# Patient Record
Sex: MALE | Race: WHITE | NOT HISPANIC OR LATINO | Employment: FULL TIME | URBAN - METROPOLITAN AREA
[De-identification: names, ages, dates, MRNs, and addresses within clinical notes are randomized per-mention and may not be internally consistent; named-entity substitution may affect disease eponyms.]

---

## 2019-10-09 ENCOUNTER — APPOINTMENT (OUTPATIENT)
Dept: RADIOLOGY | Facility: MEDICAL CENTER | Age: 59
End: 2019-10-09
Attending: STUDENT IN AN ORGANIZED HEALTH CARE EDUCATION/TRAINING PROGRAM
Payer: COMMERCIAL

## 2019-10-09 ENCOUNTER — APPOINTMENT (OUTPATIENT)
Dept: RADIOLOGY | Facility: MEDICAL CENTER | Age: 59
End: 2019-10-09
Attending: EMERGENCY MEDICINE
Payer: COMMERCIAL

## 2019-10-09 ENCOUNTER — HOSPITAL ENCOUNTER (OUTPATIENT)
Facility: MEDICAL CENTER | Age: 59
End: 2019-10-11
Attending: EMERGENCY MEDICINE | Admitting: INTERNAL MEDICINE
Payer: COMMERCIAL

## 2019-10-09 DIAGNOSIS — L03.115 CELLULITIS OF RIGHT LOWER EXTREMITY: ICD-10-CM

## 2019-10-09 DIAGNOSIS — L03.114 CELLULITIS OF LEFT UPPER EXTREMITY: ICD-10-CM

## 2019-10-09 PROBLEM — E87.1 HYPONATREMIA: Status: ACTIVE | Noted: 2019-10-09

## 2019-10-09 PROBLEM — F12.10 CANNABIS USE DISORDER, MILD, ABUSE: Chronic | Status: ACTIVE | Noted: 2019-10-09

## 2019-10-09 PROBLEM — F15.10 METHAMPHETAMINE USE (HCC): Status: ACTIVE | Noted: 2019-10-09

## 2019-10-09 PROBLEM — Z59.00 HOMELESS: Status: ACTIVE | Noted: 2019-10-09

## 2019-10-09 LAB
ALBUMIN SERPL BCP-MCNC: 4.1 G/DL (ref 3.2–4.9)
ALBUMIN/GLOB SERPL: 1.6 G/DL
ALP SERPL-CCNC: 72 U/L (ref 30–99)
ALT SERPL-CCNC: 10 U/L (ref 2–50)
AMPHET UR QL SCN: POSITIVE
ANION GAP SERPL CALC-SCNC: 8 MMOL/L (ref 0–11.9)
APPEARANCE UR: CLEAR
AST SERPL-CCNC: 16 U/L (ref 12–45)
BACTERIA #/AREA URNS HPF: NEGATIVE /HPF
BARBITURATES UR QL SCN: NEGATIVE
BASOPHILS # BLD AUTO: 0.5 % (ref 0–1.8)
BASOPHILS # BLD: 0.04 K/UL (ref 0–0.12)
BENZODIAZ UR QL SCN: NEGATIVE
BILIRUB SERPL-MCNC: 0.8 MG/DL (ref 0.1–1.5)
BILIRUB UR QL STRIP.AUTO: NEGATIVE
BUN SERPL-MCNC: 14 MG/DL (ref 8–22)
BZE UR QL SCN: NEGATIVE
CALCIUM SERPL-MCNC: 9.1 MG/DL (ref 8.5–10.5)
CANNABINOIDS UR QL SCN: POSITIVE
CHLORIDE SERPL-SCNC: 101 MMOL/L (ref 96–112)
CK SERPL-CCNC: 37 U/L (ref 0–154)
CO2 SERPL-SCNC: 24 MMOL/L (ref 20–33)
COLOR UR: YELLOW
CREAT SERPL-MCNC: 0.64 MG/DL (ref 0.5–1.4)
CRP SERPL HS-MCNC: 7.69 MG/DL (ref 0–0.75)
EOSINOPHIL # BLD AUTO: 0.17 K/UL (ref 0–0.51)
EOSINOPHIL NFR BLD: 1.9 % (ref 0–6.9)
EPI CELLS #/AREA URNS HPF: NEGATIVE /HPF
ERYTHROCYTE [DISTWIDTH] IN BLOOD BY AUTOMATED COUNT: 51.7 FL (ref 35.9–50)
ERYTHROCYTE [SEDIMENTATION RATE] IN BLOOD BY WESTERGREN METHOD: 27 MM/HOUR (ref 0–20)
EST. AVERAGE GLUCOSE BLD GHB EST-MCNC: 105 MG/DL
GLOBULIN SER CALC-MCNC: 2.6 G/DL (ref 1.9–3.5)
GLUCOSE SERPL-MCNC: 134 MG/DL (ref 65–99)
GLUCOSE UR STRIP.AUTO-MCNC: NEGATIVE MG/DL
GRAM STN SPEC: NORMAL
HBA1C MFR BLD: 5.3 % (ref 0–5.6)
HCT VFR BLD AUTO: 43 % (ref 42–52)
HGB BLD-MCNC: 14 G/DL (ref 14–18)
HYALINE CASTS #/AREA URNS LPF: ABNORMAL /LPF
IMM GRANULOCYTES # BLD AUTO: 0.05 K/UL (ref 0–0.11)
IMM GRANULOCYTES NFR BLD AUTO: 0.6 % (ref 0–0.9)
KETONES UR STRIP.AUTO-MCNC: NEGATIVE MG/DL
LACTATE BLD-SCNC: 1.7 MMOL/L (ref 0.5–2)
LEUKOCYTE ESTERASE UR QL STRIP.AUTO: ABNORMAL
LYMPHOCYTES # BLD AUTO: 1.12 K/UL (ref 1–4.8)
LYMPHOCYTES NFR BLD: 12.7 % (ref 22–41)
MCH RBC QN AUTO: 29 PG (ref 27–33)
MCHC RBC AUTO-ENTMCNC: 32.6 G/DL (ref 33.7–35.3)
MCV RBC AUTO: 89 FL (ref 81.4–97.8)
METHADONE UR QL SCN: NEGATIVE
MICRO URNS: ABNORMAL
MONOCYTES # BLD AUTO: 0.8 K/UL (ref 0–0.85)
MONOCYTES NFR BLD AUTO: 9 % (ref 0–13.4)
NEUTROPHILS # BLD AUTO: 6.67 K/UL (ref 1.82–7.42)
NEUTROPHILS NFR BLD: 75.3 % (ref 44–72)
NITRITE UR QL STRIP.AUTO: NEGATIVE
NRBC # BLD AUTO: 0 K/UL
NRBC BLD-RTO: 0 /100 WBC
OPIATES UR QL SCN: POSITIVE
OXYCODONE UR QL SCN: NEGATIVE
PCP UR QL SCN: NEGATIVE
PH UR STRIP.AUTO: 6.5 [PH] (ref 5–8)
PLATELET # BLD AUTO: 234 K/UL (ref 164–446)
PMV BLD AUTO: 9.2 FL (ref 9–12.9)
POTASSIUM SERPL-SCNC: 3.8 MMOL/L (ref 3.6–5.5)
PROPOXYPH UR QL SCN: NEGATIVE
PROT SERPL-MCNC: 6.7 G/DL (ref 6–8.2)
PROT UR QL STRIP: NEGATIVE MG/DL
RBC # BLD AUTO: 4.83 M/UL (ref 4.7–6.1)
RBC # URNS HPF: ABNORMAL /HPF
RBC UR QL AUTO: NEGATIVE
SIGNIFICANT IND 70042: NORMAL
SITE SITE: NORMAL
SODIUM SERPL-SCNC: 133 MMOL/L (ref 135–145)
SOURCE SOURCE: NORMAL
SP GR UR STRIP.AUTO: 1.01
UROBILINOGEN UR STRIP.AUTO-MCNC: 1 MG/DL
WBC # BLD AUTO: 8.9 K/UL (ref 4.8–10.8)
WBC #/AREA URNS HPF: ABNORMAL /HPF

## 2019-10-09 PROCEDURE — G0378 HOSPITAL OBSERVATION PER HR: HCPCS

## 2019-10-09 PROCEDURE — 85025 COMPLETE CBC W/AUTO DIFF WBC: CPT

## 2019-10-09 PROCEDURE — 71045 X-RAY EXAM CHEST 1 VIEW: CPT

## 2019-10-09 PROCEDURE — 700117 HCHG RX CONTRAST REV CODE 255: Performed by: STUDENT IN AN ORGANIZED HEALTH CARE EDUCATION/TRAINING PROGRAM

## 2019-10-09 PROCEDURE — 700101 HCHG RX REV CODE 250: Performed by: EMERGENCY MEDICINE

## 2019-10-09 PROCEDURE — 87070 CULTURE OTHR SPECIMN AEROBIC: CPT | Mod: XU

## 2019-10-09 PROCEDURE — 87186 SC STD MICRODIL/AGAR DIL: CPT

## 2019-10-09 PROCEDURE — 86140 C-REACTIVE PROTEIN: CPT

## 2019-10-09 PROCEDURE — 82550 ASSAY OF CK (CPK): CPT

## 2019-10-09 PROCEDURE — 96368 THER/DIAG CONCURRENT INF: CPT

## 2019-10-09 PROCEDURE — 96366 THER/PROPH/DIAG IV INF ADDON: CPT

## 2019-10-09 PROCEDURE — 700105 HCHG RX REV CODE 258: Performed by: EMERGENCY MEDICINE

## 2019-10-09 PROCEDURE — 80053 COMPREHEN METABOLIC PANEL: CPT

## 2019-10-09 PROCEDURE — 96365 THER/PROPH/DIAG IV INF INIT: CPT | Mod: XU

## 2019-10-09 PROCEDURE — 700105 HCHG RX REV CODE 258: Performed by: STUDENT IN AN ORGANIZED HEALTH CARE EDUCATION/TRAINING PROGRAM

## 2019-10-09 PROCEDURE — 73130 X-RAY EXAM OF HAND: CPT | Mod: LT

## 2019-10-09 PROCEDURE — 700111 HCHG RX REV CODE 636 W/ 250 OVERRIDE (IP): Performed by: EMERGENCY MEDICINE

## 2019-10-09 PROCEDURE — 36415 COLL VENOUS BLD VENIPUNCTURE: CPT

## 2019-10-09 PROCEDURE — 96375 TX/PRO/DX INJ NEW DRUG ADDON: CPT

## 2019-10-09 PROCEDURE — 700111 HCHG RX REV CODE 636 W/ 250 OVERRIDE (IP): Performed by: STUDENT IN AN ORGANIZED HEALTH CARE EDUCATION/TRAINING PROGRAM

## 2019-10-09 PROCEDURE — 83605 ASSAY OF LACTIC ACID: CPT

## 2019-10-09 PROCEDURE — 83036 HEMOGLOBIN GLYCOSYLATED A1C: CPT

## 2019-10-09 PROCEDURE — 87077 CULTURE AEROBIC IDENTIFY: CPT

## 2019-10-09 PROCEDURE — A9270 NON-COVERED ITEM OR SERVICE: HCPCS | Performed by: STUDENT IN AN ORGANIZED HEALTH CARE EDUCATION/TRAINING PROGRAM

## 2019-10-09 PROCEDURE — 87040 BLOOD CULTURE FOR BACTERIA: CPT

## 2019-10-09 PROCEDURE — 96372 THER/PROPH/DIAG INJ SC/IM: CPT

## 2019-10-09 PROCEDURE — 99407 BEHAV CHNG SMOKING > 10 MIN: CPT | Performed by: INTERNAL MEDICINE

## 2019-10-09 PROCEDURE — 81001 URINALYSIS AUTO W/SCOPE: CPT | Mod: XU

## 2019-10-09 PROCEDURE — 85652 RBC SED RATE AUTOMATED: CPT

## 2019-10-09 PROCEDURE — 96367 TX/PROPH/DG ADDL SEQ IV INF: CPT

## 2019-10-09 PROCEDURE — 73201 CT UPPER EXTREMITY W/DYE: CPT | Mod: LT

## 2019-10-09 PROCEDURE — 87086 URINE CULTURE/COLONY COUNT: CPT

## 2019-10-09 PROCEDURE — 700102 HCHG RX REV CODE 250 W/ 637 OVERRIDE(OP): Performed by: STUDENT IN AN ORGANIZED HEALTH CARE EDUCATION/TRAINING PROGRAM

## 2019-10-09 PROCEDURE — 80307 DRUG TEST PRSMV CHEM ANLYZR: CPT

## 2019-10-09 PROCEDURE — 99220 PR INITIAL OBSERVATION CARE,LEVL III: CPT | Mod: GC | Performed by: INTERNAL MEDICINE

## 2019-10-09 PROCEDURE — 99285 EMERGENCY DEPT VISIT HI MDM: CPT

## 2019-10-09 PROCEDURE — 87205 SMEAR GRAM STAIN: CPT

## 2019-10-09 RX ORDER — NICOTINE 21 MG/24HR
21 PATCH, TRANSDERMAL 24 HOURS TRANSDERMAL
Status: DISCONTINUED | OUTPATIENT
Start: 2019-10-09 | End: 2019-10-11 | Stop reason: HOSPADM

## 2019-10-09 RX ORDER — SULFAMETHOXAZOLE AND TRIMETHOPRIM 800; 160 MG/1; MG/1
2 TABLET ORAL EVERY 12 HOURS
Qty: 40 TAB | Refills: 0 | Status: ON HOLD | OUTPATIENT
Start: 2019-10-09 | End: 2019-10-11

## 2019-10-09 RX ORDER — CEPHALEXIN 500 MG/1
500 CAPSULE ORAL 4 TIMES DAILY
Qty: 40 CAP | Refills: 0 | Status: ON HOLD | OUTPATIENT
Start: 2019-10-09 | End: 2019-10-11 | Stop reason: SDUPTHER

## 2019-10-09 RX ORDER — SODIUM CHLORIDE, SODIUM LACTATE, POTASSIUM CHLORIDE, AND CALCIUM CHLORIDE .6; .31; .03; .02 G/100ML; G/100ML; G/100ML; G/100ML
30 INJECTION, SOLUTION INTRAVENOUS ONCE
Status: COMPLETED | OUTPATIENT
Start: 2019-10-09 | End: 2019-10-09

## 2019-10-09 RX ORDER — SODIUM CHLORIDE 9 MG/ML
INJECTION, SOLUTION INTRAVENOUS CONTINUOUS
Status: DISCONTINUED | OUTPATIENT
Start: 2019-10-09 | End: 2019-10-10

## 2019-10-09 RX ORDER — POLYETHYLENE GLYCOL 3350 17 G/17G
1 POWDER, FOR SOLUTION ORAL
Status: DISCONTINUED | OUTPATIENT
Start: 2019-10-09 | End: 2019-10-11 | Stop reason: HOSPADM

## 2019-10-09 RX ORDER — BISACODYL 10 MG
10 SUPPOSITORY, RECTAL RECTAL
Status: DISCONTINUED | OUTPATIENT
Start: 2019-10-09 | End: 2019-10-11 | Stop reason: HOSPADM

## 2019-10-09 RX ORDER — AMOXICILLIN 250 MG
2 CAPSULE ORAL 2 TIMES DAILY
Status: DISCONTINUED | OUTPATIENT
Start: 2019-10-09 | End: 2019-10-11 | Stop reason: HOSPADM

## 2019-10-09 RX ORDER — KETOROLAC TROMETHAMINE 30 MG/ML
10 INJECTION, SOLUTION INTRAMUSCULAR; INTRAVENOUS EVERY 6 HOURS PRN
Status: DISCONTINUED | OUTPATIENT
Start: 2019-10-09 | End: 2019-10-11 | Stop reason: HOSPADM

## 2019-10-09 RX ORDER — ACETAMINOPHEN 325 MG/1
650 TABLET ORAL EVERY 6 HOURS PRN
Status: DISCONTINUED | OUTPATIENT
Start: 2019-10-09 | End: 2019-10-11 | Stop reason: HOSPADM

## 2019-10-09 RX ORDER — POTASSIUM CHLORIDE 20 MEQ/1
40 TABLET, EXTENDED RELEASE ORAL ONCE
Status: COMPLETED | OUTPATIENT
Start: 2019-10-09 | End: 2019-10-09

## 2019-10-09 RX ADMIN — IOHEXOL 100 ML: 350 INJECTION, SOLUTION INTRAVENOUS at 17:43

## 2019-10-09 RX ADMIN — KETOROLAC TROMETHAMINE 9.99 MG: 30 INJECTION, SOLUTION INTRAMUSCULAR at 20:25

## 2019-10-09 RX ADMIN — SODIUM CHLORIDE: 9 INJECTION, SOLUTION INTRAVENOUS at 20:16

## 2019-10-09 RX ADMIN — METRONIDAZOLE 500 MG: 500 INJECTION, SOLUTION INTRAVENOUS at 05:11

## 2019-10-09 RX ADMIN — VANCOMYCIN HYDROCHLORIDE 1500 MG: 500 INJECTION, POWDER, LYOPHILIZED, FOR SOLUTION INTRAVENOUS at 18:33

## 2019-10-09 RX ADMIN — ENOXAPARIN SODIUM 40 MG: 100 INJECTION SUBCUTANEOUS at 11:14

## 2019-10-09 RX ADMIN — CEFTRIAXONE SODIUM 2 G: 2 INJECTION, POWDER, FOR SOLUTION INTRAMUSCULAR; INTRAVENOUS at 04:49

## 2019-10-09 RX ADMIN — POTASSIUM CHLORIDE 40 MEQ: 1500 TABLET, EXTENDED RELEASE ORAL at 13:40

## 2019-10-09 RX ADMIN — VANCOMYCIN HYDROCHLORIDE 2600 MG: 500 INJECTION, POWDER, LYOPHILIZED, FOR SOLUTION INTRAVENOUS at 06:17

## 2019-10-09 RX ADMIN — NICOTINE TRANSDERMAL SYSTEM 21 MG: 21 PATCH, EXTENDED RELEASE TRANSDERMAL at 11:14

## 2019-10-09 RX ADMIN — ACETAMINOPHEN 650 MG: 325 TABLET, FILM COATED ORAL at 17:54

## 2019-10-09 RX ADMIN — SODIUM CHLORIDE, POTASSIUM CHLORIDE, SODIUM LACTATE AND CALCIUM CHLORIDE 2604 ML: 600; 310; 30; 20 INJECTION, SOLUTION INTRAVENOUS at 04:43

## 2019-10-09 RX ADMIN — SENNOSIDES, DOCUSATE SODIUM 2 TABLET: 50; 8.6 TABLET, FILM COATED ORAL at 18:31

## 2019-10-09 ASSESSMENT — ENCOUNTER SYMPTOMS
DIAPHORESIS: 0
BLOOD IN STOOL: 0
HALLUCINATIONS: 0
DEPRESSION: 0
NERVOUS/ANXIOUS: 0
WEAKNESS: 0
SHORTNESS OF BREATH: 0
FEVER: 1
FEVER: 0
MYALGIAS: 0
WEIGHT LOSS: 0
CONSTIPATION: 0
CHILLS: 0
DIARRHEA: 0
ABDOMINAL PAIN: 0
HEADACHES: 0
VOMITING: 0
INSOMNIA: 0
SENSORY CHANGE: 0
SORE THROAT: 0
DIZZINESS: 0
COUGH: 0
PALPITATIONS: 0

## 2019-10-09 ASSESSMENT — LIFESTYLE VARIABLES
TOTAL SCORE: 0
TOTAL SCORE: 0
HOW MANY TIMES IN THE PAST YEAR HAVE YOU HAD 5 OR MORE DRINKS IN A DAY: 0
HAVE YOU EVER FELT YOU SHOULD CUT DOWN ON YOUR DRINKING: NO
TOTAL SCORE: 0
CONSUMPTION TOTAL: NEGATIVE
EVER HAD A DRINK FIRST THING IN THE MORNING TO STEADY YOUR NERVES TO GET RID OF A HANGOVER: NO
ALCOHOL_USE: YES
DOES PATIENT WANT TO STOP DRINKING: NO
EVER FELT BAD OR GUILTY ABOUT YOUR DRINKING: NO
HAVE PEOPLE ANNOYED YOU BY CRITICIZING YOUR DRINKING: NO
ON A TYPICAL DAY WHEN YOU DRINK ALCOHOL HOW MANY DRINKS DO YOU HAVE: 1
EVER_SMOKED: YES
AVERAGE NUMBER OF DAYS PER WEEK YOU HAVE A DRINK CONTAINING ALCOHOL: 0
SUBSTANCE_ABUSE: 1

## 2019-10-09 ASSESSMENT — COGNITIVE AND FUNCTIONAL STATUS - GENERAL
SUGGESTED CMS G CODE MODIFIER DAILY ACTIVITY: CH
MOBILITY SCORE: 24
DAILY ACTIVITIY SCORE: 24
SUGGESTED CMS G CODE MODIFIER MOBILITY: CH

## 2019-10-09 NOTE — DISCHARGE INSTRUCTIONS
Discharge Instructions    Discharged to home by car with relative. Discharged via walking, hospital escort: Refused.  Special equipment needed: Not Applicable    Be sure to schedule a follow-up appointment with your primary care doctor or any specialists as instructed.     Discharge Plan:   Diet Plan: Discussed  Activity Level: Discussed  Confirmed Follow up Appointment: Patient to Call and Schedule Appointment  Confirmed Symptoms Management: Discussed  Medication Reconciliation Updated: No (Comments)  Influenza Vaccine Indication: Patient Refuses    I understand that a diet low in cholesterol, fat, and sodium is recommended for good health. Unless I have been given specific instructions below for another diet, I accept this instruction as my diet prescription.   Other diet: Regular     Special Instructions:       Cellulitis, Adult  Introduction  Cellulitis is a skin infection. The infected area is usually red and sore. This condition occurs most often in the arms and lower legs. It is very important to get treated for this condition.  Follow these instructions at home:  · Take over-the-counter and prescription medicines only as told by your doctor.  · If you were prescribed an antibiotic medicine, take it as told by your doctor. Do not stop taking the antibiotic even if you start to feel better.  · Drink enough fluid to keep your pee (urine) clear or pale yellow.  · Do not touch or rub the infected area.  · Raise (elevate) the infected area above the level of your heart while you are sitting or lying down.  · Place warm or cold wet cloths (warm or cold compresses) on the infected area. Do this as told by your doctor.  · Keep all follow-up visits as told by your doctor. This is important. These visits let your doctor make sure your infection is not getting worse.  Contact a doctor if:  · You have a fever.  · Your symptoms do not get better after 1-2 days of treatment.  · Your bone or joint under the infected area  starts to hurt after the skin has healed.  · Your infection comes back. This can happen in the same area or another area.  · You have a swollen bump in the infected area.  · You have new symptoms.  · You feel ill and also have muscle aches and pains.  Get help right away if:  · Your symptoms get worse.  · You feel very sleepy.  · You throw up (vomit) or have watery poop (diarrhea) for a long time.  · There are red streaks coming from the infected area.  · Your red area gets larger.  · Your red area turns darker.  This information is not intended to replace advice given to you by your health care provider. Make sure you discuss any questions you have with your health care provider.  Document Released: 06/05/2009 Document Revised: 05/25/2017 Document Reviewed: 10/26/2016  © 2017 [employer]      · Is patient discharged on Warfarin / Coumadin?   No     Depression / Suicide Risk    As [facility] are [facility], it is important to learn how to keep safe from harming yourself.    Recognize the warning signs:  · Abrupt changes in personality, positive or negative- including increase in energy   · Giving away possessions  · Change in eating patterns- significant weight changes-  positive or negative  · Change in sleeping patterns- unable to sleep or sleeping all the time   · Unwillingness or inability to communicate  · Depression  · Unusual sadness, discouragement and loneliness  · Talk of wanting to die  · Neglect of personal appearance   · Rebelliousness- reckless behavior  · Withdrawal from people/activities they love  · Confusion- inability to concentrate     If you or a loved one observes any of these behaviors or has concerns about self-harm, here's what you can do:  · Talk about it- your feelings and reasons for harming yourself  · Remove any means that you might use to hurt yourself (examples: pills, rope, extension cords, firearm)  · Get professional help from the community (Mental Health,  Substance Abuse, psychological counseling)  · Do not be alone:Call your Safe Contact- someone whom you trust who will be there for you.  · Call your local CRISIS HOTLINE 217-5357 or 564-475-4695  · Call your local Children's Mobile Crisis Response Team [place] (743) 141-9637 or www.[employer]  · Call the toll free National Suicide Prevention Hotlines   · National Suicide Prevention Lifeline 234-228-RRXX (0331)  · National Hope Line Network 800-SUICIDE (387-1619)

## 2019-10-09 NOTE — ASSESSMENT & PLAN NOTE
History of methamphetamine use recent for pain per patient.  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  -Patient counseled on methamphetamine use cessation.

## 2019-10-09 NOTE — ASSESSMENT & PLAN NOTE
-Markedly improved  -MRI could not be obtained due to spinal cord stimulator.  -CT scan of the left hand 10/9 shows soft tissue swelling without involvement of bone.  No signs of abscess.  -History of MRSA left hip abscess in 2010.  -Cultures with light growth of MSSA, heavy growth of beta-hemolytic Streptococcus group A.   -Changing to PO cephalexin from IV cefazolin on discharge

## 2019-10-09 NOTE — PROGRESS NOTES
Pt unable to have MRI left hand due to implanted stimulator in lower back area. Pt has no device information. Nurse notified

## 2019-10-09 NOTE — ED PROVIDER NOTES
ED Provider Note    ED Provider Note      Primary care provider: Pcp Pt States None    CHIEF COMPLAINT  Chief Complaint   Patient presents with   • Bug Bite     was bitten by something possibly on Sunday. left hand has been swelling since, almost double in size   • Hand Swelling     left hand, says it did start to get better but now has swollen up again       HPI  Gonsalo Hunt is a 59 y.o. male who presents to the Emergency Department with chief complaint of bug bite and hand swelling.  Patient is currently homeless and reports that he thinks he was bitten by something on the palm of his left foot days prior.  He reports increasing redness pain and swelling throughout the left hand.  He states that he has had slight chills no measured fever minor nausea without emesis.  He has moderate pain that extends from the hand into the forearm.  Patient states that he actually has better mobility of the hand now than he did yesterday states that he was unable to move his fingers due to the pain and swelling yesterday.  Patient also reports some redness and swelling of the skin on the anterior aspect of his right lower extremity.  No headache altered mental status cough congestion chest pain shortness of breath no abdominal pain no problems with urination or bowel movements no other acute symptoms or concerns at this time.    REVIEW OF SYSTEMS  10 systems reviewed and otherwise negative, pertinent positives and negatives listed in the history of present illness.    PAST MEDICAL HISTORY    Patient denies  SURGICAL HISTORY   has a past surgical history that includes other orthopedic surgery and other abdominal surgery.    SOCIAL HISTORY  Social History     Tobacco Use   • Smoking status: Current Every Day Smoker     Packs/day: 1.00     Types: Cigarettes   Substance Use Topics   • Alcohol use: Yes     Comment: rarely   • Drug use: No      Social History     Substance and Sexual Activity   Drug Use No       FAMILY  "HISTORY  Non-Contributory    CURRENT MEDICATIONS  Home Medications     Reviewed by Saroj Leslie R.N. (Registered Nurse) on 10/09/19 at 0401  Med List Status: <None>   Medication Last Dose Status        Patient Aquiles Taking any Medications                       ALLERGIES  Allergies   Allergen Reactions   • Pcn [Penicillins]    • Tylenol W/Codeine [Acetaminophen-Codeine]        PHYSICAL EXAM  VITAL SIGNS: /76   Pulse 91   Temp 36.8 °C (98.2 °F) (Temporal)   Resp 16   Ht 1.93 m (6' 4\")   Wt 102 kg (224 lb 13.9 oz)   SpO2 97%   BMI 27.37 kg/m²   Pulse ox interpretation: I interpret this pulse ox as normal.  Constitutional: Alert and oriented x 3, moderate distress  HEENT: Atraumatic normocephalic, pupils are equal round reactive to light extraocular movements are intact. The nares is clear, external ears are normal, mouth shows dry mucous membranes  Neck: Supple, no JVD no tracheal deviation  Cardiovascular: Tachycardic no murmur rub or gallop 2+ pulses peripherally x4  Thorax & Lungs: No respiratory distress, no wheezes rales or rhonchi, No chest tenderness.   GI: Soft nontender nondistended positive bowel sounds, no peritoneal signs  Skin: Patient has several areas of chronic appearing skin breakdown over the upper and lower extremities.  In the left upper extremity patient has a small skin defect of a proximally 1 cm at the central palm the hand with minimal purulence at the site.  Patient has erythema and induration throughout the hand both of the palmar and the dorsal aspect there is erythema and induration does extend at the posterior aspect of the left forearm to just distal to the elbow.  There is no axillary or antecubital fossa lymphadenopathy no other streaking.  Patient has a secondary area of concern in the right lower extremity approximately 15 cm in the anterior right lower extremity with erythema and induration no fluctuance no drainage.  Musculoskeletal: Moving all extremities " with full range and 5 of 5 strength, no acute  deformity swelling of the left hand as described above and slight decrease in  strength in left hand due to swelling.  Normal cap refill and sensation both radially and ulnarly in all fingers.  Neurologic: Cranial nerves III through XII are grossly intact, no sensory deficit, no cerebellar dysfunction   Psychiatric: Appropriate affect for situation at this time      DIAGNOSTIC STUDIES / PROCEDURES  LABS    Results for orders placed or performed during the hospital encounter of 10/25/11   CBC WITH DIFFERENTIAL   Result Value Ref Range    WBC 7.3 4.8 - 10.8 K/uL    RBC 4.56 (L) 4.70 - 6.10 M/uL    Hemoglobin 14.9 14.0 - 18.0 g/dL    Hematocrit 42.1 42.0 - 52.0 %    MCV 92.2 79.0 - 98.0 fL    MCH 32.7 27.0 - 33.0 pg    MCHC 35.5 (H) 30.0 - 35.0 g/dL    RDW 14.0 12.0 - 16.2 %    Platelet Count 246 164 - 446 K/uL    MPV 7.6 6.7 - 10.4 fL    Neutrophils-Polys 69.9 44.0 - 72.0 %    Lymphocytes 20.9 (L) 22.0 - 41.0 %    Monocytes 6.5 1.0 - 9.0 %    Eosinophils 2.1 0.0 - 6.0 %    Basophils 0.6 0.0 - 2.0 %   COMP METABOLIC PANEL   Result Value Ref Range    Sodium 136 135 - 145 mmol/L    Potassium 3.7 3.6 - 5.5 mmol/L    Chloride 105 96 - 112 mmol/L    Co2 21 20 - 33 mmol/L    Anion Gap 10.0 0.0 - 11.9    Glucose 99 65 - 99 mg/dL    Bun 15 8 - 22 mg/dL    Creatinine 0.60 0.50 - 1.40 mg/dL    Calcium 9.3 8.4 - 10.2 mg/dL    AST(SGOT) 21 12 - 45 U/L    ALT(SGPT) 20 2 - 50 U/L    Alkaline Phosphatase 98 30 - 99 U/L    Total Bilirubin 0.5 0.1 - 1.5 mg/dL    Albumin 4.2 3.2 - 4.9 g/dL    Total Protein 7.3 6.0 - 8.2 g/dL    Globulin 3.1 1.9 - 3.5 g/dL    A-G Ratio 1.4 g/dL   LIPASE   Result Value Ref Range    Lipase 41 7 - 58 U/L   RBC MORPHOLOGY   Result Value Ref Range    RBC Morphology Normal    ESTIMATED GFR   Result Value Ref Range    GFR If African American >60 mL/min/1.73 m 2    GFR If Non African American >60 mL/min/1.73 m 2       All labs reviewed by  "me.      RADIOLOGY  DX-CHEST-PORTABLE (1 VIEW)    (Results Pending)     The radiologist's interpretation of all radiological studies have been reviewed by me.    COURSE & MEDICAL DECISION MAKING  Pertinent Labs & Imaging studies reviewed. (See chart for details)    4:48 AM - Patient seen and examined at bedside.       Patient noted to have slightly elevated blood pressure likely circumstantial secondary to presenting complaint. Referred to primary care physician for further evaluation.     Patient was given IV fluids based on tachycardia dry mucous membranes concern for sepsis, oral hydration was not attempted due to insufficiency for hydration, after fluids had improvement of symptoms, improvement of vital signs    Medical Decision Making: Patient has significant cellulitis of the left hand in the left upper extremity as well as secondary lesion in the right lower extremity.  He does have some minimal purulent drainage from the palmar aspect of the hand skin breakdown.  Patient was given broad-spectrum antibiotic's in the emergency department vancomycin Rocephin Flagyl patient be admitted to the [facility] resident housestaff for further evaluation and treatment.  There is no obvious fluctuance in the hand but the patient may benefit from MRI of the hand should the cellulitis failed to improve with broad-spectrum antibiotics.  Admitted in guarded condition.    /76   Pulse 91   Temp 36.8 °C (98.2 °F) (Temporal)   Resp 16   Ht 1.93 m (6' 4\")   Wt 102 kg (224 lb 13.9 oz)   SpO2 97%   BMI 27.37 kg/m²     [address] [address] [phone]  Schedule an appointment as soon as possible for a visit   for establishment of primary care, for blood pressure management    [facility], Emergency Dept  [address] [facility] [address] [phone]    immediately if symptoms worsen      New Prescriptions    CEPHALEXIN (KEFLEX) 500 MG CAP    Take 1 Cap by " mouth 4 times a day for 10 days.    SULFAMETHOXAZOLE-TRIMETHOPRIM (BACTRIM DS) 800-160 MG TABLET    Take 2 Tabs by mouth every 12 hours for 10 days.       FINAL IMPRESSION  1. Cellulitis of left upper extremity Active   2. Cellulitis of right lower extremity Active       This dictation has been created using voice recognition software and/or scribes. The accuracy of the dictation is limited by the abilities of the software and the expertise of the scribes. I expect there may be some errors of grammar and possibly content. I made every attempt to manually correct the errors within my dictation. However, errors related to voice recognition software and/or scribes may still exist and should be interpreted within the appropriate context.

## 2019-10-09 NOTE — ED TRIAGE NOTES
"Gonsalo Hunt   59 y.o. male   Chief Complaint   Patient presents with   • Bug Bite     was bitten by something possibly on Sunday. left hand has been swelling since, almost double in size   • Hand Swelling     left hand, says it did start to get better but now has swollen up again      Pt amb to triage with steady gait for above complaint. + CMS though pt says it is difficult to move the fingers on his left hand.      Pt is alert and oriented, speaking in full sentences, follows commands and responds appropriately to questions. NAD. Resp are even and unlabored.   Pt placed in lobby. Pt educated on triage process. Pt encouraged to alert staff for any changes.    /91   Pulse (!) 106   Temp 36.8 °C (98.2 °F) (Temporal)   Resp 16   Ht 1.93 m (6' 4\")   Wt 102 kg (224 lb 13.9 oz)   SpO2 97%   BMI 27.37 kg/m²     "

## 2019-10-09 NOTE — ASSESSMENT & PLAN NOTE
Improved.   3 inch lesion seen, with granulation tissue. Markedly improved regression of erythema around wound at the tibial area.   1cm inch lesion at the lateral calf with very mild erythema     -Change antibiotics as per above.
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
## 2019-10-09 NOTE — NON-PROVIDER
Internal Medicine Admitting History and Physical    Note Author: Lior Traore, Student       Name Gonsalo Hunt       1960   Age/Sex 59 y.o. male   MRN [id number]   Code Status FULL     After 5PM or if no immediate response to page, please call for cross-coverage  Attending/Team: White See Patient List for primary contact information  Call [phone] to page    1st Call - Day Intern (R1):   Dr. Will 2nd Call - Day Sr. Resident (R2/R3):   Dr. Rai       Chief Complaint:   Left hand and arm pain for 4 days     HPI:  KRISTAN Hunt is a homeless man with a history of heroin IV drug use (last use 4-5 years ago) presenting with worsening left arm and hand pain with swelling for 4 days. He reports that the he thinks he was bitten by a bug. He endorses as well some redness and swelling on the anterior aspect of his right lower extremity. Denies fevers or chills. Currently smokes one pack of cigarettes a day and marijuana but denies using any other recreational drug. He reports smoking amphetamines about 3 days ago to control the pain.     Review of Systems   Constitutional: Negative for diaphoresis, fever and malaise/fatigue.   HENT: Negative for sore throat.    Respiratory: Negative for shortness of breath.    Cardiovascular: Negative for chest pain.   Gastrointestinal: Negative for abdominal pain, blood in stool and constipation.   Neurological: Negative for weakness.             Past Medical History (Chronic medical problem, known complications and current treatment)    Denies any.    Past Surgical History:  Past Surgical History:   Procedure Laterality Date   • OTHER ABDOMINAL SURGERY      gall bladder   • OTHER ORTHOPEDIC SURGERY      laminectomy        Current Outpatient Medications:  Home Medications     Reviewed by Christain Romero (Pharmacy Tech) on 10/09/19 at 0722  Med List Status: Complete   Medication Last Dose Status        Patient Aquiles Taking any Medications                        Medication Allergy/Sensitivities:  Allergies   Allergen Reactions   • Codeine Rash     Rash     • Pcn [Penicillins] Unspecified     Unknown reaction. Tolerated ceftriaxone on 10/9/19           Family History (mandatory)   No family history on file.    Social History (mandatory)   Social History     Socioeconomic History   • Marital status: Single     Spouse name: Not on file   • Number of children: Not on file   • Years of education: Not on file   • Highest education level: Not on file   Occupational History   • Not on file   Social Needs   • Financial resource strain: Not on file   • Food insecurity:     Worry: Not on file     Inability: Not on file   • Transportation needs:     Medical: Not on file     Non-medical: Not on file   Tobacco Use   • Smoking status: Current Every Day Smoker     Packs/day: 1.00     Types: Cigarettes   Substance and Sexual Activity   • Alcohol use: Yes     Comment: rarely   • Drug use: No   • Sexual activity: Not on file   Lifestyle   • Physical activity:     Days per week: Not on file     Minutes per session: Not on file   • Stress: Not on file   Relationships   • Social connections:     Talks on phone: Not on file     Gets together: Not on file     Attends Jewish service: Not on file     Active member of club or organization: Not on file     Attends meetings of clubs or organizations: Not on file     Relationship status: Not on file   • Intimate partner violence:     Fear of current or ex partner: Not on file     Emotionally abused: Not on file     Physically abused: Not on file     Forced sexual activity: Not on file   Other Topics Concern   • Not on file   Social History Narrative   • Not on file     Living situation: Homeless  PCP : Pcp Pt States None    Physical Exam     Vitals:    10/09/19 0700 10/09/19 0730 10/09/19 0800 10/09/19 0930   BP: 119/74 121/76 110/76 122/78   Pulse: 72 71 71 69   Resp: 18      Temp:       TempSrc:       SpO2: 96% 97% 94% 94%   Weight:        Height:         Body mass index is 27.37 kg/m².  O2 therapy: Pulse Oximetry: 94 %    Physical Exam   Constitutional: He is oriented to person, place, and time and well-developed, well-nourished, and in no distress. Vital signs are normal.   HENT:   Head: Normocephalic and atraumatic.   Right Ear: External ear normal.   Left Ear: External ear normal.   Eyes: Pupils are equal, round, and reactive to light. Conjunctivae, EOM and lids are normal.   Neck: Neck supple.   Cardiovascular: Normal rate, regular rhythm, normal heart sounds and normal pulses. Exam reveals no gallop.   No murmur heard.  Pulmonary/Chest: Effort normal and breath sounds normal.   Abdominal: Normal appearance and bowel sounds are normal. There is no tenderness. There is no rebound.   Musculoskeletal:        Right shoulder: Tenderness: Left arm and hand. Swelling: Left arm and hand. Pain: Left arm and hand.        Left forearm: He exhibits tenderness and swelling.        Left hand: He exhibits decreased range of motion, tenderness and swelling. Decreased sensation noted.   Lymphadenopathy:     He has no cervical adenopathy.   Neurological: He is alert and oriented to person, place, and time. A sensory deficit (Upper extremity bilaterally) is present.   Skin: Skin is warm. Ecchymosis and lesion (Left hand purulent) noted. Laceration: Left hand. There is erythema (Left hand and right leg).   Psychiatric: Mood, memory, affect and judgment normal.         Data Review       Old Records Request:   Deferred  Current Records review/summary: Completed    Lab Data Review:  Recent Results (from the past 24 hour(s))   LACTIC ACID    Collection Time: 10/09/19  4:04 AM   Result Value Ref Range    Lactic Acid 1.7 0.5 - 2.0 mmol/L   CBC WITH DIFFERENTIAL    Collection Time: 10/09/19  4:04 AM   Result Value Ref Range    WBC 8.9 4.8 - 10.8 K/uL    RBC 4.83 4.70 - 6.10 M/uL    Hemoglobin 14.0 14.0 - 18.0 g/dL    Hematocrit 43.0 42.0 - 52.0 %    MCV 89.0 81.4 - 97.8  fL    MCH 29.0 27.0 - 33.0 pg    MCHC 32.6 (L) 33.7 - 35.3 g/dL    RDW 51.7 (H) 35.9 - 50.0 fL    Platelet Count 234 164 - 446 K/uL    MPV 9.2 9.0 - 12.9 fL    Neutrophils-Polys 75.30 (H) 44.00 - 72.00 %    Lymphocytes 12.70 (L) 22.00 - 41.00 %    Monocytes 9.00 0.00 - 13.40 %    Eosinophils 1.90 0.00 - 6.90 %    Basophils 0.50 0.00 - 1.80 %    Immature Granulocytes 0.60 0.00 - 0.90 %    Nucleated RBC 0.00 /100 WBC    Neutrophils (Absolute) 6.67 1.82 - 7.42 K/uL    Lymphs (Absolute) 1.12 1.00 - 4.80 K/uL    Monos (Absolute) 0.80 0.00 - 0.85 K/uL    Eos (Absolute) 0.17 0.00 - 0.51 K/uL    Baso (Absolute) 0.04 0.00 - 0.12 K/uL    Immature Granulocytes (abs) 0.05 0.00 - 0.11 K/uL    NRBC (Absolute) 0.00 K/uL   COMP METABOLIC PANEL    Collection Time: 10/09/19  4:04 AM   Result Value Ref Range    Sodium 133 (L) 135 - 145 mmol/L    Potassium 3.8 3.6 - 5.5 mmol/L    Chloride 101 96 - 112 mmol/L    Co2 24 20 - 33 mmol/L    Anion Gap 8.0 0.0 - 11.9    Glucose 134 (H) 65 - 99 mg/dL    Bun 14 8 - 22 mg/dL    Creatinine 0.64 0.50 - 1.40 mg/dL    Calcium 9.1 8.5 - 10.5 mg/dL    AST(SGOT) 16 12 - 45 U/L    ALT(SGPT) 10 2 - 50 U/L    Alkaline Phosphatase 72 30 - 99 U/L    Total Bilirubin 0.8 0.1 - 1.5 mg/dL    Albumin 4.1 3.2 - 4.9 g/dL    Total Protein 6.7 6.0 - 8.2 g/dL    Globulin 2.6 1.9 - 3.5 g/dL    A-G Ratio 1.6 g/dL   ESTIMATED GFR    Collection Time: 10/09/19  4:04 AM   Result Value Ref Range    GFR If African American >60 >60 mL/min/1.73 m 2    GFR If Non African American >60 >60 mL/min/1.73 m 2   URINALYSIS    Collection Time: 10/09/19  6:50 AM   Result Value Ref Range    Color Yellow     Character Clear     Specific Gravity 1.015 <1.035    Ph 6.5 5.0 - 8.0    Glucose Negative Negative mg/dL    Ketones Negative Negative mg/dL    Protein Negative Negative mg/dL    Bilirubin Negative Negative    Urobilinogen, Urine 1.0 Negative    Nitrite Negative Negative    Leukocyte Esterase Trace (A) Negative    Occult Blood Negative  Negative    Micro Urine Req Microscopic    URINE MICROSCOPIC (W/UA)    Collection Time: 10/09/19  6:50 AM   Result Value Ref Range    WBC 5-10 (A) /hpf    RBC 0-2 (A) /hpf    Bacteria Negative None /hpf    Epithelial Cells Negative /hpf    Hyaline Cast 0-2 /lpf   URINE DRUG SCREEN    Collection Time: 10/09/19  6:50 AM   Result Value Ref Range    Amphetamines Urine Positive (A) Negative    Barbiturates Negative Negative    Benzodiazepines Negative Negative    Cocaine Metabolite Negative Negative    Methadone Negative Negative    Opiates Positive (A) Negative    Oxycodone Negative Negative    Phencyclidine -Pcp Negative Negative    Propoxyphene Negative Negative    Cannabinoid Metab Positive (A) Negative       Imaging/Procedures Review:    Independant Imaging Review: Completed  DX-CHEST-PORTABLE (1 VIEW)   Final Result         1.  Left basilar atelectasis, no focal infiltrate   2.  Cardiomegaly      MR-HAND - WITH & W/O LEFT    (Results Pending)          EKG:   EKG Independent Review: Deferred  QTc:, HR: , Normal Sinus Rhythm, no ST/T changes     Records reviewed and summarized in current documentation :  Yes  [employer] teaching service handout given to patient:  No         Assessment/Plan     Assessment & plan notes cannot be loaded without a specified hospital service.  # Left hand purulent cellulitis  Assessment:  -59 y.o. homeless man in no acute distress with purulent cellulitis of L-hand and no signs of systemic infection. SIRS score is 0.     Plan:  -Blood cultures (ED)  -Left hand x-ray  -Left hand MRI w/ and w/o contrast  -Wound culture  -Continue IV Vancomycin  -Toxicology screen  -CMP, UA,   -Limb elevation  -Possibly consult with orthopedic surgery depending on MRI results or signs of pain worsening concerning with compartment syndrome  -Tylenol as needed for pain. Toradol mg IV q 6 hrs as needed for breakthrough pain.    # Right lower extremity cellulitis  -Continue IV Vancomycin  -Continue close monitoring      Anticipated Hospital stay:  >2 midnights        Quality Measures  Quality-Core Measures  PCP: Pcp Pt States None

## 2019-10-09 NOTE — ED NOTES
MEDICATION RECONCILIATION UPDATED AND COMPLETE PER PT AT BEDSIDE        PT REPORTS NO PRESCRIPTION OR OTC MEDICATIONS

## 2019-10-09 NOTE — PROGRESS NOTES
Pt arrived from ED with all belongings. Pt oriented to room. Pt requests keeping belongings in room. No c/o pain at this moment. Pt ambulating without difficulty.

## 2019-10-09 NOTE — SENIOR ADMIT NOTE
SENIOR ADMIT NOTE     CC:  Left hand and arm pain x 4 days.     HPI:   Mr. Hunt is a pleasant 59-year-old homeless gentleman with a history of distant IV drug use (heroin, last use 5 years ago) and MRSA hip abscess in 2010, who presents with worsening left arm swelling and pain following what he believed was a bug bite approximately 4 days ago.  He also has mild erythema and tenderness in the right lower extremity as well.  Denies any fevers or chills.  Currently uses marijuana occasionally but no other illicit drug use.  No significant alcohol use.  He reports a penicillin allergy as a child but does not remember the reaction.    ER course:  X-ray of the chest showed left basilar atelectasis without focal infiltrate.  Cardiomegaly.  White count was normal.  Afebrile.  Mild tachycardia on arrival, which is improved.  No other SIRS criteria.  Started on vancomycin, ceftriaxone, and metronidazole IV per ER physician.  Blood cultures obtained per ER physician.      Physical exam:  Afebrile.  Vital signs stable.  Patient has sleeping comfortably.  He is alert and oriented.  There is purulent drainage from a less than 15 millimeter opening from the volar aspect of the left hand.  There is exquisite tenderness to palpation of the left hand as well as the forearm up to the elbow.  Significant induration and swelling with erythema.  Right lower extremity shin has 3 x 5 cm excoriation with surrounding erythema and tenderness.     Laboratory studies:  White count 8.9, with a left shift.  Hemoglobin 14.0, platelets 234.  Sodium mildly low at 133, glucose 134.  Lactic acid 1.7.       Imaging studies:  As per above.     Assessment and plan:  #Purulent cellulitis of left hand  #Nonpurulent cellulitis of right lower extremity  #Distant history of IV drug use with heroin dependence  #Cannabis use disorder  #Homelessness    -Admit to medicine for observation.  -Wound culture obtained after cleaning surrounding area with alcohol  swab and and expressing purulence.  -MRI left hand with and without contrast to rule out osteomyelitis.  -Continue IV vancomycin due to concern for MRSA given history of IV drug use albeit distant.  -Discontinue metronidazole since low suspicion for anaerobic involvement.  -Tylenol as needed for pain.  Toradol 10 mg IV every 6 hours as needed for breakthrough pain.        Please see Dr. Will's history and physical for complete details.

## 2019-10-09 NOTE — PROGRESS NOTES
"Pharmacy Kinetics 59 y.o. male on vancomycin day # 1 10/9/2019    Currently on Vancomycin new start  10/9 at 0617 vancomycin 2600 mg (25 mg/kg) X 1 dose     Provider specified end date: not specified     Indication for Treatment: Skin and soft tissue infection    Pertinent history per medical record: Admitted on 10/9/2019 for cellulitis of left hand and left upper extremity. Patient also has secondary lesion of right lower extremity. Per chart review, noted to have purulent drainage from palmar aspect of hand. Patient has history of homelessness, remote IVDU (5 years ago) and complaint of recent bug bite to hand/foot. Patient received vancomycin, ceftriaxone, metronidazole in the ER given concern for deeper tissue involvement. MRI ordered. On admission, patient continued vancomycin.     Other antibiotics: ceftriaxone, metronidazole in the ER on 10/9/19    Allergies: Codeine and Pcn [penicillins]     List concerns for renal function: nephrotoxic drugs    Pertinent cultures to date:   None this admission   2010 history of MRSA from left hip abscess wound      Imaging  10/9 MRI ordered      Recent Labs     10/09/19  0404   WBC 8.9   NEUTSPOLYS 75.30*     Recent Labs     10/09/19  0404   BUN 14   CREATININE 0.64   ALBUMIN 4.1     No results for input(s): VANCOTROUGH, VANCOPEAK, VANCORANDOM in the last 72 hours.    Intake/Output Summary (Last 24 hours) at 10/9/2019 1007  Last data filed at 10/9/2019 0616  Gross per 24 hour   Intake 2804 ml   Output --   Net 2804 ml      /78   Pulse 69   Temp 36.8 °C (98.2 °F) (Temporal)   Resp 18   Ht 1.93 m (6' 4\")   Wt 102 kg (224 lb 13.9 oz)   SpO2 94%  Temp (24hrs), Av.8 °C (98.2 °F), Min:36.8 °C (98.2 °F), Max:36.8 °C (98.2 °F)      A/P   1. Vancomycin dose change: vancomycin 1500 mg (15 mg/kg) q12h   2. Next vancomycin level: consider ordering on 10/11 prior to morning dose (not ordered) or sooner if urine output decreases   3. Goal trough: 12-16 " mcg/mL  Comments: initiated per protocol vancomycin dosing given limited concern for renal dysfunction. Scr at baseline. Follow up on vancomycin level order, antimicrobial duration and MRI result. Pharmacy will continue to monitor and adjust dosing or recommend de-escalation as appropriate.         Carol Valerio Pharm.D., BCPS

## 2019-10-10 ENCOUNTER — APPOINTMENT (OUTPATIENT)
Dept: RADIOLOGY | Facility: MEDICAL CENTER | Age: 59
End: 2019-10-10
Attending: STUDENT IN AN ORGANIZED HEALTH CARE EDUCATION/TRAINING PROGRAM
Payer: COMMERCIAL

## 2019-10-10 PROBLEM — F19.90 IV DRUG USER: Status: ACTIVE | Noted: 2019-10-10

## 2019-10-10 LAB
ALBUMIN SERPL BCP-MCNC: 3.4 G/DL (ref 3.2–4.9)
ALBUMIN/GLOB SERPL: 1.3 G/DL
ALP SERPL-CCNC: 63 U/L (ref 30–99)
ALT SERPL-CCNC: 9 U/L (ref 2–50)
ANION GAP SERPL CALC-SCNC: 7 MMOL/L (ref 0–11.9)
AST SERPL-CCNC: 13 U/L (ref 12–45)
BASOPHILS # BLD AUTO: 0.9 % (ref 0–1.8)
BASOPHILS # BLD: 0.04 K/UL (ref 0–0.12)
BILIRUB SERPL-MCNC: 0.5 MG/DL (ref 0.1–1.5)
BUN SERPL-MCNC: 8 MG/DL (ref 8–22)
CALCIUM SERPL-MCNC: 8.5 MG/DL (ref 8.5–10.5)
CHLORIDE SERPL-SCNC: 104 MMOL/L (ref 96–112)
CO2 SERPL-SCNC: 25 MMOL/L (ref 20–33)
CREAT SERPL-MCNC: 0.57 MG/DL (ref 0.5–1.4)
EOSINOPHIL # BLD AUTO: 0.18 K/UL (ref 0–0.51)
EOSINOPHIL NFR BLD: 3.9 % (ref 0–6.9)
ERYTHROCYTE [DISTWIDTH] IN BLOOD BY AUTOMATED COUNT: 50.9 FL (ref 35.9–50)
GLOBULIN SER CALC-MCNC: 2.7 G/DL (ref 1.9–3.5)
GLUCOSE SERPL-MCNC: 83 MG/DL (ref 65–99)
HAV IGM SERPL QL IA: NEGATIVE
HBV CORE IGM SER QL: NEGATIVE
HBV SURFACE AG SER QL: NEGATIVE
HCT VFR BLD AUTO: 43.3 % (ref 42–52)
HCV AB SER QL: REACTIVE
HGB BLD-MCNC: 13.9 G/DL (ref 14–18)
HIV 1+2 AB+HIV1 P24 AG SERPL QL IA: NON REACTIVE
IMM GRANULOCYTES # BLD AUTO: 0.02 K/UL (ref 0–0.11)
IMM GRANULOCYTES NFR BLD AUTO: 0.4 % (ref 0–0.9)
LYMPHOCYTES # BLD AUTO: 0.98 K/UL (ref 1–4.8)
LYMPHOCYTES NFR BLD: 21.4 % (ref 22–41)
MAGNESIUM SERPL-MCNC: 1.8 MG/DL (ref 1.5–2.5)
MCH RBC QN AUTO: 28.9 PG (ref 27–33)
MCHC RBC AUTO-ENTMCNC: 32.1 G/DL (ref 33.7–35.3)
MCV RBC AUTO: 90 FL (ref 81.4–97.8)
MONOCYTES # BLD AUTO: 0.43 K/UL (ref 0–0.85)
MONOCYTES NFR BLD AUTO: 9.4 % (ref 0–13.4)
NEUTROPHILS # BLD AUTO: 2.92 K/UL (ref 1.82–7.42)
NEUTROPHILS NFR BLD: 64 % (ref 44–72)
NRBC # BLD AUTO: 0 K/UL
NRBC BLD-RTO: 0 /100 WBC
PHOSPHATE SERPL-MCNC: 3.6 MG/DL (ref 2.5–4.5)
PLATELET # BLD AUTO: 204 K/UL (ref 164–446)
PMV BLD AUTO: 9.2 FL (ref 9–12.9)
POTASSIUM SERPL-SCNC: 3.9 MMOL/L (ref 3.6–5.5)
PROT SERPL-MCNC: 6.1 G/DL (ref 6–8.2)
RBC # BLD AUTO: 4.81 M/UL (ref 4.7–6.1)
SODIUM SERPL-SCNC: 136 MMOL/L (ref 135–145)
WBC # BLD AUTO: 4.6 K/UL (ref 4.8–10.8)

## 2019-10-10 PROCEDURE — 99225 PR SUBSEQUENT OBSERVATION CARE,LEVEL II: CPT | Mod: GC | Performed by: INTERNAL MEDICINE

## 2019-10-10 PROCEDURE — 96376 TX/PRO/DX INJ SAME DRUG ADON: CPT

## 2019-10-10 PROCEDURE — 84100 ASSAY OF PHOSPHORUS: CPT

## 2019-10-10 PROCEDURE — G0378 HOSPITAL OBSERVATION PER HR: HCPCS

## 2019-10-10 PROCEDURE — 80053 COMPREHEN METABOLIC PANEL: CPT

## 2019-10-10 PROCEDURE — 87522 HEPATITIS C REVRS TRNSCRPJ: CPT

## 2019-10-10 PROCEDURE — 36415 COLL VENOUS BLD VENIPUNCTURE: CPT

## 2019-10-10 PROCEDURE — 96366 THER/PROPH/DIAG IV INF ADDON: CPT

## 2019-10-10 PROCEDURE — 700111 HCHG RX REV CODE 636 W/ 250 OVERRIDE (IP): Performed by: STUDENT IN AN ORGANIZED HEALTH CARE EDUCATION/TRAINING PROGRAM

## 2019-10-10 PROCEDURE — 96367 TX/PROPH/DG ADDL SEQ IV INF: CPT

## 2019-10-10 PROCEDURE — 96372 THER/PROPH/DIAG INJ SC/IM: CPT

## 2019-10-10 PROCEDURE — 700102 HCHG RX REV CODE 250 W/ 637 OVERRIDE(OP): Performed by: STUDENT IN AN ORGANIZED HEALTH CARE EDUCATION/TRAINING PROGRAM

## 2019-10-10 PROCEDURE — 87902 NFCT AGT GNTYP ALYS HEP C: CPT

## 2019-10-10 PROCEDURE — A9270 NON-COVERED ITEM OR SERVICE: HCPCS | Performed by: STUDENT IN AN ORGANIZED HEALTH CARE EDUCATION/TRAINING PROGRAM

## 2019-10-10 PROCEDURE — 85025 COMPLETE CBC W/AUTO DIFF WBC: CPT

## 2019-10-10 PROCEDURE — 700105 HCHG RX REV CODE 258: Performed by: STUDENT IN AN ORGANIZED HEALTH CARE EDUCATION/TRAINING PROGRAM

## 2019-10-10 PROCEDURE — 80074 ACUTE HEPATITIS PANEL: CPT

## 2019-10-10 PROCEDURE — 83735 ASSAY OF MAGNESIUM: CPT

## 2019-10-10 PROCEDURE — 87389 HIV-1 AG W/HIV-1&-2 AB AG IA: CPT

## 2019-10-10 RX ORDER — CEFAZOLIN SODIUM 2 G/100ML
2 INJECTION, SOLUTION INTRAVENOUS EVERY 8 HOURS
Status: DISCONTINUED | OUTPATIENT
Start: 2019-10-10 | End: 2019-10-11

## 2019-10-10 RX ORDER — POTASSIUM CHLORIDE 20 MEQ/1
20 TABLET, EXTENDED RELEASE ORAL ONCE
Status: COMPLETED | OUTPATIENT
Start: 2019-10-10 | End: 2019-10-10

## 2019-10-10 RX ORDER — CEFAZOLIN SODIUM 2 G/100ML
2 INJECTION, SOLUTION INTRAVENOUS EVERY 8 HOURS
Status: CANCELLED | OUTPATIENT
Start: 2019-10-10

## 2019-10-10 RX ADMIN — SENNOSIDES, DOCUSATE SODIUM 2 TABLET: 50; 8.6 TABLET, FILM COATED ORAL at 05:02

## 2019-10-10 RX ADMIN — POTASSIUM CHLORIDE 20 MEQ: 1500 TABLET, EXTENDED RELEASE ORAL at 08:58

## 2019-10-10 RX ADMIN — CEFAZOLIN SODIUM 2 G: 2 INJECTION, SOLUTION INTRAVENOUS at 21:34

## 2019-10-10 RX ADMIN — NICOTINE TRANSDERMAL SYSTEM 21 MG: 21 PATCH, EXTENDED RELEASE TRANSDERMAL at 05:02

## 2019-10-10 RX ADMIN — KETOROLAC TROMETHAMINE 9.99 MG: 30 INJECTION, SOLUTION INTRAMUSCULAR at 16:44

## 2019-10-10 RX ADMIN — Medication 200 MG: at 18:16

## 2019-10-10 RX ADMIN — KETOROLAC TROMETHAMINE 9.99 MG: 30 INJECTION, SOLUTION INTRAMUSCULAR at 05:02

## 2019-10-10 RX ADMIN — VANCOMYCIN HYDROCHLORIDE 1500 MG: 500 INJECTION, POWDER, LYOPHILIZED, FOR SOLUTION INTRAVENOUS at 05:02

## 2019-10-10 RX ADMIN — Medication 200 MG: at 08:59

## 2019-10-10 RX ADMIN — ENOXAPARIN SODIUM 40 MG: 100 INJECTION SUBCUTANEOUS at 16:43

## 2019-10-10 RX ADMIN — SENNOSIDES, DOCUSATE SODIUM 2 TABLET: 50; 8.6 TABLET, FILM COATED ORAL at 18:16

## 2019-10-10 RX ADMIN — CEFAZOLIN SODIUM 2 G: 2 INJECTION, SOLUTION INTRAVENOUS at 13:49

## 2019-10-10 ASSESSMENT — ENCOUNTER SYMPTOMS
VOMITING: 0
ABDOMINAL PAIN: 0
CONSTIPATION: 0
DIARRHEA: 0
MYALGIAS: 0
FOCAL WEAKNESS: 0
BLOOD IN STOOL: 0
DIZZINESS: 0
PALPITATIONS: 0
WEAKNESS: 0
SHORTNESS OF BREATH: 0
BLURRED VISION: 0
DOUBLE VISION: 0
INSOMNIA: 0
HEADACHES: 0
NAUSEA: 0
FALLS: 0
FEVER: 0
COUGH: 0
SORE THROAT: 0
CHILLS: 0
NERVOUS/ANXIOUS: 0

## 2019-10-10 ASSESSMENT — PATIENT HEALTH QUESTIONNAIRE - PHQ9
SUM OF ALL RESPONSES TO PHQ9 QUESTIONS 1 AND 2: 0
2. FEELING DOWN, DEPRESSED, IRRITABLE, OR HOPELESS: NOT AT ALL
1. LITTLE INTEREST OR PLEASURE IN DOING THINGS: NOT AT ALL

## 2019-10-10 NOTE — PROGRESS NOTES
A&Ox4, reporting pain level of 8 to hand. PRN toradol provided. Left hand is swollen and scant amount of drainage.  RLE has two open wounds that are outlined in marker that are with redness that is only within the margins.   NS running at 83.  Pt upset and slightly irritable due to issues outside of the hospital where a friend of his went to his motel room to grab some of this things and management called the police on him.  RN tried to provide comfort and perspective.  Pt now resting without issue.

## 2019-10-10 NOTE — NON-PROVIDER
Internal Medicine Medical Student Note  Note Author: Lior Traore, Student    Name Gonsalo Hunt 1960   Age/Sex 59 y.o. male   MRN [id number]   Code Status FULL             Reason for interval visit  (Principal Problem)   Left hand purulent cellulitis    Interval Problem Daily Status Update  (problem status, last 24 hours, new history, new data )   59 y.o. Homeless male with a remote history of IV drug use admitted for left hand pain and swelling for 4 days.     -No acute events overnight.  -Alert and oriented.  -Pain responding to Tylenol PRN and Toradol 10 mg IV q 6 hrs.       Physical Exam       Vitals:    10/09/19 1039 10/09/19 1910 10/10/19 0310 10/10/19 0732   BP: 125/88 131/77 107/59 126/74   Pulse: 68 87 66 65   Resp: 18 18 16 20   Temp: 36.4 °C (97.5 °F) 36.3 °C (97.3 °F) 36.4 °C (97.6 °F) 36.8 °C (98.2 °F)   TempSrc: Temporal Temporal Temporal Temporal   SpO2: 97% 98% 95% 93%   Weight: 98.9 kg (218 lb 0.6 oz)      Height:         Body mass index is 26.54 kg/m².    Oxygen Therapy:  Pulse Oximetry: 93 %, O2 (LPM): 0, O2 Delivery: None (Room Air)    Physical Exam   Constitutional: He is oriented to person, place, and time and well-developed, well-nourished, and in no distress. Vital signs are normal.   HENT:   Head: Normocephalic and atraumatic.   Right Ear: External ear normal.   Left Ear: External ear normal.   Eyes: Pupils are equal, round, and reactive to light. Conjunctivae, EOM and lids are normal.   Neck: Neck supple.   Cardiovascular: Normal rate, regular rhythm, normal heart sounds and normal pulses. Exam reveals no gallop.   No murmur heard.  Pulmonary/Chest: Effort normal and breath sounds normal.   Abdominal: Normal appearance and bowel sounds are normal. There is no tenderness. There is no rebound.   Musculoskeletal:        Right shoulder: Tenderness: Left arm and hand. Swelling: Left arm and hand. Pain: Left arm and hand.        Left forearm: He exhibits tenderness  and swelling.        Left hand: He exhibits decreased range of motion, tenderness and swelling. Decreased sensation noted.   Lymphadenopathy:     He has no cervical adenopathy.   Neurological: He is alert and oriented to person, place, and time. A sensory deficit (Upper extremity bilaterally) is present.   Skin: Skin is warm. Ecchymosis and lesion (Left hand purulent) noted. Laceration: Left hand. There is erythema (Left hand and right leg).   Psychiatric: Mood, memory, affect and judgment normal.       CT-HAND WITH LEFT   Final Result      1.  Dorsal soft tissue swelling over the metacarpals.   2.  No focal abscess demonstrated.   3.  No focal bony destruction or soft tissue gas.      DX-HAND 3+ LEFT   Final Result      Diffuse soft tissue swelling. No x-ray evidence of osteomyelitis  although not excluded.      DX-CHEST-PORTABLE (1 VIEW)   Final Result         1.  Left basilar atelectasis, no focal infiltrate   2.  Cardiomegaly            Assessment/Plan   # Left hand purulent cellulitis  Asessment:  58 yo homeless man in no acute distress with L-hand purulent cellulitis in no signs of systemic infection. SIRS score is 0. Significant improvement of symptoms after 1 day course of IV Vancomycin.     Plan:  -Blood cultures (pending)  -Left hand x-ray is consistent with diffuse swelling, no evidence of osteomyelitis. No fracture.  -Left hand MRI could not be completed. Patient has an implanted stimulator in lower back from previous surgery.   -Wound culture shows Gram positive cocci. Consistent with Staph A.   -Left hand CT w/ contrast is consistent with no focal abscess, no bony destruction, no soft tissue gas.  -Continue IV Vancomycin since patient is at high risk for noncompliance.

## 2019-10-10 NOTE — PROGRESS NOTES
Pt in good spirits now that he is able to eat. Pt. Reports that swelling and pain in left hand has decreased since admission. Will continue to monitor.

## 2019-10-10 NOTE — PROGRESS NOTES
Internal Medicine Interval Note  Note Author: Brock Rai M.D.     Name Gonsalo Hunt 1960   Age/Sex 59 y.o. male   MRN [id number]   Code Status FULL     After 5PM or if no immediate response to page, please call for cross-coverage  Attending/Team: Zoie See Patient List for primary contact information  Call [phone] to page    1st Call - Day Intern (R1):   Eric Will M.D. 2nd Call - Day Sr. Resident (R2/R3):   Brock Rai M.D.         Reason for interval visit  (Principal Problem)   Cellulitis of left hand and right lower extremity.      Interval Problem Daily Status Update  (24 hours, problem oriented, brief subjective history, new lab/imaging data pertinent to that problem)   Purulent cellulitis of left hand: Decreased erythema and purulent discharge from volar aspect.  Decreased swelling as well as pain.  Increased mobility.  Pain controlled on Tylenol and Toradol IV.  CT scan left hand showing no abscess and no involvement of the bones.    Cultures with light growth of MSSA, heavy growth of beta-hemolytic Streptococcus group A.  Vancomycin changed to cefazolin.    Nonpurulent cellulitis of right lower extremity: Improved erythema and tenderness of right shin excoriation, dorsal right lower extremity smaller excoriation also improving.    Review of Systems   Constitutional: Negative for chills and fever.   HENT: Negative for ear pain and sore throat.    Eyes: Negative for blurred vision and double vision.   Respiratory: Negative for cough and shortness of breath.    Cardiovascular: Negative for chest pain and palpitations.   Gastrointestinal: Negative for abdominal pain, blood in stool, constipation, diarrhea, nausea and vomiting.   Genitourinary: Negative for dysuria, frequency and urgency.   Musculoskeletal: Positive for joint pain (Left hand pain improving). Negative for falls and myalgias.   Skin: Negative for itching and rash.   Neurological: Negative for dizziness,  focal weakness, weakness and headaches.   Psychiatric/Behavioral: The patient is not nervous/anxious and does not have insomnia.        Disposition/Barriers to discharge:   IV antibiotics for cellulitis.    Consultants/Specialty  None  PCP: Pcp Pt States None      Quality Measures  Quality-Core Measures   Reviewed items::  Labs reviewed, Medications reviewed and Radiology images reviewed  Lundberg catheter::  No Lundberg  DVT prophylaxis pharmacological::  Enoxaparin (Lovenox)  Antibiotics:  Treating active infection/contamination beyond 24 hours perioperative coverage          Physical Exam       Vitals:    10/09/19 1910 10/10/19 0310 10/10/19 0732 10/10/19 1505   BP: 131/77 107/59 126/74 127/86   Pulse: 87 66 65 75   Resp: 18 16 20 19   Temp: 36.3 °C (97.3 °F) 36.4 °C (97.6 °F) 36.8 °C (98.2 °F) 36.9 °C (98.4 °F)   TempSrc: Temporal Temporal Temporal Temporal   SpO2: 98% 95% 93% 96%   Weight:       Height:         Body mass index is 26.54 kg/m².    Oxygen Therapy:  Pulse Oximetry: 96 %, O2 (LPM): 0, O2 Delivery: None (Room Air)    Physical Exam   Constitutional: He is oriented to person, place, and time and well-developed, well-nourished, and in no distress. No distress.   HENT:   Head: Normocephalic and atraumatic.   Right Ear: External ear normal.   Left Ear: External ear normal.   Nose: Nose normal.   Mouth/Throat: Oropharynx is clear and moist. No oropharyngeal exudate.   Mallampati 2   Eyes: Pupils are equal, round, and reactive to light. Conjunctivae are normal. Right eye exhibits no discharge. Left eye exhibits no discharge. No scleral icterus.   Neck: Neck supple. No JVD present. No tracheal deviation present. No thyromegaly present.   Cardiovascular: Normal rate, regular rhythm, normal heart sounds and intact distal pulses. Exam reveals no gallop and no friction rub.   No murmur heard.  Pulmonary/Chest: Effort normal and breath sounds normal. No stridor. No respiratory distress. He has no wheezes. He has no  rales.   Abdominal: Soft. Bowel sounds are normal. He exhibits no distension and no mass. There is no tenderness. There is no rebound and no guarding.   Musculoskeletal: He exhibits edema (Left hand, improving) and tenderness (Left hand improving). He exhibits no deformity.   Neurological: He is alert and oriented to person, place, and time. No cranial nerve deficit. He exhibits normal muscle tone. GCS score is 15.   Skin: Skin is warm and dry. No rash noted. He is not diaphoretic. There is erythema (Improving on left hand and right lower extremity). No pallor.   Psychiatric: Mood, memory, affect and judgment normal.   Nursing note and vitals reviewed.            Assessment/Plan     * Cellulitis of hand, left- (present on admission)  Assessment & Plan  -Improving.  -MRI could not be obtained due to spinal cord stimulator.  -CT scan of the left hand 10/9 shows soft tissue swelling without involvement of bone.  No signs of abscess.  -History of MRSA left hip abscess in 2010.  -Cultures with light growth of MSSA, heavy growth of beta-hemolytic Streptococcus group A.   -Change vancomycin to cefazolin IV.    Cellulitis of right lower leg- (present on admission)  Assessment & Plan  3 inch lesion seen, with granulation tissue and mild erythema around wound at the tibial area likely cellulitis  1cm inch lesion at the lateral calf with very mild erythema   -Improving.  -Change antibiotics as per above.    Hyponatremia- (present on admission)  Assessment & Plan  -Resolved.  -Continue to monitor.  -Discontinue IV fluids.    IV drug user- (present on admission)  Assessment & Plan  -History of IV heroin drug use reportedly last 5 years ago.  -Hepatitis A, B, and C panel as well as HIV testing ordered.  -No signs of endocarditis.    Cannabis use disorder, mild, abuse- (present on admission)  Assessment & Plan  -History of regular marijuana use.    Homeless- (present on admission)  Assessment & Plan  -Coordinate with SW for  disposition.     Methamphetamine use (HCC)- (present on admission)  Assessment & Plan  History of methamphetamine use recent for pain per patient.  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  -Patient counseled on methamphetamine use cessation.

## 2019-10-10 NOTE — ASSESSMENT & PLAN NOTE
-History of IV heroin drug use reportedly last 5 years ago.  -Hepatitis A, B, and C panel as well as HIV testing ordered.  -No signs of endocarditis.   How Severe Are Your Bumps?: mild Have Your Bumps Been Treated?: not been treated Is This A New Presentation, Or A Follow-Up?: Bumps

## 2019-10-10 NOTE — CARE PLAN
Problem: Safety  Goal: Will remain free from falls  Outcome: PROGRESSING AS EXPECTED    Bed alarm is on, bed is locked and in lowest position, call light and bedside table are within reach, treaded slipper socks are on, and hourly rounding is in place.       Problem: Infection  Goal: Will remain free from infection  Outcome: PROGRESSING AS EXPECTED    Pt being treated with IV vanco. Educating pt on good hand hygiene. Ensuring proper safety precautions are being used while providing pt care.     Problem: Pain Management  Goal: Pain level will decrease to patient's comfort goal  Outcome: PROGRESSING AS EXPECTED    Acetaminophen and Toradol provided PRN.

## 2019-10-10 NOTE — DISCHARGE PLANNING
Patient is eligible for Medicaid Meds to Beds at discharge if they have [place] Medicaid, Medicaid FFS, Medicaid HMO (N), or [place]. This service is provided through the [facility] if orders are received by the pharmacy prior to 4pm Monday through Friday. Preferred pharmacy has been changed to [facility]. Please call x [phone] prior to discharge.

## 2019-10-10 NOTE — PROGRESS NOTES
Pt having mild epistaxis this AM, due to blowing nose too hard and on xarelto. Pt instructed to hold pressure with tissue and upon reevaluating pt, bleeding almost completely stopped. Pt did not want to take morning dose of xarelto. MD to be notified.

## 2019-10-11 VITALS
WEIGHT: 218.03 LBS | SYSTOLIC BLOOD PRESSURE: 109 MMHG | HEIGHT: 76 IN | HEART RATE: 70 BPM | RESPIRATION RATE: 16 BRPM | BODY MASS INDEX: 26.55 KG/M2 | TEMPERATURE: 97 F | OXYGEN SATURATION: 97 % | DIASTOLIC BLOOD PRESSURE: 60 MMHG

## 2019-10-11 PROBLEM — F19.90 IV DRUG USER: Status: RESOLVED | Noted: 2019-10-10 | Resolved: 2019-10-11

## 2019-10-11 PROBLEM — E87.1 HYPONATREMIA: Status: RESOLVED | Noted: 2019-10-09 | Resolved: 2019-10-11

## 2019-10-11 PROBLEM — F17.200 SMOKING: Status: ACTIVE | Noted: 2019-10-11

## 2019-10-11 PROBLEM — R76.8 HEPATITIS C ANTIBODY POSITIVE IN BLOOD: Status: ACTIVE | Noted: 2019-10-11

## 2019-10-11 LAB
ALBUMIN SERPL BCP-MCNC: 3.2 G/DL (ref 3.2–4.9)
ALBUMIN/GLOB SERPL: 1.2 G/DL
ALP SERPL-CCNC: 64 U/L (ref 30–99)
ALT SERPL-CCNC: 11 U/L (ref 2–50)
ANION GAP SERPL CALC-SCNC: 7 MMOL/L (ref 0–11.9)
AST SERPL-CCNC: 16 U/L (ref 12–45)
BACTERIA UR CULT: NORMAL
BACTERIA WND AEROBE CULT: ABNORMAL
BASOPHILS # BLD AUTO: 0.6 % (ref 0–1.8)
BASOPHILS # BLD: 0.03 K/UL (ref 0–0.12)
BILIRUB SERPL-MCNC: 0.3 MG/DL (ref 0.1–1.5)
BUN SERPL-MCNC: 15 MG/DL (ref 8–22)
CALCIUM SERPL-MCNC: 8.7 MG/DL (ref 8.5–10.5)
CHLORIDE SERPL-SCNC: 105 MMOL/L (ref 96–112)
CO2 SERPL-SCNC: 24 MMOL/L (ref 20–33)
CREAT SERPL-MCNC: 0.76 MG/DL (ref 0.5–1.4)
EOSINOPHIL # BLD AUTO: 0.18 K/UL (ref 0–0.51)
EOSINOPHIL NFR BLD: 3.7 % (ref 0–6.9)
ERYTHROCYTE [DISTWIDTH] IN BLOOD BY AUTOMATED COUNT: 49.9 FL (ref 35.9–50)
GLOBULIN SER CALC-MCNC: 2.6 G/DL (ref 1.9–3.5)
GLUCOSE SERPL-MCNC: 184 MG/DL (ref 65–99)
GRAM STN SPEC: ABNORMAL
HCT VFR BLD AUTO: 43.3 % (ref 42–52)
HGB BLD-MCNC: 14.4 G/DL (ref 14–18)
IMM GRANULOCYTES # BLD AUTO: 0.02 K/UL (ref 0–0.11)
IMM GRANULOCYTES NFR BLD AUTO: 0.4 % (ref 0–0.9)
LYMPHOCYTES # BLD AUTO: 1.04 K/UL (ref 1–4.8)
LYMPHOCYTES NFR BLD: 21.4 % (ref 22–41)
MAGNESIUM SERPL-MCNC: 1.8 MG/DL (ref 1.5–2.5)
MCH RBC QN AUTO: 29.6 PG (ref 27–33)
MCHC RBC AUTO-ENTMCNC: 33.3 G/DL (ref 33.7–35.3)
MCV RBC AUTO: 88.9 FL (ref 81.4–97.8)
MONOCYTES # BLD AUTO: 0.44 K/UL (ref 0–0.85)
MONOCYTES NFR BLD AUTO: 9 % (ref 0–13.4)
NEUTROPHILS # BLD AUTO: 3.16 K/UL (ref 1.82–7.42)
NEUTROPHILS NFR BLD: 64.9 % (ref 44–72)
NRBC # BLD AUTO: 0 K/UL
NRBC BLD-RTO: 0 /100 WBC
PHOSPHATE SERPL-MCNC: 4.8 MG/DL (ref 2.5–4.5)
PLATELET # BLD AUTO: 234 K/UL (ref 164–446)
PMV BLD AUTO: 9.2 FL (ref 9–12.9)
POTASSIUM SERPL-SCNC: 3.9 MMOL/L (ref 3.6–5.5)
PROT SERPL-MCNC: 5.8 G/DL (ref 6–8.2)
RBC # BLD AUTO: 4.87 M/UL (ref 4.7–6.1)
SIGNIFICANT IND 70042: ABNORMAL
SIGNIFICANT IND 70042: NORMAL
SITE SITE: ABNORMAL
SITE SITE: NORMAL
SODIUM SERPL-SCNC: 136 MMOL/L (ref 135–145)
SOURCE SOURCE: ABNORMAL
SOURCE SOURCE: NORMAL
WBC # BLD AUTO: 4.9 K/UL (ref 4.8–10.8)

## 2019-10-11 PROCEDURE — 99217 PR OBSERVATION CARE DISCHARGE: CPT | Mod: GC | Performed by: INTERNAL MEDICINE

## 2019-10-11 PROCEDURE — 96367 TX/PROPH/DG ADDL SEQ IV INF: CPT

## 2019-10-11 PROCEDURE — 96366 THER/PROPH/DIAG IV INF ADDON: CPT

## 2019-10-11 PROCEDURE — A9270 NON-COVERED ITEM OR SERVICE: HCPCS | Performed by: STUDENT IN AN ORGANIZED HEALTH CARE EDUCATION/TRAINING PROGRAM

## 2019-10-11 PROCEDURE — 700111 HCHG RX REV CODE 636 W/ 250 OVERRIDE (IP): Performed by: STUDENT IN AN ORGANIZED HEALTH CARE EDUCATION/TRAINING PROGRAM

## 2019-10-11 PROCEDURE — 36415 COLL VENOUS BLD VENIPUNCTURE: CPT

## 2019-10-11 PROCEDURE — 83735 ASSAY OF MAGNESIUM: CPT

## 2019-10-11 PROCEDURE — 85025 COMPLETE CBC W/AUTO DIFF WBC: CPT

## 2019-10-11 PROCEDURE — 84100 ASSAY OF PHOSPHORUS: CPT

## 2019-10-11 PROCEDURE — 700102 HCHG RX REV CODE 250 W/ 637 OVERRIDE(OP): Performed by: STUDENT IN AN ORGANIZED HEALTH CARE EDUCATION/TRAINING PROGRAM

## 2019-10-11 PROCEDURE — G0378 HOSPITAL OBSERVATION PER HR: HCPCS

## 2019-10-11 PROCEDURE — 80053 COMPREHEN METABOLIC PANEL: CPT

## 2019-10-11 RX ORDER — CEPHALEXIN 500 MG/1
500 CAPSULE ORAL 4 TIMES DAILY
Qty: 20 CAP | Refills: 0 | Status: SHIPPED | OUTPATIENT
Start: 2019-10-11 | End: 2019-10-16

## 2019-10-11 RX ORDER — CEPHALEXIN 500 MG/1
500 CAPSULE ORAL EVERY 6 HOURS
Status: DISCONTINUED | OUTPATIENT
Start: 2019-10-12 | End: 2019-10-11 | Stop reason: HOSPADM

## 2019-10-11 RX ORDER — MAGNESIUM SULFATE HEPTAHYDRATE 40 MG/ML
2 INJECTION, SOLUTION INTRAVENOUS ONCE
Status: COMPLETED | OUTPATIENT
Start: 2019-10-11 | End: 2019-10-11

## 2019-10-11 RX ORDER — POTASSIUM CHLORIDE 20 MEQ/1
40 TABLET, EXTENDED RELEASE ORAL ONCE
Status: COMPLETED | OUTPATIENT
Start: 2019-10-11 | End: 2019-10-11

## 2019-10-11 RX ADMIN — POTASSIUM CHLORIDE 40 MEQ: 1500 TABLET, EXTENDED RELEASE ORAL at 10:16

## 2019-10-11 RX ADMIN — CEFAZOLIN SODIUM 2 G: 2 INJECTION, SOLUTION INTRAVENOUS at 13:50

## 2019-10-11 RX ADMIN — Medication 200 MG: at 05:20

## 2019-10-11 RX ADMIN — SENNOSIDES, DOCUSATE SODIUM 2 TABLET: 50; 8.6 TABLET, FILM COATED ORAL at 05:21

## 2019-10-11 RX ADMIN — NICOTINE TRANSDERMAL SYSTEM 21 MG: 21 PATCH, EXTENDED RELEASE TRANSDERMAL at 05:21

## 2019-10-11 RX ADMIN — MAGNESIUM SULFATE 2 G: 2 INJECTION INTRAVENOUS at 10:21

## 2019-10-11 RX ADMIN — CEFAZOLIN SODIUM 2 G: 2 INJECTION, SOLUTION INTRAVENOUS at 05:20

## 2019-10-11 ASSESSMENT — ENCOUNTER SYMPTOMS
BACK PAIN: 0
PHOTOPHOBIA: 0
FALLS: 0
WEIGHT LOSS: 0
DIZZINESS: 0
WHEEZING: 0
EYE PAIN: 0
HEARTBURN: 0
NECK PAIN: 0
CONSTIPATION: 0
LOSS OF CONSCIOUSNESS: 0
WEAKNESS: 0
FEVER: 0
MYALGIAS: 0
ABDOMINAL PAIN: 0
CHILLS: 0
PALPITATIONS: 0
SHORTNESS OF BREATH: 0
SEIZURES: 0
COUGH: 0
HEMOPTYSIS: 0
DIARRHEA: 0
DOUBLE VISION: 0
BLURRED VISION: 0
ORTHOPNEA: 0

## 2019-10-11 NOTE — NON-PROVIDER
Internal Medicine Medical Student Note  Note Author: Lior Traore, Student    Name Gonsalo Hunt 1960   Age/Sex 59 y.o. male   MRN [id number]   Code Status FULL             Reason for interval visit  (Principal Problem)   Cellulitis of hand, left    Interval Problem Daily Status Update  (problem status, last 24 hours, new history, new data )   58 yo homeless male with a remote hx of IVDU admitted for left hand purulent cellulitis in no signs of acute systemic infection (No progressive SIRS or qSOFA).    N/P: Homeless, remote IVDU, chronic THC, Utox: + for meth and opiates.  ENT: Mallampati score 1.  MSK/Pain: Hx spinal stimulator, chronic back pain. At risk for appliance infection. Currently neg BC.   CVS: Normal. Per chest x-ray cardiomyopathy, probably 2/2 meth use.   Pulm: Smoker, 1 ppd, 30 py. Per chest x-ray LLL atelectasis.   GI: Hep C Ab reactive.  : UA revealed 5-10 WBCs and 0-2 RBCs. Possible asymptomatic pyuria.   Renal: Hypo Na 133, Cr 0.64 (136) after IV fluids. Most likely 2/2 dehydration.  Heme: Admit WBC 7.3, today 4.9.   Derm: Left hand swelling, wound mid palm for 5 days. RLE excoriation. ALP 73. Per hand x-ray no OM, remote hx of IVDU. Motor function intact.   ID: Light growth MSSA, Group A strep. HIV non reactive.         Physical Exam       Vitals:    10/10/19 1505 10/10/19 1905 10/11/19 0305 10/11/19 0748   BP: 127/86 119/75 113/63 109/60   Pulse: 75 77 77 70   Resp: 19 17 16 16   Temp: 36.9 °C (98.4 °F) 36.4 °C (97.5 °F) 36.8 °C (98.3 °F) 36.1 °C (97 °F)   TempSrc: Temporal Temporal Temporal Temporal   SpO2: 96% 95% 97%    Weight:       Height:         Body mass index is 26.54 kg/m².    Oxygen Therapy:  Pulse Oximetry: 97 %, O2 (LPM): 0, O2 Delivery: None (Room Air)    Physical Exam   Constitutional: He is oriented to person, place, and time and well-developed, well-nourished, and in no distress. No distress.   HENT:   Head: Normocephalic and atraumatic.   Right  Ear: External ear normal.   Left Ear: External ear normal.   Nose: Nose normal.   Mouth/Throat: Oropharynx is clear and moist.   Eyes: Pupils are equal, round, and reactive to light. Conjunctivae and EOM are normal. No scleral icterus.   Neck: Normal range of motion. Neck supple.   Cardiovascular: Normal rate, regular rhythm and intact distal pulses. Exam reveals no gallop and no friction rub.   No murmur heard.  Pulmonary/Chest: Effort normal and breath sounds normal. No respiratory distress. He has no wheezes. He has no rales. He exhibits no tenderness.   Abdominal: Soft. Bowel sounds are normal. There is no tenderness. There is no rebound and no guarding.   Musculoskeletal: Normal range of motion.   Lymphadenopathy:     He has no cervical adenopathy.   Neurological: He is alert and oriented to person, place, and time.   Skin: Skin is warm and dry. There is erythema (Mild to RLE).   Psychiatric: Mood, memory, affect and judgment normal.       CT-HAND WITH LEFT   Final Result      1.  Dorsal soft tissue swelling over the metacarpals.   2.  No focal abscess demonstrated.   3.  No focal bony destruction or soft tissue gas.      DX-HAND 3+ LEFT   Final Result      Diffuse soft tissue swelling. No x-ray evidence of osteomyelitis  although not excluded.      DX-CHEST-PORTABLE (1 VIEW)   Final Result         1.  Left basilar atelectasis, no focal infiltrate   2.  Cardiomegaly            Assessment/Plan   #Purulent Left hand cellulitis.  Assesment:   Purulent left hand cellulitis and non purulent RLE cellulits. Wound cultures light growth MSSA, group A strep. No OM or vascular compromise.     Plan:  D/C home  -PO cephalexin x 5 days total  -Clinic f/u  -Tdap shot

## 2019-10-11 NOTE — ASSESSMENT & PLAN NOTE
Likely chronic hepatitis C infection.  Per pt, had been diagnosed 20 years ago, states got Hep C using razor to shave in [place]. Born between [birth year]; Assymptomatic, no transaminitis, normal plt ct, Positive Hep C antibody, negative with coinfection with Hep B, negative HIV test. Normal Creatinine, no cyroglobinemia sxs.  No abdominal US or Abd CT on file. Would recommend PCP to further workup if patient has fibrosis and HCC screening.     Plan  Ordered HIV C NAAT w/ reflex NS5A  Avoidance of Alcohol  Follow up with PCP for further treatment, HCC screening.

## 2019-10-11 NOTE — PROGRESS NOTES
Internal Medicine Interval Note  Note Author: Eric Will M.D.     Name Gonsalo Hunt 1960   Age/Sex 59 y.o. male   MRN [id number]   Code Status Full      After 5PM or if no immediate response to page, please call for cross-coverage  Attending/Team: Dr Mays/Talon See Patient List for primary contact information  Call [phone] to page    1st Call - Day Intern (R1):   Dr Will 2nd Call - Day Sr. Resident (R2/R3):   Dr Rai         Reason for interval visit  (Principal Problem)   No acute overnight events. Pt's VS stable, remained afebrile with stable blood pressure. Pt's hand swelling markedly improved. Tolerating IV cefazolin. Otherwise, pt denies fever, chills and malaise    -WBC ct 4.9; Na 136. CT Left hand did not show osteomyelitis or gas in tissue.   -Ordered HCV PCR viral load with reflex NS5A resistance.   -Since pt tolerated cefazolin would be discharged with cephalexin PO 500mg QID.       Review of Systems   Constitutional: Negative for chills, fever and weight loss.   HENT: Negative for hearing loss and tinnitus.    Eyes: Negative for blurred vision, double vision, photophobia and pain.   Respiratory: Negative for cough, hemoptysis, shortness of breath and wheezing.    Cardiovascular: Negative for chest pain, palpitations and orthopnea.   Gastrointestinal: Negative for abdominal pain, constipation, diarrhea and heartburn.   Genitourinary: Negative for dysuria, frequency and urgency.   Musculoskeletal: Negative for back pain, falls, joint pain, myalgias and neck pain.   Skin: Positive for rash. Negative for itching.   Neurological: Negative for dizziness, seizures, loss of consciousness and weakness.       Disposition/Barriers to discharge:   None    Consultants/Specialty    PCP: Pcp Pt States None      Quality Measures  Quality-Core Measures   Reviewed items::  Labs reviewed  Lundberg catheter::  No Lundberg  DVT prophylaxis pharmacological::  Enoxaparin (Lovenox)  Antibiotics:   Treating active infection/contamination beyond 24 hours perioperative coverage          Physical Exam       Vitals:    10/10/19 1505 10/10/19 1905 10/11/19 0305 10/11/19 0748   BP: 127/86 119/75 113/63 109/60   Pulse: 75 77 77 70   Resp: 19 17 16 16   Temp: 36.9 °C (98.4 °F) 36.4 °C (97.5 °F) 36.8 °C (98.3 °F) 36.1 °C (97 °F)   TempSrc: Temporal Temporal Temporal Temporal   SpO2: 96% 95% 97%    Weight:       Height:         Body mass index is 26.54 kg/m².    Oxygen Therapy:  Pulse Oximetry: 97 %, O2 (LPM): 0, O2 Delivery: None (Room Air)    Physical Exam   Constitutional: No distress.   HENT:   Head: Normocephalic.   Eyes: Pupils are equal, round, and reactive to light.   Neck: Normal range of motion. No JVD present. No thyromegaly present.   Cardiovascular: Normal rate, regular rhythm and normal heart sounds. Exam reveals no friction rub.   No murmur heard.  Pulmonary/Chest: Breath sounds normal. No respiratory distress. He has no wheezes. He has no rales. He exhibits no tenderness.   Abdominal: Bowel sounds are normal. He exhibits no distension and no mass. There is no tenderness. There is no rebound and no guarding.   Musculoskeletal: He exhibits no edema or tenderness.   Lymphadenopathy:     He has no cervical adenopathy.   Neurological: He is alert.   Left hand sensation intact, still feels pain when gerhard left fingers to make a fist.    Skin: Skin is warm. Rash noted. He is not diaphoretic. There is erythema.   Markedly improved erythema regression at the Right tibial wound area.  Left hand swelling markedly improved.     Left hand:  Wound at central palm non-discharging, non tender when palpated, no erythema appreciated.    Psychiatric: Mood, memory, affect and judgment normal.             Assessment/Plan   Gilbert is a 60 y/o M w/ hx of IVDU, with no signficant hx of vascular disease, hx of b/l wrist fracture without hardware done admitted here for 3 day swelling of left hand likely from cellulitis;  MRI of left hand negative for deep soft infection, now with markedly improved left hand swelling on IV cefazolin. OK to be discharged today with PO cephalexin 500mg QID. Needs further workup for hep C outpatient.          * Cellulitis of hand, left- (present on admission)  Assessment & Plan  -Markedly improved  -MRI could not be obtained due to spinal cord stimulator.  -CT scan of the left hand 10/9 shows soft tissue swelling without involvement of bone.  No signs of abscess.  -History of MRSA left hip abscess in 2010.  -Cultures with light growth of MSSA, heavy growth of beta-hemolytic Streptococcus group A.   -Changing to PO cephalexin from IV cefazolin on discharge    Hepatitis C antibody positive in blood  Assessment & Plan  Likely chronic hepatitis C infection.  Per pt, had been diagnosed 20 years ago, states got Hep C using razor to shave in [facility]. Born between [birth year]; Assymptomatic, no transaminitis, Positive Hep C antibody, negative with coinfection with Hep B, negative HIV test. Normal Creatinine, no cyroglobinemia sxs.  No abdominal US or Abd CT on file. Would recommend PCP to further workup if patient has fibrosis and HCC screening.     Plan  Ordered HIV C NAAT w/ reflex NS5A  Avoidance of Alcohol  Follow up with PCP for further treatment, HCC screening.      Hyponatremia- (present on admission)  Assessment & Plan  -Resolved.  -Continue to monitor.  -Discontinue IV fluids.    Cellulitis of right lower leg- (present on admission)  Assessment & Plan  Improved.   3 inch lesion seen, with granulation tissue. Markedly improved regression of erythema around wound at the tibial area.   1cm inch lesion at the lateral calf with very mild erythema     -Change antibiotics as per above.    IV drug user- (present on admission)  Assessment & Plan  -History of IV heroin drug use reportedly last 5 years ago.  -Hepatitis A, B, and C panel as well as HIV testing ordered.  -No signs of endocarditis.    Cannabis use  disorder, mild, abuse- (present on admission)  Assessment & Plan  -History of regular marijuana use.    Homeless- (present on admission)  Assessment & Plan  -Coordinate with  for disposition.     Methamphetamine use (HCC)- (present on admission)  Assessment & Plan  History of methamphetamine use recent for pain per patient.  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  -Patient counseled on methamphetamine use cessation.

## 2019-10-11 NOTE — PROGRESS NOTES
Pt alert and oriented, on RA and VSS. Pt ambulates to the bathroom independently. He reports that his left hand is looking and feeling much better than the day before. Denies pain at this time. Fall precautions and hourly rounding in place. Pt calls appropriately. Will continue to monitor.

## 2019-10-12 NOTE — PROGRESS NOTES
7:26 PM   Patient discharged home via bus, bus pass given. IV discontinued. Discharge paperwork and instructions gone over with patient and signed. All belongings sent with patient

## 2019-10-12 NOTE — DISCHARGE SUMMARY
Internal Medicine Discharge Summary  Note Author: Brock Rai M.D.       Name Gonsalo Hunt 1960   Age/Sex 59 y.o. male   MRN [id number]         Admit Date:  10/9/2019       Discharge Date:   10/11/2019    Service:   [facility] Internal Medicine White Team  Attending Physician(s):   Ron Mays M.D.       Senior Resident(s):   Brock Rai M.D.  Darian Resident(s):   Eric Will M.D.  PCP: Pcp Pt States None      Primary Diagnosis:   Purulent cellulitis of left upper extremity and nonpurulent cellulitis of right lower extremity    Secondary Diagnoses:                Principal Problem:    Cellulitis of hand, left POA: Yes  Active Problems:    Hepatitis C antibody positive in blood POA: Yes    Cellulitis of right lower leg POA: Yes    Methamphetamine use (HCC) POA: Yes    Homeless POA: Yes    Cannabis use disorder, mild, abuse (Chronic) POA: Yes    Smoking POA: Yes  Resolved Problems:    Hyponatremia POA: Yes    IV drug user POA: Yes      Hospital Summary (Brief Narrative):       Mr. Hunt is a pleasant 59-year-old homeless gentleman with a history of distant IV drug use (heroin, last use 5 years ago) and MRSA hip abscess in , who presented with 4-day history of worsening left arm pain and swelling following what was believed to be a bug bite.  He was also noted to have erythema and induration in the right lower extremity consistent with a nonpurulent cellulitis. On admission, he had purulent discharge from the volar aspect of his left hand, which was positive for heavy growth of beta-hemolytic Streptococcus group A and light growth of Staphylococcus aureus, MSSA.  Patient later reported that he cut the volar aspect of his left palm to relieve the pressure from the swelling.  Due to childhood history of penicillin allergy, patient was continued on vancomycin alone and then transitioned to cefazolin following culture results.  His significant swelling as well as erythema continued to  improve with increased mobility of his fingers.  Pain was well controlled with acetaminophen and low-dose IV Toradol.  He did not have any signs or symptoms of necrotizing fasciitis or compartment syndrome.  He continued to remain afebrile without leukocytosis.  He continued to recover well and was discharged on 5-day course of cephalexin.      Patient /Hospital Summary (Details -- Problem Oriented) :          Hepatitis C antibody positive in blood  Assessment & Plan  Hepatitis B panel testing was positive for hepatitis C antibody.  Hepatitis RNA testing is pending.  Patient reports he likely got hepatitis C while using a razor in nursing home 20 years ago.  HIV testing was negative.  Recommend a follow-up with PCP.    Cellulitis of right lower leg  Assessment & Plan  Improved as per above.    * Cellulitis of hand, left  Assessment & Plan  As per above.  Purulent discharge on arrival which improved.  CT scan with contrast was performed since patient had a spinal cord stimulator, which was unclear whether he was compatible with MRI.  CT scan did not show any signs of osteomyelitis or abscess.    Hyponatremia-resolved as of 10/11/2019  Assessment & Plan  Mild hyponatremia resolved.    IV drug user-resolved as of 10/11/2019  Assessment & Plan  History of IV heroin drug use reportedly last 5 years ago.  Hepatitis panel testing was ordered because of his history was positive for hepatitis C antibody.  HIV testing was negative.  No signs or symptoms of endocarditis.    Cannabis use disorder, mild, abuse  Assessment & Plan  Continues to use marijuana regularly.    Homeless  Assessment & Plan  Stays at local shelters.    Methamphetamine use (HCC)  Assessment & Plan  History of methamphetamine use recent for pain per patient.  Cardiomegaly on chest x-ray, suspect ischemic cardiomyopathy from methamphetamine use.  Patient counseled on methamphetamine use cessation.    Smoking- (present on admission)  Assessment & Plan  Patient was  maintained on nicotine replacement therapy.  History of smoking 1 pack/day.        Consultants:     None.    Procedures:        None.    Imaging/ Testing:      CT-HAND WITH LEFT   Final Result      1.  Dorsal soft tissue swelling over the metacarpals.   2.  No focal abscess demonstrated.   3.  No focal bony destruction or soft tissue gas.      DX-HAND 3+ LEFT   Final Result      Diffuse soft tissue swelling. No x-ray evidence of osteomyelitis  although not excluded.      DX-CHEST-PORTABLE (1 VIEW)   Final Result         1.  Left basilar atelectasis, no focal infiltrate   2.  Cardiomegaly          Discharge Medications:         Medication Reconciliation: Completed       Medication List      Start taking these medications      Instructions   cephALEXin 500 MG Caps  Commonly known as:  KEFLEX   Take 1 Cap by mouth 4 times a day for 5 days.  Dose:  500 mg                     Disposition:   To home.    Diet:   Regular.    Activity:   As tolerated.    Instructions:       The patient was instructed to return to the ER in the event of worsening symptoms. I have counseled the patient on the importance of compliance and the patient has agreed to proceed with all medical recommendations and follow up plan indicated above.   The patient understands that all medications come with benefits and risks. Risks may include permanent injury or death and these risks can be minimized with close reassessment and monitoring.        Primary Care Provider:    Pcp Pt States None  Discharge summary faxed to primary care provider:  Deferred  Copy of discharge summary given to the patient: Deferred      Follow up appointment details :      No future appointments.  [facility]  [address] [place] [id number]  [phone]  In 1 week  For follow up of cellulitis.      Pending Studies:        HCV by [employer] NAAT  HCV NS5A drug resistance    Time spent on discharge day patient visit, preparing discharge paperwork and  arranging for patient follow up.    Summary of follow up issues:   -Work up of hepatitis C infection  -Resolution of cellulitis    Discharge Time (Minutes) :    53  Hospital Course Type:  Inpatient Stay >2 midnights      Condition on Discharge    ______________________________________________________________________    Interval history/exam for day of discharge:    Erythema, edema, and discharge from the left hand was improving significantly.  Patient was able to flex his left digits with significant improvement.  Erythema and edema in the right lower extremity excoriations was also improving.  Patient continued to remain afebrile with a low normal white count.    Vitals:    10/10/19 1505 10/10/19 1905 10/11/19 0305 10/11/19 0748   BP: 127/86 119/75 113/63 109/60   Pulse: 75 77 77 70   Resp: 19 17 16 16   Temp: 36.9 °C (98.4 °F) 36.4 °C (97.5 °F) 36.8 °C (98.3 °F) 36.1 °C (97 °F)   TempSrc: Temporal Temporal Temporal Temporal   SpO2: 96% 95% 97%    Weight:       Height:           Physical Exam   Constitutional: He is oriented to person, place, and time. He appears well-developed and well-nourished. No distress.   HENT:   Head: Normocephalic and atraumatic.   Right Ear: External ear normal.   Left Ear: External ear normal.   Nose: Nose normal.   Mouth/Throat: Oropharynx is clear and moist. No oropharyngeal exudate.   Eyes: Pupils are equal, round, and reactive to light. Conjunctivae and EOM are normal. Right eye exhibits no discharge. Left eye exhibits no discharge. No scleral icterus.   Neck: No JVD present. No tracheal deviation present. No thyromegaly present.   Cardiovascular: Normal rate, regular rhythm, normal heart sounds and intact distal pulses. Exam reveals no gallop and no friction rub.   No murmur heard.  Pulmonary/Chest: Effort normal and breath sounds normal. No stridor. No respiratory distress. He has no wheezes. He has no rales.   Abdominal: Soft. Bowel sounds are normal. He exhibits no distension and  no mass. There is no tenderness. There is no rebound and no guarding.   Musculoskeletal: He exhibits edema (Left upper extremity edema improving). He exhibits no tenderness or deformity.   Lymphadenopathy:     He has no cervical adenopathy.   Neurological: He is alert and oriented to person, place, and time. No cranial nerve deficit. He exhibits normal muscle tone.   Skin: Skin is warm and dry. No rash noted. He is not diaphoretic. There is erythema (Mild left hand and right lower extremity erythema improving). No pallor.   Excoriations in the anterior shin of right lower extremity as well as posterior calf   Psychiatric: He has a normal mood and affect. His behavior is normal. Judgment and thought content normal.   Nursing note and vitals reviewed.        Most Recent Labs:    Lab Results   Component Value Date/Time    WBC 4.9 10/11/2019 02:44 AM    RBC 4.87 10/11/2019 02:44 AM    HEMOGLOBIN 14.4 10/11/2019 02:44 AM    HEMATOCRIT 43.3 10/11/2019 02:44 AM    MCV 88.9 10/11/2019 02:44 AM    MCH 29.6 10/11/2019 02:44 AM    MCHC 33.3 (L) 10/11/2019 02:44 AM    MPV 9.2 10/11/2019 02:44 AM    NEUTSPOLYS 64.90 10/11/2019 02:44 AM    LYMPHOCYTES 21.40 (L) 10/11/2019 02:44 AM    MONOCYTES 9.00 10/11/2019 02:44 AM    EOSINOPHILS 3.70 10/11/2019 02:44 AM    BASOPHILS 0.60 10/11/2019 02:44 AM      Lab Results   Component Value Date/Time    SODIUM 136 10/11/2019 02:44 AM    POTASSIUM 3.9 10/11/2019 02:44 AM    CHLORIDE 105 10/11/2019 02:44 AM    CO2 24 10/11/2019 02:44 AM    GLUCOSE 184 (H) 10/11/2019 02:44 AM    BUN 15 10/11/2019 02:44 AM    CREATININE 0.76 10/11/2019 02:44 AM    CREATININE 1.0 07/08/2007 02:50 PM      Lab Results   Component Value Date/Time    ALTSGPT 11 10/11/2019 02:44 AM    ASTSGOT 16 10/11/2019 02:44 AM    ALKPHOSPHAT 64 10/11/2019 02:44 AM    TBILIRUBIN 0.3 10/11/2019 02:44 AM    LIPASE 41 10/25/2011 08:00 PM    ALBUMIN 3.2 10/11/2019 02:44 AM    GLOBULIN 2.6 10/11/2019 02:44 AM    INR 1.05 07/08/2007  02:50 PM     Lab Results   Component Value Date/Time    PROTHROMBTM 13.2 07/08/2007 02:50 PM    INR 1.05 07/08/2007 02:50 PM

## 2019-10-14 LAB
BACTERIA BLD CULT: NORMAL
BACTERIA BLD CULT: NORMAL
SIGNIFICANT IND 70042: NORMAL
SIGNIFICANT IND 70042: NORMAL
SITE SITE: NORMAL
SITE SITE: NORMAL
SOURCE SOURCE: NORMAL
SOURCE SOURCE: NORMAL

## 2019-10-15 LAB
HCV RNA SERPL NAA+PROBE-ACNC: ABNORMAL K[IU]/ML
HCV RNA SERPL NAA+PROBE-LOG IU: 6.81 LOG IU/ML
HCV RNA SERPL QL NAA+PROBE: DETECTED

## 2019-10-22 LAB
HCV GENTYP 1 NS5A MUT DET ISLT: NORMAL
HCV GENTYP 1 SERPL NAA+PROBE: NORMAL

## 2020-01-16 ENCOUNTER — ANESTHESIA (OUTPATIENT)
Dept: SURGERY | Facility: MEDICAL CENTER | Age: 60
DRG: 853 | End: 2020-01-16
Payer: COMMERCIAL

## 2020-01-16 ENCOUNTER — APPOINTMENT (OUTPATIENT)
Dept: CARDIOLOGY | Facility: MEDICAL CENTER | Age: 60
DRG: 853 | End: 2020-01-16
Attending: STUDENT IN AN ORGANIZED HEALTH CARE EDUCATION/TRAINING PROGRAM
Payer: COMMERCIAL

## 2020-01-16 ENCOUNTER — HOSPITAL ENCOUNTER (INPATIENT)
Facility: MEDICAL CENTER | Age: 60
LOS: 15 days | DRG: 853 | End: 2020-01-31
Attending: EMERGENCY MEDICINE | Admitting: INTERNAL MEDICINE
Payer: COMMERCIAL

## 2020-01-16 ENCOUNTER — ANESTHESIA EVENT (OUTPATIENT)
Dept: SURGERY | Facility: MEDICAL CENTER | Age: 60
DRG: 853 | End: 2020-01-16
Payer: COMMERCIAL

## 2020-01-16 ENCOUNTER — APPOINTMENT (OUTPATIENT)
Dept: RADIOLOGY | Facility: MEDICAL CENTER | Age: 60
DRG: 853 | End: 2020-01-16
Attending: EMERGENCY MEDICINE
Payer: COMMERCIAL

## 2020-01-16 DIAGNOSIS — L02.91 ABSCESS: ICD-10-CM

## 2020-01-16 DIAGNOSIS — L03.115 CELLULITIS OF RIGHT LOWER EXTREMITY: ICD-10-CM

## 2020-01-16 DIAGNOSIS — Z59.00 HOMELESS: ICD-10-CM

## 2020-01-16 DIAGNOSIS — I48.91 ATRIAL FIBRILLATION WITH RVR (HCC): ICD-10-CM

## 2020-01-16 DIAGNOSIS — M72.6 NECROTIZING FASCIITIS OF PELVIC REGION AND THIGH (HCC): Primary | ICD-10-CM

## 2020-01-16 DIAGNOSIS — A41.9 SEPSIS WITH ACUTE ORGAN DYSFUNCTION, DUE TO UNSPECIFIED ORGANISM, UNSPECIFIED TYPE, UNSPECIFIED WHETHER SEPTIC SHOCK PRESENT: ICD-10-CM

## 2020-01-16 DIAGNOSIS — R65.20 SEPSIS WITH ACUTE ORGAN DYSFUNCTION, DUE TO UNSPECIFIED ORGANISM, UNSPECIFIED TYPE, UNSPECIFIED WHETHER SEPTIC SHOCK PRESENT: ICD-10-CM

## 2020-01-16 PROBLEM — B18.2 CHRONIC HEPATITIS C VIRUS INFECTION (HCC): Status: ACTIVE | Noted: 2019-10-11

## 2020-01-16 PROBLEM — L02.415 CELLULITIS AND ABSCESS OF RIGHT LOWER EXTREMITY: Status: ACTIVE | Noted: 2019-10-09

## 2020-01-16 LAB
ALBUMIN SERPL BCP-MCNC: 2.6 G/DL (ref 3.2–4.9)
ALBUMIN/GLOB SERPL: 0.7 G/DL
ALP SERPL-CCNC: 102 U/L (ref 30–99)
ALT SERPL-CCNC: 42 U/L (ref 2–50)
AMPHET UR QL SCN: POSITIVE
ANION GAP SERPL CALC-SCNC: 9 MMOL/L (ref 0–11.9)
APPEARANCE UR: CLEAR
APTT PPP: 26.3 SEC (ref 24.7–36)
AST SERPL-CCNC: 31 U/L (ref 12–45)
BARBITURATES UR QL SCN: NEGATIVE
BASOPHILS # BLD AUTO: 0 % (ref 0–1.8)
BASOPHILS # BLD: 0 K/UL (ref 0–0.12)
BENZODIAZ UR QL SCN: NEGATIVE
BILIRUB SERPL-MCNC: 0.7 MG/DL (ref 0.1–1.5)
BILIRUB UR QL STRIP.AUTO: NEGATIVE
BLOOD CULTURE HOLD CXBCH: NORMAL
BUN SERPL-MCNC: 21 MG/DL (ref 8–22)
BURR CELLS BLD QL SMEAR: NORMAL
BZE UR QL SCN: NEGATIVE
CALCIUM SERPL-MCNC: 8.8 MG/DL (ref 8.5–10.5)
CANNABINOIDS UR QL SCN: NEGATIVE
CHLORIDE SERPL-SCNC: 94 MMOL/L (ref 96–112)
CK SERPL-CCNC: 40 U/L (ref 0–154)
CO2 SERPL-SCNC: 24 MMOL/L (ref 20–33)
COLOR UR: NORMAL
CREAT SERPL-MCNC: 0.75 MG/DL (ref 0.5–1.4)
CRP SERPL HS-MCNC: 12.38 MG/DL (ref 0–0.75)
EKG IMPRESSION: NORMAL
EOSINOPHIL # BLD AUTO: 0.26 K/UL (ref 0–0.51)
EOSINOPHIL NFR BLD: 1.8 % (ref 0–6.9)
ERYTHROCYTE [DISTWIDTH] IN BLOOD BY AUTOMATED COUNT: 45.4 FL (ref 35.9–50)
ERYTHROCYTE [SEDIMENTATION RATE] IN BLOOD BY WESTERGREN METHOD: 34 MM/HOUR (ref 0–20)
GLOBULIN SER CALC-MCNC: 3.7 G/DL (ref 1.9–3.5)
GLUCOSE SERPL-MCNC: 114 MG/DL (ref 65–99)
GLUCOSE UR STRIP.AUTO-MCNC: NEGATIVE MG/DL
GRAM STN SPEC: NORMAL
GRAM STN SPEC: NORMAL
HCT VFR BLD AUTO: 45.4 % (ref 42–52)
HGB BLD-MCNC: 15.1 G/DL (ref 14–18)
INR PPP: 1.16 (ref 0.87–1.13)
INR PPP: 1.21 (ref 0.87–1.13)
KETONES UR STRIP.AUTO-MCNC: NEGATIVE MG/DL
LACTATE BLD-SCNC: 1.3 MMOL/L (ref 0.5–2)
LACTATE BLD-SCNC: 2.5 MMOL/L (ref 0.5–2)
LACTATE BLD-SCNC: 2.5 MMOL/L (ref 0.5–2)
LEUKOCYTE ESTERASE UR QL STRIP.AUTO: NEGATIVE
LV EJECT FRACT  99904: 60
LV EJECT FRACT MOD 2C 99903: 82.31
LV EJECT FRACT MOD 4C 99902: 54.47
LV EJECT FRACT MOD BP 99901: 71.56
LYMPHOCYTES # BLD AUTO: 1.02 K/UL (ref 1–4.8)
LYMPHOCYTES NFR BLD: 7.1 % (ref 22–41)
MAGNESIUM SERPL-MCNC: 1.9 MG/DL (ref 1.5–2.5)
MANUAL DIFF BLD: NORMAL
MCH RBC QN AUTO: 28.3 PG (ref 27–33)
MCHC RBC AUTO-ENTMCNC: 33.3 G/DL (ref 33.7–35.3)
MCV RBC AUTO: 85.2 FL (ref 81.4–97.8)
METHADONE UR QL SCN: NEGATIVE
MICRO URNS: NORMAL
MONOCYTES # BLD AUTO: 0.37 K/UL (ref 0–0.85)
MONOCYTES NFR BLD AUTO: 2.6 % (ref 0–13.4)
MORPHOLOGY BLD-IMP: NORMAL
NEUTROPHILS # BLD AUTO: 12.66 K/UL (ref 1.82–7.42)
NEUTROPHILS NFR BLD: 80.5 % (ref 44–72)
NEUTS BAND NFR BLD MANUAL: 8 % (ref 0–10)
NITRITE UR QL STRIP.AUTO: NEGATIVE
NRBC # BLD AUTO: 0 K/UL
NRBC BLD-RTO: 0 /100 WBC
OPIATES UR QL SCN: POSITIVE
OXYCODONE UR QL SCN: NEGATIVE
PCP UR QL SCN: NEGATIVE
PH UR STRIP.AUTO: 6 [PH] (ref 5–8)
PLATELET # BLD AUTO: 598 K/UL (ref 164–446)
PLATELET BLD QL SMEAR: NORMAL
PMV BLD AUTO: 9.1 FL (ref 9–12.9)
POIKILOCYTOSIS BLD QL SMEAR: NORMAL
POTASSIUM SERPL-SCNC: 4.9 MMOL/L (ref 3.6–5.5)
PROPOXYPH UR QL SCN: NEGATIVE
PROT SERPL-MCNC: 6.3 G/DL (ref 6–8.2)
PROT UR QL STRIP: NEGATIVE MG/DL
PROTHROMBIN TIME: 15.1 SEC (ref 12–14.6)
PROTHROMBIN TIME: 15.6 SEC (ref 12–14.6)
RBC # BLD AUTO: 5.33 M/UL (ref 4.7–6.1)
RBC BLD AUTO: PRESENT
RBC UR QL AUTO: NEGATIVE
SIGNIFICANT IND 70042: NORMAL
SIGNIFICANT IND 70042: NORMAL
SITE SITE: NORMAL
SITE SITE: NORMAL
SODIUM SERPL-SCNC: 127 MMOL/L (ref 135–145)
SOURCE SOURCE: NORMAL
SOURCE SOURCE: NORMAL
SP GR UR STRIP.AUTO: 1.04
UROBILINOGEN UR STRIP.AUTO-MCNC: 2 MG/DL
WBC # BLD AUTO: 14.3 K/UL (ref 4.8–10.8)

## 2020-01-16 PROCEDURE — 87205 SMEAR GRAM STAIN: CPT | Mod: 91

## 2020-01-16 PROCEDURE — 87116 MYCOBACTERIA CULTURE: CPT

## 2020-01-16 PROCEDURE — 81003 URINALYSIS AUTO W/O SCOPE: CPT

## 2020-01-16 PROCEDURE — 700111 HCHG RX REV CODE 636 W/ 250 OVERRIDE (IP): Performed by: EMERGENCY MEDICINE

## 2020-01-16 PROCEDURE — 99291 CRITICAL CARE FIRST HOUR: CPT | Mod: GC | Performed by: INTERNAL MEDICINE

## 2020-01-16 PROCEDURE — A9270 NON-COVERED ITEM OR SERVICE: HCPCS | Performed by: STUDENT IN AN ORGANIZED HEALTH CARE EDUCATION/TRAINING PROGRAM

## 2020-01-16 PROCEDURE — 82550 ASSAY OF CK (CPK): CPT

## 2020-01-16 PROCEDURE — 87186 SC STD MICRODIL/AGAR DIL: CPT | Mod: 91

## 2020-01-16 PROCEDURE — 85652 RBC SED RATE AUTOMATED: CPT

## 2020-01-16 PROCEDURE — 96375 TX/PRO/DX INJ NEW DRUG ADDON: CPT

## 2020-01-16 PROCEDURE — 160027 HCHG SURGERY MINUTES - 1ST 30 MINS LEVEL 2: Performed by: ORTHOPAEDIC SURGERY

## 2020-01-16 PROCEDURE — 93005 ELECTROCARDIOGRAM TRACING: CPT | Performed by: EMERGENCY MEDICINE

## 2020-01-16 PROCEDURE — 700111 HCHG RX REV CODE 636 W/ 250 OVERRIDE (IP): Performed by: STUDENT IN AN ORGANIZED HEALTH CARE EDUCATION/TRAINING PROGRAM

## 2020-01-16 PROCEDURE — 700105 HCHG RX REV CODE 258

## 2020-01-16 PROCEDURE — 85027 COMPLETE CBC AUTOMATED: CPT

## 2020-01-16 PROCEDURE — 87077 CULTURE AEROBIC IDENTIFY: CPT | Mod: 91

## 2020-01-16 PROCEDURE — 700105 HCHG RX REV CODE 258: Performed by: STUDENT IN AN ORGANIZED HEALTH CARE EDUCATION/TRAINING PROGRAM

## 2020-01-16 PROCEDURE — 700101 HCHG RX REV CODE 250: Performed by: ANESTHESIOLOGY

## 2020-01-16 PROCEDURE — 160048 HCHG OR STATISTICAL LEVEL 1-5: Performed by: ORTHOPAEDIC SURGERY

## 2020-01-16 PROCEDURE — 700111 HCHG RX REV CODE 636 W/ 250 OVERRIDE (IP): Performed by: ANESTHESIOLOGY

## 2020-01-16 PROCEDURE — 160002 HCHG RECOVERY MINUTES (STAT): Performed by: ORTHOPAEDIC SURGERY

## 2020-01-16 PROCEDURE — 85730 THROMBOPLASTIN TIME PARTIAL: CPT

## 2020-01-16 PROCEDURE — 73701 CT LOWER EXTREMITY W/DYE: CPT | Mod: RT

## 2020-01-16 PROCEDURE — 71045 X-RAY EXAM CHEST 1 VIEW: CPT

## 2020-01-16 PROCEDURE — 85007 BL SMEAR W/DIFF WBC COUNT: CPT

## 2020-01-16 PROCEDURE — 700105 HCHG RX REV CODE 258: Performed by: ANESTHESIOLOGY

## 2020-01-16 PROCEDURE — 87075 CULTR BACTERIA EXCEPT BLOOD: CPT

## 2020-01-16 PROCEDURE — 96365 THER/PROPH/DIAG IV INF INIT: CPT

## 2020-01-16 PROCEDURE — 160035 HCHG PACU - 1ST 60 MINS PHASE I: Performed by: ORTHOPAEDIC SURGERY

## 2020-01-16 PROCEDURE — 700117 HCHG RX CONTRAST REV CODE 255: Performed by: EMERGENCY MEDICINE

## 2020-01-16 PROCEDURE — 0KBQ0ZZ EXCISION OF RIGHT UPPER LEG MUSCLE, OPEN APPROACH: ICD-10-PCS | Performed by: ORTHOPAEDIC SURGERY

## 2020-01-16 PROCEDURE — 87015 SPECIMEN INFECT AGNT CONCNTJ: CPT

## 2020-01-16 PROCEDURE — A6550 NEG PRES WOUND THER DRSG SET: HCPCS | Performed by: ORTHOPAEDIC SURGERY

## 2020-01-16 PROCEDURE — 87040 BLOOD CULTURE FOR BACTERIA: CPT

## 2020-01-16 PROCEDURE — 501445 HCHG STAPLER, SKIN DISP: Performed by: ORTHOPAEDIC SURGERY

## 2020-01-16 PROCEDURE — 700105 HCHG RX REV CODE 258: Performed by: EMERGENCY MEDICINE

## 2020-01-16 PROCEDURE — 80053 COMPREHEN METABOLIC PANEL: CPT

## 2020-01-16 PROCEDURE — 160038 HCHG SURGERY MINUTES - EA ADDL 1 MIN LEVEL 2: Performed by: ORTHOPAEDIC SURGERY

## 2020-01-16 PROCEDURE — 87102 FUNGUS ISOLATION CULTURE: CPT

## 2020-01-16 PROCEDURE — 87206 SMEAR FLUORESCENT/ACID STAI: CPT

## 2020-01-16 PROCEDURE — 93306 TTE W/DOPPLER COMPLETE: CPT | Mod: 26 | Performed by: INTERNAL MEDICINE

## 2020-01-16 PROCEDURE — 96367 TX/PROPH/DG ADDL SEQ IV INF: CPT

## 2020-01-16 PROCEDURE — 86140 C-REACTIVE PROTEIN: CPT

## 2020-01-16 PROCEDURE — 99291 CRITICAL CARE FIRST HOUR: CPT

## 2020-01-16 PROCEDURE — 87070 CULTURE OTHR SPECIMN AEROBIC: CPT | Mod: 91

## 2020-01-16 PROCEDURE — 700102 HCHG RX REV CODE 250 W/ 637 OVERRIDE(OP): Performed by: STUDENT IN AN ORGANIZED HEALTH CARE EDUCATION/TRAINING PROGRAM

## 2020-01-16 PROCEDURE — 83735 ASSAY OF MAGNESIUM: CPT

## 2020-01-16 PROCEDURE — 160036 HCHG PACU - EA ADDL 30 MINS PHASE I: Performed by: ORTHOPAEDIC SURGERY

## 2020-01-16 PROCEDURE — 87150 DNA/RNA AMPLIFIED PROBE: CPT

## 2020-01-16 PROCEDURE — 85610 PROTHROMBIN TIME: CPT | Mod: 91

## 2020-01-16 PROCEDURE — 160009 HCHG ANES TIME/MIN: Performed by: ORTHOPAEDIC SURGERY

## 2020-01-16 PROCEDURE — 83605 ASSAY OF LACTIC ACID: CPT

## 2020-01-16 PROCEDURE — 93306 TTE W/DOPPLER COMPLETE: CPT

## 2020-01-16 PROCEDURE — 500881 HCHG PACK, EXTREMITY: Performed by: ORTHOPAEDIC SURGERY

## 2020-01-16 PROCEDURE — 80307 DRUG TEST PRSMV CHEM ANLYZR: CPT

## 2020-01-16 PROCEDURE — 770022 HCHG ROOM/CARE - ICU (200)

## 2020-01-16 RX ORDER — SODIUM CHLORIDE, SODIUM GLUCONATE, SODIUM ACETATE, POTASSIUM CHLORIDE AND MAGNESIUM CHLORIDE 526; 502; 368; 37; 30 MG/100ML; MG/100ML; MG/100ML; MG/100ML; MG/100ML
INJECTION, SOLUTION INTRAVENOUS
Status: DISCONTINUED | OUTPATIENT
Start: 2020-01-16 | End: 2020-01-16 | Stop reason: SURG

## 2020-01-16 RX ORDER — SODIUM CHLORIDE, SODIUM LACTATE, POTASSIUM CHLORIDE, CALCIUM CHLORIDE 600; 310; 30; 20 MG/100ML; MG/100ML; MG/100ML; MG/100ML
1000 INJECTION, SOLUTION INTRAVENOUS ONCE
Status: COMPLETED | OUTPATIENT
Start: 2020-01-16 | End: 2020-01-16

## 2020-01-16 RX ORDER — SODIUM CHLORIDE, SODIUM LACTATE, POTASSIUM CHLORIDE, CALCIUM CHLORIDE 600; 310; 30; 20 MG/100ML; MG/100ML; MG/100ML; MG/100ML
INJECTION, SOLUTION INTRAVENOUS CONTINUOUS
Status: DISCONTINUED | OUTPATIENT
Start: 2020-01-16 | End: 2020-01-19

## 2020-01-16 RX ORDER — HYDROMORPHONE HYDROCHLORIDE 1 MG/ML
0.2 INJECTION, SOLUTION INTRAMUSCULAR; INTRAVENOUS; SUBCUTANEOUS
Status: DISCONTINUED | OUTPATIENT
Start: 2020-01-16 | End: 2020-01-16 | Stop reason: HOSPADM

## 2020-01-16 RX ORDER — HYDROMORPHONE HYDROCHLORIDE 2 MG/ML
INJECTION, SOLUTION INTRAMUSCULAR; INTRAVENOUS; SUBCUTANEOUS PRN
Status: DISCONTINUED | OUTPATIENT
Start: 2020-01-16 | End: 2020-01-16 | Stop reason: SURG

## 2020-01-16 RX ORDER — OXYCODONE HCL 5 MG/5 ML
5 SOLUTION, ORAL ORAL
Status: DISCONTINUED | OUTPATIENT
Start: 2020-01-16 | End: 2020-01-16 | Stop reason: HOSPADM

## 2020-01-16 RX ORDER — MAGNESIUM SULFATE HEPTAHYDRATE 40 MG/ML
2 INJECTION, SOLUTION INTRAVENOUS ONCE
Status: COMPLETED | OUTPATIENT
Start: 2020-01-16 | End: 2020-01-16

## 2020-01-16 RX ORDER — ONDANSETRON 2 MG/ML
4 INJECTION INTRAMUSCULAR; INTRAVENOUS
Status: COMPLETED | OUTPATIENT
Start: 2020-01-16 | End: 2020-01-16

## 2020-01-16 RX ORDER — MIDAZOLAM HYDROCHLORIDE 1 MG/ML
INJECTION INTRAMUSCULAR; INTRAVENOUS PRN
Status: DISCONTINUED | OUTPATIENT
Start: 2020-01-16 | End: 2020-01-16 | Stop reason: SURG

## 2020-01-16 RX ORDER — MIDAZOLAM HYDROCHLORIDE 1 MG/ML
1 INJECTION INTRAMUSCULAR; INTRAVENOUS
Status: DISCONTINUED | OUTPATIENT
Start: 2020-01-16 | End: 2020-01-16 | Stop reason: HOSPADM

## 2020-01-16 RX ORDER — PHENYLEPHRINE HCL IN 0.9% NACL 0.5 MG/5ML
SYRINGE (ML) INTRAVENOUS PRN
Status: DISCONTINUED | OUTPATIENT
Start: 2020-01-16 | End: 2020-01-16 | Stop reason: SURG

## 2020-01-16 RX ORDER — MORPHINE SULFATE 4 MG/ML
3 INJECTION, SOLUTION INTRAMUSCULAR; INTRAVENOUS EVERY 4 HOURS PRN
Status: DISCONTINUED | OUTPATIENT
Start: 2020-01-16 | End: 2020-01-16

## 2020-01-16 RX ORDER — HALOPERIDOL 5 MG/ML
1 INJECTION INTRAMUSCULAR
Status: DISCONTINUED | OUTPATIENT
Start: 2020-01-16 | End: 2020-01-16 | Stop reason: HOSPADM

## 2020-01-16 RX ORDER — LINEZOLID 2 MG/ML
600 INJECTION, SOLUTION INTRAVENOUS ONCE
Status: COMPLETED | OUTPATIENT
Start: 2020-01-16 | End: 2020-01-16

## 2020-01-16 RX ORDER — LABETALOL HYDROCHLORIDE 5 MG/ML
5 INJECTION, SOLUTION INTRAVENOUS
Status: DISCONTINUED | OUTPATIENT
Start: 2020-01-16 | End: 2020-01-16 | Stop reason: HOSPADM

## 2020-01-16 RX ORDER — SODIUM CHLORIDE, SODIUM LACTATE, POTASSIUM CHLORIDE, AND CALCIUM CHLORIDE .6; .31; .03; .02 G/100ML; G/100ML; G/100ML; G/100ML
1000 INJECTION, SOLUTION INTRAVENOUS ONCE
Status: COMPLETED | OUTPATIENT
Start: 2020-01-16 | End: 2020-01-16

## 2020-01-16 RX ORDER — MEPERIDINE HYDROCHLORIDE 25 MG/ML
6.25 INJECTION INTRAMUSCULAR; INTRAVENOUS; SUBCUTANEOUS
Status: DISCONTINUED | OUTPATIENT
Start: 2020-01-16 | End: 2020-01-16 | Stop reason: HOSPADM

## 2020-01-16 RX ORDER — POLYETHYLENE GLYCOL 3350 17 G/17G
1 POWDER, FOR SOLUTION ORAL
Status: DISCONTINUED | OUTPATIENT
Start: 2020-01-16 | End: 2020-01-31 | Stop reason: HOSPADM

## 2020-01-16 RX ORDER — SODIUM CHLORIDE 9 MG/ML
INJECTION, SOLUTION INTRAVENOUS
Status: COMPLETED
Start: 2020-01-16 | End: 2020-01-16

## 2020-01-16 RX ORDER — SODIUM CHLORIDE, SODIUM LACTATE, POTASSIUM CHLORIDE, CALCIUM CHLORIDE 600; 310; 30; 20 MG/100ML; MG/100ML; MG/100ML; MG/100ML
INJECTION, SOLUTION INTRAVENOUS CONTINUOUS
Status: DISCONTINUED | OUTPATIENT
Start: 2020-01-16 | End: 2020-01-16

## 2020-01-16 RX ORDER — HYDRALAZINE HYDROCHLORIDE 20 MG/ML
5 INJECTION INTRAMUSCULAR; INTRAVENOUS
Status: DISCONTINUED | OUTPATIENT
Start: 2020-01-16 | End: 2020-01-16 | Stop reason: HOSPADM

## 2020-01-16 RX ORDER — CLINDAMYCIN PHOSPHATE 900 MG/50ML
900 INJECTION, SOLUTION INTRAVENOUS ONCE
Status: DISCONTINUED | OUTPATIENT
Start: 2020-01-16 | End: 2020-01-16

## 2020-01-16 RX ORDER — DIPHENHYDRAMINE HYDROCHLORIDE 50 MG/ML
12.5 INJECTION INTRAMUSCULAR; INTRAVENOUS
Status: DISCONTINUED | OUTPATIENT
Start: 2020-01-16 | End: 2020-01-16 | Stop reason: HOSPADM

## 2020-01-16 RX ORDER — ESMOLOL HYDROCHLORIDE 10 MG/ML
INJECTION INTRAVENOUS PRN
Status: DISCONTINUED | OUTPATIENT
Start: 2020-01-16 | End: 2020-01-16 | Stop reason: SURG

## 2020-01-16 RX ORDER — HYDROMORPHONE HYDROCHLORIDE 1 MG/ML
0.5 INJECTION, SOLUTION INTRAMUSCULAR; INTRAVENOUS; SUBCUTANEOUS EVERY 4 HOURS PRN
Status: DISCONTINUED | OUTPATIENT
Start: 2020-01-16 | End: 2020-01-16

## 2020-01-16 RX ORDER — OXYCODONE HCL 5 MG/5 ML
10 SOLUTION, ORAL ORAL
Status: DISCONTINUED | OUTPATIENT
Start: 2020-01-16 | End: 2020-01-16 | Stop reason: HOSPADM

## 2020-01-16 RX ORDER — AMOXICILLIN 250 MG
2 CAPSULE ORAL 2 TIMES DAILY
Status: DISCONTINUED | OUTPATIENT
Start: 2020-01-16 | End: 2020-01-31 | Stop reason: HOSPADM

## 2020-01-16 RX ORDER — ACETAMINOPHEN 325 MG/1
650 TABLET ORAL EVERY 6 HOURS PRN
Status: DISCONTINUED | OUTPATIENT
Start: 2020-01-16 | End: 2020-01-20

## 2020-01-16 RX ORDER — HYDROMORPHONE HYDROCHLORIDE 1 MG/ML
0.1 INJECTION, SOLUTION INTRAMUSCULAR; INTRAVENOUS; SUBCUTANEOUS
Status: DISCONTINUED | OUTPATIENT
Start: 2020-01-16 | End: 2020-01-16 | Stop reason: HOSPADM

## 2020-01-16 RX ORDER — ACETAMINOPHEN 325 MG/1
1300 TABLET ORAL EVERY 6 HOURS PRN
Status: ON HOLD | COMMUNITY
End: 2020-01-31

## 2020-01-16 RX ORDER — HYDROMORPHONE HYDROCHLORIDE 1 MG/ML
1 INJECTION, SOLUTION INTRAMUSCULAR; INTRAVENOUS; SUBCUTANEOUS
Status: DISCONTINUED | OUTPATIENT
Start: 2020-01-16 | End: 2020-01-17

## 2020-01-16 RX ORDER — HYDROMORPHONE HYDROCHLORIDE 1 MG/ML
0.4 INJECTION, SOLUTION INTRAMUSCULAR; INTRAVENOUS; SUBCUTANEOUS
Status: DISCONTINUED | OUTPATIENT
Start: 2020-01-16 | End: 2020-01-16 | Stop reason: HOSPADM

## 2020-01-16 RX ORDER — LINEZOLID 600 MG/1
600 TABLET, FILM COATED ORAL EVERY 12 HOURS
Status: DISCONTINUED | OUTPATIENT
Start: 2020-01-16 | End: 2020-01-22

## 2020-01-16 RX ORDER — BISACODYL 10 MG
10 SUPPOSITORY, RECTAL RECTAL
Status: DISCONTINUED | OUTPATIENT
Start: 2020-01-16 | End: 2020-01-31 | Stop reason: HOSPADM

## 2020-01-16 RX ADMIN — MEROPENEM 500 MG: 500 INJECTION, POWDER, FOR SOLUTION INTRAVENOUS at 10:46

## 2020-01-16 RX ADMIN — HYDROMORPHONE HYDROCHLORIDE 1 MG: 1 INJECTION, SOLUTION INTRAMUSCULAR; INTRAVENOUS; SUBCUTANEOUS at 12:16

## 2020-01-16 RX ADMIN — HYDROMORPHONE HYDROCHLORIDE 1 MG: 1 INJECTION, SOLUTION INTRAMUSCULAR; INTRAVENOUS; SUBCUTANEOUS at 20:15

## 2020-01-16 RX ADMIN — HYDROMORPHONE HYDROCHLORIDE 1 MG: 1 INJECTION, SOLUTION INTRAMUSCULAR; INTRAVENOUS; SUBCUTANEOUS at 15:32

## 2020-01-16 RX ADMIN — PROPOFOL 50 MG: 10 INJECTION, EMULSION INTRAVENOUS at 17:33

## 2020-01-16 RX ADMIN — HYDROMORPHONE HYDROCHLORIDE 1 MG: 1 INJECTION, SOLUTION INTRAMUSCULAR; INTRAVENOUS; SUBCUTANEOUS at 22:15

## 2020-01-16 RX ADMIN — SODIUM CHLORIDE 500 ML: 9 INJECTION, SOLUTION INTRAVENOUS at 14:30

## 2020-01-16 RX ADMIN — SODIUM CHLORIDE, SODIUM GLUCONATE, SODIUM ACETATE, POTASSIUM CHLORIDE AND MAGNESIUM CHLORIDE: 526; 502; 368; 37; 30 INJECTION, SOLUTION INTRAVENOUS at 18:14

## 2020-01-16 RX ADMIN — LINEZOLID 600 MG: 600 INJECTION, SOLUTION INTRAVENOUS at 08:31

## 2020-01-16 RX ADMIN — ONDANSETRON 4 MG: 2 INJECTION INTRAMUSCULAR; INTRAVENOUS at 19:20

## 2020-01-16 RX ADMIN — FENTANYL CITRATE 50 MCG: 0.05 INJECTION, SOLUTION INTRAMUSCULAR; INTRAVENOUS at 19:17

## 2020-01-16 RX ADMIN — SODIUM CHLORIDE, POTASSIUM CHLORIDE, SODIUM LACTATE AND CALCIUM CHLORIDE 1000 ML: 600; 310; 30; 20 INJECTION, SOLUTION INTRAVENOUS at 10:02

## 2020-01-16 RX ADMIN — HYDROMORPHONE HYDROCHLORIDE 0.5 MG: 1 INJECTION, SOLUTION INTRAMUSCULAR; INTRAVENOUS; SUBCUTANEOUS at 11:19

## 2020-01-16 RX ADMIN — LINEZOLID 600 MG: 600 TABLET, FILM COATED ORAL at 20:15

## 2020-01-16 RX ADMIN — SODIUM CHLORIDE, POTASSIUM CHLORIDE, SODIUM LACTATE AND CALCIUM CHLORIDE: 600; 310; 30; 20 INJECTION, SOLUTION INTRAVENOUS at 16:57

## 2020-01-16 RX ADMIN — ESMOLOL HYDROCHLORIDE 20 MG: 100 INJECTION, SOLUTION INTRAVENOUS at 18:24

## 2020-01-16 RX ADMIN — SODIUM CHLORIDE, POTASSIUM CHLORIDE, SODIUM LACTATE AND CALCIUM CHLORIDE: 600; 310; 30; 20 INJECTION, SOLUTION INTRAVENOUS at 10:02

## 2020-01-16 RX ADMIN — SODIUM CHLORIDE, POTASSIUM CHLORIDE, SODIUM LACTATE AND CALCIUM CHLORIDE 1000 ML: 600; 310; 30; 20 INJECTION, SOLUTION INTRAVENOUS at 08:31

## 2020-01-16 RX ADMIN — ACETAMINOPHEN 650 MG: 325 TABLET, FILM COATED ORAL at 22:15

## 2020-01-16 RX ADMIN — SODIUM CHLORIDE, POTASSIUM CHLORIDE, SODIUM LACTATE AND CALCIUM CHLORIDE: 600; 310; 30; 20 INJECTION, SOLUTION INTRAVENOUS at 20:16

## 2020-01-16 RX ADMIN — HYDROMORPHONE HYDROCHLORIDE 2 MG: 2 INJECTION, SOLUTION INTRAMUSCULAR; INTRAVENOUS; SUBCUTANEOUS at 17:33

## 2020-01-16 RX ADMIN — IOHEXOL 65 ML: 350 INJECTION, SOLUTION INTRAVENOUS at 07:47

## 2020-01-16 RX ADMIN — Medication 100 MCG: at 18:16

## 2020-01-16 RX ADMIN — Medication 100 MCG: at 18:24

## 2020-01-16 RX ADMIN — FENTANYL CITRATE 50 MCG: 0.05 INJECTION, SOLUTION INTRAMUSCULAR; INTRAVENOUS at 07:21

## 2020-01-16 RX ADMIN — HALOPERIDOL LACTATE 1 MG: 5 INJECTION, SOLUTION INTRAMUSCULAR at 19:22

## 2020-01-16 RX ADMIN — CEFTRIAXONE SODIUM 2 G: 2 INJECTION, POWDER, FOR SOLUTION INTRAMUSCULAR; INTRAVENOUS at 08:11

## 2020-01-16 RX ADMIN — SODIUM CHLORIDE, POTASSIUM CHLORIDE, SODIUM LACTATE AND CALCIUM CHLORIDE 1000 ML: 600; 310; 30; 20 INJECTION, SOLUTION INTRAVENOUS at 06:57

## 2020-01-16 RX ADMIN — SODIUM CHLORIDE, POTASSIUM CHLORIDE, SODIUM LACTATE AND CALCIUM CHLORIDE 1000 ML: 600; 310; 30; 20 INJECTION, SOLUTION INTRAVENOUS at 17:15

## 2020-01-16 RX ADMIN — MIDAZOLAM HYDROCHLORIDE 2 MG: 1 INJECTION, SOLUTION INTRAMUSCULAR; INTRAVENOUS at 17:33

## 2020-01-16 RX ADMIN — MAGNESIUM SULFATE 2 G: 2 INJECTION INTRAVENOUS at 14:30

## 2020-01-16 SDOH — ECONOMIC STABILITY - HOUSING INSECURITY: HOMELESSNESS UNSPECIFIED: Z59.00

## 2020-01-16 ASSESSMENT — ENCOUNTER SYMPTOMS
FEVER: 1
MYALGIAS: 0
VOMITING: 0
ABDOMINAL PAIN: 0
BLURRED VISION: 0
COUGH: 0
CHILLS: 1
SHORTNESS OF BREATH: 0
NAUSEA: 1
DIZZINESS: 0
WHEEZING: 0
DEPRESSION: 0
DOUBLE VISION: 0
WEIGHT LOSS: 0
HEADACHES: 0

## 2020-01-16 ASSESSMENT — LIFESTYLE VARIABLES
SUBSTANCE_ABUSE: 1
EVER_SMOKED: YES

## 2020-01-16 ASSESSMENT — PAIN SCALES - GENERAL: PAIN_LEVEL: 2

## 2020-01-16 NOTE — ED PROVIDER NOTES
ED Provider Note    ER PROVIDER NOTE      CHIEF COMPLAINT  Chief Complaint   Patient presents with   • Wound Infection     Pt fell a couple of weeks ago, right thigh was erythemic afterwards. Pt reports today the area opened, discharge. Pt reports he has felt feverish, did not measure temperature.        HPI  Gonsalo Hunt is a 59 y.o. male who presents to the emergency department complaining of pain and swelling to his right thigh.  Patient reports he slipped on ice 2 weeks ago, had some pain and redness to the area ever since, however in the last 3 to 4 days the pain has been worsening, and the last 2 days he noticed it more swelling and began to drain some pus.  Subjective fever.  Chills.  He denies any chest pain, cough, shortness of breath.  No abdominal pain nausea vomiting.  No other rash or lesion.    He denies any injection drug use preceding the lesion although states he has been using to deal with the pain        REVIEW OF SYSTEMS  Pertinent positives include leg pain, fever. Pertinent negatives include no chest pain. See HPI for details. All other systems reviewed and are negative.    PAST MEDICAL HISTORY   has a past medical history of Back pain and Heroin use.    SURGICAL HISTORY   has a past surgical history that includes other orthopedic surgery and other abdominal surgery.    FAMILY HISTORY  Family History   Problem Relation Age of Onset   • No Known Problems Mother    • No Known Problems Father        SOCIAL HISTORY  Social History     Socioeconomic History   • Marital status: Single     Spouse name: Not on file   • Number of children: Not on file   • Years of education: Not on file   • Highest education level: Not on file   Occupational History   • Not on file   Social Needs   • Financial resource strain: Not on file   • Food insecurity:     Worry: Not on file     Inability: Not on file   • Transportation needs:     Medical: Not on file     Non-medical: Not on file   Tobacco Use   • Smoking  "status: Current Every Day Smoker     Packs/day: 1.00     Types: Cigarettes   • Smokeless tobacco: Never Used   Substance and Sexual Activity   • Alcohol use: Yes     Comment: rarely   • Drug use: No   • Sexual activity: Not on file   Lifestyle   • Physical activity:     Days per week: Not on file     Minutes per session: Not on file   • Stress: Not on file   Relationships   • Social connections:     Talks on phone: Not on file     Gets together: Not on file     Attends Evangelical service: Not on file     Active member of club or organization: Not on file     Attends meetings of clubs or organizations: Not on file     Relationship status: Not on file   • Intimate partner violence:     Fear of current or ex partner: Not on file     Emotionally abused: Not on file     Physically abused: Not on file     Forced sexual activity: Not on file   Other Topics Concern   • Not on file   Social History Narrative   • Not on file      Social History     Substance and Sexual Activity   Drug Use No       CURRENT MEDICATIONS  Home Medications     Reviewed by Christian Romero (Pharmacy Tech) on 01/16/20 at 0830  Med List Status: Complete   Medication Last Dose Status   acetaminophen (TYLENOL) 325 MG Tab <1week Active   Multiple Vitamins-Minerals (MULTIVITAMIN PO) unk Active                ALLERGIES  Allergies   Allergen Reactions   • Codeine Rash     Rash     • Pcn [Penicillins] Unspecified     Unknown reaction. Tolerated ceftriaxone on 10/9/19         PHYSICAL EXAM  VITAL SIGNS: BP (!) 96/68   Pulse (!) 127   Temp 36.9 °C (98.4 °F) (Temporal)   Ht 1.93 m (6' 4\")   Wt 99.8 kg (220 lb)   SpO2 95%   BMI 26.78 kg/m²   Pulse ox interpretation: I interpret this pulse ox as normal.    Constitutional: Alert ill-appearing  HENT: No signs of trauma, Bilateral external ears normal, Nose normal.   Eyes: Pupils are equal and reactive, Conjunctiva normal, Non-icteric.   Neck: Normal range of motion, No tenderness, Supple, No stridor. "   Lymphatic: No lymphadenopathy noted.   Cardiovascular: Tachycardic, regular, no murmurs.   Thorax & Lungs: Mild wheezing throughout, no respiratory distress no chest tenderness.   Abdomen: Bowel sounds normal, Soft, No tenderness, No masses, No pulsatile masses. No peritoneal signs.  Skin: Warm, Dry, No erythema, No rash.   Back: No bony tenderness, No CVA tenderness.   Extremities: Intact distal pulses, no cyanosis, no obvious deformity  Musculoskeletal: Extensive area over right thigh essentially from level of the greater trochanter to almost the knee, erythematous with some skin breakdown, several areas of purulent drainage, tender to palpation, right lower extremity edema noted, otherwise good range of motion in all major joints. No tenderness to palpation or major deformities noted.   Neurologic: Alert , Normal motor function, Normal sensory function, No focal deficits noted.   Psychiatric: Affect normal, Judgment normal, Mood normal.     DIAGNOSTIC STUDIES / PROCEDURES    EKG  Results for orders placed or performed during the hospital encounter of 01/16/20   CBC WITH DIFFERENTIAL   Result Value Ref Range    WBC 14.3 (H) 4.8 - 10.8 K/uL    RBC 5.33 4.70 - 6.10 M/uL    Hemoglobin 15.1 14.0 - 18.0 g/dL    Hematocrit 45.4 42.0 - 52.0 %    MCV 85.2 81.4 - 97.8 fL    MCH 28.3 27.0 - 33.0 pg    MCHC 33.3 (L) 33.7 - 35.3 g/dL    RDW 45.4 35.9 - 50.0 fL    Platelet Count 598 (H) 164 - 446 K/uL    MPV 9.1 9.0 - 12.9 fL    Neutrophils-Polys 80.50 (H) 44.00 - 72.00 %    Lymphocytes 7.10 (L) 22.00 - 41.00 %    Monocytes 2.60 0.00 - 13.40 %    Eosinophils 1.80 0.00 - 6.90 %    Basophils 0.00 0.00 - 1.80 %    Nucleated RBC 0.00 /100 WBC    Neutrophils (Absolute) 12.66 (H) 1.82 - 7.42 K/uL    Lymphs (Absolute) 1.02 1.00 - 4.80 K/uL    Monos (Absolute) 0.37 0.00 - 0.85 K/uL    Eos (Absolute) 0.26 0.00 - 0.51 K/uL    Baso (Absolute) 0.00 0.00 - 0.12 K/uL    NRBC (Absolute) 0.00 K/uL   COMP METABOLIC PANEL   Result Value Ref  Range    Sodium 127 (L) 135 - 145 mmol/L    Potassium 4.9 3.6 - 5.5 mmol/L    Chloride 94 (L) 96 - 112 mmol/L    Co2 24 20 - 33 mmol/L    Anion Gap 9.0 0.0 - 11.9    Glucose 114 (H) 65 - 99 mg/dL    Bun 21 8 - 22 mg/dL    Creatinine 0.75 0.50 - 1.40 mg/dL    Calcium 8.8 8.5 - 10.5 mg/dL    AST(SGOT) 31 12 - 45 U/L    ALT(SGPT) 42 2 - 50 U/L    Alkaline Phosphatase 102 (H) 30 - 99 U/L    Total Bilirubin 0.7 0.1 - 1.5 mg/dL    Albumin 2.6 (L) 3.2 - 4.9 g/dL    Total Protein 6.3 6.0 - 8.2 g/dL    Globulin 3.7 (H) 1.9 - 3.5 g/dL    A-G Ratio 0.7 g/dL   LACTIC ACID   Result Value Ref Range    Lactic Acid 2.5 (H) 0.5 - 2.0 mmol/L   LACTIC ACID   Result Value Ref Range    Lactic Acid 2.5 (H) 0.5 - 2.0 mmol/L   APTT   Result Value Ref Range    APTT 26.3 24.7 - 36.0 sec   PROTHROMBIN TIME   Result Value Ref Range    PT 15.6 (H) 12.0 - 14.6 sec    INR 1.21 (H) 0.87 - 1.13   WESTERGREN SED RATE   Result Value Ref Range    Sed Rate Westergren 34 (H) 0 - 20 mm/hour   CRP QUANTITIVE (NON-CARDIAC)   Result Value Ref Range    Stat C-Reactive Protein 12.38 (H) 0.00 - 0.75 mg/dL   DIFFERENTIAL MANUAL   Result Value Ref Range    Bands-Stabs 8.00 0.00 - 10.00 %    Manual Diff Status PERFORMED    PERIPHERAL SMEAR REVIEW   Result Value Ref Range    Peripheral Smear Review see below    PLATELET ESTIMATE   Result Value Ref Range    Plt Estimation Increased    MORPHOLOGY   Result Value Ref Range    RBC Morphology Present     Poikilocytosis 1+     Echinocytes 1+    Blood Culture,Hold   Result Value Ref Range    Blood Culture Hold Collected    ESTIMATED GFR   Result Value Ref Range    GFR If African American >60 >60 mL/min/1.73 m 2    GFR If Non African American >60 >60 mL/min/1.73 m 2   EKG   Result Value Ref Range    Report       [facility] Emergency Dept.    Test Date:  2020-01-16  Pt Name:    JANNA ROJAS                Department: ER  MRN:        [id number]                      Room:       BL 14  Gender:     Male                          Technician: [id number]  :        1960                   Requested By:SHERI BAINS  Order #:    [id number]                    Reading MD:    Measurements  Intervals                                Axis  Rate:       148                          P:  AK:                                      QRS:        -27  QRSD:       84                           T:          67  QT:         288  QTc:        453    Interpretive Statements  ATRIAL FIBRILLATION, V-RATE 118-181  BORDERLINE LEFT AXIS DEVIATION  No previous ECG available for comparison           RADIOLOGY  CT-EXTREMITY, LOWER WITH RIGHT   Final Result         1.  Large abscess in the right lateral thigh, may be within or adjacent to the muscular fascia. Adjacent fluid collection tracking adjacent to this abscess is seen which may be additional early forming abscess.   2.  Foci of air within these fluid collections is seen, appearance suggests gas producing organisms, consider component of necrotizing fasciitis as clinically appropriate.      These findings were discussed with the patient's clinician, SHERI BAINS, on 2020 8:00 AM.      DX-CHEST-PORTABLE (1 VIEW)   Final Result         1.  No acute cardiopulmonary disease.   2.  Atherosclerosis      EC-ECHOCARDIOGRAM COMPLETE W/O CONT    (Results Pending)     The radiologist's interpretation of all radiological studies have been reviewed by me.    COURSE & MEDICAL DECISION MAKING  Nursing notes, VS, PMSFHx reviewed in chart.    6:06 AM Patient seen and examined at bedside. Patient will be treated with IV fluid, Zyvox and Rocephin, patient has a penicillin allergy. Ordered for sepsis bundle, sed rate, CRP, CT to evaluate his symptoms.     6:30 AM patient reevaluated, has not received the pain medication yet, readdressed antibiotics with nursing as these were not going yet    7:08 AM  Patient reevaluated, more comfortable at this point,    7:53 AM  Paged ICU and orthopedics    8:21 AM  Discussed  case with [facility] for admission to ICU    8:26 AM   Patient reevaluated, complete this time, still tachycardic, blood pressure with systolics 100s, ordered for additional fluids    Discussed case with Dr. Blanc for orthopedic intervention    8:43 AM patient reevaluated, comfortable at this time    9:36 AM patient reevaluated again, heart rate 1 teens, systolic blood pressure 103, pending admission orders        HYDRATION: Based on the patient's presentation of Sepsis and Tachycardia the patient was given IV fluids. IV Hydration was used because oral hydration was not as rapid as required. Upon recheck following hydration, the patient was improved.     The total critical care time spent on this patient was 40 minutes, resuscitating patient, speaking with admitting physician, and interpreting test results. This 40 minutes is exclusive of separately billable procedures.      Decision Making:  This is a 59 y.o. male presenting with right leg wound, that has resulted in concomitant sepsis.  Patient was started on Zyvox and Rocephin given his allergies, as well as initial fluid resuscitation, 2 L.  He does appear to be fluid responsive with, with no hypotension.  Initial lactate was 2.5.  I do believe is the source is his abscess and orthopedics has been consulted given the large abscess and need for surgical intervention as well as with potential for early necrotizing fasciitis although his exam is not consistent with necrotizing fasciitis at this time.  Patient has no other focal symptoms to suggest other source.  He does have atrial fibrillation with rapid ventricular response, I think this is likely secondary to his infection, and would not attempt more aggressive rate control as his heart rate is improving with treatment of his infection  Patient is admitted in critical condition    FINAL IMPRESSION  1. Sepsis with acute organ dysfunction, due to unspecified organism, unspecified type, unspecified whether septic  shock present (HCC)    2. Cellulitis of right lower extremity    3. Abscess    4. Atrial fibrillation with RVR (HCC)         The note accurately reflects work and decisions made by me.  Christos Jennings M.D.  1/16/2020  9:48 AM

## 2020-01-16 NOTE — H&P
"      Internal Medicine Admitting History and Physical    Note Author: Earl Moraels M.D.       Name Gonsalo Hunt       1960   Age/Sex 59 y.o. male   MRN [id number]   Code Status Full     After 5PM or if no immediate response to page, please call for cross-coverage  Attending/Team: Dr. Paula Morales(resident) See Patient List for primary contact information  Call [phone] to page        Chief Complaint:   Right lower extremity pain    HPI:  Mr. Hunt is a 59 year old gentleman with a past medical history significant for Hepatitis C, homelessness, and previous IVDU who presents complaining of 2 weeks of right lower extremity pain localized to his lateral thigh. He noticed it after a mechanical ground level fall 2 weeks ago and states it has been progressively more painful with recent fevers and chills. He has also had some intermittent nausea and vomiting. Over the past day he has noticed copious purulent and bloody drainage and has been expressing this and cleaning it daily with alcohol swabs. He denies any recent history of IV drug use, saying \"I stay away from that stuff now.\" Additionally he was apparently unaware of his Hepatitis C status; I informed him he was Hepatitis C positive.     In the emergency department he was found to have borderline hypotension and atrial fibrillation with RVR, which he denies previous history of, and was treated with intravenous fluids, Ceftriaxone, and Linezolid. Orthopedic surgeon Dr. Blanc was consulted by the ERP. His rate gradually decreased to the 120s after receiving fluids and he denied any chest pain, shortness of breath, or dizziness.    Review of Systems   Constitutional: Positive for chills, fever and malaise/fatigue. Negative for weight loss.   HENT: Negative for hearing loss and tinnitus.    Eyes: Negative for blurred vision and double vision.   Respiratory: Negative for cough, shortness of breath and wheezing.    Cardiovascular: Negative for " chest pain and leg swelling.   Gastrointestinal: Positive for nausea. Negative for abdominal pain and vomiting.   Genitourinary: Negative for dysuria and urgency.   Musculoskeletal: Negative for myalgias.   Skin: Negative for itching and rash.   Neurological: Negative for dizziness and headaches.   Psychiatric/Behavioral: Positive for substance abuse (tobacco, marijuana). Negative for depression.             Past Medical History (Chronic medical problem, known complications and current treatment)    Hepatitis C - 6.2 million viral load 10/2019     Past Surgical History:  Past Surgical History:   Procedure Laterality Date   • OTHER ABDOMINAL SURGERY      gall bladder   • OTHER ORTHOPEDIC SURGERY      laminectomy 1990       Current Outpatient Medications:  Home Medications     Reviewed by Christian Romero (Pharmacy Tech) on 01/16/20 at 0830  Med List Status: Complete   Medication Last Dose Status   acetaminophen (TYLENOL) 325 MG Tab <1week Active   Multiple Vitamins-Minerals (MULTIVITAMIN PO) unk Active                Medication Allergy/Sensitivities:  Allergies   Allergen Reactions   • Codeine Rash     Rash     • Pcn [Penicillins] Unspecified     Unknown reaction. Tolerated ceftriaxone on 10/9/19           Family History (mandatory)   Family History   Problem Relation Age of Onset   • No Known Problems Mother    • No Known Problems Father        Social History (mandatory)   Social History     Socioeconomic History   • Marital status: Single     Spouse name: Not on file   • Number of children: Not on file   • Years of education: Not on file   • Highest education level: Not on file   Occupational History   • Not on file   Social Needs   • Financial resource strain: Not on file   • Food insecurity:     Worry: Not on file     Inability: Not on file   • Transportation needs:     Medical: Not on file     Non-medical: Not on file   Tobacco Use   • Smoking status: Current Every Day Smoker     Packs/day: 1.00     Types:  Cigarettes   • Smokeless tobacco: Never Used   Substance and Sexual Activity   • Alcohol use: Yes     Comment: rarely   • Drug use: No   • Sexual activity: Not on file   Lifestyle   • Physical activity:     Days per week: Not on file     Minutes per session: Not on file   • Stress: Not on file   Relationships   • Social connections:     Talks on phone: Not on file     Gets together: Not on file     Attends Mosque service: Not on file     Active member of club or organization: Not on file     Attends meetings of clubs or organizations: Not on file     Relationship status: Not on file   • Intimate partner violence:     Fear of current or ex partner: Not on file     Emotionally abused: Not on file     Physically abused: Not on file     Forced sexual activity: Not on file   Other Topics Concern   • Not on file   Social History Narrative   • Not on file     Living situation: Homeless   PCP : Pcp Pt States None    Physical Exam     Vitals:    01/16/20 0901 01/16/20 0915 01/16/20 0916 01/16/20 0946   BP: 100/67  (!) 96/68 101/63   Pulse: (!) 122 (!) 129 (!) 127 (!) 119   Temp:       TempSrc:       SpO2: 95% 98% 95% 94%   Weight:       Height:         Body mass index is 26.78 kg/m².  O2 therapy: Pulse Oximetry: 94 %    Physical Exam   Constitutional: He is oriented to person, place, and time.   Unkempt, appears older than stated age   HENT:   Head: Normocephalic and atraumatic.   Poor dentition, moist mucous membranes   Cardiovascular:   Irregularly irregular, tachycardic, no murmurs   Pulmonary/Chest: Effort normal. He has wheezes (scattered, end-expiratory). He has no rales.   Abdominal: Soft. Bowel sounds are normal. He exhibits no distension. There is no tenderness.   Musculoskeletal:         General: No edema.      Comments: Large abscess on the right lateral thigh with copious foul smelling muddy-brown and bloody drainage, fluctuant and extremely tender with areas of blackened necrotic tissue    Peripheral pulses,  neurologic function is intact in right lower extremity   Neurological: He is alert and oriented to person, place, and time.         Data Review       Old Records Request:   Deferred  Current Records review/summary: Completed    Lab Data Review:  Recent Results (from the past 24 hour(s))   CBC WITH DIFFERENTIAL    Collection Time: 01/16/20  6:45 AM   Result Value Ref Range    WBC 14.3 (H) 4.8 - 10.8 K/uL    RBC 5.33 4.70 - 6.10 M/uL    Hemoglobin 15.1 14.0 - 18.0 g/dL    Hematocrit 45.4 42.0 - 52.0 %    MCV 85.2 81.4 - 97.8 fL    MCH 28.3 27.0 - 33.0 pg    MCHC 33.3 (L) 33.7 - 35.3 g/dL    RDW 45.4 35.9 - 50.0 fL    Platelet Count 598 (H) 164 - 446 K/uL    MPV 9.1 9.0 - 12.9 fL    Neutrophils-Polys 80.50 (H) 44.00 - 72.00 %    Lymphocytes 7.10 (L) 22.00 - 41.00 %    Monocytes 2.60 0.00 - 13.40 %    Eosinophils 1.80 0.00 - 6.90 %    Basophils 0.00 0.00 - 1.80 %    Nucleated RBC 0.00 /100 WBC    Neutrophils (Absolute) 12.66 (H) 1.82 - 7.42 K/uL    Lymphs (Absolute) 1.02 1.00 - 4.80 K/uL    Monos (Absolute) 0.37 0.00 - 0.85 K/uL    Eos (Absolute) 0.26 0.00 - 0.51 K/uL    Baso (Absolute) 0.00 0.00 - 0.12 K/uL    NRBC (Absolute) 0.00 K/uL   COMP METABOLIC PANEL    Collection Time: 01/16/20  6:45 AM   Result Value Ref Range    Sodium 127 (L) 135 - 145 mmol/L    Potassium 4.9 3.6 - 5.5 mmol/L    Chloride 94 (L) 96 - 112 mmol/L    Co2 24 20 - 33 mmol/L    Anion Gap 9.0 0.0 - 11.9    Glucose 114 (H) 65 - 99 mg/dL    Bun 21 8 - 22 mg/dL    Creatinine 0.75 0.50 - 1.40 mg/dL    Calcium 8.8 8.5 - 10.5 mg/dL    AST(SGOT) 31 12 - 45 U/L    ALT(SGPT) 42 2 - 50 U/L    Alkaline Phosphatase 102 (H) 30 - 99 U/L    Total Bilirubin 0.7 0.1 - 1.5 mg/dL    Albumin 2.6 (L) 3.2 - 4.9 g/dL    Total Protein 6.3 6.0 - 8.2 g/dL    Globulin 3.7 (H) 1.9 - 3.5 g/dL    A-G Ratio 0.7 g/dL   LACTIC ACID    Collection Time: 01/16/20  6:45 AM   Result Value Ref Range    Lactic Acid 2.5 (H) 0.5 - 2.0 mmol/L   APTT    Collection Time: 01/16/20  6:45 AM      Result Value Ref Range    APTT 26.3 24.7 - 36.0 sec   PROTHROMBIN TIME    Collection Time: 20  6:45 AM   Result Value Ref Range    PT 15.6 (H) 12.0 - 14.6 sec    INR 1.21 (H) 0.87 - 1.13   WESTERGREN SED RATE    Collection Time: 20  6:45 AM   Result Value Ref Range    Sed Rate Westergren 34 (H) 0 - 20 mm/hour   CRP QUANTITIVE (NON-CARDIAC)    Collection Time: 20  6:45 AM   Result Value Ref Range    Stat C-Reactive Protein 12.38 (H) 0.00 - 0.75 mg/dL   DIFFERENTIAL MANUAL    Collection Time: 20  6:45 AM   Result Value Ref Range    Bands-Stabs 8.00 0.00 - 10.00 %    Manual Diff Status PERFORMED    PERIPHERAL SMEAR REVIEW    Collection Time: 20  6:45 AM   Result Value Ref Range    Peripheral Smear Review see below    PLATELET ESTIMATE    Collection Time: 20  6:45 AM   Result Value Ref Range    Plt Estimation Increased    MORPHOLOGY    Collection Time: 20  6:45 AM   Result Value Ref Range    RBC Morphology Present     Poikilocytosis 1+     Echinocytes 1+    Blood Culture,Hold    Collection Time: 20  6:45 AM   Result Value Ref Range    Blood Culture Hold Collected    ESTIMATED GFR    Collection Time: 20  6:45 AM   Result Value Ref Range    GFR If African American >60 >60 mL/min/1.73 m 2    GFR If Non African American >60 >60 mL/min/1.73 m 2   EKG    Collection Time: 20  6:49 AM   Result Value Ref Range    Report       [facility] Emergency Dept.    Test Date:  2020  Pt Name:    JANNA ROJAS                Department: ER  MRN:        [id number]                      Room:       [facility]  Gender:     Male                         Technician: [id number]  :        1960                   Requested By:SHERI BAINS  Order #:    [id number]                    Rigoberto MD:    Measurements  Intervals                                Axis  Rate:       148                          P:  KS:                                      QRS:        -27  QRSD:        84                           T:          67  QT:         288  QTc:        453    Interpretive Statements  ATRIAL FIBRILLATION, V-RATE 118-181  BORDERLINE LEFT AXIS DEVIATION  No previous ECG available for comparison     LACTIC ACID    Collection Time: 01/16/20  8:10 AM   Result Value Ref Range    Lactic Acid 2.5 (H) 0.5 - 2.0 mmol/L       Imaging/Procedures Review:    Independant Imaging Review: Completed  CT-EXTREMITY, LOWER WITH RIGHT   Final Result         1.  Large abscess in the right lateral thigh, may be within or adjacent to the muscular fascia. Adjacent fluid collection tracking adjacent to this abscess is seen which may be additional early forming abscess.   2.  Foci of air within these fluid collections is seen, appearance suggests gas producing organisms, consider component of necrotizing fasciitis as clinically appropriate.      These findings were discussed with the patient's clinician, SHERI BAINS, on 1/16/2020 8:00 AM.      DX-CHEST-PORTABLE (1 VIEW)   Final Result         1.  No acute cardiopulmonary disease.   2.  Atherosclerosis      EC-ECHOCARDIOGRAM COMPLETE W/O CONT    (Results Pending)               Assessment/Plan     * Sepsis without acute organ dysfunction (HCC)  Assessment & Plan  This is Sepsis Present on admission  SIRS criteria identified on my evaluation include: Tachycardia, with heart rate greater than 90 BPM and Leukocyosis, with WBC greater than 12,000  Source is right lower extremity  Sepsis protocol initiated  Fluid resuscitation ordered per protocol  IV antibiotics ordered    Ordered additional 1L bolus of LR followed by 150mL/hr LR  Cultures pending  Antibiotics to cover necrotizing process, MRSA, toxin-producing strep, gram negatives  See cellulitis/abscess problem          Cellulitis and abscess of right lower extremity- (present on admission)  Assessment & Plan  Assessment: Patient presents with large abscess (40cm craniocaudially, 4x10cm in other dimensions) in his  right lateral thigh and a somewhat subacute history of increasing pain, fever, and chills after a ground level fall where he injured the area. He has elevated WBC and has been tachycardic with atrial fibrillation with RVR, although has been afebrile. He has a history of IV drug use, MSSA and GAS cellulitis most recently 10/2019. Given his subacute presentation necrotizing fasciitis is somewhat less likely despite the air seen, however he will need surgical intervention regardless. Given the appearance of his discharge and air seen on CT, anaerobic infection probably most likely followed by staph. Will cover broadly for toxin-producing strep although he does not appear to have toxin-mediated hemodynamic compromise currently.    Plan:  30cc/kg sepsis bolus followed by maintenance fluids  Blood cultures, wound culture/gram stain  Orthopedic surgical consultation with OR this afternoon, NPO, no anticoagulation  Broad coverage with Meropenem(penicillin allergy) and Linezolid(MRSA, mediation of toxin production)  Pain control with hydromorphone 0.5mg Q4H PRN(morphine intolerance, avoid antiplatelet effect of Toradol in the pre-operative setting), will titrate as appropriate  CPK    Atrial fibrillation (HCC)  Assessment & Plan  Assessment: New atrial fibrillation with RVR in the setting of sepsis, no previously known history of atrial fibrillation. Likely reactive to his acute process; technically has [id number] of 0(no diagnosed comorbidities although he has poor outpatient follow-up) and would not require anticoagulation.    Plan:  Treat underlying process as above  Will address rate control if he becomes hypotensive or if rate persistently >140  Echocardiogram    Hyponatremia- (present on admission)  Assessment & Plan  Moderate hyponatremia in the setting of volume depletion, will continue to monitor and if persistent obtain urine osmolality and electrolytes.     Chronic hepatitis C virus infection (HCC)- (present on  admission)  Assessment & Plan  Assessment: Patient was antibody positive with viral load 6.2 million. Transaminases normal, ALP slightly high.    Plan:  Will need outpatient follow-up, abstention from further drug use      Anticipated Hospital stay:  >2 midnights        Quality Measures  Quality-Core Measures   Reviewed items::  Labs reviewed, Medications reviewed, Radiology images reviewed and EKG reviewed  Lundberg catheter::  No Lundberg  DVT prophylaxis - mechanical:  SCDs (pending surgery)    PCP: Pcp Pt States None

## 2020-01-16 NOTE — ED TRIAGE NOTES
Chief Complaint   Patient presents with   • Wound Infection     Pt fell a couple of weeks ago, right thigh was erythemic afterwards. Pt reports today the area opened, discharge. Pt reports he has felt feverish, did not measure temperature.      [facility] Bp 98/62, pulse 70, 96% on room air, GCS 15, RR 18.

## 2020-01-16 NOTE — ASSESSMENT & PLAN NOTE
Resolved   This is Sepsis Present on admission  SIRS criteria identified on my evaluation include: Tachycardia, with heart rate greater than 90 BPM and Leukocyosis, with WBC greater than 12,000  Source is right lower extremity, bacteremia  Sepsis protocol initiated  Fluid resuscitation ordered per protocol  IV antibiotics were ordered    Maintenance fluid as above  Phenylephrine GTT - has not required for over 24 hours  Cultures pending - repeat blood cultures NGTD  Antibiotics to cover necrotizing process, MSSA  See necrotizing fasciitis problem.    * Sepsis Resolved  - CTM  - Vital signs stable  - BC No growth for 5 days  - Pt will finish Abx Feb 1st

## 2020-01-16 NOTE — ASSESSMENT & PLAN NOTE
- Resolved  - A Fib on 1/20 afternoon, was given Metoprolol, Amiodarone loading dose and Drip.  - Converted back to Sinus Rhythm on 1/21 at 3:52 am.  -  EKG SR (1/21)  - Continues sinus rhythm   - Transferred to medical floor  - Denies chest pain, palpitations or SOB  - Amiodarone discontinued on 1/29                         PLAN:  - On Medicacal floor  - Stop Amiodarone  - CTM  - SNF pending  - Patient able to get wound change w/o IV phorphine  -

## 2020-01-16 NOTE — CONSULTS
DATE OF SERVICE:  01/16/2020    ORTHOPEDIC CONSULTATION    REQUESTING PHYSICIAN:  Dr. Christos Jennings, emergency department.    REASON FOR CONSULTATION:  Right thigh infection.    CHIEF COMPLAINT:  Right thigh pain and swelling.    HISTORY OF PRESENT ILLNESS:  The patient is a 59-year-old male.  He has a past   medical history of hepatitis C and history of IV drug use.  He is currently   homeless.  He states that 2 weeks ago or so, he fell on the ice and injured   his right thigh.  He states that since then, he has been getting progressively   more pain and swelling in the thigh to the degree where he presented to the   emergency department this morning.  He does state that he has had some recent   fevers and chills, as well as some intermittent nausea and vomiting.  He   denies other issues.  He denies using a recent injectable drugs.  He is being   evaluated for admission to the hospital service in the ICU.    PAST MEDICAL HISTORY:  ALLERGIES:  CODEINE AND PENICILLIN.    MEDICATIONS:  Outpatient medications, Tylenol and multivitamins.  Additional   pertinent inpatient medications include linezolid and meropenem.    FAMILY HISTORY:  Negative for significant medical problems that he is aware   of.    SOCIAL HISTORY:  The patient is homeless.  He has a history of IV drug use,   but denies using it recently; however, his urine drug screen was positive for   amphetamines.    PAST SURGICAL HISTORY:  He has had spine surgery in 1990 consistent with   laminectomy, as well as a cholecystectomy.    REVIEW OF SYSTEMS:  He does have chills, fevers, malaise and fatigue, nausea.    Otherwise, normal per AMA criteria other than that already stated in the HPI.    PHYSICAL EXAMINATION:  VITAL SIGNS:  Temperature is 98, heart rate 121, respiratory rate 18, blood   pressure is 90/72, pulse oximetry 100% on room air.  GENERAL APPEARANCE:  The patient is alert, oriented, pleasant, cooperative, in   no acute distress.  HEAD, EYES, EARS,  NOSE AND THROAT:  Normocephalic and atraumatic.  Mucous   membranes are moist.  He has poor dentition.  PULMONARY:  Symmetric, unlabored breathing.  CARDIOVASCULAR:  Extremities are well perfused.  Mild JVP is noted.  He has a   sinus tachycardia seen on the cardiac monitor.  ABDOMEN:  Not obviously distended.  MUSCULOSKELETAL:  Right lower extremity, he has significant swelling of the   right thigh.  At the lateral aspect of the thigh, there is some eschar and   evidence of skin necrosis and drainage diffusely from the skin.  There is no   significant knee effusion noted.  He is able to dorsi and plantarflex his   foot, and flex and extend the toes.  He has no significant pain with log roll   of the right lower extremity.    DIAGNOSTIC IMAGING:  CT of the right lower extremity shows an extensive   abscess in the right lateral thigh within the muscle, tracking from the distal   thigh to the proximal thigh, with foci of air within the fluid collections.    ASSESSMENT:  A 59-year-old male with an extensive right thigh abscess.  He is   being evaluated for admission to the [facility] internal medicine service to the ICU   and started on empiric antibiotic therapy.  I do not feel that he has obvious   necrotizing fasciitis, but he does have an extensive abscess in the right   thigh.    RECOMMENDATIONS:  I discussed these findings with the patient.  I recommended   going to the operating room for drainage of his large abscess and debridement   of devitalized skin and potentially subcutaneous tissue and muscle depending   on intraoperative findings  I anticipate he will need a wound VAC   postoperatively and ongoing wound care and potentially further surgeries for   serial debridement until we can achieve a stable wound and help treat his   infection.  He expressed understanding and he wishes to go to the operating   room for surgery as soon as possible.       ____________________________________     MD SUKUMAR Cordero  / SUZI    DD:  01/16/2020 11:39:28  DT:  01/16/2020 11:50:58    D#:  [id number]  Job#:  [id number]

## 2020-01-16 NOTE — PROGRESS NOTES
2 RN skin check complete.   Devices in place cardiac monitoring, bp cuff, pulseox.  Skin assessed under devices yes.  Confirmed pressure ulcers found on sacrum.  Extensive necrotic wound noted on right thigh. Bilateral feet calloused and peeling. Wound consult placed yes.  The following interventions in place mepilex, waffle cushion overlay, pillows in use for support and positioning.

## 2020-01-16 NOTE — ASSESSMENT & PLAN NOTE
-  Patient presented with large abscess (40cm craniocaudially, 4x10cm in other dimensions) in his right lateral thigh and a somewhat subacute history of increasing pain, fever, and chills after a ground level fall where he injured the area. - He had elevated WBC.  - Initially started on Meropenem and Linezolid on admission, went to the OR evening of 1/16/2019 with extensive debridement confirming necrotizing infection.   - Cultures were obtained and a wound vac was placed.   - Subsequently gram stain showed gram positive cocci consistent with streptococcus,    -  Cefazolin and Linezolid after above finding  - Cultures positive for viridans strep in wound.  - 3rd I&D with change of wound vac on 1/20  - No more I&D planned by Surgery  - Continues Sinus Rhythm   - Midline placed   - BC negative for 5 days  - No signs of infection  - Afebrile, No Tachycardia  - Patient able to walk hallways  - Wound vac changed yesterday 1/29 without IV morphine  - Ortho signed out               Plan:    - Waiting for room to be DC, already accepted SNF  - Wound Vac changes 3 times a week  - Continue Cefazolin till Feb 1st  - Pain control, Oxy 10  - DC IV meds  - CTM  - SNF transfer till petient can tolerate Wound Vac changes w/o IV analgesics per Ortho

## 2020-01-17 ENCOUNTER — APPOINTMENT (OUTPATIENT)
Dept: RADIOLOGY | Facility: MEDICAL CENTER | Age: 60
DRG: 853 | End: 2020-01-17
Attending: INTERNAL MEDICINE
Payer: COMMERCIAL

## 2020-01-17 PROBLEM — M72.6 NECROTIZING FASCIITIS OF PELVIC REGION AND THIGH (HCC): Status: ACTIVE | Noted: 2019-10-09

## 2020-01-17 PROBLEM — R65.21 SEPTIC SHOCK DUE TO GRAM POSITIVE BACTERIA (HCC): Status: ACTIVE | Noted: 2020-01-16

## 2020-01-17 PROBLEM — D64.9 NORMOCHROMIC ANEMIA: Status: ACTIVE | Noted: 2020-01-17

## 2020-01-17 PROBLEM — B95.61 MSSA BACTEREMIA: Status: ACTIVE | Noted: 2020-01-17

## 2020-01-17 PROBLEM — R78.81 MSSA BACTEREMIA: Status: ACTIVE | Noted: 2020-01-17

## 2020-01-17 PROBLEM — D75.839 THROMBOCYTOSIS: Status: ACTIVE | Noted: 2020-01-17

## 2020-01-17 PROBLEM — A41.89 SEPTIC SHOCK DUE TO GRAM POSITIVE BACTERIA (HCC): Status: ACTIVE | Noted: 2020-01-16

## 2020-01-17 LAB
ALBUMIN SERPL BCP-MCNC: 1.7 G/DL (ref 3.2–4.9)
ALBUMIN/GLOB SERPL: 0.7 G/DL
ALP SERPL-CCNC: 58 U/L (ref 30–99)
ALT SERPL-CCNC: 20 U/L (ref 2–50)
ANION GAP SERPL CALC-SCNC: 4 MMOL/L (ref 0–11.9)
AST SERPL-CCNC: 14 U/L (ref 12–45)
BASOPHILS # BLD AUTO: 0.3 % (ref 0–1.8)
BASOPHILS # BLD: 0.04 K/UL (ref 0–0.12)
BILIRUB SERPL-MCNC: 0.3 MG/DL (ref 0.1–1.5)
BUN SERPL-MCNC: 15 MG/DL (ref 8–22)
CALCIUM SERPL-MCNC: 7.3 MG/DL (ref 8.5–10.5)
CHLORIDE SERPL-SCNC: 96 MMOL/L (ref 96–112)
CO2 SERPL-SCNC: 28 MMOL/L (ref 20–33)
CREAT SERPL-MCNC: 0.54 MG/DL (ref 0.5–1.4)
EOSINOPHIL # BLD AUTO: 0.13 K/UL (ref 0–0.51)
EOSINOPHIL NFR BLD: 1 % (ref 0–6.9)
ERYTHROCYTE [DISTWIDTH] IN BLOOD BY AUTOMATED COUNT: 48.5 FL (ref 35.9–50)
GLOBULIN SER CALC-MCNC: 2.4 G/DL (ref 1.9–3.5)
GLUCOSE SERPL-MCNC: 128 MG/DL (ref 65–99)
HCT VFR BLD AUTO: 31.2 % (ref 42–52)
HGB BLD-MCNC: 10.5 G/DL (ref 14–18)
IMM GRANULOCYTES # BLD AUTO: 0.22 K/UL (ref 0–0.11)
IMM GRANULOCYTES NFR BLD AUTO: 1.7 % (ref 0–0.9)
LYMPHOCYTES # BLD AUTO: 1.14 K/UL (ref 1–4.8)
LYMPHOCYTES NFR BLD: 8.7 % (ref 22–41)
MCH RBC QN AUTO: 29.5 PG (ref 27–33)
MCHC RBC AUTO-ENTMCNC: 33.7 G/DL (ref 33.7–35.3)
MCV RBC AUTO: 87.6 FL (ref 81.4–97.8)
MONOCYTES # BLD AUTO: 1.27 K/UL (ref 0–0.85)
MONOCYTES NFR BLD AUTO: 9.7 % (ref 0–13.4)
NEUTROPHILS # BLD AUTO: 10.3 K/UL (ref 1.82–7.42)
NEUTROPHILS NFR BLD: 78.6 % (ref 44–72)
NRBC # BLD AUTO: 0 K/UL
NRBC BLD-RTO: 0 /100 WBC
OSMOLALITY UR: 606 MOSM/KG H2O (ref 300–900)
PLATELET # BLD AUTO: 567 K/UL (ref 164–446)
PMV BLD AUTO: 9.1 FL (ref 9–12.9)
POTASSIUM SERPL-SCNC: 4.1 MMOL/L (ref 3.6–5.5)
PROT SERPL-MCNC: 4.1 G/DL (ref 6–8.2)
RBC # BLD AUTO: 3.56 M/UL (ref 4.7–6.1)
RHODAMINE-AURAMINE STN SPEC: NORMAL
SIGNIFICANT IND 70042: NORMAL
SITE SITE: NORMAL
SODIUM SERPL-SCNC: 128 MMOL/L (ref 135–145)
SODIUM UR-SCNC: 42 MMOL/L
SOURCE SOURCE: NORMAL
WBC # BLD AUTO: 13.1 K/UL (ref 4.8–10.8)

## 2020-01-17 PROCEDURE — C1751 CATH, INF, PER/CENT/MIDLINE: HCPCS

## 2020-01-17 PROCEDURE — 71045 X-RAY EXAM CHEST 1 VIEW: CPT

## 2020-01-17 PROCEDURE — 700111 HCHG RX REV CODE 636 W/ 250 OVERRIDE (IP)

## 2020-01-17 PROCEDURE — 700111 HCHG RX REV CODE 636 W/ 250 OVERRIDE (IP): Performed by: INTERNAL MEDICINE

## 2020-01-17 PROCEDURE — 02HV33Z INSERTION OF INFUSION DEVICE INTO SUPERIOR VENA CAVA, PERCUTANEOUS APPROACH: ICD-10-PCS | Performed by: INTERNAL MEDICINE

## 2020-01-17 PROCEDURE — 36556 INSERT NON-TUNNEL CV CATH: CPT

## 2020-01-17 PROCEDURE — 84300 ASSAY OF URINE SODIUM: CPT

## 2020-01-17 PROCEDURE — 700102 HCHG RX REV CODE 250 W/ 637 OVERRIDE(OP): Performed by: STUDENT IN AN ORGANIZED HEALTH CARE EDUCATION/TRAINING PROGRAM

## 2020-01-17 PROCEDURE — 770022 HCHG ROOM/CARE - ICU (200)

## 2020-01-17 PROCEDURE — 700105 HCHG RX REV CODE 258

## 2020-01-17 PROCEDURE — 99291 CRITICAL CARE FIRST HOUR: CPT | Mod: 25 | Performed by: INTERNAL MEDICINE

## 2020-01-17 PROCEDURE — A9270 NON-COVERED ITEM OR SERVICE: HCPCS | Performed by: INTERNAL MEDICINE

## 2020-01-17 PROCEDURE — 83935 ASSAY OF URINE OSMOLALITY: CPT

## 2020-01-17 PROCEDURE — 700105 HCHG RX REV CODE 258: Performed by: STUDENT IN AN ORGANIZED HEALTH CARE EDUCATION/TRAINING PROGRAM

## 2020-01-17 PROCEDURE — 80053 COMPREHEN METABOLIC PANEL: CPT

## 2020-01-17 PROCEDURE — 36556 INSERT NON-TUNNEL CV CATH: CPT | Mod: LT | Performed by: INTERNAL MEDICINE

## 2020-01-17 PROCEDURE — 85025 COMPLETE CBC W/AUTO DIFF WBC: CPT

## 2020-01-17 PROCEDURE — 700102 HCHG RX REV CODE 250 W/ 637 OVERRIDE(OP): Performed by: INTERNAL MEDICINE

## 2020-01-17 PROCEDURE — 700111 HCHG RX REV CODE 636 W/ 250 OVERRIDE (IP): Performed by: STUDENT IN AN ORGANIZED HEALTH CARE EDUCATION/TRAINING PROGRAM

## 2020-01-17 PROCEDURE — A9270 NON-COVERED ITEM OR SERVICE: HCPCS | Performed by: STUDENT IN AN ORGANIZED HEALTH CARE EDUCATION/TRAINING PROGRAM

## 2020-01-17 RX ORDER — HYDROMORPHONE HYDROCHLORIDE 2 MG/ML
INJECTION, SOLUTION INTRAMUSCULAR; INTRAVENOUS; SUBCUTANEOUS
Status: COMPLETED
Start: 2020-01-17 | End: 2020-01-17

## 2020-01-17 RX ORDER — SODIUM CHLORIDE 9 MG/ML
INJECTION, SOLUTION INTRAVENOUS
Status: ACTIVE
Start: 2020-01-17 | End: 2020-01-17

## 2020-01-17 RX ORDER — HYDROMORPHONE HYDROCHLORIDE 1 MG/ML
1 INJECTION, SOLUTION INTRAMUSCULAR; INTRAVENOUS; SUBCUTANEOUS EVERY 4 HOURS PRN
Status: DISCONTINUED | OUTPATIENT
Start: 2020-01-17 | End: 2020-01-19

## 2020-01-17 RX ORDER — SODIUM CHLORIDE, SODIUM LACTATE, POTASSIUM CHLORIDE, CALCIUM CHLORIDE 600; 310; 30; 20 MG/100ML; MG/100ML; MG/100ML; MG/100ML
INJECTION, SOLUTION INTRAVENOUS
Status: COMPLETED
Start: 2020-01-17 | End: 2020-01-17

## 2020-01-17 RX ORDER — HYDROMORPHONE HYDROCHLORIDE 2 MG/ML
2 INJECTION, SOLUTION INTRAMUSCULAR; INTRAVENOUS; SUBCUTANEOUS ONCE
Status: COMPLETED | OUTPATIENT
Start: 2020-01-17 | End: 2020-01-17

## 2020-01-17 RX ORDER — MORPHINE SULFATE 4 MG/ML
2 INJECTION, SOLUTION INTRAMUSCULAR; INTRAVENOUS
Status: COMPLETED | OUTPATIENT
Start: 2020-01-17 | End: 2020-01-18

## 2020-01-17 RX ORDER — SODIUM CHLORIDE, SODIUM LACTATE, POTASSIUM CHLORIDE, AND CALCIUM CHLORIDE .6; .31; .03; .02 G/100ML; G/100ML; G/100ML; G/100ML
250 INJECTION, SOLUTION INTRAVENOUS ONCE
Status: COMPLETED | OUTPATIENT
Start: 2020-01-17 | End: 2020-01-17

## 2020-01-17 RX ORDER — GABAPENTIN 300 MG/1
300 CAPSULE ORAL 3 TIMES DAILY
Status: DISCONTINUED | OUTPATIENT
Start: 2020-01-17 | End: 2020-01-17

## 2020-01-17 RX ORDER — PREGABALIN 25 MG/1
50 CAPSULE ORAL 2 TIMES DAILY
Status: DISCONTINUED | OUTPATIENT
Start: 2020-01-17 | End: 2020-01-23

## 2020-01-17 RX ORDER — PHENYLEPHRINE HYDROCHLORIDE 10 MG/ML
INJECTION, SOLUTION INTRAMUSCULAR; INTRAVENOUS; SUBCUTANEOUS
Status: ACTIVE
Start: 2020-01-17 | End: 2020-01-17

## 2020-01-17 RX ORDER — OXYCODONE HYDROCHLORIDE 10 MG/1
10 TABLET ORAL EVERY 4 HOURS PRN
Status: DISCONTINUED | OUTPATIENT
Start: 2020-01-17 | End: 2020-01-23

## 2020-01-17 RX ORDER — HYDROMORPHONE HYDROCHLORIDE 2 MG/ML
2 INJECTION, SOLUTION INTRAMUSCULAR; INTRAVENOUS; SUBCUTANEOUS
Status: DISCONTINUED | OUTPATIENT
Start: 2020-01-17 | End: 2020-01-17

## 2020-01-17 RX ORDER — NOREPINEPHRINE BITARTRATE 1 MG/ML
INJECTION, SOLUTION INTRAVENOUS
Status: COMPLETED
Start: 2020-01-17 | End: 2020-01-17

## 2020-01-17 RX ORDER — CEFAZOLIN SODIUM 2 G/100ML
2 INJECTION, SOLUTION INTRAVENOUS EVERY 8 HOURS
Status: DISCONTINUED | OUTPATIENT
Start: 2020-01-17 | End: 2020-01-31 | Stop reason: HOSPADM

## 2020-01-17 RX ADMIN — MEROPENEM 500 MG: 500 INJECTION, POWDER, FOR SOLUTION INTRAVENOUS at 05:00

## 2020-01-17 RX ADMIN — PHENYLEPHRINE HYDROCHLORIDE 100 MCG/MIN: 10 INJECTION INTRAVENOUS at 15:08

## 2020-01-17 RX ADMIN — PREGABALIN 50 MG: 25 CAPSULE ORAL at 16:57

## 2020-01-17 RX ADMIN — PHENYLEPHRINE HYDROCHLORIDE 75 MCG/MIN: 10 INJECTION INTRAVENOUS at 08:38

## 2020-01-17 RX ADMIN — HYDROMORPHONE HYDROCHLORIDE 1 MG: 1 INJECTION, SOLUTION INTRAMUSCULAR; INTRAVENOUS; SUBCUTANEOUS at 18:42

## 2020-01-17 RX ADMIN — SODIUM CHLORIDE, POTASSIUM CHLORIDE, SODIUM LACTATE AND CALCIUM CHLORIDE: 600; 310; 30; 20 INJECTION, SOLUTION INTRAVENOUS at 21:48

## 2020-01-17 RX ADMIN — CEFAZOLIN SODIUM 2 G: 2 INJECTION, SOLUTION INTRAVENOUS at 10:02

## 2020-01-17 RX ADMIN — HYDROMORPHONE HYDROCHLORIDE 2 MG: 2 INJECTION, SOLUTION INTRAMUSCULAR; INTRAVENOUS; SUBCUTANEOUS at 04:37

## 2020-01-17 RX ADMIN — HYDROMORPHONE HYDROCHLORIDE 1 MG: 1 INJECTION, SOLUTION INTRAMUSCULAR; INTRAVENOUS; SUBCUTANEOUS at 13:06

## 2020-01-17 RX ADMIN — LINEZOLID 600 MG: 600 TABLET, FILM COATED ORAL at 16:56

## 2020-01-17 RX ADMIN — SODIUM CHLORIDE, POTASSIUM CHLORIDE, SODIUM LACTATE AND CALCIUM CHLORIDE: 600; 310; 30; 20 INJECTION, SOLUTION INTRAVENOUS at 15:10

## 2020-01-17 RX ADMIN — OXYCODONE HYDROCHLORIDE 10 MG: 10 TABLET ORAL at 10:02

## 2020-01-17 RX ADMIN — LINEZOLID 600 MG: 600 TABLET, FILM COATED ORAL at 05:00

## 2020-01-17 RX ADMIN — SODIUM CHLORIDE, POTASSIUM CHLORIDE, SODIUM LACTATE AND CALCIUM CHLORIDE: 600; 310; 30; 20 INJECTION, SOLUTION INTRAVENOUS at 02:21

## 2020-01-17 RX ADMIN — HYDROMORPHONE HYDROCHLORIDE 1 MG: 1 INJECTION, SOLUTION INTRAMUSCULAR; INTRAVENOUS; SUBCUTANEOUS at 04:23

## 2020-01-17 RX ADMIN — PHENYLEPHRINE HYDROCHLORIDE 50 MCG/MIN: 10 INJECTION INTRAVENOUS at 03:30

## 2020-01-17 RX ADMIN — PHENYLEPHRINE HYDROCHLORIDE 50 MCG/MIN: 10 INJECTION INTRAVENOUS at 23:19

## 2020-01-17 RX ADMIN — CEFAZOLIN SODIUM 2 G: 2 INJECTION, SOLUTION INTRAVENOUS at 13:06

## 2020-01-17 RX ADMIN — SODIUM CHLORIDE, POTASSIUM CHLORIDE, SODIUM LACTATE AND CALCIUM CHLORIDE 250 ML: 600; 310; 30; 20 INJECTION, SOLUTION INTRAVENOUS at 03:54

## 2020-01-17 RX ADMIN — OXYCODONE HYDROCHLORIDE 10 MG: 10 TABLET ORAL at 21:48

## 2020-01-17 RX ADMIN — SODIUM CHLORIDE, SODIUM LACTATE, POTASSIUM CHLORIDE, AND CALCIUM CHLORIDE 250 ML: .6; .31; .03; .02 INJECTION, SOLUTION INTRAVENOUS at 03:54

## 2020-01-17 RX ADMIN — OXYCODONE HYDROCHLORIDE 10 MG: 10 TABLET ORAL at 16:57

## 2020-01-17 RX ADMIN — HYDROMORPHONE HYDROCHLORIDE 1 MG: 1 INJECTION, SOLUTION INTRAMUSCULAR; INTRAVENOUS; SUBCUTANEOUS at 23:14

## 2020-01-17 RX ADMIN — MEROPENEM 500 MG: 500 INJECTION, POWDER, FOR SOLUTION INTRAVENOUS at 00:17

## 2020-01-17 RX ADMIN — HYDROMORPHONE HYDROCHLORIDE 1 MG: 1 INJECTION, SOLUTION INTRAMUSCULAR; INTRAVENOUS; SUBCUTANEOUS at 00:17

## 2020-01-17 RX ADMIN — HYDROMORPHONE HYDROCHLORIDE 2 MG: 2 INJECTION, SOLUTION INTRAMUSCULAR; INTRAVENOUS; SUBCUTANEOUS at 08:29

## 2020-01-17 RX ADMIN — SENNOSIDES AND DOCUSATE SODIUM 2 TABLET: 8.6; 5 TABLET ORAL at 16:58

## 2020-01-17 RX ADMIN — CEFAZOLIN SODIUM 2 G: 2 INJECTION, SOLUTION INTRAVENOUS at 21:48

## 2020-01-17 RX ADMIN — SODIUM CHLORIDE, POTASSIUM CHLORIDE, SODIUM LACTATE AND CALCIUM CHLORIDE: 600; 310; 30; 20 INJECTION, SOLUTION INTRAVENOUS at 08:15

## 2020-01-17 ASSESSMENT — ENCOUNTER SYMPTOMS
DIAPHORESIS: 0
INSOMNIA: 1
SHORTNESS OF BREATH: 0
PALPITATIONS: 0
NAUSEA: 0
SORE THROAT: 0
HALLUCINATIONS: 0
FOCAL WEAKNESS: 0
DIZZINESS: 0
HEADACHES: 0
FLANK PAIN: 0
SENSORY CHANGE: 0
NERVOUS/ANXIOUS: 0
POLYDIPSIA: 0
HEMOPTYSIS: 0
CHILLS: 0
PND: 0
MYALGIAS: 1
DOUBLE VISION: 0
PHOTOPHOBIA: 0
VOMITING: 0
BRUISES/BLEEDS EASILY: 0
SPEECH CHANGE: 0
DIARRHEA: 0
SPUTUM PRODUCTION: 0
COUGH: 0
LOSS OF CONSCIOUSNESS: 0
BLURRED VISION: 0
ABDOMINAL PAIN: 0
ORTHOPNEA: 0
EYE REDNESS: 0
FEVER: 0

## 2020-01-17 NOTE — PROGRESS NOTES
SBPs low 80s with MAP >65. [employer] Resident and Dr Thomas notified. IV Rick and Levo ordered. Plan for central line placement

## 2020-01-17 NOTE — ASSESSMENT & PLAN NOTE
Rate controlled -> remains controlled  Echocardiogram noted  Rick-Synephrine for hypotension, avoiding catecholamine so we do not drive the heart rate up  Optimize electrolytes  No anticoagulation for now  Low CHADSvasc2

## 2020-01-17 NOTE — DISCHARGE PLANNING
Patient is eligible for Medicaid Meds to Beds at discharge if they have coverage with [place] Medicaid, Medicaid FFS, Medicaid HMO ([facility]), or [place]. This service is provided through the [facility] if orders are received by the pharmacy prior to 4pm Monday through Friday excluding holidays. Preferred pharmacy has been changed to [facility]. Please call [phone] prior to discharge.

## 2020-01-17 NOTE — ANESTHESIA POSTPROCEDURE EVALUATION
Patient: Gonsalo Hunt    Procedure Summary     Date:  01/16/20 Room / Location:  [place] [address] / SURGERY [facility]    Anesthesia Start:  1730 Anesthesia Stop:  1846    Procedure:  RIGHT THIGH ABSCESS INCISION AND DRAINAGE, WOUND VAC PLACEMENT (Right Leg Upper) Diagnosis:  (Right thigh abscess)    Surgeon:  Amari Blanc M.D. Responsible Provider:  Stephen Morales M.D.    Anesthesia Type:  general ASA Status:  3 - Emergent          Final Anesthesia Type: general  Last vitals  BP   Blood Pressure: (!) 79/55    Temp   36.3 °C (97.4 °F)    Pulse   Pulse: (!) 108   Resp   (!) 24    SpO2   100 %      Anesthesia Post Evaluation    Patient location during evaluation: PACU  Patient participation: complete - patient participated  Level of consciousness: sleepy but conscious  Pain score: 2    Airway patency: patent  Anesthetic complications: no  Cardiovascular status: hypotensive  Respiratory status: acceptable  Hydration status: hypovolemic  Comments: Giving fluid bolus.    PONV: none           Nurse Pain Score: 8 (NPRS)

## 2020-01-17 NOTE — PROGRESS NOTES
[facility] Progress Note      Admit Date: 1/16/2020    Resident(s): Earl Morales M.D.   Attending:  TATYANA STRAUSS/ Dr. Gould    Patient ID:    Name:  Gonsalo Hunt     YOB: 1960  Age:  59 y.o.  male   MRN:  [id number]    Hospital Course (carried forward and updated):  Gonsalo Hunt is a 59 y.o. homeless male with a past medical history significant for intravenous drug use and tobacco use who presented 1/16/2020 with right lower extremity pain and edema and was found to have a necrotizing streptococcal infection.  He was started on intravenous fluids and appropriate broad-spectrum antibiotics upon arrival and underwent debridement and drainage per orthopedic surgery on the evening of admission, however subsequently on 1/17/2020 required initiation of vasopressors to maintain his blood pressure.  Additionally he had blood cultures positive for MSSA.  His antibiotics were narrowed on 1/17/2020 to cefazolin and linezolid to cover his toxic streptococcal infection as well as his MSSA bacteremia.  Infectious disease was consulted for further recommendations.  During his hospitalization he was found to be in atrial fibrillation which has not been previously documented for him.    Consultants:  Critical Care  Orthopedic surgery  Infectious disease    Interval Events:  He became hypotensive overnight, requiring initiation of phenylephrine.  Blood cultures were also positive for MSSA as of this morning.  He is more comfortable overall as compared to yesterday, as well as alert and oriented.  He underwent debridement, incision and drainage yesterday with orthopedic surgery with confirmation of necrotizing fasciitis.  Discussed with infectious disease team and infectious disease pharmacist and reviewed Gram stain and cultures in the laboratory, narrowed his coverage to cefazolin and linezolid.  Surgery continues to follow, a woundvac is now in place and he may require further debridement in a few  "days.      Review of Systems   Constitutional: Positive for malaise/fatigue (improving). Negative for chills and fever.   Eyes: Negative for blurred vision and double vision.   Respiratory: Negative for cough and shortness of breath.    Cardiovascular: Negative for chest pain and leg swelling.   Gastrointestinal: Negative for nausea and vomiting.   Skin: Negative for itching and rash.   Neurological: Negative for dizziness and headaches.       PHYSICAL EXAM:  Vitals:    01/17/20 0400 01/17/20 0423 01/17/20 0500 01/17/20 0600   BP: (!) 95/57 (!) 93/62 (!) 91/60 (!) 96/65   Pulse: 94 99 (!) 110 99   Resp: 19 (!) 11 20 17   Temp: 36.1 °C (97 °F)      TempSrc:       SpO2: 98% 97% 90% 95%   Weight: 96.5 kg (212 lb 11.9 oz)      Height:        Body mass index is 25.9 kg/m².  Latest Vitals:  BP (!) 96/65   Pulse 99   Temp 36.1 °C (97 °F)   Resp 17   Ht 1.93 m (6' 4\")   Wt 96.5 kg (212 lb 11.9 oz)   SpO2 95%   BMI 25.90 kg/m²   O2 therapy: Pulse Oximetry: 95 %, O2 (LPM): 0, O2 Delivery: None (Room Air)  Vitals Range last 24h:  Temp:  [36.1 °C (96.9 °F)-36.9 °C (98.5 °F)] 36.1 °C (97 °F)  Pulse:  [] 99  Resp:  [9-26] 17  BP: ()/(47-71) 96/65  SpO2:  [90 %-100 %] 95 %       Intake/Output Summary (Last 24 hours) at 1/17/2020 1127  Last data filed at 1/17/2020 0600  Gross per 24 hour   Intake 4668.19 ml   Output 2225 ml   Net 2443.19 ml        Physical Exam   Constitutional: He is oriented to person, place, and time.   Unkempt, appears older than stated age   HENT:   Head: Normocephalic and atraumatic.   Poor dentition, moist mucous membranes   Cardiovascular:   Murmur (very faint systolic murmur heard best at LLSB) heard.  Irregularly irregular, normal rate, no new murmurs heard today    Pulmonary/Chest: Effort normal. He has no wheezes (scattered, end-expiratory). He has no rales.   Abdominal: Soft. Bowel sounds are normal. He exhibits no distension. There is no tenderness.   Musculoskeletal:         " General: No edema.      Comments: Wound vac in place on the right lateral thigh without significant surrounding erythema. 400mL of dark red drainage as of 8am.     Neurovascularly intact   Neurological: He is alert and oriented to person, place, and time.           Recent Labs     01/16/20 0645 01/17/20 0445   SODIUM 127* 128*   POTASSIUM 4.9 4.1   CHLORIDE 94* 96   CO2 24 28   BUN 21 15   CREATININE 0.75 0.54   MAGNESIUM 1.9  --    CALCIUM 8.8 7.3*     Recent Labs     01/16/20 0645 01/17/20 0445   ALTSGPT 42 20   ASTSGOT 31 14   ALKPHOSPHAT 102* 58   TBILIRUBIN 0.7 0.3   GLUCOSE 114* 128*     Recent Labs     01/16/20 0645 01/17/20 0445   RBC 5.33 3.56*   HEMOGLOBIN 15.1 10.5*   HEMATOCRIT 45.4 31.2*   PLATELETCT 598* 567*   PROTHROMBTM 15.1*  15.6*  --    APTT 26.3  --    INR 1.16*  1.21*  --      Recent Labs     01/16/20 0645 01/17/20 0445   WBC 14.3* 13.1*   NEUTSPOLYS 80.50* 78.60*   LYMPHOCYTES 7.10* 8.70*   MONOCYTES 2.60 9.70   EOSINOPHILS 1.80 1.00   BASOPHILS 0.00 0.30   ASTSGOT 31 14   ALTSGPT 42 20   ALKPHOSPHAT 102* 58   TBILIRUBIN 0.7 0.3       Meds:  • NORepinephrine (LEVOPHED) infusion  0-30 mcg/min Stopped (01/17/20 0345)   • NS       • Phenylephrine infusion  0-300 mcg/min 75 mcg/min (01/17/20 0838)   • phenylephrine       • ceFAZolin  2 g 2 g (01/17/20 1002)   • oxyCODONE immediate-release  10 mg     • HYDROmorphone  1 mg     • [START ON 1/18/2020] enoxaparin (LOVENOX) injection  40 mg     • linezolid  600 mg     • senna-docusate  2 Tab      And   • polyethylene glycol/lytes  1 Packet      And   • magnesium hydroxide  30 mL      And   • bisacodyl  10 mg     • LR   150 mL/hr at 01/17/20 0815   • acetaminophen  650 mg            Imaging:  DX-CHEST-LIMITED (1 VIEW)   Final Result         1.  No acute cardiopulmonary disease.      EC-ECHOCARDIOGRAM COMPLETE W/O CONT   Final Result      CT-EXTREMITY, LOWER WITH RIGHT   Final Result         1.  Large abscess in the right lateral thigh, may be  within or adjacent to the muscular fascia. Adjacent fluid collection tracking adjacent to this abscess is seen which may be additional early forming abscess.   2.  Foci of air within these fluid collections is seen, appearance suggests gas producing organisms, consider component of necrotizing fasciitis as clinically appropriate.      These findings were discussed with the patient's clinician, SHERI BAINS, on 1/16/2020 8:00 AM.      DX-CHEST-PORTABLE (1 VIEW)   Final Result         1.  No acute cardiopulmonary disease.   2.  Atherosclerosis          Problem and Plan:    * Septic shock due to Gram positive bacteria (HCC)  Assessment & Plan  This is Sepsis Present on admission  SIRS criteria identified on my evaluation include: Tachycardia, with heart rate greater than 90 BPM and Leukocyosis, with WBC greater than 12,000  Source is right lower extremity, bacteremia  Sepsis protocol initiated  Fluid resuscitation ordered per protocol  IV antibiotics ordered    Maintenance fluid as above  Phenylephrine GTT  Cultures pending  Antibiotics to cover necrotizing process, MSSA  See necrotizing fasciitis problem          MSSA bacteremia  Assessment & Plan  Assessment: Initial blood cultures positive for MSSA bacteremia, this is not a common cause of necrotizing fasciitis and I suspect although seemingly unlikely, there is an entirely different source considering his wound gram stains have shown only streptococcus. Given his history of intravenous drug use it is conceivable that his bacteremia is due to an endocarditis.  Notably his transthoracic echocardiogram was negative for any vegetations although he did have mild mitral regurgitation of unknown chronicity, which is faintly audible as a systolic murmur on exam.  Unfortunately this is somewhat concerning for endocarditis, although transthoracic echocardiogram is not very sensitive and it is probably too early to evaluate with transesophageal echocardiogram to get the  best yield.  Either way, he is currently covered well with cefazolin.    Notably currently only meets 2 minor duke criteria.    Plan:  Cefazolin as above  Repeat blood cultures at 48H  Consider BERNABE at 5-7 days from first positive blood culture    Necrotizing fasciitis of pelvic region and thigh (HCC)- (present on admission)  Assessment & Plan  Assessment: Patient presents with large abscess (40cm craniocaudially, 4x10cm in other dimensions) in his right lateral thigh and a somewhat subacute history of increasing pain, fever, and chills after a ground level fall where he injured the area. He had elevated WBC and had been tachycardic with atrial fibrillation with RVR. Initially started on Meropenem and Linezolid on admission, went to the OR evening of 1/16/2019 with extensive debridement confirming necrotizing infection. Cultures were obtained and a wound vac was placed.     Subsequently gram stain showed gram positive cocci consistent with streptococcus, which is consistent as this is a common cause of necrotizing fasciitis. His coverage was then narrowed to Cefazolin and Linezolid(for toxin production.) Technically he does not meet criteria for toxic shock(although he is requiring pressors) as he does not have end-organ manifestations, however this is likely a toxin-producing organism with hemodynamic consequences. It is questionable if his staphylococcal bacteremia is related to this infection.    Plan:  Maintenance fluid while NPO  Start diet, when taking good PO can stop fluids  Phenylephrine gtt  Follow wound culture results  Appreciate orthopedic intervention, may need further source control  Narrow spectrum to Cefazolin and Linezolid(to be continued until off pressors and clinically improving)  Pain control with Oxycodone 10mg Q4H, breakthrough with hydromorphone IV    Atrial fibrillation (HCC)  Assessment & Plan  Assessment: New atrial fibrillation with RVR in the setting of sepsis, no previously known  history of atrial fibrillation. Likely reactive to his acute process; technically has [id number] of 0(no diagnosed comorbidities although he has poor outpatient follow-up) and would not require anticoagulation.     Rate significantly improved, actually converted to sinus spontaneously 1/17    Plan:  Treat underlying process as above  Will address rate control if he becomes hypotensive or if rate persistently >140    Hyponatremia- (present on admission)  Assessment & Plan  Moderate hyponatremia in the setting of volume depletion, not improving significantly with fluids    Urine osmolality  Urine sodium    Chronic hepatitis C virus infection (HCC)- (present on admission)  Assessment & Plan  Assessment: Patient was antibody positive with viral load 6.2 million. Transaminases normal, ALP slightly high.    Plan:  Will need outpatient follow-up, abstention from further drug use    Methamphetamine use (HCC)- (present on admission)  Assessment & Plan  Counselled on cessation      DISPO: ICU    CODE STATUS: Full    Quality Measures:  Feeding: Regular diet  Analgesia: Oxycodone with Hydromorphone  Sedation: None  Thromboprophylaxis: None, order at 24 hours post-op  Head of bed: >30 degrees  Ulcer prophylaxis: None  Glycemic control: None  Bowel care: bowel regimen  Indwelling lines: Central line, PIV  Deescalation of antibiotics: Not appropriate currently      Earl Morales M.D.

## 2020-01-17 NOTE — ASSESSMENT & PLAN NOTE
- MSSA Bacteremia One bottle  - Second Culture taken 18t h no Growth for 5 days  - Midline was placed 1/21 afternoon.  - Patient will continue antibiotics till Feb 1st  - DC pending after can tolerate wound change w/o IV drugs  - Outpatient PCP follow up

## 2020-01-17 NOTE — PROGRESS NOTES
Critical Care Progress Note    Date of admission  1/16/2020    Chief Complaint  59 y.o. male admitted 1/16/2020 with RLE pain secondary to thigh abcess -> sepsis    Hospital Course          Interval Problem Update  Reviewed last 24 hour events:    S/p I&D abcess late yesterday  Strep on gram stain  Started on pressors and CVC place early this AM  BC + for MSSA by PCR - only reported out so far  Strep in wound    A&O x4  AF chronic? - 90s  SBp 60-90s  KERLINE 100  UO ok    VAC R thigh  Afebrile  WBC 13.1  Room air  LA 1.3  Merrem/Zyvox  ECHO noted    SQ heparin  ID eval  Repeat BC x2  Oral pain meds  Antibiotics adjusted to Ancef and Zyvox    Serial follow-up  Blood pressure improved since initiating Kerline-Synephrine infusion  Actively titrating KERLINE drip between 50 and 100 mics  Urine output acceptable  No new fevers  Wound draining nicely with new VAC  Initial thigh culture growing very dense streptococcus    Review of Systems  Review of Systems   Constitutional: Positive for malaise/fatigue. Negative for chills, diaphoresis and fever.   HENT: Negative for congestion, nosebleeds and sore throat.    Eyes: Negative for blurred vision and double vision.   Respiratory: Negative for cough, sputum production and shortness of breath.    Cardiovascular: Positive for leg swelling. Negative for chest pain, palpitations, orthopnea and PND.   Gastrointestinal: Negative for abdominal pain, diarrhea, nausea and vomiting.   Genitourinary: Negative for dysuria, flank pain, hematuria and urgency.   Musculoskeletal: Positive for myalgias (RLE). Negative for joint pain.   Neurological: Negative for sensory change, speech change, focal weakness and headaches.   Endo/Heme/Allergies: Does not bruise/bleed easily.   Psychiatric/Behavioral: The patient has insomnia. The patient is not nervous/anxious.         Vital Signs for last 24 hours   Temp:  [36.1 °C (96.9 °F)-36.9 °C (98.4 °F)] 36.9 °C (98.4 °F)  Pulse:  [] 78  Resp:  [9-29]  22  BP: ()/(47-82) 118/69  SpO2:  [70 %-100 %] 96 %    Hemodynamic parameters for last 24 hours       Respiratory Information for the last 24 hours       Physical Exam   Physical Exam  Vitals signs reviewed.   Constitutional:       Appearance: He is ill-appearing. He is not toxic-appearing or diaphoretic.   HENT:      Head: Normocephalic and atraumatic.      Mouth/Throat:      Mouth: Mucous membranes are moist.   Eyes:      General: No scleral icterus.     Extraocular Movements: Extraocular movements intact.   Neck:      Musculoskeletal: Neck supple. No neck rigidity.   Cardiovascular:      Rate and Rhythm: Normal rate. Rhythm irregularly irregular.      Pulses: Normal pulses.      Heart sounds: No murmur. No gallop.       Comments: AF  Pulmonary:      Effort: Pulmonary effort is normal. No respiratory distress.      Breath sounds: No wheezing, rhonchi or rales.   Abdominal:      General: Abdomen is flat. Bowel sounds are normal. There is no distension.      Palpations: Abdomen is soft. There is no fluid wave, hepatomegaly or splenomegaly.      Tenderness: There is no tenderness. There is no right CVA tenderness, left CVA tenderness or guarding. Negative signs include Huddleston's sign and McBurney's sign.   Musculoskeletal:      Right lower leg: No edema.      Left lower leg: No edema.      Comments: VAC in place covering most of the anterior lateral right thigh with fairly large output in duration around wound improved versus yesterday   Lymphadenopathy:      Cervical: No cervical adenopathy.   Skin:     General: Skin is warm and dry.      Capillary Refill: Capillary refill takes less than 2 seconds.      Coloration: Skin is not cyanotic.      Nails: There is no clubbing.     Neurological:      General: No focal deficit present.      Mental Status: He is alert and oriented to person, place, and time. Mental status is at baseline.      Comments: Follows well   Psychiatric:         Attention and Perception:  Attention normal.         Mood and Affect: Mood normal.         Speech: Speech normal.         Behavior: Behavior normal. Behavior is cooperative.         Medications  Current Facility-Administered Medications   Medication Dose Route Frequency Provider Last Rate Last Dose   • norepinephrine (LEVOPHED) 8 mg in  mL Infusion  0-30 mcg/min Intravenous Continuous Donovan Thomas M.D.   Stopped at 01/17/20 0345   • phenylephrine (KERLINE-SYNEPHRINE) 40 mg in  mL Infusion  0-300 mcg/min Intravenous Continuous Dorina Dc M.D. 37.5 mL/hr at 01/17/20 1508 100 mcg/min at 01/17/20 1508   • ceFAZolin in dextrose (ANCEF) IVPB premix 2 g  2 g Intravenous Q8HRS Earl Morales M.D.   Stopped at 01/17/20 1336   • oxyCODONE immediate release (ROXICODONE) tablet 10 mg  10 mg Oral Q4HRS PRSUNITA Morales M.D.   10 mg at 01/17/20 1002   • HYDROmorphone pf (DILAUDID) injection 1 mg  1 mg Intravenous Q4HRS PRN Earl Morales M.D.   1 mg at 01/17/20 1306   • [START ON 1/18/2020] enoxaparin (LOVENOX) inj 40 mg  40 mg Subcutaneous DAILY Piero Gould M.D.       • linezolid (ZYVOX) tablet 600 mg  600 mg Oral Q12HRS Earl Morales M.D.   600 mg at 01/17/20 0500   • senna-docusate (PERICOLACE or SENOKOT S) 8.6-50 MG per tablet 2 Tab  2 Tab Oral BID Earl Morales M.D.        And   • polyethylene glycol/lytes (MIRALAX) PACKET 1 Packet  1 Packet Oral QDAY PRN Earl Morales M.D.        And   • magnesium hydroxide (MILK OF MAGNESIA) suspension 30 mL  30 mL Oral QDAY PRN Earl Morales M.D.        And   • bisacodyl (DULCOLAX) suppository 10 mg  10 mg Rectal QDAY PRSUNITA Morales M.D.       • lactated ringers infusion   Intravenous Continuous Earl Morales M.D. 150 mL/hr at 01/17/20 1510     • acetaminophen (TYLENOL) tablet 650 mg  650 mg Oral Q6HRS PRN Earl Morales M.D.   650 mg at 01/16/20 2215       Fluids    Intake/Output Summary (Last 24 hours) at 1/17/2020 1616  Last data filed at 1/17/2020 1510  Gross per  24 hour   Intake 4510.69 ml   Output 1550 ml   Net 2960.69 ml       Laboratory      Recent Labs     01/16/20  0645   CPKTOTAL 40     Recent Labs     01/16/20 0645 01/17/20  0445   SODIUM 127* 128*   POTASSIUM 4.9 4.1   CHLORIDE 94* 96   CO2 24 28   BUN 21 15   CREATININE 0.75 0.54   MAGNESIUM 1.9  --    CALCIUM 8.8 7.3*     Recent Labs     01/16/20 0645 01/17/20  0445   ALTSGPT 42 20   ASTSGOT 31 14   ALKPHOSPHAT 102* 58   TBILIRUBIN 0.7 0.3   GLUCOSE 114* 128*     Recent Labs     01/16/20 0645 01/17/20  0445   WBC 14.3* 13.1*   NEUTSPOLYS 80.50* 78.60*   LYMPHOCYTES 7.10* 8.70*   MONOCYTES 2.60 9.70   EOSINOPHILS 1.80 1.00   BASOPHILS 0.00 0.30   ASTSGOT 31 14   ALTSGPT 42 20   ALKPHOSPHAT 102* 58   TBILIRUBIN 0.7 0.3     Recent Labs     01/16/20 0645 01/17/20  0445   RBC 5.33 3.56*   HEMOGLOBIN 15.1 10.5*   HEMATOCRIT 45.4 31.2*   PLATELETCT 598* 567*   PROTHROMBTM 15.1*  15.6*  --    APTT 26.3  --    INR 1.16*  1.21*  --        Imaging  X-Ray:  I have personally reviewed the images and compared with prior images.  EKG:  I have personally reviewed the images and compared with prior images.  CT:    Reviewed  Echo:   Reviewed    Assessment/Plan  * Septic shock due to Gram positive bacteria (HCC)  Assessment & Plan  This is Sepsis Present on admission  SIRS criteria identified on my evaluation include: Tachycardia, with heart rate greater than 90 BPM, Tachypnea, with respirations greater than 20 per minute and Leukocyosis, with WBC greater than 12,000  Source is thigh  Sepsis protocol initiated  Fluid resuscitation ordered per protocol  IV antibiotics as appropriate for source of sepsis  While organ dysfunction may be noted elsewhere in this problem list or in the chart, degree of organ dysfunction does not meet CMS criteria for severe sepsis  IV Rick-Synephrine since she is in A. fib  Second pressor to add would be vasopressin  Repeat fluid boluses needed  CVP monitoring her bedside ultrasound for volume  assessment as needed        MSSA bacteremia  Assessment & Plan  One of two + BC 1/16  Repeat BC  TTE neg  If repeat BC + then BERNABE  ID eval    Necrotizing fasciitis of pelvic region and thigh (HCC)- (present on admission)  Assessment & Plan  S/p I&D 1/16  40 cm long thigh abcess drained  NF seen I wound  VAC  Back to OR as per Surgery    Atrial fibrillation (HCC)  Assessment & Plan  Rate controlled  Echocardiogram noted  Rick-Synephrine for hypotension, avoiding catecholamine so we do not drive the heart rate up  Optimize electrolytes  No anticoagulation for now    Hyponatremia- (present on admission)  Assessment & Plan  Mild  Secondary to above  Serial BMP  Avoid hypotonic fluids    Thrombocytosis (HCC)  Assessment & Plan  Secondary probably indolent right thigh abscess  Functionally an acute phase reactant  Monitor    Normochromic anemia  Assessment & Plan  Multifactorial including nutrition, infection etc.  Serial CBC  Transfuse per protocols    Smoking- (present on admission)  Assessment & Plan  Cessation encouraged  COPD?  Need vaccines prior to discharge    Chronic hepatitis C virus infection (HCC)- (present on admission)  Assessment & Plan  Monitor    Cannabis use disorder, mild, abuse- (present on admission)  Assessment & Plan  Cessation encouraged    Homeless- (present on admission)  Assessment & Plan  SW consult, may need SNU for weeks    Methamphetamine use (HCC)- (present on admission)  Assessment & Plan  + UDS  Cessation encouraged    Cellulitis of hand, left- (present on admission)  Assessment & Plan  Monitor    VTE:  Contraindicated  Ulcer: Not Indicated  Lines: Central Line  Ongoing indication addressed    I have performed a physical exam and reviewed and updated ROS and Plan today (1/17/2020). In review of yesterday's note (1/16/2020), there are no changes except as documented above.     Discussed patient condition and risk of morbidity and/or mortality with RN, RT, Pharmacy, [facility] resident, Charge  nurse / hot rounds and Patient     The patient remains critically ill.  Critical care time = 45 minutes in directly providing and coordinating critical care and extensive data review.  No time overlap and excludes procedures.

## 2020-01-17 NOTE — CONSULTS
Infectious Disease Consultation    Date of consult: 1/17/2020    Referring Physician  Earl Morales M.D.    Reason for Consultation  Necrotizing fasciitis complicated by septic shock    History of Presenting Illness  59 y.o. male with PMH of IV drug use including amphetamines and heroin, tobacco use, chronic hepatitis C infection, homelessness, presented 1/16/2020 with right thigh swelling after ground-level fall 2 weeks prior to presentation (patient did not seek immediate care).  CT with contrast of the right lower extremity showed a large fluid collection in the right lateral thigh with extensive foci of air.  Leukocytes, platelets, lactic acid, CRP were elevated.  Orthopedic Surgery Dr. Blanc was consulted, with incision and drainage of right thigh abscess performed on 1/16/2020 with a wound VAC placed.  Patient was initially normotensive however became hypotensive today, central line was placed and phenylephrine was started (norepinephrine not used due to new onset atrial fibrillation with rapid ventricular response).  Culture history notable for MRSA from wound in 2010, and clindamycin resistant Staphylococcus aureus as well as group A streptococcus in October of last year.  Blood cultures this course drawn 1/16/2020 showing initial speciation of possible Staphylococcus aureus that is methicillin sensitive by PCR.  Gram stain of wound discharge from admission, reviewed directly by myself and Dr. Kwong, appeared to be gram-positive cocci in chains; per Dr. Morales the preliminary culture from this fluid may be mixed staph and strep (no official results in Epic yet).  Gram stain and initial deep tissue cultures from surgery appear to be strep per Dr. Morales (no official results in Epic yet).  Infectious Disease was consulted for antibiotic recommendations.    Current antibiotics  Cefazolin 2 g IV: 1/17/2020 - present  Linezolid 600 mg p.o.: 1/16/2020 - present    Previous antibiotics  Ceftriaxone 2 g IV:  1/16/2020 (1 dose)  Linezolid 600 mg IV: 1/16/2020 (1 dose)  Meropenem 500 mg IV: 1/16/2020 - 1/17/2020 (3 doses)    Code Status  Full Code    Review of Systems  Review of Systems   Constitutional: Positive for malaise/fatigue. Negative for chills and fever.   HENT: Negative for congestion and sore throat.    Eyes: Negative for photophobia and redness.   Respiratory: Negative for hemoptysis and shortness of breath.    Cardiovascular: Negative for chest pain and palpitations.   Gastrointestinal: Negative for diarrhea and vomiting.   Genitourinary: Negative for dysuria and hematuria.   Musculoskeletal: Positive for myalgias. Negative for joint pain.   Skin: Positive for rash.   Neurological: Negative for dizziness and loss of consciousness.   Endo/Heme/Allergies: Negative for environmental allergies and polydipsia.   Psychiatric/Behavioral: Negative for hallucinations and suicidal ideas.       Past Medical History   has a past medical history of Back pain and Heroin use.    Surgical History   has a past surgical history that includes other orthopedic surgery; other abdominal surgery; and irrigation & debridement ortho (Right, 1/16/2020).    Family History  family history includes No Known Problems in his father and mother.    Social History   reports that he has been smoking cigarettes. He has been smoking about 1.00 pack per day. He has never used smokeless tobacco. He reports previous alcohol use. He reports current drug use. Drug: Marijuana.    Medications  Home Medications     Reviewed by Gay Mayers R.N. (Registered Nurse) on 01/16/20 at 1709  Med List Status: Complete   Medication Last Dose Status   acetaminophen (TYLENOL) 325 MG Tab <1week Active   acetaminophen (TYLENOL) tablet 650 mg  Active   bisacodyl (DULCOLAX) suppository 10 mg  Active   HYDROmorphone pf (DILAUDID) injection 1 mg  Active   lactated ringer BOLUS infusion  Active   lactated ringers infusion  Active   linezolid (ZYVOX) tablet 600 mg   Active   magnesium hydroxide (MILK OF MAGNESIA) suspension 30 mL  Active   meropenem (MERREM) 500 mg in  mL IVPB  Active   Multiple Vitamins-Minerals (MULTIVITAMIN PO) unk Active   polyethylene glycol/lytes (MIRALAX) PACKET 1 Packet  Active   senna-docusate (PERICOLACE or SENOKOT S) 8.6-50 MG per tablet 2 Tab  Active              Current Facility-Administered Medications   Medication Dose Route Frequency Provider Last Rate Last Dose   • norepinephrine (LEVOPHED) 8 mg in  mL Infusion  0-30 mcg/min Intravenous Continuous Donovan Thomas M.D.   Stopped at 01/17/20 0345   • SODIUM CHLORIDE 0.9 % IV SOLN            • phenylephrine (KERLINE-SYNEPHRINE) 40 mg in  mL Infusion  0-300 mcg/min Intravenous Continuous Dorina Dc M.D. 37.5 mL/hr at 01/17/20 0945 100 mcg/min at 01/17/20 0945   • PHENYLEPHRINE HCL 10 MG/ML INJ SOLN (WRAPPED)            • ceFAZolin in dextrose (ANCEF) IVPB premix 2 g  2 g Intravenous Q8HRS Earl Morales M.D.   Stopped at 01/17/20 1032   • oxyCODONE immediate release (ROXICODONE) tablet 10 mg  10 mg Oral Q4HRS PRN Earl Morales M.D.   10 mg at 01/17/20 1002   • HYDROmorphone pf (DILAUDID) injection 1 mg  1 mg Intravenous Q4HRS PRN Earl Morales M.D.       • [START ON 1/18/2020] enoxaparin (LOVENOX) inj 40 mg  40 mg Subcutaneous DAILY Piero Gould M.D.       • linezolid (ZYVOX) tablet 600 mg  600 mg Oral Q12HRS Earl Morales M.D.   600 mg at 01/17/20 0500   • senna-docusate (PERICOLACE or SENOKOT S) 8.6-50 MG per tablet 2 Tab  2 Tab Oral BID Earl Morales M.D.        And   • polyethylene glycol/lytes (MIRALAX) PACKET 1 Packet  1 Packet Oral QDAY PRN Earl Morales M.D.        And   • magnesium hydroxide (MILK OF MAGNESIA) suspension 30 mL  30 mL Oral QDAY PRN Earl Morales M.D.        And   • bisacodyl (DULCOLAX) suppository 10 mg  10 mg Rectal QDAY PRN Earl Morales M.D.       • lactated ringers infusion   Intravenous Continuous Earl Morales M.D. 150  mL/hr at 01/17/20 0815     • acetaminophen (TYLENOL) tablet 650 mg  650 mg Oral Q6HRS PRN Earl Morales M.D.   650 mg at 01/16/20 0724       Allergies  Allergies   Allergen Reactions   • Codeine Rash     Rash  Historical tolerance to hydromorphone   • Pcn [Penicillins] Unspecified     Unknown reaction. Tolerated ceftriaxone on 10/9/19         Vital Signs last 24 hours  Temp:  [36.1 °C (96.9 °F)-36.9 °C (98.5 °F)] 36.9 °C (98.4 °F)  Pulse:  [] 65  Resp:  [9-26] 26  BP: ()/(47-82) 101/65  SpO2:  [90 %-100 %] 92 %    Physical Exam  Physical Exam   Constitutional: No distress.   HENT:   Head: Normocephalic and atraumatic.   Eyes: Conjunctivae and EOM are normal.   Neck: Normal range of motion. Neck supple.   Cardiovascular: Normal rate and regular rhythm.   Pulmonary/Chest: Breath sounds normal. No respiratory distress.   Abdominal: Soft. There is no tenderness.   Musculoskeletal:         General: Tenderness (right lateral thigh) and edema (right lower extremity) present.      Comments: Wound vac in place   Lymphadenopathy:     He has no cervical adenopathy.   Neurological: He is alert.   Skin: Skin is warm and dry. Rash (right lateral thigh) noted.   Psychiatric: His affect is not inappropriate. He is not agitated.       Fluids    Intake/Output Summary (Last 24 hours) at 1/17/2020 1217  Last data filed at 1/17/2020 0600  Gross per 24 hour   Intake 4668.19 ml   Output 2225 ml   Net 2443.19 ml       Laboratory  Recent Results (from the past 48 hour(s))   GRAM STAIN    Collection Time: 01/16/20  6:25 AM   Result Value Ref Range    Significant Indicator .     Source WND     Site RIGHT LEG     Gram Stain Result Few WBCs.  Moderate Gram positive cocci.      CBC WITH DIFFERENTIAL    Collection Time: 01/16/20  6:45 AM   Result Value Ref Range    WBC 14.3 (H) 4.8 - 10.8 K/uL    RBC 5.33 4.70 - 6.10 M/uL    Hemoglobin 15.1 14.0 - 18.0 g/dL    Hematocrit 45.4 42.0 - 52.0 %    MCV 85.2 81.4 - 97.8 fL    MCH 28.3 27.0  - 33.0 pg    MCHC 33.3 (L) 33.7 - 35.3 g/dL    RDW 45.4 35.9 - 50.0 fL    Platelet Count 598 (H) 164 - 446 K/uL    MPV 9.1 9.0 - 12.9 fL    Neutrophils-Polys 80.50 (H) 44.00 - 72.00 %    Lymphocytes 7.10 (L) 22.00 - 41.00 %    Monocytes 2.60 0.00 - 13.40 %    Eosinophils 1.80 0.00 - 6.90 %    Basophils 0.00 0.00 - 1.80 %    Nucleated RBC 0.00 /100 WBC    Neutrophils (Absolute) 12.66 (H) 1.82 - 7.42 K/uL    Lymphs (Absolute) 1.02 1.00 - 4.80 K/uL    Monos (Absolute) 0.37 0.00 - 0.85 K/uL    Eos (Absolute) 0.26 0.00 - 0.51 K/uL    Baso (Absolute) 0.00 0.00 - 0.12 K/uL    NRBC (Absolute) 0.00 K/uL   COMP METABOLIC PANEL    Collection Time: 01/16/20  6:45 AM   Result Value Ref Range    Sodium 127 (L) 135 - 145 mmol/L    Potassium 4.9 3.6 - 5.5 mmol/L    Chloride 94 (L) 96 - 112 mmol/L    Co2 24 20 - 33 mmol/L    Anion Gap 9.0 0.0 - 11.9    Glucose 114 (H) 65 - 99 mg/dL    Bun 21 8 - 22 mg/dL    Creatinine 0.75 0.50 - 1.40 mg/dL    Calcium 8.8 8.5 - 10.5 mg/dL    AST(SGOT) 31 12 - 45 U/L    ALT(SGPT) 42 2 - 50 U/L    Alkaline Phosphatase 102 (H) 30 - 99 U/L    Total Bilirubin 0.7 0.1 - 1.5 mg/dL    Albumin 2.6 (L) 3.2 - 4.9 g/dL    Total Protein 6.3 6.0 - 8.2 g/dL    Globulin 3.7 (H) 1.9 - 3.5 g/dL    A-G Ratio 0.7 g/dL   LACTIC ACID    Collection Time: 01/16/20  6:45 AM   Result Value Ref Range    Lactic Acid 2.5 (H) 0.5 - 2.0 mmol/L   APTT    Collection Time: 01/16/20  6:45 AM   Result Value Ref Range    APTT 26.3 24.7 - 36.0 sec   PROTHROMBIN TIME    Collection Time: 01/16/20  6:45 AM   Result Value Ref Range    PT 15.6 (H) 12.0 - 14.6 sec    INR 1.21 (H) 0.87 - 1.13   WESTERGREN SED RATE    Collection Time: 01/16/20  6:45 AM   Result Value Ref Range    Sed Rate Westergren 34 (H) 0 - 20 mm/hour   CRP QUANTITIVE (NON-CARDIAC)    Collection Time: 01/16/20  6:45 AM   Result Value Ref Range    Stat C-Reactive Protein 12.38 (H) 0.00 - 0.75 mg/dL   DIFFERENTIAL MANUAL    Collection Time: 01/16/20  6:45 AM   Result Value Ref  Range    Bands-Stabs 8.00 0.00 - 10.00 %    Manual Diff Status PERFORMED    PERIPHERAL SMEAR REVIEW    Collection Time: 20  6:45 AM   Result Value Ref Range    Peripheral Smear Review see below    PLATELET ESTIMATE    Collection Time: 20  6:45 AM   Result Value Ref Range    Plt Estimation Increased    MORPHOLOGY    Collection Time: 20  6:45 AM   Result Value Ref Range    RBC Morphology Present     Poikilocytosis 1+     Echinocytes 1+    Blood Culture,Hold    Collection Time: 20  6:45 AM   Result Value Ref Range    Blood Culture Hold Collected    ESTIMATED GFR    Collection Time: 20  6:45 AM   Result Value Ref Range    GFR If African American >60 >60 mL/min/1.73 m 2    GFR If Non African American >60 >60 mL/min/1.73 m 2   Prothrombin time (INR)    Collection Time: 20  6:45 AM   Result Value Ref Range    PT 15.1 (H) 12.0 - 14.6 sec    INR 1.16 (H) 0.87 - 1.13   CREATINE KINASE    Collection Time: 20  6:45 AM   Result Value Ref Range    CPK Total 40 0 - 154 U/L   MAGNESIUM    Collection Time: 20  6:45 AM   Result Value Ref Range    Magnesium 1.9 1.5 - 2.5 mg/dL   EKG    Collection Time: 20  6:49 AM   Result Value Ref Range    Report       [facility] Emergency Dept[facility]    Test Date:  2020  Pt Name:    JANNA ROJAS                Department: ER  MRN:        [id number]                      Room:       [facility]  Gender:     Male                         Technician: [id number]  :        1960                   Requested By:SHERI BAINS  Order #:    [id number]                    Rigoberto MD:    Measurements  Intervals                                Axis  Rate:       148                          P:  HI:                                      QRS:        -27  QRSD:       84                           T:          67  QT:         288  QTc:        453    Interpretive Statements  ATRIAL FIBRILLATION, V-RATE 118-181  BORDERLINE LEFT AXIS DEVIATION  No previous  ECG available for comparison     Blood Culture    Collection Time: 01/16/20  7:10 AM   Result Value Ref Range    Significant Indicator POS (POS)     Source BLD     Site PERIPHERAL     Culture Result (A)      Growth detected by Bactec instrument. 01/17/2020  03:51  Gram Stain: Gram positive cocci: Possible Staphylococcus sp.  Staphylococcus aureus (methicillin sensitive)  detected by PCR.  Susceptibility to follow.     LACTIC ACID    Collection Time: 01/16/20  8:10 AM   Result Value Ref Range    Lactic Acid 2.5 (H) 0.5 - 2.0 mmol/L   Urinalysis    Collection Time: 01/16/20 10:04 AM   Result Value Ref Range    Color DK Yellow     Character Clear     Specific Gravity 1.038 <1.035    Ph 6.0 5.0 - 8.0    Glucose Negative Negative mg/dL    Ketones Negative Negative mg/dL    Protein Negative Negative mg/dL    Bilirubin Negative Negative    Urobilinogen, Urine 2.0 Negative    Nitrite Negative Negative    Leukocyte Esterase Negative Negative    Occult Blood Negative Negative    Micro Urine Req see below    URINE DRUG SCREEN    Collection Time: 01/16/20 10:04 AM   Result Value Ref Range    Amphetamines Urine Positive (A) Negative    Barbiturates Negative Negative    Benzodiazepines Negative Negative    Cocaine Metabolite Negative Negative    Methadone Negative Negative    Opiates Positive (A) Negative    Oxycodone Negative Negative    Phencyclidine -Pcp Negative Negative    Propoxyphene Negative Negative    Cannabinoid Metab Negative Negative   EC-ECHOCARDIOGRAM COMPLETE W/O CONT    Collection Time: 01/16/20 12:16 PM   Result Value Ref Range    Eject.Frac. MOD BP 71.56     Eject.Frac. MOD 4C 54.47     Eject.Frac. MOD 2C 82.31     Left Ventrical Ejection Fraction 60    Lactic Acid Every four hours after STAT order    Collection Time: 01/16/20  3:30 PM   Result Value Ref Range    Lactic Acid 1.3 0.5 - 2.0 mmol/L   GRAM STAIN    Collection Time: 01/16/20  6:09 PM   Result Value Ref Range    Significant Indicator .     Source WND      Site Right Thigh Abscess     Gram Stain Result Moderate WBCs.  Many Gram positive cocci.      CBC with Differential    Collection Time: 01/17/20  4:45 AM   Result Value Ref Range    WBC 13.1 (H) 4.8 - 10.8 K/uL    RBC 3.56 (L) 4.70 - 6.10 M/uL    Hemoglobin 10.5 (L) 14.0 - 18.0 g/dL    Hematocrit 31.2 (L) 42.0 - 52.0 %    MCV 87.6 81.4 - 97.8 fL    MCH 29.5 27.0 - 33.0 pg    MCHC 33.7 33.7 - 35.3 g/dL    RDW 48.5 35.9 - 50.0 fL    Platelet Count 567 (H) 164 - 446 K/uL    MPV 9.1 9.0 - 12.9 fL    Neutrophils-Polys 78.60 (H) 44.00 - 72.00 %    Lymphocytes 8.70 (L) 22.00 - 41.00 %    Monocytes 9.70 0.00 - 13.40 %    Eosinophils 1.00 0.00 - 6.90 %    Basophils 0.30 0.00 - 1.80 %    Immature Granulocytes 1.70 (H) 0.00 - 0.90 %    Nucleated RBC 0.00 /100 WBC    Neutrophils (Absolute) 10.30 (H) 1.82 - 7.42 K/uL    Lymphs (Absolute) 1.14 1.00 - 4.80 K/uL    Monos (Absolute) 1.27 (H) 0.00 - 0.85 K/uL    Eos (Absolute) 0.13 0.00 - 0.51 K/uL    Baso (Absolute) 0.04 0.00 - 0.12 K/uL    Immature Granulocytes (abs) 0.22 (H) 0.00 - 0.11 K/uL    NRBC (Absolute) 0.00 K/uL   Comp Metabolic Panel (CMP)    Collection Time: 01/17/20  4:45 AM   Result Value Ref Range    Sodium 128 (L) 135 - 145 mmol/L    Potassium 4.1 3.6 - 5.5 mmol/L    Chloride 96 96 - 112 mmol/L    Co2 28 20 - 33 mmol/L    Anion Gap 4.0 0.0 - 11.9    Glucose 128 (H) 65 - 99 mg/dL    Bun 15 8 - 22 mg/dL    Creatinine 0.54 0.50 - 1.40 mg/dL    Calcium 7.3 (L) 8.5 - 10.5 mg/dL    AST(SGOT) 14 12 - 45 U/L    ALT(SGPT) 20 2 - 50 U/L    Alkaline Phosphatase 58 30 - 99 U/L    Total Bilirubin 0.3 0.1 - 1.5 mg/dL    Albumin 1.7 (L) 3.2 - 4.9 g/dL    Total Protein 4.1 (L) 6.0 - 8.2 g/dL    Globulin 2.4 1.9 - 3.5 g/dL    A-G Ratio 0.7 g/dL   ESTIMATED GFR    Collection Time: 01/17/20  4:45 AM   Result Value Ref Range    GFR If African American >60 >60 mL/min/1.73 m 2    GFR If Non African American >60 >60 mL/min/1.73 m 2       Pertinent Micro:  Results     Procedure  "Component Value Units Date/Time    BLOOD CULTURE [[id number]] Collected:  01/16/20 0645    Order Status:  Completed Specimen:  Blood from Peripheral Updated:  01/17/20 0947    Narrative:       Collected By:[id number] PAT FLORES  For blood culture collected 1/16/20 at 0645  Per Hospital Policy: Only change Specimen Src: to \"Line\" if  specified by physician order.    Blood Culture [[id number]]  (Abnormal) Collected:  01/16/20 0710    Order Status:  Completed Specimen:  Blood from Peripheral Updated:  01/17/20 0507     Significant Indicator POS     Source BLD     Site PERIPHERAL     Culture Result Growth detected by Bactec instrument. 01/17/2020  03:51  Gram Stain: Gram positive cocci: Possible Staphylococcus sp.  Staphylococcus aureus (methicillin sensitive)  detected by PCR.  Susceptibility to follow.      Narrative:       CALL  [facility]ers  161 tel. [phone],  CALLED  161 tel. [phone] 01/17/2020, 05:07, RESULTS CALLED TO:Riri  Per Hospital Policy: Only change Specimen Src: to \"Line\" if  specified by physician order.  Left Hand    GRAM STAIN [[id number]] Collected:  01/16/20 1809    Order Status:  Completed Specimen:  Wound Updated:  01/16/20 2101     Significant Indicator .     Source WND     Site Right Thigh Abscess     Gram Stain Result Moderate WBCs.  Many Gram positive cocci.      AFB Culture [[id number]] Collected:  01/16/20 1809    Order Status:  Completed Specimen:  Other Updated:  01/16/20 1858    CULTURE WOUND W/ GRAM STAIN [[id number]] Collected:  01/16/20 1809    Order Status:  Completed Specimen:  Other Updated:  01/16/20 1858    Anaerobic Culture [[id number]] Collected:  01/16/20 1809    Order Status:  Completed Specimen:  Other Updated:  01/16/20 1858    Fungal Culture [[id number]] Collected:  01/16/20 1809    Order Status:  Completed Specimen:  Other Updated:  01/16/20 1858    GRAM STAIN [[id number]] Collected:  01/16/20 0625    Order Status:  Completed Specimen:  Wound Updated:  01/16/20 1451     Significant " Indicator .     Source WND     Site RIGHT LEG     Gram Stain Result Few WBCs.  Moderate Gram positive cocci.      Urinalysis [[id number]] Collected:  01/16/20 1004    Order Status:  Completed Specimen:  Urine Updated:  01/16/20 1018     Color DK Yellow     Character Clear     Specific Gravity 1.038     Ph 6.0     Glucose Negative mg/dL      Ketones Negative mg/dL      Protein Negative mg/dL      Bilirubin Negative     Urobilinogen, Urine 2.0     Nitrite Negative     Leukocyte Esterase Negative     Occult Blood Negative     Micro Urine Req see below     Comment: Microscopic examination not performed when specimen is clear  and chemically negative for protein, blood, leukocyte esterase  and nitrite.         Narrative:       If not done within the last 24 hours    Blood Culture,Hold [[id number]] Collected:  01/16/20 0645    Order Status:  Completed Updated:  01/16/20 0741     Blood Culture Hold Collected    Culture Wound W/ Gram Stain [[id number]] Collected:  01/16/20 0625    Order Status:  Completed Specimen:  Blood from Right Leg Updated:  01/16/20 0640        Blood Culture   Date Value Ref Range Status   06/16/2010 No growth after 5 days of incubation.  Final     Blood Culture Hold   Date Value Ref Range Status   01/16/2020 Collected  Final        Imaging  DX-CHEST-LIMITED (1 VIEW)   Final Result         1.  No acute cardiopulmonary disease.      EC-ECHOCARDIOGRAM COMPLETE W/O CONT   Final Result      CT-EXTREMITY, LOWER WITH RIGHT   Final Result         1.  Large abscess in the right lateral thigh, may be within or adjacent to the muscular fascia. Adjacent fluid collection tracking adjacent to this abscess is seen which may be additional early forming abscess.   2.  Foci of air within these fluid collections is seen, appearance suggests gas producing organisms, consider component of necrotizing fasciitis as clinically appropriate.      These findings were discussed with the patient's clinician, SHERI BAINS, on  1/16/2020 8:00 AM.      DX-CHEST-PORTABLE (1 VIEW)   Final Result         1.  No acute cardiopulmonary disease.   2.  Atherosclerosis          Assessment/Plan  1.  Sepsis secondary to necrotizing fasciitis of right lateral thigh tertiary to traumatic wound sustained from ground-level fall  2.  Intravenous drug use  3.  Chronic hepatitis C infection  4.  New onset atrial fibrillation with rapid ventricular response    Necrotizing fasciitis likely due to toxin producing Streptococcus (based on review of Gram stain, final speciation and sensitivities pending) with concomitant hypotensive shock.  Patient is now postoperative day 1 of incision and drainage.  Need to follow-up final results of blood culture which is being preliminarily read as MSSA in patient with a history of IV drug use, no vegetation seen on transthoracic echocardiogram. Transesophageal echocardiogram per primary team.  Agree with changing from meropenem to cefazolin and continuing linezolid for streptococcus and MSSA coverage as well as toxin suppression.  Current regimen would also cover MRSA however laboratory results as of today not showing MRSA (only remote history of MRSA from 10 years ago).  Thank you for this consultation.  Infectious Disease will continue to follow with you.    Chevy Carroll M.D. PGY-3 Internal Medicine  Discussed with Dr. Kwong.  Discussed patient condition and risk of morbidity and/or mortality with TATYANA Callahan resident.

## 2020-01-17 NOTE — CARE PLAN
Problem: Fluid Volume:  Goal: Will maintain balanced intake and output  Outcome: PROGRESSING AS EXPECTED  Note:   Pt fluid status monitored with strict I/O documentation, assessment of skin integrity, edema and lung sounds.        Problem: Safety  Goal: Will remain free from injury  Outcome: PROGRESSING AS EXPECTED  Intervention: Provide assistance with mobility  Note:   Pt provided assistance to restroom. Pt provided education on safety, fall prevention, and use of call light. Treaded socks in place, call light within reach, hourly rounding in effect.

## 2020-01-17 NOTE — OP REPORT
DATE OF SERVICE:  01/16/2020    PREOPERATIVE DIAGNOSIS:  Right thigh necrotizing soft tissue infection with   associated abscess.    POSTOPERATIVE DIAGNOSIS:  Right thigh necrotizing soft tissue infection with   associated abscess.    PROCEDURES PERFORMED:  1.  Incision and drainage of right intramuscular thigh abscess.  2.  Excisional debridement of right thigh wound including skin, subcutaneous   tissue and muscle through wound measuring 30x10 cm.  3.  Application of wound VAC greater than 50 sq cm.    SURGEON:  Amari Blanc MD    ANESTHESIOLOGIST:  Stephen Morales MD    ANESTHESIA:  General.    ESTIMATED BLOOD LOSS:  200 mL.    SPECIMENS:  Purulent drainage was swabbed and sent to lab for aerobic and   anaerobic cultures.    INDICATION FOR PROCEDURE:  Patient is a 59-year-old male.  He presented this   morning to the emergency department with sepsis and extensive abscess in the   right thigh extending into the lateral and anterior quadriceps musculature.    He was admitted to the [facility] internal medicine service and in the ICU for sepsis   and started on empiric antibiotic therapy.  I was consulted and he did have   evidence of necrotizing soft tissue infection with skin necrosis centrally at   the thigh, but it did not seem to be extending proximally or distally along   the extremity.  I discussed treatment recommendations with the patient.  I   recommended excisional debridement of his devitalized soft tissue and incision   and drainage of his abscess.  He signed informed consent preoperatively and   wished to proceed with surgery as outlined above.    DESCRIPTION OF PROCEDURE:  Patient was met in the preop holding area and his   surgical site was signed.  His consent was confirmed to be accurate.  He was   taken back to the operating room and general anesthesia was induced.  The   right lower extremity was prepped and draped in the usual sterile fashion up   to the hip.  A formal timeout was performed to  confirm patient's correct name,   correct surgical site, correct procedure and correct laterality.  An   elliptical incision was made around the area of devitalized skin down through   the subcutaneous tissue.  There was copious amounts of purulent drainage   expressed through the subcutaneous plane.  I incised the iliotibial band and   there was copious amounts of purulent drainage underneath the iliotibial band   as well.  There was devitalized subcutaneous tissue, iliotibial band and a   portion of vastus lateralis muscle, which I debrided with a rongeur.  The   anterior quadriceps muscle fascia was also excised as it was very purulent in   nature.  The purulence extended to the anterior distal thigh as well as the   lateral proximal thigh.  I excised devitalized fascia, muscle and subcutaneous   tissue as well as a little bit more skin to healthy bleeding skin edges.    Once sufficient debridement was performed with scalpel, rongeur and curette, I   thoroughly irrigated the wound with Pulsavac using normal saline at 6 liters.    I then applied a wound VAC at 125 mmHg low continuous pressure to the wound,   which measured about 30x10 cm in maximal dimensions, elliptical in shape.  I   was able to achieve a wound VAC at 125 mmHg low continuous pressure and obtain   a good seal with Ioban.  He was then awoken from anesthesia, transferred on   the [facility] and taken to the postanesthesia care unit in stable condition.    PLAN:  1.  Patient will be readmitted to the ICU postop.  2.  I recommend continuing antibiotic therapy and following up intraoperative   cultures.  3.  Continue wound VAC therapy for now and I anticipate the need for him to   return to the operating room in 2-3 days for repeat wound debridement and   wound VAC change.       ____________________________________     MD SUKUMAR Cordero / SUZI    DD:  01/16/2020 18:35:07  DT:  01/16/2020 18:46:47    D#:  [id number]  Job#:  [id number]

## 2020-01-17 NOTE — ANESTHESIA PROCEDURE NOTES
Airway  Date/Time: 1/16/2020 5:34 PM  Performed by: Stephen Morales M.D.  Authorized by: Stephen Morales M.D.     Location:  OR  Urgency:  Elective  Difficult Airway: No    Indications for Airway Management:  Anesthesia  Spontaneous Ventilation: absent    Sedation Level:  Deep  Preoxygenated: Yes    MILS Maintained Throughout: No    Mask Difficulty Assessment:  1 - vent by mask  Final Airway Type:  Supraglottic airway  Final Supraglottic Airway:  Standard LMA  SGA Size:  4  Number of Attempts at Approach:  1

## 2020-01-17 NOTE — PROGRESS NOTES
Planning right thigh repeat debridement and wound vac change in OR scheduled for tomorrow am.  REED HARE.

## 2020-01-17 NOTE — ANESTHESIA TIME REPORT
Anesthesia Start and Stop Event Times     Date Time Event    1/16/2020 1651 Ready for Procedure     1730 Anesthesia Start     1846 Anesthesia Stop        Responsible Staff  01/16/20    Name Role Begin End    Stephen Morales M.D. Anesth 1730 1846        Preop Diagnosis (Free Text):  Pre-op Diagnosis     Right thigh abscess        Preop Diagnosis (Codes):    Post op Diagnosis  Abscess of right thigh      Premium Reason  Non-Premium    Comments:

## 2020-01-17 NOTE — ASSESSMENT & PLAN NOTE
+ UDS  Cessation encouraged  Patient takes amphetamines for pain management, he states its the only thing that works

## 2020-01-17 NOTE — PROCEDURES
Central Line Insertion  Date/Time: 1/17/2020 5:00 AM  Performed by: Donovan Thomas M.D.  Authorized by: Donovan Thomas M.D.     Consent:     Consent obtained:  Verbal    Consent given by:  Patient    Risks discussed:  Arterial puncture, incorrect placement, nerve damage, pneumothorax, infection and bleeding    Alternatives discussed:  Delayed treatment  Pre-procedure details:     Hand hygiene: Hand hygiene performed prior to insertion      Sterile barrier technique: All elements of maximal sterile technique followed      Skin preparation:  ChloraPrep  Sedation:     Sedation type:  Moderate (conscious) sedation  Anesthesia:     Anesthesia method:  Local infiltration    Local anesthetic:  Lidocaine 1% w/o epi  Procedure details:     Location:  L subclavian    Patient position:  Flat    Procedural supplies:  Triple lumen    Catheter size:  7.5 Fr    Landmarks identified: yes      Ultrasound guidance: no      Number of attempts:  1    Successful placement: yes    Post-procedure details:     Post-procedure:  Dressing applied and line sutured    Assessment:  Blood return through all ports and free fluid flow    Patient tolerance of procedure:  Tolerated well, no immediate complications  Comments:      Now on pressors, septic shock

## 2020-01-17 NOTE — ANESTHESIA PREPROCEDURE EVALUATION
59 yio ashlyn ID of right thigh abscess  Septic shock    Meth and opiates in the urine screen  Sodium 127  Very rapid A fib    Relevant Problems   CARDIAC   (+) Atrial fibrillation (HCC)         (+) Chronic hepatitis C virus infection (HCC)      Other   (+) Cannabis use disorder, mild, abuse   (+) Cellulitis and abscess of right lower extremity   (+) Cellulitis of hand, left   (+) Homeless   (+) Hyponatremia   (+) Methamphetamine use (HCC)   (+) Sepsis without acute organ dysfunction (HCC)   (+) Smoking       Physical Exam    Airway   Mallampati: II  TM distance: >3 FB  Neck ROM: full       Cardiovascular - normal exam  Rhythm: regular  Rate: normal  (-) murmur     Dental       Very poor dentition   Pulmonary - normal exam  Breath sounds clear to auscultation     Abdominal    Neurological      Other findings: Only a few decayed lower incisors remain          Anesthesia Plan    ASA 4- EMERGENT   ASA physical status 4 criteria: sepsisASA physical status emergent criteria: acutely contaminated wound or identified infection source    Plan - general       Airway plan will be LMA        Induction: intravenous    Postoperative Plan: Postoperative administration of opioids is intended.    Pertinent diagnostic labs and testing reviewed    Informed Consent:    Anesthetic plan and risks discussed with patient.    Use of blood products discussed with: patient whom consented to blood products.

## 2020-01-17 NOTE — PROGRESS NOTES
ICU brief progress note    Patient with systolics down into the 60s was received IV fluid bolus with minimal response now on Rick-Synephrine.  Rick-Synephrine chosen because of atrial failure with concern for rapid ventricular response.  Rick-Synephrine started with a with a good blood pressure response with a mean arterial pressure goals of 65 clinically patient appears largely unchanged except complaining of fairly intense leg pain which responds to 2 mg boluses of Dilaudid.

## 2020-01-17 NOTE — CARE PLAN
Problem: Safety  Goal: Will remain free from injury  Outcome: PROGRESSING AS EXPECTED  Note:   Fall precautions in place. Bed in lowest position. Non-skid socks in place. Personal possessions within reach. Mobility sign on door. Bed-alarm on. Call light within reach. Pt educated regarding fall prevention and states understanding.      Problem: Skin Integrity  Goal: Risk for impaired skin integrity will decrease  Outcome: PROGRESSING SLOWER THAN EXPECTED  Note:   S/p ID with wound vac placement. Possible DTI to sacrum. Wound consult and pictures placed

## 2020-01-17 NOTE — ANESTHESIA QCDR
2019 [facility] Clinical Data Registry (for Quality Improvement)     Postoperative nausea/vomiting risk protocol (Adult = 18 yrs and Pediatric 3-17 yrs)- (430 and 463)  General inhalation anesthetic (NOT TIVA) with PONV risk factors: No  Provision of anti-emetic therapy with at least 2 different classes of agents: N/A  Patient DID NOT receive anti-emetic therapy and reason is documented in Medical Record: N/A    Multimodal Pain Management- (477)  Non-emergent surgery AND patient age >= 18: No  Use of Multimodal Pain Management, two or more drugs and/or interventions, NOT including systemic opioids:   Exception: Documented allergy to multiple classes of analgesics:     Smoking Abstinence (404)  Patient is current smoker (cigarette, pipe, e-cig, marijuanna): Yes  Elective Surgery: No  Abstinence instructions provided prior to day of surgery:   Patient abstained from smoking on day of surgery:     Pre-Op Beta-Blocker in Isolated CABG (44)  Isolated CABG AND patient age >= 18: No  Beta-blocker admin within 24 hours of surgical incision:   Exception:of medical reason(s) for not administering beta blocker within 24 hours prior to surgical incision (e.g., not  indicated,other medical reason):     PACU assessment of acute postoperative pain prior to Anesthesia Care End- Applies to Patients Age = 18- (ABG7)  Initial PACU pain score is which of the following: < 7/10  Patient unable to report pain score: N/A    Post-anesthetic transfer of care checklist/protocol to PACU/ICU- (426 and 427)  Upon conclusion of case, patient transferred to which of the following locations: PACU/Non-ICU  Use of transfer checklist/protocol: Yes  Exclusion: Service Performed in Patient Hospital Room (and thus did not require transfer): N/A  Unplanned admission to ICU related to anesthesia service up through end of PACU care- (MD51)  Unplanned admission to ICU (not initially anticipated at anesthesia start time): No

## 2020-01-17 NOTE — OR SURGEON
Immediate Post OP Note    PreOp Diagnosis: Right thigh necrotizing soft tissue infection and associated abscess    PostOp Diagnosis: same    Procedure(s):  RIGHT THIGH ABSCESS INCISION AND DRAINAGE, WOUND VAC PLACEMENT - Wound Class: Dirty or Infected    Surgeon(s):  Amari Blanc M.D.    Anesthesiologist/Type of Anesthesia:  Anesthesiologist: Stephen Morales M.D./General    Surgical Staff:  Circulator: Gay Mayers R.N.  Scrub Person: Johny Moura    Specimens removed if any:  ID Type Source Tests Collected by Time Destination   1 : Right thigh abscess Other Other AFB CULTURE, FUNGAL CULTURE, AEROBIC/ANAEROBIC CULTURE (SURGERY) Amari Blanc M.D. 1/16/2020  6:09 PM        Estimated Blood Loss: 200cc    Findings: see dictation    Complications: none known    PLAN:  --readmit ICU postop  --continue abx therapy  --continue wound vac therapy  --will need to return to OR in 2-3 days for repeat debridement and wound vac change        1/16/2020 6:30 PM Amari Blanc M.D.

## 2020-01-18 ENCOUNTER — ANESTHESIA (OUTPATIENT)
Dept: SURGERY | Facility: MEDICAL CENTER | Age: 60
DRG: 853 | End: 2020-01-18
Payer: COMMERCIAL

## 2020-01-18 ENCOUNTER — ANESTHESIA EVENT (OUTPATIENT)
Dept: SURGERY | Facility: MEDICAL CENTER | Age: 60
DRG: 853 | End: 2020-01-18
Payer: COMMERCIAL

## 2020-01-18 LAB
ALBUMIN SERPL BCP-MCNC: 1.7 G/DL (ref 3.2–4.9)
ALBUMIN/GLOB SERPL: 0.7 G/DL
ALP SERPL-CCNC: 60 U/L (ref 30–99)
ALT SERPL-CCNC: 12 U/L (ref 2–50)
ANION GAP SERPL CALC-SCNC: 2 MMOL/L (ref 0–11.9)
AST SERPL-CCNC: 11 U/L (ref 12–45)
BACTERIA WND AEROBE CULT: ABNORMAL
BASOPHILS # BLD AUTO: 0.4 % (ref 0–1.8)
BASOPHILS # BLD: 0.03 K/UL (ref 0–0.12)
BILIRUB SERPL-MCNC: 0.2 MG/DL (ref 0.1–1.5)
BUN SERPL-MCNC: 9 MG/DL (ref 8–22)
CALCIUM SERPL-MCNC: 7.4 MG/DL (ref 8.5–10.5)
CHLORIDE SERPL-SCNC: 97 MMOL/L (ref 96–112)
CO2 SERPL-SCNC: 31 MMOL/L (ref 20–33)
CREAT SERPL-MCNC: 0.65 MG/DL (ref 0.5–1.4)
EOSINOPHIL # BLD AUTO: 0.09 K/UL (ref 0–0.51)
EOSINOPHIL NFR BLD: 1.2 % (ref 0–6.9)
ERYTHROCYTE [DISTWIDTH] IN BLOOD BY AUTOMATED COUNT: 47.8 FL (ref 35.9–50)
GLOBULIN SER CALC-MCNC: 2.5 G/DL (ref 1.9–3.5)
GLUCOSE SERPL-MCNC: 107 MG/DL (ref 65–99)
GRAM STN SPEC: ABNORMAL
GRAM STN SPEC: ABNORMAL
HCT VFR BLD AUTO: 28.5 % (ref 42–52)
HGB BLD-MCNC: 9.5 G/DL (ref 14–18)
IMM GRANULOCYTES # BLD AUTO: 0.19 K/UL (ref 0–0.11)
IMM GRANULOCYTES NFR BLD AUTO: 2.5 % (ref 0–0.9)
LYMPHOCYTES # BLD AUTO: 0.93 K/UL (ref 1–4.8)
LYMPHOCYTES NFR BLD: 12.3 % (ref 22–41)
MCH RBC QN AUTO: 29.1 PG (ref 27–33)
MCHC RBC AUTO-ENTMCNC: 33.3 G/DL (ref 33.7–35.3)
MCV RBC AUTO: 87.2 FL (ref 81.4–97.8)
MONOCYTES # BLD AUTO: 0.63 K/UL (ref 0–0.85)
MONOCYTES NFR BLD AUTO: 8.3 % (ref 0–13.4)
NEUTROPHILS # BLD AUTO: 5.69 K/UL (ref 1.82–7.42)
NEUTROPHILS NFR BLD: 75.3 % (ref 44–72)
NRBC # BLD AUTO: 0 K/UL
NRBC BLD-RTO: 0 /100 WBC
PLATELET # BLD AUTO: 363 K/UL (ref 164–446)
PMV BLD AUTO: 8.8 FL (ref 9–12.9)
POTASSIUM SERPL-SCNC: 4.2 MMOL/L (ref 3.6–5.5)
PROT SERPL-MCNC: 4.2 G/DL (ref 6–8.2)
RBC # BLD AUTO: 3.27 M/UL (ref 4.7–6.1)
SIGNIFICANT IND 70042: ABNORMAL
SIGNIFICANT IND 70042: ABNORMAL
SITE SITE: ABNORMAL
SITE SITE: ABNORMAL
SODIUM SERPL-SCNC: 130 MMOL/L (ref 135–145)
SOURCE SOURCE: ABNORMAL
SOURCE SOURCE: ABNORMAL
WBC # BLD AUTO: 7.6 K/UL (ref 4.8–10.8)

## 2020-01-18 PROCEDURE — 160048 HCHG OR STATISTICAL LEVEL 1-5: Performed by: ORTHOPAEDIC SURGERY

## 2020-01-18 PROCEDURE — 770001 HCHG ROOM/CARE - MED/SURG/GYN PRIV*

## 2020-01-18 PROCEDURE — 160009 HCHG ANES TIME/MIN: Performed by: ORTHOPAEDIC SURGERY

## 2020-01-18 PROCEDURE — 160038 HCHG SURGERY MINUTES - EA ADDL 1 MIN LEVEL 2: Performed by: ORTHOPAEDIC SURGERY

## 2020-01-18 PROCEDURE — 87040 BLOOD CULTURE FOR BACTERIA: CPT

## 2020-01-18 PROCEDURE — 501838 HCHG SUTURE GENERAL: Performed by: ORTHOPAEDIC SURGERY

## 2020-01-18 PROCEDURE — 700111 HCHG RX REV CODE 636 W/ 250 OVERRIDE (IP): Performed by: STUDENT IN AN ORGANIZED HEALTH CARE EDUCATION/TRAINING PROGRAM

## 2020-01-18 PROCEDURE — 700111 HCHG RX REV CODE 636 W/ 250 OVERRIDE (IP): Performed by: ANESTHESIOLOGY

## 2020-01-18 PROCEDURE — 700101 HCHG RX REV CODE 250: Performed by: ANESTHESIOLOGY

## 2020-01-18 PROCEDURE — 700102 HCHG RX REV CODE 250 W/ 637 OVERRIDE(OP): Performed by: INTERNAL MEDICINE

## 2020-01-18 PROCEDURE — 501445 HCHG STAPLER, SKIN DISP: Performed by: ORTHOPAEDIC SURGERY

## 2020-01-18 PROCEDURE — 500881 HCHG PACK, EXTREMITY: Performed by: ORTHOPAEDIC SURGERY

## 2020-01-18 PROCEDURE — 700105 HCHG RX REV CODE 258: Performed by: STUDENT IN AN ORGANIZED HEALTH CARE EDUCATION/TRAINING PROGRAM

## 2020-01-18 PROCEDURE — A6454 SELF-ADHER BAND W>=3" <5"/YD: HCPCS | Performed by: ORTHOPAEDIC SURGERY

## 2020-01-18 PROCEDURE — A9270 NON-COVERED ITEM OR SERVICE: HCPCS | Performed by: INTERNAL MEDICINE

## 2020-01-18 PROCEDURE — 160035 HCHG PACU - 1ST 60 MINS PHASE I: Performed by: ORTHOPAEDIC SURGERY

## 2020-01-18 PROCEDURE — 160002 HCHG RECOVERY MINUTES (STAT): Performed by: ORTHOPAEDIC SURGERY

## 2020-01-18 PROCEDURE — 99291 CRITICAL CARE FIRST HOUR: CPT | Performed by: INTERNAL MEDICINE

## 2020-01-18 PROCEDURE — 0KBQ0ZZ EXCISION OF RIGHT UPPER LEG MUSCLE, OPEN APPROACH: ICD-10-PCS | Performed by: ORTHOPAEDIC SURGERY

## 2020-01-18 PROCEDURE — 160027 HCHG SURGERY MINUTES - 1ST 30 MINS LEVEL 2: Performed by: ORTHOPAEDIC SURGERY

## 2020-01-18 PROCEDURE — A9270 NON-COVERED ITEM OR SERVICE: HCPCS | Performed by: STUDENT IN AN ORGANIZED HEALTH CARE EDUCATION/TRAINING PROGRAM

## 2020-01-18 PROCEDURE — 700102 HCHG RX REV CODE 250 W/ 637 OVERRIDE(OP): Performed by: STUDENT IN AN ORGANIZED HEALTH CARE EDUCATION/TRAINING PROGRAM

## 2020-01-18 PROCEDURE — 501330 HCHG SET, CYSTO IRRIG TUBING: Performed by: ORTHOPAEDIC SURGERY

## 2020-01-18 PROCEDURE — 85025 COMPLETE CBC W/AUTO DIFF WBC: CPT

## 2020-01-18 PROCEDURE — 770022 HCHG ROOM/CARE - ICU (200)

## 2020-01-18 PROCEDURE — 80053 COMPREHEN METABOLIC PANEL: CPT

## 2020-01-18 PROCEDURE — 700101 HCHG RX REV CODE 250: Performed by: INTERNAL MEDICINE

## 2020-01-18 PROCEDURE — A7000 DISPOSABLE CANISTER FOR PUMP: HCPCS | Performed by: ORTHOPAEDIC SURGERY

## 2020-01-18 RX ORDER — HYDROMORPHONE HYDROCHLORIDE 1 MG/ML
0.2 INJECTION, SOLUTION INTRAMUSCULAR; INTRAVENOUS; SUBCUTANEOUS
Status: DISCONTINUED | OUTPATIENT
Start: 2020-01-18 | End: 2020-01-18 | Stop reason: HOSPADM

## 2020-01-18 RX ORDER — MAGNESIUM SULFATE HEPTAHYDRATE 500 MG/ML
INJECTION, SOLUTION INTRAMUSCULAR; INTRAVENOUS PRN
Status: DISCONTINUED | OUTPATIENT
Start: 2020-01-18 | End: 2020-01-18 | Stop reason: SURG

## 2020-01-18 RX ORDER — HYDROMORPHONE HYDROCHLORIDE 1 MG/ML
0.1 INJECTION, SOLUTION INTRAMUSCULAR; INTRAVENOUS; SUBCUTANEOUS
Status: DISCONTINUED | OUTPATIENT
Start: 2020-01-18 | End: 2020-01-18 | Stop reason: HOSPADM

## 2020-01-18 RX ORDER — FOLIC ACID 1 MG/1
1 TABLET ORAL DAILY
Status: COMPLETED | OUTPATIENT
Start: 2020-01-19 | End: 2020-01-22

## 2020-01-18 RX ORDER — DIPHENHYDRAMINE HYDROCHLORIDE 50 MG/ML
12.5 INJECTION INTRAMUSCULAR; INTRAVENOUS
Status: DISCONTINUED | OUTPATIENT
Start: 2020-01-18 | End: 2020-01-18 | Stop reason: HOSPADM

## 2020-01-18 RX ORDER — METOCLOPRAMIDE HYDROCHLORIDE 5 MG/ML
INJECTION INTRAMUSCULAR; INTRAVENOUS PRN
Status: DISCONTINUED | OUTPATIENT
Start: 2020-01-18 | End: 2020-01-18 | Stop reason: SURG

## 2020-01-18 RX ORDER — HALOPERIDOL 5 MG/ML
1 INJECTION INTRAMUSCULAR
Status: DISCONTINUED | OUTPATIENT
Start: 2020-01-18 | End: 2020-01-18 | Stop reason: HOSPADM

## 2020-01-18 RX ORDER — THIAMINE MONONITRATE (VIT B1) 100 MG
100 TABLET ORAL DAILY
Status: COMPLETED | OUTPATIENT
Start: 2020-01-19 | End: 2020-01-22

## 2020-01-18 RX ORDER — ONDANSETRON 2 MG/ML
INJECTION INTRAMUSCULAR; INTRAVENOUS PRN
Status: DISCONTINUED | OUTPATIENT
Start: 2020-01-18 | End: 2020-01-18 | Stop reason: SURG

## 2020-01-18 RX ORDER — KETOROLAC TROMETHAMINE 30 MG/ML
INJECTION, SOLUTION INTRAMUSCULAR; INTRAVENOUS PRN
Status: DISCONTINUED | OUTPATIENT
Start: 2020-01-18 | End: 2020-01-18 | Stop reason: SURG

## 2020-01-18 RX ORDER — ONDANSETRON 2 MG/ML
4 INJECTION INTRAMUSCULAR; INTRAVENOUS
Status: DISCONTINUED | OUTPATIENT
Start: 2020-01-18 | End: 2020-01-18 | Stop reason: HOSPADM

## 2020-01-18 RX ORDER — OXYCODONE HCL 5 MG/5 ML
5 SOLUTION, ORAL ORAL
Status: DISCONTINUED | OUTPATIENT
Start: 2020-01-18 | End: 2020-01-18 | Stop reason: HOSPADM

## 2020-01-18 RX ORDER — OXYCODONE HCL 5 MG/5 ML
10 SOLUTION, ORAL ORAL
Status: DISCONTINUED | OUTPATIENT
Start: 2020-01-18 | End: 2020-01-18 | Stop reason: HOSPADM

## 2020-01-18 RX ORDER — LIDOCAINE HYDROCHLORIDE 20 MG/ML
INJECTION, SOLUTION EPIDURAL; INFILTRATION; INTRACAUDAL; PERINEURAL PRN
Status: DISCONTINUED | OUTPATIENT
Start: 2020-01-18 | End: 2020-01-18 | Stop reason: SURG

## 2020-01-18 RX ORDER — CEFAZOLIN SODIUM 1 G/3ML
INJECTION, POWDER, FOR SOLUTION INTRAMUSCULAR; INTRAVENOUS PRN
Status: DISCONTINUED | OUTPATIENT
Start: 2020-01-18 | End: 2020-01-18 | Stop reason: SURG

## 2020-01-18 RX ORDER — HYDROMORPHONE HYDROCHLORIDE 1 MG/ML
0.4 INJECTION, SOLUTION INTRAMUSCULAR; INTRAVENOUS; SUBCUTANEOUS
Status: DISCONTINUED | OUTPATIENT
Start: 2020-01-18 | End: 2020-01-18 | Stop reason: HOSPADM

## 2020-01-18 RX ORDER — MEPERIDINE HYDROCHLORIDE 25 MG/ML
6.25 INJECTION INTRAMUSCULAR; INTRAVENOUS; SUBCUTANEOUS
Status: DISCONTINUED | OUTPATIENT
Start: 2020-01-18 | End: 2020-01-18 | Stop reason: HOSPADM

## 2020-01-18 RX ADMIN — HYDROMORPHONE HYDROCHLORIDE 1 MG: 1 INJECTION, SOLUTION INTRAMUSCULAR; INTRAVENOUS; SUBCUTANEOUS at 20:51

## 2020-01-18 RX ADMIN — OXYCODONE HYDROCHLORIDE 10 MG: 10 TABLET ORAL at 23:33

## 2020-01-18 RX ADMIN — SODIUM CHLORIDE, POTASSIUM CHLORIDE, SODIUM LACTATE AND CALCIUM CHLORIDE: 600; 310; 30; 20 INJECTION, SOLUTION INTRAVENOUS at 20:52

## 2020-01-18 RX ADMIN — SENNOSIDES AND DOCUSATE SODIUM 2 TABLET: 8.6; 5 TABLET ORAL at 04:46

## 2020-01-18 RX ADMIN — THIAMINE HYDROCHLORIDE: 100 INJECTION, SOLUTION INTRAMUSCULAR; INTRAVENOUS at 11:00

## 2020-01-18 RX ADMIN — LIDOCAINE HYDROCHLORIDE 100 MG: 20 INJECTION, SOLUTION EPIDURAL; INFILTRATION; INTRACAUDAL at 14:46

## 2020-01-18 RX ADMIN — FENTANYL CITRATE 100 MCG: 50 INJECTION, SOLUTION INTRAMUSCULAR; INTRAVENOUS at 14:47

## 2020-01-18 RX ADMIN — HYDROMORPHONE HYDROCHLORIDE 1 MG: 1 INJECTION, SOLUTION INTRAMUSCULAR; INTRAVENOUS; SUBCUTANEOUS at 04:56

## 2020-01-18 RX ADMIN — SODIUM CHLORIDE, POTASSIUM CHLORIDE, SODIUM LACTATE AND CALCIUM CHLORIDE: 600; 310; 30; 20 INJECTION, SOLUTION INTRAVENOUS at 04:56

## 2020-01-18 RX ADMIN — LINEZOLID 600 MG: 600 TABLET, FILM COATED ORAL at 04:46

## 2020-01-18 RX ADMIN — PREGABALIN 50 MG: 25 CAPSULE ORAL at 17:17

## 2020-01-18 RX ADMIN — CEFAZOLIN SODIUM 2 G: 2 INJECTION, SOLUTION INTRAVENOUS at 20:52

## 2020-01-18 RX ADMIN — CEFAZOLIN SODIUM 2 G: 2 INJECTION, SOLUTION INTRAVENOUS at 04:46

## 2020-01-18 RX ADMIN — HYDROMORPHONE HYDROCHLORIDE 1 MG: 1 INJECTION, SOLUTION INTRAMUSCULAR; INTRAVENOUS; SUBCUTANEOUS at 16:28

## 2020-01-18 RX ADMIN — MAGNESIUM SULFATE HEPTAHYDRATE 3 G: 500 INJECTION, SOLUTION INTRAMUSCULAR; INTRAVENOUS at 14:46

## 2020-01-18 RX ADMIN — CEFAZOLIN SODIUM 2 G: 2 INJECTION, SOLUTION INTRAVENOUS at 12:57

## 2020-01-18 RX ADMIN — HYDROMORPHONE HYDROCHLORIDE 0.4 MG: 1 INJECTION, SOLUTION INTRAMUSCULAR; INTRAVENOUS; SUBCUTANEOUS at 15:51

## 2020-01-18 RX ADMIN — HYDROMORPHONE HYDROCHLORIDE 1 MG: 1 INJECTION, SOLUTION INTRAMUSCULAR; INTRAVENOUS; SUBCUTANEOUS at 11:03

## 2020-01-18 RX ADMIN — KETOROLAC TROMETHAMINE 30 MG: 30 INJECTION, SOLUTION INTRAMUSCULAR at 14:46

## 2020-01-18 RX ADMIN — SODIUM CHLORIDE, POTASSIUM CHLORIDE, SODIUM LACTATE AND CALCIUM CHLORIDE: 600; 310; 30; 20 INJECTION, SOLUTION INTRAVENOUS at 14:37

## 2020-01-18 RX ADMIN — SENNOSIDES AND DOCUSATE SODIUM 2 TABLET: 8.6; 5 TABLET ORAL at 17:17

## 2020-01-18 RX ADMIN — FENTANYL CITRATE 50 MCG: 0.05 INJECTION, SOLUTION INTRAMUSCULAR; INTRAVENOUS at 15:48

## 2020-01-18 RX ADMIN — OXYCODONE HYDROCHLORIDE 10 MG: 10 TABLET ORAL at 12:56

## 2020-01-18 RX ADMIN — PREGABALIN 50 MG: 25 CAPSULE ORAL at 04:46

## 2020-01-18 RX ADMIN — METOCLOPRAMIDE 10 MG: 5 INJECTION, SOLUTION INTRAMUSCULAR; INTRAVENOUS at 14:46

## 2020-01-18 RX ADMIN — CEFAZOLIN 2 G: 330 INJECTION, POWDER, FOR SOLUTION INTRAMUSCULAR; INTRAVENOUS at 14:36

## 2020-01-18 RX ADMIN — LINEZOLID 600 MG: 600 TABLET, FILM COATED ORAL at 17:17

## 2020-01-18 RX ADMIN — ONDANSETRON 4 MG: 2 INJECTION INTRAMUSCULAR; INTRAVENOUS at 14:46

## 2020-01-18 RX ADMIN — MORPHINE SULFATE 2 MG: 4 INJECTION INTRAVENOUS at 01:35

## 2020-01-18 RX ADMIN — SODIUM CHLORIDE, POTASSIUM CHLORIDE, SODIUM LACTATE AND CALCIUM CHLORIDE: 600; 310; 30; 20 INJECTION, SOLUTION INTRAVENOUS at 11:03

## 2020-01-18 RX ADMIN — PROPOFOL 150 MG: 10 INJECTION, EMULSION INTRAVENOUS at 14:46

## 2020-01-18 RX ADMIN — ACETAMINOPHEN 650 MG: 325 TABLET, FILM COATED ORAL at 07:20

## 2020-01-18 ASSESSMENT — ENCOUNTER SYMPTOMS
VOMITING: 0
COUGH: 0
SHORTNESS OF BREATH: 0
SPEECH CHANGE: 0
DIAPHORESIS: 0
SPUTUM PRODUCTION: 0
FOCAL WEAKNESS: 0
PALPITATIONS: 0
INSOMNIA: 1
SORE THROAT: 0
NAUSEA: 0
ORTHOPNEA: 0
SENSORY CHANGE: 0
CHILLS: 0
FEVER: 0
FLANK PAIN: 0
DOUBLE VISION: 0
BLURRED VISION: 0
HEADACHES: 0
BRUISES/BLEEDS EASILY: 0
PND: 0
MYALGIAS: 1
ABDOMINAL PAIN: 0
DIARRHEA: 0
NERVOUS/ANXIOUS: 0

## 2020-01-18 NOTE — PROGRESS NOTES
59yoM with right thigh extensive necrotizing soft tissue infection and abscess s/p I&D on 1/16.    S: NPO awaiting surgery, says thigh feels a lot better now.    O:  Vitals:    01/18/20 1407   BP: 102/61   Pulse: 75   Resp: 16   Temp: 36.1 °C (97 °F)   SpO2: 98%     Exam:  General-NAD, alert and following commands  RLE-thigh VAC c/d/i, NVI distally, moderate amount of serosanguinous drainage in vac cannister.    A: 59yoM with right thigh extensive necrotizing soft tissue infection and abscess s/p I&D on 1/16.    Recs:  --planning repeat I&D and wound vac change in OR today, he is in agreement to proceed.

## 2020-01-18 NOTE — CARE PLAN
Problem: Pain Management  Goal: Pain level will decrease to patient's comfort goal  Outcome: PROGRESSING AS EXPECTED  Note:   PRN dilaudid and oxycodone. PO Lyrica added today. Q2hr pain assessments     Problem: Infection  Goal: Will remain free from infection  Outcome: PROGRESSING SLOWER THAN EXPECTED  Note:   + blood cultures from 1/16. IV Ancef and PO Zyvox. ID consulted. Wound vac in place to right leg

## 2020-01-18 NOTE — ANESTHESIA PROCEDURE NOTES
Airway  Date/Time: 1/18/2020 2:47 PM  Performed by: David Rome M.D.  Authorized by: David Rome M.D.     Location:  OR  Urgency:  Elective  Indications for Airway Management:  Anesthesia  Spontaneous Ventilation: absent    Sedation Level:  Deep  Preoxygenated: Yes    Final Airway Type:  Supraglottic airway  Final Supraglottic Airway:  Standard LMA  Number of Attempts at Approach:  1

## 2020-01-18 NOTE — ANESTHESIA TIME REPORT
Anesthesia Start and Stop Event Times     Date Time Event    1/18/2020 1430 Ready for Procedure     1436 Anesthesia Start     1522 Anesthesia Stop        Responsible Staff  01/18/20    Name Role Begin End    David Rome M.D. Anesth 1436 1522        Preop Diagnosis (Free Text):  Pre-op Diagnosis     RIGHT THIGH OPEN WOUND        Preop Diagnosis (Codes):    Post op Diagnosis  Left leg cellulitis      Premium Reason  E. Weekend    Comments: premium emergency

## 2020-01-18 NOTE — ASSESSMENT & PLAN NOTE
One of two + BC 1/16  Repeat BC negative so far  TTE neg  If repeat BC + then BERNABE  ID eval ongoing

## 2020-01-18 NOTE — ASSESSMENT & PLAN NOTE
S/p I&D 1/16  Streptococcus viridans growing from the wounds as well as MSSA  40 cm long thigh abcess drained  NF seen I wound  VAC  Back to OR as per Surgery 1/18 for follow-up I&D

## 2020-01-18 NOTE — ANESTHESIA POSTPROCEDURE EVALUATION
Patient: Gonsalo Hunt    Procedure Summary     Date:  01/18/20 Room / Location:  [place] [address] / SURGERY [facility]    Anesthesia Start:  1436 Anesthesia Stop:      Procedure:  IRRIGATION AND DEBRIDEMENT, WOUND- THIGH AND WOUND VAC CHANGE (Right Leg Upper) Diagnosis:  (RIGHT THIGH OPEN WOUND)    Surgeon:  Amari Blanc M.D. Responsible Provider:  David Rome M.D.    Anesthesia Type:  general ASA Status:  4 - Emergent          Final Anesthesia Type: general  Last vitals  BP   Blood Pressure: 102/61    Temp   36.1 °C (97 °F)    Pulse   Pulse: 75   Resp   16    SpO2   98 %      Anesthesia Post Evaluation    Patient location during evaluation: PACU  Patient participation: complete - patient participated  Level of consciousness: awake and alert    Airway patency: patent  Anesthetic complications: no  Cardiovascular status: hemodynamically stable  Respiratory status: acceptable  Hydration status: euvolemic    PONV: none           Nurse Pain Score: 6 (NPRS)

## 2020-01-18 NOTE — OR SURGEON
Immediate Post OP Note    PreOp Diagnosis: Right thigh infected wound    PostOp Diagnosis: same    Procedure(s):  IRRIGATION AND DEBRIDEMENT, WOUND- THIGH AND WOUND VAC CHANGE - Wound Class: Dirty or Infected    Surgeon(s):  Amari Blanc M.D.    Anesthesiologist/Type of Anesthesia:  Anesthesiologist: David Rome M.D./General    Surgical Staff:  Circulator: Lukas D. Gansert, R.N.  Scrub Person: Luis Hodges    Specimens removed if any:  * No specimens in log *    Estimated Blood Loss: minimal    Findings: see dictation    Complications: none known    PLAN:  --readmit medicine postop  --continue wound vac therapy  --continue abx per ID recs  --will need to return to OR in 2-3 days for repeat I&D and VAC change        1/18/2020 3:17 PM Amari Blanc M.D.

## 2020-01-18 NOTE — ANESTHESIA PREPROCEDURE EVALUATION
Relevant Problems   CARDIAC   (+) Atrial fibrillation (HCC)         (+) Chronic hepatitis C virus infection (HCC)       Physical Exam    Airway   Mallampati: II  TM distance: >3 FB  Neck ROM: full       Cardiovascular - normal exam  Rhythm: regular  Rate: normal  (-) murmur     Dental - normal exam         Pulmonary - normal exam  Breath sounds clear to auscultation     Abdominal    Neurological - normal exam                 Anesthesia Plan    ASA 4- EMERGENT       Plan - general             Induction: intravenous    Postoperative Plan: Postoperative administration of opioids is intended.    Pertinent diagnostic labs and testing reviewed    Informed Consent:    Anesthetic plan and risks discussed with patient.    Use of blood products discussed with: patient whom consented to blood products.

## 2020-01-18 NOTE — ASSESSMENT & PLAN NOTE
This is Sepsis Present on admission  SIRS criteria identified on my evaluation include: Tachycardia, with heart rate greater than 90 BPM, Tachypnea, with respirations greater than 20 per minute and Leukocyosis, with WBC greater than 12,000  Source is thigh  Sepsis protocol initiated  Fluid resuscitation ordered per protocol  IV antibiotics as appropriate for source of sepsis  While organ dysfunction may be noted elsewhere in this problem list or in the chart, degree of organ dysfunction does not meet CMS criteria for severe sepsis  IV Rick-Synephrine since she is in A. fib  Second pressor to add would be vasopressin  Repeat fluid boluses needed  CVP monitoring her bedside ultrasound for volume assessment as needed

## 2020-01-18 NOTE — PROGRESS NOTES
[facility] Progress Note      Admit Date: 1/16/2020    Resident(s): Earl Morales M.D.   Attending:  TATYANA STRAUSS/ Dr. Gould    Patient ID:    Name:  Gonsalo Hunt     YOB: 1960  Age:  59 y.o.  male   MRN:  [id number]    Hospital Course (carried forward and updated):  Gonsalo Hunt is a 59 y.o. homeless male with a past medical history significant for intravenous drug use and tobacco use who presented 1/16/2020 with right lower extremity pain and edema and was found to have a necrotizing streptococcal infection.  He was started on intravenous fluids and appropriate broad-spectrum antibiotics upon arrival and underwent debridement and drainage per orthopedic surgery on the evening of admission, however subsequently on 1/17/2020 required initiation of vasopressors to maintain his blood pressure.  Additionally he had blood cultures positive for MSSA.  His antibiotics were narrowed on 1/17/2020 to cefazolin and linezolid to cover his toxic streptococcal infection as well as his MSSA bacteremia.  Infectious disease was consulted for further recommendations.  During his hospitalization he was found to be in atrial fibrillation which has not been previously documented for him.     Consultants:  Critical Care  Infectious disease  Orthopedic surgery     Interval Events:  No acute events overnight, now off pressors. He is pending surgery today for further debridement. Pain control improving, afebrile overnight, drainage of about 800mL. Cultures positive for viridans strep and MSSA; infectious disease now onboard. Repeat cultures obtained today and pending.    Review of Systems   Constitutional: Negative for chills and fever.   Respiratory: Negative for cough and shortness of breath.    Cardiovascular: Negative for chest pain and leg swelling.   Gastrointestinal: Negative for nausea and vomiting.       PHYSICAL EXAM:  Vitals:    01/18/20 0400 01/18/20 0456 01/18/20 0500 01/18/20 0600   BP: 107/58  108/55 106/55  "  Pulse: 87 89 88 81   Resp: 17 18 16 19   Temp: 36.2 °C (97.2 °F)      TempSrc: Temporal      SpO2: 99% 99% 97% 99%   Weight: 102.5 kg (225 lb 15.5 oz)      Height:        Body mass index is 27.51 kg/m².  Latest Vitals:  /55   Pulse 81   Temp 36.2 °C (97.2 °F) (Temporal)   Resp 19   Ht 1.93 m (6' 4\")   Wt 102.5 kg (225 lb 15.5 oz)   SpO2 99%   BMI 27.51 kg/m²   O2 therapy: Pulse Oximetry: 99 %, O2 (LPM): 0, O2 Delivery: None (Room Air)  Vitals Range last 24h:  Temp:  [36.2 °C (97.2 °F)-36.9 °C (98.5 °F)] 36.2 °C (97.2 °F)  Pulse:  [65-98] 81  Resp:  [13-30] 19  BP: ()/(55-82) 106/55  SpO2:  [90 %-100 %] 99 %       Intake/Output Summary (Last 24 hours) at 1/18/2020 0753  Last data filed at 1/18/2020 0600  Gross per 24 hour   Intake 4437.17 ml   Output 1950 ml   Net 2487.17 ml        Physical Exam   Constitutional: He is oriented to person, place, and time.   Unkempt, appears older than stated age   HENT:   Head: Normocephalic and atraumatic.   Poor dentition, moist mucous membranes   Cardiovascular:   Murmur (very faint systolic murmur heard best at LLSB) heard.  Regular rate and rhythm, normal rate   Pulmonary/Chest: Effort normal. He has no wheezes (scattered, end-expiratory). He has no rales.   Abdominal: Soft. Bowel sounds are normal. He exhibits no distension. There is no tenderness.   Musculoskeletal:         General: No edema.      Comments: Wound vac in place on the right lateral thigh without significant surrounding erythema. 800mL of dark red drainage as of 8am.     Neurovascularly intact   Neurological: He is alert and oriented to person, place, and time.           Recent Labs     01/16/20  0645 01/17/20  0445 01/18/20  0450   SODIUM 127* 128* 130*   POTASSIUM 4.9 4.1 4.2   CHLORIDE 94* 96 97   CO2 24 28 31   BUN 21 15 9   CREATININE 0.75 0.54 0.65   MAGNESIUM 1.9  --   --    CALCIUM 8.8 7.3* 7.4*     Recent Labs     01/16/20  0645 01/17/20  0445 01/18/20  0450   ALTSGPT 42 20 12 "   ASTSGOT 31 14 11*   ALKPHOSPHAT 102* 58 60   TBILIRUBIN 0.7 0.3 0.2   GLUCOSE 114* 128* 107*     Recent Labs     01/16/20  0645 01/17/20 0445 01/18/20  0450   RBC 5.33 3.56* 3.27*   HEMOGLOBIN 15.1 10.5* 9.5*   HEMATOCRIT 45.4 31.2* 28.5*   PLATELETCT 598* 567* 363   PROTHROMBTM 15.1*  15.6*  --   --    APTT 26.3  --   --    INR 1.16*  1.21*  --   --      Recent Labs     01/16/20  0645 01/17/20 0445 01/18/20  0450   WBC 14.3* 13.1* 7.6   NEUTSPOLYS 80.50* 78.60* 75.30*   LYMPHOCYTES 7.10* 8.70* 12.30*   MONOCYTES 2.60 9.70 8.30   EOSINOPHILS 1.80 1.00 1.20   BASOPHILS 0.00 0.30 0.40   ASTSGOT 31 14 11*   ALTSGPT 42 20 12   ALKPHOSPHAT 102* 58 60   TBILIRUBIN 0.7 0.3 0.2       Meds:  • NORepinephrine (LEVOPHED) infusion  0-30 mcg/min Stopped (01/17/20 0345)   • Phenylephrine infusion  0-300 mcg/min Stopped (01/18/20 0318)   • ceFAZolin  2 g Stopped (01/18/20 0516)   • oxyCODONE immediate-release  10 mg     • HYDROmorphone  1 mg     • pregabalin  50 mg     • linezolid  600 mg     • senna-docusate  2 Tab      And   • polyethylene glycol/lytes  1 Packet      And   • magnesium hydroxide  30 mL      And   • bisacodyl  10 mg     • LR   150 mL/hr at 01/18/20 0456   • acetaminophen  650 mg         Imaging:  DX-CHEST-LIMITED (1 VIEW)   Final Result         1.  No acute cardiopulmonary disease.      EC-ECHOCARDIOGRAM COMPLETE W/O CONT   Final Result      CT-EXTREMITY, LOWER WITH RIGHT   Final Result         1.  Large abscess in the right lateral thigh, may be within or adjacent to the muscular fascia. Adjacent fluid collection tracking adjacent to this abscess is seen which may be additional early forming abscess.   2.  Foci of air within these fluid collections is seen, appearance suggests gas producing organisms, consider component of necrotizing fasciitis as clinically appropriate.      These findings were discussed with the patient's clinician, SHERI BAINS, on 1/16/2020 8:00 AM.      DX-CHEST-PORTABLE (1 VIEW)    Final Result         1.  No acute cardiopulmonary disease.   2.  Atherosclerosis          Problem and Plan:    * Septic shock due to Gram positive bacteria (HCC)  Assessment & Plan  This is Sepsis Present on admission  SIRS criteria identified on my evaluation include: Tachycardia, with heart rate greater than 90 BPM and Leukocyosis, with WBC greater than 12,000  Source is right lower extremity, bacteremia  Sepsis protocol initiated  Fluid resuscitation ordered per protocol  IV antibiotics ordered    Maintenance fluid as above  Phenylephrine GTT  Cultures pending  Antibiotics to cover necrotizing process, MSSA  See necrotizing fasciitis problem          MSSA bacteremia  Assessment & Plan  Assessment: Initial blood cultures positive for MSSA bacteremia, this is not a common cause of necrotizing fasciitis and I suspect although seemingly unlikely, there is an entirely different source considering his wound gram stains have shown only streptococcus. Given his history of intravenous drug use it is conceivable that his bacteremia is due to an endocarditis.  Notably his transthoracic echocardiogram was negative for any vegetations although he did have mild mitral regurgitation of unknown chronicity, which is faintly audible as a systolic murmur on exam.  Unfortunately this is somewhat concerning for endocarditis, although transthoracic echocardiogram is not very sensitive and it is probably too early to evaluate with transesophageal echocardiogram to get the best yield.  Either way, he is currently covered well with cefazolin.    Notably currently only meets 2 minor [facility] criteria.    Plan:  Cefazolin as above  Follow repeat blood cultures, once negative and not requiring pressors can remove central line and place PICC  Consider BERNABE at 5-7 days from first positive blood culture    Necrotizing fasciitis of pelvic region and thigh (HCC)- (present on admission)  Assessment & Plan  Assessment: Patient presents with large  abscess (40cm craniocaudially, 4x10cm in other dimensions) in his right lateral thigh and a somewhat subacute history of increasing pain, fever, and chills after a ground level fall where he injured the area. He had elevated WBC and had been tachycardic with atrial fibrillation with RVR. Initially started on Meropenem and Linezolid on admission, went to the OR evening of 1/16/2019 with extensive debridement confirming necrotizing infection. Cultures were obtained and a wound vac was placed.     Subsequently gram stain showed gram positive cocci consistent with streptococcus, which is consistent as this is a common cause of necrotizing fasciitis. His coverage was then narrowed to Cefazolin and Linezolid(for toxin production.) Technically he does not meet criteria for toxic shock(although he is requiring pressors) as he does not have end-organ manifestations, however this is likely a toxin-producing organism with hemodynamic consequences. It is questionable if his staphylococcal bacteremia is related to this infection.    Cultures positive for viridans strep in wound as well as MSSA.    Plan:  Maintenance fluid while NPO, surgery today - hold DVT prophylaxis with Lovenox for 24 hours  Phenylephrine gtt, currently not needed  Follow wound culture sensitivities  Appreciate orthopedic intervention  Continue Cefazolin and Linezolid(to be continued until off pressors and clinically improving)  Pain control with Oxycodone 10mg Q4H, breakthrough with hydromorphone IV    Atrial fibrillation (HCC)  Assessment & Plan  Assessment: New atrial fibrillation with RVR in the setting of sepsis, no previously known history of atrial fibrillation. Likely reactive to his acute process; technically has [id number] of 0(no diagnosed comorbidities although he has poor outpatient follow-up) and would not require anticoagulation.     Rate significantly improved, actually converted to sinus spontaneously 1/17    Plan:  Treat underlying process  as above  Will address rate control if he becomes hypotensive or if rate persistently >140    Hyponatremia- (present on admission)  Assessment & Plan  Moderate hyponatremia in the setting of volume depletion, gradually improving with fluids. Urine electrolytes consistent with SIADH, once taking PO will not give further IV fluids. Clinically correcting hyponatremia relatively less vital than maintaining BPs currently.    Chronic hepatitis C virus infection (HCC)- (present on admission)  Assessment & Plan  Assessment: Patient was antibody positive with viral load 6.2 million. Transaminases normal, ALP slightly high.    Plan:  Will need outpatient follow-up, abstention from further drug use    Methamphetamine use (HCC)- (present on admission)  Assessment & Plan  Counselled on cessation      DISPO: ICU    CODE STATUS: Full    Quality Measures:  Feeding: NPO for surgery  Analgesia: Oxy/dilaudid  Sedation: None  Thromboprophylaxis: SCD  Head of bed: >30 degrees  Ulcer prophylaxis: None  Glycemic control: PRN  Bowel care: bowel regimen  Indwelling lines: Central, PIV, wound vac  Deescalation of antibiotics: None warranted      Earl Morales M.D.

## 2020-01-18 NOTE — ASSESSMENT & PLAN NOTE
Secondary probably indolent right thigh abscess, improved  Functionally an acute phase reactant  Monitor

## 2020-01-18 NOTE — CARE PLAN
Problem: Pain Management  Goal: Pain level will decrease to patient's comfort goal  Outcome: PROGRESSING SLOWER THAN EXPECTED  Note:   Pt on oral and IV pain medication. Multimodal medication started to help pain control     Problem: Fluid Volume:  Goal: Will maintain balanced intake and output  Note:   Pt on continuous IV fluids as ordered. Urine output adequate

## 2020-01-18 NOTE — ANESTHESIA QCDR
2019 [facility] Clinical Data Registry (for Quality Improvement)     Postoperative nausea/vomiting risk protocol (Adult = 18 yrs and Pediatric 3-17 yrs)- (430 and 463)  General inhalation anesthetic (NOT TIVA) with PONV risk factors: Yes  Provision of anti-emetic therapy with at least 2 different classes of agents: Yes   Patient DID NOT receive anti-emetic therapy and reason is documented in Medical Record:  N/A    Multimodal Pain Management- (477)  Non-emergent surgery AND patient age >= 18: Yes  Use of Multimodal Pain Management, two or more drugs and/or interventions, NOT including systemic opioids: Yes  Exception: Documented allergy to multiple classes of analgesics: N/A    Smoking Abstinence (404)  Patient is current smoker (cigarette, pipe, e-cig, marijuanna): Yes  Elective Surgery: No  Abstinence instructions provided prior to day of surgery:   Patient abstained from smoking on day of surgery:     Pre-Op Beta-Blocker in Isolated CABG (44)  Isolated CABG AND patient age >= 18: Yes  Beta-blocker admin within 24 hours of surgical incision: Yes  Exception:of medical reason(s) for not administering beta blocker within 24 hours prior to surgical incision (e.g., not  indicated,other medical reason):     PACU assessment of acute postoperative pain prior to Anesthesia Care End- Applies to Patients Age = 18- (ABG7)  Initial PACU pain score is which of the following: < 7/10  Patient unable to report pain score: N/A    Post-anesthetic transfer of care checklist/protocol to PACU/ICU- (426 and 427)  Upon conclusion of case, patient transferred to which of the following locations: PACU/Non-ICU  Use of transfer checklist/protocol: Yes  Exclusion: Service Performed in Patient Hospital Room (and thus did not require transfer): N/A  Unplanned admission to ICU related to anesthesia service up through end of PACU care- (MD51)  Unplanned admission to ICU (not initially anticipated at anesthesia start time): No

## 2020-01-18 NOTE — CARE PLAN
Problem: Knowledge Deficit  Goal: Knowledge of disease process/condition, treatment plan, diagnostic tests, and medications will improve  Outcome: PROGRESSING AS EXPECTED  Note:   POC discussed with patient, all questions answered, medication education completed, pt educated on disease process.      Problem: Pain Management  Goal: Pain level will decrease to patient's comfort goal  Note:   Pt educated on 0/10 pain scale and to notify RN for pain

## 2020-01-18 NOTE — OP REPORT
DATE OF SERVICE:  01/18/2020    PREOPERATIVE DIAGNOSIS:  Right thigh infected open wound, status post wound   VAC application.    POSTOPERATIVE DIAGNOSIS:  Right thigh infected open wound, status post wound   VAC application.    PROCEDURES PERFORMED:  1.  Excisional debridement of right thigh wound including skin, subcutaneous   tissue, and muscle through wound measuring 30x10 cm in maximal dimensions.  2.  Application of wound VAC greater than 50 square cm.    SURGEON:  Amari Blanc MD    ANESTHESIOLOGIST:  David Rome MD    ANESTHESIA:  General.    ESTIMATED BLOOD LOSS:  Minimal.    DRAINS:  Wound VAC at 125 mmHg low continuous pressure was applied to the   right thigh wound.    INDICATION FOR PROCEDURE:  The patient is a 59-year-old male.  On 01/16/2020,   I took him to the operating room for incision and drainage of right   intramuscular thigh abscess as well as extensive excisional debridement of   right thigh wound including skin, subcutaneous tissue, and muscle resulting in   a wound measuring 30x10 cm.  I applied wound VAC to this area.  He has had   polymicrobial cultures and this was consistent with necrotizing soft tissue   infection.  The plan was to return to the operating room today for another   debridement and wound VAC change.  He signed informed consent preoperatively   and wished to proceed with surgery as outlined above.    DESCRIPTION OF PROCEDURE:  The patient was met in the preop holding area and   his surgical site was signed.  His consent was confirmed for accuracy.  He was   taken back to the operating room.  General anesthesia was induced.  His wound   VAC was removed from the right thigh.  Right lower extremity was then prepped   and draped in the usual sterile fashion.  Formal timeout was performed to   confirm patient's correct name, correct surgical site, correct procedure and   correct laterality.  There was a small amount of purulent fluid tracking from   the proximal  aspect of the wound and posteriorly as well.  At the proximal   aspect, evaluated this area and bluntly dissected into a plane adjacent to the   greater trochanter of the femur underneath the iliotibial band.  There was a   small amount of purulence more anteriorly as well under the subcutaneous   tissue proximally and between the residual iliotibial band and the vastus   lateralis muscle.  I thoroughly irrigated out the entire wound with Pulsavac   using normal saline and excised some devitalized skin, fascial tissue and   subcutaneous tissue with a rongeur and scalpel.  The remaining muscle of the   quadriceps was contractile and viable.  After thorough irrigation of the   wound, I then placed a wound VAC and had it tracked up into the areas where   the purulence had originated to prevent adherence of this wound and hopefully   they continue to allow to drain.  I then placed a wound VAC over the residual   portion of the wound and adhered to the skin edges with staples and applied a   good seal at 125 mmHg low continuous pressure with Ioban over the VAC sponge.    He was then awoken from anesthesia and transferred on the [place] and taken to   postanesthesia care unit in stable condition.    PLAN:  1.  The patient will be readmitted to the medical service postop.  2.  He should be continued on IV antibiotic therapy as directed by the   infectious disease service.  3.  I will continue wound VAC therapy for now.  4.  He will need to return to the operating room in 2-3 days for a repeat   wound debridement and wound VAC change.       ____________________________________     MD SUKUMAR Cordero / SUZI    DD:  01/18/2020 15:22:58  DT:  01/18/2020 15:34:28    D#:  [id number]  Job#:  [id number]

## 2020-01-18 NOTE — ASSESSMENT & PLAN NOTE
SW consult, may need SNU for weeks for complete ABX course - transfer to  when ready?  Vitamin supplements  Nutritional supplements  Update vaccines prior to discharge  Does he need a tetanus vaccine?

## 2020-01-18 NOTE — PROGRESS NOTES
Critical Care Progress Note    Date of admission  1/16/2020    Chief Complaint  59 y.o. male admitted 1/16/2020 with RLE pain secondary to thigh abcess -> sepsis    Hospital Course          Interval Problem Update  Reviewed last 24 hour events:    Leg pain better  Remains on pressors for sepsis  Titrating KERLINE drip down  AF 70-80s  SBp 90-110s, off kerline-this a.m. when I examined him but blood pressure soft and down to 90/59  Threshold continue to titrate since patient going to the OR today   IV, will continue since he is come back to the OR today  UO adequate  N.p.o. for the OR later today  I/Os reviewed  Follows well  A&O x4  Tm 98.5  WBC 7.6  Ancef/Zyvox  ID eval  Cultures reviewed - Viridans Strep/MSSA in wound MSSA blood   since placed  REpeat BC neg from admit  Hgb 28.5  Na 130  Room air  IS pending  Lyrica may be helping him with the pain  Patient tells me he takes amphetamines because that helps him with pain?    To OR for wound washout/debriedment -> VAC again  Supplements PO  Vitamins  Rally bag okay today and start p.o. tomorrow  Hold Lovenox for OR  Continue current antibiotics  Keep Kerline-Synephrine on the MAR and monitor postop for recurrent hypotension, that is what happened yesterday    Serial follow-up  Hemodynamically unchanged still with soft blood pressure, 89/54 but asymptomatic  If has another low blood pressure we will restart his needle, low threshold to rebolus him going into the OR when they will vasodilate him to put him to sleep and reoperate on him  Not spiking a fever at this time  No new micro     YESTERDAY  S/p I&D abcess late yesterday  Strep on gram stain  Started on pressors and CVC place early this AM  BC + for MSSA by PCR - only reported out so far  Strep in wound    A&O x4  AF chronic? - 90s  SBp 60-90s  KERLINE 100  UO ok    VAC R thigh  Afebrile  WBC 13.1  Room air  LA 1.3  Merrem/Zyvox  ECHO noted    SQ heparin  ID eval  Repeat BC x2  Oral pain meds  Antibiotics  adjusted to Ancef and Zyvox    Serial follow-up  Blood pressure improved since initiating Kerline-Synephrine infusion  Actively titrating KERLINE drip between 50 and 100 mics  Urine output acceptable  No new fevers  Wound draining nicely with new VAC  Initial thigh culture growing very dense streptococcus    Review of Systems  Review of Systems   Constitutional: Positive for malaise/fatigue (Improved). Negative for chills, diaphoresis and fever.   HENT: Negative for congestion, nosebleeds and sore throat.    Eyes: Negative for blurred vision and double vision.   Respiratory: Negative for cough, sputum production and shortness of breath.    Cardiovascular: Positive for leg swelling (Improved). Negative for chest pain, palpitations, orthopnea and PND.   Gastrointestinal: Negative for abdominal pain, diarrhea, nausea and vomiting.   Genitourinary: Negative for dysuria, flank pain, hematuria and urgency.   Musculoskeletal: Positive for myalgias (RLE, improved). Negative for joint pain.   Neurological: Negative for sensory change, speech change, focal weakness and headaches.   Endo/Heme/Allergies: Does not bruise/bleed easily.   Psychiatric/Behavioral: The patient has insomnia (Chronic). The patient is not nervous/anxious.         Vital Signs for last 24 hours   Temp:  [36.1 °C (97 °F)-36.9 °C (98.5 °F)] 36.1 °C (97 °F)  Pulse:  [72-93] 75  Resp:  [14-30] 16  BP: ()/(53-74) 102/61  SpO2:  [91 %-100 %] 98 %    Hemodynamic parameters for last 24 hours       Respiratory Information for the last 24 hours       Physical Exam   Physical Exam  Vitals signs reviewed.   Constitutional:       Appearance: He is ill-appearing (No change). He is not toxic-appearing or diaphoretic.   HENT:      Head: Normocephalic and atraumatic.      Mouth/Throat:      Mouth: Mucous membranes are moist.   Eyes:      General: No scleral icterus.     Extraocular Movements: Extraocular movements intact.   Neck:      Musculoskeletal: Neck supple. No neck  rigidity.   Cardiovascular:      Rate and Rhythm: Normal rate. Rhythm irregularly irregular.      Pulses: Normal pulses.      Heart sounds: No murmur. No gallop.       Comments: AF  Pulmonary:      Effort: Pulmonary effort is normal. No respiratory distress.      Breath sounds: No wheezing, rhonchi or rales.   Abdominal:      General: Abdomen is flat. Bowel sounds are normal. There is no distension.      Palpations: Abdomen is soft. There is no fluid wave, hepatomegaly or splenomegaly.      Tenderness: There is no tenderness. There is no right CVA tenderness, left CVA tenderness or guarding. Negative signs include Huddleston's sign and McBurney's sign.   Musculoskeletal:      Right lower leg: No edema.      Left lower leg: No edema.      Comments: VAC in place covering most of the anterior lateral right thigh with fairly large output, redness/edema/induration around the margins of the wound are improved significantly   Lymphadenopathy:      Cervical: No cervical adenopathy.   Skin:     General: Skin is warm and dry.      Capillary Refill: Capillary refill takes less than 2 seconds.      Coloration: Skin is not cyanotic.      Nails: There is no clubbing.     Neurological:      General: No focal deficit present.      Mental Status: He is alert and oriented to person, place, and time. Mental status is at baseline.      Comments: Follows well   Psychiatric:         Attention and Perception: Attention normal.         Mood and Affect: Mood normal.         Speech: Speech normal.         Behavior: Behavior normal. Behavior is cooperative.         Medications  Current Facility-Administered Medications   Medication Dose Route Frequency Provider Last Rate Last Dose   • [MAR Hold] thiamine tablet 100 mg  100 mg Oral DAILY Piero Gould M.D.        And   • [MAR Hold] folic acid (FOLVITE) tablet 1 mg  1 mg Oral DAILY Piero Gould M.D.        And   • [MAR Hold] multivitamin (THERAGRAN) tablet 1 Tab  1 Tab Oral DAILY Piero  SHEMAR Gould M.D.       • norepinephrine (LEVOPHED) 8 mg in  mL Infusion  0-30 mcg/min Intravenous Continuous Donovan Thomas M.D.   Stopped at 01/17/20 0345   • phenylephrine (KERLINE-SYNEPHRINE) 40 mg in  mL Infusion  0-300 mcg/min Intravenous Continuous Dorina Dc M.D.   Stopped at 01/18/20 0318   • [MAR Hold] ceFAZolin in dextrose (ANCEF) IVPB premix 2 g  2 g Intravenous Q8HRS Earl Morales M.D.   Stopped at 01/18/20 1327   • [MAR Hold] oxyCODONE immediate release (ROXICODONE) tablet 10 mg  10 mg Oral Q4HRS PRN Earl Morales M.D.   10 mg at 01/18/20 1256   • [MAR Hold] HYDROmorphone pf (DILAUDID) injection 1 mg  1 mg Intravenous Q4HRS PRN Earl Morales M.D.   1 mg at 01/18/20 1103   • [MAR Hold] pregabalin (LYRICA) capsule 50 mg  50 mg Oral BID Piero Gould M.D.   50 mg at 01/18/20 0446   • [MAR Hold] linezolid (ZYVOX) tablet 600 mg  600 mg Oral Q12HRS Earl Morlaes M.D.   600 mg at 01/18/20 0446   • [MAR Hold] senna-docusate (PERICOLACE or SENOKOT S) 8.6-50 MG per tablet 2 Tab  2 Tab Oral BID Earl Morales M.D.   2 Tab at 01/18/20 0446    And   • [MAR Hold] polyethylene glycol/lytes (MIRALAX) PACKET 1 Packet  1 Packet Oral QDAY PRN aErl Morales M.D.        And   • [MAR Hold] magnesium hydroxide (MILK OF MAGNESIA) suspension 30 mL  30 mL Oral QDAY PRN Earl Morales M.D.        And   • [MAR Hold] bisacodyl (DULCOLAX) suppository 10 mg  10 mg Rectal QDAY PRN Earl Morales M.D.       • lactated ringers infusion   Intravenous Continuous Earl Morales M.D. 0 mL/hr at 01/18/20 1402     • [MAR Hold] acetaminophen (TYLENOL) tablet 650 mg  650 mg Oral Q6HRS PRN Earl Morales M.D.   650 mg at 01/18/20 0720     Facility-Administered Medications Ordered in Other Encounters   Medication Dose Route Frequency Provider Last Rate Last Dose   • propofol (DIPRIVAN) injection    PRN David Rome M.D.   150 mg at 01/18/20 1446   • lidocaine PF (XYLOCAINE-MPF) 2 % injection PF    PRN  David Rome M.D.   100 mg at 01/18/20 1446   • ketorolac (TORADOL) injection    PATRICIA Rome M.D.   30 mg at 01/18/20 1446   • magnesium sulfate 50 % injection    PATRICIA Rome M.D.   3 g at 01/18/20 1446   • metoclopramide (REGLAN) injection    PATRICIA Rome M.D.   10 mg at 01/18/20 1446   • ondansetron (ZOFRAN) syringe/vial injection    PATRICIA Rome M.D.   4 mg at 01/18/20 1446   • ceFAZolin (ANCEF) injection    PATRICIA Rome M.D.   2 g at 01/18/20 1436   • fentaNYL (SUBLIMAZE) injection    PATRICIA Rome M.D.   100 mcg at 01/18/20 1447       Fluids    Intake/Output Summary (Last 24 hours) at 1/18/2020 1504  Last data filed at 1/18/2020 1300  Gross per 24 hour   Intake 5867.17 ml   Output 3790 ml   Net 2077.17 ml       Laboratory      Recent Labs     01/16/20  0645   CPKTOTAL 40     Recent Labs     01/16/20  0645 01/17/20  0445 01/18/20  0450   SODIUM 127* 128* 130*   POTASSIUM 4.9 4.1 4.2   CHLORIDE 94* 96 97   CO2 24 28 31   BUN 21 15 9   CREATININE 0.75 0.54 0.65   MAGNESIUM 1.9  --   --    CALCIUM 8.8 7.3* 7.4*     Recent Labs     01/16/20  0645 01/17/20  0445 01/18/20  0450   ALTSGPT 42 20 12   ASTSGOT 31 14 11*   ALKPHOSPHAT 102* 58 60   TBILIRUBIN 0.7 0.3 0.2   GLUCOSE 114* 128* 107*     Recent Labs     01/16/20  0645 01/17/20  0445 01/18/20  0450   WBC 14.3* 13.1* 7.6   NEUTSPOLYS 80.50* 78.60* 75.30*   LYMPHOCYTES 7.10* 8.70* 12.30*   MONOCYTES 2.60 9.70 8.30   EOSINOPHILS 1.80 1.00 1.20   BASOPHILS 0.00 0.30 0.40   ASTSGOT 31 14 11*   ALTSGPT 42 20 12   ALKPHOSPHAT 102* 58 60   TBILIRUBIN 0.7 0.3 0.2     Recent Labs     01/16/20  0645 01/17/20  0445 01/18/20  0450   RBC 5.33 3.56* 3.27*   HEMOGLOBIN 15.1 10.5* 9.5*   HEMATOCRIT 45.4 31.2* 28.5*   PLATELETCT 598* 567* 363   PROTHROMBTM 15.1*  15.6*  --   --    APTT 26.3  --   --    INR 1.16*  1.21*  --   --        Imaging  X-Ray:  I have personally reviewed the images and compared with prior images.  EKG:   I have personally reviewed the images and compared with prior images.  CT:    Reviewed  Echo:   Reviewed    Assessment/Plan  * Septic shock due to Gram positive bacteria (HCC)  Assessment & Plan  This is Sepsis Present on admission  SIRS criteria identified on my evaluation include: Tachycardia, with heart rate greater than 90 BPM, Tachypnea, with respirations greater than 20 per minute and Leukocyosis, with WBC greater than 12,000  Source is thigh  Sepsis protocol initiated  Fluid resuscitation ordered per protocol  IV antibiotics as appropriate for source of sepsis  While organ dysfunction may be noted elsewhere in this problem list or in the chart, degree of organ dysfunction does not meet CMS criteria for severe sepsis  IV Rick-Synephrine since she is in A. fib  Second pressor to add would be vasopressin  Repeat fluid boluses needed  CVP monitoring her bedside ultrasound for volume assessment as needed        MSSA bacteremia  Assessment & Plan  One of two + BC 1/16  Repeat BC negative so far  TTE neg  If repeat BC + then BERNABE  ID eval ongoing    Necrotizing fasciitis of pelvic region and thigh (HCC)- (present on admission)  Assessment & Plan  S/p I&D 1/16  Streptococcus viridans growing from the wounds as well as MSSA  40 cm long thigh abcess drained  NF seen I wound  VAC  Back to OR as per Surgery 1/18 for follow-up I&D    Atrial fibrillation (HCC)  Assessment & Plan  Rate controlled -> remains controlled  Echocardiogram noted  Rick-Synephrine for hypotension, avoiding catecholamine so we do not drive the heart rate up  Optimize electrolytes  No anticoagulation for now  Low CHADSvasc2    Hyponatremia- (present on admission)  Assessment & Plan  Mild, improved  Secondary to above  Serial BMP  Avoid hypotonic fluids    Thrombocytosis (HCC)  Assessment & Plan  Secondary probably indolent right thigh abscess, improved  Functionally an acute phase reactant  Monitor    Normochromic anemia  Assessment &  Plan  Multifactorial including nutrition, infection etc.  Serial CBC  Transfuse per protocols    Smoking- (present on admission)  Assessment & Plan  Cessation encouraged  COPD?  Need vaccines prior to discharge    Chronic hepatitis C virus infection (HCC)- (present on admission)  Assessment & Plan  Monitor    Cannabis use disorder, mild, abuse- (present on admission)  Assessment & Plan  Cessation encouraged    Homeless- (present on admission)  Assessment & Plan  SW consult, may need SNU for weeks for complete ABX course - transfer to  when ready?  Vitamin supplements  Nutritional supplements  Update vaccines prior to discharge  Does he need a tetanus vaccine?    Methamphetamine use (HCC)- (present on admission)  Assessment & Plan  + UDS  Cessation encouraged  Patient takes amphetamines for pain management, he states its the only thing that works    Cellulitis of hand, left- (present on admission)  Assessment & Plan  Monitor    VTE:  Contraindicated  Ulcer: Not Indicated  Lines: Central Line  Ongoing indication addressed    I have performed a physical exam and reviewed and updated ROS and Plan today (1/18/2020). In review of yesterday's note (1/17/2020), there are no changes except as documented above.     Patient remains critically ill.  Patient has improving sepsis and necrotizing fasciitis right thigh was Streptococcus viridans and MSSA.  Rick-Synephrine dose weaned down/off today off so far but he still is a very soft blood pressure is going back to the OR today and may need further fluid resuscitation and pressors postop.  Add vasopressin and Rick-Synephrine if we get to 100 mics.  IV antibiotics ongoing cultures reviewed.  Echocardiogram does not reveal obvious infectious process but if repeat positive cultures are identified especially for staph in the blood will consult cardiology for BERNABE.  Reassess postop when he comes back to the ICU.  Prognosis guarded.  Patient at high risk for increased morbidity  mortality without above critical care interventions.    Discussed patient condition and risk of morbidity and/or mortality with RN, RT, Pharmacy, [facility] resident, Charge nurse / hot rounds and Patient     The patient remains critically ill.  Critical care time = 50 minutes in directly providing and coordinating critical care and extensive data review.  No time overlap and excludes procedures.

## 2020-01-19 ENCOUNTER — APPOINTMENT (OUTPATIENT)
Dept: RADIOLOGY | Facility: MEDICAL CENTER | Age: 60
DRG: 853 | End: 2020-01-19
Attending: STUDENT IN AN ORGANIZED HEALTH CARE EDUCATION/TRAINING PROGRAM
Payer: COMMERCIAL

## 2020-01-19 PROBLEM — K40.90 UNILATERAL INGUINAL HERNIA WITHOUT OBSTRUCTION OR GANGRENE: Status: ACTIVE | Noted: 2020-01-19

## 2020-01-19 PROBLEM — K40.91 UNILATERAL RECURRENT INGUINAL HERNIA WITHOUT OBSTRUCTION OR GANGRENE: Status: ACTIVE | Noted: 2020-01-19

## 2020-01-19 LAB
ALBUMIN SERPL BCP-MCNC: 1.7 G/DL (ref 3.2–4.9)
ALBUMIN/GLOB SERPL: 0.7 G/DL
ALP SERPL-CCNC: 67 U/L (ref 30–99)
ALT SERPL-CCNC: 11 U/L (ref 2–50)
ANION GAP SERPL CALC-SCNC: 2 MMOL/L (ref 0–11.9)
AST SERPL-CCNC: 13 U/L (ref 12–45)
BACTERIA BLD CULT: ABNORMAL
BACTERIA BLD CULT: ABNORMAL
BACTERIA SPEC ANAEROBE CULT: NORMAL
BASOPHILS # BLD AUTO: 0.4 % (ref 0–1.8)
BASOPHILS # BLD: 0.03 K/UL (ref 0–0.12)
BILIRUB SERPL-MCNC: 0.2 MG/DL (ref 0.1–1.5)
BUN SERPL-MCNC: 11 MG/DL (ref 8–22)
CALCIUM SERPL-MCNC: 7.3 MG/DL (ref 8.5–10.5)
CHLORIDE SERPL-SCNC: 98 MMOL/L (ref 96–112)
CO2 SERPL-SCNC: 30 MMOL/L (ref 20–33)
CREAT SERPL-MCNC: 0.62 MG/DL (ref 0.5–1.4)
EOSINOPHIL # BLD AUTO: 0.05 K/UL (ref 0–0.51)
EOSINOPHIL NFR BLD: 0.7 % (ref 0–6.9)
ERYTHROCYTE [DISTWIDTH] IN BLOOD BY AUTOMATED COUNT: 47.8 FL (ref 35.9–50)
GLOBULIN SER CALC-MCNC: 2.4 G/DL (ref 1.9–3.5)
GLUCOSE SERPL-MCNC: 138 MG/DL (ref 65–99)
HCT VFR BLD AUTO: 27.7 % (ref 42–52)
HGB BLD-MCNC: 9.1 G/DL (ref 14–18)
IMM GRANULOCYTES # BLD AUTO: 0.17 K/UL (ref 0–0.11)
IMM GRANULOCYTES NFR BLD AUTO: 2.3 % (ref 0–0.9)
LYMPHOCYTES # BLD AUTO: 0.71 K/UL (ref 1–4.8)
LYMPHOCYTES NFR BLD: 9.4 % (ref 22–41)
MAGNESIUM SERPL-MCNC: 1.9 MG/DL (ref 1.5–2.5)
MCH RBC QN AUTO: 29.3 PG (ref 27–33)
MCHC RBC AUTO-ENTMCNC: 32.9 G/DL (ref 33.7–35.3)
MCV RBC AUTO: 89.1 FL (ref 81.4–97.8)
MONOCYTES # BLD AUTO: 0.51 K/UL (ref 0–0.85)
MONOCYTES NFR BLD AUTO: 6.8 % (ref 0–13.4)
NEUTROPHILS # BLD AUTO: 6.06 K/UL (ref 1.82–7.42)
NEUTROPHILS NFR BLD: 80.4 % (ref 44–72)
NRBC # BLD AUTO: 0 K/UL
NRBC BLD-RTO: 0 /100 WBC
PHOSPHATE SERPL-MCNC: 3.6 MG/DL (ref 2.5–4.5)
PLATELET # BLD AUTO: 351 K/UL (ref 164–446)
PMV BLD AUTO: 8.9 FL (ref 9–12.9)
POTASSIUM SERPL-SCNC: 4.7 MMOL/L (ref 3.6–5.5)
PROT SERPL-MCNC: 4.1 G/DL (ref 6–8.2)
RBC # BLD AUTO: 3.11 M/UL (ref 4.7–6.1)
SIGNIFICANT IND 70042: ABNORMAL
SIGNIFICANT IND 70042: NORMAL
SITE SITE: ABNORMAL
SITE SITE: NORMAL
SODIUM SERPL-SCNC: 130 MMOL/L (ref 135–145)
SOURCE SOURCE: ABNORMAL
SOURCE SOURCE: NORMAL
WBC # BLD AUTO: 7.5 K/UL (ref 4.8–10.8)

## 2020-01-19 PROCEDURE — 700102 HCHG RX REV CODE 250 W/ 637 OVERRIDE(OP): Performed by: STUDENT IN AN ORGANIZED HEALTH CARE EDUCATION/TRAINING PROGRAM

## 2020-01-19 PROCEDURE — 307059 PAD,EAR PROTECTOR: Performed by: INTERNAL MEDICINE

## 2020-01-19 PROCEDURE — 83735 ASSAY OF MAGNESIUM: CPT

## 2020-01-19 PROCEDURE — 700111 HCHG RX REV CODE 636 W/ 250 OVERRIDE (IP): Performed by: STUDENT IN AN ORGANIZED HEALTH CARE EDUCATION/TRAINING PROGRAM

## 2020-01-19 PROCEDURE — 770020 HCHG ROOM/CARE - TELE (206)

## 2020-01-19 PROCEDURE — 700105 HCHG RX REV CODE 258: Performed by: STUDENT IN AN ORGANIZED HEALTH CARE EDUCATION/TRAINING PROGRAM

## 2020-01-19 PROCEDURE — A9270 NON-COVERED ITEM OR SERVICE: HCPCS | Performed by: INTERNAL MEDICINE

## 2020-01-19 PROCEDURE — A9270 NON-COVERED ITEM OR SERVICE: HCPCS | Performed by: STUDENT IN AN ORGANIZED HEALTH CARE EDUCATION/TRAINING PROGRAM

## 2020-01-19 PROCEDURE — A6213 FOAM DRG >16<=48 SQ IN W/BDR: HCPCS | Performed by: INTERNAL MEDICINE

## 2020-01-19 PROCEDURE — 85025 COMPLETE CBC W/AUTO DIFF WBC: CPT

## 2020-01-19 PROCEDURE — 700102 HCHG RX REV CODE 250 W/ 637 OVERRIDE(OP): Performed by: INTERNAL MEDICINE

## 2020-01-19 PROCEDURE — 76857 US EXAM PELVIC LIMITED: CPT

## 2020-01-19 PROCEDURE — 84100 ASSAY OF PHOSPHORUS: CPT

## 2020-01-19 PROCEDURE — 700111 HCHG RX REV CODE 636 W/ 250 OVERRIDE (IP): Performed by: INTERNAL MEDICINE

## 2020-01-19 PROCEDURE — 80053 COMPREHEN METABOLIC PANEL: CPT

## 2020-01-19 PROCEDURE — 99233 SBSQ HOSP IP/OBS HIGH 50: CPT | Performed by: INTERNAL MEDICINE

## 2020-01-19 RX ORDER — MAGNESIUM SULFATE HEPTAHYDRATE 40 MG/ML
2 INJECTION, SOLUTION INTRAVENOUS ONCE
Status: COMPLETED | OUTPATIENT
Start: 2020-01-19 | End: 2020-01-19

## 2020-01-19 RX ORDER — SODIUM CHLORIDE, SODIUM LACTATE, POTASSIUM CHLORIDE, CALCIUM CHLORIDE 600; 310; 30; 20 MG/100ML; MG/100ML; MG/100ML; MG/100ML
INJECTION, SOLUTION INTRAVENOUS CONTINUOUS
Status: DISCONTINUED | OUTPATIENT
Start: 2020-01-20 | End: 2020-01-22

## 2020-01-19 RX ORDER — MORPHINE SULFATE 4 MG/ML
2 INJECTION, SOLUTION INTRAMUSCULAR; INTRAVENOUS EVERY 4 HOURS PRN
Status: DISCONTINUED | OUTPATIENT
Start: 2020-01-19 | End: 2020-01-23

## 2020-01-19 RX ORDER — SODIUM CHLORIDE 9 MG/ML
INJECTION, SOLUTION INTRAVENOUS
Status: ACTIVE
Start: 2020-01-19 | End: 2020-01-20

## 2020-01-19 RX ORDER — ONDANSETRON 2 MG/ML
4 INJECTION INTRAMUSCULAR; INTRAVENOUS EVERY 4 HOURS PRN
Status: DISCONTINUED | OUTPATIENT
Start: 2020-01-19 | End: 2020-01-31 | Stop reason: HOSPADM

## 2020-01-19 RX ADMIN — OXYCODONE HYDROCHLORIDE 10 MG: 10 TABLET ORAL at 11:48

## 2020-01-19 RX ADMIN — POLYETHYLENE GLYCOL 3350 1 PACKET: 17 POWDER, FOR SOLUTION ORAL at 07:26

## 2020-01-19 RX ADMIN — CEFAZOLIN SODIUM 2 G: 2 INJECTION, SOLUTION INTRAVENOUS at 21:53

## 2020-01-19 RX ADMIN — MAGNESIUM SULFATE HEPTAHYDRATE 2 G: 40 INJECTION, SOLUTION INTRAVENOUS at 10:54

## 2020-01-19 RX ADMIN — THERA TABS 1 TABLET: TAB at 10:54

## 2020-01-19 RX ADMIN — CEFAZOLIN SODIUM 2 G: 2 INJECTION, SOLUTION INTRAVENOUS at 15:05

## 2020-01-19 RX ADMIN — OXYCODONE HYDROCHLORIDE 10 MG: 10 TABLET ORAL at 21:53

## 2020-01-19 RX ADMIN — HYDROMORPHONE HYDROCHLORIDE 1 MG: 1 INJECTION, SOLUTION INTRAMUSCULAR; INTRAVENOUS; SUBCUTANEOUS at 08:55

## 2020-01-19 RX ADMIN — PREGABALIN 50 MG: 25 CAPSULE ORAL at 04:55

## 2020-01-19 RX ADMIN — OXYCODONE HYDROCHLORIDE 10 MG: 10 TABLET ORAL at 07:30

## 2020-01-19 RX ADMIN — MORPHINE SULFATE 2 MG: 4 INJECTION INTRAVENOUS at 19:57

## 2020-01-19 RX ADMIN — CEFAZOLIN SODIUM 2 G: 2 INJECTION, SOLUTION INTRAVENOUS at 04:55

## 2020-01-19 RX ADMIN — SENNOSIDES AND DOCUSATE SODIUM 2 TABLET: 8.6; 5 TABLET ORAL at 04:55

## 2020-01-19 RX ADMIN — HYDROMORPHONE HYDROCHLORIDE 1 MG: 1 INJECTION, SOLUTION INTRAMUSCULAR; INTRAVENOUS; SUBCUTANEOUS at 04:56

## 2020-01-19 RX ADMIN — LINEZOLID 600 MG: 600 TABLET, FILM COATED ORAL at 17:06

## 2020-01-19 RX ADMIN — OXYCODONE HYDROCHLORIDE 10 MG: 10 TABLET ORAL at 16:51

## 2020-01-19 RX ADMIN — MORPHINE SULFATE 2 MG: 4 INJECTION INTRAVENOUS at 14:29

## 2020-01-19 RX ADMIN — ACETAMINOPHEN 650 MG: 325 TABLET, FILM COATED ORAL at 02:12

## 2020-01-19 RX ADMIN — HYDROMORPHONE HYDROCHLORIDE 1 MG: 1 INJECTION, SOLUTION INTRAMUSCULAR; INTRAVENOUS; SUBCUTANEOUS at 00:51

## 2020-01-19 RX ADMIN — FOLIC ACID 1 MG: 1 TABLET ORAL at 10:54

## 2020-01-19 RX ADMIN — Medication 100 MG: at 10:54

## 2020-01-19 RX ADMIN — PREGABALIN 50 MG: 25 CAPSULE ORAL at 17:06

## 2020-01-19 RX ADMIN — SODIUM CHLORIDE, POTASSIUM CHLORIDE, SODIUM LACTATE AND CALCIUM CHLORIDE: 600; 310; 30; 20 INJECTION, SOLUTION INTRAVENOUS at 04:56

## 2020-01-19 RX ADMIN — SODIUM CHLORIDE, POTASSIUM CHLORIDE, SODIUM LACTATE AND CALCIUM CHLORIDE: 600; 310; 30; 20 INJECTION, SOLUTION INTRAVENOUS at 12:20

## 2020-01-19 RX ADMIN — LINEZOLID 600 MG: 600 TABLET, FILM COATED ORAL at 04:56

## 2020-01-19 ASSESSMENT — PATIENT HEALTH QUESTIONNAIRE - PHQ9
5. POOR APPETITE OR OVEREATING: SEVERAL DAYS
6. FEELING BAD ABOUT YOURSELF - OR THAT YOU ARE A FAILURE OR HAVE LET YOURSELF OR YOUR FAMILY DOWN: SEVERAL DAYS
2. FEELING DOWN, DEPRESSED, IRRITABLE, OR HOPELESS: SEVERAL DAYS
7. TROUBLE CONCENTRATING ON THINGS, SUCH AS READING THE NEWSPAPER OR WATCHING TELEVISION: SEVERAL DAYS
1. LITTLE INTEREST OR PLEASURE IN DOING THINGS: SEVERAL DAYS
9. THOUGHTS THAT YOU WOULD BE BETTER OFF DEAD, OR OF HURTING YOURSELF: NOT AT ALL
SUM OF ALL RESPONSES TO PHQ QUESTIONS 1-9: 9
3. TROUBLE FALLING OR STAYING ASLEEP OR SLEEPING TOO MUCH: MORE THAN HALF THE DAYS
8. MOVING OR SPEAKING SO SLOWLY THAT OTHER PEOPLE COULD HAVE NOTICED. OR THE OPPOSITE, BEING SO FIGETY OR RESTLESS THAT YOU HAVE BEEN MOVING AROUND A LOT MORE THAN USUAL: NOT AT ALL
4. FEELING TIRED OR HAVING LITTLE ENERGY: MORE THAN HALF THE DAYS
SUM OF ALL RESPONSES TO PHQ9 QUESTIONS 1 AND 2: 2

## 2020-01-19 ASSESSMENT — COGNITIVE AND FUNCTIONAL STATUS - GENERAL
CLIMB 3 TO 5 STEPS WITH RAILING: A LOT
SUGGESTED CMS G CODE MODIFIER DAILY ACTIVITY: CK
DAILY ACTIVITIY SCORE: 18
DRESSING REGULAR LOWER BODY CLOTHING: A LITTLE
HELP NEEDED FOR BATHING: A LOT
MOVING FROM LYING ON BACK TO SITTING ON SIDE OF FLAT BED: A LOT
TURNING FROM BACK TO SIDE WHILE IN FLAT BAD: A LOT
DRESSING REGULAR UPPER BODY CLOTHING: A LITTLE
STANDING UP FROM CHAIR USING ARMS: A LOT
TOILETING: A LOT
WALKING IN HOSPITAL ROOM: A LOT
MOBILITY SCORE: 12
MOVING TO AND FROM BED TO CHAIR: A LOT
SUGGESTED CMS G CODE MODIFIER MOBILITY: CL

## 2020-01-19 ASSESSMENT — LIFESTYLE VARIABLES
EVER_SMOKED: YES
EVER HAD A DRINK FIRST THING IN THE MORNING TO STEADY YOUR NERVES TO GET RID OF A HANGOVER: NO
DOES PATIENT WANT TO STOP DRINKING: NO
TOTAL SCORE: 0
ALCOHOL_USE: YES
TOTAL SCORE: 0
EVER FELT BAD OR GUILTY ABOUT YOUR DRINKING: NO
ON A TYPICAL DAY WHEN YOU DRINK ALCOHOL HOW MANY DRINKS DO YOU HAVE: 0
HOW MANY TIMES IN THE PAST YEAR HAVE YOU HAD 5 OR MORE DRINKS IN A DAY: 0
CONSUMPTION TOTAL: NEGATIVE
HAVE PEOPLE ANNOYED YOU BY CRITICIZING YOUR DRINKING: NO
TOTAL SCORE: 0
HAVE YOU EVER FELT YOU SHOULD CUT DOWN ON YOUR DRINKING: NO
AVERAGE NUMBER OF DAYS PER WEEK YOU HAVE A DRINK CONTAINING ALCOHOL: 0

## 2020-01-19 ASSESSMENT — ENCOUNTER SYMPTOMS
SENSORY CHANGE: 0
BRUISES/BLEEDS EASILY: 0
NERVOUS/ANXIOUS: 0
SPUTUM PRODUCTION: 0
PND: 0
VOMITING: 0
NAUSEA: 0
SORE THROAT: 0
DIARRHEA: 0
SPEECH CHANGE: 0
BLURRED VISION: 0
FOCAL WEAKNESS: 0
MYALGIAS: 1
ABDOMINAL PAIN: 0
FEVER: 0
COUGH: 0
FLANK PAIN: 0
SHORTNESS OF BREATH: 0
ORTHOPNEA: 0
CHILLS: 0
DIAPHORESIS: 0
DOUBLE VISION: 0
HEADACHES: 0
INSOMNIA: 0
PALPITATIONS: 0

## 2020-01-19 NOTE — CARE PLAN
Problem: Nutritional:  Goal: Achieve adequate nutritional intake  Description  Patient will consume 50% of meals   Outcome: PROGRESSING AS EXPECTED     See RD note.

## 2020-01-19 NOTE — PROGRESS NOTES
Critical Care Progress Note    Date of admission  1/16/2020    Chief Complaint  59 y.o. male admitted 1/16/2020 with RLE pain secondary to thigh abcess -> sepsis    Hospital Course          Interval Problem Update  Reviewed last 24 hour events:    S/p 1st f/u wound washout and debridement  Bp soft at times but no KERLINE drip overnight    A&O x4  SR 90s  SBp 90-120s  UO adequate    Tm 99.3  WBC 7.5  Micro: No new positive cultures  Bowel care protocols  L ing hernia - reduced by pateint  Sodium 130    Transfer to [facility]  Replace Mg  OR tomorrow for third time  Change to IV MS - seems to like Dilaudid IV  Surgery eval in AM - sooner if needed     YESTERDAY  Leg pain better  Remains on pressors for sepsis  Titrating KERLINE drip down  AF 70-80s  SBp 90-110s, off kerline-this a.m. when I examined him but blood pressure soft and down to 90/59  Threshold continue to titrate since patient going to the OR today   IV, will continue since he is come back to the OR today  UO adequate  N.p.o. for the OR later today  I/Os reviewed  Follows well  A&O x4  Tm 98.5  WBC 7.6  Ancef/Zyvox  ID eval  Cultures reviewed - Viridans Strep/MSSA in wound MSSA blood   since placed  REpeat BC neg from admit  Hgb 28.5  Na 130  Room air  IS pending  Lyrica may be helping him with the pain  Patient tells me he takes amphetamines because that helps him with pain?    To OR for wound washout/debriedment -> VAC again  Supplements PO  Vitamins  Rally bag okay today and start p.o. tomorrow  Hold Lovenox for OR  Continue current antibiotics  Keep Kerline-Synephrine on the MAR and monitor postop for recurrent hypotension, that is what happened yesterday    Serial follow-up  Hemodynamically unchanged still with soft blood pressure, 89/54 but asymptomatic  If has another low blood pressure we will restart his needle, low threshold to rebolus him going into the OR when they will vasodilate him to put him to sleep and reoperate on him  Not spiking a fever at  this time  No new micro   Ancef and Zyvox    Review of Systems  Review of Systems   Constitutional: Positive for malaise/fatigue (Improved). Negative for chills, diaphoresis and fever.   HENT: Negative for congestion, nosebleeds and sore throat.    Eyes: Negative for blurred vision and double vision.   Respiratory: Negative for cough, sputum production and shortness of breath.    Cardiovascular: Positive for leg swelling (Improved). Negative for chest pain, palpitations, orthopnea and PND.   Gastrointestinal: Negative for abdominal pain, diarrhea, nausea and vomiting.   Genitourinary: Negative for dysuria, flank pain, hematuria and urgency.        Left groin and testicular pain   Musculoskeletal: Positive for myalgias (RLE, improved). Negative for joint pain.   Neurological: Negative for sensory change, speech change, focal weakness and headaches.   Endo/Heme/Allergies: Does not bruise/bleed easily.   Psychiatric/Behavioral: The patient is not nervous/anxious and does not have insomnia.         Vital Signs for last 24 hours   Temp:  [36.1 °C (97 °F)-37.5 °C (99.5 °F)] 37.1 °C (98.7 °F)  Pulse:  [82-97] 97  Resp:  [16-26] 16  BP: ()/(46-68) 108/66  SpO2:  [95 %-99 %] 99 %    Hemodynamic parameters for last 24 hours       Respiratory Information for the last 24 hours       Physical Exam   Physical Exam  Vitals signs reviewed.   Constitutional:       Appearance: He is ill-appearing (No change). He is not toxic-appearing or diaphoretic.   HENT:      Head: Normocephalic and atraumatic.      Mouth/Throat:      Mouth: Mucous membranes are moist.   Eyes:      General: No scleral icterus.     Extraocular Movements: Extraocular movements intact.   Neck:      Musculoskeletal: Neck supple. No neck rigidity.   Cardiovascular:      Rate and Rhythm: Normal rate. Rhythm irregularly irregular.      Pulses: Normal pulses.      Heart sounds: No murmur. No gallop.       Comments: AF  Pulmonary:      Effort: Pulmonary effort is  normal. No respiratory distress.      Breath sounds: No wheezing, rhonchi or rales.   Abdominal:      General: Abdomen is flat. Bowel sounds are normal. There is no distension.      Palpations: Abdomen is soft. There is no fluid wave, hepatomegaly or splenomegaly.      Tenderness: There is no tenderness. There is no right CVA tenderness, left CVA tenderness or guarding. Negative signs include Huddleston's sign and McBurney's sign.      Hernia: A hernia is present.   Genitourinary:     Comments: Large hernia left groin/left scrotal sac, reduced manually  Musculoskeletal:      Right lower leg: No edema.      Left lower leg: No edema.      Comments: VAC in place covering most of the anterior lateral right thigh with fairly large output, redness/edema/induration around the margins of the wound are improved significantly   Lymphadenopathy:      Cervical: No cervical adenopathy.   Skin:     General: Skin is warm and dry.      Capillary Refill: Capillary refill takes less than 2 seconds.      Coloration: Skin is not cyanotic.      Nails: There is no clubbing.     Neurological:      General: No focal deficit present.      Mental Status: He is alert and oriented to person, place, and time. Mental status is at baseline.      Comments: Follows well   Psychiatric:         Attention and Perception: Attention normal.         Mood and Affect: Mood normal.         Speech: Speech normal.         Behavior: Behavior normal. Behavior is cooperative.         Medications  Current Facility-Administered Medications   Medication Dose Route Frequency Provider Last Rate Last Dose   • SODIUM CHLORIDE 0.9 % IV SOLN            • morphine (pf) 4 MG/ML injection 2 mg  2 mg Intravenous Q4HRS PRN Earl Morales M.D.   2 mg at 01/19/20 1957   • ondansetron (ZOFRAN) syringe/vial injection 4 mg  4 mg Intravenous Q4HRS PRN Piero Gould M.D.       • [START ON 1/20/2020] lactated ringers infusion   Intravenous Continuous Earl Morales M.D.       •  thiamine tablet 100 mg  100 mg Oral DAILY Piero Gould M.D.   100 mg at 01/19/20 1054    And   • folic acid (FOLVITE) tablet 1 mg  1 mg Oral DAILY Piero Gould M.D.   1 mg at 01/19/20 1054    And   • multivitamin (THERAGRAN) tablet 1 Tab  1 Tab Oral DAILY Piero Gould M.D.   1 Tab at 01/19/20 1054   • ceFAZolin in dextrose (ANCEF) IVPB premix 2 g  2 g Intravenous Q8HRS Earl Morales M.D. 200 mL/hr at 01/19/20 2153 2 g at 01/19/20 2153   • oxyCODONE immediate release (ROXICODONE) tablet 10 mg  10 mg Oral Q4HRS PRSUNITA Morales M.D.   10 mg at 01/19/20 2153   • pregabalin (LYRICA) capsule 50 mg  50 mg Oral BID Piero Gould M.D.   50 mg at 01/19/20 1706   • linezolid (ZYVOX) tablet 600 mg  600 mg Oral Q12HRS Earl Morales M.D.   600 mg at 01/19/20 1706   • senna-docusate (PERICOLACE or SENOKOT S) 8.6-50 MG per tablet 2 Tab  2 Tab Oral BID Earl Morales M.D.   2 Tab at 01/19/20 0455    And   • polyethylene glycol/lytes (MIRALAX) PACKET 1 Packet  1 Packet Oral QDAY PATRICIA Morales M.D.   1 Packet at 01/19/20 0726    And   • magnesium hydroxide (MILK OF MAGNESIA) suspension 30 mL  30 mL Oral QDAY PATRICIA Morales M.D.        And   • bisacodyl (DULCOLAX) suppository 10 mg  10 mg Rectal QDAY PRSUNITA Morales M.D.       • acetaminophen (TYLENOL) tablet 650 mg  650 mg Oral Q6HRS PRSUNITA Morales M.D.   650 mg at 01/19/20 0212       Fluids    Intake/Output Summary (Last 24 hours) at 1/19/2020 2232  Last data filed at 1/19/2020 2000  Gross per 24 hour   Intake 4360 ml   Output 3000 ml   Net 1360 ml       Laboratory          Recent Labs     01/17/20  0445 01/18/20  0450 01/19/20  0500   SODIUM 128* 130* 130*   POTASSIUM 4.1 4.2 4.7   CHLORIDE 96 97 98   CO2 28 31 30   BUN 15 9 11   CREATININE 0.54 0.65 0.62   MAGNESIUM  --   --  1.9   PHOSPHORUS  --   --  3.6   CALCIUM 7.3* 7.4* 7.3*     Recent Labs     01/17/20  0445 01/18/20  0450 01/19/20  0500   ALTSGPT 20 12 11   ASTSGOT 14 11*  13   ALKPHOSPHAT 58 60 67   TBILIRUBIN 0.3 0.2 0.2   GLUCOSE 128* 107* 138*     Recent Labs     01/17/20  0445 01/18/20  0450 01/19/20  0500   WBC 13.1* 7.6 7.5   NEUTSPOLYS 78.60* 75.30* 80.40*   LYMPHOCYTES 8.70* 12.30* 9.40*   MONOCYTES 9.70 8.30 6.80   EOSINOPHILS 1.00 1.20 0.70   BASOPHILS 0.30 0.40 0.40   ASTSGOT 14 11* 13   ALTSGPT 20 12 11   ALKPHOSPHAT 58 60 67   TBILIRUBIN 0.3 0.2 0.2     Recent Labs     01/17/20  0445 01/18/20  0450 01/19/20  0500   RBC 3.56* 3.27* 3.11*   HEMOGLOBIN 10.5* 9.5* 9.1*   HEMATOCRIT 31.2* 28.5* 27.7*   PLATELETCT 567* 363 351       Imaging  X-Ray:  I have personally reviewed the images and compared with prior images.  EKG:  I have personally reviewed the images and compared with prior images.  CT:    Reviewed  Echo:   Reviewed    Assessment/Plan  * Septic shock due to Gram positive bacteria (HCC)  Assessment & Plan  This is Sepsis Present on admission  SIRS criteria identified on my evaluation include: Tachycardia, with heart rate greater than 90 BPM, Tachypnea, with respirations greater than 20 per minute and Leukocyosis, with WBC greater than 12,000  Source is thigh  Sepsis protocol initiated  Fluid resuscitation ordered per protocol  IV antibiotics as appropriate for source of sepsis  While organ dysfunction may be noted elsewhere in this problem list or in the chart, degree of organ dysfunction does not meet CMS criteria for severe sepsis  IV Rick-Synephrine since she is in A. fib  Second pressor to add would be vasopressin  Repeat fluid boluses needed  CVP monitoring her bedside ultrasound for volume assessment as needed        MSSA bacteremia  Assessment & Plan  One of two + BC 1/16  Repeat BC negative so far  TTE neg  If repeat BC + then BERNABE  ID eval ongoing    Necrotizing fasciitis of pelvic region and thigh (HCC)- (present on admission)  Assessment & Plan  S/p I&D 1/16  Streptococcus viridans growing from the wounds as well as MSSA  40 cm long thigh abcess drained  NF  seen I wound  VAC  Back to OR as per Surgery 1/18 for follow-up I&D    Unilateral recurrent inguinal hernia without obstruction or gangrene  Assessment & Plan  Reducible in left groin  Ultrasound abdomen/groin pending  Surgical consultation  Monitor for obstruction/strangulation    Atrial fibrillation (HCC)  Assessment & Plan  Rate controlled -> remains controlled  Echocardiogram noted  Rick-Synephrine for hypotension, avoiding catecholamine so we do not drive the heart rate up  Optimize electrolytes  No anticoagulation for now  Low CHADSvasc2    Hyponatremia- (present on admission)  Assessment & Plan  Mild, improved  Secondary to above  Serial BMP  Avoid hypotonic fluids    Thrombocytosis (HCC)  Assessment & Plan  Secondary probably indolent right thigh abscess, improved  Functionally an acute phase reactant  Monitor    Normochromic anemia  Assessment & Plan  Multifactorial including nutrition, infection etc.  Serial CBC  Transfuse per protocols    Smoking- (present on admission)  Assessment & Plan  Cessation encouraged  COPD?  Need vaccines prior to discharge    Chronic hepatitis C virus infection (HCC)- (present on admission)  Assessment & Plan  Monitor    Cannabis use disorder, mild, abuse- (present on admission)  Assessment & Plan  Cessation encouraged    Homeless- (present on admission)  Assessment & Plan  SW consult, may need SNU for weeks for complete ABX course - transfer to  when ready?  Vitamin supplements  Nutritional supplements  Update vaccines prior to discharge  Does he need a tetanus vaccine?    Methamphetamine use (HCC)- (present on admission)  Assessment & Plan  + UDS  Cessation encouraged  Patient takes amphetamines for pain management, he states its the only thing that works    Cellulitis of hand, left- (present on admission)  Assessment & Plan  Monitor    VTE:  Contraindicated  Ulcer: Not Indicated  Lines: Central Line  Ongoing indication addressed    I have performed a physical exam and  reviewed and updated ROS and Plan today (1/19/2020). In review of yesterday's note (1/18/2020), there are no changes except as documented above.     Discussed patient condition and risk of morbidity and/or mortality with RN, RT, Pharmacy, [facility] resident, Charge nurse / hot rounds and Patient

## 2020-01-19 NOTE — PROGRESS NOTES
59yoM with right thigh extensive necrotizing soft tissue infection and abscess s/p I&D on 1/16 AND 1/18.    S: doing okay, no complaints    O:  Vitals:    01/19/20 0800   BP: (!) 94/61   Pulse: 86   Resp: 18   Temp:    SpO2: 97%     Exam:  General-NAD, alert and following commands  RLE-thigh VAC c/d/i, NVI distally, mild amount of serosanguinous drainage in vac cannister.    A: 59yoM with right thigh extensive necrotizing soft tissue infection and abscess s/p I&D on 1/16 AND 1/18.    Recs:  --readmit medicine postop  --continue wound vac therapy  --continue abx per ID recs  --will need to return to OR in 1-2 days for repeat I&D and VAC change

## 2020-01-19 NOTE — ASSESSMENT & PLAN NOTE
- Large left Inguinal Hernia  - Bowel contents in scrotum  - General surgery recommended outpatient follow up for elective surgery once recovered from infection.              PLAN:  - Scrotal sling  - No heavy lifting  - Outpatient follow up with surgery after recovery from this hospitalization  - Elective surgery  - Establish care with PCP  - KAILA

## 2020-01-19 NOTE — PROGRESS NOTES
"   Orthopaedic Progress Note    Interval changes:  Pain control issues - Neurontin 100mg po TID added and oxi increased to 10mg     ROS - Patient denies any new issues except per above.    BP (!) 94/61   Pulse 86   Temp 36.1 °C (97 °F) (Temporal)   Resp 18   Ht 1.93 m (6' 4\")   Wt 110.5 kg (243 lb 9.7 oz)   SpO2 97%       Patient seen and examined  No acute distress  Breathing non labored  RRR  R thigh vac dressing CDI with no leak, DNVI, moves all toes, cap refill <2 sec.     Recent Labs     01/17/20  0445 01/18/20  0450 01/19/20  0500   WBC 13.1* 7.6 7.5   RBC 3.56* 3.27* 3.11*   HEMOGLOBIN 10.5* 9.5* 9.1*   HEMATOCRIT 31.2* 28.5* 27.7*   MCV 87.6 87.2 89.1   MCH 29.5 29.1 29.3   MCHC 33.7 33.3* 32.9*   RDW 48.5 47.8 47.8   PLATELETCT 567* 363 351   MPV 9.1 8.8* 8.9*       Active Hospital Problems    Diagnosis   • MSSA bacteremia [R78.81]     Priority: High   • Septic shock due to Gram positive bacteria (HCC) [A41.89, R65.21]     Priority: High   • Necrotizing fasciitis of pelvic region and thigh (HCC) [M72.6]     Priority: High   • Atrial fibrillation (HCC) [I48.91]     Priority: Medium   • Hyponatremia [E87.1]     Priority: Medium   • Normochromic anemia [D64.9]     Priority: Low   • Thrombocytosis (HCC) [D47.3]     Priority: Low   • Chronic hepatitis C virus infection (HCC) [B18.2]     Priority: Low   • Smoking [F17.200]     Priority: Low   • Cannabis use disorder, mild, abuse [F12.10]     Priority: Low   • Homeless [Z59.0]     Priority: Low   • Methamphetamine use (HCC) [F15.10]     Priority: Low   • Cellulitis of hand, left [L03.114]     Priority: Low       Assessment/Plan:  Return to OR this weekend for repeat I&D  POD#1 S/P:  1.  Incision and drainage of right intramuscular thigh abscess.  2.  Excisional debridement of right thigh wound including skin, subcutaneous tissue and muscle through wound measuring 30x10 cm.  3.  Application of wound VAC greater than 50 sq cm.  Wt bearing status - WBAT  Wound " care/Drains - vac in place  Future Procedures - Repeat I&D possible closure this weekend   Sutures/Staples out- pending closure type  PT/OT-initiated  Antibiotics: ancef 2g IV q8  DVT Prophylaxis- TEDS/SCDs/Foot pumps  [place]-none  Case Coordination for Discharge Planning - Disposition pending abx needs

## 2020-01-19 NOTE — WOUND TEAM
[facility]  Inpatient Services  Initial Wound and Skin Care Evaluation    Admission Date: 1/16/2020     Consult Date: 01/16/2020 @ 1255   HPI, PMH, SH: Reviewed    Unit where seen by Wound Team: [id number]     WOUND CONSULT RELATED TO:   Sacrum and Right lateral thigh     Self Report / Pain Level:  Pain with movement        OBJECTIVE:  Pt lying in bed with wound vac functioning properly to R thigh, sacral mepilex to Sacrum    WOUND TYPE, LOCATION, CHARACTERISTICS (Pressure Injuries: location, stage, POA or date identified)    Pressure Injury 01/16/20 Coccyx (POA)   Wound Image       Pressure Injury Stage 2    State of Healing Non-healing    Site Assessment Clean;Dry;Intact;Red    Toshia-wound Assessment Clean;Dry;Intact    Margins Attached edges;Defined edges    Wound Length (cm) 4 cm    Wound Width (cm) 4 cm    Wound Depth (cm) 0.1 cm    Wound Surface Area (cm^2) 16 cm^2    Closure Adhesive bandage    Drainage Amount None    Non-staged Wound Description Not applicable    Treatments Cleansed;Site care    Cleansing Normal Saline Irrigation    Periwound Protectant Skin Protectant wipes to Periwound    Dressing Options Mepilex    Dressing Cleansing/Solutions Not Applicable    Dressing Changed New    Dressing Status Clean;Dry;Intact    Dressing Change Frequency Every 72 hrs    NEXT Dressing Change  01/22/20    NEXT Weekly Photo (Inpatient Only) 01/26/20    WOUND NURSE ONLY - Odor None    WOUND NURSE ONLY - Exposed Structures None    WOUND NURSE ONLY - Tissue Type and Percentage 100% red    WOUND NURSE ONLY - Time Spent with Patient (mins) 60      Vascular:    Dorsal Pedal pulses:  NA  Posterior tib pulses:   NA    SILVIA:      NA    Lab Values:    Lab Results   Component Value Date/Time    WBC 7.5 01/19/2020 05:00 AM    RBC 3.11 (L) 01/19/2020 05:00 AM    HEMOGLOBIN 9.1 (L) 01/19/2020 05:00 AM    HEMATOCRIT 27.7 (L) 01/19/2020 05:00 AM      Results from last 7 days   Lab Units 01/16/20  0645   C REACTIVE PROTEIN  4596 mg/dL 12.38*     Results from last 7 days   Lab Units 01/16/20  0645   SED RATE JANAE 1526 mm/hour 34*     Lab Results   Component Value Date/Time    HBA1C 5.3 10/09/2019 11:16 AM      Culture:   NA    INTERVENTIONS BY WOUND TEAM:  Chart and images reviewed, discussed with bedside RN. Per bedside RN pt has undergone surgical intervention for the right lateral thigh on both 1/16 and 1/18 with wound vac placement and pt will be going back for an I&D on 1/20. This RN in with bedside RN to assess sacrum. Pt agreeable but yelled during entire assessment. Pt was slow to turn but able to partially turn self. This RN removed sacral mepilex to assess the area. Pt had what appears to have been a DTI to the area that is now a stage II. Measurements and pictures obtained. Sacral mepilex reapplied and pt returned to a supine position. Pt then boosted up and pillows applied.     Interdisciplinary consultation: Patient, Bedside RN (Gail),     EVALUATION: Pt is an older homeless gentleman who is Hep C+. Pt was admitted with R thigh pain and found to have a large wound with slough/eschar. Per report the wound was caused by Heroin use. Pt immediately under went surgical intervention and then returned to the OR again on the 18th for further I&D with wound vac. Pt scheduled to return to the OR on 1/20 for further debridement. Wound team will continue to follow awaiting orders for wound vac changes by wound team. Pts sacrum appears to have had a DTI per chart review that has evolved into a stage II here. Given pts level of pain and noncompliance there is concern the wound will deteriorate during this hospital stay. Mepilex in place to offload pressure.    Goals: Steady decrease in wound area and depth weekly.    NURSING PLAN OF CARE ORDERS (X):  Dressing changes: See Dressing Care orders: X  Skin care: See Skin Care orders: X  Rectal tube care: See Rectal Tube Care orders:        Other orders:                           RSKIN:     CURRENTLY IN PLACE (X), APPLIED THIS VISIT (A), ORDERED (O):   Q shift Yash:  X  Q shift pressure point assessments:  X  Pressure redistribution mattress                             Low Airloss   X, ICU Bed                     Bariatric SAMMI                     Bariatric foam                        Heel float boots  O                   Heel Silicone dressing O                        Float Heels off Bed with Pillows               Barrier wipes         Barrier Cream         Barrier paste          Sacral silicone dressing X        Silicone O2 tubing X        Anchorfast         Cannula fixation Device (Tender )          Gray Foam Ear protectors           Trach with Optifoam split foam                 Waffle cushion        Waffle Overlay  X       Rectal tube or BMS    Purwick/Condom Cath          Antifungal tx      Interdry          Reposition q 2 hours X, self mobile with assistance and reminders       Up to chair        Ambulate      PT/OT        Dietician        Diabetes Education      PO  X -Regular (Double portions)   TF     TPN     NPO X-NPO at midnight for Sx   # days   Other        WOUND TEAM PLAN OF CARE (X):   Dressing changes by wound team:          Follow up 1-2 times weekly:               Follow up 3 times weekly:                NPWT change 3 times weekly:     Follow up as needed:  X, Wound team will continue to monitor for wound vac changes 3x weekly     Other (explain):     Anticipated discharge plans (X):   LTACH:        SNF/Rehab: X, Pt will require additional outpatient wound care to the right lateral thigh wound                 Home Care:           Outpatient Wound Center:            Self Care:            Other:

## 2020-01-19 NOTE — DIETARY
"Nutrition services: Day 3 of admit.  Gonsalo Hunt is a 59 y.o. male with admitting DX of sepsis, abscess of right leg.   Consult received for Failure to Thrive (homelessness, low albumin, active infection).     Met w/ pt at bedside. Pt reports adequate PO prior to admit despite homelessness. States that his UBW is ~235-240# (107-109 kg) which is consistent with current wt. Pt with poor dentition, declined need for softer foods. RD to update kitchen. Provided pt with food pantry prescription to obtain food on d/c.     Assessment:  Height: 193 cm (6' 4\")  Weight: 110.5 kg (243 lb 9.7 oz)  Body mass index is 29.65 kg/m²., BMI classification: Overweight.   Diet/Intake: Regular (double portions), Boost Plus TID; % x 1 meal and 1 snack.     Evaluation:   1. Admitted with cellulitis and abscess of right lower extremity (thigh), sepsis, and hyponatremia.   2. Pt underwent I&D and wound vac change of thigh on 1/18.   3. Skin: Pressure injury on coccyx, abscess on right thigh; Pending wound therapy consult.   4. Pertinent meds: thiamine, folic acid, multivitamin.   5. Labs: K, phos, Mg all WNL.   6. Wt was 98.9 kg via standing scale on 10/9/19, stable with wt of 96.5 kg on 1/17.   7. Nutrition-focused physical exam unremarkable.     Malnutrition Risk: Unable to meet criteria at this time.     Recommendations/Plan:  1. Continue current diet and supplements as ordered.   2. Encourage intake of meals and supplements.   3. Document intake of all meals and supplements as % taken in ADL's to provide interdisciplinary communication across all shifts.   4. Monitor weight.  5. Nutrition rep will continue to see patient for ongoing meal and snack preferences.     RD Following.     "

## 2020-01-19 NOTE — PROGRESS NOTES
[facility] Progress Note      Admit Date: 1/16/2020    Resident(s): Earl Morales M.D.   Attending:  TATYANA STRAUSS/ Dr. Gould    Patient ID:    Name:  Gonsalo Hunt     YOB: 1960  Age:  59 y.o.  male   MRN:  [id number]    Hospital Course (carried forward and updated):  Gonsalo Hunt is a 59 y.o. homeless male with a past medical history significant for intravenous drug use and tobacco use who presented 1/16/2020 with right lower extremity pain and edema and was found to have a necrotizing streptococcal infection.  He was started on intravenous fluids and appropriate broad-spectrum antibiotics upon arrival and underwent debridement and drainage per orthopedic surgery on the evening of admission, however subsequently on 1/17/2020 required initiation of vasopressors to maintain his blood pressure.  Additionally he had blood cultures positive for MSSA.  His antibiotics were narrowed on 1/17/2020 to cefazolin and linezolid to cover his toxic streptococcal infection as well as his MSSA bacteremia.  Infectious disease was consulted for further recommendations. He subsequently underwent a repeat incision and drainage procedure on 1/18/2020 without significant complications. During his hospitalization he was found to be in atrial fibrillation which has not been previously documented for him.     Consultants:  Critical Care  Infectious disease  Orthopedic surgery    Interval Events:  Patient complaining of some testicular pain overnight, found to have a reducible(with difficulty) left inguinal hernia. Mildly tender, per patient has been chronic for at least 8 months with progressively worsening difficulty in reduction. No symptoms of obstruction or gangrene. Otherwise no acute events, underwent further I&D yesterday, draining about 200mL in the wound vac. Pain managed with oxycodone and breakthrough IV dilaudid.      Review of Systems   Constitutional: Negative for chills and fever.   Eyes: Negative for blurred  "vision and double vision.   Respiratory: Negative for cough and shortness of breath.    Cardiovascular: Negative for chest pain and leg swelling.   Gastrointestinal: Negative for nausea and vomiting.   Skin: Negative for itching and rash.       PHYSICAL EXAM:  Vitals:    01/19/20 0600 01/19/20 0700 01/19/20 0730 01/19/20 0800   BP: 104/52 (!) 97/53 (!) 92/56 (!) 94/61   Pulse: 85 90 87 86   Resp: 16 19 16 18   Temp:       TempSrc:       SpO2: 96% 95% 99% 97%   Weight:       Height:        Body mass index is 29.65 kg/m².  Latest Vitals:  BP (!) 94/61   Pulse 86   Temp 36.1 °C (97 °F) (Temporal)   Resp 18   Ht 1.93 m (6' 4\")   Wt 110.5 kg (243 lb 9.7 oz)   SpO2 97%   BMI 29.65 kg/m²   O2 therapy: Pulse Oximetry: 97 %, O2 (LPM): 0, O2 Delivery: None (Room Air)  Vitals Range last 24h:  Temp:  [36.1 °C (97 °F)-36.3 °C (97.4 °F)] 36.1 °C (97 °F)  Pulse:  [67-94] 86  Resp:  [12-26] 18  BP: ()/(44-68) 94/61  SpO2:  [85 %-100 %] 97 %  Date 01/19/20 0700 - 01/20/20 0659   Shift 0412-6591 6832-7835 2460-0146 24 Hour Total   INTAKE   P.O. 350   350     P.O. 350   350   I.V. 300   300     Volume (mL) (lactated ringers infusion) 300   300   Shift Total 650   650   OUTPUT   Urine 325   325     Number of Times Voided 1 x   1 x     Urine Void (mL) 325   325   Shift Total 325   325      325        Intake/Output Summary (Last 24 hours) at 1/19/2020 0911  Last data filed at 1/19/2020 0800  Gross per 24 hour   Intake 4705 ml   Output 2675 ml   Net 2030 ml        Physical Exam   Constitutional: He is oriented to person, place, and time.   Appears older than stated age   HENT:   Head: Normocephalic and atraumatic.   Poor dentition, moist mucous membranes   Cardiovascular:   Murmur (very faint systolic murmur heard best at LLSB) heard.  Regular rate and rhythm, normal rate   Pulmonary/Chest: Effort normal. He has no wheezes. He has no rales.   Abdominal: Soft. Bowel sounds are normal. He exhibits no distension. There is " no tenderness.   Genitourinary:    Genitourinary Comments: Reducible(with some difficulty and mild tenderness) left inguinal hernia, no skin changes     Musculoskeletal:         General: No edema.      Comments: Wound vac in place on the right lateral thigh without significant surrounding erythema. 200mL of dark red drainage as of 8am.     Neurovascularly intact   Neurological: He is alert and oriented to person, place, and time.           Recent Labs     01/17/20 0445 01/18/20  0450 01/19/20  0500   SODIUM 128* 130* 130*   POTASSIUM 4.1 4.2 4.7   CHLORIDE 96 97 98   CO2 28 31 30   BUN 15 9 11   CREATININE 0.54 0.65 0.62   MAGNESIUM  --   --  1.9   PHOSPHORUS  --   --  3.6   CALCIUM 7.3* 7.4* 7.3*     Recent Labs     01/17/20 0445 01/18/20  0450 01/19/20  0500   ALTSGPT 20 12 11   ASTSGOT 14 11* 13   ALKPHOSPHAT 58 60 67   TBILIRUBIN 0.3 0.2 0.2   GLUCOSE 128* 107* 138*     Recent Labs     01/17/20 0445 01/18/20  0450 01/19/20  0500   RBC 3.56* 3.27* 3.11*   HEMOGLOBIN 10.5* 9.5* 9.1*   HEMATOCRIT 31.2* 28.5* 27.7*   PLATELETCT 567* 363 351     Recent Labs     01/17/20 0445 01/18/20  0450 01/19/20  0500   WBC 13.1* 7.6 7.5   NEUTSPOLYS 78.60* 75.30* 80.40*   LYMPHOCYTES 8.70* 12.30* 9.40*   MONOCYTES 9.70 8.30 6.80   EOSINOPHILS 1.00 1.20 0.70   BASOPHILS 0.30 0.40 0.40   ASTSGOT 14 11* 13   ALTSGPT 20 12 11   ALKPHOSPHAT 58 60 67   TBILIRUBIN 0.3 0.2 0.2       Meds:  • magnesium sulfate  2 g     • thiamine  100 mg      And   • folic acid  1 mg      And   • multivitamin  1 Tab     • NORepinephrine (LEVOPHED) infusion  0-30 mcg/min Stopped (01/17/20 0345)   • Phenylephrine infusion  0-300 mcg/min Stopped (01/18/20 0318)   • ceFAZolin  2 g Stopped (01/19/20 0525)   • oxyCODONE immediate-release  10 mg     • HYDROmorphone  1 mg     • pregabalin  50 mg     • linezolid  600 mg     • senna-docusate  2 Tab      And   • polyethylene glycol/lytes  1 Packet      And   • magnesium hydroxide  30 mL      And   • bisacodyl   10 mg     • LR   150 mL/hr at 01/19/20 0456   • acetaminophen  650 mg          Imaging:  DX-CHEST-LIMITED (1 VIEW)   Final Result         1.  No acute cardiopulmonary disease.      EC-ECHOCARDIOGRAM COMPLETE W/O CONT   Final Result      CT-EXTREMITY, LOWER WITH RIGHT   Final Result         1.  Large abscess in the right lateral thigh, may be within or adjacent to the muscular fascia. Adjacent fluid collection tracking adjacent to this abscess is seen which may be additional early forming abscess.   2.  Foci of air within these fluid collections is seen, appearance suggests gas producing organisms, consider component of necrotizing fasciitis as clinically appropriate.      These findings were discussed with the patient's clinician, SHERI BAINS, on 1/16/2020 8:00 AM.      DX-CHEST-PORTABLE (1 VIEW)   Final Result         1.  No acute cardiopulmonary disease.   2.  Atherosclerosis      US-INGUINAL HERNIA    (Results Pending)       Problem and Plan:    * Septic shock due to Gram positive bacteria (HCC)  Assessment & Plan  This is Sepsis Present on admission  SIRS criteria identified on my evaluation include: Tachycardia, with heart rate greater than 90 BPM and Leukocyosis, with WBC greater than 12,000  Source is right lower extremity, bacteremia  Sepsis protocol initiated  Fluid resuscitation ordered per protocol  IV antibiotics ordered    Maintenance fluid as above  Phenylephrine GTT - has not required for over 24 hours  Cultures pending - repeat blood cultures NGTD  Antibiotics to cover necrotizing process, MSSA  See necrotizing fasciitis problem          MSSA bacteremia  Assessment & Plan  Assessment: Initial blood cultures positive for MSSA bacteremia, this is not a common cause of necrotizing fasciitis and I suspect although seemingly unlikely, there is an entirely different source considering his wound gram stains have shown only streptococcus. Given his history of intravenous drug use it is conceivable that  his bacteremia is due to an endocarditis.  Notably his transthoracic echocardiogram was negative for any vegetations although he did have mild mitral regurgitation of unknown chronicity, which is faintly audible as a systolic murmur on exam.  Unfortunately this is somewhat concerning for endocarditis, although transthoracic echocardiogram is not very sensitive and it is probably too early to evaluate with transesophageal echocardiogram to get the best yield.  Either way, he is currently covered well with cefazolin.    Notably currently only meets 2 minor [facility] criteria.    Plan:  Cefazolin as above  Follow repeat blood cultures, once negative and not requiring pressors can remove central line and place PICC  Consider BERNABE at 5-7 days from first positive blood culture    Necrotizing fasciitis of pelvic region and thigh (HCC)- (present on admission)  Assessment & Plan  Assessment: Patient presents with large abscess (40cm craniocaudially, 4x10cm in other dimensions) in his right lateral thigh and a somewhat subacute history of increasing pain, fever, and chills after a ground level fall where he injured the area. He had elevated WBC and had been tachycardic with atrial fibrillation with RVR. Initially started on Meropenem and Linezolid on admission, went to the OR evening of 1/16/2019 with extensive debridement confirming necrotizing infection. Cultures were obtained and a wound vac was placed.     Subsequently gram stain showed gram positive cocci consistent with streptococcus, which is consistent as this is a common cause of necrotizing fasciitis. His coverage was then narrowed to Cefazolin and Linezolid(for toxin production.) Technically he does not meet criteria for toxic shock(although he is requiring pressors) as he does not have end-organ manifestations, however this is likely a toxin-producing organism with hemodynamic consequences. It is questionable if his staphylococcal bacteremia is related to this  infection.    Cultures positive for viridans strep in wound as well as MSSA.    Plan:  Follow wound culture sensitivities  Appreciate orthopedic intervention, will need repeat debridement in 2-3 days  Continue Cefazolin and Linezolid(to be continued until off pressors and clinically improving)  Pain control with Oxycodone 10mg Q4H, breakthrough with IV morphine.  Pregabalin to minimize opioid use for multimodality pain control  Start aggressive bowel protocol due to heavy narcotic use  Ok to transfer to [facility]    Unilateral inguinal hernia without obstruction or gangrene  Assessment & Plan  Patient has a left inguinal hernia protruding into the scrotum. It can be reduced with some difficulty however is mildly tender, however the patient is unlikely to have good follow-up and it is becoming progressively more difficult to reduce.    U/S inguinal hernia  Consult general surgery while patient is admitted, urgency to depend on further symptoms and ultrasound results    Atrial fibrillation (HCC)  Assessment & Plan  Assessment: New atrial fibrillation with RVR in the setting of sepsis, no previously known history of atrial fibrillation. Likely reactive to his acute process; technically has [id number] of 0(no diagnosed comorbidities although he has poor outpatient follow-up) and would not require anticoagulation.     Rate significantly improved, actually converted to sinus spontaneously 1/17    Plan:  Treat underlying process as above  Will address rate control if he becomes hypotensive or if rate persistently >140    Hyponatremia- (present on admission)  Assessment & Plan  Moderate hyponatremia in the setting of volume depletion, gradually improving with fluids. Urine electrolytes consistent with SIADH, once taking PO will not give further IV fluids. Clinically correcting hyponatremia relatively less vital than maintaining BPs currently.    Chronic hepatitis C virus infection (HCC)- (present on admission)  Assessment &  Plan  Assessment: Patient was antibody positive with viral load 6.2 million. Transaminases normal, ALP slightly high.    Plan:  Will need outpatient follow-up, abstention from further drug use    Methamphetamine use (HCC)- (present on admission)  Assessment & Plan  Counselled on cessation      DISPO: Transfer to [facility]    CODE STATUS: Full    Quality Measures:  Feeding: Regular  Analgesia: Oxy/dilaudid  Sedation: None  Thromboprophylaxis: SCD  Head of bed: >30 degrees  Ulcer prophylaxis: None  Glycemic control: PRN  Bowel care: bowel regimen advancing  Indwelling lines: PIV/Central line  Deescalation of antibiotics: On treatment for necrotizing fasciitis      Earl Morales M.D.

## 2020-01-19 NOTE — PROGRESS NOTES
Patient with complaints of groin pain. Tenderness and swelling noted. History of inguinal hernia. [facility] Resident Saund notified. No new orders. PRN pain medication available.  Will continue to monitor and pass along to day team.

## 2020-01-19 NOTE — CARE PLAN
Problem: Safety  Goal: Will remain free from injury  Outcome: PROGRESSING AS EXPECTED  Note:   Fall precautions in place. Bed in lowest position. Non-skid socks in place. Personal possessions within reach. Mobility sign on door. Bed-alarm on. Call light within reach. Pt educated regarding fall prevention and states understanding.      Problem: Infection  Goal: Will remain free from infection  Outcome: PROGRESSING AS EXPECTED  Note:   + blood cultures 1/16, ID consulted. IV Ancef and PO Zyvox. Repeat cultures pending

## 2020-01-20 ENCOUNTER — APPOINTMENT (OUTPATIENT)
Dept: RADIOLOGY | Facility: MEDICAL CENTER | Age: 60
DRG: 853 | End: 2020-01-20
Attending: STUDENT IN AN ORGANIZED HEALTH CARE EDUCATION/TRAINING PROGRAM
Payer: COMMERCIAL

## 2020-01-20 ENCOUNTER — ANESTHESIA (OUTPATIENT)
Dept: SURGERY | Facility: MEDICAL CENTER | Age: 60
DRG: 853 | End: 2020-01-20
Payer: COMMERCIAL

## 2020-01-20 ENCOUNTER — ANESTHESIA EVENT (OUTPATIENT)
Dept: SURGERY | Facility: MEDICAL CENTER | Age: 60
DRG: 853 | End: 2020-01-20
Payer: COMMERCIAL

## 2020-01-20 LAB
ALBUMIN SERPL BCP-MCNC: 1.8 G/DL (ref 3.2–4.9)
ALBUMIN SERPL BCP-MCNC: 2.3 G/DL (ref 3.2–4.9)
ALBUMIN/GLOB SERPL: 0.7 G/DL
ALBUMIN/GLOB SERPL: 0.8 G/DL
ALP SERPL-CCNC: 66 U/L (ref 30–99)
ALP SERPL-CCNC: 76 U/L (ref 30–99)
ALT SERPL-CCNC: 9 U/L (ref 2–50)
ALT SERPL-CCNC: 9 U/L (ref 2–50)
ANION GAP SERPL CALC-SCNC: 5 MMOL/L (ref 0–11.9)
ANION GAP SERPL CALC-SCNC: 7 MMOL/L (ref 0–11.9)
AST SERPL-CCNC: 13 U/L (ref 12–45)
AST SERPL-CCNC: 17 U/L (ref 12–45)
BASOPHILS # BLD AUTO: 0.5 % (ref 0–1.8)
BASOPHILS # BLD AUTO: 0.5 % (ref 0–1.8)
BASOPHILS # BLD: 0.04 K/UL (ref 0–0.12)
BASOPHILS # BLD: 0.06 K/UL (ref 0–0.12)
BILIRUB SERPL-MCNC: 0.2 MG/DL (ref 0.1–1.5)
BILIRUB SERPL-MCNC: 0.2 MG/DL (ref 0.1–1.5)
BUN SERPL-MCNC: 10 MG/DL (ref 8–22)
BUN SERPL-MCNC: 11 MG/DL (ref 8–22)
CALCIUM SERPL-MCNC: 7.6 MG/DL (ref 8.5–10.5)
CALCIUM SERPL-MCNC: 8.4 MG/DL (ref 8.5–10.5)
CHLORIDE SERPL-SCNC: 98 MMOL/L (ref 96–112)
CHLORIDE SERPL-SCNC: 98 MMOL/L (ref 96–112)
CO2 SERPL-SCNC: 27 MMOL/L (ref 20–33)
CO2 SERPL-SCNC: 29 MMOL/L (ref 20–33)
COMMENT 1642: NORMAL
CREAT SERPL-MCNC: 0.59 MG/DL (ref 0.5–1.4)
CREAT SERPL-MCNC: 0.66 MG/DL (ref 0.5–1.4)
EKG IMPRESSION: NORMAL
EOSINOPHIL # BLD AUTO: 0 K/UL (ref 0–0.51)
EOSINOPHIL # BLD AUTO: 0.14 K/UL (ref 0–0.51)
EOSINOPHIL NFR BLD: 0 % (ref 0–6.9)
EOSINOPHIL NFR BLD: 1.9 % (ref 0–6.9)
ERYTHROCYTE [DISTWIDTH] IN BLOOD BY AUTOMATED COUNT: 48.2 FL (ref 35.9–50)
ERYTHROCYTE [DISTWIDTH] IN BLOOD BY AUTOMATED COUNT: 50.4 FL (ref 35.9–50)
GLOBULIN SER CALC-MCNC: 2.5 G/DL (ref 1.9–3.5)
GLOBULIN SER CALC-MCNC: 3 G/DL (ref 1.9–3.5)
GLUCOSE SERPL-MCNC: 114 MG/DL (ref 65–99)
GLUCOSE SERPL-MCNC: 124 MG/DL (ref 65–99)
HCT VFR BLD AUTO: 28.6 % (ref 42–52)
HCT VFR BLD AUTO: 35.7 % (ref 42–52)
HGB BLD-MCNC: 11.4 G/DL (ref 14–18)
HGB BLD-MCNC: 9.4 G/DL (ref 14–18)
IMM GRANULOCYTES # BLD AUTO: 0.22 K/UL (ref 0–0.11)
IMM GRANULOCYTES # BLD AUTO: 0.26 K/UL (ref 0–0.11)
IMM GRANULOCYTES NFR BLD AUTO: 2.2 % (ref 0–0.9)
IMM GRANULOCYTES NFR BLD AUTO: 2.9 % (ref 0–0.9)
LYMPHOCYTES # BLD AUTO: 0.49 K/UL (ref 1–4.8)
LYMPHOCYTES # BLD AUTO: 0.95 K/UL (ref 1–4.8)
LYMPHOCYTES NFR BLD: 12.7 % (ref 22–41)
LYMPHOCYTES NFR BLD: 4.2 % (ref 22–41)
MAGNESIUM SERPL-MCNC: 1.8 MG/DL (ref 1.5–2.5)
MAGNESIUM SERPL-MCNC: 1.9 MG/DL (ref 1.5–2.5)
MCH RBC QN AUTO: 29 PG (ref 27–33)
MCH RBC QN AUTO: 29.2 PG (ref 27–33)
MCHC RBC AUTO-ENTMCNC: 31.9 G/DL (ref 33.7–35.3)
MCHC RBC AUTO-ENTMCNC: 32.9 G/DL (ref 33.7–35.3)
MCV RBC AUTO: 88.3 FL (ref 81.4–97.8)
MCV RBC AUTO: 91.3 FL (ref 81.4–97.8)
MONOCYTES # BLD AUTO: 0.17 K/UL (ref 0–0.85)
MONOCYTES # BLD AUTO: 0.56 K/UL (ref 0–0.85)
MONOCYTES NFR BLD AUTO: 1.5 % (ref 0–13.4)
MONOCYTES NFR BLD AUTO: 7.5 % (ref 0–13.4)
MORPHOLOGY BLD-IMP: NORMAL
NEUTROPHILS # BLD AUTO: 10.74 K/UL (ref 1.82–7.42)
NEUTROPHILS # BLD AUTO: 5.59 K/UL (ref 1.82–7.42)
NEUTROPHILS NFR BLD: 74.5 % (ref 44–72)
NEUTROPHILS NFR BLD: 91.6 % (ref 44–72)
NRBC # BLD AUTO: 0 K/UL
NRBC # BLD AUTO: 0 K/UL
NRBC BLD-RTO: 0 /100 WBC
NRBC BLD-RTO: 0 /100 WBC
PLATELET # BLD AUTO: 173 K/UL (ref 164–446)
PLATELET # BLD AUTO: 406 K/UL (ref 164–446)
PMV BLD AUTO: 8.9 FL (ref 9–12.9)
PMV BLD AUTO: 9.6 FL (ref 9–12.9)
POTASSIUM SERPL-SCNC: 4.3 MMOL/L (ref 3.6–5.5)
POTASSIUM SERPL-SCNC: 4.3 MMOL/L (ref 3.6–5.5)
PROT SERPL-MCNC: 4.3 G/DL (ref 6–8.2)
PROT SERPL-MCNC: 5.3 G/DL (ref 6–8.2)
RBC # BLD AUTO: 3.24 M/UL (ref 4.7–6.1)
RBC # BLD AUTO: 3.91 M/UL (ref 4.7–6.1)
SODIUM SERPL-SCNC: 132 MMOL/L (ref 135–145)
SODIUM SERPL-SCNC: 132 MMOL/L (ref 135–145)
TROPONIN T SERPL-MCNC: 9 NG/L (ref 6–19)
WBC # BLD AUTO: 11.7 K/UL (ref 4.8–10.8)
WBC # BLD AUTO: 7.5 K/UL (ref 4.8–10.8)

## 2020-01-20 PROCEDURE — A6550 NEG PRES WOUND THER DRSG SET: HCPCS | Performed by: ORTHOPAEDIC SURGERY

## 2020-01-20 PROCEDURE — 700105 HCHG RX REV CODE 258: Performed by: STUDENT IN AN ORGANIZED HEALTH CARE EDUCATION/TRAINING PROGRAM

## 2020-01-20 PROCEDURE — 93005 ELECTROCARDIOGRAM TRACING: CPT | Performed by: STUDENT IN AN ORGANIZED HEALTH CARE EDUCATION/TRAINING PROGRAM

## 2020-01-20 PROCEDURE — 700111 HCHG RX REV CODE 636 W/ 250 OVERRIDE (IP): Performed by: STUDENT IN AN ORGANIZED HEALTH CARE EDUCATION/TRAINING PROGRAM

## 2020-01-20 PROCEDURE — 94760 N-INVAS EAR/PLS OXIMETRY 1: CPT

## 2020-01-20 PROCEDURE — 84484 ASSAY OF TROPONIN QUANT: CPT

## 2020-01-20 PROCEDURE — 160009 HCHG ANES TIME/MIN: Performed by: ORTHOPAEDIC SURGERY

## 2020-01-20 PROCEDURE — 700111 HCHG RX REV CODE 636 W/ 250 OVERRIDE (IP): Performed by: ANESTHESIOLOGY

## 2020-01-20 PROCEDURE — 700105 HCHG RX REV CODE 258

## 2020-01-20 PROCEDURE — 700102 HCHG RX REV CODE 250 W/ 637 OVERRIDE(OP): Performed by: STUDENT IN AN ORGANIZED HEALTH CARE EDUCATION/TRAINING PROGRAM

## 2020-01-20 PROCEDURE — 501445 HCHG STAPLER, SKIN DISP: Performed by: ORTHOPAEDIC SURGERY

## 2020-01-20 PROCEDURE — 160048 HCHG OR STATISTICAL LEVEL 1-5: Performed by: ORTHOPAEDIC SURGERY

## 2020-01-20 PROCEDURE — 700102 HCHG RX REV CODE 250 W/ 637 OVERRIDE(OP): Performed by: INTERNAL MEDICINE

## 2020-01-20 PROCEDURE — 160028 HCHG SURGERY MINUTES - 1ST 30 MINS LEVEL 3: Performed by: ORTHOPAEDIC SURGERY

## 2020-01-20 PROCEDURE — 99233 SBSQ HOSP IP/OBS HIGH 50: CPT | Mod: GC | Performed by: INTERNAL MEDICINE

## 2020-01-20 PROCEDURE — A9270 NON-COVERED ITEM OR SERVICE: HCPCS | Performed by: STUDENT IN AN ORGANIZED HEALTH CARE EDUCATION/TRAINING PROGRAM

## 2020-01-20 PROCEDURE — 85025 COMPLETE CBC W/AUTO DIFF WBC: CPT

## 2020-01-20 PROCEDURE — 93010 ELECTROCARDIOGRAM REPORT: CPT | Performed by: INTERNAL MEDICINE

## 2020-01-20 PROCEDURE — 700101 HCHG RX REV CODE 250: Performed by: ANESTHESIOLOGY

## 2020-01-20 PROCEDURE — 160002 HCHG RECOVERY MINUTES (STAT): Performed by: ORTHOPAEDIC SURGERY

## 2020-01-20 PROCEDURE — A9270 NON-COVERED ITEM OR SERVICE: HCPCS | Performed by: ANESTHESIOLOGY

## 2020-01-20 PROCEDURE — 700101 HCHG RX REV CODE 250: Performed by: STUDENT IN AN ORGANIZED HEALTH CARE EDUCATION/TRAINING PROGRAM

## 2020-01-20 PROCEDURE — 83735 ASSAY OF MAGNESIUM: CPT

## 2020-01-20 PROCEDURE — 160036 HCHG PACU - EA ADDL 30 MINS PHASE I: Performed by: ORTHOPAEDIC SURGERY

## 2020-01-20 PROCEDURE — 700102 HCHG RX REV CODE 250 W/ 637 OVERRIDE(OP): Performed by: ANESTHESIOLOGY

## 2020-01-20 PROCEDURE — 160035 HCHG PACU - 1ST 60 MINS PHASE I: Performed by: ORTHOPAEDIC SURGERY

## 2020-01-20 PROCEDURE — 80053 COMPREHEN METABOLIC PANEL: CPT

## 2020-01-20 PROCEDURE — A9270 NON-COVERED ITEM OR SERVICE: HCPCS | Performed by: INTERNAL MEDICINE

## 2020-01-20 PROCEDURE — 501488 HCHG SUCTION CANN, WOUNDVAC TRAC: Performed by: ORTHOPAEDIC SURGERY

## 2020-01-20 PROCEDURE — 770020 HCHG ROOM/CARE - TELE (206)

## 2020-01-20 PROCEDURE — 160039 HCHG SURGERY MINUTES - EA ADDL 1 MIN LEVEL 3: Performed by: ORTHOPAEDIC SURGERY

## 2020-01-20 PROCEDURE — 0JBL0ZZ EXCISION OF RIGHT UPPER LEG SUBCUTANEOUS TISSUE AND FASCIA, OPEN APPROACH: ICD-10-PCS | Performed by: ORTHOPAEDIC SURGERY

## 2020-01-20 PROCEDURE — 36415 COLL VENOUS BLD VENIPUNCTURE: CPT

## 2020-01-20 RX ORDER — MEPERIDINE HYDROCHLORIDE 25 MG/ML
12.5 INJECTION INTRAMUSCULAR; INTRAVENOUS; SUBCUTANEOUS
Status: DISCONTINUED | OUTPATIENT
Start: 2020-01-20 | End: 2020-01-20 | Stop reason: HOSPADM

## 2020-01-20 RX ORDER — OXYCODONE HCL 5 MG/5 ML
10 SOLUTION, ORAL ORAL
Status: COMPLETED | OUTPATIENT
Start: 2020-01-20 | End: 2020-01-20

## 2020-01-20 RX ORDER — HALOPERIDOL 5 MG/ML
1 INJECTION INTRAMUSCULAR
Status: DISCONTINUED | OUTPATIENT
Start: 2020-01-20 | End: 2020-01-20 | Stop reason: HOSPADM

## 2020-01-20 RX ORDER — SODIUM CHLORIDE 9 MG/ML
INJECTION, SOLUTION INTRAVENOUS
Status: COMPLETED
Start: 2020-01-20 | End: 2020-01-20

## 2020-01-20 RX ORDER — DIPHENHYDRAMINE HYDROCHLORIDE 50 MG/ML
12.5 INJECTION INTRAMUSCULAR; INTRAVENOUS
Status: DISCONTINUED | OUTPATIENT
Start: 2020-01-20 | End: 2020-01-20 | Stop reason: HOSPADM

## 2020-01-20 RX ORDER — DEXTROSE MONOHYDRATE 50 MG/ML
INJECTION, SOLUTION INTRAVENOUS CONTINUOUS
Status: DISCONTINUED | OUTPATIENT
Start: 2020-01-20 | End: 2020-01-21

## 2020-01-20 RX ORDER — METOPROLOL TARTRATE 1 MG/ML
5 INJECTION, SOLUTION INTRAVENOUS ONCE
Status: COMPLETED | OUTPATIENT
Start: 2020-01-20 | End: 2020-01-20

## 2020-01-20 RX ORDER — ONDANSETRON 2 MG/ML
4 INJECTION INTRAMUSCULAR; INTRAVENOUS
Status: COMPLETED | OUTPATIENT
Start: 2020-01-20 | End: 2020-01-20

## 2020-01-20 RX ORDER — IPRATROPIUM BROMIDE AND ALBUTEROL SULFATE 2.5; .5 MG/3ML; MG/3ML
3 SOLUTION RESPIRATORY (INHALATION)
Status: DISCONTINUED | OUTPATIENT
Start: 2020-01-20 | End: 2020-01-20 | Stop reason: HOSPADM

## 2020-01-20 RX ORDER — SODIUM CHLORIDE 9 MG/ML
500 INJECTION, SOLUTION INTRAVENOUS ONCE
Status: COMPLETED | OUTPATIENT
Start: 2020-01-20 | End: 2020-01-20

## 2020-01-20 RX ORDER — OXYCODONE HCL 5 MG/5 ML
5 SOLUTION, ORAL ORAL
Status: COMPLETED | OUTPATIENT
Start: 2020-01-20 | End: 2020-01-20

## 2020-01-20 RX ORDER — ONDANSETRON 2 MG/ML
INJECTION INTRAMUSCULAR; INTRAVENOUS PRN
Status: DISCONTINUED | OUTPATIENT
Start: 2020-01-20 | End: 2020-01-20 | Stop reason: SURG

## 2020-01-20 RX ORDER — HYDROMORPHONE HYDROCHLORIDE 1 MG/ML
0.1 INJECTION, SOLUTION INTRAMUSCULAR; INTRAVENOUS; SUBCUTANEOUS
Status: DISCONTINUED | OUTPATIENT
Start: 2020-01-20 | End: 2020-01-20 | Stop reason: HOSPADM

## 2020-01-20 RX ORDER — LIDOCAINE HYDROCHLORIDE 20 MG/ML
INJECTION, SOLUTION EPIDURAL; INFILTRATION; INTRACAUDAL; PERINEURAL PRN
Status: DISCONTINUED | OUTPATIENT
Start: 2020-01-20 | End: 2020-01-20 | Stop reason: SURG

## 2020-01-20 RX ORDER — ACETAMINOPHEN 500 MG
1000 TABLET ORAL 3 TIMES DAILY
Status: DISCONTINUED | OUTPATIENT
Start: 2020-01-20 | End: 2020-01-31 | Stop reason: HOSPADM

## 2020-01-20 RX ORDER — HYDROMORPHONE HYDROCHLORIDE 1 MG/ML
0.4 INJECTION, SOLUTION INTRAMUSCULAR; INTRAVENOUS; SUBCUTANEOUS
Status: DISCONTINUED | OUTPATIENT
Start: 2020-01-20 | End: 2020-01-20 | Stop reason: HOSPADM

## 2020-01-20 RX ORDER — ACETAMINOPHEN 325 MG/1
650 TABLET ORAL 3 TIMES DAILY
Status: DISCONTINUED | OUTPATIENT
Start: 2020-01-20 | End: 2020-01-20

## 2020-01-20 RX ORDER — HYDROMORPHONE HYDROCHLORIDE 1 MG/ML
0.2 INJECTION, SOLUTION INTRAMUSCULAR; INTRAVENOUS; SUBCUTANEOUS
Status: DISCONTINUED | OUTPATIENT
Start: 2020-01-20 | End: 2020-01-20 | Stop reason: HOSPADM

## 2020-01-20 RX ORDER — SODIUM CHLORIDE, SODIUM LACTATE, POTASSIUM CHLORIDE, CALCIUM CHLORIDE 600; 310; 30; 20 MG/100ML; MG/100ML; MG/100ML; MG/100ML
INJECTION, SOLUTION INTRAVENOUS CONTINUOUS
Status: DISCONTINUED | OUTPATIENT
Start: 2020-01-20 | End: 2020-01-20 | Stop reason: HOSPADM

## 2020-01-20 RX ORDER — DEXAMETHASONE SODIUM PHOSPHATE 4 MG/ML
INJECTION, SOLUTION INTRA-ARTICULAR; INTRALESIONAL; INTRAMUSCULAR; INTRAVENOUS; SOFT TISSUE PRN
Status: DISCONTINUED | OUTPATIENT
Start: 2020-01-20 | End: 2020-01-20 | Stop reason: SURG

## 2020-01-20 RX ADMIN — FENTANYL CITRATE 100 MCG: 50 INJECTION, SOLUTION INTRAMUSCULAR; INTRAVENOUS at 12:52

## 2020-01-20 RX ADMIN — SODIUM CHLORIDE, POTASSIUM CHLORIDE, SODIUM LACTATE AND CALCIUM CHLORIDE: 600; 310; 30; 20 INJECTION, SOLUTION INTRAVENOUS at 06:12

## 2020-01-20 RX ADMIN — CEFAZOLIN SODIUM 2 G: 2 INJECTION, SOLUTION INTRAVENOUS at 21:55

## 2020-01-20 RX ADMIN — THERA TABS 1 TABLET: TAB at 06:08

## 2020-01-20 RX ADMIN — MORPHINE SULFATE 2 MG: 4 INJECTION INTRAVENOUS at 01:36

## 2020-01-20 RX ADMIN — PROPOFOL 200 MG: 10 INJECTION, EMULSION INTRAVENOUS at 12:42

## 2020-01-20 RX ADMIN — SODIUM CHLORIDE 500 ML: 9 INJECTION, SOLUTION INTRAVENOUS at 16:30

## 2020-01-20 RX ADMIN — SODIUM CHLORIDE 1000 ML: 9 INJECTION, SOLUTION INTRAVENOUS at 16:02

## 2020-01-20 RX ADMIN — FENTANYL CITRATE 50 MCG: 0.05 INJECTION, SOLUTION INTRAMUSCULAR; INTRAVENOUS at 13:37

## 2020-01-20 RX ADMIN — ONDANSETRON 4 MG: 2 INJECTION INTRAMUSCULAR; INTRAVENOUS at 12:42

## 2020-01-20 RX ADMIN — HYDROMORPHONE HYDROCHLORIDE 0.4 MG: 1 INJECTION, SOLUTION INTRAMUSCULAR; INTRAVENOUS; SUBCUTANEOUS at 14:02

## 2020-01-20 RX ADMIN — OXYCODONE HYDROCHLORIDE 10 MG: 10 TABLET ORAL at 06:13

## 2020-01-20 RX ADMIN — DEXTROSE MONOHYDRATE: 50 INJECTION, SOLUTION INTRAVENOUS at 17:27

## 2020-01-20 RX ADMIN — METOPROLOL TARTRATE 2.5 MG: 5 INJECTION, SOLUTION INTRAVENOUS at 15:58

## 2020-01-20 RX ADMIN — LINEZOLID 600 MG: 600 TABLET, FILM COATED ORAL at 06:07

## 2020-01-20 RX ADMIN — OXYCODONE HYDROCHLORIDE 10 MG: 5 SOLUTION ORAL at 13:35

## 2020-01-20 RX ADMIN — Medication 100 MG: at 06:07

## 2020-01-20 RX ADMIN — FENTANYL CITRATE 100 MCG: 50 INJECTION, SOLUTION INTRAMUSCULAR; INTRAVENOUS at 12:38

## 2020-01-20 RX ADMIN — LINEZOLID 600 MG: 600 TABLET, FILM COATED ORAL at 17:31

## 2020-01-20 RX ADMIN — AMIODARONE HYDROCHLORIDE 1 MG/MIN: 50 INJECTION, SOLUTION INTRAVENOUS at 17:39

## 2020-01-20 RX ADMIN — HYDROMORPHONE HYDROCHLORIDE 0.2 MG: 1 INJECTION, SOLUTION INTRAMUSCULAR; INTRAVENOUS; SUBCUTANEOUS at 14:08

## 2020-01-20 RX ADMIN — LIDOCAINE HYDROCHLORIDE 5 ML: 20 INJECTION, SOLUTION EPIDURAL; INFILTRATION; INTRACAUDAL at 12:38

## 2020-01-20 RX ADMIN — PREGABALIN 50 MG: 25 CAPSULE ORAL at 06:08

## 2020-01-20 RX ADMIN — FENTANYL CITRATE 50 MCG: 0.05 INJECTION, SOLUTION INTRAMUSCULAR; INTRAVENOUS at 13:33

## 2020-01-20 RX ADMIN — ACETAMINOPHEN 1000 MG: 500 TABLET, FILM COATED ORAL at 17:31

## 2020-01-20 RX ADMIN — HYDROMORPHONE HYDROCHLORIDE 0.4 MG: 1 INJECTION, SOLUTION INTRAMUSCULAR; INTRAVENOUS; SUBCUTANEOUS at 13:56

## 2020-01-20 RX ADMIN — SODIUM CHLORIDE, POTASSIUM CHLORIDE, SODIUM LACTATE AND CALCIUM CHLORIDE: 600; 310; 30; 20 INJECTION, SOLUTION INTRAVENOUS at 00:06

## 2020-01-20 RX ADMIN — AMIODARONE HYDROCHLORIDE 150 MG: 1.5 INJECTION, SOLUTION INTRAVENOUS at 17:18

## 2020-01-20 RX ADMIN — DEXAMETHASONE SODIUM PHOSPHATE 4 MG: 4 INJECTION, SOLUTION INTRA-ARTICULAR; INTRALESIONAL; INTRAMUSCULAR; INTRAVENOUS; SOFT TISSUE at 12:42

## 2020-01-20 RX ADMIN — MORPHINE SULFATE 2 MG: 4 INJECTION INTRAVENOUS at 20:31

## 2020-01-20 RX ADMIN — PREGABALIN 50 MG: 25 CAPSULE ORAL at 17:31

## 2020-01-20 RX ADMIN — CEFAZOLIN SODIUM 2 G: 2 INJECTION, SOLUTION INTRAVENOUS at 06:07

## 2020-01-20 RX ADMIN — FOLIC ACID 1 MG: 1 TABLET ORAL at 06:08

## 2020-01-20 RX ADMIN — SODIUM CHLORIDE 500 ML: 9 INJECTION, SOLUTION INTRAVENOUS at 15:30

## 2020-01-20 RX ADMIN — MORPHINE SULFATE 2 MG: 4 INJECTION INTRAVENOUS at 10:09

## 2020-01-20 RX ADMIN — OXYCODONE HYDROCHLORIDE 10 MG: 10 TABLET ORAL at 17:32

## 2020-01-20 RX ADMIN — ONDANSETRON 4 MG: 2 INJECTION INTRAMUSCULAR; INTRAVENOUS at 13:30

## 2020-01-20 ASSESSMENT — PAIN SCALES - GENERAL: PAIN_LEVEL: 3

## 2020-01-20 ASSESSMENT — ENCOUNTER SYMPTOMS
PALPITATIONS: 0
TINGLING: 0
HEMOPTYSIS: 0
ORTHOPNEA: 0
FOCAL WEAKNESS: 0
DOUBLE VISION: 0
CHILLS: 0
BLURRED VISION: 0
SHORTNESS OF BREATH: 0
VOMITING: 0
ABDOMINAL PAIN: 0
SINUS PAIN: 0
SPEECH CHANGE: 0
NAUSEA: 0
FEVER: 0
SPUTUM PRODUCTION: 0
HEADACHES: 0
BACK PAIN: 0
NECK PAIN: 0
HEARTBURN: 0
BRUISES/BLEEDS EASILY: 0
HALLUCINATIONS: 0
COUGH: 0
DIZZINESS: 0
PHOTOPHOBIA: 0
MYALGIAS: 0
DEPRESSION: 0
SORE THROAT: 0
DIAPHORESIS: 0

## 2020-01-20 ASSESSMENT — LIFESTYLE VARIABLES: EVER_SMOKED: YES

## 2020-01-20 ASSESSMENT — COPD QUESTIONNAIRES
DO YOU EVER COUGH UP ANY MUCUS OR PHLEGM?: NO/ONLY WITH OCCASIONAL COLDS OR INFECTIONS
DURING THE PAST 4 WEEKS HOW MUCH DID YOU FEEL SHORT OF BREATH: NONE/LITTLE OF THE TIME
HAVE YOU SMOKED AT LEAST 100 CIGARETTES IN YOUR ENTIRE LIFE: YES
COPD SCREENING SCORE: 3

## 2020-01-20 NOTE — ANESTHESIA QCDR
2019 [facility] Clinical Data Registry (for Quality Improvement)     Postoperative nausea/vomiting risk protocol (Adult = 18 yrs and Pediatric 3-17 yrs)- (430 and 463)  General inhalation anesthetic (NOT TIVA) with PONV risk factors: Yes  Provision of anti-emetic therapy with at least 2 different classes of agents: Yes   Patient DID NOT receive anti-emetic therapy and reason is documented in Medical Record:  N/A    Multimodal Pain Management- (477)  Non-emergent surgery AND patient age >= 18: No  Use of Multimodal Pain Management, two or more drugs and/or interventions, NOT including systemic opioids:   Exception: Documented allergy to multiple classes of analgesics:     Smoking Abstinence (404)  Patient is current smoker (cigarette, pipe, e-cig, marijuanna): No  Elective Surgery:   Abstinence instructions provided prior to day of surgery:   Patient abstained from smoking on day of surgery:     Pre-Op Beta-Blocker in Isolated CABG (44)  Isolated CABG AND patient age >= 18: No  Beta-blocker admin within 24 hours of surgical incision:   Exception:of medical reason(s) for not administering beta blocker within 24 hours prior to surgical incision (e.g., not  indicated,other medical reason):     PACU assessment of acute postoperative pain prior to Anesthesia Care End- Applies to Patients Age = 18- (ABG7)  Initial PACU pain score is which of the following: < 7/10  Patient unable to report pain score: N/A    Post-anesthetic transfer of care checklist/protocol to PACU/ICU- (426 and 427)  Upon conclusion of case, patient transferred to which of the following locations: PACU/Non-ICU  Use of transfer checklist/protocol: Yes  Exclusion: Service Performed in Patient Hospital Room (and thus did not require transfer): N/A  Unplanned admission to ICU related to anesthesia service up through end of PACU care- (MD51)  Unplanned admission to ICU (not initially anticipated at anesthesia start time): No

## 2020-01-20 NOTE — ANESTHESIA POSTPROCEDURE EVALUATION
Patient: Gonsalo Hunt    Procedure Summary     Date:  01/20/20 Room / Location:  [place] [id number] / SURGERY [facility]    Anesthesia Start:  1238 Anesthesia Stop:  1322    Procedure:  INCISION AND DRAINAGE, WOUND VAC CHANGE (Right Thigh) Diagnosis:  (right thigh wound)    Surgeon:  Amari Blanc M.D. Responsible Provider:  Tevin Zavaleta M.D.    Anesthesia Type:  general ASA Status:  3 - Emergent          Final Anesthesia Type: general  Last vitals  BP   Blood Pressure: 105/62    Temp   36.7 °C (98 °F)    Pulse   Pulse: 81   Resp   18    SpO2   96 %      Anesthesia Post Evaluation    Patient location during evaluation: PACU  Patient participation: complete - patient participated  Level of consciousness: awake and alert  Pain score: 3    Airway patency: patent  Anesthetic complications: no  Cardiovascular status: hemodynamically stable  Respiratory status: acceptable  Hydration status: euvolemic    PONV: none           Nurse Pain Score: 8 (NPRS)

## 2020-01-20 NOTE — PROGRESS NOTES
2 RN skin check cmplete  Sacral skin breakdown. Wound to patient's sacrum, mepilex replaced due to saturation.   Right lateral thigh wound vac in place  Bilateral feet calloused, pt refusing heel float boots at this time.

## 2020-01-20 NOTE — OR NURSING
Pt on room air.  Nausea treated with Zofran, tolerating PO fluids, medication, and lunch box.  Right thigh wound vac compressed, patent and draining scant serosanguineous fluid.  Pain down from 8/10 to 5/10. VSS, afebrile, CRAFT, A/O x4.    No belongings in PACU.

## 2020-01-20 NOTE — PROGRESS NOTES
Two Rn skin check.    Sacrum breakdown, already seen by wound care and picture taken today.-mepilex in place  Right thigh wound with wound vac in place.  Right heel has several calluses and the heel itself is boggy-soft heel lift boots placed. Left foot has several calluses.

## 2020-01-20 NOTE — ANESTHESIA TIME REPORT
Anesthesia Start and Stop Event Times     Date Time Event    1/20/2020 1238 Anesthesia Start     1322 Anesthesia Stop        Responsible Staff  01/20/20    Name Role Begin End    Tevin Zavaleta M.D. Anesth 1238 1322        Preop Diagnosis (Free Text):  Pre-op Diagnosis     right thigh wound        Preop Diagnosis (Codes):    Post op Diagnosis  Necrotizing fasciitis      Premium Reason  Non-Premium    Comments:

## 2020-01-20 NOTE — OR SURGEON
Immediate Post OP Note    PreOp Diagnosis: Right thigh infected open wound    PostOp Diagnosis: same    Procedure(s):  INCISION AND DRAINAGE, WOUND VAC CHANGE - Wound Class: Clean    Surgeon(s):  Amari Blanc M.D.    Anesthesiologist/Type of Anesthesia:  Anesthesiologist: Tevin Zavaleta M.D./General    Surgical Staff:  Circulator: Angela Malave R.N.  Scrub Person: Keyon Acosta    Specimens removed if any:  * No specimens in log *    Estimated Blood Loss: minimal    Findings: see dictation    Complications: none known    PLAN:  --readmit medicine postop  --WBAT RLE  --continue wound vac therapy per wound care team  --continue abx per ID recs        1/20/2020 1:17 PM Amari Blanc M.D.

## 2020-01-20 NOTE — PROGRESS NOTES
Daily Progress Note:     Date of Service: 1/20/2020  Primary Team: [facility] Team   Attending: Jose Lange M.D.   Senior Resident: Dr. Andersen  Intern: Dr. Urbano  Contact:  [phone]    Chief Complaint:   Right thigh wound Infection  MSSA Bacteremia  Hep C Infection    Subjective:  - Mr Hunt was admitted to the Hospital for wound infection.  - Underwent two I&D with wound vac placement.   - Had one BC bottle positive for  MSSA.  - Wound positive for Strep Viridans  - ID following, currently on Cefazolin, Linezolid.  - Wound cultures past 48 hrs negative  - PICC line pending placement  - Patient underwent I&D this afternoon and change of Wound Vac.  - Pain Control adequate.  - Patient has a large Indirect Ingunal Hernia, general surgery recommended out patient follow up for elective surgery.  - Patient went into A Fib but has resolved per Telemetry, likely stress induced from sepsis.  - Telemetry Sinus Rhythm.  - Patient will need SNF to complete treatment when cleared by Medical Team  - Homelessness.    Consultants/Specialty:  Othopaedic  General Surgery  Intensive Care  Infectious Disease     Review of Systems:    Review of Systems   Constitutional: Positive for malaise/fatigue. Negative for chills, diaphoresis and fever.   HENT: Negative for congestion, hearing loss, sinus pain, sore throat and tinnitus.    Eyes: Negative for blurred vision, double vision and photophobia.   Respiratory: Negative for cough, hemoptysis, sputum production and shortness of breath.    Cardiovascular: Negative for chest pain, palpitations and orthopnea.   Gastrointestinal: Negative for abdominal pain, heartburn, nausea and vomiting.   Genitourinary: Negative for dysuria, frequency, hematuria and urgency.   Musculoskeletal: Negative for back pain, joint pain, myalgias and neck pain.        Right thigh pain, wound site.   Skin: Negative for itching and rash.   Neurological: Negative for dizziness, tingling, speech  change, focal weakness and headaches.   Endo/Heme/Allergies: Does not bruise/bleed easily.   Psychiatric/Behavioral: Negative for depression, hallucinations and suicidal ideas.       Objective Data:   Physical Exam:   Vitals:   Temp:  [36.4 °C (97.5 °F)-37.5 °C (99.5 °F)] 36.7 °C (98.1 °F)  Pulse:  [] 134  Resp:  [12-30] 20  BP: ()/(55-67) 110/67  SpO2:  [94 %-99 %] 94 %    Physical Exam  Constitutional:       General: He is not in acute distress.     Appearance: Normal appearance.   HENT:      Head: Normocephalic and atraumatic.      Nose: Nose normal. No congestion or rhinorrhea.      Mouth/Throat:      Mouth: Mucous membranes are moist.   Eyes:      General: No scleral icterus.     Extraocular Movements: Extraocular movements intact.      Conjunctiva/sclera: Conjunctivae normal.      Pupils: Pupils are equal, round, and reactive to light.   Neck:      Musculoskeletal: Normal range of motion and neck supple. No neck rigidity or muscular tenderness.   Cardiovascular:      Rate and Rhythm: Normal rate and regular rhythm.      Pulses: Normal pulses.      Heart sounds: No murmur.   Pulmonary:      Effort: Pulmonary effort is normal. No respiratory distress.      Breath sounds: Normal breath sounds. No stridor. No wheezing, rhonchi or rales.   Abdominal:      General: Bowel sounds are normal. There is no distension.      Palpations: Abdomen is soft. There is no mass.      Tenderness: There is no tenderness. There is no guarding or rebound.      Hernia: No hernia is present.   Genitourinary:     Comments: Large left inguinal hernia.  Musculoskeletal: Normal range of motion.         General: Swelling present.      Right lower leg: No edema.      Left lower leg: No edema.      Comments: Right thigh mild swelling, no erythema notes in the periphery of wound vac.   Skin:     General: Skin is warm.      Coloration: Skin is not jaundiced or pale.      Findings: No erythema.   Neurological:      General: No focal  deficit present.      Mental Status: He is alert and oriented to person, place, and time. Mental status is at baseline.      Cranial Nerves: No cranial nerve deficit.   Psychiatric:         Mood and Affect: Mood normal.         Behavior: Behavior normal.         Thought Content: Thought content normal.         Judgment: Judgment normal.           Labs:   Review    Imaging:    Review      * Septic shock due to Gram positive bacteria (HCC)  Assessment & Plan  This is Sepsis Present on admission  SIRS criteria identified on my evaluation include: Tachycardia, with heart rate greater than 90 BPM and Leukocyosis, with WBC greater than 12,000  Source is right lower extremity, bacteremia  Sepsis protocol initiated  Fluid resuscitation ordered per protocol  IV antibiotics were ordered    Maintenance fluid as above  Phenylephrine GTT - has not required for over 24 hours  Cultures pending - repeat blood cultures NGTD  Antibiotics to cover necrotizing process, MSSA  See necrotizing fasciitis problem.    * Sepsis Resolved          MSSA bacteremia  Assessment & Plan  - MSSA Bacteremia One bottle  - Second Culture 48 hr BC no Growth   - CTM  - PICC line placement safe now    Necrotizing fasciitis of pelvic region and thigh (HCC)- (present on admission)  Assessment & Plan  -  Patient presented with large abscess (40cm craniocaudially, 4x10cm in other dimensions) in his right lateral thigh and a somewhat subacute history of increasing pain, fever, and chills after a ground level fall where he injured the area. - He had elevated WBC.  - Initially started on Meropenem and Linezolid on admission, went to the OR evening of 1/16/2019 with extensive debridement confirming necrotizing infection.   - Cultures were obtained and a wound vac was placed.   - Subsequently gram stain showed gram positive cocci consistent with streptococcus,    -  Cefazolin and Linezolid after above finding  - Cultures positive for viridans strep in wound.  - 3rd  I&D with change of wound vac on 1/20               Plan:    - Follow wound culture sensitivities  - Ortho 3rd I&D on 1/20  - Continue Cefazolin and Linezolid.  - ID following   - Pain control  - CTM    Unilateral recurrent inguinal hernia without obstruction or gangrene  Assessment & Plan  - Large left Inguinal Hernia  - Bowel contents in scrotum  - General surgery recommended outpatient follow up for elective surgery              PLAN:  - Scrotal sling      Atrial fibrillation (HCC)  Assessment & Plan  - Resolved  - CTM  - Telemetry    Hyponatremia- (present on admission)  Assessment & Plan  - Mild Hyponatremia 1/20  Sodium 132  - No CNS symptoms  - CTM    Chronic hepatitis C virus infection (HCC)- (present on admission)  Assessment & Plan  - Patient was antibody positive with viral load 6.2 million.   - Transaminases normal, ALP slightly high.    Plan:  - Outpatient follow-up,   - Abstention from further drug use  - Establish care PCP    Methamphetamine use (HCC)- (present on admission)  Assessment & Plan  - Counselled on cessation.  - PCP establish care

## 2020-01-20 NOTE — CONSULTS
Surgical Consultation    Date: 1/19/2020    Requesting Physician: Dr. Gould  PCP: Pcp Pt States None    CC: Right thigh pain    HPI: This is a 59 y.o. male who is presenting with a right thigh necrotizing soft tissue infection in the setting of IV drug use, hepatitis C, and homelessness.  Reportedly he had an injury to his right thigh from a fall.  He underwent debridement with VAC placement by orthopedic surgery, and another debridement is planned for tomorrow.  Surgery was consulted due to the presence of a chronic left inguinal hernia which reportedly is getting bigger.  It is currently reducible and without obstruction, but visible on an ultrasound which was performed earlier today.  The patient reports that he used to have a hernia strap/belt, but lost it.  Currently he denies any fever, chills, nausea, vomiting, chest pain, shortness of breath, diarrhea, constipation.    Past Medical History:   Diagnosis Date   • Back pain    • Heroin use        Past Surgical History:   Procedure Laterality Date   • IRRIGATION & DEBRIDEMENT ORTHO Right 1/16/2020    Procedure: RIGHT THIGH ABSCESS INCISION AND DRAINAGE, WOUND VAC PLACEMENT;  Surgeon: Amari Blanc M.D.;  Location: SURGERY [facility];  Service: Orthopedics   • OTHER ABDOMINAL SURGERY      gall bladder   • OTHER ORTHOPEDIC SURGERY      laminectomy 1990       Current Facility-Administered Medications   Medication Dose Route Frequency Provider Last Rate Last Dose   • morphine (pf) 4 MG/ML injection 2 mg  2 mg Intravenous Q4HRS PRN Earl Morales M.D.   2 mg at 01/19/20 1429   • ondansetron (ZOFRAN) syringe/vial injection 4 mg  4 mg Intravenous Q4HRS PRN Piero Gould M.D.       • thiamine tablet 100 mg  100 mg Oral DAILY Piero Gould M.D.   100 mg at 01/19/20 1054    And   • folic acid (FOLVITE) tablet 1 mg  1 mg Oral DAILY Piero Gould M.D.   1 mg at 01/19/20 1054    And   • multivitamin (THERAGRAN) tablet 1 Tab  1 Tab Oral DAILY Piero  SHEMAR Gould M.D.   1 Tab at 01/19/20 1054   • ceFAZolin in dextrose (ANCEF) IVPB premix 2 g  2 g Intravenous Q8HRS Earl Morales M.D. 200 mL/hr at 01/19/20 1505 2 g at 01/19/20 1505   • oxyCODONE immediate release (ROXICODONE) tablet 10 mg  10 mg Oral Q4HRS PRN Earl Morales M.D.   10 mg at 01/19/20 1148   • pregabalin (LYRICA) capsule 50 mg  50 mg Oral BID Piero Gould M.D.   50 mg at 01/19/20 0455   • linezolid (ZYVOX) tablet 600 mg  600 mg Oral Q12HRS Earl Morales M.D.   600 mg at 01/19/20 0456   • senna-docusate (PERICOLACE or SENOKOT S) 8.6-50 MG per tablet 2 Tab  2 Tab Oral BID Earl Morales M.D.   2 Tab at 01/19/20 0455    And   • polyethylene glycol/lytes (MIRALAX) PACKET 1 Packet  1 Packet Oral QDAY PRN Earl Morales M.D.   1 Packet at 01/19/20 0726    And   • magnesium hydroxide (MILK OF MAGNESIA) suspension 30 mL  30 mL Oral QDAY PRN Earl Morales M.D.        And   • bisacodyl (DULCOLAX) suppository 10 mg  10 mg Rectal QDAY PRN Earl Morales M.D.       • lactated ringers infusion   Intravenous Continuous Earl Morales M.D. 150 mL/hr at 01/19/20 1220     • acetaminophen (TYLENOL) tablet 650 mg  650 mg Oral Q6HRS PRN Earl Morales M.D.   650 mg at 01/19/20 0212       Social History     Socioeconomic History   • Marital status: Single     Spouse name: Not on file   • Number of children: Not on file   • Years of education: Not on file   • Highest education level: Not on file   Occupational History   • Not on file   Social Needs   • Financial resource strain: Not on file   • Food insecurity:     Worry: Not on file     Inability: Not on file   • Transportation needs:     Medical: Not on file     Non-medical: Not on file   Tobacco Use   • Smoking status: Current Every Day Smoker     Packs/day: 1.00     Types: Cigarettes   • Smokeless tobacco: Never Used   Substance and Sexual Activity   • Alcohol use: Not Currently     Comment: rarely   • Drug use: Yes     Types: Marijuana     Comment:  "previous IV meth   • Sexual activity: Not on file   Lifestyle   • Physical activity:     Days per week: Not on file     Minutes per session: Not on file   • Stress: Not on file   Relationships   • Social connections:     Talks on phone: Not on file     Gets together: Not on file     Attends Voodoo service: Not on file     Active member of club or organization: Not on file     Attends meetings of clubs or organizations: Not on file     Relationship status: Not on file   • Intimate partner violence:     Fear of current or ex partner: Not on file     Emotionally abused: Not on file     Physically abused: Not on file     Forced sexual activity: Not on file   Other Topics Concern   • Not on file   Social History Narrative   • Not on file       Family History   Problem Relation Age of Onset   • No Known Problems Mother    • No Known Problems Father        Allergies:  Codeine and Pcn [penicillins]    Review of Systems:  Negative except as per HPI on 10 point review    Physical Exam:  /64   Pulse 90   Temp 37.3 °C (99.1 °F) (Temporal)   Resp 20   Ht 1.93 m (6' 4\")   Wt 110.5 kg (243 lb 9.7 oz)   SpO2 99%     Constitutional: he is oriented to person, place, and time.  he appears well-developed and well-nourished. No acute distress.   Head: Normocephalic and atraumatic.   Neck: Normal range of motion. Neck supple. No JVD present. No tracheal deviation present.   Cardiovascular: Normal rate, regular rhythm  Pulmonary/Chest: Breathing is nonlabored on room air.  No stridor, no respiratory distress  Abdominal: Soft, nontender, nondistended.  Moderate sized indirect left inguinal hernia which is reducible.  No overlying skin changes.  Some scrotal edema.  Mild tenderness  Musculoskeletal: Right thigh incision with VAC in place, functioning.  Neurological: he is alert and oriented to person, place, and time.  No gross motor or sensory deficit  Skin: Skin is warm and dry. No rash noted. he is not diaphoretic. No " erythema. No pallor.   Psychiatric: he has a depressed mood and affect at this time    Labs:  Recent Labs     01/17/20  0445 01/18/20  0450 01/19/20  0500   WBC 13.1* 7.6 7.5   RBC 3.56* 3.27* 3.11*   HEMOGLOBIN 10.5* 9.5* 9.1*   HEMATOCRIT 31.2* 28.5* 27.7*   MCV 87.6 87.2 89.1   MCH 29.5 29.1 29.3   MCHC 33.7 33.3* 32.9*   RDW 48.5 47.8 47.8   PLATELETCT 567* 363 351   MPV 9.1 8.8* 8.9*     Recent Labs     01/17/20  0445 01/18/20  0450 01/19/20  0500   SODIUM 128* 130* 130*   POTASSIUM 4.1 4.2 4.7   CHLORIDE 96 97 98   CO2 28 31 30   GLUCOSE 128* 107* 138*   BUN 15 9 11   CREATININE 0.54 0.65 0.62   CALCIUM 7.3* 7.4* 7.3*         Recent Labs     01/17/20  0445 01/18/20  0450 01/19/20  0500   ASTSGOT 14 11* 13   ALTSGPT 20 12 11   TBILIRUBIN 0.3 0.2 0.2   ALKPHOSPHAT 58 60 67   GLOBULIN 2.4 2.5 2.4       Radiology:  US-INGUINAL HERNIA   Final Result      Indirect left inguinal hernia containing bowel extending into scrotal sac         DX-CHEST-LIMITED (1 VIEW)   Final Result         1.  No acute cardiopulmonary disease.      EC-ECHOCARDIOGRAM COMPLETE W/O CONT   Final Result      CT-EXTREMITY, LOWER WITH RIGHT   Final Result         1.  Large abscess in the right lateral thigh, may be within or adjacent to the muscular fascia. Adjacent fluid collection tracking adjacent to this abscess is seen which may be additional early forming abscess.   2.  Foci of air within these fluid collections is seen, appearance suggests gas producing organisms, consider component of necrotizing fasciitis as clinically appropriate.      These findings were discussed with the patient's clinician, SHERI BAINS, on 1/16/2020 8:00 AM.      DX-CHEST-PORTABLE (1 VIEW)   Final Result         1.  No acute cardiopulmonary disease.   2.  Atherosclerosis          Assessment: This is a 59 y.o. male admitted for right thigh necrotizing soft tissue infection status post debridement by orthopedic surgery with VAC placement.  I am consulted now for  reducible but fairly large left inguinal hernia which is been chronic.  At this point there is no evidence of bowel obstruction.  There is scrotal edema likely secondary to some resuscitation and immobility.      Recommendations:   -At this time, I would not recommend proceeding with left inguinal hernia repair given recent management of necrotizing soft tissue infection and concerns about potential mesh infection due to transient bacteremia.  The hernia is reducible and nonobstructing.  I would recommend proceeding with elective robotic inguinal hernia repair with mesh on an outpatient basis.  -I provided the patient with a scrotal support device which was obtained in the operating room, but the patient needs a hernia belt.  This was discussed with Dr. Morales and he will check with physical therapy to obtain this.  -Recommend social work consult for help with temporary housing  -Overall, the patient needs to be encouraged to accept some responsibility for his medical condition and show initiative to help his own health.    -Call with any further questions, concerns, updates      Weston Krause M.D.  [facility]  [phone]

## 2020-01-20 NOTE — PROGRESS NOTES
Received report from day shift RN. Assumed patient care  AOx4  Pain rated at 7/10, medicated per MAR  Room air  Right thigh wound vac in place, 125 mmHg, no leaks noted, patent  No complaints of nausea at this time, regular diet. Will be NPO at midnight  Pt refusing to ambulate, q2 turns  +void +flatus +BM  Call light within reach  Bed locked and in low position  POC discussed with patient   All needs met at this time

## 2020-01-20 NOTE — OP REPORT
DATE OF SERVICE:  01/20/2020    PREOPERATIVE DIAGNOSIS:  Right thigh infected open wound, status post   excisional debridement and wound VAC application.    POSTOPERATIVE DIAGNOSIS:  Right thigh infected open wound, status post   excisional debridement and wound VAC application.    PROCEDURES PERFORMED:  1.  Excisional debridement of right thigh wound including skin, subcutaneous   tissue, and muscle through wound measuring 30x10 cm in elliptical shape.  2.  Application of wound VAC greater than 50 square cm, right thigh wound.    INDICATION FOR PROCEDURE:  Patient is a 59-year-old male.  He developed a   necrotizing soft tissue infection.  I took him to the operating room on   01/16/2020 for excision of devitalized and infected skin, subcutaneous tissue   in muscle fashion a small amount of muscle of the quadriceps and applied wound   VAC therapy, was readmitted to the ICU.  I have taken him back on 01/18/2020.    There was some mild residual purulent drainage, but no extensive further   devitalization of soft tissue.  I debrided this wound and then placed another   wound VAC and today the plan is to return to the operating room for another   debridement and evaluation to determine whether there is residual infection.    He signed informed consent preoperatively and he wished to proceed with   surgery as outlined above.      DESCRIPTION OF PROCEDURE:  Patient was met in the preop holding area and his   surgical site was signed.  His consent was confirmed to be accurate.  He was   taken back to the operating room and general anesthesia was induced.  The   right thigh was prepped and draped in the usual sterile fashion.  A formal   time-out was performed to confirm patient's correct name, correct surgical   site, correct procedure and correct laterality.  The wound appeared healthy   and there was viable muscle.  There was no evidence of residual purulent   drainage.  There was just some small amount of devitalized  subcutaneous tissue   and fascia of the iliotibial band that I debrided with a rongeur.  I   thoroughly irrigated out the wound with Pulsavac using 3 liters of normal   saline.  I then applied a wound VAC covering the open wound, which measured   30x10 cm in elliptical shape and used small amount of staples and adhered the   wound VAC sponge to the skin and then achieved a good seal at 125 mmHg low   continuous pressure.  He was then awoken from anesthesia and transferred on   the rGarfield and taken to postanesthesia care unit in stable condition.      PLAN:  1.  Patient will be readmitted to the medical service postop.    2.  He can be weightbearing as tolerated to the right lower extremity.    3.  Recommend continuing wound VAC therapy and I have consulted the inpatient   wound team to start transitioning to wound VAC changes on the floor.    4.  He will likely ultimately require soft tissue coverage with split   thickness skin grafting once we confirm that his infection is sufficiently   treated, he may require plastic surgery consult to assist with this given the   size of the wound.    5.  He should be continued on IV antibiotic therapy as directed by the   infectious disease service.       ____________________________________     MD SUKUMAR Cordero / SUZI    DD:  01/20/2020 13:25:28  DT:  01/20/2020 13:48:20    D#:  [id number]  Job#:  [id number]

## 2020-01-20 NOTE — ASSESSMENT & PLAN NOTE
Reducible in left groin  Ultrasound abdomen/groin pending  Surgical consultation  Monitor for obstruction/strangulation

## 2020-01-20 NOTE — PROGRESS NOTES
Patient received from pacu, hooked back up to  and monitor, Patient Hr noted to be up to 134, verified with tele rate is elevated.  Unable to determine if afib.  Call into [facility].

## 2020-01-20 NOTE — ANESTHESIA PROCEDURE NOTES
Airway  Date/Time: 1/20/2020 12:45 PM  Performed by: Tevin Zavaleta M.D.  Authorized by: Tevin Zavaleta M.D.     Location:  OR  Urgency:  Elective  Indications for Airway Management:  Anesthesia  Spontaneous Ventilation: absent    Sedation Level:  Deep  Preoxygenated: Yes    Mask Difficulty Assessment:  0 - not attempted  Final Airway Type:  Supraglottic airway  Final Supraglottic Airway:  Standard LMA  SGA Size:  4  Number of Attempts at Approach:  1

## 2020-01-20 NOTE — ANESTHESIA PREPROCEDURE EVALUATION
Right thigh infection. Denies problems with anesthesia. Denies angina, dyspnea, GERD.     Relevant Problems   CARDIAC   (+) Atrial fibrillation (HCC)         (+) Chronic hepatitis C virus infection (HCC)      Other   (+) Cannabis use disorder, mild, abuse   (+) Homeless   (+) Methamphetamine use (HCC)   (+) Septic shock due to Gram positive bacteria (HCC)   (+) Smoking       Physical Exam    Airway   Mallampati: II  TM distance: >3 FB  Neck ROM: full       Cardiovascular - normal exam  Rhythm: regular  Rate: normal  (-) murmur     Dental - normal exam      Very poor dentition   Pulmonary - normal exam  Breath sounds clear to auscultation     Abdominal    Neurological - normal exam                 Anesthesia Plan    ASA 3- EMERGENT   ASA physical status 3 criteria: alcohol and/or substance dependence or abuseASA physical status emergent criteria: acutely contaminated wound or identified infection source    Plan - general       Airway plan will be LMA        Induction: intravenous    Postoperative Plan: Postoperative administration of opioids is intended.    Pertinent diagnostic labs and testing reviewed    Informed Consent:    Anesthetic plan and risks discussed with patient.    Use of blood products discussed with: patient whom consented to blood products.

## 2020-01-20 NOTE — PROGRESS NOTES
[facility] ICU Transfer Note                                                                                         ICU Admit Date: 1/16/2020       ICU Discharge Date: 1/19/2020        Primary Diagnosis: Necrotizing fasciitis, MSSA bacteremia      ICU Course Summary (Brief Narrative):  Gonsalo Hunt is a 59 y.o. homeless male with a past medical history significant for intravenous drug use and tobacco use who presented 1/16/2020 with right lower extremity pain and edema and was found to have a necrotizing streptococcal infection.  He was started on intravenous fluids and appropriate broad-spectrum antibiotics upon arrival and underwent debridement and drainage per orthopedic surgery on the evening of admission, however subsequently on 1/17/2020 required initiation of vasopressors to maintain his blood pressure.  Additionally he had blood cultures positive for MSSA.  His antibiotics were narrowed on 1/17/2020 to cefazolin and linezolid to cover his toxic streptococcal infection as well as his MSSA bacteremia.  Infectious disease was consulted for further recommendations. He subsequently underwent a repeat incision and drainage procedure on 1/18/2020 without significant complications. During his hospitalization he was found to be in atrial fibrillation which has not been previously documented for him. He was also found to have a left inguinal hernia which is reducible but associated with some discomfort and difficulty in reduction; general surgery was consulted and recommended outpatient elective robotic surgery with temporization with scrotal sling.       Important Events in the ICU with relevant dates:  - Central Line: 1/17/2020 - 1/19/2020   - Intubation: None   - Pressors: Phenylephrine, now discontinued   - Lundberg catheter: None  - Tube feeding: None  - Antibiotics: Cefazolin/Linezolid   - Other procedures: I&D x2, see above     Things to follow:  -Blood/wound cultures, specifically anaerobic culture  -Can likely  discontinue Linezolid once full source control is achieved  -Recommend down-titration of analgesics over time and maintenance of aggressive bowel protocol  -Consider BERNABE 1/22-1/25 to evaluate for vegetations due to drug use history and MSSA bacteremia without clear source. It is not thought that his necrotizing fasciitis is due to staphylococcus despite growth on drainage culture.  -Discuss atrial fibrillation/anticoagulation, he technically does not merit AC given his his [id number] of 0 but if he has underlying hypertension(does not follow with PCP so has no real history) then anticoagulation would be indicated.  -Midline placed, patient unlikely to discharge with IV antibiotics so decided against PICC for now  -Ensure good follow-up outpatient for hepatitis C, drug use, his inguinal hernia(may strangulate if he does not have it eventually treated)      Earl Morales M.D.

## 2020-01-20 NOTE — DISCHARGE SUMMARY
Internal Medicine Discharge Summary  Note Author: Siria Andersen M.D.       Name Gonsalo Hunt       1960   Age/Sex 59 y.o. male   MRN [id number]         Admit Date:  2020       Discharge Date:   2020    Service:   [facility] Internal Medicine Purple Team  Attending Physician(s):   Alex Obrien M.D., Jose Lange M.D.  Senior Resident(s):   Brock Rai M.D.  Darian Resident(s):   Ander Urbano M.D.  PCP: Pcp Pt States None      Primary Diagnosis:   Septic shock secondary to necrotizing fasciitis of right lower extremity    Secondary Diagnoses:                Principal Problem (Resolved):    Septic shock due to Gram positive bacteria (HCC) POA: Yes  Active Problems:    Unilateral recurrent inguinal hernia without obstruction or gangrene POA: Yes    Methamphetamine use (HCC) POA: Yes    Homeless POA: Yes    Cannabis use disorder, mild, abuse (Chronic) POA: Yes    Chronic hepatitis C virus infection (HCC) POA: Yes    Smoking POA: Yes    Normochromic anemia POA: Yes  Resolved Problems:    Necrotizing fasciitis of pelvic region and thigh (HCC) POA: Yes    MSSA bacteremia POA: Yes    Hyponatremia POA: Yes    Atrial fibrillation (HCC) POA: Yes    Cellulitis of hand, left POA: Yes    Thrombocytosis (HCC) POA: Yes      Hospital Summary (Brief Narrative):       Gonsalo Hunt is a 59 y.o. homeless male with a past medical history significant for intravenous drug use and tobacco use who presented 2020 with right lower extremity pain and edema and was found to have a necrotizing streptococcal infection.  He was started on intravenous fluids and appropriate broad-spectrum antibiotics upon arrival and underwent debridement and drainage per orthopedic surgery on the evening of admission. However, the patient subsequently developed septic shock on 2020 requiring initiation of vasopressors.  Additionally, he had blood cultures positive for MSSA.  His antibiotics were narrowed on 2020  to cefazolin and linezolid to cover his toxic streptococcal infection as well as his MSSA bacteremia.  Infectious disease was consulted for further recommendations. Patient underwent a repeat incision and drainage procedure on 1/18/2020 without significant complications, followed by a third incision and drainage with wound vac change on 1/20/2020. During his ICU stay he was found to be in atrial fibrillation, which had not been previously documented for him, and then after transfer out of the ICU, he had another episode of atrial fibrillation with rapid ventricular response but remained hemodynamically stable.  He was chemically converted with IV amiodarone, which was transitioned to oral form for maintenance and then discontinued on 1/28/2020 following medical stabilization and after a period of being in normal sinus rhythm.  He was also found to have a left inguinal hernia, which was reducible but associated with some discomfort and difficulty in reduction.  General surgery was consulted and recommended outpatient elective robotic surgery with temporization with scrotal sling.  A supportive belt was provided for his hernia.  Once he was able to tolerate wound vac changes with oral pain medication control, he was discharged in stable medical condition to [facility] skilled [facility].      Patient /Hospital Summary (Details -- Problem Oriented) :          * Septic shock due to Gram positive bacteria (HCC)-resolved as of 1/31/2020  Assessment & Plan  As per above.    MSSA bacteremia-resolved as of 1/31/2020  Assessment & Plan  MSSA Bacteremia One bottle 1/16/2020  Blood cultures from 1/18/2020-x2.  Midline was placed 1/21 afternoon.  Continue cefazolin for 2 weeks last day 2/1/2020    Necrotizing fasciitis of pelvic region and thigh (HCC)-resolved as of 1/31/2020  Assessment & Plan  As per above.  Continue wound VAC dressing changes.  Oxycodone as needed for pain associated with changes.    Unilateral  recurrent inguinal hernia without obstruction or gangrene  Assessment & Plan  Large left Inguinal Hernia.  Bowel contents in scrotum.  General surgery recommended outpatient follow up for elective surgery once recovered from infection.    Plan:  Scrotal sling  No heavy lifting  Outpatient follow up with surgery after recovery from this hospitalization  Elective surgery with PCP referral.    Atrial fibrillation (HCC)-resolved as of 1/31/2020  Assessment & Plan  Resolved.   [id number] Score was 0.  Went into atrial fibrillation on 1/20 was cardioverted with amiodarone.  Converted back to Sinus Rhythm on 1/21 at 3:52 am.  Amiodarone discontinued on 1/29 since atrial fibrillation was related to acute illness.    Hyponatremia-resolved as of 1/31/2020  Assessment & Plan  Resolved.    Normochromic anemia  Assessment & Plan  Resolved.    Smoking  Assessment & Plan  Counseled on cessation.    Chronic hepatitis C virus infection (HCC)  Assessment & Plan  Patient was antibody positive with viral load 6.2 million.   Transaminases normal, ALP slightly high.    Plan:  Outpatient follow-up. Consider referral to [facility] following discharge from skilled nursing facility.    Cannabis use disorder, mild, abuse  Assessment & Plan  Counseled patient on discontinuation.    Homeless  Assessment & Plan  Disposition pending following discharge from [facility].    Methamphetamine use (HCC)  Assessment & Plan  Counselled on cessation.  Follow-up with PCP for further monitoring and counseling.    Thrombocytosis (HCC)-resolved as of 1/31/2020  Assessment & Plan  Resolved.  Secondary to active infectious process.      Consultants:     Orthopedic surgery-Amari Blanc M.D.  Infectious diseases-Ritchie Kwong M.D.  General surgery-Weston Krause M.D.      Procedures:        1/16/2020-Incision and drainage of right intramuscular thigh abscess with wound vac placement.  1/17/2020-Central line  placement  1/18/2020-Excisional debridement of right thigh wound including skin, subcutaneous   tissue, and muscle through wound.  Application of wound VAC.  1/20/2020-Excisional debridement of right thigh wound including skin, subcutaneous   tissue, and muscle through wound.  Application of wound VAC.    Imaging/ Testing:      US-EXTREMITY VENOUS UPPER UNILAT LEFT   Final Result      IR-MIDLINE CATHETER INSERTION WO GUIDANCE > AGE 3   Final Result                  Ultrasound-guided midline placement performed by qualified nursing staff    as above.          US-INGUINAL HERNIA   Final Result      Indirect left inguinal hernia containing bowel extending into scrotal sac         DX-CHEST-LIMITED (1 VIEW)   Final Result         1.  No acute cardiopulmonary disease.      EC-ECHOCARDIOGRAM COMPLETE W/O CONT   Final Result      CT-EXTREMITY, LOWER WITH RIGHT   Final Result         1.  Large abscess in the right lateral thigh, may be within or adjacent to the muscular fascia. Adjacent fluid collection tracking adjacent to this abscess is seen which may be additional early forming abscess.   2.  Foci of air within these fluid collections is seen, appearance suggests gas producing organisms, consider component of necrotizing fasciitis as clinically appropriate.      These findings were discussed with the patient's clinician, SHERI BAINS, on 1/16/2020 8:00 AM.      DX-CHEST-PORTABLE (1 VIEW)   Final Result         1.  No acute cardiopulmonary disease.   2.  Atherosclerosis          Discharge Medications:         Medication Reconciliation: Completed       Hunt Gonsalo   Home Medication Instructions ELIUD:[id number]    Printed on:01/31/20 1228   Medication Information                      acetaminophen (TYLENOL) 500 MG Tab  Take 2 Tabs by mouth 3 times a day.             ceFAZolin in dextrose (ANCEF) 2 g/100mL IVPB  100 mL by Intravenous route every 8 hours for 1 day.             gabapentin (NEURONTIN) 100 MG Cap  Take 1 Cap  by mouth 2 Times a Day.             heparin 5000 UNIT/ML Solution  Inject 1 mL as instructed every 8 hours.             lidocaine (LIDODERM) 5 % Patch  Apply 1 Patch to skin as directed every 24 hours.             lidocaine (XYLOCAINE) 4 % Solution  Apply 1 Application to affected area(s) as needed (Wound Care).             miconazole 2%-zinc oxide (INZO) 2 % Cream topical cream  Apply 1 g to affected area(s) every 6 hours. Apply to buttocks.             Multiple Vitamins-Minerals (MULTIVITAMIN PO)  Take 1 Tab by mouth every day.             oxyCODONE immediate release (ROXICODONE) 10 MG immediate release tablet  Take 1 Tab by mouth every 6 hours as needed (For dressing changes) for up to 5 days.             polyethylene glycol/lytes (MIRALAX) Pack  Take 1 Packet by mouth every day.             senna-docusate (PERICOLACE OR SENOKOT S) 8.6-50 MG Tab  Take 2 Tabs by mouth 2 Times a Day.             traZODone (DESYREL) 50 MG Tab  Take 1 Tab by mouth every bedtime.                 Disposition:   [facility]    Diet:   Regular    Activity:   As tolerated    Instructions:      The patient was instructed to return to the ER in the event of worsening symptoms. I have counseled the patient on the importance of compliance and the patient has agreed to proceed with all medical recommendations and follow up plan indicated above.   The patient understands that all medications come with benefits and risks. Risks may include permanent injury or death and these risks can be minimized with close reassessment and monitoring.        Primary Care Provider:    Pcp Pt States None  Discharge summary faxed to primary care provider:  Deferred  Copy of discharge summary given to the patient: Deferred      Follow up appointment details :      [facility]  [address] [facility] [address]  [phone]        [facility] [address] [place]  [address]  [phone]      Please call to schedule your hospital follow up and to establish with a new Primary Care Physician. Thank you.         Pending Studies:        None.    Time spent on discharge day patient visit, preparing discharge paperwork and arranging for patient follow up.    Summary of follow up issues:   -Continue wound vac changes and monitoring of right thigh wound.  -Completion of cefazolin until 1/2/2020.  -Consider restarting amiodarone if recurrent atrial fibrillation.    Discharge Time (Minutes) :    62  Hospital Course Type:  Inpatient Stay >2 midnights      Condition on Discharge  Stable, improve.  Wound healing well with wound vac in place.  ______________________________________________________________________    Interval history/exam for day of discharge:    Patient was tolerating wound VAC dressing changes with oral oxycodone.  He was tolerating a diet.  He was afebrile.  Right thigh wound was clean dry and intact.        Vitals:    01/29/20 1540 01/29/20 2006 01/30/20 0414 01/30/20 0808   BP: (Abnormal) 90/53 (Abnormal) 93/60 101/59 (Abnormal) 98/57   Pulse: 75 73 71 68   Resp: 18 16 16 16   Temp: 36.8 °C (98.3 °F) 36.2 °C (97.2 °F) 36.5 °C (97.7 °F) 36.3 °C (97.4 °F)   TempSrc: Temporal Temporal Temporal Temporal   SpO2: 98% 98% 98% 98%   Weight:       Height:           Physical Exam  Vitals signs and nursing note reviewed.   Constitutional:       General: He is not in acute distress.     Appearance: He is well-developed. He is not diaphoretic.   HENT:      Head: Normocephalic and atraumatic.      Right Ear: External ear normal.      Left Ear: External ear normal.      Nose: Nose normal.      Mouth/Throat:      Pharynx: No oropharyngeal exudate.   Eyes:      General: No scleral icterus.        Right eye: No discharge.         Left eye: No discharge.      Conjunctiva/sclera: Conjunctivae normal.      Pupils: Pupils are equal, round, and reactive to light.   Neck:      Thyroid: No  thyromegaly.      Vascular: No JVD.      Trachea: No tracheal deviation.   Cardiovascular:      Rate and Rhythm: Normal rate and regular rhythm.      Heart sounds: Normal heart sounds. No murmur. No friction rub. No gallop.    Pulmonary:      Effort: Pulmonary effort is normal. No respiratory distress.      Breath sounds: Normal breath sounds. No stridor. No wheezing or rales.   Abdominal:      General: Bowel sounds are normal. There is no distension.      Palpations: Abdomen is soft. There is no mass.      Tenderness: There is no tenderness. There is no guarding or rebound.   Genitourinary:     Comments: Large left inguinal hernia and scrotum.  Musculoskeletal:         General: No tenderness or deformity.   Lymphadenopathy:      Cervical: No cervical adenopathy.   Skin:     General: Skin is warm and dry.      Coloration: Skin is not pale.      Findings: No erythema or rash.      Comments: Right thigh wound with wound VAC in place.  Clean dry and intact.   Neurological:      Mental Status: He is alert and oriented to person, place, and time.      Cranial Nerves: No cranial nerve deficit.      Motor: No abnormal muscle tone.   Psychiatric:         Behavior: Behavior normal.         Thought Content: Thought content normal.         Judgment: Judgment normal.           Most Recent Labs:    Lab Results   Component Value Date/Time    WBC 7.5 01/20/2020 04:47 AM    RBC 3.24 (L) 01/20/2020 04:47 AM    HEMOGLOBIN 9.4 (L) 01/20/2020 04:47 AM    HEMATOCRIT 28.6 (L) 01/20/2020 04:47 AM    MCV 88.3 01/20/2020 04:47 AM    MCH 29.0 01/20/2020 04:47 AM    MCHC 32.9 (L) 01/20/2020 04:47 AM    MPV 8.9 (L) 01/20/2020 04:47 AM    NEUTSPOLYS 74.50 (H) 01/20/2020 04:47 AM    LYMPHOCYTES 12.70 (L) 01/20/2020 04:47 AM    MONOCYTES 7.50 01/20/2020 04:47 AM    EOSINOPHILS 1.90 01/20/2020 04:47 AM    BASOPHILS 0.50 01/20/2020 04:47 AM      Lab Results   Component Value Date/Time    SODIUM 132 (L) 01/20/2020 04:47 AM    POTASSIUM 4.3  01/20/2020 04:47 AM    CHLORIDE 98 01/20/2020 04:47 AM    CO2 29 01/20/2020 04:47 AM    GLUCOSE 124 (H) 01/20/2020 04:47 AM    BUN 10 01/20/2020 04:47 AM    CREATININE 0.59 01/20/2020 04:47 AM    CREATININE 1.0 07/08/2007 02:50 PM      Lab Results   Component Value Date/Time    ALTSGPT 9 01/20/2020 04:47 AM    ASTSGOT 13 01/20/2020 04:47 AM    ALKPHOSPHAT 66 01/20/2020 04:47 AM    TBILIRUBIN 0.2 01/20/2020 04:47 AM    LIPASE 41 10/25/2011 08:00 PM    ALBUMIN 1.8 (L) 01/20/2020 04:47 AM    GLOBULIN 2.5 01/20/2020 04:47 AM    INR 1.21 (H) 01/16/2020 06:45 AM    INR 1.16 (H) 01/16/2020 06:45 AM     Lab Results   Component Value Date/Time    PROTHROMBTM 15.6 (H) 01/16/2020 06:45 AM    PROTHROMBTM 15.1 (H) 01/16/2020 06:45 AM    INR 1.21 (H) 01/16/2020 06:45 AM    INR 1.16 (H) 01/16/2020 06:45 AM

## 2020-01-20 NOTE — PROGRESS NOTES
59yoM with right thigh extensive necrotizing soft tissue infection and abscess s/p I&D on 1/16 AND 1/18.    S: NPO awaiting surgery    O:  Vitals:    01/20/20 0831   BP: 105/62   Pulse: 81   Resp: 18   Temp: 36.7 °C (98 °F)   SpO2: 96%     Exam:  General-NAD, alert and following commands  RLE-thigh VAC c/d/i, NVI distally, mild amount of serosanguinous drainage in vac cannister.    A: 59yoM with right thigh extensive necrotizing soft tissue infection and abscess s/p I&D on 1/16 AND 1/18.    Recs:  --planning repeat I&D and vac change in OR today

## 2020-01-20 NOTE — PROGRESS NOTES
Patient seen this morning in f/u for MSSA bacteremia and necrotizing fasciitis right thigh    HPI: patient presented with fever, low blood pressure and painful right thigh. He states this developed after an injury to it, denied iv drug use but did have amphetamines in his tox screen. On admit, the right thigh was quite diffusely swollen with some induration and there was a large necrotic lesion along the lateral aspect. Dark fluid drainage was noted, no odor. CT scan showed gas in the thigh. Taken to OR for now 2 debridements of necrotic and purulent tissue, and wound vac applied. He feels much better, pain and swelling decreased. Off pressors, only complaint today was of a recurrent hernia left inguinal area    PMH- untreated HCV            Last Fall, had a MSSA hand infection    Allergies ? PCN- told may have had a reaction as a child    Meds- iv cefazolin /oral linezolid    Social- homeless     Review of Systems  Review of Systems   Constitutional:. Negative for chills and fever.     Respiratory: Negative for hemoptysis and shortness of breath.    Cardiovascular: Negative for chest pain and palpitations.   Gastrointestinal: Negative for diarrhea and vomiting. Does have hernia   Genitourinary: Negative for dysuria and hematuria.   Musculoskeletal: leg feels more comfortable, less pain   Skin: no rash    Physical Exam  37.5  108/63   P76  Physical Exam   Constitutional: No distress, comfortable appearing .   HENT:   Head: Normocephalic and atraumatic.   Eyes: Conjunctivae and EOM are normal.   Neck: Normal range of motion. Neck supple.   Cardiovascular: Normal rate and regular rhythm.  I do not hear a murmur   Pulmonary/Chest: Breath sounds normal. No respiratory distress.   Abdominal: Soft. There is no tenderness. , has large hernia in right inguinal area   Musculoskeletal:         General: right thigh much less swollen, no redness or induration    Wound vac in place    Psychiatric: His affect is not  inappropriate. He is not agitated.     LABS:    BLOOD;  Susceptibility      Staphylococcus aureus     TORITO     Ampicillin/sulbactam <=8/4 mcg/mL Sensitive     Azithromycin >4 mcg/mL Resistant     Cefazolin <=8 mcg/mL Sensitive     Ceftaroline <=0.5 mcg/mL Sensitive     Clindamycin <=0.5 mcg/mL Resistant     Daptomycin <=1 mcg/mL Sensitive     Erythromycin >4 mcg/mL Resistant     Oxacillin 0.5 mcg/mL Sensitive     Penicillin >8 mcg/mL Resistant     Pip/Tazobactam <=4 mcg/mL Sensitive     Tetracycline <=4 mcg/mL Sensitive     Trimeth/Sulfa <=0.5/9.5 m... Sensitive     Vancomycin 1 mcg/mL Sensitive      Site Right Thigh Abscess    Culture Result -Abnormal     Gram Stain Result Moderate WBCs.   Many Gram positive cocci.    Culture Result Abnormal    Viridans Streptococcus   Heavy growth     Culture Result Abnormal    Staphylococcus aureus   Rare growth   See previous culture for sensitivity report.     Resulting Agency M         Specimen Collected: 01/16/20 18:09 Last Resulted: 01/18/20 09:51 Lab Flowsheet Order Details View Encounter Lab and Collection Details               Assess:  1. Necrotizing fasciitis of right thigh s/p 2 debridements with excellent clinical improvement, sepsis resolved and leg much improved .Not clear what precipitated this infection. Wound cultures showed predominately strep viridans which does not cause nec fasciitis  2. MSSA bacteremia - source appears to be the leg, with no murmur on exam and neg TTE, seems unlikely to be cardiac.Will need to treat with 2 weeks of iv antibiotics from negative blood cultures  3. HCV - not a candidate for HCV therapy until drug use addressed         REC:             1.PICC line once negative f/u cultures are neg for 48 hours- covers both MSSA and strep viridana           2.  Likely will need SNF for the iv antibiotics and wound care            3. 2 weeks of iv cefazolin once BCs negative'               4. Stop linezolid after last debridement            5. I  would not repeat any cardiac echoes unless blood cultures remain positive

## 2020-01-20 NOTE — CARE PLAN
Problem: Infection  Goal: Will remain free from infection  Outcome: PROGRESSING AS EXPECTED     Pt receiving IV abx per MAR    Problem: Knowledge Deficit  Goal: Knowledge of disease process/condition, treatment plan, diagnostic tests, and medications will improve  Outcome: PROGRESSING AS EXPECTED     Educated patient regarding treatment plan, verbalizes understanding

## 2020-01-21 ENCOUNTER — PATIENT OUTREACH (OUTPATIENT)
Dept: HEALTH INFORMATION MANAGEMENT | Facility: OTHER | Age: 60
End: 2020-01-21

## 2020-01-21 ENCOUNTER — APPOINTMENT (OUTPATIENT)
Dept: RADIOLOGY | Facility: MEDICAL CENTER | Age: 60
DRG: 853 | End: 2020-01-21
Attending: STUDENT IN AN ORGANIZED HEALTH CARE EDUCATION/TRAINING PROGRAM
Payer: COMMERCIAL

## 2020-01-21 LAB
ALBUMIN SERPL BCP-MCNC: 1.8 G/DL (ref 3.2–4.9)
ALBUMIN/GLOB SERPL: 0.7 G/DL
ALP SERPL-CCNC: 58 U/L (ref 30–99)
ALT SERPL-CCNC: 9 U/L (ref 2–50)
ANION GAP SERPL CALC-SCNC: 4 MMOL/L (ref 0–11.9)
AST SERPL-CCNC: 13 U/L (ref 12–45)
BASOPHILS # BLD AUTO: 0.3 % (ref 0–1.8)
BASOPHILS # BLD: 0.02 K/UL (ref 0–0.12)
BILIRUB SERPL-MCNC: 0.1 MG/DL (ref 0.1–1.5)
BUN SERPL-MCNC: 13 MG/DL (ref 8–22)
CALCIUM SERPL-MCNC: 7.7 MG/DL (ref 8.5–10.5)
CHLORIDE SERPL-SCNC: 99 MMOL/L (ref 96–112)
CO2 SERPL-SCNC: 31 MMOL/L (ref 20–33)
CREAT SERPL-MCNC: 0.7 MG/DL (ref 0.5–1.4)
EKG IMPRESSION: NORMAL
EOSINOPHIL # BLD AUTO: 0 K/UL (ref 0–0.51)
EOSINOPHIL NFR BLD: 0 % (ref 0–6.9)
ERYTHROCYTE [DISTWIDTH] IN BLOOD BY AUTOMATED COUNT: 48.7 FL (ref 35.9–50)
GLOBULIN SER CALC-MCNC: 2.5 G/DL (ref 1.9–3.5)
GLUCOSE SERPL-MCNC: 121 MG/DL (ref 65–99)
HCT VFR BLD AUTO: 28 % (ref 42–52)
HGB BLD-MCNC: 9.4 G/DL (ref 14–18)
IMM GRANULOCYTES # BLD AUTO: 0.14 K/UL (ref 0–0.11)
IMM GRANULOCYTES NFR BLD AUTO: 1.9 % (ref 0–0.9)
LYMPHOCYTES # BLD AUTO: 0.84 K/UL (ref 1–4.8)
LYMPHOCYTES NFR BLD: 11.6 % (ref 22–41)
MAGNESIUM SERPL-MCNC: 1.9 MG/DL (ref 1.5–2.5)
MCH RBC QN AUTO: 29.6 PG (ref 27–33)
MCHC RBC AUTO-ENTMCNC: 33.6 G/DL (ref 33.7–35.3)
MCV RBC AUTO: 88.1 FL (ref 81.4–97.8)
MONOCYTES # BLD AUTO: 0.43 K/UL (ref 0–0.85)
MONOCYTES NFR BLD AUTO: 5.9 % (ref 0–13.4)
NEUTROPHILS # BLD AUTO: 5.81 K/UL (ref 1.82–7.42)
NEUTROPHILS NFR BLD: 80.3 % (ref 44–72)
NRBC # BLD AUTO: 0 K/UL
NRBC BLD-RTO: 0 /100 WBC
PLATELET # BLD AUTO: 349 K/UL (ref 164–446)
PMV BLD AUTO: 8.9 FL (ref 9–12.9)
POTASSIUM SERPL-SCNC: 4.6 MMOL/L (ref 3.6–5.5)
PROT SERPL-MCNC: 4.3 G/DL (ref 6–8.2)
RBC # BLD AUTO: 3.18 M/UL (ref 4.7–6.1)
SODIUM SERPL-SCNC: 134 MMOL/L (ref 135–145)
WBC # BLD AUTO: 7.2 K/UL (ref 4.8–10.8)

## 2020-01-21 PROCEDURE — A9270 NON-COVERED ITEM OR SERVICE: HCPCS | Performed by: STUDENT IN AN ORGANIZED HEALTH CARE EDUCATION/TRAINING PROGRAM

## 2020-01-21 PROCEDURE — 93010 ELECTROCARDIOGRAM REPORT: CPT | Performed by: INTERNAL MEDICINE

## 2020-01-21 PROCEDURE — 93005 ELECTROCARDIOGRAM TRACING: CPT | Performed by: INTERNAL MEDICINE

## 2020-01-21 PROCEDURE — 700102 HCHG RX REV CODE 250 W/ 637 OVERRIDE(OP): Performed by: STUDENT IN AN ORGANIZED HEALTH CARE EDUCATION/TRAINING PROGRAM

## 2020-01-21 PROCEDURE — 700111 HCHG RX REV CODE 636 W/ 250 OVERRIDE (IP): Performed by: STUDENT IN AN ORGANIZED HEALTH CARE EDUCATION/TRAINING PROGRAM

## 2020-01-21 PROCEDURE — 700105 HCHG RX REV CODE 258: Performed by: STUDENT IN AN ORGANIZED HEALTH CARE EDUCATION/TRAINING PROGRAM

## 2020-01-21 PROCEDURE — 97161 PT EVAL LOW COMPLEX 20 MIN: CPT

## 2020-01-21 PROCEDURE — 700102 HCHG RX REV CODE 250 W/ 637 OVERRIDE(OP): Performed by: INTERNAL MEDICINE

## 2020-01-21 PROCEDURE — 770020 HCHG ROOM/CARE - TELE (206)

## 2020-01-21 PROCEDURE — 307736 CANISTER GO RENASYS 300ML: Performed by: INTERNAL MEDICINE

## 2020-01-21 PROCEDURE — A9270 NON-COVERED ITEM OR SERVICE: HCPCS | Performed by: INTERNAL MEDICINE

## 2020-01-21 PROCEDURE — 05HY33Z INSERTION OF INFUSION DEVICE INTO UPPER VEIN, PERCUTANEOUS APPROACH: ICD-10-PCS | Performed by: INTERNAL MEDICINE

## 2020-01-21 PROCEDURE — 36415 COLL VENOUS BLD VENIPUNCTURE: CPT

## 2020-01-21 PROCEDURE — 97165 OT EVAL LOW COMPLEX 30 MIN: CPT

## 2020-01-21 PROCEDURE — 76937 US GUIDE VASCULAR ACCESS: CPT

## 2020-01-21 PROCEDURE — 85025 COMPLETE CBC W/AUTO DIFF WBC: CPT

## 2020-01-21 PROCEDURE — 83735 ASSAY OF MAGNESIUM: CPT

## 2020-01-21 PROCEDURE — 99233 SBSQ HOSP IP/OBS HIGH 50: CPT | Mod: GC | Performed by: INTERNAL MEDICINE

## 2020-01-21 PROCEDURE — 80053 COMPREHEN METABOLIC PANEL: CPT

## 2020-01-21 RX ORDER — AMIODARONE HYDROCHLORIDE 200 MG/1
200 TABLET ORAL TWICE DAILY
Status: DISCONTINUED | OUTPATIENT
Start: 2020-01-21 | End: 2020-01-28

## 2020-01-21 RX ORDER — HEPARIN SODIUM 5000 [USP'U]/ML
5000 INJECTION, SOLUTION INTRAVENOUS; SUBCUTANEOUS EVERY 8 HOURS
Status: DISCONTINUED | OUTPATIENT
Start: 2020-01-21 | End: 2020-01-31 | Stop reason: HOSPADM

## 2020-01-21 RX ORDER — AMIODARONE HYDROCHLORIDE 200 MG/1
200 TABLET ORAL DAILY
Status: DISCONTINUED | OUTPATIENT
Start: 2020-02-18 | End: 2020-01-28

## 2020-01-21 RX ORDER — MORPHINE SULFATE 4 MG/ML
4 INJECTION, SOLUTION INTRAMUSCULAR; INTRAVENOUS
Status: DISCONTINUED | OUTPATIENT
Start: 2020-01-21 | End: 2020-01-24

## 2020-01-21 RX ADMIN — PREGABALIN 50 MG: 25 CAPSULE ORAL at 17:40

## 2020-01-21 RX ADMIN — CEFAZOLIN SODIUM 2 G: 2 INJECTION, SOLUTION INTRAVENOUS at 22:30

## 2020-01-21 RX ADMIN — OXYCODONE HYDROCHLORIDE 10 MG: 10 TABLET ORAL at 08:55

## 2020-01-21 RX ADMIN — SENNOSIDES AND DOCUSATE SODIUM 2 TABLET: 8.6; 5 TABLET ORAL at 05:11

## 2020-01-21 RX ADMIN — THERA TABS 1 TABLET: TAB at 05:11

## 2020-01-21 RX ADMIN — MORPHINE SULFATE 2 MG: 4 INJECTION INTRAVENOUS at 22:29

## 2020-01-21 RX ADMIN — ACETAMINOPHEN 1000 MG: 500 TABLET, FILM COATED ORAL at 12:30

## 2020-01-21 RX ADMIN — CEFAZOLIN SODIUM 2 G: 2 INJECTION, SOLUTION INTRAVENOUS at 05:11

## 2020-01-21 RX ADMIN — AMIODARONE HYDROCHLORIDE 0.5 MG/MIN: 50 INJECTION, SOLUTION INTRAVENOUS at 02:30

## 2020-01-21 RX ADMIN — ACETAMINOPHEN 1000 MG: 500 TABLET, FILM COATED ORAL at 17:40

## 2020-01-21 RX ADMIN — HEPARIN SODIUM 5000 UNITS: 5000 INJECTION, SOLUTION INTRAVENOUS; SUBCUTANEOUS at 16:03

## 2020-01-21 RX ADMIN — OXYCODONE HYDROCHLORIDE 10 MG: 10 TABLET ORAL at 13:45

## 2020-01-21 RX ADMIN — FOLIC ACID 1 MG: 1 TABLET ORAL at 05:11

## 2020-01-21 RX ADMIN — PREGABALIN 50 MG: 25 CAPSULE ORAL at 05:11

## 2020-01-21 RX ADMIN — OXYCODONE HYDROCHLORIDE 10 MG: 10 TABLET ORAL at 02:28

## 2020-01-21 RX ADMIN — CEFAZOLIN SODIUM 2 G: 2 INJECTION, SOLUTION INTRAVENOUS at 16:03

## 2020-01-21 RX ADMIN — HEPARIN SODIUM 5000 UNITS: 5000 INJECTION, SOLUTION INTRAVENOUS; SUBCUTANEOUS at 05:49

## 2020-01-21 RX ADMIN — AMIODARONE HYDROCHLORIDE 200 MG: 200 TABLET ORAL at 17:40

## 2020-01-21 RX ADMIN — ACETAMINOPHEN 1000 MG: 500 TABLET, FILM COATED ORAL at 05:11

## 2020-01-21 RX ADMIN — SENNOSIDES AND DOCUSATE SODIUM 2 TABLET: 8.6; 5 TABLET ORAL at 17:41

## 2020-01-21 RX ADMIN — HEPARIN SODIUM 5000 UNITS: 5000 INJECTION, SOLUTION INTRAVENOUS; SUBCUTANEOUS at 21:52

## 2020-01-21 RX ADMIN — AMIODARONE HYDROCHLORIDE 200 MG: 200 TABLET ORAL at 10:50

## 2020-01-21 RX ADMIN — LINEZOLID 600 MG: 600 TABLET, FILM COATED ORAL at 17:41

## 2020-01-21 RX ADMIN — OXYCODONE HYDROCHLORIDE 10 MG: 10 TABLET ORAL at 21:52

## 2020-01-21 RX ADMIN — OXYCODONE HYDROCHLORIDE 10 MG: 10 TABLET ORAL at 17:44

## 2020-01-21 RX ADMIN — Medication 100 MG: at 05:12

## 2020-01-21 RX ADMIN — LINEZOLID 600 MG: 600 TABLET, FILM COATED ORAL at 05:11

## 2020-01-21 SDOH — ECONOMIC STABILITY: TRANSPORTATION INSECURITY
IN THE PAST 12 MONTHS, HAS THE LACK OF TRANSPORTATION KEPT YOU FROM MEDICAL APPOINTMENTS OR FROM GETTING MEDICATIONS?: YES

## 2020-01-21 SDOH — ECONOMIC STABILITY: INCOME INSECURITY: HOW HARD IS IT FOR YOU TO PAY FOR THE VERY BASICS LIKE FOOD, HOUSING, MEDICAL CARE, AND HEATING?: HARD

## 2020-01-21 SDOH — ECONOMIC STABILITY: TRANSPORTATION INSECURITY
IN THE PAST 12 MONTHS, HAS LACK OF TRANSPORTATION KEPT YOU FROM MEETINGS, WORK, OR FROM GETTING THINGS NEEDED FOR DAILY LIVING?: NO

## 2020-01-21 SDOH — ECONOMIC STABILITY: FOOD INSECURITY: WITHIN THE PAST 12 MONTHS, THE FOOD YOU BOUGHT JUST DIDN'T LAST AND YOU DIDN'T HAVE MONEY TO GET MORE.: NEVER TRUE

## 2020-01-21 SDOH — ECONOMIC STABILITY: FOOD INSECURITY: WITHIN THE PAST 12 MONTHS, YOU WORRIED THAT YOUR FOOD WOULD RUN OUT BEFORE YOU GOT MONEY TO BUY MORE.: NEVER TRUE

## 2020-01-21 ASSESSMENT — ENCOUNTER SYMPTOMS
HEARTBURN: 0
HEADACHES: 0
NAUSEA: 0
PHOTOPHOBIA: 0
ORTHOPNEA: 0
FEVER: 0
SORE THROAT: 0
BLURRED VISION: 0
DIAPHORESIS: 0
HEMOPTYSIS: 0
TINGLING: 0
MYALGIAS: 0
SPUTUM PRODUCTION: 0
STRIDOR: 0
DEPRESSION: 0
CHILLS: 0
COUGH: 0
ABDOMINAL PAIN: 0
SHORTNESS OF BREATH: 0
BRUISES/BLEEDS EASILY: 0
DIZZINESS: 0
DOUBLE VISION: 0
VOMITING: 0
FOCAL WEAKNESS: 0
BACK PAIN: 0
PALPITATIONS: 0
NECK PAIN: 0

## 2020-01-21 ASSESSMENT — LIFESTYLE VARIABLES: SUBSTANCE_ABUSE: 0

## 2020-01-21 ASSESSMENT — GAIT ASSESSMENTS
ASSISTIVE DEVICE: FRONT WHEEL WALKER
GAIT LEVEL OF ASSIST: MINIMAL ASSIST
DISTANCE (FEET): 3

## 2020-01-21 ASSESSMENT — COGNITIVE AND FUNCTIONAL STATUS - GENERAL
SUGGESTED CMS G CODE MODIFIER MOBILITY: CL
STANDING UP FROM CHAIR USING ARMS: A LITTLE
DRESSING REGULAR UPPER BODY CLOTHING: A LITTLE
MOVING FROM LYING ON BACK TO SITTING ON SIDE OF FLAT BED: A LITTLE
MOBILITY SCORE: 14
PERSONAL GROOMING: A LITTLE
TOILETING: A LOT
DRESSING REGULAR LOWER BODY CLOTHING: A LOT
SUGGESTED CMS G CODE MODIFIER DAILY ACTIVITY: CK
WALKING IN HOSPITAL ROOM: A LOT
TURNING FROM BACK TO SIDE WHILE IN FLAT BAD: A LOT
DAILY ACTIVITIY SCORE: 15
CLIMB 3 TO 5 STEPS WITH RAILING: A LOT
MOVING TO AND FROM BED TO CHAIR: A LOT
EATING MEALS: A LITTLE
HELP NEEDED FOR BATHING: A LOT

## 2020-01-21 ASSESSMENT — ACTIVITIES OF DAILY LIVING (ADL): TOILETING: INDEPENDENT

## 2020-01-21 NOTE — PROGRESS NOTES
Paged by RN for A Fib with RVR, patient was asymptomatic, BP was dropping less than his baseline down to 70s/50s, had the A fib after the procedure, he was NPO overnight, received 500ml IV fluid bolus, EKG a Fib, mag and K pending, rate was 150s, gave IV metoprolol 2.5mg IV, slowed to 90s-110s, loaded with Amio 150 mg / 10 min then drip 1mg /min IV x6 hours then 0.5 mg / min IV x 18 hours.   CHADVASC score 0.

## 2020-01-21 NOTE — DOCUMENTATION QUERY
"                                                                         [facility]                                                                       Query Response Note      PATIENT:               JANNA ROJAS  ACCT #:                  [id number]  MRN:                     [id number]  :                      1960  ADMIT DATE:       2020 5:57 AM  DISCH DATE:          RESPONDING  PROVIDER #:        [id number]           QUERY TEXT:    Pressure ulcer is documented as follows by the Wound Care RN in the Medical Record.     \"Pressure Injury Coccyx (POA) Stage 2\"    Please specify if you:    NOTE:  If an appropriate response is not listed below, please respond with a new note.    The patient's Clinical Indicators include:   Wound Team Note: Pressure Injury Coccyx (POA) Stage 2, non-healing, clean, dry intact, red, length - 4 cm, width - 4 cm, depth - 0.1 cm, surface area - 16 cm^2  Treatment: Wound team consult, dietary consult; mepilex dressing, low air loss mattress; reposition q 2  Risk Factors: Homeless; failure to thrive;  hx heroin use; pain; active infection  Options provided:   -- Agree with Wound Care RN assessment   -- Disagree with Wound Care RN assessment   -- Unable to determine      Query created by: Rona Wells on 2020 1:15 PM    RESPONSE TEXT:    Agree with Wound Care RN assessment          Electronically signed by:  CHARLES GHOTRA MD 2020 1:24 PM              "

## 2020-01-21 NOTE — PROGRESS NOTES
Community Health Worker Intake  • Social determinates of health intake complete.   • Identified barriers to trouble paying for resources such as care, and housing.  • Contact information provided to Gonsalo Hunt.      CHW Richard met with pt to introduce CCM services. Pt reports no pcp. Informed pt about [facility], [facility], and [facility]. Pt reports possible discharge to rehab. Pt reports no transportation issues getting to medical appmnt. Pt reports he will either ride his bike or take the bus. Informed pt about MTM. Pt reports trouble paying for resources such as care, and housing. Pt reports he is on food stamps. Pt reports currently living in an apartment. Pt reports no support system outside the hospital. Pt reports he feels confident in managing his health after d/c.

## 2020-01-21 NOTE — PROGRESS NOTES
Assumed care of pt, bedside report received from FERNANDO Coello. Call light within reach. Addressed POC with pt, no additional questions at this time.

## 2020-01-21 NOTE — PROGRESS NOTES
Transfer Note:    Patient received from Pacu from Shari.  Per report patient prior to transfer SR 90's HR.  Patient received on the floor. Attached  and tele, vitals taken. At that time it was noted via  that patient HR was rapidly going up and down into 150's and 50's.I called tele who also confirmed patient hr was erratic but couldn't determine at that time what the rate was.  I called MD made aware of situation.  I placed patient on Zoll monitor. I called tele again to find out rhythm and they stated they were about to call me.  Made them aware patient was on Zoll and MD to bedside.  Md arrived. Ordered 500 cc bolus which was given.  Per Zoll patient HR up to 170 and rapidly changing.  Patient BP 80's/70's.  Patient lost consciousness at one point and had to be sternal rubbed for response.  After that we placed patient supine. I called vasquez rapid nurse after I asked MD to call a rapid and he declined.  Rapid nurse to bedside and situation report given.  At this point we were able to administer 2.5 IV metoprolol via rapid nurse.  A second 500 cc NSS bolus was ordered and started.  I asked for patient to be transferred to higher level of care.  Patient to be placed on amio drip and transferred to tele.  Tele nurses to bedside.  Called report to nathalia and patient transferred at that time.   Patient HR at time of transfer down to 100-130's via Zoll and BP still systolicly 80's  Charge nurse at bedside during this time.

## 2020-01-21 NOTE — PROGRESS NOTES
Daily Progress Note:     Date of Service: 1/21/2020  Primary Team: [facility] IM Purple Team   Attending: Jose Lange M.D.   Senior Resident: Dr. Andersen   Intern: Dr. Urbano  Contact:  [phone]    Chief Complaint:   Right thigh wound Infection.  MSSA Bacteremia.  Hep C Infection.    Subjective:   - Yesterday afternoon we received a paged by RN for A Fib w/ RVR, patient's HR was 100's-140's, his blood pressure was borderline low, but he was not symptomatic at all. EKG confirmed A Fib w/ rate 142, Labs were ordered stat, no electrolytes abnormalities or raising trop.  - Patient was given 2 boluses of 500ml NS, 2.5 IV metoprolol, and load dose of Amiodarone. He was sent to Telemetry floor on Amio drip. His vital signs stabilized within an hour, with /71, his rate when down to low 100's. Overnight patient remained asymptomatic with BP WNL, low 100's SBP. He converted to Sinus Rhythm at 03:52 am  - This  Morning he states feeling well, denies any lightheadedness, chest pain, palpitations, SOB or significant pain.  - Morning labs Unremarkable from baseline.  - Morning EKG: Sinus Rhythm, rate 65.  - We will transition to Oral Amiodarone.  - Surgeon does not think any need for future I&D, unless condition worsens or other problems arise, he thinks the wound looks clean, he would required at some point graft.  - Surgeon will address his Hernia repair outpatient once better from his current infection.    Consultants/Specialty:  Orthopaedic   General Surgery  Intensive Care    Review of Systems:    Review of Systems   Constitutional: Negative for chills, diaphoresis and fever.   HENT: Negative for ear pain, hearing loss, nosebleeds, sore throat and tinnitus.    Eyes: Negative for blurred vision, double vision and photophobia.   Respiratory: Negative for cough, hemoptysis, sputum production, shortness of breath and stridor.    Cardiovascular: Negative for chest pain, palpitations and orthopnea.    Gastrointestinal: Negative for abdominal pain, heartburn, nausea and vomiting.   Genitourinary: Negative for dysuria, frequency, hematuria and urgency.   Musculoskeletal: Negative for back pain, myalgias and neck pain.   Skin: Negative for itching and rash.   Neurological: Negative for dizziness, tingling, focal weakness and headaches.   Endo/Heme/Allergies: Does not bruise/bleed easily.   Psychiatric/Behavioral: Negative for depression, substance abuse and suicidal ideas.       Objective Data:   Physical Exam:   Vitals:   Temp:  [36.2 °C (97.1 °F)-36.7 °C (98.1 °F)] 36.4 °C (97.5 °F)  Pulse:  [] 74  Resp:  [12-30] 16  BP: ()/(51-85) 98/57  SpO2:  [94 %-98 %] 98 %     Physical Exam  Constitutional:       General: He is not in acute distress.     Appearance: Normal appearance.   HENT:      Head: Normocephalic and atraumatic.      Nose: Nose normal. No congestion or rhinorrhea.      Mouth/Throat:      Mouth: Mucous membranes are moist.      Pharynx: No oropharyngeal exudate or posterior oropharyngeal erythema.   Eyes:      General: No scleral icterus.     Extraocular Movements: Extraocular movements intact.      Conjunctiva/sclera: Conjunctivae normal.      Pupils: Pupils are equal, round, and reactive to light.   Neck:      Musculoskeletal: Normal range of motion and neck supple. No neck rigidity or muscular tenderness.      Vascular: No carotid bruit.   Cardiovascular:      Rate and Rhythm: Normal rate and regular rhythm.      Heart sounds: No murmur.   Pulmonary:      Effort: Pulmonary effort is normal. No respiratory distress.      Breath sounds: Normal breath sounds. No stridor. No wheezing, rhonchi or rales.   Chest:      Chest wall: No tenderness.   Abdominal:      General: Bowel sounds are normal. There is no distension.      Palpations: Abdomen is soft. There is no mass.      Tenderness: There is no tenderness. There is no guarding.      Hernia: No hernia is present.   Musculoskeletal: Normal  range of motion.         General: No swelling, tenderness or deformity.      Comments: Wound site area clean, no erythema noted.   Skin:     General: Skin is warm.      Coloration: Skin is not jaundiced or pale.      Findings: No erythema or rash.   Neurological:      Mental Status: He is alert.           Labs:   Review    Imaging:   Review    * Septic shock due to Gram positive bacteria (HCC)  Assessment & Plan  This is Sepsis Present on admission  SIRS criteria identified on my evaluation include: Tachycardia, with heart rate greater than 90 BPM and Leukocyosis, with WBC greater than 12,000  Source is right lower extremity, bacteremia  Sepsis protocol initiated  Fluid resuscitation ordered per protocol  IV antibiotics were ordered    Maintenance fluid as above  Phenylephrine GTT - has not required for over 24 hours  Cultures pending - repeat blood cultures NGTD  Antibiotics to cover necrotizing process, MSSA  See necrotizing fasciitis problem.    * Sepsis Resolved  - CTM          MSSA bacteremia  Assessment & Plan  - MSSA Bacteremia One bottle  - Second Culture taken 18t h no Growth do date  - CTM  - PICC line/Midline placement safe now  - PICC line/Midline  today 1/21    Necrotizing fasciitis of pelvic region and thigh (HCC)- (present on admission)  Assessment & Plan  -  Patient presented with large abscess (40cm craniocaudially, 4x10cm in other dimensions) in his right lateral thigh and a somewhat subacute history of increasing pain, fever, and chills after a ground level fall where he injured the area. - He had elevated WBC.  - Initially started on Meropenem and Linezolid on admission, went to the OR evening of 1/16/2019 with extensive debridement confirming necrotizing infection.   - Cultures were obtained and a wound vac was placed.   - Subsequently gram stain showed gram positive cocci consistent with streptococcus,    -  Cefazolin and Linezolid after above finding  - Cultures positive for viridans strep in  wound.  - 3rd I&D with change of wound vac on 1/20  - No more I&D planned by Surgery               Plan:    - Follow wound culture sensitivities  - DC Linezolid  - Continue Cefazolin  - ID following   - Pain control  - CTM  - Placement in the next 24-48 hours if place available    Unilateral recurrent inguinal hernia without obstruction or gangrene  Assessment & Plan  - Large left Inguinal Hernia  - Bowel contents in scrotum  - Seen by Surgery today 1/21  - General surgery recommended outpatient follow up for elective surgery once better from infection.              PLAN:  - Scrotal sling  - No heavy lifting      Atrial fibrillation (HCC)  Assessment & Plan  - Resolved  - A Fib on 1/20 afternoon, was given Metoprolol, Amiodarone loading dose and Drip.  - Converted back to Sinus Rhythm today 1/21 at 3:52 am.  - Morning EKG SR                       PLAN:  - CTM  - Telemetry floor  - On Tele  - Oral Amiodarone  -    Hyponatremia- (present on admission)  Assessment & Plan  - Mild Hyponatremia 1/21  Sodium 134  - No CNS symptoms  - Improving   - CTM    Chronic hepatitis C virus infection (HCC)- (present on admission)  Assessment & Plan  - Patient was antibody positive with viral load 6.2 million.   - Transaminases normal, ALP slightly high.    Plan:  - Outpatient follow-up,   - Abstention from further drug use  - Establish care PCP    Methamphetamine use (HCC)- (present on admission)  Assessment & Plan  - Counselled on cessation.  - PCP establish care

## 2020-01-21 NOTE — THERAPY
"Pt w/ hx of hep c, meth use, heroin use, homeless.  He is s/p fall, sustaining a right thigh wound, which is now s/p I&D w/ a wound vac.  He was indep w/ mobility PTA.  Today, he needs quite a bit of verbal cues in order to participate w/ therapy.  He needs mod assist for bed mob for his RLE.  Min assist to stand.  Refused to attempt ambulation, but did agree to take some sidesteps up to the head of the bed.  PT will follow and address goals to improve mobility.  He will probably need post acute in pt stay somewhere while his wound is healing.      Physical Therapy Evaluation completed.   Bed Mobility:  Supine to Sit: Moderate Assist  Transfers: Sit to Stand: Minimal Assist  Gait: Level Of Assist: Minimal Assist with Front-Wheel Walker     3  Plan of Care: Will benefit from Physical Therapy 3 times per week  Discharge Recommendations: Equipment: Will Continue to Assess for Equipment Needs. Post-acute therapy   Recommend post-acute placement for continued physical therapy services prior to discharge home. Patient can tolerate post-acute therapies at a 5x/week frequency.       See \"Rehab Therapy-Acute\" Patient Summary Report for complete documentation.        "

## 2020-01-21 NOTE — PROGRESS NOTES
Surgery    I was consulted on 1/19/2024 a progressively enlarging but nonobstructing and reducible left inguinal hernia.  I recommended placement of scrotal support/hernia belt system.  Surgery is not indicated at this time, and is contraindicated due to recent bacteremia and management of multiple wounds/necrotizing soft tissue infection.  I will see the patient as an outpatient and schedule him for elective robotic inguinal hernia repair with mesh once the above issues are resolved.    NAD, awake, alert  Breathing nonlabored  Right lower extremity wound VAC in place  Palpable reducible left inguinal hernia without significant tenderness or evidence of obstruction/skin changes      -Again, recommend continued use of a scrotal support/hernia belt system (recommend checking with physical therapy to obtain this device)  -Aggressive bowel regimen as needed to prevent constipation and therefore transient obstruction which could worsen hernia symptom  -Recommend avoiding heavy lifting greater than 15 pounds  -The above was discussed with the patient, bedside nurse, and current hospital care team  -The patient may call to schedule follow-up appointment once he is discharged.  The  will not schedule him if he remains inpatient.    Weston Krause M.D.  [facility]  [phone]

## 2020-01-21 NOTE — PROGRESS NOTES
2 RN skin check complete W/Praful RN.   Devices in place wound vac to right thigh.  Skin assessed under devices n/a.  Confirmed pressure ulcers found on sacrum.  The following interventions in place mepilex, wound vac, Q2 turns, low air loss mattress    Bilateral feet calloused with old popped blisters, pt refusing heel float boots.

## 2020-01-21 NOTE — PROGRESS NOTES
Pt arrived to unit. Pt A&Ox4. He c/o 5/10 pain in leg. Wound vac in place and on. POC discussed, he verbalized understanding. Pt requesting to eat; [facility] paged for orders. Pt currently strict NPO. Pt AFIB on monitors, HR up to 135. Call light within reach

## 2020-01-21 NOTE — PROGRESS NOTES
Called son as per Patient request. Informed of situation with heart per request(Rapid heart rate).  Son asked for more specific information, which patient had not given me any permissions. I gave the son the room number and floor number and directed him to the nurse on his current floor so they could ask patient for permission to discuss his medical issues.  I asked that family member give it some time before calling as there is a lot of things going on right at the moment and allow it to settle.

## 2020-01-21 NOTE — THERAPY
"Occupational Therapy Evaluation completed.   Functional Status:  Pt is a 58 y/o male admitted with R thigh abscess, s/p multiple I&Ds w/ wound vac placement. He was cooperative, however self limiting due to pain needing encouragement throughout. ModA bed mobility. Minesh sit<>stand w/ FWW. MaxA LB dressing. Setup grooming. He was able to take a couple side steps EOB in prep for ADL txfs. He is limited by weakness, fatigue, impaired balance, pain which impacts independence in self care and functional mobility.  Plan of Care: Will benefit from Occupational Therapy 3 times per week  Discharge Recommendations:  Equipment: Will Continue to Assess for Equipment Needs. Recommend post-acute placement for additional occupational therapy services prior to discharge home. Patient can tolerate post-acute therapies at a 5x/week frequency.      See \"Rehab Therapy-Acute\" Patient Summary Report for complete documentation.    "

## 2020-01-21 NOTE — PROGRESS NOTES
Dr. Prater updated about delay in pts amiodarone drip reduction. Pts VSS. No new orders at this time.

## 2020-01-21 NOTE — PROCEDURES
Vascular Access Team    Date of Insertion: 1/21/20  Arm Circumference: n/a  Line Length: 8cm  Line Size: 18g  Vein Occupancy %: 29  Reason for Midline: IV abx, lack of access  Labs: WBC 7.5, , BUN 10, Cr 0.59, GFR >60, INR n/a    Orders confirmed, vessel patency confirmed with ultrasound. Risks and benefits of procedure explained to patient and education regarding line associated bloodstream infections provided. Questions answered.     PowerGlide Midline placed in RUE per licensed provider order with ultrasound guidance. 18g, 8 cm line placed in basilic vein after 1 attempt(s).  Catheter inserted with brisk blood return. Secured with 0cm external from insertion site.  Line flushed without resistance with 10 mL 0.9% normal saline.  Midline secured with Biopatch and Tegaderm.     Midline placement is confirmed by nurse using ultrasound and ability to flush and draw blood. Midline is appropriate for use at this time.  No X-ray is needed for placement confirmation. Pt tolerated procedure well.  Patient condition relayed to unit RN or ordering physician via this post procedure note in the EMR.    Ultrasound images uploaded to PACS and viewable in the EMR - yes  Ultrasound imaged printed and placed in paper chart - no      BARD PowerGlide Midline ref # [id number], Lot # [id number]

## 2020-01-21 NOTE — CARE PLAN
Problem: Pain Management  Goal: Pain level will decrease to patient's comfort goal  Outcome: PROGRESSING AS EXPECTED  Intervention: Educate and implement non-pharmacologic comfort measures. Examples: relaxation, distration, play therapy, activity therapy, massage, etc.  Flowsheets (Taken 1/21/2020 0241)  Intervention: Medication (see MAR); Rest     Problem: Communication  Goal: The ability to communicate needs accurately and effectively will improve  Outcome: PROGRESSING AS EXPECTED  Intervention: [place] patient and significant other/support system to call light to alert staff of needs  Flowsheets (Taken 1/21/2020 0241)  Oriented to:: All of the Following : Location of Bathroom, Visiting Policy, Unit Routine, Call Light and Bedside Controls, Bedside Rail Policy, Smoking Policy, Rights and Responsibilities, Bedside Report, and Patient Education Notebook

## 2020-01-22 LAB
ALBUMIN SERPL BCP-MCNC: 2 G/DL (ref 3.2–4.9)
ALBUMIN/GLOB SERPL: 0.7 G/DL
ALP SERPL-CCNC: 58 U/L (ref 30–99)
ALT SERPL-CCNC: 8 U/L (ref 2–50)
ANION GAP SERPL CALC-SCNC: 9 MMOL/L (ref 0–11.9)
AST SERPL-CCNC: 18 U/L (ref 12–45)
BACTERIA BLD CULT: NORMAL
BASOPHILS # BLD AUTO: 0.6 % (ref 0–1.8)
BASOPHILS # BLD: 0.05 K/UL (ref 0–0.12)
BILIRUB SERPL-MCNC: 0.2 MG/DL (ref 0.1–1.5)
BUN SERPL-MCNC: 16 MG/DL (ref 8–22)
CALCIUM SERPL-MCNC: 7.8 MG/DL (ref 8.5–10.5)
CHLORIDE SERPL-SCNC: 100 MMOL/L (ref 96–112)
CO2 SERPL-SCNC: 25 MMOL/L (ref 20–33)
CREAT SERPL-MCNC: 0.74 MG/DL (ref 0.5–1.4)
EOSINOPHIL # BLD AUTO: 0.04 K/UL (ref 0–0.51)
EOSINOPHIL NFR BLD: 0.5 % (ref 0–6.9)
ERYTHROCYTE [DISTWIDTH] IN BLOOD BY AUTOMATED COUNT: 51 FL (ref 35.9–50)
GLOBULIN SER CALC-MCNC: 2.9 G/DL (ref 1.9–3.5)
GLUCOSE SERPL-MCNC: 120 MG/DL (ref 65–99)
HCT VFR BLD AUTO: 30.8 % (ref 42–52)
HGB BLD-MCNC: 9.8 G/DL (ref 14–18)
IMM GRANULOCYTES # BLD AUTO: 0.15 K/UL (ref 0–0.11)
IMM GRANULOCYTES NFR BLD AUTO: 1.9 % (ref 0–0.9)
LYMPHOCYTES # BLD AUTO: 1.12 K/UL (ref 1–4.8)
LYMPHOCYTES NFR BLD: 14.5 % (ref 22–41)
MCH RBC QN AUTO: 29 PG (ref 27–33)
MCHC RBC AUTO-ENTMCNC: 31.8 G/DL (ref 33.7–35.3)
MCV RBC AUTO: 91.1 FL (ref 81.4–97.8)
MONOCYTES # BLD AUTO: 0.49 K/UL (ref 0–0.85)
MONOCYTES NFR BLD AUTO: 6.3 % (ref 0–13.4)
NEUTROPHILS # BLD AUTO: 5.87 K/UL (ref 1.82–7.42)
NEUTROPHILS NFR BLD: 76.2 % (ref 44–72)
NRBC # BLD AUTO: 0 K/UL
NRBC BLD-RTO: 0 /100 WBC
PLATELET # BLD AUTO: 335 K/UL (ref 164–446)
PMV BLD AUTO: 8.4 FL (ref 9–12.9)
POTASSIUM SERPL-SCNC: 4.5 MMOL/L (ref 3.6–5.5)
PROT SERPL-MCNC: 4.9 G/DL (ref 6–8.2)
RBC # BLD AUTO: 3.38 M/UL (ref 4.7–6.1)
SIGNIFICANT IND 70042: NORMAL
SITE SITE: NORMAL
SODIUM SERPL-SCNC: 134 MMOL/L (ref 135–145)
SOURCE SOURCE: NORMAL
WBC # BLD AUTO: 7.7 K/UL (ref 4.8–10.8)

## 2020-01-22 PROCEDURE — 700102 HCHG RX REV CODE 250 W/ 637 OVERRIDE(OP): Performed by: STUDENT IN AN ORGANIZED HEALTH CARE EDUCATION/TRAINING PROGRAM

## 2020-01-22 PROCEDURE — 700111 HCHG RX REV CODE 636 W/ 250 OVERRIDE (IP): Performed by: STUDENT IN AN ORGANIZED HEALTH CARE EDUCATION/TRAINING PROGRAM

## 2020-01-22 PROCEDURE — 80053 COMPREHEN METABOLIC PANEL: CPT

## 2020-01-22 PROCEDURE — 700111 HCHG RX REV CODE 636 W/ 250 OVERRIDE (IP): Performed by: PHYSICIAN ASSISTANT

## 2020-01-22 PROCEDURE — A9270 NON-COVERED ITEM OR SERVICE: HCPCS | Performed by: STUDENT IN AN ORGANIZED HEALTH CARE EDUCATION/TRAINING PROGRAM

## 2020-01-22 PROCEDURE — 85025 COMPLETE CBC W/AUTO DIFF WBC: CPT

## 2020-01-22 PROCEDURE — A9270 NON-COVERED ITEM OR SERVICE: HCPCS | Performed by: INTERNAL MEDICINE

## 2020-01-22 PROCEDURE — 700101 HCHG RX REV CODE 250: Performed by: ORTHOPAEDIC SURGERY

## 2020-01-22 PROCEDURE — 700102 HCHG RX REV CODE 250 W/ 637 OVERRIDE(OP): Performed by: INTERNAL MEDICINE

## 2020-01-22 PROCEDURE — 97606 NEG PRS WND THER DME>50 SQCM: CPT

## 2020-01-22 PROCEDURE — 770006 HCHG ROOM/CARE - MED/SURG/GYN SEMI*

## 2020-01-22 PROCEDURE — 700105 HCHG RX REV CODE 258

## 2020-01-22 PROCEDURE — 36415 COLL VENOUS BLD VENIPUNCTURE: CPT

## 2020-01-22 PROCEDURE — 99232 SBSQ HOSP IP/OBS MODERATE 35: CPT | Mod: GC | Performed by: INTERNAL MEDICINE

## 2020-01-22 RX ORDER — LIDOCAINE HYDROCHLORIDE 20 MG/ML
20 INJECTION, SOLUTION INFILTRATION; PERINEURAL ONCE
Status: COMPLETED | OUTPATIENT
Start: 2020-01-22 | End: 2020-01-22

## 2020-01-22 RX ORDER — SODIUM CHLORIDE 9 MG/ML
INJECTION, SOLUTION INTRAVENOUS
Status: COMPLETED
Start: 2020-01-22 | End: 2020-01-22

## 2020-01-22 RX ADMIN — OXYCODONE HYDROCHLORIDE 10 MG: 10 TABLET ORAL at 19:11

## 2020-01-22 RX ADMIN — THERA TABS 1 TABLET: TAB at 04:57

## 2020-01-22 RX ADMIN — OXYCODONE HYDROCHLORIDE 10 MG: 10 TABLET ORAL at 14:56

## 2020-01-22 RX ADMIN — CEFAZOLIN SODIUM 2 G: 2 INJECTION, SOLUTION INTRAVENOUS at 20:02

## 2020-01-22 RX ADMIN — AMIODARONE HYDROCHLORIDE 200 MG: 200 TABLET ORAL at 04:57

## 2020-01-22 RX ADMIN — HEPARIN SODIUM 5000 UNITS: 5000 INJECTION, SOLUTION INTRAVENOUS; SUBCUTANEOUS at 04:58

## 2020-01-22 RX ADMIN — FOLIC ACID 1 MG: 1 TABLET ORAL at 04:57

## 2020-01-22 RX ADMIN — MORPHINE SULFATE 4 MG: 4 INJECTION INTRAVENOUS at 09:15

## 2020-01-22 RX ADMIN — LIDOCAINE HYDROCHLORIDE 20 ML: 20 INJECTION, SOLUTION INFILTRATION; PERINEURAL at 10:15

## 2020-01-22 RX ADMIN — PREGABALIN 50 MG: 25 CAPSULE ORAL at 17:23

## 2020-01-22 RX ADMIN — SODIUM CHLORIDE 500 ML: 9 INJECTION, SOLUTION INTRAVENOUS at 14:47

## 2020-01-22 RX ADMIN — OXYCODONE HYDROCHLORIDE 10 MG: 10 TABLET ORAL at 04:57

## 2020-01-22 RX ADMIN — ACETAMINOPHEN 1000 MG: 500 TABLET, FILM COATED ORAL at 17:23

## 2020-01-22 RX ADMIN — CEFAZOLIN SODIUM 2 G: 2 INJECTION, SOLUTION INTRAVENOUS at 14:46

## 2020-01-22 RX ADMIN — CEFAZOLIN SODIUM 2 G: 2 INJECTION, SOLUTION INTRAVENOUS at 04:58

## 2020-01-22 RX ADMIN — Medication 100 MG: at 04:57

## 2020-01-22 RX ADMIN — ACETAMINOPHEN 1000 MG: 500 TABLET, FILM COATED ORAL at 04:57

## 2020-01-22 RX ADMIN — PREGABALIN 50 MG: 25 CAPSULE ORAL at 04:56

## 2020-01-22 RX ADMIN — OXYCODONE HYDROCHLORIDE 10 MG: 10 TABLET ORAL at 09:43

## 2020-01-22 RX ADMIN — SENNOSIDES AND DOCUSATE SODIUM 2 TABLET: 8.6; 5 TABLET ORAL at 17:24

## 2020-01-22 RX ADMIN — LINEZOLID 600 MG: 600 TABLET, FILM COATED ORAL at 04:57

## 2020-01-22 RX ADMIN — HEPARIN SODIUM 5000 UNITS: 5000 INJECTION, SOLUTION INTRAVENOUS; SUBCUTANEOUS at 14:41

## 2020-01-22 RX ADMIN — AMIODARONE HYDROCHLORIDE 200 MG: 200 TABLET ORAL at 17:24

## 2020-01-22 RX ADMIN — SENNOSIDES AND DOCUSATE SODIUM 2 TABLET: 8.6; 5 TABLET ORAL at 04:57

## 2020-01-22 RX ADMIN — ACETAMINOPHEN 1000 MG: 500 TABLET, FILM COATED ORAL at 12:38

## 2020-01-22 RX ADMIN — HEPARIN SODIUM 5000 UNITS: 5000 INJECTION, SOLUTION INTRAVENOUS; SUBCUTANEOUS at 22:00

## 2020-01-22 ASSESSMENT — ENCOUNTER SYMPTOMS
NECK PAIN: 0
WEAKNESS: 0
SHORTNESS OF BREATH: 0
COUGH: 0
SORE THROAT: 0
HEMOPTYSIS: 0
BLURRED VISION: 0
MYALGIAS: 0
BRUISES/BLEEDS EASILY: 0
SINUS PAIN: 0
PHOTOPHOBIA: 0
HALLUCINATIONS: 0
ABDOMINAL PAIN: 0
ORTHOPNEA: 0
HEADACHES: 0
PALPITATIONS: 0
DIAPHORESIS: 0
FOCAL WEAKNESS: 0
NAUSEA: 0
DOUBLE VISION: 0
BACK PAIN: 0
SPEECH CHANGE: 0
SPUTUM PRODUCTION: 0
HEARTBURN: 0
CHILLS: 0
FEVER: 0
DIZZINESS: 0
VOMITING: 0
DEPRESSION: 0

## 2020-01-22 NOTE — WOUND TEAM
[facility]  Inpatient Services  Wound and Skin Care Evaluation    Admission Date: 1/16/2020     HPI, PMH, SH: Reviewed    Unit where seen by Wound Team: [id number]     WOUND Follow Up RELATED TO:   Right Lateral Thigh NPWT dressing change     Self Report / Pain Level:  Pain 10/10. Pre medicated w/ IV and PO pain medication prior to dressing change.      OBJECTIVE:  NPWT dressing in place. No leaks or drainage noted. Sacral mepilex on sacrum noted.     WOUND TYPE, LOCATION, CHARACTERISTICS (Pressure Injuries: location, stage, POA or date identified          Negative Pressure Wound Therapy Leg Upper;Other (Comment) (Active)   NPWT Pump Mode / Pressure Setting Continuous;125 mmHg    Dressing Type Black foam;Large    Number of Foam Pieces Used 4    Canister Changed No    Output (mL) 50 mL      Wound 01/16/20 Other (comment) Leg Right lateral Open complicated surgical wound (Active)   Wound Image      Site Assessment Red;Muscle & Tendon    Toshia-wound Assessment Pink;Bleeding    Margins Unattached edges;Defined edges    Wound Length (cm) 31.5 cm    Wound Width (cm) 14 cm    Wound Depth (cm) 1.5 cm    Wound Surface Area (cm^2) 441 cm^2    Undermining 2 cm    Closure Secondary intention    Drainage Amount Moderate    Drainage Description Sanguineous    Non-staged Wound Description Full thickness    Treatments Cleansed;Site care    Cleansing Normal Saline Irrigation    Periwound Protectant Benzoin;Drape    Dressing Options Wound Vac    Dressing Cleansing/Solutions Not Applicable    Dressing Changed Changed    Dressing Status Clean;Dry;Intact    Dressing Change Frequency Monday, Wednesday, Friday    NEXT Dressing Change  01/24/20    NEXT Weekly Photo (Inpatient Only) 01/29/20    WOUND NURSE ONLY - Odor None    WOUND NURSE ONLY - Exposed Structures Muscle;Tendon;Connective tissue    WOUND NURSE ONLY - Tissue Type and Percentage muscle and tendon exposed            Vascular:    Dorsal Pedal pulses:  NA  Posterior  tib pulses:   NA    SILVIA:      NA    Lab Values:    Lab Results   Component Value Date/Time    WBC 7.7 01/22/2020 02:17 AM    RBC 3.38 (L) 01/22/2020 02:17 AM    HEMOGLOBIN 9.8 (L) 01/22/2020 02:17 AM    HEMATOCRIT 30.8 (L) 01/22/2020 02:17 AM      Results from last 7 days   Lab Units 01/16/20  0645   C REACTIVE PROTEIN 4596 mg/dL 12.38*     Results from last 7 days   Lab Units 01/16/20  0645   SED RATE WESTERGREN 1526 mm/hour 34*     Lab Results   Component Value Date/Time    HBA1C 5.3 10/09/2019 11:16 AM      Culture:   NA    INTERVENTIONS BY WOUND TEAM:  Patient seen at bedside w/ Fadia Qureshi wound care tech. NS instill into foam. Drape carefully removed. 15 Staples removed from foam that is attached to pt's skin. Lidocaine was then dribbled into foam and left on for about 10 minutes before foam was removed. No retained foam noted. Cleansed wound w/ NS and gauze. Measurements and picture done. Pt noted to have undermining throughout, 2 cm noted at deepest. Benzoin and drape to periwound. 4 pieces of adaptic applied to wound bed. 4 pieces of foam used to fill wound bed, foam secured w/ drape. Small window was cut and trac pad was applied over foam. Pt reconnected back to 125 mmhg cont suction. No leaks or drainage noted. Sacral mepilex applied to right medial thigh and tubing was anchored to mepilex.     Interdisciplinary consultation: Patient, Bedside RN ([place]),     EVALUATION: Pt is an older homeless gentleman who is Hep C+. Pt was admitted with R thigh pain and found to have a large wound with slough/eschar. Per report the wound was caused by Heroin use. Pt immediately under went surgical intervention and then returned to the OR again on the 18th for further I&D with wound vac. Pt scheduled to return to the OR on 1/20 for further debridement. 1/22, wound team did the first NPWT dressing change. Pt has visible muscle and tendon. Adaptic applied to wound bed, pt had significant amount of pain w/ foam removal. NPWT  dressing to assist w/ tissue granulation and drainage control.     Goals: Steady decrease in wound area and depth weekly.    NURSING PLAN OF CARE ORDERS (X):  Dressing changes: See Dressing Care orders: X  Skin care: See Skin Care orders: X  Rectal tube care: See Rectal Tube Care orders:        Other orders:          WOUND TEAM PLAN OF CARE (X):   Dressing changes by wound team:          Follow up 1-2 times weekly:               Follow up 3 times weekly:                NPWT change 3 times weekly:X currently MWF.      Follow up as needed:    Other (explain):     Anticipated discharge plans (X):   LTACH:        SNF/Rehab: X, Pt will require additional outpatient wound care to the right lateral thigh wound. Per chart review SNF referal ordered.        Home Care:           Outpatient Wound Center:            Self Care:            Other:

## 2020-01-22 NOTE — CARE PLAN
Problem: Safety  Goal: Will remain free from injury  Outcome: PROGRESSING AS EXPECTED  Fall risk assessed every shift and fall precautions in place.  Pt educated to not get up without assistance.  Verbalized understanding.      Problem: Skin Integrity  Goal: Risk for impaired skin integrity will decrease  Outcome: PROGRESSING AS EXPECTED   Yash score completed every shift, pt turned every 2 hours, skin assessment complete, adequate nutrition encouraged.

## 2020-01-22 NOTE — PROGRESS NOTES
Bedside report received, assumed pt care @2307. Pt A&Ox4. He c/o pain in R leg; medicated per mar. POC discussed, he verbalized understanding. Pt able to move himself in bed. Pt reminded and educated on importance of frequent turns to help prevent skin breakdown. Pt refuses pillow turns. Call light within reach

## 2020-01-22 NOTE — PROGRESS NOTES
"   Orthopaedic Progress Note    Interval changes:  -Wound team to initiate 3x week vac dressing changes at bedside  -PRN morphine order place to facilitate dressing change tolerance  -Will need STSG once wound bed granulated enough  -Once tolerating bedside vac changes without morphine may make sense to DC to SNF until wound granulated enough for graft    ROS - Patient denies any new issues except per above.    BP (!) 98/57   Pulse 74   Temp 36.4 °C (97.5 °F) (Temporal)   Resp 16   Ht 1.93 m (6' 4\")   Wt 110.3 kg (243 lb 2.7 oz)   SpO2 98%       Patient seen and examined  No acute distress  Breathing non labored  RRR  R thigh vac dressing CDI with no leak, DNVI, moves all toes, cap refill <2 sec.     Recent Labs     01/20/20  0447 01/20/20  1610 01/21/20  0245   WBC 7.5 11.7* 7.2   RBC 3.24* 3.91* 3.18*   HEMOGLOBIN 9.4* 11.4* 9.4*   HEMATOCRIT 28.6* 35.7* 28.0*   MCV 88.3 91.3 88.1   MCH 29.0 29.2 29.6   MCHC 32.9* 31.9* 33.6*   RDW 48.2 50.4* 48.7   PLATELETCT 406 173 349   MPV 8.9* 9.6 8.9*       Active Hospital Problems    Diagnosis   • MSSA bacteremia [R78.81]     Priority: High   • Septic shock due to Gram positive bacteria (HCC) [A41.89, R65.21]     Priority: High   • Necrotizing fasciitis of pelvic region and thigh (HCC) [M72.6]     Priority: High   • Unilateral recurrent inguinal hernia without obstruction or gangrene [K40.91]     Priority: Medium   • Atrial fibrillation (HCC) [I48.91]     Priority: Medium   • Hyponatremia [E87.1]     Priority: Medium   • Normochromic anemia [D64.9]     Priority: Low   • Thrombocytosis (HCC) [D47.3]     Priority: Low   • Chronic hepatitis C virus infection (HCC) [B18.2]     Priority: Low   • Smoking [F17.200]     Priority: Low   • Cannabis use disorder, mild, abuse [F12.10]     Priority: Low   • Homeless [Z59.0]     Priority: Low   • Methamphetamine use (HCC) [F15.10]     Priority: Low   • Cellulitis of hand, left [L03.114]     Priority: Low "       Assessment/Plan:  POD#1 S/P  1.  Excisional debridement of right thigh wound including skin, subcutaneous tissue, and muscle through wound measuring 30x10 cm in elliptical shape.  2.  Application of wound VAC greater than 50 square cm, right thigh wound  POD#3 S/P:  1.  Incision and drainage of right intramuscular thigh abscess.  2.  Excisional debridement of right thigh wound including skin, subcutaneous tissue and muscle through wound measuring 30x10 cm.  3.  Application of wound VAC greater than 50 sq cm.  Wt bearing status - WBAT  Wound care/Drains - vac in place- wound team to change 3x week  Future Procedures - Repeat I&D possible closure this weekend   Sutures/Staples out- pending closure type  PT/OT-initiated  Antibiotics: ancef 2g IV q8  DVT Prophylaxis- TEDS/SCDs/Foot pumps  Lundberg-none  Case Coordination for Discharge Planning - Disposition pending abx needs

## 2020-01-22 NOTE — PROGRESS NOTES
Pt is refusing the Scrotal sling at this time stating that it is digging into his skin. Pt aware of the importance on wearing the sling. Pt's pain was medicated.

## 2020-01-22 NOTE — CARE PLAN
Problem: Nutritional:  Goal: Achieve adequate nutritional intake  Description  Patient will consume 50% of meals   Outcome: MET    PO intake is adequate at % of recorded meals since 1/19/20. Pt is receiving double portions and Boost Plus TID. Pt has an abscess with I&D on leg with wound vac and stage 2 on coccyx. Current PO intake should meet nutrition needs for wound healing. RD will follow weekly.

## 2020-01-22 NOTE — PROGRESS NOTES
Daily Progress Note:     Date of Service: 1/22/2020  Primary Team: [facility] Team   Attending: Jose Lange M.D.   Senior Resident: Dr. Andersen  Intern: Dr. Urbano  Contact:  [phone]    Chief Complaint:   Right thigh wound Infection.  MSSA Bacteremia.  Hep C Infection.    Subjective:  - No acute events overnight reported.  - Patient continues in Sinus Rhythm.  - Patient alert and oriented times 3, denies SOB, chest pain, palpitations, fever or chills, she stills complains of moderate pain in his right thigh, but it seems more tolerable.  - Surgery not planning on doing more I&D.  - Hernia repair will be addressed after he recovers from this hospitalization problem.  - Patient will be transferred to the Medical Floor.  - SNF order placed.  - Patient had already a Mid-line catheter to continue his antibiotic treatment outpatient when accepted by a facility.    Consultants/Specialty:  Orthopaedic  Surgery  Critical Care  Wound care    Review of Systems:    Review of Systems   Constitutional: Positive for malaise/fatigue. Negative for chills, diaphoresis and fever.   HENT: Negative for hearing loss, nosebleeds, sinus pain, sore throat and tinnitus.    Eyes: Negative for blurred vision, double vision and photophobia.   Respiratory: Negative for cough, hemoptysis, sputum production and shortness of breath.    Cardiovascular: Negative for chest pain, palpitations and orthopnea.   Gastrointestinal: Negative for abdominal pain, heartburn, nausea and vomiting.   Genitourinary: Negative for dysuria, frequency, hematuria and urgency.   Musculoskeletal: Negative for back pain, myalgias and neck pain.   Skin: Negative for itching and rash.   Neurological: Negative for dizziness, speech change, focal weakness, weakness and headaches.   Endo/Heme/Allergies: Does not bruise/bleed easily.   Psychiatric/Behavioral: Negative for depression, hallucinations and suicidal ideas.       Objective Data:   Physical Exam:    Vitals:   Temp:  [36 °C (96.8 °F)-36.5 °C (97.7 °F)] 36 °C (96.8 °F)  Pulse:  [76-82] 79  Resp:  [16-17] 16  BP: ()/(53-61) 98/61  SpO2:  [95 %-98 %] 96 %     Physical Exam  Constitutional:       General: He is not in acute distress.     Appearance: Normal appearance. He is not diaphoretic.   HENT:      Head: Normocephalic and atraumatic.      Nose: Nose normal. No congestion or rhinorrhea.      Mouth/Throat:      Mouth: Mucous membranes are moist.      Pharynx: Oropharynx is clear. No oropharyngeal exudate or posterior oropharyngeal erythema.   Eyes:      General: No scleral icterus.     Extraocular Movements: Extraocular movements intact.      Conjunctiva/sclera: Conjunctivae normal.      Pupils: Pupils are equal, round, and reactive to light.   Neck:      Musculoskeletal: Normal range of motion and neck supple. No neck rigidity or muscular tenderness.   Cardiovascular:      Rate and Rhythm: Normal rate and regular rhythm.      Heart sounds: No murmur.   Pulmonary:      Effort: Pulmonary effort is normal. No respiratory distress.      Breath sounds: Normal breath sounds. No stridor. No wheezing, rhonchi or rales.   Abdominal:      General: Bowel sounds are normal. There is no distension.      Palpations: There is no mass.      Tenderness: There is no tenderness. There is no guarding or rebound.   Genitourinary:     Comments: Large left Inguinal hernia.  Musculoskeletal: Normal range of motion.         General: No swelling, tenderness, deformity or signs of injury.      Comments: Wound vac right upper thigh, no signs of infection.   Skin:     General: Skin is warm.      Capillary Refill: Capillary refill takes less than 2 seconds.      Coloration: Skin is not jaundiced.      Findings: No rash.   Neurological:      General: No focal deficit present.      Mental Status: He is alert and oriented to person, place, and time. Mental status is at baseline.      Cranial Nerves: No cranial nerve deficit.    Psychiatric:         Mood and Affect: Mood normal.         Thought Content: Thought content normal.         Judgment: Judgment normal.           Labs:   Review    Imaging:   Review    * Septic shock due to Gram positive bacteria (HCC)  Assessment & Plan  This is Sepsis Present on admission  SIRS criteria identified on my evaluation include: Tachycardia, with heart rate greater than 90 BPM and Leukocyosis, with WBC greater than 12,000  Source is right lower extremity, bacteremia  Sepsis protocol initiated  Fluid resuscitation ordered per protocol  IV antibiotics were ordered    Maintenance fluid as above  Phenylephrine GTT - has not required for over 24 hours  Cultures pending - repeat blood cultures NGTD  Antibiotics to cover necrotizing process, MSSA  See necrotizing fasciitis problem.    * Sepsis Resolved  - CTM  - Vital signs stable        MSSA bacteremia  Assessment & Plan  - MSSA Bacteremia One bottle  - Second Culture taken 18t h no Growth do date  - CTM  - Midline was placed 1/21 afternoon.  - Patient will continue antibiotics in SNF in [place] in the next following days.    Necrotizing fasciitis of pelvic region and thigh (HCC)- (present on admission)  Assessment & Plan  -  Patient presented with large abscess (40cm craniocaudially, 4x10cm in other dimensions) in his right lateral thigh and a somewhat subacute history of increasing pain, fever, and chills after a ground level fall where he injured the area. - He had elevated WBC.  - Initially started on Meropenem and Linezolid on admission, went to the OR evening of 1/16/2019 with extensive debridement confirming necrotizing infection.   - Cultures were obtained and a wound vac was placed.   - Subsequently gram stain showed gram positive cocci consistent with streptococcus,    -  Cefazolin and Linezolid after above finding  - Cultures positive for viridans strep in wound.  - 3rd I&D with change of wound vac on 1/20  - No more I&D planned by Surgery  - Continues  Sinus Rhythm   - Midline placed                Plan:    - DC Linezolid  - Continue Cefazolin  - ID following   - Pain control  - CTM  - Continue IV Abx  - [facility] referral     Unilateral recurrent inguinal hernia without obstruction or gangrene  Assessment & Plan  - Large left Inguinal Hernia  - Bowel contents in scrotum  - Seen by Surgery today 1/21  - General surgery recommended outpatient follow up for elective surgery once better from infection.              PLAN:  - Scrotal sling  - No heavy lifting  - Outpatient follow up with surgery after recovery from this hospitalization      Atrial fibrillation (HCC)  Assessment & Plan  - Resolved  - A Fib on 1/20 afternoon, was given Metoprolol, Amiodarone loading dose and Drip.  - Converted back to Sinus Rhythm today 1/21 at 3:52 am.  - Morning EKG SR  - On Tele, patient continues Sinus Rhythm 1/22                       PLAN:  - CTM  - Transfer to Medical floor  - On Tele  - Oral Amiodarone      Hyponatremia- (present on admission)  Assessment & Plan  - Mild Hyponatremia 1/22, no change,  Sodium 134  - No CNS symptoms  - Improving   - CTM    Chronic hepatitis C virus infection (HCC)- (present on admission)  Assessment & Plan  - Patient was antibody positive with viral load 6.2 million.   - Transaminases normal, ALP slightly high.    Plan:  - Outpatient follow-up,   - Abstention from further drug use  - Establish care with PCP  - Regular follow up    Methamphetamine use (HCC)- (present on admission)  Assessment & Plan  - Counselled on cessation.  - PCP establish care

## 2020-01-23 ENCOUNTER — APPOINTMENT (OUTPATIENT)
Dept: RADIOLOGY | Facility: MEDICAL CENTER | Age: 60
DRG: 853 | End: 2020-01-23
Attending: STUDENT IN AN ORGANIZED HEALTH CARE EDUCATION/TRAINING PROGRAM
Payer: COMMERCIAL

## 2020-01-23 LAB
ALBUMIN SERPL BCP-MCNC: 2 G/DL (ref 3.2–4.9)
ALBUMIN/GLOB SERPL: 0.8 G/DL
ALP SERPL-CCNC: 60 U/L (ref 30–99)
ALT SERPL-CCNC: 10 U/L (ref 2–50)
ANION GAP SERPL CALC-SCNC: 4 MMOL/L (ref 0–11.9)
AST SERPL-CCNC: 17 U/L (ref 12–45)
BACTERIA BLD CULT: NORMAL
BACTERIA BLD CULT: NORMAL
BASOPHILS # BLD AUTO: 0.3 % (ref 0–1.8)
BASOPHILS # BLD: 0.02 K/UL (ref 0–0.12)
BILIRUB SERPL-MCNC: 0.2 MG/DL (ref 0.1–1.5)
BUN SERPL-MCNC: 15 MG/DL (ref 8–22)
CALCIUM SERPL-MCNC: 8.3 MG/DL (ref 8.5–10.5)
CHLORIDE SERPL-SCNC: 99 MMOL/L (ref 96–112)
CO2 SERPL-SCNC: 30 MMOL/L (ref 20–33)
CREAT SERPL-MCNC: 0.86 MG/DL (ref 0.5–1.4)
EOSINOPHIL # BLD AUTO: 0 K/UL (ref 0–0.51)
EOSINOPHIL NFR BLD: 0 % (ref 0–6.9)
ERYTHROCYTE [DISTWIDTH] IN BLOOD BY AUTOMATED COUNT: 50.3 FL (ref 35.9–50)
GLOBULIN SER CALC-MCNC: 2.6 G/DL (ref 1.9–3.5)
GLUCOSE SERPL-MCNC: 95 MG/DL (ref 65–99)
HCT VFR BLD AUTO: 27.3 % (ref 42–52)
HGB BLD-MCNC: 9 G/DL (ref 14–18)
IMM GRANULOCYTES # BLD AUTO: 0.12 K/UL (ref 0–0.11)
IMM GRANULOCYTES NFR BLD AUTO: 1.7 % (ref 0–0.9)
LYMPHOCYTES # BLD AUTO: 0.99 K/UL (ref 1–4.8)
LYMPHOCYTES NFR BLD: 14.4 % (ref 22–41)
MCH RBC QN AUTO: 29.6 PG (ref 27–33)
MCHC RBC AUTO-ENTMCNC: 33 G/DL (ref 33.7–35.3)
MCV RBC AUTO: 89.8 FL (ref 81.4–97.8)
MONOCYTES # BLD AUTO: 0.44 K/UL (ref 0–0.85)
MONOCYTES NFR BLD AUTO: 6.4 % (ref 0–13.4)
NEUTROPHILS # BLD AUTO: 5.32 K/UL (ref 1.82–7.42)
NEUTROPHILS NFR BLD: 77.2 % (ref 44–72)
NRBC # BLD AUTO: 0 K/UL
NRBC BLD-RTO: 0 /100 WBC
PLATELET # BLD AUTO: 325 K/UL (ref 164–446)
PMV BLD AUTO: 8.5 FL (ref 9–12.9)
POTASSIUM SERPL-SCNC: 4.2 MMOL/L (ref 3.6–5.5)
PROT SERPL-MCNC: 4.6 G/DL (ref 6–8.2)
RBC # BLD AUTO: 3.04 M/UL (ref 4.7–6.1)
SIGNIFICANT IND 70042: NORMAL
SIGNIFICANT IND 70042: NORMAL
SITE SITE: NORMAL
SITE SITE: NORMAL
SODIUM SERPL-SCNC: 133 MMOL/L (ref 135–145)
SOURCE SOURCE: NORMAL
SOURCE SOURCE: NORMAL
WBC # BLD AUTO: 6.9 K/UL (ref 4.8–10.8)

## 2020-01-23 PROCEDURE — 700102 HCHG RX REV CODE 250 W/ 637 OVERRIDE(OP): Performed by: INTERNAL MEDICINE

## 2020-01-23 PROCEDURE — 770006 HCHG ROOM/CARE - MED/SURG/GYN SEMI*

## 2020-01-23 PROCEDURE — A9270 NON-COVERED ITEM OR SERVICE: HCPCS | Performed by: STUDENT IN AN ORGANIZED HEALTH CARE EDUCATION/TRAINING PROGRAM

## 2020-01-23 PROCEDURE — 700102 HCHG RX REV CODE 250 W/ 637 OVERRIDE(OP): Performed by: STUDENT IN AN ORGANIZED HEALTH CARE EDUCATION/TRAINING PROGRAM

## 2020-01-23 PROCEDURE — 99232 SBSQ HOSP IP/OBS MODERATE 35: CPT | Mod: GC | Performed by: INTERNAL MEDICINE

## 2020-01-23 PROCEDURE — A9270 NON-COVERED ITEM OR SERVICE: HCPCS | Performed by: INTERNAL MEDICINE

## 2020-01-23 PROCEDURE — 36415 COLL VENOUS BLD VENIPUNCTURE: CPT

## 2020-01-23 PROCEDURE — 85025 COMPLETE CBC W/AUTO DIFF WBC: CPT

## 2020-01-23 PROCEDURE — 700111 HCHG RX REV CODE 636 W/ 250 OVERRIDE (IP): Performed by: STUDENT IN AN ORGANIZED HEALTH CARE EDUCATION/TRAINING PROGRAM

## 2020-01-23 PROCEDURE — 80053 COMPREHEN METABOLIC PANEL: CPT

## 2020-01-23 RX ORDER — MORPHINE SULFATE 4 MG/ML
2 INJECTION, SOLUTION INTRAMUSCULAR; INTRAVENOUS EVERY 8 HOURS PRN
Status: DISCONTINUED | OUTPATIENT
Start: 2020-01-23 | End: 2020-01-28

## 2020-01-23 RX ORDER — OXYCODONE HYDROCHLORIDE 10 MG/1
10 TABLET ORAL EVERY 6 HOURS PRN
Status: DISCONTINUED | OUTPATIENT
Start: 2020-01-23 | End: 2020-01-28

## 2020-01-23 RX ORDER — GABAPENTIN 100 MG/1
100 CAPSULE ORAL 2 TIMES DAILY
Status: DISCONTINUED | OUTPATIENT
Start: 2020-01-23 | End: 2020-01-31 | Stop reason: HOSPADM

## 2020-01-23 RX ADMIN — GABAPENTIN 100 MG: 100 CAPSULE ORAL at 16:12

## 2020-01-23 RX ADMIN — AMIODARONE HYDROCHLORIDE 200 MG: 200 TABLET ORAL at 16:12

## 2020-01-23 RX ADMIN — CEFAZOLIN SODIUM 2 G: 2 INJECTION, SOLUTION INTRAVENOUS at 12:09

## 2020-01-23 RX ADMIN — HEPARIN SODIUM 5000 UNITS: 5000 INJECTION, SOLUTION INTRAVENOUS; SUBCUTANEOUS at 04:44

## 2020-01-23 RX ADMIN — PREGABALIN 50 MG: 25 CAPSULE ORAL at 04:45

## 2020-01-23 RX ADMIN — AMIODARONE HYDROCHLORIDE 200 MG: 200 TABLET ORAL at 04:45

## 2020-01-23 RX ADMIN — ACETAMINOPHEN 1000 MG: 500 TABLET, FILM COATED ORAL at 12:09

## 2020-01-23 RX ADMIN — OXYCODONE HYDROCHLORIDE 10 MG: 10 TABLET ORAL at 12:11

## 2020-01-23 RX ADMIN — CEFAZOLIN SODIUM 2 G: 2 INJECTION, SOLUTION INTRAVENOUS at 20:20

## 2020-01-23 RX ADMIN — HEPARIN SODIUM 5000 UNITS: 5000 INJECTION, SOLUTION INTRAVENOUS; SUBCUTANEOUS at 12:10

## 2020-01-23 RX ADMIN — ACETAMINOPHEN 1000 MG: 500 TABLET, FILM COATED ORAL at 04:45

## 2020-01-23 RX ADMIN — ACETAMINOPHEN 1000 MG: 500 TABLET, FILM COATED ORAL at 16:12

## 2020-01-23 RX ADMIN — OXYCODONE HYDROCHLORIDE 10 MG: 10 TABLET ORAL at 18:51

## 2020-01-23 RX ADMIN — OXYCODONE HYDROCHLORIDE 10 MG: 10 TABLET ORAL at 00:43

## 2020-01-23 RX ADMIN — CEFAZOLIN SODIUM 2 G: 2 INJECTION, SOLUTION INTRAVENOUS at 04:45

## 2020-01-23 RX ADMIN — OXYCODONE HYDROCHLORIDE 10 MG: 10 TABLET ORAL at 04:45

## 2020-01-23 RX ADMIN — HEPARIN SODIUM 5000 UNITS: 5000 INJECTION, SOLUTION INTRAVENOUS; SUBCUTANEOUS at 20:20

## 2020-01-23 ASSESSMENT — ENCOUNTER SYMPTOMS
BRUISES/BLEEDS EASILY: 0
SPUTUM PRODUCTION: 0
HEMOPTYSIS: 0
SINUS PAIN: 0
DEPRESSION: 0
MYALGIAS: 0
SPEECH CHANGE: 0
DOUBLE VISION: 0
FEVER: 0
FOCAL WEAKNESS: 0
ABDOMINAL PAIN: 0
NECK PAIN: 0
HALLUCINATIONS: 0
COUGH: 0
SHORTNESS OF BREATH: 0
BACK PAIN: 0
DIZZINESS: 0
DIAPHORESIS: 0
VOMITING: 0
HEADACHES: 0
ORTHOPNEA: 0
CHILLS: 0
PALPITATIONS: 0
SORE THROAT: 0
BLURRED VISION: 0
NAUSEA: 0
WEAKNESS: 0
PHOTOPHOBIA: 0
HEARTBURN: 0

## 2020-01-23 NOTE — DISCHARGE PLANNING
Hospital Care management Discharge Planning     Anticipated Discharge Disposition:  · SNF     Action:  · This RN CM met with pt at bedside to obtain SNF choice.  · Per choice form, patient preference is as follows:  · (1) [facility]  · This RN CM faxed choice form to [facility].     Barriers to Discharge:  · SNF acceptance and bed availability.     Plan:  · Follow up with [facility] and treatment team.

## 2020-01-23 NOTE — PROGRESS NOTES
"   Orthopaedic PA Progress Note    Interval changes:Poorly tolerated vac change earlier. Requests conscious sedation vs OR for next change    ROS - Patient denies any new issues. No chest pain, dyspnea, or fever.  Pain well controlled.    /60   Pulse 80   Temp 36.1 °C (97 °F) (Temporal)   Resp 16   Ht 1.93 m (6' 4\")   Wt 110.1 kg (242 lb 11.6 oz)   SpO2 100%     Patient seen and examined  No acute distress  Breathing non labored  RRR  Surgical dressing is clean, dry, and intact. Patient clearly fires tibialis anterior, EHL, and gastrocnemius/soleus. Sensation is intact to light touch throughout superficial peroneal, deep peroneal, tibial, saphenous, and sural nerve distributions. Strong and palpable 2+ dorsalis pedis and posterior tibial pulses with capillary refill less than 2 seconds. No lower leg tenderness or discomfort.    Recent Labs     01/20/20  1610 01/21/20  0245 01/22/20  0217   WBC 11.7* 7.2 7.7   RBC 3.91* 3.18* 3.38*   HEMOGLOBIN 11.4* 9.4* 9.8*   HEMATOCRIT 35.7* 28.0* 30.8*   MCV 91.3 88.1 91.1   MCH 29.2 29.6 29.0   MCHC 31.9* 33.6* 31.8*   RDW 50.4* 48.7 51.0*   PLATELETCT 173 349 335   MPV 9.6 8.9* 8.4*       Active Hospital Problems    Diagnosis   • MSSA bacteremia [R78.81]     Priority: High   • Septic shock due to Gram positive bacteria (HCC) [A41.89, R65.21]     Priority: High   • Necrotizing fasciitis of pelvic region and thigh (HCC) [M72.6]     Priority: High   • Unilateral recurrent inguinal hernia without obstruction or gangrene [K40.91]     Priority: Medium   • Atrial fibrillation (HCC) [I48.91]     Priority: Medium   • Hyponatremia [E87.1]     Priority: Medium   • Normochromic anemia [D64.9]     Priority: Low   • Thrombocytosis (HCC) [D47.3]     Priority: Low   • Chronic hepatitis C virus infection (HCC) [B18.2]     Priority: Low   • Smoking [F17.200]     Priority: Low   • Cannabis use disorder, mild, abuse [F12.10]     Priority: Low   • Homeless [Z59.0]     Priority: Low   • " Methamphetamine use ([facility]) [F15.10]     Priority: Low   • Cellulitis of hand, left [L03.114]     Priority: Low     Assessment/Plan:  POD#2 S/P  1.  Excisional debridement of right thigh wound including skin, subcutaneous tissue, and muscle through wound measuring 30x10 cm in elliptical shape.  2.  Application of wound VAC greater than 50 square cm, right thigh wound  POD#4 S/P:  1.  Incision and drainage of right intramuscular thigh abscess.  2.  Excisional debridement of right thigh wound including skin, subcutaneous tissue and muscle through wound measuring 30x10 cm.  3.  Application of wound VAC greater than 50 sq cm.  Wt bearing status - WBAT  Wound care/Drains - vac in place- wound team to change 3x week  Future Procedures - Repeat I&D possible closure this weekend   Sutures/Staples out- pending closure type  PT/OT-initiated  Antibiotics: ancef 2g IV q8  DVT Prophylaxis- TEDS/SCDs/Foot pumps  Lundberg-none  Case Coordination for Discharge Planning - Disposition pending abx needs

## 2020-01-23 NOTE — PROGRESS NOTES
Assumed care of patient and report received from previous RN. Bed controls on, bed locked and in lowest position, call light with patient. Appropriate fall precautions in place. Belongings and bedside table within reach. Sleeping at this time

## 2020-01-24 LAB
ALBUMIN SERPL BCP-MCNC: 2.2 G/DL (ref 3.2–4.9)
ALBUMIN/GLOB SERPL: 0.8 G/DL
ALP SERPL-CCNC: 66 U/L (ref 30–99)
ALT SERPL-CCNC: 8 U/L (ref 2–50)
ANION GAP SERPL CALC-SCNC: 6 MMOL/L (ref 0–11.9)
AST SERPL-CCNC: 16 U/L (ref 12–45)
BASOPHILS # BLD AUTO: 0.4 % (ref 0–1.8)
BASOPHILS # BLD: 0.04 K/UL (ref 0–0.12)
BILIRUB SERPL-MCNC: 0.2 MG/DL (ref 0.1–1.5)
BUN SERPL-MCNC: 17 MG/DL (ref 8–22)
CALCIUM SERPL-MCNC: 8.2 MG/DL (ref 8.5–10.5)
CHLORIDE SERPL-SCNC: 101 MMOL/L (ref 96–112)
CO2 SERPL-SCNC: 29 MMOL/L (ref 20–33)
CREAT SERPL-MCNC: 0.76 MG/DL (ref 0.5–1.4)
EOSINOPHIL # BLD AUTO: 0.09 K/UL (ref 0–0.51)
EOSINOPHIL NFR BLD: 1 % (ref 0–6.9)
ERYTHROCYTE [DISTWIDTH] IN BLOOD BY AUTOMATED COUNT: 52.2 FL (ref 35.9–50)
GLOBULIN SER CALC-MCNC: 2.8 G/DL (ref 1.9–3.5)
GLUCOSE SERPL-MCNC: 110 MG/DL (ref 65–99)
HCT VFR BLD AUTO: 28.3 % (ref 42–52)
HGB BLD-MCNC: 9.1 G/DL (ref 14–18)
IMM GRANULOCYTES # BLD AUTO: 0.12 K/UL (ref 0–0.11)
IMM GRANULOCYTES NFR BLD AUTO: 1.3 % (ref 0–0.9)
LYMPHOCYTES # BLD AUTO: 0.93 K/UL (ref 1–4.8)
LYMPHOCYTES NFR BLD: 9.9 % (ref 22–41)
MCH RBC QN AUTO: 29.2 PG (ref 27–33)
MCHC RBC AUTO-ENTMCNC: 32.2 G/DL (ref 33.7–35.3)
MCV RBC AUTO: 90.7 FL (ref 81.4–97.8)
MONOCYTES # BLD AUTO: 0.5 K/UL (ref 0–0.85)
MONOCYTES NFR BLD AUTO: 5.3 % (ref 0–13.4)
NEUTROPHILS # BLD AUTO: 7.72 K/UL (ref 1.82–7.42)
NEUTROPHILS NFR BLD: 82.1 % (ref 44–72)
NRBC # BLD AUTO: 0 K/UL
NRBC BLD-RTO: 0 /100 WBC
PLATELET # BLD AUTO: 301 K/UL (ref 164–446)
PMV BLD AUTO: 8.3 FL (ref 9–12.9)
POTASSIUM SERPL-SCNC: 4.4 MMOL/L (ref 3.6–5.5)
PROT SERPL-MCNC: 5 G/DL (ref 6–8.2)
RBC # BLD AUTO: 3.12 M/UL (ref 4.7–6.1)
SODIUM SERPL-SCNC: 136 MMOL/L (ref 135–145)
WBC # BLD AUTO: 9.4 K/UL (ref 4.8–10.8)

## 2020-01-24 PROCEDURE — 700111 HCHG RX REV CODE 636 W/ 250 OVERRIDE (IP): Performed by: STUDENT IN AN ORGANIZED HEALTH CARE EDUCATION/TRAINING PROGRAM

## 2020-01-24 PROCEDURE — A9270 NON-COVERED ITEM OR SERVICE: HCPCS | Performed by: STUDENT IN AN ORGANIZED HEALTH CARE EDUCATION/TRAINING PROGRAM

## 2020-01-24 PROCEDURE — 700111 HCHG RX REV CODE 636 W/ 250 OVERRIDE (IP): Performed by: PHYSICIAN ASSISTANT

## 2020-01-24 PROCEDURE — 97606 NEG PRS WND THER DME>50 SQCM: CPT

## 2020-01-24 PROCEDURE — 770006 HCHG ROOM/CARE - MED/SURG/GYN SEMI*

## 2020-01-24 PROCEDURE — 99232 SBSQ HOSP IP/OBS MODERATE 35: CPT | Mod: GC | Performed by: INTERNAL MEDICINE

## 2020-01-24 PROCEDURE — 700105 HCHG RX REV CODE 258

## 2020-01-24 PROCEDURE — 700102 HCHG RX REV CODE 250 W/ 637 OVERRIDE(OP): Performed by: STUDENT IN AN ORGANIZED HEALTH CARE EDUCATION/TRAINING PROGRAM

## 2020-01-24 PROCEDURE — 93971 EXTREMITY STUDY: CPT | Mod: LT

## 2020-01-24 PROCEDURE — 85025 COMPLETE CBC W/AUTO DIFF WBC: CPT

## 2020-01-24 PROCEDURE — 306263 VAC CANNISTER W/GEL 500ML: Performed by: INTERNAL MEDICINE

## 2020-01-24 PROCEDURE — 80053 COMPREHEN METABOLIC PANEL: CPT

## 2020-01-24 RX ORDER — SODIUM CHLORIDE 9 MG/ML
INJECTION, SOLUTION INTRAVENOUS
Status: COMPLETED
Start: 2020-01-24 | End: 2020-01-25

## 2020-01-24 RX ORDER — MORPHINE SULFATE 10 MG/ML
5-10 INJECTION, SOLUTION INTRAMUSCULAR; INTRAVENOUS
Status: DISCONTINUED | OUTPATIENT
Start: 2020-01-24 | End: 2020-01-28

## 2020-01-24 RX ORDER — SODIUM CHLORIDE 9 MG/ML
INJECTION, SOLUTION INTRAVENOUS
Status: COMPLETED
Start: 2020-01-24 | End: 2020-01-24

## 2020-01-24 RX ORDER — LIDOCAINE HYDROCHLORIDE 20 MG/ML
20 INJECTION, SOLUTION INFILTRATION; PERINEURAL PRN
Status: DISCONTINUED | OUTPATIENT
Start: 2020-01-24 | End: 2020-01-29 | Stop reason: ALTCHOICE

## 2020-01-24 RX ADMIN — OXYCODONE HYDROCHLORIDE 10 MG: 10 TABLET ORAL at 13:58

## 2020-01-24 RX ADMIN — SODIUM CHLORIDE 500 ML: 9 INJECTION, SOLUTION INTRAVENOUS at 00:20

## 2020-01-24 RX ADMIN — GABAPENTIN 100 MG: 100 CAPSULE ORAL at 17:20

## 2020-01-24 RX ADMIN — SENNOSIDES AND DOCUSATE SODIUM 2 TABLET: 8.6; 5 TABLET ORAL at 17:21

## 2020-01-24 RX ADMIN — CEFAZOLIN SODIUM 2 G: 2 INJECTION, SOLUTION INTRAVENOUS at 05:09

## 2020-01-24 RX ADMIN — MORPHINE SULFATE 2 MG: 4 INJECTION INTRAVENOUS at 03:30

## 2020-01-24 RX ADMIN — GABAPENTIN 100 MG: 100 CAPSULE ORAL at 05:08

## 2020-01-24 RX ADMIN — ACETAMINOPHEN 1000 MG: 500 TABLET, FILM COATED ORAL at 05:08

## 2020-01-24 RX ADMIN — SODIUM CHLORIDE 500 ML: 9 INJECTION, SOLUTION INTRAVENOUS at 15:16

## 2020-01-24 RX ADMIN — OXYCODONE HYDROCHLORIDE 10 MG: 10 TABLET ORAL at 07:42

## 2020-01-24 RX ADMIN — MORPHINE SULFATE 10 MG: 10 INJECTION INTRAVENOUS at 14:45

## 2020-01-24 RX ADMIN — HEPARIN SODIUM 5000 UNITS: 5000 INJECTION, SOLUTION INTRAVENOUS; SUBCUTANEOUS at 05:08

## 2020-01-24 RX ADMIN — CEFAZOLIN SODIUM 2 G: 2 INJECTION, SOLUTION INTRAVENOUS at 13:42

## 2020-01-24 RX ADMIN — ACETAMINOPHEN 1000 MG: 500 TABLET, FILM COATED ORAL at 17:20

## 2020-01-24 RX ADMIN — AMIODARONE HYDROCHLORIDE 200 MG: 200 TABLET ORAL at 17:20

## 2020-01-24 RX ADMIN — ACETAMINOPHEN 1000 MG: 500 TABLET, FILM COATED ORAL at 13:34

## 2020-01-24 RX ADMIN — HEPARIN SODIUM 5000 UNITS: 5000 INJECTION, SOLUTION INTRAVENOUS; SUBCUTANEOUS at 13:40

## 2020-01-24 RX ADMIN — AMIODARONE HYDROCHLORIDE 200 MG: 200 TABLET ORAL at 05:08

## 2020-01-24 RX ADMIN — OXYCODONE HYDROCHLORIDE 10 MG: 10 TABLET ORAL at 01:05

## 2020-01-24 RX ADMIN — MORPHINE SULFATE 4 MG: 4 INJECTION INTRAVENOUS at 13:34

## 2020-01-24 RX ADMIN — HEPARIN SODIUM 5000 UNITS: 5000 INJECTION, SOLUTION INTRAVENOUS; SUBCUTANEOUS at 21:11

## 2020-01-24 RX ADMIN — CEFAZOLIN SODIUM 2 G: 2 INJECTION, SOLUTION INTRAVENOUS at 21:11

## 2020-01-24 ASSESSMENT — ENCOUNTER SYMPTOMS
VOMITING: 0
DEPRESSION: 0
COUGH: 0
NECK PAIN: 0
SENSORY CHANGE: 0
CHILLS: 0
FOCAL WEAKNESS: 0
BACK PAIN: 0
SHORTNESS OF BREATH: 0
DIAPHORESIS: 0
SORE THROAT: 0
BLURRED VISION: 0
HALLUCINATIONS: 0
TINGLING: 0
DOUBLE VISION: 0
BRUISES/BLEEDS EASILY: 0
SPUTUM PRODUCTION: 0
FEVER: 0
DIZZINESS: 0
STRIDOR: 0
SPEECH CHANGE: 0
ABDOMINAL PAIN: 0
ORTHOPNEA: 0
PHOTOPHOBIA: 0
HEARTBURN: 0
HEMOPTYSIS: 0
NAUSEA: 0
HEADACHES: 0
PALPITATIONS: 0
MYALGIAS: 0

## 2020-01-24 NOTE — DISCHARGE PLANNING
Agency/Facility Name: [facility]  Spoke To: Admissions  Outcome: Patient declined as they only take medicaid for children.

## 2020-01-24 NOTE — PROGRESS NOTES
Pt wound vac beeping continously displaying air leak message. Trac pad changed per would care orders and dressing reinforced. No further error messages, serosanguinous drainage noted to be draining, will continue to monitor.

## 2020-01-24 NOTE — DISCHARGE PLANNING
Hospital Care Management Discharge Planning       Action:   · PASRR completed: [id number]  · LOC in Manual Review. Will reassess in a few hours.

## 2020-01-24 NOTE — PROGRESS NOTES
Pt resting in bed, c/o pain in his RLE- wound vac in place, draining well. Pt in no apparent distress, VSS, call light within reach, bed in lowest position and locked, will continue to monitor.

## 2020-01-24 NOTE — DISCHARGE PLANNING
Hospital Care Management Discharge Planning       Anticipated Discharge Disposition:   · SNF     Action:   · This RN CM updated Radha with [facility] that patient's discharge disposition is SNF at this time.      Barriers to Discharge:   · Medical Clearance.  · SNF acceptance and bed availability.      Plan:   · F/U with [facility] and treatment team.    · Continue to provide support services and assistance with discharge planning as needed.

## 2020-01-24 NOTE — CARE PLAN
Problem: Bowel/Gastric:  Goal: Will not experience complications related to bowel motility  Outcome: PROGRESSING AS EXPECTED     Problem: Knowledge Deficit  Goal: Knowledge of the prescribed therapeutic regimen will improve  Outcome: PROGRESSING AS EXPECTED     Problem: Skin Integrity  Goal: Risk for impaired skin integrity will decrease  Outcome: PROGRESSING AS EXPECTED     Problem: Respiratory:  Goal: Respiratory status will improve  Outcome: PROGRESSING AS EXPECTED     Problem: Discharge Barriers/Planning  Goal: Patient's continuum of care needs will be met  Outcome: PROGRESSING SLOWER THAN EXPECTED     Problem: Psychosocial Needs:  Goal: Level of anxiety will decrease  Outcome: PROGRESSING SLOWER THAN EXPECTED     Problem: Mobility  Goal: Risk for activity intolerance will decrease  Outcome: PROGRESSING SLOWER THAN EXPECTED

## 2020-01-24 NOTE — PROGRESS NOTES
Left voicemail for wound team regarding pt complaints of burning rash around sacrum, legs, buttock, and scrotum. Site red and intact. Cleansing of area done. Requested they assess as soon as possible

## 2020-01-24 NOTE — DISCHARGE PLANNING
Hospital Care Management Discharge Planning       Anticipated Discharge Disposition:   · SNF     Action:   · This RN CM requested :[facility] to follow up on SNF acceptance and bed availability.      Barriers to Discharge:   · Medical Clearance.   · SNF acceptance and bed availability.      Plan:   · F/U with [facility] and SNFs.   · Continue to provide support services and assistance with discharge planning as needed.

## 2020-01-24 NOTE — DISCHARGE PLANNING
Received Transport Form @ 2639  Spoke to Preston @ [email or web address]    Transport is scheduled for 1/25/2020 @1100 going to [facility].

## 2020-01-24 NOTE — DISCHARGE PLANNING
Hospital Care Management Discharge Planning       Anticipated Discharge Disposition:   · [facility] tomorrow at 1100     Action:   · LOC still in manual review.   · If LOC does not clear before transport time tomorrow, will have to cancel transportation.      Barriers to Discharge:   · LOC clearance.     Plan:   · Check for clearance in AM.    · Continue to provide support services and assistance with discharge planning as needed.

## 2020-01-24 NOTE — DISCHARGE PLANNING
[facility]ility Name: [facility]  Spoke To: Jesenia  Outcome: Referral pending onsite liaison visit.     Agency/Facility Name: [facility]  Spoke To: Giancarlo  Outcome: Referral pending onsite liaison visit.    Agency/Facility Name: [facility]  Spoke To: Jasmin  Outcome: not contracted with patient's insurance.

## 2020-01-24 NOTE — PROGRESS NOTES
Daily Progress Note:     Date of Service: 1/24/2020  Primary Team: [facility] Team   Attending: Jose Lange M.D.   Senior Resident: Dr. Andersen  Intern: Dr. Urbano  Contact:  [phone]    Chief Complaint:   Right thigh wound Infection.  MSSA Bacteremia.  Hep C Infection.      Subjective:   - No acute events overnight reported.  - Patient complained about his PRN medications being extended Q6 hrs.  - Patient alert and oriented times 3, denies fever, chills, night sweats, SON, Palpitations or chest pain. Moderate pain in his leg the main complain, he does not look in distress when assessed this morning.   - Patient has not been able to do much PT/OT refusing at times, he is educated about the importance of it for faster recovery and cope better with pain.  - BC no growth for 5 days.  - Ortho PA mentioned maybe still need of further I&D, we need to clarify that, since I was told by Surgeon no need of any planned intervention previously.  - Patient still on Cefazolin only per ID recommendation, he will need to complete 2 weeks after negative results from his blood cultures.  - Patient has remained Sinus Rhythm, on Amiodarone for at least 3 months.  - Patient medically clear to be transferred to SNF when Ortho clears him from their service.  - Wound care on Board.    Consultants/Specialty:  Orthopaedic  Surgery  Critical Care  Wound care    Review of Systems:    Review of Systems   Constitutional: Positive for malaise/fatigue. Negative for chills, diaphoresis and fever.   HENT: Negative for ear discharge, hearing loss, nosebleeds, sore throat and tinnitus.    Eyes: Negative for blurred vision, double vision and photophobia.   Respiratory: Negative for cough, hemoptysis, sputum production, shortness of breath and stridor.    Cardiovascular: Negative for chest pain, palpitations and orthopnea.   Gastrointestinal: Negative for abdominal pain, heartburn, nausea and vomiting.   Genitourinary: Negative for  dysuria, frequency, hematuria and urgency.   Musculoskeletal: Negative for back pain, joint pain, myalgias and neck pain.   Skin: Negative for itching and rash.   Neurological: Negative for dizziness, tingling, sensory change, speech change, focal weakness and headaches.   Endo/Heme/Allergies: Does not bruise/bleed easily.   Psychiatric/Behavioral: Negative for depression, hallucinations and suicidal ideas.       Objective Data:   Physical Exam:   Vitals:   Temp:  [36.3 °C (97.4 °F)-36.9 °C (98.4 °F)] 36.6 °C (97.8 °F)  Pulse:  [69-83] 72  Resp:  [17-20] 20  BP: ()/(57-63) 110/63  SpO2:  [94 %-98 %] 97 %      Physical Exam  Constitutional:       General: He is not in acute distress.     Appearance: Normal appearance. He is not diaphoretic.   HENT:      Head: Normocephalic and atraumatic.      Nose: Nose normal. No congestion or rhinorrhea.      Mouth/Throat:      Mouth: Mucous membranes are moist.      Pharynx: Oropharynx is clear. No oropharyngeal exudate or posterior oropharyngeal erythema.   Eyes:      General: No scleral icterus.     Extraocular Movements: Extraocular movements intact.      Conjunctiva/sclera: Conjunctivae normal.      Pupils: Pupils are equal, round, and reactive to light.   Neck:      Musculoskeletal: Normal range of motion and neck supple. No neck rigidity or muscular tenderness.   Cardiovascular:      Rate and Rhythm: Normal rate and regular rhythm.      Pulses: Normal pulses.      Heart sounds: No murmur.   Pulmonary:      Effort: Pulmonary effort is normal. No respiratory distress.      Breath sounds: Normal breath sounds. No stridor. No wheezing, rhonchi or rales.   Abdominal:      General: Bowel sounds are normal. There is no distension.      Palpations: Abdomen is soft. There is no mass.      Tenderness: There is no tenderness. There is no guarding or rebound.   Genitourinary:     Comments: Large left inguinal hernia, will be addressed outpatient for elective  surgery.  Musculoskeletal: Normal range of motion.         General: No swelling or tenderness.      Right lower leg: No edema.      Left lower leg: No edema.      Comments: Right thigh wound vac in place, no erythema or edema in the periphery. Patient has mild.moderate pain.   Skin:     General: Skin is warm.      Capillary Refill: Capillary refill takes less than 2 seconds.      Coloration: Skin is not jaundiced.      Findings: No bruising or erythema.   Neurological:      General: No focal deficit present.      Mental Status: He is alert and oriented to person, place, and time. Mental status is at baseline.      Cranial Nerves: No cranial nerve deficit.   Psychiatric:         Mood and Affect: Mood normal.         Thought Content: Thought content normal.         Judgment: Judgment normal.           Labs:   Review    Imaging:   Review      * Septic shock due to Gram positive bacteria (HCC)- (present on admission)  Assessment & Plan  Resolved   This is Sepsis Present on admission  SIRS criteria identified on my evaluation include: Tachycardia, with heart rate greater than 90 BPM and Leukocyosis, with WBC greater than 12,000  Source is right lower extremity, bacteremia  Sepsis protocol initiated  Fluid resuscitation ordered per protocol  IV antibiotics were ordered    Maintenance fluid as above  Phenylephrine GTT - has not required for over 24 hours  Cultures pending - repeat blood cultures NGTD  Antibiotics to cover necrotizing process, MSSA  See necrotizing fasciitis problem.    * Sepsis Resolved  - CTM  - Vital signs stable  - BC No growth for 5 days        MSSA bacteremia  Assessment & Plan  - MSSA Bacteremia One bottle  - Second Culture taken 18t h no Growth for 5 days  - Midline was placed 1/21 afternoon.  - Patient will continue antibiotics for 2 weeks after Negative BC  - DC pending to [facility]    Necrotizing fasciitis of pelvic region and thigh (HCC)- (present on admission)  Assessment & Plan  -  Patient presented  with large abscess (40cm craniocaudially, 4x10cm in other dimensions) in his right lateral thigh and a somewhat subacute history of increasing pain, fever, and chills after a ground level fall where he injured the area. - He had elevated WBC.  - Initially started on Meropenem and Linezolid on admission, went to the OR evening of 1/16/2019 with extensive debridement confirming necrotizing infection.   - Cultures were obtained and a wound vac was placed.   - Subsequently gram stain showed gram positive cocci consistent with streptococcus,    -  Cefazolin and Linezolid after above finding  - Cultures positive for viridans strep in wound.  - 3rd I&D with change of wound vac on 1/20  - No more I&D planned by Surgery  - Continues Sinus Rhythm   - Midline placed   - BC negative for 5 days    Plan:  - Waiting Ortho clearness to DC to SNF.  - Continue Cefazolin for 2 weeks after BC negative  - ID following   - Pain control  - CTM  - SNF referral pending    Unilateral recurrent inguinal hernia without obstruction or gangrene  Assessment & Plan  - Large left Inguinal Hernia  - Bowel contents in scrotum  - Seen by Surgery today 1/21  - General surgery recommended outpatient follow up for elective surgery once recovered from infection.              PLAN:  - Scrotal sling  - No heavy lifting  - Outpatient follow up with surgery after recovery from this hospitalization  - Establish care with PCP      Atrial fibrillation (HCC)  Assessment & Plan  - Resolved  - A Fib on 1/20 afternoon, was given Metoprolol, Amiodarone loading dose and Drip.  - Converted back to Sinus Rhythm today 1/21 at 3:52 am.  - Morning EKG SR (1/21)  - Continues sinus rhythm   - Medical Floor                       PLAN:  - CTM  - Transfer to Medical floor  - On Tele  - Oral Amiodarone  - Outpatient follow up      Hyponatremia- (present on admission)  Assessment & Plan  - Resolved 1/24/2020  - CTM  - Routine labs    Thrombocytosis (HCC)- (present on  admission)  Assessment & Plan  CTM    Normochromic anemia- (present on admission)  Assessment & Plan  CTM       Smoking- (present on admission)  Assessment & Plan         Chronic hepatitis C virus infection (HCC)- (present on admission)  Assessment & Plan  - Patient was antibody positive with viral load 6.2 million.   - Transaminases normal, ALP slightly high.    Plan:  - Outpatient follow-up,   - Abstention from further drug use  - Establish care with PCP  - Regular follow up    Cannabis use disorder, mild, abuse- (present on admission)  Assessment & Plan   patient        Homeless- (present on admission)  Assessment & Plan  Difficult discharge      Methamphetamine use (HCC)- (present on admission)  Assessment & Plan  - Counselled on cessation.  - PCP establish care

## 2020-01-25 LAB
FERRITIN SERPL-MCNC: 173 NG/ML (ref 22–322)
FOLATE SERPL-MCNC: 8.4 NG/ML
HGB RETIC QN AUTO: 36.9 PG/CELL (ref 29–35)
IMM RETICS NFR: 30.1 % (ref 9.3–17.4)
IRON SATN MFR SERPL: 10 % (ref 15–55)
IRON SERPL-MCNC: 26 UG/DL (ref 50–180)
RETICS # AUTO: 0.15 M/UL (ref 0.04–0.06)
RETICS/RBC NFR: 4.6 % (ref 0.8–2.1)
TIBC SERPL-MCNC: 252 UG/DL (ref 250–450)
VIT B12 SERPL-MCNC: 492 PG/ML (ref 211–911)

## 2020-01-25 PROCEDURE — 36415 COLL VENOUS BLD VENIPUNCTURE: CPT

## 2020-01-25 PROCEDURE — 770006 HCHG ROOM/CARE - MED/SURG/GYN SEMI*

## 2020-01-25 PROCEDURE — 700111 HCHG RX REV CODE 636 W/ 250 OVERRIDE (IP): Performed by: STUDENT IN AN ORGANIZED HEALTH CARE EDUCATION/TRAINING PROGRAM

## 2020-01-25 PROCEDURE — 82746 ASSAY OF FOLIC ACID SERUM: CPT

## 2020-01-25 PROCEDURE — A9270 NON-COVERED ITEM OR SERVICE: HCPCS | Performed by: STUDENT IN AN ORGANIZED HEALTH CARE EDUCATION/TRAINING PROGRAM

## 2020-01-25 PROCEDURE — A7000 DISPOSABLE CANISTER FOR PUMP: HCPCS | Performed by: INTERNAL MEDICINE

## 2020-01-25 PROCEDURE — 700102 HCHG RX REV CODE 250 W/ 637 OVERRIDE(OP): Performed by: STUDENT IN AN ORGANIZED HEALTH CARE EDUCATION/TRAINING PROGRAM

## 2020-01-25 PROCEDURE — 700105 HCHG RX REV CODE 258

## 2020-01-25 PROCEDURE — A9270 NON-COVERED ITEM OR SERVICE: HCPCS

## 2020-01-25 PROCEDURE — 99232 SBSQ HOSP IP/OBS MODERATE 35: CPT | Mod: GC | Performed by: INTERNAL MEDICINE

## 2020-01-25 PROCEDURE — 82607 VITAMIN B-12: CPT

## 2020-01-25 PROCEDURE — 82728 ASSAY OF FERRITIN: CPT

## 2020-01-25 PROCEDURE — 83540 ASSAY OF IRON: CPT

## 2020-01-25 PROCEDURE — 306263 VAC CANNISTER W/GEL 500ML: Performed by: INTERNAL MEDICINE

## 2020-01-25 PROCEDURE — 83550 IRON BINDING TEST: CPT

## 2020-01-25 PROCEDURE — 85046 RETICYTE/HGB CONCENTRATE: CPT

## 2020-01-25 PROCEDURE — 700102 HCHG RX REV CODE 250 W/ 637 OVERRIDE(OP)

## 2020-01-25 RX ORDER — MICONAZOLE NITRATE 20 MG/G
CREAM TOPICAL EVERY 6 HOURS
Status: DISCONTINUED | OUTPATIENT
Start: 2020-01-25 | End: 2020-01-31 | Stop reason: HOSPADM

## 2020-01-25 RX ORDER — SODIUM CHLORIDE 9 MG/ML
INJECTION, SOLUTION INTRAVENOUS
Status: COMPLETED
Start: 2020-01-25 | End: 2020-01-25

## 2020-01-25 RX ADMIN — GABAPENTIN 100 MG: 100 CAPSULE ORAL at 05:12

## 2020-01-25 RX ADMIN — OXYCODONE HYDROCHLORIDE 10 MG: 10 TABLET ORAL at 10:25

## 2020-01-25 RX ADMIN — ACETAMINOPHEN 1000 MG: 500 TABLET, FILM COATED ORAL at 05:12

## 2020-01-25 RX ADMIN — MICONAZOLE NITRATE: 20 CREAM TOPICAL at 22:35

## 2020-01-25 RX ADMIN — AMIODARONE HYDROCHLORIDE 200 MG: 200 TABLET ORAL at 16:32

## 2020-01-25 RX ADMIN — SENNOSIDES AND DOCUSATE SODIUM 2 TABLET: 8.6; 5 TABLET ORAL at 05:12

## 2020-01-25 RX ADMIN — SODIUM CHLORIDE 500 ML: 9 INJECTION, SOLUTION INTRAVENOUS at 13:21

## 2020-01-25 RX ADMIN — CEFAZOLIN SODIUM 2 G: 2 INJECTION, SOLUTION INTRAVENOUS at 13:16

## 2020-01-25 RX ADMIN — OXYCODONE HYDROCHLORIDE 10 MG: 10 TABLET ORAL at 16:32

## 2020-01-25 RX ADMIN — ACETAMINOPHEN 1000 MG: 500 TABLET, FILM COATED ORAL at 16:32

## 2020-01-25 RX ADMIN — HEPARIN SODIUM 5000 UNITS: 5000 INJECTION, SOLUTION INTRAVENOUS; SUBCUTANEOUS at 22:07

## 2020-01-25 RX ADMIN — HEPARIN SODIUM 5000 UNITS: 5000 INJECTION, SOLUTION INTRAVENOUS; SUBCUTANEOUS at 05:12

## 2020-01-25 RX ADMIN — CEFAZOLIN SODIUM 2 G: 2 INJECTION, SOLUTION INTRAVENOUS at 22:07

## 2020-01-25 RX ADMIN — HEPARIN SODIUM 5000 UNITS: 5000 INJECTION, SOLUTION INTRAVENOUS; SUBCUTANEOUS at 13:14

## 2020-01-25 RX ADMIN — OXYCODONE HYDROCHLORIDE 10 MG: 10 TABLET ORAL at 22:34

## 2020-01-25 RX ADMIN — ACETAMINOPHEN 1000 MG: 500 TABLET, FILM COATED ORAL at 13:15

## 2020-01-25 RX ADMIN — MORPHINE SULFATE 2 MG: 4 INJECTION INTRAVENOUS at 05:49

## 2020-01-25 RX ADMIN — OXYCODONE HYDROCHLORIDE 10 MG: 10 TABLET ORAL at 02:04

## 2020-01-25 RX ADMIN — CEFAZOLIN SODIUM 2 G: 2 INJECTION, SOLUTION INTRAVENOUS at 05:12

## 2020-01-25 RX ADMIN — GABAPENTIN 100 MG: 100 CAPSULE ORAL at 16:32

## 2020-01-25 RX ADMIN — AMIODARONE HYDROCHLORIDE 200 MG: 200 TABLET ORAL at 05:12

## 2020-01-25 ASSESSMENT — ENCOUNTER SYMPTOMS
CHILLS: 0
DIZZINESS: 0
SPUTUM PRODUCTION: 0
ABDOMINAL PAIN: 0
DEPRESSION: 0
NAUSEA: 0
HALLUCINATIONS: 0
SHORTNESS OF BREATH: 0
HEADACHES: 0
MYALGIAS: 0
DOUBLE VISION: 0
HEMOPTYSIS: 0
NECK PAIN: 0
ORTHOPNEA: 0
SORE THROAT: 0
FOCAL WEAKNESS: 0
DIAPHORESIS: 0
PALPITATIONS: 0
VOMITING: 0
COUGH: 0
BRUISES/BLEEDS EASILY: 0
STRIDOR: 0
BLURRED VISION: 0
HEARTBURN: 0
BACK PAIN: 0
FEVER: 0
PHOTOPHOBIA: 0
SPEECH CHANGE: 0

## 2020-01-25 NOTE — CARE PLAN
Problem: Communication  Goal: The ability to communicate needs accurately and effectively will improve  Outcome: PROGRESSING AS EXPECTED     Problem: Safety  Goal: Will remain free from falls  Outcome: PROGRESSING AS EXPECTED     Problem: Mobility  Goal: Risk for activity intolerance will decrease  Outcome: PROGRESSING AS EXPECTED

## 2020-01-25 NOTE — PROGRESS NOTES
Daily Progress Note:     Date of Service: 1/25/2020  Primary Team: [facility] Team   Attending: Jose Lange M.D.   Senior Resident: Dr. Andersen  Intern: Dr. Urbano  Contact:  [phone]    Chief Complaint:   Right thigh wound Infection.  MSSA Bacteremia.  Hep C Infection.       Subjective:   - No acute events overnight reported.  - Patient stating feeling well, only reporting some frustration, fatigue and feeling sleepy, mentioning that patient next door kept awake for most of the night.  - Patient alert and oriented times 3, denies fever, chills or night sweats, SOB, chest pain, palpitations, abdominal, dysuria, frequency or hematuria.  - Pain sometimes still moderate, but tolerable with his pain medications.  - Patient in Sinus rhythm.  - Vital signs stable, afebrile, tolerating well oral intake.  - Wound vac working well, no erythema or signs of infection on his thigh.  - Placement pending, working on confirmation if SNF is able to care for patient needs.    Consultants/Specialty:  Orthopaedic   Surgery  Intensive care  Infectious disease    Review of Systems:   Review of Systems   Constitutional: Positive for malaise/fatigue. Negative for chills, diaphoresis and fever.   HENT: Negative for ear discharge, hearing loss, sore throat and tinnitus.    Eyes: Negative for blurred vision, double vision and photophobia.   Respiratory: Negative for cough, hemoptysis, sputum production, shortness of breath and stridor.    Cardiovascular: Negative for chest pain, palpitations and orthopnea.   Gastrointestinal: Negative for abdominal pain, heartburn, nausea and vomiting.   Genitourinary: Negative for dysuria, frequency, hematuria and urgency.   Musculoskeletal: Negative for back pain, joint pain, myalgias and neck pain.   Skin: Negative for itching and rash.   Neurological: Negative for dizziness, speech change, focal weakness and headaches.   Endo/Heme/Allergies: Does not bruise/bleed easily.    Psychiatric/Behavioral: Negative for depression, hallucinations and suicidal ideas.       Objective Data:   Physical Exam:   Vitals:   Temp:  [36.3 °C (97.4 °F)-37.1 °C (98.7 °F)] 36.7 °C (98 °F)  Pulse:  [73-82] 82  Resp:  [17-20] 20  BP: ()/(50-65) 105/62  SpO2:  [96 %-99 %] 97 %      Physical Exam  Constitutional:       General: He is not in acute distress.     Appearance: He is not ill-appearing.   HENT:      Head: Normocephalic and atraumatic.      Nose: Nose normal. No congestion or rhinorrhea.      Mouth/Throat:      Mouth: Mucous membranes are moist.      Pharynx: No oropharyngeal exudate or posterior oropharyngeal erythema.   Eyes:      General: No scleral icterus.     Extraocular Movements: Extraocular movements intact.      Conjunctiva/sclera: Conjunctivae normal.      Pupils: Pupils are equal, round, and reactive to light.   Neck:      Musculoskeletal: Normal range of motion and neck supple. No neck rigidity or muscular tenderness.   Cardiovascular:      Rate and Rhythm: Normal rate and regular rhythm.      Heart sounds: No murmur.   Pulmonary:      Effort: Pulmonary effort is normal. No respiratory distress.      Breath sounds: Normal breath sounds. No stridor. No wheezing, rhonchi or rales.   Chest:      Chest wall: No tenderness.   Abdominal:      General: Bowel sounds are normal. There is no distension.      Palpations: Abdomen is soft. There is no mass.      Tenderness: There is no tenderness. There is no guarding or rebound.   Genitourinary:     Comments: Large left inguinal hernia.  Musculoskeletal: Normal range of motion.         General: No swelling or tenderness.      Right lower leg: No edema.      Left lower leg: No edema.      Comments: Sacral pressure wound.   Skin:     General: Skin is warm.      Capillary Refill: Capillary refill takes less than 2 seconds.      Coloration: Skin is not jaundiced.      Findings: No erythema or rash.   Neurological:      General: No focal deficit  present.      Mental Status: He is alert and oriented to person, place, and time. Mental status is at baseline.      Cranial Nerves: No cranial nerve deficit.   Psychiatric:         Mood and Affect: Mood normal.         Thought Content: Thought content normal.         Judgment: Judgment normal.           Labs:   Review    Imaging:   Review    * Septic shock due to Gram positive bacteria (HCC)- (present on admission)  Assessment & Plan  Resolved   This is Sepsis Present on admission  SIRS criteria identified on my evaluation include: Tachycardia, with heart rate greater than 90 BPM and Leukocyosis, with WBC greater than 12,000  Source is right lower extremity, bacteremia  Sepsis protocol initiated  Fluid resuscitation ordered per protocol  IV antibiotics were ordered    Maintenance fluid as above  Phenylephrine GTT - has not required for over 24 hours  Cultures pending - repeat blood cultures NGTD  Antibiotics to cover necrotizing process, MSSA  See necrotizing fasciitis problem.    * Sepsis Resolved  - CTM  - Vital signs stable  - BC No growth for 5 days  - Pt will finish 2 weeks of Abx treatment        MSSA bacteremia  Assessment & Plan  - MSSA Bacteremia One bottle  - Second Culture taken 18t h no Growth for 5 days  - Midline was placed 1/21 afternoon.  - Patient will continue antibiotics for 2 weeks after Negative BC  - DC pending to SNF  - Outpatient PCP follow up    Necrotizing fasciitis of pelvic region and thigh (HCC)- (present on admission)  Assessment & Plan  -  Patient presented with large abscess (40cm craniocaudially, 4x10cm in other dimensions) in his right lateral thigh and a somewhat subacute history of increasing pain, fever, and chills after a ground level fall where he injured the area. - He had elevated WBC.  - Initially started on Meropenem and Linezolid on admission, went to the OR evening of 1/16/2019 with extensive debridement confirming necrotizing infection.   - Cultures were obtained and a  wound vac was placed.   - Subsequently gram stain showed gram positive cocci consistent with streptococcus,    -  Cefazolin and Linezolid after above finding  - Cultures positive for viridans strep in wound.  - 3rd I&D with change of wound vac on 1/20  - No more I&D planned by Surgery  - Continues Sinus Rhythm   - Midline placed   - BC negative for 5 days  - No signs of infection  - Afebrile, No Tachycardia               Plan:    - Waiting Ortho clearness to DC to SNF.  - Continue Cefazolin for 2 weeks after BC negative  - ID following   - Pain control  - CTM  - SNF referral pending    Unilateral recurrent inguinal hernia without obstruction or gangrene  Assessment & Plan  - Large left Inguinal Hernia  - Bowel contents in scrotum  - General surgery recommended outpatient follow up for elective surgery once recovered from infection.              PLAN:  - Scrotal sling  - No heavy lifting  - Outpatient follow up with surgery after recovery from this hospitalization  - Establish care with PCP  - CTM      Atrial fibrillation (HCC)  Assessment & Plan  - Resolved  - A Fib on 1/20 afternoon, was given Metoprolol, Amiodarone loading dose and Drip.  - Converted back to Sinus Rhythm today 1/21 at 3:52 am.  - Morning EKG SR (1/21)  - Continues sinus rhythm   - Medical Floor                       PLAN:  - CTM  - Transfer to Medical floor  - On Tele  - Oral Amiodarone  - Outpatient follow up  - [facility]      Hyponatremia- (present on admission)  Assessment & Plan  - Resolved 1/24/2020  - CTM  - Routine labs    Thrombocytosis (HCC)- (present on admission)  Assessment & Plan  CTM    Normochromic anemia- (present on admission)  Assessment & Plan  CTM       Smoking- (present on admission)  Assessment & Plan         Chronic hepatitis C virus infection (HCC)- (present on admission)  Assessment & Plan  - Patient was antibody positive with viral load 6.2 million.   - Transaminases normal, ALP slightly high.    Plan:  - Outpatient  follow-up,   - Abstention from further drug use  - Establish care with PCP  - Regular follow up  - Labs per PCP    Cannabis use disorder, mild, abuse- (present on admission)  Assessment & Plan   patient        Homeless- (present on admission)  Assessment & Plan  Difficult discharge      Methamphetamine use (HCC)- (present on admission)  Assessment & Plan  - Counselled on cessation.  - PCP establish care

## 2020-01-25 NOTE — PROGRESS NOTES
Report received from Garrison KING. Pt care assumed. Assessment performed. Pt AOx4. Pt laying supine in bed. Pt complains of pain, repositioned. Bed in low, locked position. Pt educated on calling to ambulate. Call light within reach. Treaded socks on pt.  Hourly rounding in place.

## 2020-01-25 NOTE — DISCHARGE PLANNING
Agency/Facility Name: [facility]  Spoke To: Abimbola  Outcome: Would not be able to accept patient until pain is controled. Cannot accommodate IV morphine.     RAS Feldman notified.

## 2020-01-25 NOTE — DISCHARGE PLANNING
Agency/Facility Name: [facility]  Outcome: Left voice message for weekend  [place]. Transport to [facility] @ 1100 canceled.

## 2020-01-25 NOTE — PROGRESS NOTES
Assumed care of patient and report received from previous RN. Patient educated on importance of calling, bed controls on, bed locked and in lowest position, call light with patient. Appropriate fall precautions in place. Belongings and bedside table within reach. No needs at this time. Plan to discharge today

## 2020-01-25 NOTE — DISCHARGE PLANNING
Anticipated Discharge Disposition:   · SNF: [facility]     Action:   · Pt's discharge to [facility] canceled at this time. Provider, Dr. Lange is concerned with SNF's ability to manage pt's wound. Outreach call was placed to [facility] spoke with ottoniel, Tamica to discuss concerns. Provider, Dr. Lange spoke directly with admissions regarding concerns. Per report plan is for [facility]'s wound care RN to fully review pt's wound and follow up with [facility] regarding [facility]'s ability to care for wound.   · Pt's LOC is still currently pending in manual review.   · LSW placed follow up call to [facility] to request transportation to [facility] today be cancelled.     Barriers to Discharge:   · LOC approval  · Follow up from [facility] wound care RN to determine if facility is able to meet pt's wound care needs.     Plan:   · Care coordination will continue to follow up and assist pt with discharge plans/barriers.

## 2020-01-25 NOTE — CARE PLAN
Problem: Communication  Goal: The ability to communicate needs accurately and effectively will improve  Outcome: PROGRESSING AS EXPECTED  Intervention: [place] patient and significant other/support system to call light to alert staff of needs  Flowsheets (Taken 1/25/2020 0018)  Oriented to:: All of the Following : Location of Bathroom, Visiting Policy, Unit Routine, Call Light and Bedside Controls, Bedside Rail Policy, Smoking Policy, Rights and Responsibilities, Bedside Report, and Patient Education Notebook  Note:   Pt updated on plan of care. Patient expresses understanding. All questions answered. Call light within reach and educated on how to use it.      Problem: Infection  Goal: Will remain free from infection  Outcome: PROGRESSING AS EXPECTED  Intervention: Assess signs and symptoms of infection  Note:   Pt verbalized understanding of the importance of hand hygiene. Pt assessed for signs and symptoms of infection.

## 2020-01-25 NOTE — WOUND TEAM
[facility]  Inpatient Services  Wound and Skin Care Evaluation    Admission Date: 1/16/2020     HPI, PMH, SH: Reviewed    Unit where seen by Wound Team: [id number]     WOUND Follow Up RELATED TO:   Right Lateral Thigh NPWT dressing change     Self Report / Pain Level:  Initial pain 10/10, pain after medication administration 8/10.   Premedicated with: 4 mg IV morphine, 10 mg PO oxy, lidocaine to wound bed  Received 10 mg IV morphine during dsg change    OBJECTIVE:  NPWT dressing in intact.     WOUND TYPE, LOCATION, CHARACTERISTICS (Pressure Injuries: location, stage, POA or date identified       Negative Pressure Wound Therapy Leg Upper;Other (Comment) (Active)   NPWT Pump Mode / Pressure Setting 125 mmHg    Dressing Type Gray foam    Number of Foam Pieces Used 4    Canister Changed No    Output (mL) 50 mL    VAC VeraFlo Irrigant Normal Saline    VAC VeraFlo Soak Time (mins) 10    VAC VeraFlo Instill Volume (ml) 36    VAC VeraFlo - Therapy Time (hrs) 3.5    VAC VeraFlo Pressure (mm/Hg) 125 mmHg;Continuous      Wound 01/16/20 Other (comment) Leg Right lateral Open complicated surgical wound (Active)   Wound Image   1/22/2020 10:58 AM   Site Assessment Granulation tissue;Drainage    Toshia-wound Assessment Dry;Red    Margins Unattached edges;Defined edges    Wound Length (cm) 31.5 cm    Wound Width (cm) 14 cm    Wound Depth (cm) 1.5 cm    Wound Surface Area (cm^2) 441 cm^2    Tunneling 0 cm    Undermining 2 cm    Closure Secondary intention    Drainage Amount Small    Drainage Description Serosanguineous    Non-staged Wound Description Full thickness    Treatments Cleansed;Site care    Cleansing Normal Saline Irrigation    Periwound Protectant Skin Protectant wipes to Periwound    Dressing Options Wound Vac    Dressing Cleansing/Solutions Not Applicable    Dressing Changed Changed    Dressing Status Clean;Dry;Intact    Dressing Change Frequency Monday, Wednesday, Friday    NEXT Dressing Change  01/27/20     "  NEXT Weekly Photo (Inpatient Only) 01/29/20    WOUND NURSE ONLY - Odor None    WOUND NURSE ONLY - Exposed Structures Tendon;Muscle    WOUND NURSE ONLY - Tissue Type and Percentage white tendon red muscle    WOUND NURSE ONLY - Time Spent with Patient (mins)          Vascular:    Dorsal Pedal pulses:  Not assessed during this visit  Posterior tib pulses:        SILVIA:      NA    Lab Values:    Lab Results   Component Value Date/Time    WBC 9.4 01/24/2020 02:21 AM    RBC 3.12 (L) 01/24/2020 02:21 AM    HEMOGLOBIN 9.1 (L) 01/24/2020 02:21 AM    HEMATOCRIT 28.3 (L) 01/24/2020 02:21 AM              Lab Results   Component Value Date/Time    HBA1C 5.3 10/09/2019 11:16 AM      Culture:   NA    INTERVENTIONS BY WOUND TEAM: Premedicated by primary RN. Pt turned to left side.  Pt required more pain meds and lidocaine to wound bed. Dressing and adaptic removed. Wound and zoë wound cleansed with NS and gauze. No sting barrier and paste ring to zoë wound. veraflo foam applied to wound bed. Drape and tracpad to foam. See flow sheet for initial vac settings      Interdisciplinary consultation: Patient, Garrison KING, Fadia wound team, Milvia SHARIF, Benjamin Sutton, PAC    EVALUATION: Hep C+. Tendon and muscle visible to wound bed. Veraflo therapy initiated to decrease pain with foam removal and promote growth of granular tissue. Pt did not tolerate dressing changes d/t significant amounts of pain. Initially threatened to leave AMA, saying that if \"it hurt like last time\" he would \"walk out.\" Pt was yelling and calling RN liars d/t pre-medication not being enough to control pain. Benjamin Sutton, PAC notified and received order for 10 mg IV morphine which was administered by FERNANDO Ji after connecting pt to . Pt may need conscious sedation for next dressing changes if not agreeable to current medication regimen. Wound team will continue to follow for vac therapy.     Goals: Steady decrease in wound area and depth weekly.    NURSING PLAN " OF CARE ORDERS (X):  Dressing changes: See Dressing Care orders: X  Skin care: See Skin Care orders: X  Rectal tube care: See Rectal Tube Care orders:        Other orders:          WOUND TEAM PLAN OF CARE (X):   Dressing changes by wound team:          Follow up 1-2 times weekly:               Follow up 3 times weekly:                NPWT change 3 times weekly:X currently MWF.      Follow up as needed:    Other (explain):     Anticipated discharge plans (X):   LTACH:        SNF/Rehab: X, will benefit from ongoing wound care     Home Care:           Outpatient Wound Center:            Self Care:            Other:

## 2020-01-26 LAB
ALBUMIN SERPL BCP-MCNC: 2.5 G/DL (ref 3.2–4.9)
ALBUMIN/GLOB SERPL: 0.9 G/DL
ALP SERPL-CCNC: 77 U/L (ref 30–99)
ALT SERPL-CCNC: 7 U/L (ref 2–50)
ANION GAP SERPL CALC-SCNC: 4 MMOL/L (ref 0–11.9)
AST SERPL-CCNC: 16 U/L (ref 12–45)
BILIRUB SERPL-MCNC: 0.2 MG/DL (ref 0.1–1.5)
BUN SERPL-MCNC: 19 MG/DL (ref 8–22)
CALCIUM SERPL-MCNC: 8.3 MG/DL (ref 8.5–10.5)
CHLORIDE SERPL-SCNC: 101 MMOL/L (ref 96–112)
CO2 SERPL-SCNC: 27 MMOL/L (ref 20–33)
CREAT SERPL-MCNC: 0.68 MG/DL (ref 0.5–1.4)
GLOBULIN SER CALC-MCNC: 2.9 G/DL (ref 1.9–3.5)
GLUCOSE SERPL-MCNC: 122 MG/DL (ref 65–99)
POTASSIUM SERPL-SCNC: 4.1 MMOL/L (ref 3.6–5.5)
PROT SERPL-MCNC: 5.4 G/DL (ref 6–8.2)
SODIUM SERPL-SCNC: 132 MMOL/L (ref 135–145)

## 2020-01-26 PROCEDURE — A9270 NON-COVERED ITEM OR SERVICE: HCPCS | Performed by: STUDENT IN AN ORGANIZED HEALTH CARE EDUCATION/TRAINING PROGRAM

## 2020-01-26 PROCEDURE — 700111 HCHG RX REV CODE 636 W/ 250 OVERRIDE (IP): Performed by: STUDENT IN AN ORGANIZED HEALTH CARE EDUCATION/TRAINING PROGRAM

## 2020-01-26 PROCEDURE — 80053 COMPREHEN METABOLIC PANEL: CPT

## 2020-01-26 PROCEDURE — 700102 HCHG RX REV CODE 250 W/ 637 OVERRIDE(OP): Performed by: STUDENT IN AN ORGANIZED HEALTH CARE EDUCATION/TRAINING PROGRAM

## 2020-01-26 PROCEDURE — 36415 COLL VENOUS BLD VENIPUNCTURE: CPT

## 2020-01-26 PROCEDURE — 99232 SBSQ HOSP IP/OBS MODERATE 35: CPT | Mod: GC | Performed by: INTERNAL MEDICINE

## 2020-01-26 PROCEDURE — 770006 HCHG ROOM/CARE - MED/SURG/GYN SEMI*

## 2020-01-26 RX ORDER — TRAZODONE HYDROCHLORIDE 50 MG/1
50 TABLET ORAL
Status: DISCONTINUED | OUTPATIENT
Start: 2020-01-26 | End: 2020-01-31 | Stop reason: HOSPADM

## 2020-01-26 RX ADMIN — AMIODARONE HYDROCHLORIDE 200 MG: 200 TABLET ORAL at 05:33

## 2020-01-26 RX ADMIN — TRAZODONE HYDROCHLORIDE 50 MG: 50 TABLET ORAL at 23:06

## 2020-01-26 RX ADMIN — ACETAMINOPHEN 1000 MG: 500 TABLET, FILM COATED ORAL at 12:03

## 2020-01-26 RX ADMIN — CEFAZOLIN SODIUM 2 G: 2 INJECTION, SOLUTION INTRAVENOUS at 05:30

## 2020-01-26 RX ADMIN — OXYCODONE HYDROCHLORIDE 10 MG: 10 TABLET ORAL at 05:34

## 2020-01-26 RX ADMIN — HEPARIN SODIUM 5000 UNITS: 5000 INJECTION, SOLUTION INTRAVENOUS; SUBCUTANEOUS at 05:34

## 2020-01-26 RX ADMIN — GABAPENTIN 100 MG: 100 CAPSULE ORAL at 05:33

## 2020-01-26 RX ADMIN — SENNOSIDES AND DOCUSATE SODIUM 2 TABLET: 8.6; 5 TABLET ORAL at 05:32

## 2020-01-26 RX ADMIN — MORPHINE SULFATE 2 MG: 4 INJECTION INTRAVENOUS at 00:11

## 2020-01-26 RX ADMIN — CEFAZOLIN SODIUM 2 G: 2 INJECTION, SOLUTION INTRAVENOUS at 14:46

## 2020-01-26 RX ADMIN — MORPHINE SULFATE 2 MG: 4 INJECTION INTRAVENOUS at 09:43

## 2020-01-26 RX ADMIN — HEPARIN SODIUM 5000 UNITS: 5000 INJECTION, SOLUTION INTRAVENOUS; SUBCUTANEOUS at 14:46

## 2020-01-26 RX ADMIN — GABAPENTIN 100 MG: 100 CAPSULE ORAL at 19:12

## 2020-01-26 RX ADMIN — HEPARIN SODIUM 5000 UNITS: 5000 INJECTION, SOLUTION INTRAVENOUS; SUBCUTANEOUS at 23:07

## 2020-01-26 RX ADMIN — MICONAZOLE NITRATE: 20 CREAM TOPICAL at 14:47

## 2020-01-26 RX ADMIN — MORPHINE SULFATE 2 MG: 4 INJECTION INTRAVENOUS at 20:07

## 2020-01-26 RX ADMIN — CEFAZOLIN SODIUM 2 G: 2 INJECTION, SOLUTION INTRAVENOUS at 23:05

## 2020-01-26 RX ADMIN — SENNOSIDES AND DOCUSATE SODIUM 2 TABLET: 8.6; 5 TABLET ORAL at 19:12

## 2020-01-26 RX ADMIN — ACETAMINOPHEN 1000 MG: 500 TABLET, FILM COATED ORAL at 19:12

## 2020-01-26 RX ADMIN — OXYCODONE HYDROCHLORIDE 10 MG: 10 TABLET ORAL at 15:58

## 2020-01-26 RX ADMIN — AMIODARONE HYDROCHLORIDE 200 MG: 200 TABLET ORAL at 19:12

## 2020-01-26 RX ADMIN — ACETAMINOPHEN 1000 MG: 500 TABLET, FILM COATED ORAL at 05:31

## 2020-01-26 ASSESSMENT — ENCOUNTER SYMPTOMS
DIAPHORESIS: 0
HEMOPTYSIS: 0
NECK PAIN: 0
ORTHOPNEA: 0
SHORTNESS OF BREATH: 0
DIZZINESS: 0
HEARTBURN: 0
SENSORY CHANGE: 0
FOCAL WEAKNESS: 0
BRUISES/BLEEDS EASILY: 0
VOMITING: 0
HEADACHES: 0
PALPITATIONS: 0
ABDOMINAL PAIN: 0
HALLUCINATIONS: 0
COUGH: 0
SPEECH CHANGE: 0
BLURRED VISION: 0
BLOOD IN STOOL: 0
PHOTOPHOBIA: 0
FEVER: 0
SPUTUM PRODUCTION: 0
MYALGIAS: 0
DEPRESSION: 0
CHILLS: 0
DOUBLE VISION: 0
NAUSEA: 0

## 2020-01-26 NOTE — WOUND TEAM
Wound team in to see pt for buttock skin breakdown.  Pt with MAD/yeast.  Recommend INZO cream antifungal tx.  Nursing orders written.

## 2020-01-26 NOTE — PROGRESS NOTES
Daily Progress Note:     Date of Service: 1/26/2020  Primary Team: [facility] Purple Team   Attending: Jose Lange M.D.   Senior Resident: Dr. Andersen  Intern: Dr. Urbano  Contact:  [phone]    Chief Complaint:   Right thigh wound Infection.  MSSA Bacteremia.  Hep C Infection.    Subjective:  - No acute events overnight reported.  - Patient still complaining of moderate pain, especially after dressing changes, patient requiring high doses of morphine for it.  - Patient was transferred to Medical floor, stated better rest, but requested sleep aid, since he has not being able to rest well in the past days.  - Patient was able to work with PT yesterday 1/25 standing and baring weight on his right leg.  - Patient reminded about the importance of getting out of bed as much as possible to avoid more complications such as worsening or slow healing bed sores.  - Per Ortho PA note: Wound care/Drains - vac in place- wound team to change 3x week.                                    Future Procedures - Vac Dressing change to closure vs STSG in ~ 1-3                                              Weeks.                                    Hold transfer until tolerating vac changes without IV analgesic support.   - Patient alert and oriented times 3, denies fever, chills, night sweats, SOB, chest pain or palpitations.  - Patient is in Sinus Rhythm, blood pressure in the low side, patient has run low BP since admission.  - Wound team on board.  - Placement till patient can tolerate wound vac changes w/o IV pain meds.    Consultants/Specialty:  Orthopaedic      Surgery  Intensive care  Infectious disease  Wound Care    Review of Systems:    Review of Systems   Constitutional: Negative for chills, diaphoresis, fever and malaise/fatigue.   HENT: Negative for ear discharge, ear pain, hearing loss and tinnitus.    Eyes: Negative for blurred vision, double vision and photophobia.   Respiratory: Negative for cough, hemoptysis,  sputum production and shortness of breath.    Cardiovascular: Negative for chest pain, palpitations, orthopnea and leg swelling.   Gastrointestinal: Negative for abdominal pain, blood in stool, heartburn, nausea and vomiting.   Genitourinary: Negative for dysuria, frequency, hematuria and urgency.   Musculoskeletal: Negative for myalgias and neck pain.   Skin: Negative for itching and rash.   Neurological: Negative for dizziness, sensory change, speech change, focal weakness and headaches.   Endo/Heme/Allergies: Does not bruise/bleed easily.   Psychiatric/Behavioral: Negative for depression, hallucinations and suicidal ideas.       Objective Data:   Physical Exam:   Vitals:   Temp:  [36.2 °C (97.2 °F)-37.1 °C (98.7 °F)] 36.2 °C (97.2 °F)  Pulse:  [62-74] 74  Resp:  [16-22] 16  BP: ()/(52-58) 97/56  SpO2:  [95 %-98 %] 98 %     Physical Exam  Constitutional:       General: He is not in acute distress.     Appearance: He is not ill-appearing or diaphoretic.   HENT:      Head: Normocephalic and atraumatic.      Nose: Nose normal. No congestion or rhinorrhea.      Mouth/Throat:      Mouth: Mucous membranes are moist.      Pharynx: No oropharyngeal exudate or posterior oropharyngeal erythema.   Eyes:      General: No scleral icterus.     Extraocular Movements: Extraocular movements intact.      Conjunctiva/sclera: Conjunctivae normal.      Pupils: Pupils are equal, round, and reactive to light.   Neck:      Musculoskeletal: Normal range of motion and neck supple. No neck rigidity or muscular tenderness.   Cardiovascular:      Rate and Rhythm: Normal rate and regular rhythm.      Heart sounds: No murmur.   Pulmonary:      Effort: Pulmonary effort is normal. No respiratory distress.      Breath sounds: Normal breath sounds. No stridor. No wheezing, rhonchi or rales.   Abdominal:      General: Bowel sounds are normal. There is no distension.      Palpations: There is no mass.      Tenderness: There is no tenderness.  There is no guarding or rebound.   Genitourinary:     Comments: Large inguinal hernia left side.  Sacral pressure ulcer.  Musculoskeletal: Normal range of motion.         General: No swelling or tenderness.      Right lower leg: No edema.      Left lower leg: No edema.   Skin:     General: Skin is warm.      Capillary Refill: Capillary refill takes less than 2 seconds.      Coloration: Skin is pale. Skin is not jaundiced.      Findings: No bruising or erythema.   Neurological:      General: No focal deficit present.      Mental Status: He is alert and oriented to person, place, and time. Mental status is at baseline.      Cranial Nerves: No cranial nerve deficit.   Psychiatric:         Mood and Affect: Mood normal.         Thought Content: Thought content normal.         Judgment: Judgment normal.           Labs:   Review    Imaging:   Review    * Septic shock due to Gram positive bacteria (HCC)- (present on admission)  Assessment & Plan  Resolved   This is Sepsis Present on admission  SIRS criteria identified on my evaluation include: Tachycardia, with heart rate greater than 90 BPM and Leukocyosis, with WBC greater than 12,000  Source is right lower extremity, bacteremia  Sepsis protocol initiated  Fluid resuscitation ordered per protocol  IV antibiotics were ordered    Maintenance fluid as above  Phenylephrine GTT - has not required for over 24 hours  Cultures pending - repeat blood cultures NGTD  Antibiotics to cover necrotizing process, MSSA  See necrotizing fasciitis problem.    * Sepsis Resolved  - CTM  - Vital signs stable  - BC No growth for 5 days  - Pt will finish 2 weeks of Abx treatment        MSSA bacteremia  Assessment & Plan  - MSSA Bacteremia One bottle  - Second Culture taken 18t h no Growth for 5 days  - Midline was placed 1/21 afternoon.  - Patient will continue antibiotics for 2 weeks after Negative BC  - DC pending to SNF  - Outpatient PCP follow up    Necrotizing fasciitis of pelvic region and  thigh (HCC)- (present on admission)  Assessment & Plan  -  Patient presented with large abscess (40cm craniocaudially, 4x10cm in other dimensions) in his right lateral thigh and a somewhat subacute history of increasing pain, fever, and chills after a ground level fall where he injured the area. - He had elevated WBC.  - Initially started on Meropenem and Linezolid on admission, went to the OR evening of 1/16/2019 with extensive debridement confirming necrotizing infection.   - Cultures were obtained and a wound vac was placed.   - Subsequently gram stain showed gram positive cocci consistent with streptococcus,    -  Cefazolin and Linezolid after above finding  - Cultures positive for viridans strep in wound.  - 3rd I&D with change of wound vac on 1/20  - No more I&D planned by Surgery  - Continues Sinus Rhythm   - Midline placed   - BC negative for 5 days  - No signs of infection  - Afebrile, No Tachycardia               Plan:    - Waiting Ortho clearness to DC to SNF.  - Wound Vac changes vs STSG 1-3 weeks per Ortho note  - Continue Cefazolin for 2 weeks after BC negative  - ID following   - Pain control  - CTM  - SNF transfer till petient can tolerate Wound Vac changes w/o IV analgesics per Ortho    Unilateral recurrent inguinal hernia without obstruction or gangrene  Assessment & Plan  - Large left Inguinal Hernia  - Bowel contents in scrotum  - General surgery recommended outpatient follow up for elective surgery once recovered from infection.              PLAN:  - Scrotal sling  - No heavy lifting  - Outpatient follow up with surgery after recovery from this hospitalization  - Establish care with PCP  - CTM      Atrial fibrillation (HCC)  Assessment & Plan  - Resolved  - A Fib on 1/20 afternoon, was given Metoprolol, Amiodarone loading dose and Drip.  - Converted back to Sinus Rhythm today 1/21 at 3:52 am.  - Morning EKG SR (1/21)  - Continues sinus rhythm   - Transferred to medical floor  - Denies chest  pain, palpitations or SOB                       PLAN:  - CTM  - On Tele  - Oral Amiodarone  - Outpatient follow up  - SNF pending      Hyponatremia- (present on admission)  Assessment & Plan  - Mild Hyponatremia 1/26/2020  - CTM  - Routine labs    Thrombocytosis (HCC)- (present on admission)  Assessment & Plan  CTM    Normochromic anemia- (present on admission)  Assessment & Plan  CTM       Smoking- (present on admission)  Assessment & Plan         Chronic hepatitis C virus infection (HCC)- (present on admission)  Assessment & Plan  - Patient was antibody positive with viral load 6.2 million.   - Transaminases normal, ALP slightly high.    Plan:  - Outpatient follow-up,   - Abstention from further drug use  - Establish care with PCP  - Regular follow up  - Labs per PCP    Cannabis use disorder, mild, abuse- (present on admission)  Assessment & Plan   patient        Homeless- (present on admission)  Assessment & Plan  Difficult discharge      Methamphetamine use (HCC)- (present on admission)  Assessment & Plan  - Counselled on cessation.  - PCP establish care

## 2020-01-26 NOTE — PROGRESS NOTES
Assumed care of patient at 0700. Report received from FERNANDO Banks. Patient A&Ox4, pain in right thigh is currently managed, other vitals stable on room oxygen. Patient is 1 person assist to cammode and able to turn self. Patient reported that he did not sleep at all last night. MD was notified. Call light within reach; will continue to monitor.

## 2020-01-26 NOTE — PROGRESS NOTES
"   Orthopaedic PA Progress Note    Interval changes:Case discussed with Dr. Lange from [facility]. Required 14 mg IV morphine for dressing change yesterday    ROS - Patient denies any new issues. No chest pain, dyspnea, or fever.  Pain well controlled.    BP (!) 96/50   Pulse 76   Temp 37 °C (98.6 °F) (Temporal)   Resp 20   Ht 1.93 m (6' 4\")   Wt 97.7 kg (215 lb 6.2 oz)   SpO2 97%     Patient seen and examined  No acute distress  Breathing non labored  RRR  Surgical dressing is clean, dry, and intact. Patient clearly fires tibialis anterior, EHL, and gastrocnemius/soleus. Sensation is intact to light touch throughout superficial peroneal, deep peroneal, tibial, saphenous, and sural nerve distributions. Strong and palpable 2+ dorsalis pedis and posterior tibial pulses with capillary refill less than 2 seconds. No lower leg tenderness or discomfort.    Recent Labs     01/23/20  0040 01/24/20  0221   WBC 6.9 9.4   RBC 3.04* 3.12*   HEMOGLOBIN 9.0* 9.1*   HEMATOCRIT 27.3* 28.3*   MCV 89.8 90.7   MCH 29.6 29.2   MCHC 33.0* 32.2*   RDW 50.3* 52.2*   PLATELETCT 325 301   MPV 8.5* 8.3*       Active Hospital Problems    Diagnosis   • MSSA bacteremia [R78.81]     Priority: High   • Septic shock due to Gram positive bacteria (HCC) [A41.89, R65.21]     Priority: High   • Necrotizing fasciitis of pelvic region and thigh (HCC) [M72.6]     Priority: High   • Unilateral recurrent inguinal hernia without obstruction or gangrene [K40.91]     Priority: Medium   • Atrial fibrillation (HCC) [I48.91]     Priority: Medium   • Hyponatremia [E87.1]     Priority: Medium   • Normochromic anemia [D64.9]     Priority: Low   • Thrombocytosis (HCC) [D47.3]     Priority: Low   • Chronic hepatitis C virus infection (HCC) [B18.2]     Priority: Low   • Smoking [F17.200]     Priority: Low   • Cannabis use disorder, mild, abuse [F12.10]     Priority: Low   • Homeless [Z59.0]     Priority: Low   • Methamphetamine use (HCC) [F15.10]     Priority: " Low   • Cellulitis of hand, left [L03.114]     Priority: Low     Assessment/Plan:  POD#5 S/P  1.  Excisional debridement of right thigh wound including skin, subcutaneous tissue, and muscle through wound measuring 30x10 cm in elliptical shape.  2.  Application of wound VAC greater than 50 square cm, right thigh wound  POD#7 S/P:  1.  Incision and drainage of right intramuscular thigh abscess.  2.  Excisional debridement of right thigh wound including skin, subcutaneous tissue and muscle through wound measuring 30x10 cm.  3.  Application of wound VAC greater than 50 sq cm.  Wt bearing status - WBAT  Recommend all meals OOB, Amb with assist TID, bathroom proveleges   Wound care/Drains - vac in place- wound team to change 3x week  Future Procedures - Vac Dressing change to closure vs STSG in ~ 1-3 weeks   PT/OT-initiated  Antibiotics: ancef 2g IV q8 day 8  DVT Prophylaxis- TEDS/SCDs/Foot pumps  Lundberg-none  Case Coordination for Discharge Planning - Disposition pending  -   Hold transfer until tolerating vac changes without IV analgesic support. May need to return for STSG in future pending wound progress with thrice weekly vac dressing changes. Appreciate Wound team images during vac changes.

## 2020-01-26 NOTE — PROGRESS NOTES
Assumed care at 1900, report received from day shift RN.  Pt is sitting up in bed, awake. A&O x4, on RA, denies any pain at this time. Bed is locked and in the lowest position. Call light and belongings within reach. Plan of care discussed and whiteboard updated, Pt has no questions at this time.

## 2020-01-26 NOTE — CARE PLAN
Problem: Venous Thromboembolism (VTW)/Deep Vein Thrombosis (DVT) Prevention:  Goal: Patient will participate in Venous Thrombosis (VTE)/Deep Vein Thrombosis (DVT)Prevention Measures  Outcome: PROGRESSING AS EXPECTED  Flowsheets (Taken 1/26/2020 0745)  Risk Assessment Score: 3  VTE RISK: High  Mechanical Prophylaxis : SCDs, Sequential Compression Device  SCDs, Sequential Compression Device: Off  Pharmacologic Prophylaxis Used: Unfractionated Heparin     Problem: Pain Management  Goal: Pain level will decrease to patient's comfort goal  Outcome: PROGRESSING SLOWER THAN EXPECTED  Flowsheets (Taken 1/26/2020 0745)  Comfort Goal: Comfort at Rest  Pain Rating Scale (NPRS): 8

## 2020-01-26 NOTE — CARE PLAN
Problem: Pain Management  Goal: Pain level will decrease to patient's comfort goal  Outcome: PROGRESSING AS EXPECTED  Note:   Medication administered per MAR. Pt reports decrease in pain with medication. Will continue to monitor     Problem: Skin Integrity  Goal: Risk for impaired skin integrity will decrease  Outcome: PROGRESSING AS EXPECTED  Note:   Waffle cushion in place. Zinc oxide cream applied per order.

## 2020-01-26 NOTE — PROGRESS NOTES
"   Orthopaedic PA Progress Note    Interval changes:Doing well, OOB with PT yest.    ROS - Patient denies any new issues. No chest pain, dyspnea, or fever.  Pain well controlled.    BP (!) 98/58   Pulse 71   Temp 36.3 °C (97.4 °F) (Temporal)   Resp 18   Ht 1.93 m (6' 4\")   Wt 97.7 kg (215 lb 6.2 oz)   SpO2 95%     Patient seen and examined  No acute distress  Breathing non labored  RRR  Surgical dressing is clean, dry, and intact. Patient clearly fires tibialis anterior, EHL, and gastrocnemius/soleus. Sensation is intact to light touch throughout superficial peroneal, deep peroneal, tibial, saphenous, and sural nerve distributions. Strong and palpable 2+ dorsalis pedis and posterior tibial pulses with capillary refill less than 2 seconds. No lower leg tenderness or discomfort.    Recent Labs     01/24/20  0221   WBC 9.4   RBC 3.12*   HEMOGLOBIN 9.1*   HEMATOCRIT 28.3*   MCV 90.7   MCH 29.2   MCHC 32.2*   RDW 52.2*   PLATELETCT 301   MPV 8.3*       Active Hospital Problems    Diagnosis   • MSSA bacteremia [R78.81]     Priority: High   • Septic shock due to Gram positive bacteria (HCC) [A41.89, R65.21]     Priority: High   • Necrotizing fasciitis of pelvic region and thigh (HCC) [M72.6]     Priority: High   • Unilateral recurrent inguinal hernia without obstruction or gangrene [K40.91]     Priority: Medium   • Atrial fibrillation (HCC) [I48.91]     Priority: Medium   • Hyponatremia [E87.1]     Priority: Medium   • Normochromic anemia [D64.9]     Priority: Low   • Thrombocytosis (HCC) [D47.3]     Priority: Low   • Chronic hepatitis C virus infection (HCC) [B18.2]     Priority: Low   • Smoking [F17.200]     Priority: Low   • Cannabis use disorder, mild, abuse [F12.10]     Priority: Low   • Homeless [Z59.0]     Priority: Low   • Methamphetamine use (HCC) [F15.10]     Priority: Low   • Cellulitis of hand, left [L03.114]     Priority: Low     Assessment/Plan:  POD#6 S/P  1.  Excisional debridement of right thigh " wound including skin, subcutaneous tissue, and muscle through wound measuring 30x10 cm in elliptical shape.  2.  Application of wound VAC greater than 50 square cm, right thigh wound  POD#8 S/P:  1.  Incision and drainage of right intramuscular thigh abscess.  2.  Excisional debridement of right thigh wound including skin, subcutaneous tissue and muscle through wound measuring 30x10 cm.  3.  Application of wound VAC greater than 50 sq cm.  Wt bearing status - WBAT  Recommend all meals OOB, Amb with assist TID, bathroom proveleges   Wound care/Drains - vac in place- wound team to change 3x week  Future Procedures - Vac Dressing change to closure vs STSG in ~ 1-3 weeks   PT/OT-initiated  Antibiotics: ancef 2g IV q8 day 8  DVT Prophylaxis- TEDS/SCDs/Foot pumps  [place]-none  Case Coordination for Discharge Planning - Disposition pending  -   Hold transfer until tolerating vac changes without IV analgesic support. May need to return for STSG in future pending wound progress with thrice weekly vac dressing changes. Appreciate Wound team images during vac changes.

## 2020-01-27 PROCEDURE — 700102 HCHG RX REV CODE 250 W/ 637 OVERRIDE(OP): Performed by: STUDENT IN AN ORGANIZED HEALTH CARE EDUCATION/TRAINING PROGRAM

## 2020-01-27 PROCEDURE — 700111 HCHG RX REV CODE 636 W/ 250 OVERRIDE (IP): Performed by: STUDENT IN AN ORGANIZED HEALTH CARE EDUCATION/TRAINING PROGRAM

## 2020-01-27 PROCEDURE — A9270 NON-COVERED ITEM OR SERVICE: HCPCS | Performed by: STUDENT IN AN ORGANIZED HEALTH CARE EDUCATION/TRAINING PROGRAM

## 2020-01-27 PROCEDURE — 700111 HCHG RX REV CODE 636 W/ 250 OVERRIDE (IP): Performed by: PHYSICIAN ASSISTANT

## 2020-01-27 PROCEDURE — 97606 NEG PRS WND THER DME>50 SQCM: CPT

## 2020-01-27 PROCEDURE — 99232 SBSQ HOSP IP/OBS MODERATE 35: CPT | Mod: GC | Performed by: HOSPITALIST

## 2020-01-27 PROCEDURE — 770006 HCHG ROOM/CARE - MED/SURG/GYN SEMI*

## 2020-01-27 RX ORDER — OXYCODONE HYDROCHLORIDE 10 MG/1
10 TABLET ORAL ONCE
Status: COMPLETED | OUTPATIENT
Start: 2020-01-27 | End: 2020-01-27

## 2020-01-27 RX ADMIN — OXYCODONE HYDROCHLORIDE 10 MG: 10 TABLET ORAL at 20:15

## 2020-01-27 RX ADMIN — AMIODARONE HYDROCHLORIDE 200 MG: 200 TABLET ORAL at 17:20

## 2020-01-27 RX ADMIN — MICONAZOLE NITRATE: 20 CREAM TOPICAL at 09:30

## 2020-01-27 RX ADMIN — CEFAZOLIN SODIUM 2 G: 2 INJECTION, SOLUTION INTRAVENOUS at 14:01

## 2020-01-27 RX ADMIN — MORPHINE SULFATE 2 MG: 4 INJECTION INTRAVENOUS at 22:59

## 2020-01-27 RX ADMIN — ACETAMINOPHEN 1000 MG: 500 TABLET, FILM COATED ORAL at 17:19

## 2020-01-27 RX ADMIN — OXYCODONE HYDROCHLORIDE 10 MG: 10 TABLET ORAL at 09:40

## 2020-01-27 RX ADMIN — CEFAZOLIN SODIUM 2 G: 2 INJECTION, SOLUTION INTRAVENOUS at 21:57

## 2020-01-27 RX ADMIN — MICONAZOLE NITRATE: 20 CREAM TOPICAL at 17:21

## 2020-01-27 RX ADMIN — MORPHINE SULFATE 10 MG: 10 INJECTION INTRAVENOUS at 11:29

## 2020-01-27 RX ADMIN — HEPARIN SODIUM 5000 UNITS: 5000 INJECTION, SOLUTION INTRAVENOUS; SUBCUTANEOUS at 14:00

## 2020-01-27 RX ADMIN — HEPARIN SODIUM 5000 UNITS: 5000 INJECTION, SOLUTION INTRAVENOUS; SUBCUTANEOUS at 05:34

## 2020-01-27 RX ADMIN — HEPARIN SODIUM 5000 UNITS: 5000 INJECTION, SOLUTION INTRAVENOUS; SUBCUTANEOUS at 21:57

## 2020-01-27 RX ADMIN — ACETAMINOPHEN 1000 MG: 500 TABLET, FILM COATED ORAL at 12:59

## 2020-01-27 RX ADMIN — SENNOSIDES AND DOCUSATE SODIUM 2 TABLET: 8.6; 5 TABLET ORAL at 17:19

## 2020-01-27 RX ADMIN — CEFAZOLIN SODIUM 2 G: 2 INJECTION, SOLUTION INTRAVENOUS at 05:36

## 2020-01-27 RX ADMIN — AMIODARONE HYDROCHLORIDE 200 MG: 200 TABLET ORAL at 05:33

## 2020-01-27 RX ADMIN — OXYCODONE HYDROCHLORIDE 10 MG: 10 TABLET ORAL at 05:33

## 2020-01-27 RX ADMIN — ACETAMINOPHEN 1000 MG: 500 TABLET, FILM COATED ORAL at 05:33

## 2020-01-27 RX ADMIN — SENNOSIDES AND DOCUSATE SODIUM 2 TABLET: 8.6; 5 TABLET ORAL at 05:33

## 2020-01-27 RX ADMIN — TRAZODONE HYDROCHLORIDE 50 MG: 50 TABLET ORAL at 21:58

## 2020-01-27 RX ADMIN — GABAPENTIN 100 MG: 100 CAPSULE ORAL at 05:33

## 2020-01-27 RX ADMIN — GABAPENTIN 100 MG: 100 CAPSULE ORAL at 17:19

## 2020-01-27 ASSESSMENT — ENCOUNTER SYMPTOMS
BLURRED VISION: 0
NAUSEA: 0
DEPRESSION: 0
SORE THROAT: 0
FOCAL WEAKNESS: 0
DIAPHORESIS: 0
BACK PAIN: 0
ABDOMINAL PAIN: 0
DOUBLE VISION: 0
VOMITING: 0
ORTHOPNEA: 0
DIZZINESS: 0
SPUTUM PRODUCTION: 0
HALLUCINATIONS: 0
BRUISES/BLEEDS EASILY: 0
PALPITATIONS: 0
MYALGIAS: 0
COUGH: 0
HEMOPTYSIS: 0
SHORTNESS OF BREATH: 0
CHILLS: 0
STRIDOR: 0
FEVER: 0
HEADACHES: 0
NECK PAIN: 0
TINGLING: 0
PHOTOPHOBIA: 0
HEARTBURN: 0
SPEECH CHANGE: 0

## 2020-01-27 NOTE — CARE PLAN
Problem: Pain Management  Goal: Pain level will decrease to patient's comfort goal  Note:   Patient medicated per MAR for pain management and wound vac change.     Problem: Safety  Goal: Will remain free from falls  Note:   Bed in lowest and locked position, non-skid socks in place, call light in reach, hourly rounding in place.

## 2020-01-27 NOTE — PROGRESS NOTES
Received report from night RN, assumed care at 0700. Pt A&OX4, able to specify needs. Pt with wound vac to right thigh, dressing CDI. Pt to have wound vac dressing today, medicated per MAR. Pt anxious about dressing change, emotional support provided. Pt's sacrum red/excoriated, antifungal cream applied. Pt states all needs met at this time. Fall precautions in place. POC discussed, communication board updated. Call light in reach, hourly rounding in place.

## 2020-01-27 NOTE — PROGRESS NOTES
Daily Progress Note:     Date of Service: 1/27/2020  Primary Team: [facility] Purple Team   Attending: Alex Obrien M.D.   Senior Resident: Dr. Rai  Intern: Dr. Urbano  Contact:  [phone]    Chief Complaint:   Right thigh wound Infection.  MSSA Bacteremia.  Hep C Infection.    Subjective:  - NO acute events overnight reported.  - Patient stating feeling fine, sleeping better with sleeping aid.  - Pain moderate/severe after wound changes.  - Wound vac clean, no signs of infection, clear, sanguinolent vacuum output.  - Patient BP running soft, SBP high 90's, patient asymptomatic  - Patient doing well, tolerating oral intake, working with PT.  - Out of bed once a day.  - SNF placement pending till patient can tolerate wound changes w/o requiring IV drugs.  - Patient wound vac was changed today 1/27    Consultants/Specialty:  Orthopaedic      Surgery  Intensive care  Infectious disease  Wound Care    Review of Systems:    Review of Systems   Constitutional: Negative for chills, diaphoresis and fever.   HENT: Negative for ear discharge, ear pain, hearing loss, nosebleeds, sore throat and tinnitus.    Eyes: Negative for blurred vision, double vision and photophobia.   Respiratory: Negative for cough, hemoptysis, sputum production, shortness of breath and stridor.    Cardiovascular: Negative for chest pain, palpitations and orthopnea.   Gastrointestinal: Negative for abdominal pain, heartburn, nausea and vomiting.   Genitourinary: Negative for dysuria, frequency, hematuria and urgency.   Musculoskeletal: Negative for back pain, joint pain, myalgias and neck pain.   Skin: Negative for itching and rash.   Neurological: Negative for dizziness, tingling, speech change, focal weakness and headaches.   Endo/Heme/Allergies: Does not bruise/bleed easily.   Psychiatric/Behavioral: Negative for depression, hallucinations and suicidal ideas.       Objective Data:   Physical Exam:   Vitals:   Temp:  [36.1 °C (96.9 °F)-36.7 °C  (98.1 °F)] 36.1 °C (96.9 °F)  Pulse:  [71-79] 77  Resp:  [16-18] 16  BP: (91-97)/(55-58) 91/58  SpO2:  [96 %-99 %] 99 %      Physical Exam  Constitutional:       General: He is not in acute distress.     Appearance: He is normal weight. He is not ill-appearing or diaphoretic.   HENT:      Head: Normocephalic and atraumatic.      Nose: Nose normal. No congestion or rhinorrhea.      Mouth/Throat:      Mouth: Mucous membranes are moist.      Pharynx: No oropharyngeal exudate or posterior oropharyngeal erythema.   Eyes:      General: No scleral icterus.     Extraocular Movements: Extraocular movements intact.      Conjunctiva/sclera: Conjunctivae normal.      Pupils: Pupils are equal, round, and reactive to light.   Neck:      Musculoskeletal: Normal range of motion. No neck rigidity or muscular tenderness.   Cardiovascular:      Rate and Rhythm: Normal rate and regular rhythm.      Heart sounds: No murmur.   Pulmonary:      Effort: Pulmonary effort is normal. No respiratory distress.      Breath sounds: Normal breath sounds. No stridor. No wheezing, rhonchi or rales.   Abdominal:      General: Bowel sounds are normal. There is no distension.      Palpations: Abdomen is soft. There is no mass.      Tenderness: There is no tenderness. There is no guarding or rebound.   Genitourinary:     Comments: Large left inguinal hernia  Musculoskeletal: Normal range of motion.         General: No swelling or deformity.      Right lower leg: No edema.      Left lower leg: No edema.   Skin:     General: Skin is warm.      Capillary Refill: Capillary refill takes less than 2 seconds.      Coloration: Skin is not jaundiced.      Findings: No erythema.      Comments: Large wound on external right thigh 30X10 cm   Neurological:      General: No focal deficit present.      Mental Status: He is alert and oriented to person, place, and time. Mental status is at baseline.      Cranial Nerves: No cranial nerve deficit.   Psychiatric:          Mood and Affect: Mood normal.         Thought Content: Thought content normal.         Judgment: Judgment normal.           Labs:   Review    Imaging:   Review    * Septic shock due to Gram positive bacteria (HCC)- (present on admission)  Assessment & Plan  Resolved   This is Sepsis Present on admission  SIRS criteria identified on my evaluation include: Tachycardia, with heart rate greater than 90 BPM and Leukocyosis, with WBC greater than 12,000  Source is right lower extremity, bacteremia  Sepsis protocol initiated  Fluid resuscitation ordered per protocol  IV antibiotics were ordered    Maintenance fluid as above  Phenylephrine GTT - has not required for over 24 hours  Cultures pending - repeat blood cultures NGTD  Antibiotics to cover necrotizing process, MSSA  See necrotizing fasciitis problem.    * Sepsis Resolved  - CTM  - Vital signs stable  - BC No growth for 5 days  - Pt will finish Abx Feb 1st        MSSA bacteremia  Assessment & Plan  - MSSA Bacteremia One bottle  - Second Culture taken 18t h no Growth for 5 days  - Midline was placed 1/21 afternoon.  - Patient will continue antibiotics for 2 weeks after Negative BC  - DC pending after ca tolerate wound change w/o IV drugs  - Outpatient PCP follow up    Necrotizing fasciitis of pelvic region and thigh (HCC)- (present on admission)  Assessment & Plan  -  Patient presented with large abscess (40cm craniocaudially, 4x10cm in other dimensions) in his right lateral thigh and a somewhat subacute history of increasing pain, fever, and chills after a ground level fall where he injured the area. - He had elevated WBC.  - Initially started on Meropenem and Linezolid on admission, went to the OR evening of 1/16/2019 with extensive debridement confirming necrotizing infection.   - Cultures were obtained and a wound vac was placed.   - Subsequently gram stain showed gram positive cocci consistent with streptococcus,    -  Cefazolin and Linezolid after above  finding  - Cultures positive for viridans strep in wound.  - 3rd I&D with change of wound vac on 1/20  - No more I&D planned by Surgery  - Continues Sinus Rhythm   - Midline placed   - BC negative for 5 days  - No signs of infection  - Afebrile, No Tachycardia               Plan:    - Waiting Ortho clearness to DC to SNF.  - Wound Vac changes vs STSG 1-3 weeks per Ortho note  - Continue Cefazolin till Feb 1st  - Pain control  - CTM  - SNF transfer till petient can tolerate Wound Vac changes w/o IV analgesics per Ortho    Unilateral recurrent inguinal hernia without obstruction or gangrene  Assessment & Plan  - Large left Inguinal Hernia  - Bowel contents in scrotum  - General surgery recommended outpatient follow up for elective surgery once recovered from infection.              PLAN:  - Scrotal sling  - No heavy lifting  - Outpatient follow up with surgery after recovery from this hospitalization  - Establish care with PCP  - CTM      Atrial fibrillation (HCC)  Assessment & Plan  - Resolved  - A Fib on 1/20 afternoon, was given Metoprolol, Amiodarone loading dose and Drip.  - Converted back to Sinus Rhythm today 1/21 at 3:52 am.  - Morning EKG SR (1/21)  - Continues sinus rhythm   - Transferred to medical floor  - Denies chest pain, palpitations or SOB  -                       PLAN:  - CTM  - On Medicacal floor  - Oral Amiodarone  - Outpatient follow up  - SNF after patient can tolerate wound change w/o use of IV drugs      Hyponatremia- (present on admission)  Assessment & Plan  - Mild Hyponatremia today 1/27 is 132  - CTM  - Routine labs  - NO CNS symptoms    Thrombocytosis (HCC)- (present on admission)  Assessment & Plan  CTM    Normochromic anemia- (present on admission)  Assessment & Plan  CTM       Smoking- (present on admission)  Assessment & Plan         Chronic hepatitis C virus infection (HCC)- (present on admission)  Assessment & Plan  - Patient was antibody positive with viral load 6.2 million.    - Transaminases normal, ALP slightly high.    Plan:  - Outpatient follow-up,   - Abstention from further drug use  - Establish care with PCP  - Regular follow up  - Labs per PCP    Cannabis use disorder, mild, abuse- (present on admission)  Assessment & Plan   patient        Homeless- (present on admission)  Assessment & Plan  Difficult discharge      Methamphetamine use (HCC)- (present on admission)  Assessment & Plan  - Counselled on cessation.  - PCP establish care

## 2020-01-27 NOTE — PROGRESS NOTES
Assumed care of patient at 1900. Received bedside report from day RN. Patient resting comfortably in bed. Requesting PRN pain med for 7/10 pain. Bed is in low and locked position. Non-slip socks are in place. Call light is within reach.

## 2020-01-28 LAB
ALBUMIN SERPL BCP-MCNC: 2.5 G/DL (ref 3.2–4.9)
ALBUMIN/GLOB SERPL: 0.8 G/DL
ALP SERPL-CCNC: 77 U/L (ref 30–99)
ALT SERPL-CCNC: 7 U/L (ref 2–50)
ANION GAP SERPL CALC-SCNC: 6 MMOL/L (ref 0–11.9)
AST SERPL-CCNC: 18 U/L (ref 12–45)
BILIRUB SERPL-MCNC: 0.2 MG/DL (ref 0.1–1.5)
BUN SERPL-MCNC: 23 MG/DL (ref 8–22)
CALCIUM SERPL-MCNC: 8.6 MG/DL (ref 8.5–10.5)
CHLORIDE SERPL-SCNC: 101 MMOL/L (ref 96–112)
CO2 SERPL-SCNC: 29 MMOL/L (ref 20–33)
CREAT SERPL-MCNC: 0.76 MG/DL (ref 0.5–1.4)
GLOBULIN SER CALC-MCNC: 3.1 G/DL (ref 1.9–3.5)
GLUCOSE SERPL-MCNC: 103 MG/DL (ref 65–99)
POTASSIUM SERPL-SCNC: 4.1 MMOL/L (ref 3.6–5.5)
PROT SERPL-MCNC: 5.6 G/DL (ref 6–8.2)
SODIUM SERPL-SCNC: 136 MMOL/L (ref 135–145)

## 2020-01-28 PROCEDURE — 36415 COLL VENOUS BLD VENIPUNCTURE: CPT

## 2020-01-28 PROCEDURE — A9270 NON-COVERED ITEM OR SERVICE: HCPCS

## 2020-01-28 PROCEDURE — 80053 COMPREHEN METABOLIC PANEL: CPT

## 2020-01-28 PROCEDURE — 700111 HCHG RX REV CODE 636 W/ 250 OVERRIDE (IP): Performed by: STUDENT IN AN ORGANIZED HEALTH CARE EDUCATION/TRAINING PROGRAM

## 2020-01-28 PROCEDURE — 99232 SBSQ HOSP IP/OBS MODERATE 35: CPT | Mod: GC | Performed by: HOSPITALIST

## 2020-01-28 PROCEDURE — 770006 HCHG ROOM/CARE - MED/SURG/GYN SEMI*

## 2020-01-28 PROCEDURE — 700102 HCHG RX REV CODE 250 W/ 637 OVERRIDE(OP)

## 2020-01-28 PROCEDURE — A9270 NON-COVERED ITEM OR SERVICE: HCPCS | Performed by: STUDENT IN AN ORGANIZED HEALTH CARE EDUCATION/TRAINING PROGRAM

## 2020-01-28 PROCEDURE — 700102 HCHG RX REV CODE 250 W/ 637 OVERRIDE(OP): Performed by: STUDENT IN AN ORGANIZED HEALTH CARE EDUCATION/TRAINING PROGRAM

## 2020-01-28 PROCEDURE — 97535 SELF CARE MNGMENT TRAINING: CPT | Mod: CO

## 2020-01-28 PROCEDURE — 97530 THERAPEUTIC ACTIVITIES: CPT | Mod: CO

## 2020-01-28 RX ORDER — OXYCODONE HYDROCHLORIDE 10 MG/1
10 TABLET ORAL 3 TIMES DAILY PRN
Status: DISCONTINUED | OUTPATIENT
Start: 2020-01-28 | End: 2020-01-28

## 2020-01-28 RX ORDER — OXYCODONE HYDROCHLORIDE 10 MG/1
10 TABLET ORAL EVERY 6 HOURS PRN
Status: DISCONTINUED | OUTPATIENT
Start: 2020-01-28 | End: 2020-01-31 | Stop reason: HOSPADM

## 2020-01-28 RX ADMIN — ACETAMINOPHEN 1000 MG: 500 TABLET, FILM COATED ORAL at 04:55

## 2020-01-28 RX ADMIN — OXYCODONE HYDROCHLORIDE 10 MG: 10 TABLET ORAL at 04:53

## 2020-01-28 RX ADMIN — CEFAZOLIN SODIUM 2 G: 2 INJECTION, SOLUTION INTRAVENOUS at 15:47

## 2020-01-28 RX ADMIN — HEPARIN SODIUM 5000 UNITS: 5000 INJECTION, SOLUTION INTRAVENOUS; SUBCUTANEOUS at 04:56

## 2020-01-28 RX ADMIN — HEPARIN SODIUM 5000 UNITS: 5000 INJECTION, SOLUTION INTRAVENOUS; SUBCUTANEOUS at 21:05

## 2020-01-28 RX ADMIN — MICONAZOLE NITRATE: 20 CREAM TOPICAL at 13:04

## 2020-01-28 RX ADMIN — GABAPENTIN 100 MG: 100 CAPSULE ORAL at 04:54

## 2020-01-28 RX ADMIN — GABAPENTIN 100 MG: 100 CAPSULE ORAL at 17:31

## 2020-01-28 RX ADMIN — CEFAZOLIN SODIUM 2 G: 2 INJECTION, SOLUTION INTRAVENOUS at 05:00

## 2020-01-28 RX ADMIN — TRAZODONE HYDROCHLORIDE 50 MG: 50 TABLET ORAL at 21:05

## 2020-01-28 RX ADMIN — OXYCODONE HYDROCHLORIDE 10 MG: 10 TABLET ORAL at 21:05

## 2020-01-28 RX ADMIN — OXYCODONE HYDROCHLORIDE 10 MG: 10 TABLET ORAL at 13:15

## 2020-01-28 RX ADMIN — ACETAMINOPHEN 1000 MG: 500 TABLET, FILM COATED ORAL at 12:02

## 2020-01-28 RX ADMIN — CEFAZOLIN SODIUM 2 G: 2 INJECTION, SOLUTION INTRAVENOUS at 21:05

## 2020-01-28 RX ADMIN — HEPARIN SODIUM 5000 UNITS: 5000 INJECTION, SOLUTION INTRAVENOUS; SUBCUTANEOUS at 13:03

## 2020-01-28 RX ADMIN — ACETAMINOPHEN 1000 MG: 500 TABLET, FILM COATED ORAL at 17:31

## 2020-01-28 RX ADMIN — AMIODARONE HYDROCHLORIDE 200 MG: 200 TABLET ORAL at 04:54

## 2020-01-28 ASSESSMENT — ENCOUNTER SYMPTOMS
SORE THROAT: 0
SHORTNESS OF BREATH: 0
BLOOD IN STOOL: 0
PALPITATIONS: 0
FOCAL WEAKNESS: 0
VOMITING: 0
BACK PAIN: 0
DIZZINESS: 0
NECK PAIN: 0
DEPRESSION: 0
MYALGIAS: 0
HEADACHES: 0
COUGH: 0
CHILLS: 0
SPUTUM PRODUCTION: 0
PHOTOPHOBIA: 0
FEVER: 0
HEMOPTYSIS: 0
DOUBLE VISION: 0
HEARTBURN: 0
NAUSEA: 0
HALLUCINATIONS: 0
DIAPHORESIS: 0
SPEECH CHANGE: 0
ABDOMINAL PAIN: 0
BLURRED VISION: 0
ORTHOPNEA: 0
TINGLING: 0
BRUISES/BLEEDS EASILY: 0

## 2020-01-28 ASSESSMENT — COGNITIVE AND FUNCTIONAL STATUS - GENERAL
DAILY ACTIVITIY SCORE: 17
DRESSING REGULAR UPPER BODY CLOTHING: A LITTLE
SUGGESTED CMS G CODE MODIFIER DAILY ACTIVITY: CK
HELP NEEDED FOR BATHING: A LITTLE
PERSONAL GROOMING: A LITTLE
TOILETING: A LITTLE
DRESSING REGULAR LOWER BODY CLOTHING: A LOT
EATING MEALS: A LITTLE

## 2020-01-28 NOTE — PROGRESS NOTES
Daily Progress Note:     Date of Service: 1/28/2020  Primary Team: [facility]   Attending: Alex Obrien M.D.   Senior Resident: Dr. Rai  Intern: Dr. Urbano  Contact:  [phone]    Chief Complaint:   Right thigh wound Infection.  MSSA Bacteremia.  Hep C Infection.     Subjective:   - NO acute events overnight reported  - Patient stating feeling well, tolerating well oral intake.  - Reported 3 BM yesterday and would like to skip a day or so of bowel regimen  - Patient continues to run soft blood pressures, SBP mid/high 90's, we talk about decreasing frequency of his meds.  - Patient stated that yesterday took a longer walk with PT.  - Wound change was done yesterday 27th, clean skin, vac output serosanguineous.  - Overall patient doing well, working on getting less Meds for his vac changes.  - Patient will continue in the hospital till well tolerated wound changes w/o IV drugs.    Consultants/Specialty:  Orthopaedic      Surgery  Intensive care  Infectious disease  Wound Care    Review of Systems:    Review of Systems   Constitutional: Negative for chills, diaphoresis, fever and malaise/fatigue.   HENT: Negative for congestion, ear pain, hearing loss, sore throat and tinnitus.    Eyes: Negative for blurred vision, double vision and photophobia.   Respiratory: Negative for cough, hemoptysis, sputum production and shortness of breath.    Cardiovascular: Negative for chest pain, palpitations and orthopnea.   Gastrointestinal: Negative for abdominal pain, blood in stool, heartburn, nausea and vomiting.   Genitourinary: Negative for dysuria, frequency, hematuria and urgency.   Musculoskeletal: Negative for back pain, joint pain, myalgias and neck pain.   Skin: Negative for itching and rash.   Neurological: Negative for dizziness, tingling, speech change, focal weakness and headaches.   Endo/Heme/Allergies: Does not bruise/bleed easily.   Psychiatric/Behavioral: Negative for depression, hallucinations and  suicidal ideas.       Objective Data:   Physical Exam:   Vitals:   Temp:  [36.3 °C (97.4 °F)-36.7 °C (98.1 °F)] 36.5 °C (97.7 °F)  Pulse:  [68-81] 81  Resp:  [18-20] 18  BP: (90-95)/(50-57) 95/56  SpO2:  [98 %-100 %] 99 %      Physical Exam  Constitutional:       General: He is not in acute distress.     Appearance: He is not ill-appearing or diaphoretic.   HENT:      Head: Normocephalic and atraumatic.      Nose: Nose normal. No congestion or rhinorrhea.      Mouth/Throat:      Mouth: Mucous membranes are moist.      Pharynx: No oropharyngeal exudate or posterior oropharyngeal erythema.   Eyes:      General: No scleral icterus.     Extraocular Movements: Extraocular movements intact.      Conjunctiva/sclera: Conjunctivae normal.      Pupils: Pupils are equal, round, and reactive to light.   Neck:      Musculoskeletal: Normal range of motion and neck supple. No neck rigidity or muscular tenderness.   Cardiovascular:      Rate and Rhythm: Normal rate and regular rhythm.      Heart sounds: No murmur.   Pulmonary:      Effort: Pulmonary effort is normal. No respiratory distress.      Breath sounds: Normal breath sounds. No stridor. No wheezing or rhonchi.   Abdominal:      General: Bowel sounds are normal. There is no distension.      Palpations: There is no mass.      Tenderness: There is no tenderness. There is no guarding or rebound.   Genitourinary:     Comments: Left inguinal hernia  Musculoskeletal: Normal range of motion.         General: No swelling or tenderness.      Right lower leg: No edema.      Left lower leg: No edema.   Skin:     Capillary Refill: Capillary refill takes less than 2 seconds.   Neurological:      General: No focal deficit present.      Mental Status: He is alert and oriented to person, place, and time. Mental status is at baseline.      Cranial Nerves: No cranial nerve deficit.   Psychiatric:         Mood and Affect: Mood normal.         Thought Content: Thought content normal.          Judgment: Judgment normal.           Labs:   Review    Imaging:   Review    * Septic shock due to Gram positive bacteria (HCC)- (present on admission)  Assessment & Plan  Resolved   This is Sepsis Present on admission  SIRS criteria identified on my evaluation include: Tachycardia, with heart rate greater than 90 BPM and Leukocyosis, with WBC greater than 12,000  Source is right lower extremity, bacteremia  Sepsis protocol initiated  Fluid resuscitation ordered per protocol  IV antibiotics were ordered    Maintenance fluid as above  Phenylephrine GTT - has not required for over 24 hours  Cultures pending - repeat blood cultures NGTD  Antibiotics to cover necrotizing process, MSSA  See necrotizing fasciitis problem.    * Sepsis Resolved  - CTM  - Vital signs stable  - BC No growth for 5 days  - Pt will finish Abx Feb 1st        MSSA bacteremia  Assessment & Plan  - MSSA Bacteremia One bottle  - Second Culture taken 18t h no Growth for 5 days  - Midline was placed 1/21 afternoon.  - Patient will continue antibiotics till Feb 1st  - DC pending after ca tolerate wound change w/o IV drugs  - Outpatient PCP follow up    Necrotizing fasciitis of pelvic region and thigh (HCC)- (present on admission)  Assessment & Plan  -  Patient presented with large abscess (40cm craniocaudially, 4x10cm in other dimensions) in his right lateral thigh and a somewhat subacute history of increasing pain, fever, and chills after a ground level fall where he injured the area. - He had elevated WBC.  - Initially started on Meropenem and Linezolid on admission, went to the OR evening of 1/16/2019 with extensive debridement confirming necrotizing infection.   - Cultures were obtained and a wound vac was placed.   - Subsequently gram stain showed gram positive cocci consistent with streptococcus,    -  Cefazolin and Linezolid after above finding  - Cultures positive for viridans strep in wound.  - 3rd I&D with change of wound vac on 1/20  - No more  I&D planned by Surgery  - Continues Sinus Rhythm   - Midline placed   - BC negative for 5 days  - No signs of infection  - Afebrile, No Tachycardia  - Patient able to walk hallways               Plan:    - Waiting Ortho clearness to DC to SNF.  - Wound Vac changes vs STSG 1-3 weeks per Ortho note  - Continue Cefazolin till Feb 1st  - Pain control, Oxy Q8 now  - CTM  - SNF transfer till petient can tolerate Wound Vac changes w/o IV analgesics per Ortho    Unilateral recurrent inguinal hernia without obstruction or gangrene  Assessment & Plan  - Large left Inguinal Hernia  - Bowel contents in scrotum  - General surgery recommended outpatient follow up for elective surgery once recovered from infection.              PLAN:  - Scrotal sling  - No heavy lifting  - Outpatient follow up with surgery after recovery from this hospitalization  - Establish care with PCP  - CTM      Atrial fibrillation (HCC)  Assessment & Plan  - Resolved  - A Fib on 1/20 afternoon, was given Metoprolol, Amiodarone loading dose and Drip.  - Converted back to Sinus Rhythm today 1/21 at 3:52 am.  - Morning EKG SR (1/21)  - Continues sinus rhythm   - Transferred to medical floor  - Denies chest pain, palpitations or SOB                         PLAN:  - Changing Oxy from Q6 to Q8  - CTM  - On Medicacal floor  - Oral Amiodarone  - Outpatient follow up  - SNF after patient can tolerate wound change w/o use of IV drugs      Hyponatremia- (present on admission)  Assessment & Plan  - Resolved  - Today 1/28 sodium 136  - CTM    Thrombocytosis (HCC)- (present on admission)  Assessment & Plan  CTM    Normochromic anemia- (present on admission)  Assessment & Plan  CTM       Smoking- (present on admission)  Assessment & Plan         Chronic hepatitis C virus infection (HCC)- (present on admission)  Assessment & Plan  - Patient was antibody positive with viral load 6.2 million.   - Transaminases normal, ALP slightly high.    Plan:  - Outpatient  follow-up,   - Abstention from further drug use  - Establish care with PCP  - Regular follow up  - Labs per PCP    Cannabis use disorder, mild, abuse- (present on admission)  Assessment & Plan   patient        Homeless- (present on admission)  Assessment & Plan  Difficult discharge      Methamphetamine use (HCC)- (present on admission)  Assessment & Plan  - Counselled on cessation.  - PCP establish care

## 2020-01-28 NOTE — PROGRESS NOTES
Assumed care of patient at 0700. Report received from night RN. A&Ox4, no complaints of pain. Bed locked and in the lowest position. Call bell within reach, able to make needs known. Will continue to monitor.

## 2020-01-28 NOTE — WOUND TEAM
[facility]  Inpatient Services  Wound and Skin Care Progress Note    HPI, PMH, SH: Reviewed    Unit where seen by Wound Team: [id number]     WOUND CONSULT RELATED TO:  Scheduled Negative Pressure Wound Therapy dressing change to right lateral thigh     Self Report / Pain Level:  9/10. Premedicated with PO and IV and lidocaine instilled into foam and let dwell for 15 minutes prior to change.        OBJECTIVE:  Dressing intact    WOUND TYPE, LOCATION, CHARACTERISTICS (Pressure Injuries: location, stage, POA or date identified)      Negative Pressure Wound Therapy Leg Upper;Other (Comment) (Active)   NPWT Pump Mode / Pressure Setting 125 mmHg 1/27/2020  8:45 AM   Dressing Type Black foam;Large 1/27/2020  8:45 AM   Number of Foam Pieces Used 4 1/22/2020 10:58 AM   Canister Changed No 1/27/2020  8:45 AM   Output (mL) 50 mL 1/22/2020  4:29 AM   VAC VeraFlo Irrigant Normal Saline 1/27/2020 11:00 AM   VAC VeraFlo Soak Time (mins) 10 1/27/2020 11:00 AM   VAC VeraFlo Instill Volume (ml) 36 1/27/2020 11:00 AM   VAC VeraFlo - Therapy Time (hrs) 3.5 1/27/2020 11:00 AM   VAC VeraFlo Pressure (mm/Hg) 125 mmHg;Continuous 1/27/2020 11:00 AM     Wound 01/16/20 Other (comment) Leg Right lateral Open complicated surgical wound (Active)   Wound Image   1/22/2020 10:58 AM   Site Assessment Red;White 1/27/2020 11:00 AM   Toshia-wound Assessment Intact 1/27/2020 11:00 AM   Margins Unattached edges;Defined edges 1/27/2020 11:00 AM   Wound Length (cm) 31.5 cm 1/22/2020 10:58 AM   Wound Width (cm) 14 cm 1/22/2020 10:58 AM   Wound Depth (cm) 1.5 cm 1/22/2020 10:58 AM   Wound Surface Area (cm^2) 441 cm^2 1/22/2020 10:58 AM   Tunneling 0 cm 1/24/2020  2:00 PM   Undermining 2 cm 1/22/2020 10:58 AM   Closure Secondary intention 1/27/2020 11:00 AM   Drainage Amount Moderate 1/27/2020 11:00 AM   Drainage Description Serosanguineous 1/27/2020 11:00 AM   Non-staged Wound Description Full thickness 1/27/2020 11:00 AM   Treatments  Cleansed;Site care 1/27/2020 11:00 AM   Cleansing Normal Saline Irrigation 1/27/2020 11:00 AM   Periwound Protectant Skin Protectant wipes to Periwound 1/27/2020 11:00 AM   Dressing Options Wound Vac 1/27/2020 11:00 AM   Dressing Cleansing/Solutions Normal Saline 1/27/2020 11:00 AM   Dressing Changed Changed 1/27/2020 11:00 AM   Dressing Status Clean;Dry;Intact 1/27/2020 11:00 AM   Dressing Change Frequency Monday, Wednesday, Friday 1/27/2020 11:00 AM   NEXT Dressing Change  01/29/20 1/27/2020 11:00 AM   NEXT Weekly Photo (Inpatient Only) 01/29/20 1/27/2020 11:00 AM   WOUND NURSE ONLY - Odor None 1/27/2020 11:00 AM   WOUND NURSE ONLY - Exposed Structures Tendon;Muscle 1/27/2020 11:00 AM   WOUND NURSE ONLY - Tissue Type and Percentage 20% white tendon, 80% red 1/27/2020 11:00 AM        Lab Values:    Lab Results   Component Value Date/Time    WBC 9.4 01/24/2020 02:21 AM    RBC 3.12 (L) 01/24/2020 02:21 AM    HEMOGLOBIN 9.1 (L) 01/24/2020 02:21 AM    HEMATOCRIT 28.3 (L) 01/24/2020 02:21 AM                    Lab Results   Component Value Date/Time    HBA1C 5.3 10/09/2019 11:16 AM           Culture:   Obtained NA no signs of infection, Results show NA    INTERVENTIONS BY WOUND TEAM:  After lidocaine instilled into foam and let dwell for 15 minutes the dressing was saturated with NS. Used gentle push pull method to removed dressing. Patient stated that dressing removal was better than last time but there were still many times where patient was in excruciating pain. Once dressing removed changed gloves and irrigated wound with NS. Periwound cleaned with NS and gauze 4 x 4s. No Sting and paste ring to periwound. Mepitel One placed over wound bed. 4-5 pieces of black Veraflo foam over the Mepitel. This was draped, dressing seal achieved. Previous Veraflo settings maintained as this was visually appropriate (observing amount of NS instilled into the dressing.) Changed patient's gown. Patient assisted to the bathroom, bed  linens changed    Interdisciplinary consultation: Patient, Bedside RN     EVALUATION: Wound bed clean with granulation tissue developing.    Goals: Steady decrease in wound area and depth weekly.    NURSING PLAN OF CARE ORDERS (X):  Dressing changes: See Dressing Care orders: X  Skin care: See Skin Care orders: X  Rectal tube care: See Rectal Tube Care orders:        Other orders:                             WOUND TEAM PLAN OF CARE (X):   Dressing changes by wound team:          Follow up 1-2 times weekly:               Follow up 3 times weekly:                NPWT change 3 times weekly:   X  Follow up as needed:       Other (explain):

## 2020-01-28 NOTE — THERAPY
"PT treatment attempted. Pt declined to work with PT stating he \"has been up 2x already today- to bathroom and chair.\" Educated pt on role of PT and improving ambulation distance/tolerance. Pt again refused stating, I can do it on my own. Approx 10 min following refusal to work with PT, pt was observed ambulating in hallway with FWW and nursing staff. Pt observed to be ambulating well. Will reattempt PT tx 1x more, should pt continue to refuse PT will sign off and pt can continue to mobilize with nursing staff.     Olamide Merida, PT, DPT Pager: [phone]      "

## 2020-01-28 NOTE — CARE PLAN
Problem: Pain Management  Goal: Pain level will decrease to patient's comfort goal  Outcome: PROGRESSING AS EXPECTED  Note:   Patient stating his pain is at 7/10. He states this is baseline. Mostly in his back and neck. Patient c/o increased pain in right leg when ambulating.     Problem: Skin Integrity  Goal: Risk for impaired skin integrity will decrease  Outcome: PROGRESSING AS EXPECTED  Note:   Wound vac dressing- clean, dry and intact. Sacrum is red and excoriated. Patient able to independently move self in bed.     Problem: Mobility  Goal: Risk for activity intolerance will decrease  Outcome: PROGRESSING AS EXPECTED  Note:   Ambulating with SBA.

## 2020-01-28 NOTE — CARE PLAN
"  Problem: Pain Management  Goal: Pain level will decrease to patient's comfort goal  Outcome: PROGRESSING AS EXPECTED  Note:   Pt c/o 7/10 pain to his leg stating \"I did a lot today\". Medication administered per MAR. Will continue to monitor pain     Problem: Safety  Goal: Will remain free from falls  Outcome: PROGRESSING AS EXPECTED  Note:   Pt reminded to call for assistance before ambulating. Bed is in the lowest/locked position, call light and belongings are within reach. Hourly rounding in place     "

## 2020-01-28 NOTE — PROGRESS NOTES
"   Orthopaedic PA Progress Note    Interval changes:Tolerating Vac changes better , ambulating in HWs    ROS - Patient denies any new issues. No chest pain, dyspnea, or fever.  Pain well controlled.    BP (!) 95/56   Pulse 81   Temp 36.5 °C (97.7 °F) (Temporal)   Resp 18   Ht 1.93 m (6' 4\")   Wt 97.7 kg (215 lb 6.2 oz)   SpO2 99%     Patient seen and examined  No acute distress  Breathing non labored  RRR  Vac dressing is clean, dry, and intact. Patient clearly fires tibialis anterior, EHL, and gastrocnemius/soleus. Sensation is intact to light touch throughout superficial peroneal, deep peroneal, tibial, saphenous, and sural nerve distributions. Strong and palpable 2+ dorsalis pedis and posterior tibial pulses with capillary refill less than 2 seconds. No lower leg tenderness or discomfort.        Active Hospital Problems    Diagnosis   • MSSA bacteremia [R78.81]     Priority: High   • Septic shock due to Gram positive bacteria (HCC) [A41.89, R65.21]     Priority: High   • Necrotizing fasciitis of pelvic region and thigh (HCC) [M72.6]     Priority: High   • Unilateral recurrent inguinal hernia without obstruction or gangrene [K40.91]     Priority: Medium   • Atrial fibrillation (HCC) [I48.91]     Priority: Medium   • Hyponatremia [E87.1]     Priority: Medium   • Normochromic anemia [D64.9]     Priority: Low   • Thrombocytosis (HCC) [D47.3]     Priority: Low   • Chronic hepatitis C virus infection (HCC) [B18.2]     Priority: Low   • Smoking [F17.200]     Priority: Low   • Cannabis use disorder, mild, abuse [F12.10]     Priority: Low   • Homeless [Z59.0]     Priority: Low   • Methamphetamine use (HCC) [F15.10]     Priority: Low   • Cellulitis of hand, left [L03.114]     Priority: Low     Assessment/Plan:  POD#8 S/P  1.  Excisional debridement of right thigh wound including skin, subcutaneous tissue, and muscle through wound measuring 30x10 cm in elliptical shape.  2.  Application of wound VAC greater than 50 " square cm, right thigh wound  POD#10 S/P:  1.  Incision and drainage of right intramuscular thigh abscess.  2.  Excisional debridement of right thigh wound including skin, subcutaneous tissue and muscle through wound measuring 30x10 cm.  3.  Application of wound VAC greater than 50 sq cm.  Wt bearing status - WBAT  Recommend all meals OOB, Amb with assist TID, bathroom proveleges   Wound care/Drains - vac in place- wound team to change 3x week  Future Procedures - Vac Dressing change to closure vs STSG in ~ 1-3 weeks   PT/OT-initiated  Antibiotics: ancef 2g IV q8 day 8  DVT Prophylaxis- TEDS/SCDs/Foot pumps  Lundberg-none  Case Coordination for Discharge Planning - Disposition pending  -   Hold transfer until tolerating vac changes without IV analgesic support. May need to return for STSG in future pending wound progress with thrice weekly vac dressing changes. Appreciate Wound team images during vac changes.

## 2020-01-29 PROCEDURE — 770006 HCHG ROOM/CARE - MED/SURG/GYN SEMI*

## 2020-01-29 PROCEDURE — A9270 NON-COVERED ITEM OR SERVICE: HCPCS | Performed by: STUDENT IN AN ORGANIZED HEALTH CARE EDUCATION/TRAINING PROGRAM

## 2020-01-29 PROCEDURE — 700102 HCHG RX REV CODE 250 W/ 637 OVERRIDE(OP): Performed by: STUDENT IN AN ORGANIZED HEALTH CARE EDUCATION/TRAINING PROGRAM

## 2020-01-29 PROCEDURE — 97116 GAIT TRAINING THERAPY: CPT

## 2020-01-29 PROCEDURE — A7000 DISPOSABLE CANISTER FOR PUMP: HCPCS | Performed by: HOSPITALIST

## 2020-01-29 PROCEDURE — 700111 HCHG RX REV CODE 636 W/ 250 OVERRIDE (IP): Performed by: STUDENT IN AN ORGANIZED HEALTH CARE EDUCATION/TRAINING PROGRAM

## 2020-01-29 PROCEDURE — 99232 SBSQ HOSP IP/OBS MODERATE 35: CPT | Mod: GC | Performed by: HOSPITALIST

## 2020-01-29 PROCEDURE — 97606 NEG PRS WND THER DME>50 SQCM: CPT

## 2020-01-29 PROCEDURE — 97530 THERAPEUTIC ACTIVITIES: CPT

## 2020-01-29 RX ORDER — LIDOCAINE HYDROCHLORIDE 40 MG/ML
SOLUTION TOPICAL PRN
Status: COMPLETED | OUTPATIENT
Start: 2020-01-29 | End: 2020-01-31

## 2020-01-29 RX ADMIN — OXYCODONE HYDROCHLORIDE 10 MG: 10 TABLET ORAL at 20:10

## 2020-01-29 RX ADMIN — MICONAZOLE NITRATE: 20 CREAM TOPICAL at 22:50

## 2020-01-29 RX ADMIN — TRAZODONE HYDROCHLORIDE 50 MG: 50 TABLET ORAL at 22:25

## 2020-01-29 RX ADMIN — ACETAMINOPHEN 1000 MG: 500 TABLET, FILM COATED ORAL at 05:07

## 2020-01-29 RX ADMIN — HEPARIN SODIUM 5000 UNITS: 5000 INJECTION, SOLUTION INTRAVENOUS; SUBCUTANEOUS at 22:25

## 2020-01-29 RX ADMIN — ACETAMINOPHEN 1000 MG: 500 TABLET, FILM COATED ORAL at 17:10

## 2020-01-29 RX ADMIN — GABAPENTIN 100 MG: 100 CAPSULE ORAL at 05:07

## 2020-01-29 RX ADMIN — HEPARIN SODIUM 5000 UNITS: 5000 INJECTION, SOLUTION INTRAVENOUS; SUBCUTANEOUS at 05:08

## 2020-01-29 RX ADMIN — MICONAZOLE NITRATE: 20 CREAM TOPICAL at 14:24

## 2020-01-29 RX ADMIN — HEPARIN SODIUM 5000 UNITS: 5000 INJECTION, SOLUTION INTRAVENOUS; SUBCUTANEOUS at 14:21

## 2020-01-29 RX ADMIN — OXYCODONE HYDROCHLORIDE 10 MG: 10 TABLET ORAL at 05:07

## 2020-01-29 RX ADMIN — SENNOSIDES AND DOCUSATE SODIUM 2 TABLET: 8.6; 5 TABLET ORAL at 17:10

## 2020-01-29 RX ADMIN — CEFAZOLIN SODIUM 2 G: 2 INJECTION, SOLUTION INTRAVENOUS at 22:25

## 2020-01-29 RX ADMIN — GABAPENTIN 100 MG: 100 CAPSULE ORAL at 17:10

## 2020-01-29 RX ADMIN — ACETAMINOPHEN 1000 MG: 500 TABLET, FILM COATED ORAL at 10:49

## 2020-01-29 RX ADMIN — CEFAZOLIN SODIUM 2 G: 2 INJECTION, SOLUTION INTRAVENOUS at 05:13

## 2020-01-29 RX ADMIN — OXYCODONE HYDROCHLORIDE 10 MG: 10 TABLET ORAL at 10:49

## 2020-01-29 RX ADMIN — SENNOSIDES AND DOCUSATE SODIUM 2 TABLET: 8.6; 5 TABLET ORAL at 05:13

## 2020-01-29 RX ADMIN — MICONAZOLE NITRATE: 20 CREAM TOPICAL at 05:00

## 2020-01-29 RX ADMIN — CEFAZOLIN SODIUM 2 G: 2 INJECTION, SOLUTION INTRAVENOUS at 14:20

## 2020-01-29 ASSESSMENT — ENCOUNTER SYMPTOMS
HEADACHES: 0
MYALGIAS: 0
VOMITING: 0
HEMOPTYSIS: 0
HEARTBURN: 0
DEPRESSION: 0
SHORTNESS OF BREATH: 0
SPEECH CHANGE: 0
DIAPHORESIS: 0
SORE THROAT: 0
PHOTOPHOBIA: 0
STRIDOR: 0
CHILLS: 0
BACK PAIN: 0
DIZZINESS: 0
BRUISES/BLEEDS EASILY: 0
HALLUCINATIONS: 0
NAUSEA: 0
BLURRED VISION: 0
NECK PAIN: 0
ORTHOPNEA: 0
FOCAL WEAKNESS: 0
SPUTUM PRODUCTION: 0
FEVER: 0
DOUBLE VISION: 0
ABDOMINAL PAIN: 0
TINGLING: 0
COUGH: 0
SINUS PAIN: 0
PALPITATIONS: 0

## 2020-01-29 ASSESSMENT — COGNITIVE AND FUNCTIONAL STATUS - GENERAL
MOVING TO AND FROM BED TO CHAIR: A LITTLE
MOVING FROM LYING ON BACK TO SITTING ON SIDE OF FLAT BED: A LITTLE
MOBILITY SCORE: 18
SUGGESTED CMS G CODE MODIFIER MOBILITY: CK
WALKING IN HOSPITAL ROOM: A LITTLE
TURNING FROM BACK TO SIDE WHILE IN FLAT BAD: A LITTLE
STANDING UP FROM CHAIR USING ARMS: A LITTLE
CLIMB 3 TO 5 STEPS WITH RAILING: A LITTLE

## 2020-01-29 ASSESSMENT — GAIT ASSESSMENTS
DISTANCE (FEET): 200
DEVIATION: BRADYKINETIC;DECREASED HEEL STRIKE;DECREASED TOE OFF
GAIT LEVEL OF ASSIST: SUPERVISED
ASSISTIVE DEVICE: FRONT WHEEL WALKER

## 2020-01-29 NOTE — DISCHARGE PLANNING
Anticipated Discharge Disposition: SNF    Action: Pt tolerated wound dressing changes without IV Morphine. Charge RN requested LSW confirm SNF orders veraflow wound vac.    LSW called Abimbola at [facility] and provided update. Per Abimbola, will need to be monitored overnight for pain because it can come back. Then Abimbola stated pt will need to tolerate another wound dressing change without the IV medication. LSW asked Abimbola how long it would take to order veraflow wound vac, Abimbola stated 1 day. LSW asked Abimbola to confirm that the earliest pt would be accepted to [facility] would be Saturday and Abimbola confirmed.    LSW contacted [facility] with [place] and provided update. [facility] and she will contact to review.     Barriers to Discharge: None    Plan: LSW continue to coordinate discharge plan.

## 2020-01-29 NOTE — PROGRESS NOTES
Daily Progress Note:     Date of Service: 1/29/2020  Primary Team: [facility] IM Purple Team   Attending: Alex Obrien M.D.   Senior Resident: Dr. Rai  Intern: Dr. Urbano  Contact:  [phone]    Chief Complaint:   Right thigh wound Infection.  MSSA Bacteremia.  Hep C Infection.    Subjective:   - NO acute events overnight reported.  - Patient states feeling well, no significant pain at all, we discontinued IV analgesics yesterday, pain control adequate.  - Patient's vital signs running soft BP, patient denies symptoms, he has been able to ambulate without complaining of lightheadedness.  - Patient tolerating well oral intake.  - Wound vacc drainage is clear, he will get dressing change today w/o IV meds, if patient tolerates well wound change he might be able to leave the hospital by the end of the week.  - Patient still pending to get his supportive belt for his hernia.  - Continue antibiotics till February 1st.  - Patient was able to walk 200 ft with PT today.    Consultants/Specialty:  Orthopaedic      Surgery  Intensive care  Infectious disease  Wound Care.    Review of Systems:    Review of Systems   Constitutional: Negative for chills, diaphoresis and fever.   HENT: Negative for ear pain, hearing loss, nosebleeds, sinus pain, sore throat and tinnitus.    Eyes: Negative for blurred vision, double vision and photophobia.   Respiratory: Negative for cough, hemoptysis, sputum production, shortness of breath and stridor.    Cardiovascular: Negative for chest pain, palpitations and orthopnea.   Gastrointestinal: Negative for abdominal pain, heartburn, nausea and vomiting.   Genitourinary: Negative for dysuria, frequency, hematuria and urgency.   Musculoskeletal: Negative for back pain, joint pain, myalgias and neck pain.   Skin: Negative for itching and rash.   Neurological: Negative for dizziness, tingling, speech change, focal weakness and headaches.   Endo/Heme/Allergies: Does not bruise/bleed easily.    Psychiatric/Behavioral: Negative for depression, hallucinations and suicidal ideas.       Objective Data:   Physical Exam:   Vitals:   Temp:  [36.2 °C (97.2 °F)-36.8 °C (98.2 °F)] 36.3 °C (97.3 °F)  Pulse:  [65-76] 66  Resp:  [16-18] 18  BP: ()/(49-60) 90/52  SpO2:  [96 %-100 %] 99 %     Physical Exam  Constitutional:       General: He is not in acute distress.     Appearance: Normal appearance. He is not ill-appearing.   HENT:      Head: Normocephalic and atraumatic.      Nose: Nose normal. No congestion or rhinorrhea.      Mouth/Throat:      Mouth: Mucous membranes are moist.      Pharynx: No oropharyngeal exudate or posterior oropharyngeal erythema.   Eyes:      General: No scleral icterus.     Extraocular Movements: Extraocular movements intact.      Conjunctiva/sclera: Conjunctivae normal.      Pupils: Pupils are equal, round, and reactive to light.   Neck:      Musculoskeletal: Normal range of motion and neck supple. No neck rigidity or muscular tenderness.   Cardiovascular:      Rate and Rhythm: Normal rate and regular rhythm.      Heart sounds: No murmur.   Pulmonary:      Effort: Pulmonary effort is normal. No respiratory distress.      Breath sounds: No stridor. No wheezing, rhonchi or rales.   Abdominal:      General: Bowel sounds are normal. There is no distension.      Palpations: Abdomen is soft. There is no mass.      Tenderness: There is no tenderness. There is no rebound.   Genitourinary:     Comments: Large left inguinal hernia  Musculoskeletal: Normal range of motion.         General: No swelling or tenderness.      Right lower leg: No edema.      Left lower leg: No edema.   Skin:     General: Skin is warm.      Capillary Refill: Capillary refill takes less than 2 seconds.      Coloration: Skin is not jaundiced.      Findings: No bruising or erythema.   Neurological:      General: No focal deficit present.      Mental Status: He is alert and oriented to person, place, and time. Mental  status is at baseline.      Cranial Nerves: No cranial nerve deficit.   Psychiatric:         Mood and Affect: Mood normal.         Behavior: Behavior normal.         Thought Content: Thought content normal.         Judgment: Judgment normal.           Labs:   Review    Imaging:   Review    * Septic shock due to Gram positive bacteria (HCC)- (present on admission)  Assessment & Plan  Resolved   This is Sepsis Present on admission  SIRS criteria identified on my evaluation include: Tachycardia, with heart rate greater than 90 BPM and Leukocyosis, with WBC greater than 12,000  Source is right lower extremity, bacteremia  Sepsis protocol initiated  Fluid resuscitation ordered per protocol  IV antibiotics were ordered    Maintenance fluid as above  Phenylephrine GTT - has not required for over 24 hours  Cultures pending - repeat blood cultures NGTD  Antibiotics to cover necrotizing process, MSSA  See necrotizing fasciitis problem.    * Sepsis Resolved  - CTM  - Vital signs stable  - BC No growth for 5 days  - Pt will finish Abx Feb 1st        MSSA bacteremia  Assessment & Plan  - MSSA Bacteremia One bottle  - Second Culture taken 18t h no Growth for 5 days  - Midline was placed 1/21 afternoon.  - Patient will continue antibiotics till Feb 1st  - DC pending after can tolerate wound change w/o IV drugs  - Outpatient PCP follow up    Necrotizing fasciitis of pelvic region and thigh (HCC)- (present on admission)  Assessment & Plan  -  Patient presented with large abscess (40cm craniocaudially, 4x10cm in other dimensions) in his right lateral thigh and a somewhat subacute history of increasing pain, fever, and chills after a ground level fall where he injured the area. - He had elevated WBC.  - Initially started on Meropenem and Linezolid on admission, went to the OR evening of 1/16/2019 with extensive debridement confirming necrotizing infection.   - Cultures were obtained and a wound vac was placed.   - Subsequently gram  stain showed gram positive cocci consistent with streptococcus,    -  Cefazolin and Linezolid after above finding  - Cultures positive for viridans strep in wound.  - 3rd I&D with change of wound vac on 1/20  - No more I&D planned by Surgery  - Continues Sinus Rhythm   - Midline placed   - BC negative for 5 days  - No signs of infection  - Afebrile, No Tachycardia  - Patient able to walk hallways               Plan:    - Waiting Ortho clear to DC to SNF.  - Wound Vac changes vs STSG 1-3 weeks per Ortho note  - Continue Cefazolin till Feb 1st  - Pain control, Oxy 10  - DC IV meds  - CTM  - SNF transfer till petient can tolerate Wound Vac changes w/o IV analgesics per Ortho    Unilateral recurrent inguinal hernia without obstruction or gangrene  Assessment & Plan  - Large left Inguinal Hernia  - Bowel contents in scrotum  - General surgery recommended outpatient follow up for elective surgery once recovered from infection.              PLAN:  - Scrotal sling  - No heavy lifting  - Outpatient follow up with surgery after recovery from this hospitalization  - Elective surgery  - Establish care with PCP  - CTM      Atrial fibrillation (HCC)  Assessment & Plan  - Resolved  - A Fib on 1/20 afternoon, was given Metoprolol, Amiodarone loading dose and Drip.  - Converted back to Sinus Rhythm today 1/21 at 3:52 am.  - Morning EKG SR (1/21)  - Continues sinus rhythm   - Transferred to medical floor  - Denies chest pain, palpitations or SOB                         PLAN:  - On Medicacal floor  - Stop Amiodarone  - CTM  - SNF after patient can tolerate wound change w/o use of IV drugs      Hyponatremia- (present on admission)  Assessment & Plan  - Resolved  - On 1/28 sodium 136  - CTM  - No need for daily labs    Thrombocytosis (HCC)- (present on admission)  Assessment & Plan  CTM    Normochromic anemia- (present on admission)  Assessment & Plan  CTM       Smoking- (present on admission)  Assessment & Plan         Chronic  hepatitis C virus infection (HCC)- (present on admission)  Assessment & Plan  - Patient was antibody positive with viral load 6.2 million.   - Transaminases normal, ALP slightly high.    Plan:  - Outpatient follow-up,   - Abstention from further drug use  - Establish care with PCP  - Regular follow up  - Labs per PCP    Cannabis use disorder, mild, abuse- (present on admission)  Assessment & Plan   patient        Homeless- (present on admission)  Assessment & Plan  Difficult discharge      Methamphetamine use (HCC)- (present on admission)  Assessment & Plan  - Counselled on cessation.  - PCP establish care

## 2020-01-29 NOTE — DISCHARGE PLANNING
@1500  Agency/Facility Name: [facility]  Spoke To: Giancarlo  Outcome: Accepted pending bed.  SW informed.    @1350  Agency/Facility Name: [facility]  Outcome: Resent referral.

## 2020-01-29 NOTE — THERAPY
"Pt seen for PT tx session, more agreeable to day and reports he will have a wound vac change later today.  Pt demonstrated bed mobility, transfers and gait with FWW at SPV level. Pt ambulated 200 ft with FWW, intermittent cues required for FWW placement to improve activity tolerance and pain. Pt c/o difficulty with transfers and gait due to cervical spine and hernia pain- requested a hernia belt. Acute PT to sign off as pt has met acute PT goals at this time. Pt would benefit from continued therapies in a post acute setting should pt require placement for wound needs.  Continue to mobilize with nursing staff.     Physical Therapy Treatment completed.   Bed Mobility:  Supine to Sit: Supervised  Transfers: Sit to Stand: Supervised  Gait: Level Of Assist: Supervised with Front-Wheel Walker       Plan of Care: Patient with no further skilled PT needs in the acute care setting at this time  Discharge Recommendations: Equipment: Front-Wheel Walker.   Post-acute therapy: Patient has achieved highest practical level of function in an acute care setting and will not actively be followed by acute physical therapy services. Patient would still benefit from post-acute placement for additional rehabilitation services to focus on IADL and community mobility training that is not available in the acute care setting.       See \"Rehab Therapy-Acute\" Patient Summary Report for complete documentation.       "

## 2020-01-29 NOTE — PROGRESS NOTES
Assumed care of patient at 0700. Report received from night RN. A&Ox4. Denies any new pain. Discussed with patient about the plan for pain management for the dressing change. Wound vac functioning without any issues. Bed locked and in the lowest position. Able to make needs known. Call bell within reach, will continue to monitor.

## 2020-01-29 NOTE — CARE PLAN
Problem: Pain Management  Goal: Pain level will decrease to patient's comfort goal  Outcome: PROGRESSING AS EXPECTED  Note:   Pain controlled with PRN pain medications.      Problem: Safety  Goal: Will remain free from injury  Outcome: PROGRESSING AS EXPECTED  Goal: Will remain free from falls  Outcome: PROGRESSING AS EXPECTED  Note:   Remains free of falls and injury this shift.      Problem: Mobility  Goal: Risk for activity intolerance will decrease  Outcome: PROGRESSING AS EXPECTED  Note:   Safely ambulated with PT.

## 2020-01-29 NOTE — CARE PLAN
Problem: Pain Management  Goal: Pain level will decrease to patient's comfort goal  Outcome: PROGRESSING AS EXPECTED  Note:   Pt c/o 8/10 pain in right leg. Medication administered per MAR. Will continue to monitor     Problem: Safety  Goal: Will remain free from falls  Outcome: PROGRESSING AS EXPECTED  Note:   Pt reminded to call for assistance before ambulating. Bed is in the lowest/locked position, call light and belongings are within reach. Hourly rounding in palce

## 2020-01-29 NOTE — THERAPY
"Occupational Therapy Treatment completed with focus on  ADLs, ADL transfers and patient education.  Functional Status:  Pt was seen for Occupational Therapy treatment today, see Therapy Kardex for details.Treatment included education in breath control with activity and at rest, self pacing techs and energy conservation for pain management. Educated pt in safety awareness techs as well. Psychosocial intervention addressed. Pt easily agitated and upset with requesting pt attempt self care tasks. Pt apologized at end of session stating, he was \"upset about other things\". Pt demo supervision for bed mobility. Pt has wound vac in LLE hip area. Pt demonstrated supervised  for UB dressing seated EOB, Min A for LB dressing to don pants up to thighs. Mod A for sock donning. CGA for clothing management up over hips in standing using FWW for support. Pt ambulated to BR using FWW with SBA to move IV pole and wound vac.  Supervised for toilet transfers using grab bar . Pt able to do full toilet hygiene seated base. Pt stated he sponge bathed today seated base without assist just set up. Pt declined to do oral hygiene and grooming in standing c/o pain in RLE and neck pain. Pt able to get BTB with supervision.  Pt also demonstrated  Min A for Ambulating ADL's with FWW. Pt demo only fair safety awareness walking away from FWW stating, \"That's just in my way\". Pt at risk for a fall. Pt was left up in bed , call light in reach, bedside table in reach and nursing is aware. RN updated on OT treatment findings and recommendations.   Continue Occupational Therapy services as per plan.    Plan of Care: Will benefit from Occupational Therapy 3 times per week  Discharge Recommendations:  Equipment Will Continue to Assess for Equipment Needs. Post-acute therapy: Recommend post-acute placement for additional occupational therapy services prior to discharge home. Patient can tolerate post-acute therapies at a 5x/week frequency.    See \"Rehab " "Therapy-Acute\" Patient Summary Report for complete documentation.   "

## 2020-01-29 NOTE — PROGRESS NOTES
"   Orthopaedic Progress Note    Interval changes:  Patient doing well  Cleared by ortho for DC to SNF pending medicine clearance      ROS - Patient denies any new issues except per above.    BP (!) 90/52   Pulse 66   Temp 36.3 °C (97.3 °F) (Temporal)   Resp 18   Ht 1.93 m (6' 4\")   Wt 97.7 kg (215 lb 6.2 oz)   SpO2 99%       Patient seen and examined  No acute distress  Breathing non labored  RRR  R thigh vac dressing CDI with no leak, DNVI, moves all toes, cap refill <2 sec.           Active Hospital Problems    Diagnosis   • MSSA bacteremia [R78.81]     Priority: High   • Septic shock due to Gram positive bacteria (HCC) [A41.89, R65.21]     Priority: High   • Necrotizing fasciitis of pelvic region and thigh (HCC) [M72.6]     Priority: High   • Unilateral recurrent inguinal hernia without obstruction or gangrene [K40.91]     Priority: Medium   • Atrial fibrillation (HCC) [I48.91]     Priority: Medium   • Hyponatremia [E87.1]     Priority: Medium   • Normochromic anemia [D64.9]     Priority: Low   • Thrombocytosis (HCC) [D47.3]     Priority: Low   • Chronic hepatitis C virus infection (HCC) [B18.2]     Priority: Low   • Smoking [F17.200]     Priority: Low   • Cannabis use disorder, mild, abuse [F12.10]     Priority: Low   • Homeless [Z59.0]     Priority: Low   • Methamphetamine use (HCC) [F15.10]     Priority: Low   • Cellulitis of hand, left [L03.114]     Priority: Low       Assessment/Plan:  Cleared by ortho for DC to SNF pending medicine clearance  POD#9 S/P  1.  Excisional debridement of right thigh wound including skin, subcutaneous tissue, and muscle through wound measuring 30x10 cm in elliptical shape.  2.  Application of wound VAC greater than 50 square cm, right thigh wound  POD#11 S/P:  1.  Incision and drainage of right intramuscular thigh abscess.  2.  Excisional debridement of right thigh wound including skin, subcutaneous tissue and muscle through wound measuring 30x10 cm.  3.  Application of " wound VAC greater than 50 sq cm.  Wt bearing status - WBAT  Wound care/Drains - vac in place- wound team to change 3x week  Future Procedures - none planned this admission  Sutures/Staples out- N/A  PT/OT-initiated  Antibiotics: ancef 2g IV q8  DVT Prophylaxis- TEDS/SCDs/Foot pumps  Lundberg-none  Case Coordination for Discharge Planning - Disposition pending abx needs

## 2020-01-30 PROCEDURE — 700111 HCHG RX REV CODE 636 W/ 250 OVERRIDE (IP): Performed by: STUDENT IN AN ORGANIZED HEALTH CARE EDUCATION/TRAINING PROGRAM

## 2020-01-30 PROCEDURE — 700101 HCHG RX REV CODE 250: Performed by: STUDENT IN AN ORGANIZED HEALTH CARE EDUCATION/TRAINING PROGRAM

## 2020-01-30 PROCEDURE — A9270 NON-COVERED ITEM OR SERVICE: HCPCS | Performed by: STUDENT IN AN ORGANIZED HEALTH CARE EDUCATION/TRAINING PROGRAM

## 2020-01-30 PROCEDURE — L8310 TRUSS DOUBLE W/ STANDARD PAD: HCPCS

## 2020-01-30 PROCEDURE — 306637 HCHG MISC ORTHO ITEM RC 0274

## 2020-01-30 PROCEDURE — 99231 SBSQ HOSP IP/OBS SF/LOW 25: CPT | Mod: GC | Performed by: HOSPITALIST

## 2020-01-30 PROCEDURE — 770006 HCHG ROOM/CARE - MED/SURG/GYN SEMI*

## 2020-01-30 PROCEDURE — 700102 HCHG RX REV CODE 250 W/ 637 OVERRIDE(OP): Performed by: STUDENT IN AN ORGANIZED HEALTH CARE EDUCATION/TRAINING PROGRAM

## 2020-01-30 RX ORDER — LIDOCAINE 50 MG/G
1 PATCH TOPICAL EVERY 24 HOURS
Status: DISCONTINUED | OUTPATIENT
Start: 2020-01-30 | End: 2020-01-31 | Stop reason: HOSPADM

## 2020-01-30 RX ORDER — POLYETHYLENE GLYCOL 3350 17 G/17G
1 POWDER, FOR SOLUTION ORAL DAILY
Status: DISCONTINUED | OUTPATIENT
Start: 2020-01-30 | End: 2020-01-31 | Stop reason: HOSPADM

## 2020-01-30 RX ADMIN — CEFAZOLIN SODIUM 2 G: 2 INJECTION, SOLUTION INTRAVENOUS at 21:08

## 2020-01-30 RX ADMIN — ACETAMINOPHEN 1000 MG: 500 TABLET, FILM COATED ORAL at 11:33

## 2020-01-30 RX ADMIN — OXYCODONE HYDROCHLORIDE 10 MG: 10 TABLET ORAL at 03:35

## 2020-01-30 RX ADMIN — HEPARIN SODIUM 5000 UNITS: 5000 INJECTION, SOLUTION INTRAVENOUS; SUBCUTANEOUS at 14:08

## 2020-01-30 RX ADMIN — SENNOSIDES AND DOCUSATE SODIUM 2 TABLET: 8.6; 5 TABLET ORAL at 18:16

## 2020-01-30 RX ADMIN — GABAPENTIN 100 MG: 100 CAPSULE ORAL at 18:16

## 2020-01-30 RX ADMIN — ACETAMINOPHEN 1000 MG: 500 TABLET, FILM COATED ORAL at 18:16

## 2020-01-30 RX ADMIN — TRAZODONE HYDROCHLORIDE 50 MG: 50 TABLET ORAL at 21:08

## 2020-01-30 RX ADMIN — ACETAMINOPHEN 1000 MG: 500 TABLET, FILM COATED ORAL at 05:59

## 2020-01-30 RX ADMIN — GABAPENTIN 100 MG: 100 CAPSULE ORAL at 05:59

## 2020-01-30 RX ADMIN — CEFAZOLIN SODIUM 2 G: 2 INJECTION, SOLUTION INTRAVENOUS at 14:08

## 2020-01-30 RX ADMIN — SENNOSIDES AND DOCUSATE SODIUM 2 TABLET: 8.6; 5 TABLET ORAL at 06:02

## 2020-01-30 RX ADMIN — MICONAZOLE NITRATE: 20 CREAM TOPICAL at 10:04

## 2020-01-30 RX ADMIN — CEFAZOLIN SODIUM 2 G: 2 INJECTION, SOLUTION INTRAVENOUS at 05:54

## 2020-01-30 RX ADMIN — OXYCODONE HYDROCHLORIDE 10 MG: 10 TABLET ORAL at 18:19

## 2020-01-30 RX ADMIN — OXYCODONE HYDROCHLORIDE 10 MG: 10 TABLET ORAL at 11:33

## 2020-01-30 RX ADMIN — HEPARIN SODIUM 5000 UNITS: 5000 INJECTION, SOLUTION INTRAVENOUS; SUBCUTANEOUS at 05:59

## 2020-01-30 RX ADMIN — LIDOCAINE 1 PATCH: 50 PATCH TOPICAL at 10:03

## 2020-01-30 ASSESSMENT — ENCOUNTER SYMPTOMS
SPEECH CHANGE: 0
BACK PAIN: 0
DEPRESSION: 0
SENSORY CHANGE: 0
CHILLS: 0
VOMITING: 0
HEADACHES: 0
PALPITATIONS: 0
STRIDOR: 0
SPUTUM PRODUCTION: 0
PHOTOPHOBIA: 0
DIZZINESS: 0
BLOOD IN STOOL: 0
SORE THROAT: 0
ORTHOPNEA: 0
HEARTBURN: 0
BRUISES/BLEEDS EASILY: 0
NECK PAIN: 1
TINGLING: 0
HALLUCINATIONS: 0
DOUBLE VISION: 0
FOCAL WEAKNESS: 0
FEVER: 0
HEMOPTYSIS: 0
COUGH: 0
BLURRED VISION: 0
MYALGIAS: 0
NAUSEA: 0
SHORTNESS OF BREATH: 0
ABDOMINAL PAIN: 0
DIAPHORESIS: 0

## 2020-01-30 NOTE — CARE PLAN
Problem: Communication  Goal: The ability to communicate needs accurately and effectively will improve  Outcome: PROGRESSING AS EXPECTED  Note:   Pt able to communicate needs effectively. Uses call light appropriately.     Problem: Safety  Goal: Will remain free from falls  Outcome: PROGRESSING AS EXPECTED  Note:   Pt reminded to call for assistance before ambulating. Bed is in the lowest/locked position, call light and belongings are within reach. Hourly rounding in place

## 2020-01-30 NOTE — PROGRESS NOTES
Daily Progress Note:     Date of Service: 1/30/2020  Primary Team: [facility] IM Purple Team   Attending: Alex Obrien M.D.   Senior Resident: Dr. Rai  Intern: Dr. Urbano  Contact:  [phone]    Chief Complaint:   Right thigh wound Infection.  MSSA Bacteremia.  Hep C Infection.    Subjective:   - NO acute events overnight reported.  - Patient tolerated well wound change without use of IV morphine.  - Patient stating good pain control.  - He also states feeling well, denies fever, chills, night sweats, SOB, palpitations or chest pain.  - Patient has been able to ambulate hallways with minimal assistance, overall doing well.  - Wound is clean, healing as expected.  - Ortho singing off, in the future patient will require skin graft.  - Patient alert and oriented times 3, tolerating well oral intake, stephanie sings stable high 90 low 100 SBP, afebrile.  - Patient accepted in a SNF awaiting bed availability.  - Antibiotics will be finished on February first.    Consultants/Specialty:  Orthopaedic      Surgery  Intensive care  Infectious disease  Wound Care.     Review of Systems:      Review of Systems   Constitutional: Negative for chills, diaphoresis, fever and malaise/fatigue.   HENT: Negative for congestion, ear pain, hearing loss, sore throat and tinnitus.    Eyes: Negative for blurred vision, double vision and photophobia.   Respiratory: Negative for cough, hemoptysis, sputum production, shortness of breath and stridor.    Cardiovascular: Negative for chest pain, palpitations and orthopnea.   Gastrointestinal: Negative for abdominal pain, blood in stool, heartburn, nausea and vomiting.   Genitourinary: Negative for dysuria, frequency, hematuria and urgency.   Musculoskeletal: Positive for neck pain. Negative for back pain, joint pain and myalgias.   Skin: Negative for itching and rash.   Neurological: Negative for dizziness, tingling, sensory change, speech change, focal weakness and headaches.   Endo/Heme/Allergies:  Does not bruise/bleed easily.   Psychiatric/Behavioral: Negative for depression, hallucinations and suicidal ideas.       Objective Data:   Physical Exam:   Vitals:   Temp:  [36.2 °C (97.2 °F)-36.8 °C (98.3 °F)] 36.3 °C (97.4 °F)  Pulse:  [68-75] 68  Resp:  [16-18] 16  BP: ()/(53-60) 98/57  SpO2:  [98 %] 98 %       Physical Exam  Constitutional:       General: He is not in acute distress.     Appearance: Normal appearance. He is not ill-appearing or diaphoretic.   HENT:      Head: Normocephalic and atraumatic.      Nose: Nose normal. No congestion or rhinorrhea.      Mouth/Throat:      Mouth: Mucous membranes are moist.      Pharynx: No oropharyngeal exudate or posterior oropharyngeal erythema.   Eyes:      General: No scleral icterus.     Extraocular Movements: Extraocular movements intact.      Conjunctiva/sclera: Conjunctivae normal.      Pupils: Pupils are equal, round, and reactive to light.   Neck:      Musculoskeletal: Normal range of motion. Muscular tenderness present. No neck rigidity.      Comments: Mild right neck pain, sternocleidomastoid muscle, apparently slept in wrong position.  Cardiovascular:      Rate and Rhythm: Normal rate and regular rhythm.      Heart sounds: No murmur.   Pulmonary:      Effort: Pulmonary effort is normal. No respiratory distress.      Breath sounds: Normal breath sounds. No stridor. No wheezing, rhonchi or rales.   Chest:      Chest wall: No tenderness.   Abdominal:      General: Bowel sounds are normal. There is no distension.      Palpations: Abdomen is soft. There is no mass.      Tenderness: There is no tenderness. There is no guarding or rebound.   Genitourinary:     Comments: Large left inguinal hernia  Musculoskeletal: Normal range of motion.         General: No swelling or tenderness.      Right lower leg: No edema.      Left lower leg: No edema.      Comments: Large wound 30X10 cm, healing well, no signs of infection.   Skin:     General: Skin is warm.       Capillary Refill: Capillary refill takes less than 2 seconds.      Coloration: Skin is not jaundiced.      Findings: No erythema or rash.   Neurological:      General: No focal deficit present.      Mental Status: He is alert and oriented to person, place, and time. Mental status is at baseline.      Cranial Nerves: No cranial nerve deficit.   Psychiatric:         Mood and Affect: Mood normal.         Thought Content: Thought content normal.         Judgment: Judgment normal.           Labs:   Review    Imaging:   Review    * Septic shock due to Gram positive bacteria (HCC)- (present on admission)  Assessment & Plan  Resolved   This is Sepsis Present on admission  SIRS criteria identified on my evaluation include: Tachycardia, with heart rate greater than 90 BPM and Leukocyosis, with WBC greater than 12,000  Source is right lower extremity, bacteremia  Sepsis protocol initiated  Fluid resuscitation ordered per protocol  IV antibiotics were ordered    Maintenance fluid as above  Phenylephrine GTT - has not required for over 24 hours  Cultures pending - repeat blood cultures NGTD  Antibiotics to cover necrotizing process, MSSA  See necrotizing fasciitis problem.    * Sepsis Resolved  - CTM  - Vital signs stable  - BC No growth for 5 days  - Pt will finish Abx Feb 1st        MSSA bacteremia  Assessment & Plan  - MSSA Bacteremia One bottle  - Second Culture taken 18t h no Growth for 5 days  - Midline was placed 1/21 afternoon.  - Patient will continue antibiotics till Feb 1st  - DC pending after can tolerate wound change w/o IV drugs  - Outpatient PCP follow up    Necrotizing fasciitis of pelvic region and thigh (HCC)- (present on admission)  Assessment & Plan  -  Patient presented with large abscess (40cm craniocaudially, 4x10cm in other dimensions) in his right lateral thigh and a somewhat subacute history of increasing pain, fever, and chills after a ground level fall where he injured the area. - He had elevated  WBC.  - Initially started on Meropenem and Linezolid on admission, went to the OR evening of 1/16/2019 with extensive debridement confirming necrotizing infection.   - Cultures were obtained and a wound vac was placed.   - Subsequently gram stain showed gram positive cocci consistent with streptococcus,    -  Cefazolin and Linezolid after above finding  - Cultures positive for viridans strep in wound.  - 3rd I&D with change of wound vac on 1/20  - No more I&D planned by Surgery  - Continues Sinus Rhythm   - Midline placed   - BC negative for 5 days  - No signs of infection  - Afebrile, No Tachycardia  - Patient able to walk hallways  - Wound vac changed yesterday 1/29 without IV morphine  - Ortho signed out               Plan:    - Waiting for room to be DC, already accepted SNF  - Wound Vac changes 3 times a week  - Continue Cefazolin till Feb 1st  - Pain control, Oxy 10  - DC IV meds  - CTM  - SNF transfer till petient can tolerate Wound Vac changes w/o IV analgesics per Ortho    Unilateral recurrent inguinal hernia without obstruction or gangrene  Assessment & Plan  - Large left Inguinal Hernia  - Bowel contents in scrotum  - General surgery recommended outpatient follow up for elective surgery once recovered from infection.              PLAN:  - Scrotal sling  - No heavy lifting  - Outpatient follow up with surgery after recovery from this hospitalization  - Elective surgery  - Establish care with PCP  - CTM      Atrial fibrillation (HCC)  Assessment & Plan  - Resolved  - A Fib on 1/20 afternoon, was given Metoprolol, Amiodarone loading dose and Drip.  - Converted back to Sinus Rhythm on 1/21 at 3:52 am.  -  EKG SR (1/21)  - Continues sinus rhythm   - Transferred to medical floor  - Denies chest pain, palpitations or SOB  - Amiodarone discontinued on 1/29                         PLAN:  - On Medicacal floor  - Stop Amiodarone  - CT  - SNF pending  - Patient able to get wound change w/o IV phorphine  -        Hyponatremia- (present on admission)  Assessment & Plan  - Resolved  - On 1/28 sodium 136  - CTM  - No need for daily labs  - Adequate Oral intake    Thrombocytosis (HCC)- (present on admission)  Assessment & Plan  CTM    Normochromic anemia- (present on admission)  Assessment & Plan  CTM       Smoking- (present on admission)  Assessment & Plan         Chronic hepatitis C virus infection (HCC)- (present on admission)  Assessment & Plan  - Patient was antibody positive with viral load 6.2 million.   - Transaminases normal, ALP slightly high.    Plan:  - Outpatient follow-up,   - Abstention from further drug use  - Establish care with PCP  - Regular follow up  - Labs per PCP    Cannabis use disorder, mild, abuse- (present on admission)  Assessment & Plan   patient        Homeless- (present on admission)  Assessment & Plan  Difficult discharge      Methamphetamine use (HCC)- (present on admission)  Assessment & Plan  - Counselled on cessation.  - PCP establish care

## 2020-01-30 NOTE — PROGRESS NOTES
Assumed care at 1900, report received from day shift RN.  Pt is sitting up in bed, eyes closed, with no obvious s/sx of distress.Wound vac in place. Bed is locked and in the lowest position. Call light and belongings within reach. Whiteboard updated with RN and CNA phone numbers.

## 2020-01-30 NOTE — DISCHARGE PLANNING
@1400  Agency/Facility Name: [facility]  Spoke To: Giancarlo  Outcome: Will order veraflow wound vac today so they have it tomorrow.    Agency/Facility Name: [facility]  Spoke To: Giancarlo  Outcome: No beds today, might have one tomorrow.

## 2020-01-30 NOTE — PROGRESS NOTES
"   Orthopaedic Progress Note    Interval changes:  Patient doing well  Cleared by ortho for DC to SNF pending medicine clearance  Patient will need to follow up in 2-3 weeks with Dr Blanc to set up STSG    ROS - Patient denies any new issues except per above.    BP (!) 98/57   Pulse 68   Temp 36.3 °C (97.4 °F) (Temporal)   Resp 16   Ht 1.93 m (6' 4\")   Wt 97.7 kg (215 lb 6.2 oz)   SpO2 98%       Patient seen and examined  No acute distress  Breathing non labored  RRR  R thigh vac dressing CDI with no leak, DNVI, moves all toes, cap refill <2 sec.         Active Hospital Problems    Diagnosis   • MSSA bacteremia [R78.81]     Priority: High   • Septic shock due to Gram positive bacteria (HCC) [A41.89, R65.21]     Priority: High   • Necrotizing fasciitis of pelvic region and thigh (HCC) [M72.6]     Priority: High   • Unilateral recurrent inguinal hernia without obstruction or gangrene [K40.91]     Priority: Medium   • Atrial fibrillation (HCC) [I48.91]     Priority: Medium   • Hyponatremia [E87.1]     Priority: Medium   • Normochromic anemia [D64.9]     Priority: Low   • Thrombocytosis (HCC) [D47.3]     Priority: Low   • Chronic hepatitis C virus infection (HCC) [B18.2]     Priority: Low   • Smoking [F17.200]     Priority: Low   • Cannabis use disorder, mild, abuse [F12.10]     Priority: Low   • Homeless [Z59.0]     Priority: Low   • Methamphetamine use (HCC) [F15.10]     Priority: Low   • Cellulitis of hand, left [L03.114]     Priority: Low       Assessment/Plan:  Cleared by ortho for DC to SNF pending medicine clearance  POD#10 S/P  1.  Excisional debridement of right thigh wound including skin, subcutaneous tissue, and muscle through wound measuring 30x10 cm in elliptical shape.  2.  Application of wound VAC greater than 50 square cm, right thigh wound  POD#12 S/P:  1.  Incision and drainage of right intramuscular thigh abscess.  2.  Excisional debridement of right thigh wound including skin, subcutaneous " tissue and muscle through wound measuring 30x10 cm.  3.  Application of wound VAC greater than 50 sq cm.  Wt bearing status - WBAT  Wound care/Drains - vac in place- wound team to change 3x week  Future Procedures - none planned this admission  Sutures/Staples - N/A  PT/OT-initiated  Antibiotics: ancef 2g IV q8  DVT Prophylaxis- TEDS/SCDs/Foot pumps  [place]-none  Case Coordination for Discharge Planning - Disposition pending abx needs

## 2020-01-30 NOTE — PROGRESS NOTES
Assumed care at 0700, report received from NOC RN.  Pt A&O x 4, states pain is 4/10--medication given per MAR. Bed locked, 2 rails up, bed in lowest position. Call light in place, belongings at bedside, no needs at this time and hourly rounding in place. Plan of care reviewed with pt, awaiting facility acceptance.

## 2020-01-30 NOTE — WOUND TEAM
[facility]  Inpatient Services  Wound and Skin Care Progress Note    HPI, PMH, SH: Reviewed    Unit where seen by Wound Team: [id number]     WOUND CONSULT RELATED TO:  Wound Vac Change    Self Report / Pain Level:  9/10. Premedicated with PO and 4% lidocaine instilled into foam and let soak for 15 minutes prior to change.        OBJECTIVE:  Dressing intact     WOUND TYPE, LOCATION, CHARACTERISTICS (Pressure Injuries: location, stage, POA or date identified)  Negative Pressure Wound Therapy Leg Upper;Other (Comment) (Active)   NPWT Pump Mode / Pressure Setting Intermittent;125 mmHg 1/29/2020    Dressing Type Black foam;Medium    Number of Foam Pieces Used 2    Canister Changed No    Output (mL) 100 mL    VAC VeraFlo Irrigant Normal Saline    VAC VeraFlo Soak Time (mins) 10    VAC VeraFlo Instill Volume (ml) 30    VAC VeraFlo - Therapy Time (hrs) 2    VAC VeraFlo Pressure (mm/Hg) 125 mmHg;Intermittent      Wound 01/16/20 Other (comment) Leg Right lateral Open complicated surgical wound (Active)   Wound Image   1/29/2020     Site Assessment Red;Granulation tissue;Painful    Toshia-wound Assessment Scar tissue;Pink;Painful    Margins Unattached edges;Defined edges    Wound Length (cm) 30 cm    Wound Width (cm) 9.5 cm    Wound Depth (cm) 0.4 cm    Wound Surface Area (cm^2) 285 cm^2    Tunneling 0 cm    Undermining 0 cm    Closure Secondary intention    Drainage Amount Scant    Drainage Description Serosanguineous    Non-staged Wound Description Full thickness    Treatments Cleansed;Site care;Pharmaceutical agent 4% Lidocaine    Cleansing Normal Saline Irrigation    Periwound Protectant Skin Protectant wipes to Periwound    Dressing Options Wound Vac    Dressing Cleansing/Solutions Normal Saline    Dressing Changed Changed    Dressing Status Clean;Dry;Intact    Dressing Change Frequency Monday, Wednesday, Friday    NEXT Dressing Change  01/31/20    NEXT Weekly Photo (Inpatient Only) 01/31/20    WOUND NURSE ONLY  "- Odor None    WOUND NURSE ONLY - Exposed Structures Tendon;Muscle;Fascia    WOUND NURSE ONLY - Tissue Type and Percentage 90% beefy red tissue; 10% Tendon visible             Lab Values:    Lab Results   Component Value Date/Time    WBC 9.4 01/24/2020 02:21 AM    RBC 3.12 (L) 01/24/2020 02:21 AM    HEMOGLOBIN 9.1 (L) 01/24/2020 02:21 AM    HEMATOCRIT 28.3 (L) 01/24/2020 02:21 AM        Lab Results   Component Value Date/Time    HBA1C 5.3 10/09/2019 11:16 AM        Culture: Obtained 1/16/2020 Results: + Viridans Streptococcus +Staphylococcus aureus     INTERVENTIONS BY WOUND TEAM:  Dressing instilled with NS using \"dressing soak\" option on vac, hoses clamped and vac turned off, 4% lidocaine injected into foam. Lidocaine left to soak for 15 minutes. Removed dressing and foam, no retained foam noted. Cleansed with NS. Toshia wound fragile from drape, pink and irritated. Skin prep applied to per iwound. Toshia wound prepped with paste ring, small amount of foam with beveled edge placed to lateral wound undermining over tendon, 2 total pieces of foam on wound bed. Sealed with drape. Trac pad applied, therapy restarted instilling 30 ml of NS for 10 minutes every 2 hours. Pt tolerated well with only PO pain medication and topical lidocaine.    Interdisciplinary consultation: Patient, Bedside RN, [employer] Rep     EVALUATION: Wound bed clean with granulation tissue developing. Veraflo will increase granulation, protect exposed tendon with NS and decrease pain with foam removal using \"dressing soak\" option.     Goals: Steady decrease in wound area and depth weekly.    NURSING PLAN OF CARE ORDERS (X):  Dressing changes: See Dressing Care orders: X  Skin care: See Skin Care orders: X  Rectal tube care: See Rectal Tube Care orders:        Other orders:                           WOUND TEAM PLAN OF CARE (X):   Dressing changes by wound team:          Follow up 1-2 times weekly:               Follow up 3 times weekly:                NPWT " change 3 times weekly:   X  Follow up as needed:       Other (explain):

## 2020-01-30 NOTE — CARE PLAN
Problem: Venous Thromboembolism (VTW)/Deep Vein Thrombosis (DVT) Prevention:  Goal: Patient will participate in Venous Thrombosis (VTE)/Deep Vein Thrombosis (DVT)Prevention Measures  Outcome: PROGRESSING AS EXPECTED  Note:   Pt receiving heparin 3x daily. Pt able to ambulate--educated regarding the importance.      Problem: Knowledge Deficit  Goal: Knowledge of disease process/condition, treatment plan, diagnostic tests, and medications will improve  Outcome: PROGRESSING AS EXPECTED  Note:   Reviewed plan of care and all medications w pt. Pt to d/c to [facility].

## 2020-01-31 ENCOUNTER — PATIENT OUTREACH (OUTPATIENT)
Dept: HEALTH INFORMATION MANAGEMENT | Facility: OTHER | Age: 60
End: 2020-01-31

## 2020-01-31 VITALS
OXYGEN SATURATION: 97 % | WEIGHT: 215.39 LBS | BODY MASS INDEX: 26.23 KG/M2 | DIASTOLIC BLOOD PRESSURE: 52 MMHG | HEIGHT: 76 IN | SYSTOLIC BLOOD PRESSURE: 104 MMHG | RESPIRATION RATE: 17 BRPM | TEMPERATURE: 98.3 F | HEART RATE: 74 BPM

## 2020-01-31 PROBLEM — B95.61 MSSA BACTEREMIA: Status: RESOLVED | Noted: 2020-01-17 | Resolved: 2020-01-31

## 2020-01-31 PROBLEM — L03.114 CELLULITIS OF HAND, LEFT: Status: RESOLVED | Noted: 2019-10-09 | Resolved: 2020-01-31

## 2020-01-31 PROBLEM — E87.1 HYPONATREMIA: Status: RESOLVED | Noted: 2019-10-09 | Resolved: 2020-01-31

## 2020-01-31 PROBLEM — R65.21 SEPTIC SHOCK DUE TO GRAM POSITIVE BACTERIA (HCC): Status: RESOLVED | Noted: 2020-01-16 | Resolved: 2020-01-31

## 2020-01-31 PROBLEM — R78.81 MSSA BACTEREMIA: Status: RESOLVED | Noted: 2020-01-17 | Resolved: 2020-01-31

## 2020-01-31 PROBLEM — A41.89 SEPTIC SHOCK DUE TO GRAM POSITIVE BACTERIA (HCC): Status: RESOLVED | Noted: 2020-01-16 | Resolved: 2020-01-31

## 2020-01-31 PROBLEM — I48.91 ATRIAL FIBRILLATION (HCC): Status: RESOLVED | Noted: 2020-01-16 | Resolved: 2020-01-31

## 2020-01-31 PROBLEM — D75.839 THROMBOCYTOSIS: Status: RESOLVED | Noted: 2020-01-17 | Resolved: 2020-01-31

## 2020-01-31 PROBLEM — M72.6 NECROTIZING FASCIITIS OF PELVIC REGION AND THIGH (HCC): Status: RESOLVED | Noted: 2019-10-09 | Resolved: 2020-01-31

## 2020-01-31 PROCEDURE — 700111 HCHG RX REV CODE 636 W/ 250 OVERRIDE (IP): Performed by: STUDENT IN AN ORGANIZED HEALTH CARE EDUCATION/TRAINING PROGRAM

## 2020-01-31 PROCEDURE — A9270 NON-COVERED ITEM OR SERVICE: HCPCS | Performed by: STUDENT IN AN ORGANIZED HEALTH CARE EDUCATION/TRAINING PROGRAM

## 2020-01-31 PROCEDURE — 97606 NEG PRS WND THER DME>50 SQCM: CPT

## 2020-01-31 PROCEDURE — 700102 HCHG RX REV CODE 250 W/ 637 OVERRIDE(OP): Performed by: STUDENT IN AN ORGANIZED HEALTH CARE EDUCATION/TRAINING PROGRAM

## 2020-01-31 PROCEDURE — 700101 HCHG RX REV CODE 250

## 2020-01-31 PROCEDURE — 99239 HOSP IP/OBS DSCHRG MGMT >30: CPT | Mod: GC | Performed by: HOSPITALIST

## 2020-01-31 RX ORDER — HEPARIN SODIUM 5000 [USP'U]/ML
5000 INJECTION, SOLUTION INTRAVENOUS; SUBCUTANEOUS EVERY 8 HOURS
Refills: 0
Start: 2020-01-31 | End: 2020-09-14

## 2020-01-31 RX ORDER — GABAPENTIN 100 MG/1
100 CAPSULE ORAL 2 TIMES DAILY
Qty: 90 CAP
Start: 2020-01-31 | End: 2020-09-14

## 2020-01-31 RX ORDER — MICONAZOLE NITRATE 20 MG/G
1 CREAM TOPICAL EVERY 6 HOURS
Start: 2020-01-31 | End: 2020-09-14

## 2020-01-31 RX ORDER — CEFAZOLIN SODIUM 2 G/100ML
2 INJECTION, SOLUTION INTRAVENOUS EVERY 8 HOURS
Qty: 300 ML | Refills: 0
Start: 2020-01-31 | End: 2020-02-01

## 2020-01-31 RX ORDER — AMOXICILLIN 250 MG
2 CAPSULE ORAL 2 TIMES DAILY
Qty: 30 TAB | Refills: 0 | Status: SHIPPED | OUTPATIENT
Start: 2020-01-31 | End: 2020-09-14

## 2020-01-31 RX ORDER — LIDOCAINE HYDROCHLORIDE 40 MG/ML
1 SOLUTION TOPICAL PRN
Qty: 1 BOTTLE
Start: 2020-01-31 | End: 2020-09-14

## 2020-01-31 RX ORDER — POLYETHYLENE GLYCOL 3350 17 G/17G
17 POWDER, FOR SOLUTION ORAL DAILY
Refills: 3
Start: 2020-02-01 | End: 2020-09-14

## 2020-01-31 RX ORDER — OXYCODONE HYDROCHLORIDE 10 MG/1
10 TABLET ORAL EVERY 6 HOURS PRN
Qty: 20 TAB | Refills: 0 | Status: SHIPPED | OUTPATIENT
Start: 2020-01-31 | End: 2020-02-05

## 2020-01-31 RX ORDER — ACETAMINOPHEN 500 MG
1000 TABLET ORAL 3 TIMES DAILY
Qty: 30 TAB | Refills: 0 | Status: SHIPPED | OUTPATIENT
Start: 2020-01-31 | End: 2020-09-14

## 2020-01-31 RX ORDER — LIDOCAINE 50 MG/G
1 PATCH TOPICAL EVERY 24 HOURS
Qty: 10 PATCH
Start: 2020-02-01 | End: 2020-09-14

## 2020-01-31 RX ORDER — TRAZODONE HYDROCHLORIDE 50 MG/1
50 TABLET ORAL
Qty: 30 TAB | Refills: 3 | Status: SHIPPED | OUTPATIENT
Start: 2020-01-31 | End: 2020-09-14

## 2020-01-31 RX ADMIN — SENNOSIDES AND DOCUSATE SODIUM 2 TABLET: 8.6; 5 TABLET ORAL at 05:27

## 2020-01-31 RX ADMIN — OXYCODONE HYDROCHLORIDE 10 MG: 10 TABLET ORAL at 11:29

## 2020-01-31 RX ADMIN — GABAPENTIN 100 MG: 100 CAPSULE ORAL at 05:26

## 2020-01-31 RX ADMIN — CEFAZOLIN SODIUM 2 G: 2 INJECTION, SOLUTION INTRAVENOUS at 05:27

## 2020-01-31 RX ADMIN — OXYCODONE HYDROCHLORIDE 10 MG: 10 TABLET ORAL at 05:27

## 2020-01-31 RX ADMIN — LIDOCAINE HYDROCHLORIDE 20 ML: 40 SOLUTION TOPICAL at 13:00

## 2020-01-31 RX ADMIN — ACETAMINOPHEN 1000 MG: 500 TABLET, FILM COATED ORAL at 05:26

## 2020-01-31 RX ADMIN — POLYETHYLENE GLYCOL 3350 1 PACKET: 17 POWDER, FOR SOLUTION ORAL at 05:26

## 2020-01-31 RX ADMIN — CEFAZOLIN SODIUM 2 G: 2 INJECTION, SOLUTION INTRAVENOUS at 13:48

## 2020-01-31 RX ADMIN — ACETAMINOPHEN 1000 MG: 500 TABLET, FILM COATED ORAL at 11:30

## 2020-01-31 ASSESSMENT — ENCOUNTER SYMPTOMS
DIZZINESS: 0
VOMITING: 0
SHORTNESS OF BREATH: 0
DIARRHEA: 0
COUGH: 0
MYALGIAS: 1
SORE THROAT: 0
ABDOMINAL PAIN: 0
FEVER: 0
CHILLS: 0
DOUBLE VISION: 0
FALLS: 0
BLURRED VISION: 0
PALPITATIONS: 0
NERVOUS/ANXIOUS: 0
CONSTIPATION: 1
HEADACHES: 0
MEMORY LOSS: 0
FOCAL WEAKNESS: 0
NAUSEA: 0
BRUISES/BLEEDS EASILY: 0
WEAKNESS: 0

## 2020-01-31 NOTE — DISCHARGE INSTRUCTIONS
Discharge Instructions    Discharged to home by medical transportation with escort. Discharged via wheelchair, hospital escort: Yes.  Special equipment needed: Not Applicable    Be sure to schedule a follow-up appointment with your primary care doctor or any specialists as instructed.     Discharge Plan:   Diet Plan: Discussed  Activity Level: Discussed  Smoking Cessation Offered: Patient Refused  Confirmed Follow up Appointment: Appointment Scheduled  Confirmed Symptoms Management: Discussed  Medication Reconciliation Updated: No (Comments)  Influenza Vaccine Indication: Not indicated: Previously immunized this influenza season and > 8 years of age    I understand that a diet low in cholesterol, fat, and sodium is recommended for good health. Unless I have been given specific instructions below for another diet, I accept this instruction as my diet prescription.   Other diet: Regular    Special Instructions: None    · Is patient discharged on Warfarin / Coumadin?   No     Depression / Suicide Risk    As you are discharged from this [facility] facility, it is important to learn how to keep safe from harming yourself.    Recognize the warning signs:  · Abrupt changes in personality, positive or negative- including increase in energy   · Giving away possessions  · Change in eating patterns- significant weight changes-  positive or negative  · Change in sleeping patterns- unable to sleep or sleeping all the time   · Unwillingness or inability to communicate  · Depression  · Unusual sadness, discouragement and loneliness  · Talk of wanting to die  · Neglect of personal appearance   · Rebelliousness- reckless behavior  · Withdrawal from people/activities they love  · Confusion- inability to concentrate     If you or a loved one observes any of these behaviors or has concerns about self-harm, here's what you can do:  · Talk about it- your feelings and reasons for harming yourself  · Remove any means that you might use  to hurt yourself (examples: pills, rope, extension cords, firearm)  · Get professional help from the community (Mental Health, Substance Abuse, psychological counseling)  · Do not be alone:Call your Safe Contact- someone whom you trust who will be there for you.  · Call your local CRISIS HOTLINE 970-5117 or 330-735-4389  · Call your local Children's Mobile Crisis Response Team [place] (369) 447-7037 or www.[employer]  · Call the toll free National Suicide Prevention Hotlines   · National Suicide Prevention Lifeline 877-885-ZJEG (2082)  · National Hope Line Network 800-SUICIDE (278-6546)

## 2020-01-31 NOTE — WOUND TEAM
"[facility]  Inpatient Services  Wound and Skin Care Progress Note    Admission Date:  1/16/2020   HPI, PMH, SH: Reviewed  Unit where seen by Wound Team: [id number]/01    WOUND TEAM FOLLOW UP:  VAC change     SUBJECTIVE:  \"you need to wait 1 hour after getting pain med\". \"You need to warm the saline flush.\" \"you need to put a contact layer down first, the clear stuff with the holes\"     Self Report / Pain Level:  Premedicated orally by RN. 4% lidocaine 10 min soak to wound bed. \"Why don't you just kill me\"       OBJECTIVE:      WOUND TYPE, LOCATION, CHARACTERISTICS (Pressure ulcers: location, stage, POA or date identified)    Negative Pressure Wound Therapy Leg Upper;Other (Comment) (Active)   NPWT Pump Mode / Pressure Setting Continuous;125 mmHg    Dressing Type Medium black    Number of Foam Pieces Used 3    Canister Changed No      Wound 01/16/20 Other (comment) Leg Right lateral Open complicated surgical wound (Active)   Wound Image   1/31/2020   Site Assessment Red;Painful    Toshia-wound Assessment Intact    Margins Attached edges    Wound Length (cm) 30 cm Measured 1/31/2020   Wound Width (cm) 9.5 cm    Wound Depth (cm) 0.5 cm    Wound Surface Area (cm^2) 285 cm^2    Drainage Amount Small    Drainage Description Serosanguineous    Non-staged Wound Description Full thickness    Treatments Cleansed    Cleansing Normal Saline Irrigation    Periwound Protectant Skin Protectant wipes to Periwound    Dressing Options Wound Vac    Dressing Changed Changed    Dressing Change Frequency Monday, Wednesday, Friday    NEXT Dressing Change  02/03/20    NEXT Weekly Photo (Inpatient Only) 02/07/20    WOUND NURSE ONLY - Odor None    WOUND NURSE ONLY - Exposed Structures Tendon    WOUND NURSE ONLY - Tissue Type and Percentage red 100       Lab Values:    WBC:     @LABLAST(WBC)@  AIC:      Lab Results   Component Value Date/Time    HBA1C 5.3 10/09/2019 11:16 AM         Culture:   Completed 1/16/20 neg    INTERVENTIONS BY " "WOUND TEAM:  Had RN premedicate patient. After 45 minutes, had VAC cycle a soak period, then injected 4% lidocaine into foam to soak wound bed. Waited 15 minutes to begin removing black foam. Soaked multiple NS flushes into black foam and along edges. Proceed to lift edges of black foam. Patient unable to tolerate removal process. Asked RN to obtain order for additional pain med. MD arrived and watched process speaking to the patient, but no further pain med ordered.     After getting black foam off wound bed, flushed wound with NS. Measured and photographed. Placed t pieces of Adaptic to wound bed, then placed 3 pieces of black foam, and sealed with drape. Resumed VAC only at 125 mmHg continuous.         Interdisciplinary consultation:  RN, patient, MD    EVALUATION:  Patient was critical of all actions. \"Why can't you do the same thing as before?\". Removed Veraflo VAC due to ineffectiveness of Veraflo system with a contact layer on wound bed. Wound bed red and granular. Lateral tendon remains exposed. Placed Adaptic layer over wound bed, to ease removal of black foam from wound bed on next change.     Patient unable to tolerate dressing change. Crying, yelling, jumping leg around. Missed transport to SNF. Will need later transport arranged.    Factors affecting wound healing:  Abscess, substance abuse, homeless  Goals:  Steady decrease in wound area and depth weekly     NURSING PLAN OF CARE ORDERS (X):    Dressing changes: See Dressing Care orders:  X  Skin care: See Skin Care orders:   Rectal tube care: See Rectal Tube Care orders:   Other orders:      WOUND TEAM PLAN OF CARE (X):   NPWT change 3 x week:   X     Dressing changes by wound team:       Follow up as needed:       Other (explain):    Anticipated discharge plans (X):  SNF:    X       Home Care:           Outpatient Wound Center:            Self Care:            Other:           "

## 2020-01-31 NOTE — DISCHARGE PLANNING
Anticipated Discharge Disposition: SNF    Action: Transport arrived to pick pt up at 1300, however pt was in the process of having wound vac and was not tolerating pain. Transport was unable to wait and left.   Requested [facility] inquire about later transport time    Barriers to Discharge: None    Plan: Transfer to SNF

## 2020-01-31 NOTE — PROGRESS NOTES
1209--[facility] paged for discharge order as [facility] has transport set up for 1300 to [facility].  Returned page at 1211--stated they are working on d/c currently. Reminded MD about narcotic prescription--acknowledged.     1252 RN paged Dr Rai and requested MD to be at bedside. Wound changed wound vac--pt not tolerating despite premedication prior to change--requesting stronger pain meds. MD at bedside at 7838. No new orders received. Pt transportation rescheduled to [facility]. Awaiting new time.

## 2020-01-31 NOTE — PROGRESS NOTES
Pt reports missing upper dentures and leather jacket. States he had on prior floor he was on. Called T8, GSU, and CIC. Verified with all charge nurses on shift that no pt belongings were on listed units. Brittany Ma S5 supervisor notified. Pt contact info and MRN written down.

## 2020-01-31 NOTE — CARE PLAN
Problem: Pain Management  Goal: Pain level will decrease to patient's comfort goal  Outcome: PROGRESSING SLOWER THAN EXPECTED  Note:   Pt in severe 10/10 pain today during dressing change. MD notified. No new orders received.      Problem: Communication  Goal: The ability to communicate needs accurately and effectively will improve  Outcome: PROGRESSING AS EXPECTED  Note:   Pt able to use call light effectively, hourly rounding in place.

## 2020-01-31 NOTE — PROGRESS NOTES
"   Orthopaedic Progress Note    Interval changes:  Patient doing well  Cleared by ortho for DC to SNF pending medicine clearance  Patient will need to follow up in 2-3 weeks with Dr Blanc to set up STSG    ROS - Patient denies any new issues except per above.    /65   Pulse 71   Temp 36.6 °C (97.8 °F) (Temporal)   Resp 17   Ht 1.93 m (6' 4\")   Wt 97.7 kg (215 lb 6.2 oz)   SpO2 96%       Patient seen and examined  No acute distress  Breathing non labored  RRR  R thigh vac dressing CDI with no leak, DNVI, moves all toes, cap refill <2 sec.         Active Hospital Problems    Diagnosis   • Unilateral recurrent inguinal hernia without obstruction or gangrene [K40.91]     Priority: Medium   • Normochromic anemia [D64.9]     Priority: Low   • Chronic hepatitis C virus infection (HCC) [B18.2]     Priority: Low   • Smoking [F17.200]     Priority: Low   • Cannabis use disorder, mild, abuse [F12.10]     Priority: Low   • Homeless [Z59.0]     Priority: Low   • Methamphetamine use (HCC) [F15.10]     Priority: Low       Assessment/Plan:  Cleared by ortho for DC to SNF pending medicine clearance  POD#11 S/P  1.  Excisional debridement of right thigh wound including skin, subcutaneous tissue, and muscle through wound measuring 30x10 cm in elliptical shape.  2.  Application of wound VAC greater than 50 square cm, right thigh wound  POD#13 S/P:  1.  Incision and drainage of right intramuscular thigh abscess.  2.  Excisional debridement of right thigh wound including skin, subcutaneous tissue and muscle through wound measuring 30x10 cm.  3.  Application of wound VAC greater than 50 sq cm.  Wt bearing status - WBAT  Wound care/Drains - vac in place- wound team to change 3x week  Future Procedures - none planned this admission  Sutures/Staples - N/A  PT/OT-initiated  Antibiotics: ancef 2g IV q8  DVT Prophylaxis- TEDS/SCDs/Foot pumps  Lundberg-none  Case Coordination for Discharge Planning - Disposition pending abx needs    "

## 2020-01-31 NOTE — THERAPY
OT Tx attempted. Wound care is currently working with pt. Pt being discharged to [facility]- transport arranged for 13:00.

## 2020-01-31 NOTE — DISCHARGE PLANNING
Agency/Facility Name: [facility]  Spoke To: Giancarlo  Outcome: Transport set up for 1830.  SW informed.    @1020  Agency/Facility Name: [facility]  Spoke To: Giancarlo  Outcome: Transport set for 1300.  SW informed.    @1000  Agency/Facility Name: [facility]  Spoke To: Giancarlo  Outcome: Has a bed and setting up transport.    @0935  Agency/Facility Name: [facility]  Outcome: Left message, awaiting call back.

## 2020-01-31 NOTE — PROGRESS NOTES
Assumed care of patient at 1900 from Sussy KING. Pt resting in bed with no signs of distress. Assessment competed, VSS, pt is A/Ox4. He continues on IV antibiotics. Wound vac remains in place, suction at 125.   Pt denies any further needs. Bed in the lowest locked position, call light in reach.

## 2020-01-31 NOTE — CARE PLAN
Problem: Safety  Goal: Will remain free from falls  Outcome: PROGRESSING AS EXPECTED  Intervention: Assess risk factors for falls  Flowsheets (Taken 1/30/2020 2304)  Pt Calls for Assistance: Yes  History of fall: 0  Mobility Status Assessment: 1-1 Healthcare Provider Required for Assistance with Ambulation & Transfer     Problem: Bowel/Gastric:  Goal: Will not experience complications related to bowel motility  Outcome: PROGRESSING AS EXPECTED  Note:   Last BM was 1/30/2019

## 2020-02-01 NOTE — PROGRESS NOTES
Daily Progress Note:     Date of Service: 1/31/2020  Primary Team: [facility]   Attending: Alex Obrien M.D.   Senior Resident: Dr. Rai  Intern: Dr. Urbano  Contact:  [phone]    Chief Complaint:   Right thigh wound Infection.  MSSA Bacteremia.  Hep C Infection.    Subjective:   -No acute events overnight.  No new complaints.  -Possible transfer to SNF today.  -Remaining afebrile.  Vital signs stable.  Blood pressure down to 97/53.  -Pain controlled on oral oxycodone.  -Bowel movement 2 days ago.    Consultants/Specialty:  Orthopaedic      Surgery  Intensive care  Infectious disease  Wound Care     Review of Systems:      Review of Systems   Constitutional: Negative for chills and fever.   HENT: Negative for ear pain and sore throat.    Eyes: Negative for blurred vision and double vision.   Respiratory: Negative for cough and shortness of breath.    Cardiovascular: Negative for chest pain and palpitations.   Gastrointestinal: Positive for constipation. Negative for abdominal pain, diarrhea, nausea and vomiting.   Genitourinary: Negative for dysuria, frequency and urgency.   Musculoskeletal: Positive for myalgias. Negative for falls and joint pain.   Skin: Negative for itching and rash.   Neurological: Negative for dizziness, focal weakness, weakness and headaches.   Endo/Heme/Allergies: Negative for environmental allergies. Does not bruise/bleed easily.   Psychiatric/Behavioral: Negative for memory loss. The patient is not nervous/anxious.        Objective Data:   Physical Exam:   Vitals:   Temp:  [36.3 °C (97.3 °F)-36.8 °C (98.3 °F)] 36.8 °C (98.3 °F)  Pulse:  [71-84] 74  Resp:  [16-17] 17  BP: ()/(52-65) 104/52  SpO2:  [96 %-99 %] 97 %       Physical Exam  Constitutional:       General: He is not in acute distress.     Appearance: Normal appearance. He is not ill-appearing or diaphoretic.   HENT:      Head: Normocephalic and atraumatic.      Nose: Nose normal. No congestion or rhinorrhea.       Mouth/Throat:      Mouth: Mucous membranes are moist.      Pharynx: No oropharyngeal exudate or posterior oropharyngeal erythema.   Eyes:      General: No scleral icterus.     Extraocular Movements: Extraocular movements intact.      Conjunctiva/sclera: Conjunctivae normal.      Pupils: Pupils are equal, round, and reactive to light.   Neck:      Musculoskeletal: Normal range of motion and neck supple. No neck rigidity.   Cardiovascular:      Rate and Rhythm: Normal rate and regular rhythm.      Heart sounds: No murmur.   Pulmonary:      Effort: Pulmonary effort is normal. No respiratory distress.      Breath sounds: Normal breath sounds. No stridor. No wheezing, rhonchi or rales.   Chest:      Chest wall: No tenderness.   Abdominal:      General: Bowel sounds are normal. There is no distension.      Palpations: Abdomen is soft. There is no mass.      Tenderness: There is no tenderness. There is no guarding or rebound.   Genitourinary:     Comments: Large left inguinal hernia  Musculoskeletal: Normal range of motion.         General: No swelling or tenderness.      Right lower leg: No edema.      Left lower leg: No edema.      Comments: Right thigh with wound VAC in place.  Clean dry and intact.  No erythema or drainage.   Skin:     General: Skin is warm.      Capillary Refill: Capillary refill takes less than 2 seconds.      Coloration: Skin is not jaundiced.      Findings: No erythema or rash.   Neurological:      General: No focal deficit present.      Mental Status: He is alert and oriented to person, place, and time. Mental status is at baseline.      Cranial Nerves: No cranial nerve deficit.   Psychiatric:         Mood and Affect: Mood normal.         Behavior: Behavior normal.         Thought Content: Thought content normal.         Judgment: Judgment normal.           Labs:   Review    Imaging:   Review      * Septic shock due to Gram positive bacteria (HCC)- (present on admission)  Assessment &  Plan  Resolved      MSSA bacteremia  Assessment & Plan  - MSSA Bacteremia One bottle  - Second Culture taken 18t h no Growth for 5 days  - Midline was placed 1/21 afternoon.  - Patient will continue antibiotics till Feb 1st  - DC pending after can tolerate wound change w/o IV drugs     Necrotizing fasciitis of pelvic region and thigh (HCC)- (present on admission)  Assessment & Plan  - Continue wound VAC dressing changes at skilled nursing facility.  -Continue cefazolin until tomorrow.     Unilateral recurrent inguinal hernia without obstruction or gangrene  Assessment & Plan  - Large left Inguinal Hernia  - Bowel contents in scrotum  - General surgery recommended outpatient follow up for elective surgery once recovered from infection.               PLAN:  - Scrotal sling  - No heavy lifting  - Outpatient follow up with surgery after recovery from this hospitalization  - Elective surgery outpatient basis  - Establish care with PCP  - CTM     Atrial fibrillation (HCC)  Assessment & Plan  - Resolved  - A Fib on 1/20 afternoon, was given Metoprolol, Amiodarone loading dose and Drip.  - Converted back to Sinus Rhythm on 1/21 at 3:52 am.  -  EKG SR (1/21)  - Continues sinus rhythm   - Denies chest pain, palpitations or SOB  - Amiodarone discontinued on 1/29  -Continue to monitor outpatient basis.     Hyponatremia- (present on admission)  Assessment & Plan  - Resolved     Thrombocytosis (HCC)- (present on admission)  Assessment & Plan  Continue to monitor     Normochromic anemia- (present on admission)  Assessment & Plan  Continue to monitor        Smoking- (present on admission)  Assessment & Plan  Counseled cessation.        Chronic hepatitis C virus infection (HCC)- (present on admission)  Assessment & Plan  - Patient was antibody positive with viral load 6.2 million.   - Transaminases normal, ALP slightly high.     Plan:  - Outpatient follow-up with other [facility] clinic.  - Abstention from further drug use  -  Establish care with PCP    Cannabis use disorder, mild, abuse- (present on admission)  Assessment & Plan  Counseled patient  on cessation        Homeless- (present on admission)  Assessment & Plan  Difficult discharge       Methamphetamine use (HCC)- (present on admission)  Assessment & Plan  - Counselled on cessation.  - PCP establish care

## 2020-02-01 NOTE — PROGRESS NOTES
Pt transported off unit via wheelchair with [facility] transport service. Pt is alert and oriented x4. Pt removed from IV fluids, midline still in place. Wound vac tube clamped and sealed. Pt given discharge instructions, agrees to discharge instructions. Pt left with informed consent with opiates, opiate prescription, and COBRA.

## 2020-02-03 ENCOUNTER — PATIENT OUTREACH (OUTPATIENT)
Dept: HEALTH INFORMATION MANAGEMENT | Facility: OTHER | Age: 60
End: 2020-02-03

## 2020-02-06 PROCEDURE — 306637 HCHG MISC ORTHO ITEM RC 0274

## 2020-02-11 ENCOUNTER — HOSPITAL ENCOUNTER (INPATIENT)
Facility: MEDICAL CENTER | Age: 60
LOS: 7 days | DRG: 603 | End: 2020-02-19
Attending: EMERGENCY MEDICINE | Admitting: HOSPITALIST
Payer: COMMERCIAL

## 2020-02-11 ENCOUNTER — APPOINTMENT (OUTPATIENT)
Dept: RADIOLOGY | Facility: MEDICAL CENTER | Age: 60
DRG: 603 | End: 2020-02-11
Attending: EMERGENCY MEDICINE
Payer: COMMERCIAL

## 2020-02-11 DIAGNOSIS — T14.8XXA OPEN WOUND: ICD-10-CM

## 2020-02-11 DIAGNOSIS — L03.115 CELLULITIS OF RIGHT LOWER EXTREMITY: ICD-10-CM

## 2020-02-11 LAB
ALBUMIN SERPL BCP-MCNC: 3.7 G/DL (ref 3.2–4.9)
ALBUMIN/GLOB SERPL: 1.2 G/DL
ALP SERPL-CCNC: 70 U/L (ref 30–99)
ALT SERPL-CCNC: 7 U/L (ref 2–50)
ANION GAP SERPL CALC-SCNC: 9 MMOL/L (ref 0–11.9)
AST SERPL-CCNC: 13 U/L (ref 12–45)
BASOPHILS # BLD AUTO: 1.1 % (ref 0–1.8)
BASOPHILS # BLD: 0.05 K/UL (ref 0–0.12)
BILIRUB SERPL-MCNC: 0.4 MG/DL (ref 0.1–1.5)
BUN SERPL-MCNC: 18 MG/DL (ref 8–22)
CALCIUM SERPL-MCNC: 8.9 MG/DL (ref 8.5–10.5)
CHLORIDE SERPL-SCNC: 103 MMOL/L (ref 96–112)
CO2 SERPL-SCNC: 24 MMOL/L (ref 20–33)
CREAT SERPL-MCNC: 0.76 MG/DL (ref 0.5–1.4)
EOSINOPHIL # BLD AUTO: 0.34 K/UL (ref 0–0.51)
EOSINOPHIL NFR BLD: 7.5 % (ref 0–6.9)
ERYTHROCYTE [DISTWIDTH] IN BLOOD BY AUTOMATED COUNT: 60.1 FL (ref 35.9–50)
GLOBULIN SER CALC-MCNC: 3.2 G/DL (ref 1.9–3.5)
GLUCOSE SERPL-MCNC: 130 MG/DL (ref 65–99)
HCT VFR BLD AUTO: 33.1 % (ref 42–52)
HGB BLD-MCNC: 10.7 G/DL (ref 14–18)
IMM GRANULOCYTES # BLD AUTO: 0.02 K/UL (ref 0–0.11)
IMM GRANULOCYTES NFR BLD AUTO: 0.4 % (ref 0–0.9)
LACTATE BLD-SCNC: 1.9 MMOL/L (ref 0.5–2)
LYMPHOCYTES # BLD AUTO: 1.24 K/UL (ref 1–4.8)
LYMPHOCYTES NFR BLD: 27.2 % (ref 22–41)
MCH RBC QN AUTO: 30.1 PG (ref 27–33)
MCHC RBC AUTO-ENTMCNC: 32.3 G/DL (ref 33.7–35.3)
MCV RBC AUTO: 93.2 FL (ref 81.4–97.8)
MONOCYTES # BLD AUTO: 0.47 K/UL (ref 0–0.85)
MONOCYTES NFR BLD AUTO: 10.3 % (ref 0–13.4)
NEUTROPHILS # BLD AUTO: 2.44 K/UL (ref 1.82–7.42)
NEUTROPHILS NFR BLD: 53.5 % (ref 44–72)
NRBC # BLD AUTO: 0 K/UL
NRBC BLD-RTO: 0 /100 WBC
PLATELET # BLD AUTO: 248 K/UL (ref 164–446)
PMV BLD AUTO: 9.1 FL (ref 9–12.9)
POTASSIUM SERPL-SCNC: 3.8 MMOL/L (ref 3.6–5.5)
PROT SERPL-MCNC: 6.9 G/DL (ref 6–8.2)
RBC # BLD AUTO: 3.55 M/UL (ref 4.7–6.1)
SODIUM SERPL-SCNC: 136 MMOL/L (ref 135–145)
WBC # BLD AUTO: 4.6 K/UL (ref 4.8–10.8)

## 2020-02-11 PROCEDURE — 83605 ASSAY OF LACTIC ACID: CPT

## 2020-02-11 PROCEDURE — 82728 ASSAY OF FERRITIN: CPT

## 2020-02-11 PROCEDURE — 86140 C-REACTIVE PROTEIN: CPT

## 2020-02-11 PROCEDURE — 96375 TX/PRO/DX INJ NEW DRUG ADDON: CPT

## 2020-02-11 PROCEDURE — 85025 COMPLETE CBC W/AUTO DIFF WBC: CPT

## 2020-02-11 PROCEDURE — 82746 ASSAY OF FOLIC ACID SERUM: CPT

## 2020-02-11 PROCEDURE — 85652 RBC SED RATE AUTOMATED: CPT

## 2020-02-11 PROCEDURE — 99285 EMERGENCY DEPT VISIT HI MDM: CPT

## 2020-02-11 PROCEDURE — 96365 THER/PROPH/DIAG IV INF INIT: CPT

## 2020-02-11 PROCEDURE — 80053 COMPREHEN METABOLIC PANEL: CPT

## 2020-02-11 PROCEDURE — 700111 HCHG RX REV CODE 636 W/ 250 OVERRIDE (IP): Performed by: STUDENT IN AN ORGANIZED HEALTH CARE EDUCATION/TRAINING PROGRAM

## 2020-02-11 PROCEDURE — 85046 RETICYTE/HGB CONCENTRATE: CPT

## 2020-02-11 PROCEDURE — 36415 COLL VENOUS BLD VENIPUNCTURE: CPT

## 2020-02-11 PROCEDURE — 700105 HCHG RX REV CODE 258: Performed by: STUDENT IN AN ORGANIZED HEALTH CARE EDUCATION/TRAINING PROGRAM

## 2020-02-11 PROCEDURE — 87040 BLOOD CULTURE FOR BACTERIA: CPT

## 2020-02-11 PROCEDURE — 82607 VITAMIN B-12: CPT

## 2020-02-11 PROCEDURE — 71045 X-RAY EXAM CHEST 1 VIEW: CPT

## 2020-02-11 PROCEDURE — 83540 ASSAY OF IRON: CPT

## 2020-02-11 PROCEDURE — 83550 IRON BINDING TEST: CPT

## 2020-02-11 RX ORDER — MORPHINE SULFATE 4 MG/ML
4 INJECTION, SOLUTION INTRAMUSCULAR; INTRAVENOUS ONCE
Status: COMPLETED | OUTPATIENT
Start: 2020-02-11 | End: 2020-02-11

## 2020-02-11 RX ORDER — ONDANSETRON 2 MG/ML
4 INJECTION INTRAMUSCULAR; INTRAVENOUS ONCE
Status: COMPLETED | OUTPATIENT
Start: 2020-02-11 | End: 2020-02-11

## 2020-02-11 RX ADMIN — ONDANSETRON 4 MG: 2 INJECTION INTRAMUSCULAR; INTRAVENOUS at 23:24

## 2020-02-11 RX ADMIN — CEFTRIAXONE SODIUM 2 G: 2 INJECTION, POWDER, FOR SOLUTION INTRAMUSCULAR; INTRAVENOUS at 23:40

## 2020-02-11 RX ADMIN — MORPHINE SULFATE 4 MG: 4 INJECTION INTRAVENOUS at 23:24

## 2020-02-11 ASSESSMENT — LIFESTYLE VARIABLES: DO YOU DRINK ALCOHOL: NO

## 2020-02-12 ENCOUNTER — APPOINTMENT (OUTPATIENT)
Dept: RADIOLOGY | Facility: MEDICAL CENTER | Age: 60
DRG: 603 | End: 2020-02-12
Attending: EMERGENCY MEDICINE
Payer: COMMERCIAL

## 2020-02-12 ENCOUNTER — PATIENT OUTREACH (OUTPATIENT)
Dept: HEALTH INFORMATION MANAGEMENT | Facility: OTHER | Age: 60
End: 2020-02-12

## 2020-02-12 PROBLEM — Z72.0 TOBACCO ABUSE: Status: ACTIVE | Noted: 2020-02-12

## 2020-02-12 PROBLEM — M70.61 GREATER TROCHANTERIC BURSITIS, RIGHT: Status: ACTIVE | Noted: 2020-02-12

## 2020-02-12 PROBLEM — L03.115 CELLULITIS OF RIGHT LEG: Status: ACTIVE | Noted: 2020-02-12

## 2020-02-12 PROBLEM — D72.819 LEUKOPENIA: Status: ACTIVE | Noted: 2020-02-12

## 2020-02-12 PROBLEM — D64.9 ANEMIA: Status: ACTIVE | Noted: 2020-02-12

## 2020-02-12 PROBLEM — F19.10 IV DRUG ABUSE (HCC): Status: ACTIVE | Noted: 2019-10-09

## 2020-02-12 LAB
APPEARANCE UR: CLEAR
BILIRUB UR QL STRIP.AUTO: NEGATIVE
COLOR UR: YELLOW
CRP SERPL HS-MCNC: 4.99 MG/DL (ref 0–0.75)
ERYTHROCYTE [SEDIMENTATION RATE] IN BLOOD BY WESTERGREN METHOD: 35 MM/HOUR (ref 0–20)
FERRITIN SERPL-MCNC: 72.4 NG/ML (ref 22–322)
FOLATE SERPL-MCNC: 14.8 NG/ML
FUNGUS SPEC CULT: NORMAL
GLUCOSE UR STRIP.AUTO-MCNC: NEGATIVE MG/DL
HGB RETIC QN AUTO: 29.1 PG/CELL (ref 29–35)
IMM RETICS NFR: 12.3 % (ref 9.3–17.4)
IRON SATN MFR SERPL: 7 % (ref 15–55)
IRON SERPL-MCNC: 23 UG/DL (ref 50–180)
KETONES UR STRIP.AUTO-MCNC: NEGATIVE MG/DL
LEUKOCYTE ESTERASE UR QL STRIP.AUTO: NEGATIVE
MICRO URNS: NORMAL
NITRITE UR QL STRIP.AUTO: NEGATIVE
PH UR STRIP.AUTO: 5 [PH] (ref 5–8)
PROT UR QL STRIP: NEGATIVE MG/DL
RBC UR QL AUTO: NEGATIVE
RETICS # AUTO: 0.07 M/UL (ref 0.04–0.06)
RETICS/RBC NFR: 2.1 % (ref 0.8–2.1)
SIGNIFICANT IND 70042: NORMAL
SITE SITE: NORMAL
SOURCE SOURCE: NORMAL
SP GR UR REFRACTOMETRY: 1.04
TIBC SERPL-MCNC: 309 UG/DL (ref 250–450)
UROBILINOGEN UR STRIP.AUTO-MCNC: 1 MG/DL
VANCOMYCIN TROUGH SERPL-MCNC: 13.3 UG/ML (ref 10–20)
VIT B12 SERPL-MCNC: 335 PG/ML (ref 211–911)

## 2020-02-12 PROCEDURE — 700111 HCHG RX REV CODE 636 W/ 250 OVERRIDE (IP): Performed by: HOSPITALIST

## 2020-02-12 PROCEDURE — G0378 HOSPITAL OBSERVATION PER HR: HCPCS

## 2020-02-12 PROCEDURE — 96366 THER/PROPH/DIAG IV INF ADDON: CPT

## 2020-02-12 PROCEDURE — 700111 HCHG RX REV CODE 636 W/ 250 OVERRIDE (IP): Performed by: INTERNAL MEDICINE

## 2020-02-12 PROCEDURE — A9270 NON-COVERED ITEM OR SERVICE: HCPCS | Performed by: HOSPITALIST

## 2020-02-12 PROCEDURE — 96376 TX/PRO/DX INJ SAME DRUG ADON: CPT

## 2020-02-12 PROCEDURE — 99407 BEHAV CHNG SMOKING > 10 MIN: CPT | Performed by: HOSPITALIST

## 2020-02-12 PROCEDURE — 700105 HCHG RX REV CODE 258: Performed by: STUDENT IN AN ORGANIZED HEALTH CARE EDUCATION/TRAINING PROGRAM

## 2020-02-12 PROCEDURE — 770006 HCHG ROOM/CARE - MED/SURG/GYN SEMI*

## 2020-02-12 PROCEDURE — 700105 HCHG RX REV CODE 258: Performed by: HOSPITALIST

## 2020-02-12 PROCEDURE — 96367 TX/PROPH/DG ADDL SEQ IV INF: CPT

## 2020-02-12 PROCEDURE — 99220 PR INITIAL OBSERVATION CARE,LEVL III: CPT | Mod: 25 | Performed by: HOSPITALIST

## 2020-02-12 PROCEDURE — 73701 CT LOWER EXTREMITY W/DYE: CPT | Mod: RT

## 2020-02-12 PROCEDURE — 81003 URINALYSIS AUTO W/O SCOPE: CPT

## 2020-02-12 PROCEDURE — 87086 URINE CULTURE/COLONY COUNT: CPT

## 2020-02-12 PROCEDURE — 700102 HCHG RX REV CODE 250 W/ 637 OVERRIDE(OP): Performed by: HOSPITALIST

## 2020-02-12 PROCEDURE — 700117 HCHG RX CONTRAST REV CODE 255: Performed by: EMERGENCY MEDICINE

## 2020-02-12 PROCEDURE — 80202 ASSAY OF VANCOMYCIN: CPT

## 2020-02-12 PROCEDURE — 700111 HCHG RX REV CODE 636 W/ 250 OVERRIDE (IP): Performed by: STUDENT IN AN ORGANIZED HEALTH CARE EDUCATION/TRAINING PROGRAM

## 2020-02-12 PROCEDURE — 96372 THER/PROPH/DIAG INJ SC/IM: CPT

## 2020-02-12 RX ORDER — NICOTINE 21 MG/24HR
14 PATCH, TRANSDERMAL 24 HOURS TRANSDERMAL
Status: DISCONTINUED | OUTPATIENT
Start: 2020-02-12 | End: 2020-02-19 | Stop reason: HOSPADM

## 2020-02-12 RX ORDER — POLYETHYLENE GLYCOL 3350 17 G/17G
1 POWDER, FOR SOLUTION ORAL
Status: DISCONTINUED | OUTPATIENT
Start: 2020-02-12 | End: 2020-02-19 | Stop reason: HOSPADM

## 2020-02-12 RX ORDER — GABAPENTIN 100 MG/1
100 CAPSULE ORAL 2 TIMES DAILY
Status: DISCONTINUED | OUTPATIENT
Start: 2020-02-12 | End: 2020-02-19 | Stop reason: HOSPADM

## 2020-02-12 RX ORDER — SODIUM CHLORIDE 9 MG/ML
INJECTION, SOLUTION INTRAVENOUS CONTINUOUS
Status: DISCONTINUED | OUTPATIENT
Start: 2020-02-12 | End: 2020-02-16

## 2020-02-12 RX ORDER — MORPHINE SULFATE 4 MG/ML
2 INJECTION, SOLUTION INTRAMUSCULAR; INTRAVENOUS ONCE
Status: COMPLETED | OUTPATIENT
Start: 2020-02-12 | End: 2020-02-12

## 2020-02-12 RX ORDER — BISACODYL 10 MG
10 SUPPOSITORY, RECTAL RECTAL
Status: DISCONTINUED | OUTPATIENT
Start: 2020-02-12 | End: 2020-02-19 | Stop reason: HOSPADM

## 2020-02-12 RX ORDER — PROMETHAZINE HYDROCHLORIDE 25 MG/1
12.5-25 SUPPOSITORY RECTAL EVERY 4 HOURS PRN
Status: DISCONTINUED | OUTPATIENT
Start: 2020-02-12 | End: 2020-02-19 | Stop reason: HOSPADM

## 2020-02-12 RX ORDER — ACETAMINOPHEN 500 MG
1000 TABLET ORAL 3 TIMES DAILY PRN
Status: DISCONTINUED | OUTPATIENT
Start: 2020-02-12 | End: 2020-02-19 | Stop reason: HOSPADM

## 2020-02-12 RX ORDER — MORPHINE SULFATE 4 MG/ML
INJECTION, SOLUTION INTRAMUSCULAR; INTRAVENOUS
Status: COMPLETED
Start: 2020-02-12 | End: 2020-02-12

## 2020-02-12 RX ORDER — CEPHALEXIN 500 MG/1
500 CAPSULE ORAL EVERY 6 HOURS
Status: DISCONTINUED | OUTPATIENT
Start: 2020-02-12 | End: 2020-02-13

## 2020-02-12 RX ORDER — TRAZODONE HYDROCHLORIDE 50 MG/1
50 TABLET ORAL
Status: DISCONTINUED | OUTPATIENT
Start: 2020-02-12 | End: 2020-02-16

## 2020-02-12 RX ORDER — AMOXICILLIN 250 MG
2 CAPSULE ORAL 2 TIMES DAILY
Status: DISCONTINUED | OUTPATIENT
Start: 2020-02-12 | End: 2020-02-19 | Stop reason: HOSPADM

## 2020-02-12 RX ORDER — MORPHINE SULFATE 4 MG/ML
4 INJECTION, SOLUTION INTRAMUSCULAR; INTRAVENOUS ONCE
Status: COMPLETED | OUTPATIENT
Start: 2020-02-12 | End: 2020-02-12

## 2020-02-12 RX ORDER — ONDANSETRON 2 MG/ML
4 INJECTION INTRAMUSCULAR; INTRAVENOUS EVERY 4 HOURS PRN
Status: DISCONTINUED | OUTPATIENT
Start: 2020-02-12 | End: 2020-02-19 | Stop reason: HOSPADM

## 2020-02-12 RX ORDER — OXYCODONE HYDROCHLORIDE 5 MG/1
5-10 TABLET ORAL EVERY 4 HOURS PRN
Status: DISCONTINUED | OUTPATIENT
Start: 2020-02-12 | End: 2020-02-17

## 2020-02-12 RX ORDER — OXYCODONE HYDROCHLORIDE 5 MG/1
5 TABLET ORAL EVERY 4 HOURS PRN
Status: DISCONTINUED | OUTPATIENT
Start: 2020-02-12 | End: 2020-02-12

## 2020-02-12 RX ORDER — HEPARIN SODIUM 5000 [USP'U]/ML
5000 INJECTION, SOLUTION INTRAVENOUS; SUBCUTANEOUS EVERY 8 HOURS
Status: DISCONTINUED | OUTPATIENT
Start: 2020-02-12 | End: 2020-02-19 | Stop reason: HOSPADM

## 2020-02-12 RX ORDER — PROCHLORPERAZINE EDISYLATE 5 MG/ML
5-10 INJECTION INTRAMUSCULAR; INTRAVENOUS EVERY 4 HOURS PRN
Status: DISCONTINUED | OUTPATIENT
Start: 2020-02-12 | End: 2020-02-19 | Stop reason: HOSPADM

## 2020-02-12 RX ORDER — PROMETHAZINE HYDROCHLORIDE 25 MG/1
12.5-25 TABLET ORAL EVERY 4 HOURS PRN
Status: DISCONTINUED | OUTPATIENT
Start: 2020-02-12 | End: 2020-02-19 | Stop reason: HOSPADM

## 2020-02-12 RX ORDER — ONDANSETRON 4 MG/1
4 TABLET, ORALLY DISINTEGRATING ORAL EVERY 4 HOURS PRN
Status: DISCONTINUED | OUTPATIENT
Start: 2020-02-12 | End: 2020-02-19 | Stop reason: HOSPADM

## 2020-02-12 RX ADMIN — SENNOSIDES AND DOCUSATE SODIUM 2 TABLET: 8.6; 5 TABLET ORAL at 09:47

## 2020-02-12 RX ADMIN — HEPARIN SODIUM 5000 UNITS: 5000 INJECTION, SOLUTION INTRAVENOUS; SUBCUTANEOUS at 09:47

## 2020-02-12 RX ADMIN — MORPHINE SULFATE 2 MG: 4 INJECTION INTRAVENOUS at 06:27

## 2020-02-12 RX ADMIN — TRAZODONE HYDROCHLORIDE 50 MG: 50 TABLET ORAL at 21:44

## 2020-02-12 RX ADMIN — MORPHINE SULFATE 2 MG: 4 INJECTION INTRAVENOUS at 21:39

## 2020-02-12 RX ADMIN — VANCOMYCIN HYDROCHLORIDE 1400 MG: 500 INJECTION, POWDER, LYOPHILIZED, FOR SOLUTION INTRAVENOUS at 12:35

## 2020-02-12 RX ADMIN — GABAPENTIN 100 MG: 100 CAPSULE ORAL at 17:47

## 2020-02-12 RX ADMIN — VANCOMYCIN HYDROCHLORIDE 2400 MG: 500 INJECTION, POWDER, LYOPHILIZED, FOR SOLUTION INTRAVENOUS at 00:24

## 2020-02-12 RX ADMIN — SENNOSIDES AND DOCUSATE SODIUM 2 TABLET: 8.6; 5 TABLET ORAL at 17:47

## 2020-02-12 RX ADMIN — ONDANSETRON 4 MG: 4 TABLET, ORALLY DISINTEGRATING ORAL at 09:46

## 2020-02-12 RX ADMIN — CEFTRIAXONE SODIUM 2 G: 2 INJECTION, POWDER, FOR SOLUTION INTRAMUSCULAR; INTRAVENOUS at 05:40

## 2020-02-12 RX ADMIN — OXYCODONE HYDROCHLORIDE 5 MG: 5 TABLET ORAL at 09:47

## 2020-02-12 RX ADMIN — SODIUM CHLORIDE: 9 INJECTION, SOLUTION INTRAVENOUS at 05:42

## 2020-02-12 RX ADMIN — SODIUM CHLORIDE: 9 INJECTION, SOLUTION INTRAVENOUS at 20:52

## 2020-02-12 RX ADMIN — IOHEXOL 100 ML: 350 INJECTION, SOLUTION INTRAVENOUS at 03:14

## 2020-02-12 RX ADMIN — CEPHALEXIN 500 MG: 500 CAPSULE ORAL at 17:47

## 2020-02-12 RX ADMIN — OXYCODONE HYDROCHLORIDE 5 MG: 5 TABLET ORAL at 13:46

## 2020-02-12 RX ADMIN — OXYCODONE HYDROCHLORIDE 5 MG: 5 TABLET ORAL at 05:40

## 2020-02-12 RX ADMIN — GABAPENTIN 100 MG: 100 CAPSULE ORAL at 09:46

## 2020-02-12 ASSESSMENT — ENCOUNTER SYMPTOMS
SENSORY CHANGE: 0
SHORTNESS OF BREATH: 0
BRUISES/BLEEDS EASILY: 0
ABDOMINAL PAIN: 0
MYALGIAS: 0
DOUBLE VISION: 0
BLURRED VISION: 0
FLANK PAIN: 0
PALPITATIONS: 0
CHILLS: 0
DEPRESSION: 0
HEARTBURN: 0
HEMOPTYSIS: 0
NAUSEA: 0
VOMITING: 0
WEAKNESS: 1
COUGH: 0
DIZZINESS: 0
EYE DISCHARGE: 0
FEVER: 0
HALLUCINATIONS: 0
SPEECH CHANGE: 0
FOCAL WEAKNESS: 0

## 2020-02-12 ASSESSMENT — COPD QUESTIONNAIRES
IN THE PAST 12 MONTHS DO YOU DO LESS THAN YOU USED TO BECAUSE OF YOUR BREATHING PROBLEMS: DISAGREE/UNSURE
COPD SCREENING SCORE: 1
DURING THE PAST 4 WEEKS HOW MUCH DID YOU FEEL SHORT OF BREATH: NONE/LITTLE OF THE TIME
DO YOU EVER COUGH UP ANY MUCUS OR PHLEGM?: NO/ONLY WITH OCCASIONAL COLDS OR INFECTIONS
HAVE YOU SMOKED AT LEAST 100 CIGARETTES IN YOUR ENTIRE LIFE: NO/DON'T KNOW

## 2020-02-12 ASSESSMENT — LIFESTYLE VARIABLES
ALCOHOL_USE: NO
EVER FELT BAD OR GUILTY ABOUT YOUR DRINKING: NO
HOW MANY TIMES IN THE PAST YEAR HAVE YOU HAD 5 OR MORE DRINKS IN A DAY: 0
TOTAL SCORE: 0
AVERAGE NUMBER OF DAYS PER WEEK YOU HAVE A DRINK CONTAINING ALCOHOL: 0
TOTAL SCORE: 0
CONSUMPTION TOTAL: NEGATIVE
TOTAL SCORE: 0
EVER HAD A DRINK FIRST THING IN THE MORNING TO STEADY YOUR NERVES TO GET RID OF A HANGOVER: NO
SUBSTANCE_ABUSE: 0
ON A TYPICAL DAY WHEN YOU DRINK ALCOHOL HOW MANY DRINKS DO YOU HAVE: 0
EVER_SMOKED: YES
HAVE YOU EVER FELT YOU SHOULD CUT DOWN ON YOUR DRINKING: NO
HAVE PEOPLE ANNOYED YOU BY CRITICIZING YOUR DRINKING: NO
DOES PATIENT WANT TO STOP DRINKING: NO

## 2020-02-12 ASSESSMENT — PATIENT HEALTH QUESTIONNAIRE - PHQ9
1. LITTLE INTEREST OR PLEASURE IN DOING THINGS: NOT AT ALL
SUM OF ALL RESPONSES TO PHQ9 QUESTIONS 1 AND 2: 0
2. FEELING DOWN, DEPRESSED, IRRITABLE, OR HOPELESS: NOT AT ALL

## 2020-02-12 NOTE — ASSESSMENT & PLAN NOTE
New leukopenia this admission. No evidence of sepsis and no left shift seen on differential.  Monitor clinically

## 2020-02-12 NOTE — ED NOTES
Report called to May RN, waiting for transport for patient. Pt appears to still be sleeping at this time.

## 2020-02-12 NOTE — PROGRESS NOTES
TIFFANIE Ramos met with pt bedside to reengage pt to [facility] services. Pt reports no pcp. Educated pt on the importance of having a pcp. Provided contact information for [facility]. Pt reports discharging back to [facility]. Pt reports snf will provide transportation to medical appmnts. Pt reports appmnt at social security offive on 2/24 to sign up for disability. Pt interested in meds to beds. Pt feels confident in managing his health after discharge.

## 2020-02-12 NOTE — ED TRIAGE NOTES
".  Chief Complaint   Patient presents with   • Wound Check     [facility]A from [facility], has hx of necrotizing fascitis of the right upper thigh. wound vac was removed yesterday and sent by wound nurse for worsening      ./67   Pulse 89   Temp 35.9 °C (96.7 °F) (Temporal)   Ht 1.93 m (6' 4\")   Wt 95.3 kg (210 lb)   SpO2 99%   BMI 25.56 kg/m²     Patient is currently on Keflex for the infection in the right upper thigh.   "

## 2020-02-12 NOTE — ED PROVIDER NOTES
ED Provider Note    Scribed for Wai Cabezas M.D. by Corrie Oconnor. 2/11/2020, 11:12 PM.    Primary care provider: Pcp Pt States None  Means of arrival: Ambulance  History obtained from: Patient  History limited by: None    CHIEF COMPLAINT  Chief Complaint   Patient presents with   • Wound Check     BIBA from [facility], has hx of necrotizing fascitis of the right upper thigh. wound vac was removed yesterday and sent by wound nurse for worsening        HPI  Gonsalo Hunt is a 59 y.o. male with a history of necrotizing fascitis who presents to the Emergency Department via ambulance for evaluation of wound. The patient was hospitalized in January for necrotizing fascitis to his lateral R thigh. During hospitalization, condition was complicated with septic shock. He was discharged home at the time to a skilled nursing facility. Within the past 24 hours, he has had increased swelling, erythema and pain. Currently, pain is rated as 10/10 throbbing pain. Denies fever, chills, shortness of breath, dysuria. Denies IV drug use.     REVIEW OF SYSTEMS  Pertinent positives include wound to R lateral thigh with increased swelling, erythema and pain . Pertinent negatives include  fever, chills, shortness of breath, dysuria.. All other systems negative.    PAST MEDICAL HISTORY   has a past medical history of Back pain, Heroin use, and Necrotizing fasciitis of lower leg (HCC).    SURGICAL HISTORY   has a past surgical history that includes other orthopedic surgery; other abdominal surgery; irrigation & debridement ortho (Right, 1/16/2020); irrigation & debridement ortho (Right, 1/18/2020); and incision and drainage general (Right, 1/20/2020).    SOCIAL HISTORY  Social History     Tobacco Use   • Smoking status: Current Every Day Smoker     Packs/day: 1.00     Types: Cigarettes   • Smokeless tobacco: Never Used   Substance Use Topics   • Alcohol use: Not Currently     Comment: rarely   • Drug use: Yes     Types:  "Marijuana     Comment: previous IV meth      Social History     Substance and Sexual Activity   Drug Use Yes   • Types: Marijuana    Comment: previous IV meth       FAMILY HISTORY  Family History   Problem Relation Age of Onset   • No Known Problems Mother    • No Known Problems Father        CURRENT MEDICATIONS  Home Medications     Reviewed by Ingris Martel R.N. (Registered Nurse) on 02/11/20 at 2229  Med List Status: Partial   Medication Last Dose Status   acetaminophen (TYLENOL) 500 MG Tab  Active   gabapentin (NEURONTIN) 100 MG Cap  Active   heparin 5000 UNIT/ML Solution  Active   lidocaine (LIDODERM) 5 % Patch  Active   lidocaine (XYLOCAINE) 4 % Solution  Active   miconazole 2%-zinc oxide (INZO) 2 % Cream topical cream  Active   Multiple Vitamins-Minerals (MULTIVITAMIN PO)  Active   polyethylene glycol/lytes (MIRALAX) Pack  Active   senna-docusate (PERICOLACE OR SENOKOT S) 8.6-50 MG Tab  Active   traZODone (DESYREL) 50 MG Tab  Active                ALLERGIES  Allergies   Allergen Reactions   • Codeine Rash     Rash  Historical tolerance to hydromorphone   • Spinach    • Pcn [Penicillins] Unspecified     Unknown reaction. Tolerated ceftriaxone on 10/9/19         PHYSICAL EXAM  VITAL SIGNS: /65   Pulse 88   Temp 35.9 °C (96.7 °F) (Temporal)   Resp (!) 22   Ht 1.93 m (6' 4\")   Wt 95.3 kg (210 lb)   SpO2 98%   BMI 25.56 kg/m²     Constitutional: Well developed, Well nourished, Mild distress.   HENT: Normocephalic, Atraumatic, Oropharynx moist.   Eyes: Conjunctiva normal, No discharge.   Cardiovascular: Normal heart rate, Normal rhythm, No murmurs, equal pulses.   Pulmonary: Normal breath sounds, No respiratory distress, No wheezing, No rales, No rhonchi.  Chest: No chest wall tenderness or deformity.   Abdomen:Soft, No tenderness, No masses, no rebound, no guarding.   Back: No CVA tenderness.   Musculoskeletal: R lateral leg tenderness to palpation.  Skin: R lateral leg: incision that is half his " leg in length. Incision is open and exposed to muscle and subcutaneous tissue with surrounding erythema extending about 6 cm around the wound.  Is very tender to the touch and slightly warm., no associated pus. Warm, Dry.   Neurologic: Alert & oriented x 3, Normal motor function,  No focal deficits noted.   Psychiatric: Affect normal, Judgment normal, Mood normal.       LABS  Results for orders placed or performed during the hospital encounter of 02/11/20   Lactic acid (lactate)   Result Value Ref Range    Lactic Acid 1.9 0.5 - 2.0 mmol/L   CBC WITH DIFFERENTIAL   Result Value Ref Range    WBC 4.6 (L) 4.8 - 10.8 K/uL    RBC 3.55 (L) 4.70 - 6.10 M/uL    Hemoglobin 10.7 (L) 14.0 - 18.0 g/dL    Hematocrit 33.1 (L) 42.0 - 52.0 %    MCV 93.2 81.4 - 97.8 fL    MCH 30.1 27.0 - 33.0 pg    MCHC 32.3 (L) 33.7 - 35.3 g/dL    RDW 60.1 (H) 35.9 - 50.0 fL    Platelet Count 248 164 - 446 K/uL    MPV 9.1 9.0 - 12.9 fL    Neutrophils-Polys 53.50 44.00 - 72.00 %    Lymphocytes 27.20 22.00 - 41.00 %    Monocytes 10.30 0.00 - 13.40 %    Eosinophils 7.50 (H) 0.00 - 6.90 %    Basophils 1.10 0.00 - 1.80 %    Immature Granulocytes 0.40 0.00 - 0.90 %    Nucleated RBC 0.00 /100 WBC    Neutrophils (Absolute) 2.44 1.82 - 7.42 K/uL    Lymphs (Absolute) 1.24 1.00 - 4.80 K/uL    Monos (Absolute) 0.47 0.00 - 0.85 K/uL    Eos (Absolute) 0.34 0.00 - 0.51 K/uL    Baso (Absolute) 0.05 0.00 - 0.12 K/uL    Immature Granulocytes (abs) 0.02 0.00 - 0.11 K/uL    NRBC (Absolute) 0.00 K/uL   COMP METABOLIC PANEL   Result Value Ref Range    Sodium 136 135 - 145 mmol/L    Potassium 3.8 3.6 - 5.5 mmol/L    Chloride 103 96 - 112 mmol/L    Co2 24 20 - 33 mmol/L    Anion Gap 9.0 0.0 - 11.9    Glucose 130 (H) 65 - 99 mg/dL    Bun 18 8 - 22 mg/dL    Creatinine 0.76 0.50 - 1.40 mg/dL    Calcium 8.9 8.5 - 10.5 mg/dL    AST(SGOT) 13 12 - 45 U/L    ALT(SGPT) 7 2 - 50 U/L    Alkaline Phosphatase 70 30 - 99 U/L    Total Bilirubin 0.4 0.1 - 1.5 mg/dL    Albumin 3.7 3.2 -  4.9 g/dL    Total Protein 6.9 6.0 - 8.2 g/dL    Globulin 3.2 1.9 - 3.5 g/dL    A-G Ratio 1.2 g/dL   ESTIMATED GFR   Result Value Ref Range    GFR If African American >60 >60 mL/min/1.73 m 2    GFR If Non African American >60 >60 mL/min/1.73 m 2      All labs reviewed by me.    RADIOLOGY  DX-CHEST-PORTABLE (1 VIEW)   Final Result         1.  No acute cardiopulmonary disease.   2.  Atherosclerosis      CT-EXTREMITY, LOWER WITH RIGHT    (Results Pending)       CT of the limb is pending    The radiologist's interpretation of all radiological studies have been reviewed by me.    COURSE & MEDICAL DECISION MAKING  Pertinent Labs & Imaging studies reviewed. (See chart for details)    11:12 PM - Patient seen and examined at bedside. Discussed plan of care with patient. I informed them that labs and imaging will be ordered to evaluate symptoms. Patient is understanding and agreeable with plan.   Patient will be treated with Rocephin 2 g in  mL IVPB, Zofran 4 mg, Morphine 4 mg, vancomycin 2400 mg in  mL IVPB.  Ordered DX chest, blood culture, UA, CMP, CBC with diff, Lactic acid.  to evaluate his symptoms. The differential diagnoses include but are not limited to:Streptococcal necrotizing fascitis,  Cellulitis, compartment syndrome.     Discussed the case with Dr. Ornelas hospitalist she is agreed to admit the patient for IV antibiotics.  CT is still pending to rule out subcutaneous air or abscess    Medical Decision Making: At this point time appears patient has developed a cellulitis around his wound.  Patient does not appear to be septic.  I think he will benefit from admission with IV antibiotics.  CT was done to rule out deep abscess which is negative I do not see any free air.  DISPOSITION:  Patient will be hospitalized by Dr. Ornelas in guarded condition.      FINAL IMPRESSION  1. Cellulitis of right lower extremity          ICorrie (Scribe), am scribing for, and in the presence of, Wai Cabezas  M.D.    Electronically signed by: Corrie Oconnor (Scribe), 2/11/2020    IWai M.D. personally performed the services described in this documentation, as scribed by Corrie Oconnor in my presence, and it is both accurate and complete. C    The note accurately reflects work and decisions made by me.  Wai Cabezas M.D.  2/12/2020  3:21 AM

## 2020-02-12 NOTE — DISCHARGE PLANNING
Patient is eligible for Medicaid Meds to Beds at discharge if they have coverage with [place] [employer], Medicaid FFS, Medicaid HMO ([facility]), or [place]. This service is provided through the [facility] if orders are received prior to 4 p.m. Monday through Friday, excluding holidays. Please call [phone] prior to discharge.

## 2020-02-12 NOTE — H&P
Hospital Medicine History & Physical Note    Date of Service  2/12/2020    Primary Care Physician  Pcp Pt States None    Consultants  none    Code Status  full    Chief Complaint  Right leg pain     History of Presenting Illness  59 y.o. male who presented 2/11/2020 with past medical history of intravenous drug use, necrotizing fasciitis of the right lower extremity who presents with right leg pain.  This patient had a complex hospital stay recently in January.  He had necrotizing fasciitis to the right lateral thigh subsequently resulting in septic shock.  This patient was discharged to the skilled nursing facility.  Over the last 24 hours he has had increased swelling pain erythema to the right upper thigh.  10 out of 10 throbbing in nature.  No known alleviating or exacerbating factors to his symptoms otherwise.  In the emergency department he is found to have surrounding erythema around his wound very tender to touch slightly warm consistent with cellulitis and will be admitted to the hospital for further management.    Review of Systems  Review of Systems   Constitutional: Positive for malaise/fatigue. Negative for chills and fever.   HENT: Negative for congestion, hearing loss and tinnitus.    Eyes: Negative for blurred vision, double vision and discharge.   Respiratory: Negative for cough, hemoptysis and shortness of breath.    Cardiovascular: Positive for leg swelling. Negative for chest pain and palpitations.   Gastrointestinal: Negative for abdominal pain, heartburn, nausea and vomiting.   Genitourinary: Negative for dysuria and flank pain.   Musculoskeletal: Positive for joint pain. Negative for myalgias.   Skin: Negative for rash.   Neurological: Positive for weakness. Negative for dizziness, sensory change, speech change and focal weakness.   Endo/Heme/Allergies: Negative for environmental allergies. Does not bruise/bleed easily.   Psychiatric/Behavioral: Negative for depression, hallucinations and  substance abuse.       Past Medical History   has a past medical history of Back pain, Heroin use, and Necrotizing fasciitis of lower leg (HCC).    Surgical History   has a past surgical history that includes other orthopedic surgery; other abdominal surgery; irrigation & debridement ortho (Right, 1/16/2020); irrigation & debridement ortho (Right, 1/18/2020); and incision and drainage general (Right, 1/20/2020).     Family History  family history includes No Known Problems in his father and mother.     Social History   reports that he has been smoking cigarettes. He has been smoking about 1.00 pack per day. He has never used smokeless tobacco. He reports previous alcohol use. He reports current drug use. Drug: Marijuana.    Allergies  Allergies   Allergen Reactions   • Codeine Rash     Rash  Historical tolerance to hydromorphone   • Spinach    • Pcn [Penicillins] Unspecified     Unknown reaction. Tolerated ceftriaxone on 10/9/19         Medications  Prior to Admission Medications   Prescriptions Last Dose Informant Patient Reported? Taking?   Multiple Vitamins-Minerals (MULTIVITAMIN PO)  Patient Yes No   Sig: Take 1 Tab by mouth every day.   acetaminophen (TYLENOL) 500 MG Tab   No No   Sig: Take 2 Tabs by mouth 3 times a day.   gabapentin (NEURONTIN) 100 MG Cap   No No   Sig: Take 1 Cap by mouth 2 Times a Day.   heparin 5000 UNIT/ML Solution   No No   Sig: Inject 1 mL as instructed every 8 hours.   lidocaine (LIDODERM) 5 % Patch   No No   Sig: Apply 1 Patch to skin as directed every 24 hours.   lidocaine (XYLOCAINE) 4 % Solution   No No   Sig: Apply 1 Application to affected area(s) as needed (Wound Care).   miconazole 2%-zinc oxide (INZO) 2 % Cream topical cream   No No   Sig: Apply 1 g to affected area(s) every 6 hours. Apply to buttocks.   polyethylene glycol/lytes (MIRALAX) Pack   No No   Sig: Take 1 Packet by mouth every day.   senna-docusate (PERICOLACE OR SENOKOT S) 8.6-50 MG Tab   No No   Sig: Take 2 Tabs by  mouth 2 Times a Day.   traZODone (DESYREL) 50 MG Tab   No No   Sig: Take 1 Tab by mouth every bedtime.      Facility-Administered Medications: None       Physical Exam  Temp:  [35.9 °C (96.7 °F)] 35.9 °C (96.7 °F)  Pulse:  [76-89] 79  Resp:  [15-22] 20  BP: ()/(57-67) 96/57  SpO2:  [94 %-100 %] 100 %    Physical Exam  Vitals signs reviewed.   Constitutional:       General: He is in acute distress.      Appearance: He is not ill-appearing.   HENT:      Head: Normocephalic and atraumatic.      Nose: No congestion.      Mouth/Throat:      Mouth: Mucous membranes are dry.   Eyes:      Extraocular Movements: Extraocular movements intact.      Pupils: Pupils are equal, round, and reactive to light.   Neck:      Musculoskeletal: Neck supple.   Cardiovascular:      Rate and Rhythm: Normal rate and regular rhythm.      Pulses: Normal pulses.      Heart sounds: Normal heart sounds.   Pulmonary:      Effort: Pulmonary effort is normal. No respiratory distress.      Breath sounds: Normal breath sounds. No wheezing.   Abdominal:      General: Bowel sounds are normal. There is no distension.      Palpations: Abdomen is soft.      Tenderness: There is no abdominal tenderness.   Musculoskeletal:         General: Swelling present.      Comments: Right thigh edema, right lateral wound with surrounding erythema, ttp and warmth   Skin:     General: Skin is warm and dry.      Capillary Refill: Capillary refill takes 2 to 3 seconds.   Neurological:      General: No focal deficit present.      Mental Status: He is alert and oriented to person, place, and time. Mental status is at baseline.   Psychiatric:         Mood and Affect: Mood normal.         Behavior: Behavior normal.         Thought Content: Thought content normal.         Judgment: Judgment normal.         Laboratory:  Recent Labs     02/11/20  2234   WBC 4.6*   RBC 3.55*   HEMOGLOBIN 10.7*   HEMATOCRIT 33.1*   MCV 93.2   MCH 30.1   MCHC 32.3*   RDW 60.1*   PLATELETCT 248    MPV 9.1     Recent Labs     02/11/20  2234   SODIUM 136   POTASSIUM 3.8   CHLORIDE 103   CO2 24   GLUCOSE 130*   BUN 18   CREATININE 0.76   CALCIUM 8.9     Recent Labs     02/11/20  2234   ALTSGPT 7   ASTSGOT 13   ALKPHOSPHAT 70   TBILIRUBIN 0.4   GLUCOSE 130*         No results for input(s): NTPROBNP in the last 72 hours.      No results for input(s): TROPONINT in the last 72 hours.    Urinalysis:    No results found     Imaging:  CT-EXTREMITY, LOWER WITH RIGHT   Final Result         1.  Subcutaneous fat stranding and fluid in the subcutaneous fat below the knee, appearance favors cellulitis   2.  Fluid collection lateral to the right greater trochanter with possible mild enhancement, could represent bursitis or small abscess.   3.  Small knee joint effusion.   4.  Large left inguinal hernia containing loop of colon without apparent obstructive changes.   5.  Fat-containing left femoral hernia.   6.  Fat-containing right inguinal hernia      DX-CHEST-PORTABLE (1 VIEW)   Final Result         1.  No acute cardiopulmonary disease.   2.  Atherosclerosis            Assessment/Plan:  I anticipate this patient will require at least two midnights for appropriate medical management, necessitating inpatient admission.    * Cellulitis of right leg  Assessment & Plan  Hx of right leg necrotizing facitis s/p surgical interevention now what appears to be cellulitic changes  Check esr/crp   IV abx vanco and rocephin ordered   Pain control   Pt/ot eval and wound consult   Consult surgery in am for eval patient has right greater trochanteric bursitis vs abscess   Small knee joint effusion     Leukopenia  Assessment & Plan  Mild, cont to trend    Anemia  Assessment & Plan  No evidence of bleeding lab workup ordered    Tobacco abuse  Assessment & Plan  Greater than 10 minutes spent with patient smoking cessation counseling. Discussed cardiovascular risk factors of smoking. Nicotine patch provided to patient.     Chronic hepatitis C  virus infection (HCC)- (present on admission)  Assessment & Plan  Hx of, chronic     IV drug abuse (HCC)- (present on admission)  Assessment & Plan  States no use as he has been hospitalized and unable to use      VTE prophylaxis: heparin

## 2020-02-12 NOTE — PROGRESS NOTES
"Pharmacy Kinetics 59 y.o. male on vancomycin day # 1 2020    Currently on Vancomycin new start  Provider specified end date: TBD    Indication for Treatment: SSTI    Pertinent history per medical record: Admitted on 2020 for evaluation of wound. H/o nec fasc and IVDU. Was hospitalized in January for similar event.    Other antibiotics: Ceftriaxone    Allergies: Codeine; Spinach; and Pcn [penicillins]     List concerns for renal function (possible concerns include abnormal LFTs, BUN/SCr ratio > 20:1, CHF, obesity, malnutrition/low albumin, hypermetabolic state (SIRS), pressors/hypotension, nephrotoxic drugs, etc.): N/A    Pertinent cultures to date:   Blood culture X 2 in process    MRSA nares swab if pneumonia is a concern (ordered/positive/negative/n-a): N/A    Recent Labs     20  2234   WBC 4.6*   NEUTSPOLYS 53.50     Recent Labs     20  2234   BUN 18   CREATININE 0.76   ALBUMIN 3.7     No results for input(s): VANCOTROUGH, VANCOPEAK, VANCORANDOM in the last 72 hours.    Intake/Output Summary (Last 24 hours) at 2020 0355  Last data filed at 2020 0010  Gross per 24 hour   Intake 100 ml   Output --   Net 100 ml      /57   Pulse 76   Temp 35.9 °C (96.7 °F) (Temporal)   Resp 17   Ht 1.93 m (6' 4\")   Wt 95.3 kg (210 lb)   SpO2 95%  Temp (24hrs), Av.9 °C (96.7 °F), Min:35.9 °C (96.7 °F), Max:35.9 °C (96.7 °F)      A/P   1. Vancomycin dose change: New start- vancomycin 2400 mg load in ED followed by vancomycin 1400 mg (15mg/kg) q12H (0030,1230) X 3 doses  2. Next vancomycin level: Anticipate prior to 4th dose or sooner pending renal function- will need to be ordered  3. Goal trough: 12-16 mcg/mL  4. Comments: New start vancomycin for wound. H/o nec fasc. Ortho has been consulted. Minimal concern for renal function. H/o vancomycin usage in epic consists of loading doses as well as q12H dosing. Patient has standing order for CMP/BMP. Will start vancomycin 1400 mg (15mg/kg) X " 3 doses. Anticipate trough prior to 4th dose- or sooner if clinically warranted.    Earline Martinez, PharmD

## 2020-02-12 NOTE — PROGRESS NOTES
Patient admitted this a.m. with recurrent cellulitis.  The patient at this point was rolled into inpatient status.  The patient will be transferred to the medical bed once medical bed is available.  Continue IV antibiotics and await Ortho evaluation.  Await wound care evaluation as well.

## 2020-02-12 NOTE — ASSESSMENT & PLAN NOTE
No evidence of active bleeding, may have slow ooze over time from site of the thigh wound.   Hemoglobin has been stable, check intermittently.   Transfuse if hemoglobin < 7

## 2020-02-12 NOTE — THERAPY
PT orders received and acknowledged. Pt awaiting Ortho consult to determine POC. Will complete PT eval as able.

## 2020-02-12 NOTE — ASSESSMENT & PLAN NOTE
Known hep C infection. LFTs normal.   Has not been treated, likely 2/2 IVDU. Outpatient follow up recommended

## 2020-02-12 NOTE — ASSESSMENT & PLAN NOTE
Recent h/o and admission for necrotizing fasciitis. Was having wound care at [facility], wound vac was recently removed and he noted that the wound was getting worse. States that wound care recommended he come in for further evaluation. Surrounding erythema has greatly improved.   Ortho following, appreciate recs  Continue wound care and wound vac, do not remove until wound is completely closed  Multimodal pain control.   Tolerating Keflex.

## 2020-02-12 NOTE — ED PROVIDER NOTES
ED Provider Note    Scribed for Wai Cabezas M.D. by Corrie Oconnor. 2/11/2020, 11:12 PM.    Primary care provider: Pcp Pt States None  Means of arrival: Ambulance  History obtained from: Patient  History limited by: None    CHIEF COMPLAINT  Chief Complaint   Patient presents with   • Wound Check     BIBA from longterm, has hx of necrotizing fascitis of the right upper thigh. wound vac was removed yesterday and sent by wound nurse for worsening        HPI  Gonsalo Hunt is a 59 y.o. male with a history of necrotizing fascitis who presents to the Emergency Department via ambulance for evaluation of worsening wound in the past 24 hours. The patient was hospitalized in January for necrotizing fascitis to his lateral R thigh, condition was complicated with septic shock. Wound vac was removed yesterday, and he was sent from [facility], for worsening symptoms. Within the past 24 hours, he has had increased swelling, erythema and pain. Currently, pain is rated as 10/10 throbbing pain. Denies fever, chills, shortness of breath, dysuria. Denies IV drug use.     REVIEW OF SYSTEMS  Pertinent positives include wound to R lateral thigh with increased swelling, erythema and pain . Pertinent negatives include  fever, chills, shortness of breath, dysuria.. All other systems negative.    PAST MEDICAL HISTORY   has a past medical history of Back pain, Heroin use, and Necrotizing fasciitis of lower leg (HCC).    SURGICAL HISTORY   has a past surgical history that includes other orthopedic surgery; other abdominal surgery; irrigation & debridement ortho (Right, 1/16/2020); irrigation & debridement ortho (Right, 1/18/2020); and incision and drainage general (Right, 1/20/2020).    SOCIAL HISTORY  Social History     Tobacco Use   • Smoking status: Current Every Day Smoker     Packs/day: 1.00     Types: Cigarettes   • Smokeless tobacco: Never Used   Substance Use Topics   • Alcohol use: Not Currently  "    Comment: rarely   • Drug use: Yes     Types: Marijuana     Comment: previous IV meth      Social History     Substance and Sexual Activity   Drug Use Yes   • Types: Marijuana    Comment: previous IV meth       FAMILY HISTORY  Family History   Problem Relation Age of Onset   • No Known Problems Mother    • No Known Problems Father        CURRENT MEDICATIONS  Home Medications     Reviewed by Ingris Martel R.N. (Registered Nurse) on 02/11/20 at 2229  Med List Status: Partial   Medication Last Dose Status   acetaminophen (TYLENOL) 500 MG Tab  Active   gabapentin (NEURONTIN) 100 MG Cap  Active   heparin 5000 UNIT/ML Solution  Active   lidocaine (LIDODERM) 5 % Patch  Active   lidocaine (XYLOCAINE) 4 % Solution  Active   miconazole 2%-zinc oxide (INZO) 2 % Cream topical cream  Active   Multiple Vitamins-Minerals (MULTIVITAMIN PO)  Active   polyethylene glycol/lytes (MIRALAX) Pack  Active   senna-docusate (PERICOLACE OR SENOKOT S) 8.6-50 MG Tab  Active   traZODone (DESYREL) 50 MG Tab  Active                ALLERGIES  Allergies   Allergen Reactions   • Codeine Rash     Rash  Historical tolerance to hydromorphone   • Spinach    • Pcn [Penicillins] Unspecified     Unknown reaction. Tolerated ceftriaxone on 10/9/19         PHYSICAL EXAM  VITAL SIGNS: /65   Pulse 88   Temp 35.9 °C (96.7 °F) (Temporal)   Resp (!) 22   Ht 1.93 m (6' 4\")   Wt 95.3 kg (210 lb)   SpO2 98%   BMI 25.56 kg/m²   ***  Constitutional: Well developed, Well nourished, Mild distress.   HENT: Normocephalic, Atraumatic, Oropharynx moist.   Eyes: Conjunctiva normal, No discharge.   Neck: Supple, No stridor   Cardiovascular: Normal heart rate, Normal rhythm, No murmurs, equal pulses.   Pulmonary: Normal breath sounds, No respiratory distress, No wheezing, No rales, No rhonchi.  Chest: No chest wall tenderness or deformity.   Abdomen:Soft, No tenderness, No masses, no rebound, no guarding.   Back: No CVA tenderness.   Musculoskeletal: R lateral " leg tenderness to palpation.  Skin: R lateral leg: incision that is half his leg in length. Incision is open and exposed to muscle and subcutaneous tissue with surrounding erythema, no associated pus. Warm, Dry.   Neurologic: Alert & oriented x 3, Normal motor function,  No focal deficits noted.   Psychiatric: Affect normal, Judgment normal, Mood normal.       LABS  Results for orders placed or performed during the hospital encounter of 02/11/20   Lactic acid (lactate)   Result Value Ref Range    Lactic Acid 1.9 0.5 - 2.0 mmol/L   CBC WITH DIFFERENTIAL   Result Value Ref Range    WBC 4.6 (L) 4.8 - 10.8 K/uL    RBC 3.55 (L) 4.70 - 6.10 M/uL    Hemoglobin 10.7 (L) 14.0 - 18.0 g/dL    Hematocrit 33.1 (L) 42.0 - 52.0 %    MCV 93.2 81.4 - 97.8 fL    MCH 30.1 27.0 - 33.0 pg    MCHC 32.3 (L) 33.7 - 35.3 g/dL    RDW 60.1 (H) 35.9 - 50.0 fL    Platelet Count 248 164 - 446 K/uL    MPV 9.1 9.0 - 12.9 fL    Neutrophils-Polys 53.50 44.00 - 72.00 %    Lymphocytes 27.20 22.00 - 41.00 %    Monocytes 10.30 0.00 - 13.40 %    Eosinophils 7.50 (H) 0.00 - 6.90 %    Basophils 1.10 0.00 - 1.80 %    Immature Granulocytes 0.40 0.00 - 0.90 %    Nucleated RBC 0.00 /100 WBC    Neutrophils (Absolute) 2.44 1.82 - 7.42 K/uL    Lymphs (Absolute) 1.24 1.00 - 4.80 K/uL    Monos (Absolute) 0.47 0.00 - 0.85 K/uL    Eos (Absolute) 0.34 0.00 - 0.51 K/uL    Baso (Absolute) 0.05 0.00 - 0.12 K/uL    Immature Granulocytes (abs) 0.02 0.00 - 0.11 K/uL    NRBC (Absolute) 0.00 K/uL   COMP METABOLIC PANEL   Result Value Ref Range    Sodium 136 135 - 145 mmol/L    Potassium 3.8 3.6 - 5.5 mmol/L    Chloride 103 96 - 112 mmol/L    Co2 24 20 - 33 mmol/L    Anion Gap 9.0 0.0 - 11.9    Glucose 130 (H) 65 - 99 mg/dL    Bun 18 8 - 22 mg/dL    Creatinine 0.76 0.50 - 1.40 mg/dL    Calcium 8.9 8.5 - 10.5 mg/dL    AST(SGOT) 13 12 - 45 U/L    ALT(SGPT) 7 2 - 50 U/L    Alkaline Phosphatase 70 30 - 99 U/L    Total Bilirubin 0.4 0.1 - 1.5 mg/dL    Albumin 3.7 3.2 - 4.9 g/dL     Total Protein 6.9 6.0 - 8.2 g/dL    Globulin 3.2 1.9 - 3.5 g/dL    A-G Ratio 1.2 g/dL   ESTIMATED GFR   Result Value Ref Range    GFR If African American >60 >60 mL/min/1.73 m 2    GFR If Non African American >60 >60 mL/min/1.73 m 2      All labs reviewed by me.    RADIOLOGY  DX-CHEST-PORTABLE (1 VIEW)   Final Result         1.  No acute cardiopulmonary disease.   2.  Atherosclerosis        The radiologist's interpretation of all radiological studies have been reviewed by me.    COURSE & MEDICAL DECISION MAKING  Pertinent Labs & Imaging studies reviewed. (See chart for details)    11:12 PM - Patient seen and examined at bedside. Discussed plan of care with patient. I informed them that labs and imaging will be ordered to evaluate symptoms. Patient is understanding and agreeable with plan.   Patient will be treated with Rocephin 2 g in  mL IVPB, Zofran 4 mg, Morphine 4 mg, vancomycin 2400 mg in  mL IVPB.  Ordered DX chest, blood culture, UA, CMP, CBC with diff, Lactic acid.  to evaluate his symptoms. The differential diagnoses include but are not limited to:Streptococcal necrotizing fascitis,  Cellulitis, compartment syndrome.     ***    Medical Decision Making: ***    {EMSSDCNOTE or EMSSADMITNOTE}     FINAL IMPRESSION  No diagnosis found.      Corrie ZARCO (Scribe), am scribing for, and in the presence of, Wai Cabezas M.D.    Electronically signed by: Corrie Oconnor (Scribe), 2/11/2020    Wai ZARCO M.D. personally performed the services described in this documentation, as scribed by Corrie Oconnor in my presence, and it is both accurate and complete. C    {ERP Attestation (ERP ONLY):200109}

## 2020-02-12 NOTE — PROGRESS NOTES
"Pharmacy Kinetics 59 y.o. male on vancomycin day # 1   2020    Received Vancomycin 2,400 mg iv loading dose at 00:24 AM today  Provider specified end date: TBD     Indication for Treatment: RLE SSTI     Pertinent history per medical record: Admitted on 2020 for cellulitis of R leg.    Mr. Hunt is a 59 y.o. male with a history of IVDU and necrotizing fascitis who presented to the ED for evaluation of RLE wound. The patient was hospitalized in January for necrotizing fascitis of his lateral R thigh and discharged to a skilled nursing facility. In the past 24 hours, he has had increased swelling, erythema and pain to his RLE.     Other antibiotics: ceftriaxone 2g IV Q24h     Allergies: Codeine; Spinach; and Pcn [penicillins]     List concerns for renal function: BMI 25.63 kg/m², IV contrast on        Pertinent cultures to date:    - Blood culture - in process    - Blood culture - in process    - Urine culture - in process     MRSA nares swab if pneumonia is a concern (ordered/positive/negative/n-a): n-a    Recent Labs     20  2234   WBC 4.6*   NEUTSPOLYS 53.50     Recent Labs     20  2234   BUN 18   CREATININE 0.76   ALBUMIN 3.7     Intake/Output Summary (Last 24 hours) at 2020 0730  Last data filed at 2020 0324  Gross per 24 hour   Intake 600 ml   Output --   Net 600 ml      /67   Pulse 78   Temp 36.4 °C (97.6 °F) (Temporal)   Resp 16   Ht 1.93 m (6' 4\")   Wt 95.5 kg (210 lb 8.6 oz)   SpO2 99%  Temp (24hrs), Av.2 °C (97.2 °F), Min:35.9 °C (96.7 °F), Max:36.4 °C (97.6 °F)      A/P   1. Vancomycin dose change: 1,400 mg IV q12h (0030,1230)   2. Next vancomycin level: Trough prior to 4th total dose ordered for tomorrow at noon.   3. Goal trough: 12-16 mcg/mL  4. Comments: Vancomycin ~15mg/kg Q12h initiated for SSTI in pt w/ h/o nec fasc. Concerns for renal accumulation of vancomycin listed above. A trough will be obtained at steady state. Pharmacy will " continue to follow and recommend de-escalation of antibiotics as appropriate.     Lilli Paniagua, PharmD, BCPS

## 2020-02-12 NOTE — PROGRESS NOTES
"Attempted to do admit profile. Pt got irritated. \"I refuse to answer questions they just asked me 10 days ago\". He was ultimately agreed to answer some questions but ignored most of my questions.  "

## 2020-02-12 NOTE — PROGRESS NOTES
"Pt agitated, screaming, saying he is not happy and \"this place is junk. I was in the ER for over 7 hours and they did nothing to me\". Explained that he got antibiotics and he had imaging done. States \"that don't do nothing to my wound\". He is upset that \"they let me go early last time. I'm going to shireen this place down to the ground\". Offered oxycodone which the patient took but \"this don't do anything. Where's my IV one\". I explained that he needs to try the tablet first then if that does not work then I can call the Physician. He was asking for food. Gave him meal tray and he was talking about how unhappy he was. He got agitated during the conversation. He threw the box on the floor. Right lateral thigh old dressing removed, picture taken and redressed. Swelling noted around the wound site. Pt is ambulatory.    "

## 2020-02-13 LAB
ALBUMIN SERPL BCP-MCNC: 3.1 G/DL (ref 3.2–4.9)
ALBUMIN/GLOB SERPL: 1.1 G/DL
ALP SERPL-CCNC: 56 U/L (ref 30–99)
ALT SERPL-CCNC: 7 U/L (ref 2–50)
ANION GAP SERPL CALC-SCNC: 7 MMOL/L (ref 0–11.9)
AST SERPL-CCNC: 12 U/L (ref 12–45)
BASOPHILS # BLD AUTO: 0.9 % (ref 0–1.8)
BASOPHILS # BLD: 0.03 K/UL (ref 0–0.12)
BILIRUB SERPL-MCNC: 0.3 MG/DL (ref 0.1–1.5)
BUN SERPL-MCNC: 14 MG/DL (ref 8–22)
CALCIUM SERPL-MCNC: 8.8 MG/DL (ref 8.5–10.5)
CHLORIDE SERPL-SCNC: 102 MMOL/L (ref 96–112)
CO2 SERPL-SCNC: 27 MMOL/L (ref 20–33)
CREAT SERPL-MCNC: 0.82 MG/DL (ref 0.5–1.4)
EOSINOPHIL # BLD AUTO: 0.22 K/UL (ref 0–0.51)
EOSINOPHIL NFR BLD: 6.5 % (ref 0–6.9)
ERYTHROCYTE [DISTWIDTH] IN BLOOD BY AUTOMATED COUNT: 55.6 FL (ref 35.9–50)
GLOBULIN SER CALC-MCNC: 2.9 G/DL (ref 1.9–3.5)
GLUCOSE SERPL-MCNC: 106 MG/DL (ref 65–99)
HCT VFR BLD AUTO: 29.8 % (ref 42–52)
HGB BLD-MCNC: 9.9 G/DL (ref 14–18)
IMM GRANULOCYTES # BLD AUTO: 0.02 K/UL (ref 0–0.11)
IMM GRANULOCYTES NFR BLD AUTO: 0.6 % (ref 0–0.9)
LYMPHOCYTES # BLD AUTO: 1.09 K/UL (ref 1–4.8)
LYMPHOCYTES NFR BLD: 32.2 % (ref 22–41)
MCH RBC QN AUTO: 30.1 PG (ref 27–33)
MCHC RBC AUTO-ENTMCNC: 33.2 G/DL (ref 33.7–35.3)
MCV RBC AUTO: 90.6 FL (ref 81.4–97.8)
MONOCYTES # BLD AUTO: 0.44 K/UL (ref 0–0.85)
MONOCYTES NFR BLD AUTO: 13 % (ref 0–13.4)
NEUTROPHILS # BLD AUTO: 1.59 K/UL (ref 1.82–7.42)
NEUTROPHILS NFR BLD: 46.8 % (ref 44–72)
NRBC # BLD AUTO: 0 K/UL
NRBC BLD-RTO: 0 /100 WBC
PLATELET # BLD AUTO: 209 K/UL (ref 164–446)
PMV BLD AUTO: 9.3 FL (ref 9–12.9)
POTASSIUM SERPL-SCNC: 4.2 MMOL/L (ref 3.6–5.5)
PROT SERPL-MCNC: 6 G/DL (ref 6–8.2)
RBC # BLD AUTO: 3.29 M/UL (ref 4.7–6.1)
SODIUM SERPL-SCNC: 136 MMOL/L (ref 135–145)
WBC # BLD AUTO: 3.4 K/UL (ref 4.8–10.8)

## 2020-02-13 PROCEDURE — 85025 COMPLETE CBC W/AUTO DIFF WBC: CPT

## 2020-02-13 PROCEDURE — 99233 SBSQ HOSP IP/OBS HIGH 50: CPT | Performed by: HOSPITALIST

## 2020-02-13 PROCEDURE — 80053 COMPREHEN METABOLIC PANEL: CPT

## 2020-02-13 PROCEDURE — A9270 NON-COVERED ITEM OR SERVICE: HCPCS | Performed by: HOSPITALIST

## 2020-02-13 PROCEDURE — 700102 HCHG RX REV CODE 250 W/ 637 OVERRIDE(OP): Performed by: HOSPITALIST

## 2020-02-13 PROCEDURE — 96375 TX/PRO/DX INJ NEW DRUG ADDON: CPT

## 2020-02-13 PROCEDURE — 97166 OT EVAL MOD COMPLEX 45 MIN: CPT

## 2020-02-13 PROCEDURE — 97161 PT EVAL LOW COMPLEX 20 MIN: CPT

## 2020-02-13 PROCEDURE — A9270 NON-COVERED ITEM OR SERVICE: HCPCS | Performed by: INTERNAL MEDICINE

## 2020-02-13 PROCEDURE — 700111 HCHG RX REV CODE 636 W/ 250 OVERRIDE (IP): Performed by: HOSPITALIST

## 2020-02-13 PROCEDURE — 700105 HCHG RX REV CODE 258: Performed by: HOSPITALIST

## 2020-02-13 PROCEDURE — 770006 HCHG ROOM/CARE - MED/SURG/GYN SEMI*

## 2020-02-13 PROCEDURE — 700102 HCHG RX REV CODE 250 W/ 637 OVERRIDE(OP): Performed by: INTERNAL MEDICINE

## 2020-02-13 RX ORDER — CEPHALEXIN 500 MG/1
1000 CAPSULE ORAL EVERY 6 HOURS
Status: COMPLETED | OUTPATIENT
Start: 2020-02-13 | End: 2020-02-19

## 2020-02-13 RX ORDER — HYDROMORPHONE HYDROCHLORIDE 1 MG/ML
1 INJECTION, SOLUTION INTRAMUSCULAR; INTRAVENOUS; SUBCUTANEOUS EVERY 4 HOURS PRN
Status: DISCONTINUED | OUTPATIENT
Start: 2020-02-13 | End: 2020-02-15

## 2020-02-13 RX ADMIN — POLYETHYLENE GLYCOL 3350 1 PACKET: 17 POWDER, FOR SOLUTION ORAL at 11:04

## 2020-02-13 RX ADMIN — HYDROMORPHONE HYDROCHLORIDE 1 MG: 1 INJECTION, SOLUTION INTRAMUSCULAR; INTRAVENOUS; SUBCUTANEOUS at 19:01

## 2020-02-13 RX ADMIN — SENNOSIDES AND DOCUSATE SODIUM 2 TABLET: 8.6; 5 TABLET ORAL at 17:36

## 2020-02-13 RX ADMIN — SODIUM CHLORIDE: 9 INJECTION, SOLUTION INTRAVENOUS at 17:45

## 2020-02-13 RX ADMIN — CEPHALEXIN 500 MG: 500 CAPSULE ORAL at 00:47

## 2020-02-13 RX ADMIN — CEPHALEXIN 500 MG: 500 CAPSULE ORAL at 05:57

## 2020-02-13 RX ADMIN — GABAPENTIN 100 MG: 100 CAPSULE ORAL at 17:36

## 2020-02-13 RX ADMIN — CEPHALEXIN 1000 MG: 500 CAPSULE ORAL at 14:49

## 2020-02-13 RX ADMIN — SODIUM CHLORIDE: 9 INJECTION, SOLUTION INTRAVENOUS at 06:55

## 2020-02-13 RX ADMIN — CEPHALEXIN 1000 MG: 500 CAPSULE ORAL at 23:37

## 2020-02-13 RX ADMIN — TRAZODONE HYDROCHLORIDE 50 MG: 50 TABLET ORAL at 21:20

## 2020-02-13 RX ADMIN — OXYCODONE HYDROCHLORIDE 10 MG: 5 TABLET ORAL at 00:47

## 2020-02-13 RX ADMIN — OXYCODONE HYDROCHLORIDE 10 MG: 5 TABLET ORAL at 21:21

## 2020-02-13 RX ADMIN — HYDROMORPHONE HYDROCHLORIDE 1 MG: 1 INJECTION, SOLUTION INTRAMUSCULAR; INTRAVENOUS; SUBCUTANEOUS at 23:35

## 2020-02-13 RX ADMIN — SENNOSIDES AND DOCUSATE SODIUM 2 TABLET: 8.6; 5 TABLET ORAL at 11:05

## 2020-02-13 RX ADMIN — GABAPENTIN 100 MG: 100 CAPSULE ORAL at 05:57

## 2020-02-13 RX ADMIN — CEPHALEXIN 1000 MG: 500 CAPSULE ORAL at 17:36

## 2020-02-13 RX ADMIN — HYDROMORPHONE HYDROCHLORIDE 1 MG: 1 INJECTION, SOLUTION INTRAMUSCULAR; INTRAVENOUS; SUBCUTANEOUS at 14:53

## 2020-02-13 ASSESSMENT — COGNITIVE AND FUNCTIONAL STATUS - GENERAL
WALKING IN HOSPITAL ROOM: A LITTLE
MOVING FROM LYING ON BACK TO SITTING ON SIDE OF FLAT BED: A LITTLE
SUGGESTED CMS G CODE MODIFIER DAILY ACTIVITY: CK
HELP NEEDED FOR BATHING: A LITTLE
MOBILITY SCORE: 18
DAILY ACTIVITIY SCORE: 19
MOVING TO AND FROM BED TO CHAIR: A LITTLE
PERSONAL GROOMING: A LITTLE
TURNING FROM BACK TO SIDE WHILE IN FLAT BAD: A LITTLE
STANDING UP FROM CHAIR USING ARMS: A LITTLE
DRESSING REGULAR LOWER BODY CLOTHING: A LITTLE
CLIMB 3 TO 5 STEPS WITH RAILING: A LITTLE
TOILETING: A LITTLE
DRESSING REGULAR UPPER BODY CLOTHING: A LITTLE
SUGGESTED CMS G CODE MODIFIER MOBILITY: CK

## 2020-02-13 ASSESSMENT — ACTIVITIES OF DAILY LIVING (ADL): TOILETING: INDEPENDENT

## 2020-02-13 ASSESSMENT — ENCOUNTER SYMPTOMS
HEADACHES: 0
ABDOMINAL PAIN: 0
DOUBLE VISION: 0
DIZZINESS: 0
NAUSEA: 0
VOMITING: 0
WHEEZING: 0
DIAPHORESIS: 0
LOSS OF CONSCIOUSNESS: 0
HEMOPTYSIS: 0
BRUISES/BLEEDS EASILY: 0
BLOOD IN STOOL: 0
DIARRHEA: 0
SEIZURES: 0
GASTROINTESTINAL NEGATIVE: 1
COUGH: 0
PALPITATIONS: 0
CONSTITUTIONAL NEGATIVE: 1
HEARTBURN: 0
NERVOUS/ANXIOUS: 1
FOCAL WEAKNESS: 0
RESPIRATORY NEGATIVE: 1
NEUROLOGICAL NEGATIVE: 1
FEVER: 0
CARDIOVASCULAR NEGATIVE: 1
CONSTIPATION: 0
CHILLS: 0
MYALGIAS: 1
EYES NEGATIVE: 1

## 2020-02-13 ASSESSMENT — GAIT ASSESSMENTS
ASSISTIVE DEVICE: FRONT WHEEL WALKER
DISTANCE (FEET): 165
GAIT LEVEL OF ASSIST: SUPERVISED

## 2020-02-13 NOTE — PROGRESS NOTES
Bedside report received at 1900. Assessment done. VSS. AOx4. Unlabored and even breathing,RA. No pain at this time, patient resting comfortably. IV infusing per MAR. 100% dinner finished. Hourly patient rounding done. Fall precautions in place.  Call light in place, bed locked and in lowest position. White board updated. Reviewed POC(PT/OT, abx, pain management). All questions answered at this time.

## 2020-02-13 NOTE — PROGRESS NOTES
"Pt refuses to take PRN pain medication stating \"5 mg doesn't do anything my friend takes 150 mg of morphine a day\" pt educated, threatens to leave AMA unless seen by Dr. Radha Pang, new orders placed.  "

## 2020-02-13 NOTE — DISCHARGE PLANNING
Care Transition Team Assessment    Chart reviewed, Anticipate return to [facility] when medically cleared.      Information Source  Information Given By: Other (Comments)(Chart)  Informant's Name: JANNA    Interdisciplinary Discharge Planning  Primary Care Physician: Sofia HOLBROOK  Lives with - Patient's Self Care Capacity: Other (Comments)(SNF)  Patient or legal guardian wants to designate a caregiver (see row info): No  Support Systems: None  Housing / Facility: Skilled Nursing Facility  Name of Care Facility: [facility]  Do You Take your Prescribed Medications Regularly: Yes  Mobility Issues: Yes  Prior Services: Other (Comments)(SNF)  Patient Expects to be Discharged to:: Back to [facility]  Assistance Needed: Unknown at this Time    Finances  Prescription Coverage: Yes    Anticipated Discharge Information  Anticipated discharge disposition: SNF

## 2020-02-13 NOTE — WOUND TEAM
[facility]  Inpatient Services  Initial Wound and Skin Care Evaluation    Admission Date: 2/11/2020     Last order of IP CONSULT TO WOUND CARE was found on 2/12/2020 from Hospital Encounter on 2/11/2020     HPI, PMH, SH: Reviewed    Unit where seen by Wound Team: T209/00     WOUND CONSULT/FOLLOW-UP RELATED TO: Right thigh incision    Self Report / Pain Level:  Pain with site care and movement to RLE. Complained that saline used to cleanse was too cold.       OBJECTIVE: dressing intact to R thigh. Pt able to move RLE with min assist.    WOUND TYPE, LOCATION, CHARACTERISTICS (Pressure Injuries: location, stage, POA or date identified)  Wound 02/11/20 Other (comment) Leg large approximated surgical incision site with erythema and redness surrounding  (Active)   Wound Image   2/13/2020  2:25 PM   Site Assessment Red;Yellow 2/13/2020  2:25 PM   Periwound Assessment Red;Dry 2/13/2020  2:25 PM   Margins Attached edges 2/13/2020  2:25 PM   Wound Length (cm) 30 cm 2/13/2020  2:25 PM   Wound Width (cm) 9 cm 2/13/2020  2:25 PM   Wound Depth (cm) 0 cm 2/13/2020  2:25 PM   Wound Surface Area (cm^2) 270 cm^2 2/13/2020  2:25 PM   Wound Volume (cm^3) 0 cm^3 2/13/2020  2:25 PM   Tunneling (cm) 0 cm 2/13/2020  2:25 PM   Undermining (cm) 0 cm 2/13/2020  2:25 PM   Closure Secondary intention 2/13/2020  2:25 PM   Drainage Amount Small 2/13/2020  2:25 PM   Drainage Description Serous;Yellow 2/13/2020  2:25 PM   Non-staged Wound Description Full thickness 2/13/2020  2:25 PM   Treatments Cleansed;Site care 2/13/2020  2:25 PM   Wound Cleansing Normal Saline Irrigation 2/13/2020  2:25 PM   Periwound Protectant Not Applicable 2/13/2020  2:25 PM   Dressing Options Dry Roll Gauze;Absorbent Abdominal Pad 2/13/2020  2:25 PM   Dressing Cleansing/Solutions Not Applicable 2/13/2020  2:25 PM   Dressing Changed New 2/13/2020  2:25 PM   Dressing Status Clean;Intact;Dry 2/13/2020  2:25 PM   Dressing Change/Treatment Frequency Every 48  hrs, and As Needed 2/13/2020  2:25 PM   NEXT Dressing Change/Treatment Date 02/15/20 2/13/2020  2:25 PM   NEXT Weekly Photo (Inpatient Only) 02/20/20 2/13/2020  2:25 PM   Wound Odor None 2/13/2020  2:25 PM   Exposed Structures Tendon 2/13/2020  2:25 PM   WOUND NURSE ONLY - Tissue Type and Percentage red granulation, yellow tendon 2/13/2020  2:25 PM   Number of days: 2          Vascular: dorsal pedal pulse not assessed during this visit.    SILVIA:   No results found.      Lab Values:    Lab Results   Component Value Date/Time    WBC 3.4 (L) 02/13/2020 12:53 AM    RBC 3.29 (L) 02/13/2020 12:53 AM    HEMOGLOBIN 9.9 (L) 02/13/2020 12:53 AM    HEMATOCRIT 29.8 (L) 02/13/2020 12:53 AM    CREACTPROT 4.99 (H) 02/11/2020 10:34 PM    SEDRATEWES 35 (H) 02/11/2020 10:34 PM    HBA1C 5.3 10/09/2019 11:16 AM          Culture:   NA,   Culture Results show:  Recent Results (from the past 720 hour(s))   CULTURE WOUND W/ GRAM STAIN    Collection Time: 01/16/20  6:09 PM   Result Value Ref Range    Significant Indicator POS (POS)     Source WND     Site Right Thigh Abscess     Culture Result - (A)     Gram Stain Result Moderate WBCs.  Many Gram positive cocci.       Culture Result Viridans Streptococcus  Heavy growth   (A)     Culture Result (A)      Staphylococcus aureus  Rare growth  See previous culture for sensitivity report.           INTERVENTIONS BY WOUND TEAM:  Dressing removed. Wound and zoë wound cleansed with NS and gauze. pictures and measurements obtained. Adaptic placed on wound bed. Secured with 2 abd pads, rolled gauze and hypafix tape. Brody RN notified.    Interdisciplinary consultation: Patient, Brody RN    EVALUATION: Pt presents with full thickness, incisional wound. Granular tissue noted to be at skin level. Wound bed is clean, non-odorous, with small serous/yellow drainage. Erythema noted to zoë wound. Yellow tendon visible on distal base of wound. Pt reported pain during tape removal and cleansing. Removed xeroform  as bismuth can potentially dry out wound bed. Adaptic placed on wound bed as a non adherent layer, allowing for easier removal. Awaiting ortho consult and recommendations.     Goals: Steady decrease in wound area and depth weekly.    NURSING PLAN OF CARE ORDERS (X):    Dressing changes: See Dressing Care orders: x  Skin care: See Skin Care orders:   Rectal tube care: See Rectal Tube Care orders:   Other orders:    RSKIN:   CURRENTLY IN PLACE (X), APPLIED THIS VISIT (A), ORDERED (O):   Q shift Yash:  x  Q shift pressure point assessments:  x  Pressure redistribution mattress   x         Low Airloss          Bariatric SAMMI         Bariatric foam           Heel float boots     Heel Silicone dressing        Float Heels off Bed with Pillows               Barrier wipes         Barrier Cream         Barrier paste          Sacral silicone dressing         Silicone O2 tubing         Anchorfast         Cannula fixation Device (Tender )          Gray Foam Ear protectors           Trach with Optifoam split foam                 Waffle cushion        Waffle Overlay         Rectal tube or BMS    Purwick/Condom Cath          Antifungal tx      Interdry          Reposition q 2 hours      X encourage pt turning.  Up to chair        Ambulate      PT/OT        Dietician        Diabetes Education      PO   x  TF     TPN     NPO   # days   Other        WOUND TEAM PLAN OF CARE   Dressing changes by wound team:          Follow up 1-2 times weekly:  x   Nursing to maintain dressing, wound team to follow based on needs.          Follow up 3 times weekly:                NPWT change 3 times weekly:     Follow up as needed:       Other (explain):     Anticipated discharge plans:   LTACH:        SNF/Rehab:     X return to [facility] when medically clear.             Home Care:           Outpatient Wound Center:            Self Care:

## 2020-02-13 NOTE — THERAPY
"Occupational Therapy Evaluation completed.   Functional Status:  Supervised level for all necessary activity. Function is limited by pain level with out of bed/ weightbearing  Plan of Care: eval only  Discharge Recommendations:  Equipment: NA.   Post-acute therapy Anticipate that the patient will have no further occupational therapy needs after discharge from the hospital.     See \"Rehab Therapy-Acute\" Patient Summary Report for complete documentation.    "

## 2020-02-13 NOTE — DISCHARGE PLANNING
Called and spoke to Maylin @ [facility] to confirm patients belongings are still there and safe. Per Maylin they are safe and stored till patient returns. Brody KING updated and will update patient.

## 2020-02-13 NOTE — PROGRESS NOTES
[facility] Medicine Daily Progress Note    Date of Service  2/13/2020    Chief Complaint  59 y.o. male admitted 2/11/2020 with right thigh pain    Hospital Course    Patient has a history of necrotizing fasciitis.  The patient has undergone surgery and then flap surgery.  The patient also had a wound VAC placed afterwards for healing.  He was recuperating in [facility] where they remove the wound VAC.  Since then his pain is exacerbated and the patient was returned to [facility] for evaluation and treatment.  I have spoken with Dr. Kwong of infectious disease and he feels that the patient is stable to be on Keflex 3 g daily divided.      Interval Problem Update  Continue at this point with wound care  Optimize pain management  Continue with Keflex  Orthopedics to see  PT OT  Placement will be an issue    Consultants/Specialty  Orthopedics  Infectious disease    Code Status  Full code    Disposition  To be determined most likely will need to go back to [facility].    Review of Systems  Review of Systems   Constitutional: Negative.  Negative for chills, diaphoresis and fever.   HENT: Negative.    Eyes: Negative.  Negative for double vision.   Respiratory: Negative.  Negative for cough, hemoptysis and wheezing.    Cardiovascular: Negative.  Negative for chest pain, palpitations and leg swelling.   Gastrointestinal: Negative.  Negative for abdominal pain, blood in stool, constipation, diarrhea, heartburn, nausea and vomiting.   Genitourinary: Negative.  Negative for frequency, hematuria and urgency.   Musculoskeletal: Positive for myalgias. Negative for joint pain.   Skin: Negative for itching and rash.        Large skin wound on the right thigh   Neurological: Negative.  Negative for dizziness, focal weakness, seizures, loss of consciousness and headaches.   Endo/Heme/Allergies: Negative.  Does not bruise/bleed easily.   Psychiatric/Behavioral: Negative for suicidal ideas. The patient is nervous/anxious.         Physical  Exam  Temp:  [36.9 °C (98.4 °F)-37.7 °C (99.8 °F)] 36.9 °C (98.4 °F)  Pulse:  [64-85] 70  Resp:  [16] 16  BP: (101-118)/(58-67) 107/67  SpO2:  [95 %-98 %] 95 %    Physical Exam  Vitals signs and nursing note reviewed. Exam conducted with a chaperone present.   Constitutional:       Appearance: Normal appearance. He is well-developed and normal weight.   HENT:      Head: Normocephalic and atraumatic.      Right Ear: External ear normal.      Left Ear: External ear normal.      Nose: Nose normal.      Mouth/Throat:      Mouth: Mucous membranes are moist.      Pharynx: Oropharynx is clear.   Eyes:      Conjunctiva/sclera: Conjunctivae normal.      Pupils: Pupils are equal, round, and reactive to light.   Neck:      Musculoskeletal: Normal range of motion and neck supple.      Thyroid: No thyromegaly.      Vascular: No JVD.   Cardiovascular:      Rate and Rhythm: Normal rate and regular rhythm.      Pulses: Normal pulses.      Heart sounds: Normal heart sounds. No murmur.   Pulmonary:      Effort: Pulmonary effort is normal.      Breath sounds: Normal breath sounds. No wheezing or rales.   Chest:      Chest wall: No tenderness.   Abdominal:      General: Abdomen is flat. There is no distension.      Palpations: Abdomen is soft. There is no mass.      Tenderness: There is no abdominal tenderness. There is no guarding or rebound.   Genitourinary:     Comments: Lundberg catheter  Musculoskeletal: Normal range of motion.         General: No tenderness.   Lymphadenopathy:      Cervical: No cervical adenopathy.   Skin:     General: Skin is warm and dry.      Capillary Refill: Capillary refill takes 2 to 3 seconds.      Findings: Erythema present. No rash.          Neurological:      General: No focal deficit present.      Mental Status: He is alert and oriented to person, place, and time. Mental status is at baseline.      Cranial Nerves: No cranial nerve deficit.      Deep Tendon Reflexes: Reflexes are normal and symmetric.      Psychiatric:         Mood and Affect: Mood normal.         Behavior: Behavior normal.         Thought Content: Thought content normal.         Judgment: Judgment normal.         Fluids    Intake/Output Summary (Last 24 hours) at 2/13/2020 1108  Last data filed at 2/12/2020 1900  Gross per 24 hour   Intake 400 ml   Output --   Net 400 ml       Laboratory  Recent Labs     02/11/20 2234 02/13/20  0053   WBC 4.6* 3.4*   RBC 3.55* 3.29*   HEMOGLOBIN 10.7* 9.9*   HEMATOCRIT 33.1* 29.8*   MCV 93.2 90.6   MCH 30.1 30.1   MCHC 32.3* 33.2*   RDW 60.1* 55.6*   PLATELETCT 248 209   MPV 9.1 9.3     Recent Labs     02/11/20 2234 02/13/20 0053   SODIUM 136 136   POTASSIUM 3.8 4.2   CHLORIDE 103 102   CO2 24 27   GLUCOSE 130* 106*   BUN 18 14   CREATININE 0.76 0.82   CALCIUM 8.9 8.8                   Imaging  CT-EXTREMITY, LOWER WITH RIGHT   Final Result         1.  Subcutaneous fat stranding and fluid in the subcutaneous fat below the knee, appearance favors cellulitis   2.  Fluid collection lateral to the right greater trochanter with possible mild enhancement, could represent bursitis or small abscess.   3.  Small knee joint effusion.   4.  Large left inguinal hernia containing loop of colon without apparent obstructive changes.   5.  Fat-containing left femoral hernia.   6.  Fat-containing right inguinal hernia      DX-CHEST-PORTABLE (1 VIEW)   Final Result         1.  No acute cardiopulmonary disease.   2.  Atherosclerosis           Assessment/Plan  * Cellulitis of right leg  Assessment & Plan  Patient has a history of necrotizing fasciitis he status post flap.  The patient was apparently at [facility] where he was getting wound care.  Recently his wound VAC was removed.  He says since then his wound has become worse.  At this point we are pending wound care evaluation as well as orthopedic evaluation.  Patient will continue at this point with pain management.  He is at this point insisting that he  needs more pain medications.  The patient at this point will need continued wound care.  He is at this point inpatient status and he has been placed on oral Keflex per the recommendation of infectious disease.            Leukopenia  Assessment & Plan  Most recent white blood cell count is 3.4.  Will repeat levels in the morning.    Tobacco abuse  Assessment & Plan  -nicotine replacement protocol and cessation education provided for 12 minutes, discussed options of nicotine patch, acupuncture, medical treatment with wellbutrin and chantix. Discussed other options. Code 16883    Anemia  Assessment & Plan  Monitor H&H if drops below 7 or 21 transfuse.  Repeat levels in the morning    Chronic hepatitis C virus infection (HCC)- (present on admission)  Assessment & Plan  Patient will need outpatient immunotherapy for his hepatitis C.  He will need to follow-up with gastroenterology for this.    IV drug abuse (HCC)- (present on admission)  Assessment & Plan  Patient has a history of IV drug use.  This is why he has not been treated for his hepatitis C.  Patient is been counseled on IV drug use cessation.  Patient is not happy about being counseled         VTE prophylaxis: SCDs

## 2020-02-13 NOTE — CARE PLAN
Problem: Communication  Goal: The ability to communicate needs accurately and effectively will improve  Outcome: PROGRESSING AS EXPECTED     Problem: Safety  Goal: Will remain free from injury  Outcome: PROGRESSING AS EXPECTED  Goal: Will remain free from falls  Outcome: PROGRESSING AS EXPECTED     Problem: Pain Management  Goal: Pain level will decrease to patient's comfort goal  Outcome: PROGRESSING AS EXPECTED    Wound care consult, ortho consult, PT/OT, abx, medical transfer. Fall precautions in place, educated to call for assistance. POC discussed, verbalized understanding. Pain assessed, interventions appropriate.

## 2020-02-13 NOTE — THERAPY
"Physical Therapy Evaluation    Bed Mobility:  Supine to Sit: Supervised  Transfers: Sit to Stand: Supervised  Gait: Level Of Assist: Supervised with Front-Wheel Walker x 165 feet.      Plan of Care: Patient with no further skilled PT needs in the acute care setting at this time  Discharge Recommendations: Patient will not be actively followed for physical therapy services at this time, however may be seen if requested by physician for 1 more visit within 30 days to address any discharge or equipment needs    Patient is a 59 year old male admitted on 2/11/2020 with right thigh pain secondary to necrotizing faciitis with past medical history that includes heroin use, back pain, necrotizing fasciitis of the lower leg.  Additionally patient has a history of smoking, recreational marijuana use.  Patient came from SNF, and is currently homeless, with potential D/c back to SNF.  Patient presents with no functional limitations, and does not require further physical therapy in an acute care setting at this time.  Subjective and Objective evaluation was difficult secondary to patient being a poor historian, and refusing to perform formal balance and stair training.  However, patient demonstrates good balance with raising his FWW overhead and throwing his FWW during session secondary to agitation.  Patient was supervision for all functional mobility and maintained vitals during evaluation.  Patient demonstrates some pain focused behavior during evaluation, however does not demonstrate physical signs of significant pain and was able to be distracted from pain.  At this time, recommend d/c back to SNF with FWW when medically stable.   FERNANDO Skinner made aware of patient's irritability, as well as pain medication request.     Abebe Martin PT   Pager [phone]    See \"Rehab Therapy-Acute\" Patient Summary Report for complete documentation.     "

## 2020-02-13 NOTE — PROGRESS NOTES
Spoke with Krystal of bed control, pt. Is in the transfer list for medical bed, but no bed available.

## 2020-02-14 LAB
BACTERIA UR CULT: NORMAL
SIGNIFICANT IND 70042: NORMAL
SITE SITE: NORMAL
SOURCE SOURCE: NORMAL

## 2020-02-14 PROCEDURE — 770006 HCHG ROOM/CARE - MED/SURG/GYN SEMI*

## 2020-02-14 PROCEDURE — 700105 HCHG RX REV CODE 258: Performed by: HOSPITALIST

## 2020-02-14 PROCEDURE — 302098 PASTE RING (FLAT): Performed by: ORTHOPAEDIC SURGERY

## 2020-02-14 PROCEDURE — 700111 HCHG RX REV CODE 636 W/ 250 OVERRIDE (IP): Performed by: HOSPITALIST

## 2020-02-14 PROCEDURE — 700102 HCHG RX REV CODE 250 W/ 637 OVERRIDE(OP): Performed by: HOSPITALIST

## 2020-02-14 PROCEDURE — A9270 NON-COVERED ITEM OR SERVICE: HCPCS | Performed by: HOSPITALIST

## 2020-02-14 PROCEDURE — 306263 VAC CANNISTER W/GEL 500ML: Performed by: ORTHOPAEDIC SURGERY

## 2020-02-14 PROCEDURE — 99232 SBSQ HOSP IP/OBS MODERATE 35: CPT | Performed by: HOSPITALIST

## 2020-02-14 RX ADMIN — SENNOSIDES AND DOCUSATE SODIUM 2 TABLET: 8.6; 5 TABLET ORAL at 17:13

## 2020-02-14 RX ADMIN — HYDROMORPHONE HYDROCHLORIDE 1 MG: 1 INJECTION, SOLUTION INTRAMUSCULAR; INTRAVENOUS; SUBCUTANEOUS at 09:40

## 2020-02-14 RX ADMIN — CEPHALEXIN 1000 MG: 500 CAPSULE ORAL at 17:13

## 2020-02-14 RX ADMIN — GABAPENTIN 100 MG: 100 CAPSULE ORAL at 17:13

## 2020-02-14 RX ADMIN — CEPHALEXIN 1000 MG: 500 CAPSULE ORAL at 11:39

## 2020-02-14 RX ADMIN — HEPARIN SODIUM 5000 UNITS: 5000 INJECTION, SOLUTION INTRAVENOUS; SUBCUTANEOUS at 04:50

## 2020-02-14 RX ADMIN — SODIUM CHLORIDE: 9 INJECTION, SOLUTION INTRAVENOUS at 03:14

## 2020-02-14 RX ADMIN — CEPHALEXIN 1000 MG: 500 CAPSULE ORAL at 04:50

## 2020-02-14 RX ADMIN — TRAZODONE HYDROCHLORIDE 50 MG: 50 TABLET ORAL at 20:02

## 2020-02-14 RX ADMIN — GABAPENTIN 100 MG: 100 CAPSULE ORAL at 04:50

## 2020-02-14 RX ADMIN — HYDROMORPHONE HYDROCHLORIDE 1 MG: 1 INJECTION, SOLUTION INTRAMUSCULAR; INTRAVENOUS; SUBCUTANEOUS at 05:01

## 2020-02-14 RX ADMIN — CEPHALEXIN 1000 MG: 500 CAPSULE ORAL at 23:50

## 2020-02-14 RX ADMIN — HEPARIN SODIUM 5000 UNITS: 5000 INJECTION, SOLUTION INTRAVENOUS; SUBCUTANEOUS at 14:56

## 2020-02-14 RX ADMIN — HYDROMORPHONE HYDROCHLORIDE 1 MG: 1 INJECTION, SOLUTION INTRAMUSCULAR; INTRAVENOUS; SUBCUTANEOUS at 20:02

## 2020-02-14 RX ADMIN — SENNOSIDES AND DOCUSATE SODIUM 2 TABLET: 8.6; 5 TABLET ORAL at 04:50

## 2020-02-14 RX ADMIN — HYDROMORPHONE HYDROCHLORIDE 1 MG: 1 INJECTION, SOLUTION INTRAMUSCULAR; INTRAVENOUS; SUBCUTANEOUS at 14:56

## 2020-02-14 RX ADMIN — SODIUM CHLORIDE: 9 INJECTION, SOLUTION INTRAVENOUS at 11:39

## 2020-02-14 ASSESSMENT — ENCOUNTER SYMPTOMS
NERVOUS/ANXIOUS: 1
DOUBLE VISION: 0
COUGH: 0
CONSTIPATION: 0
VOMITING: 0
ABDOMINAL PAIN: 0
FEVER: 0
FLANK PAIN: 0
LOSS OF CONSCIOUSNESS: 0
DIAPHORESIS: 0
CHILLS: 0
DIZZINESS: 0
HEMOPTYSIS: 0
NAUSEA: 0
FOCAL WEAKNESS: 0
HEADACHES: 0
PALPITATIONS: 0
MYALGIAS: 1
SEIZURES: 0
BRUISES/BLEEDS EASILY: 0
WHEEZING: 0

## 2020-02-14 ASSESSMENT — LIFESTYLE VARIABLES: SUBSTANCE_ABUSE: 1

## 2020-02-14 NOTE — PROGRESS NOTES
"Hospital Medicine Daily Progress Note    Date of Service  2/14/2020    Chief Complaint  59 y.o. male admitted 2/11/2020 with right thigh pain    Hospital Course    Patient has a history of necrotizing fasciitis.  The patient has undergone surgery and then flap surgery.  The patient also had a wound VAC placed afterwards for healing.  He was recuperating in [facility] where they remove the wound VAC.  Since then his pain is exacerbated and the patient was returned to [facility] for evaluation and treatment.  I have spoken with Dr. Kwong of infectious disease and he feels that the patient is stable to be on Keflex 3 g daily divided.      Interval Problem Update  Denies any pain at this time. Thinks the wound vac should be placed back on again and that his wound was improving \"before they took it off\". No overnight events.    Consultants/Specialty  Orthopedics- discussed with PA today  Infectious disease- case was discussed with alberto    Code Status  Full code    Disposition  Back to [facility] when medically cleared.     Review of Systems  Review of Systems   Constitutional: Positive for malaise/fatigue. Negative for chills, diaphoresis and fever.   HENT: Negative.    Eyes: Negative for double vision.   Respiratory: Negative for cough, hemoptysis and wheezing.    Cardiovascular: Negative for chest pain, palpitations and leg swelling.   Gastrointestinal: Negative for abdominal pain, constipation, nausea and vomiting.   Genitourinary: Negative for flank pain and hematuria.   Musculoskeletal: Positive for myalgias. Negative for joint pain.   Skin: Negative for itching and rash.        Large skin wound on the right thigh; no purulent drainage   Neurological: Negative for dizziness, focal weakness, seizures, loss of consciousness and headaches.   Endo/Heme/Allergies: Does not bruise/bleed easily.   Psychiatric/Behavioral: Positive for substance abuse (h/o IVDU). Negative for suicidal ideas. The patient is " nervous/anxious.         Physical Exam  Temp:  [36.2 °C (97.2 °F)-37.9 °C (100.3 °F)] 36.3 °C (97.3 °F)  Pulse:  [70-83] 73  Resp:  [16-20] 20  BP: (107-118)/(66-75) 108/66  SpO2:  [94 %-98 %] 94 %    Physical Exam  Vitals signs reviewed.   Constitutional:       General: He is not in acute distress.     Appearance: Normal appearance. He is well-developed. He is not toxic-appearing or diaphoretic.   HENT:      Head: Normocephalic.      Mouth/Throat:      Mouth: Mucous membranes are moist.   Eyes:      General: No scleral icterus.        Right eye: No discharge.         Left eye: No discharge.      Extraocular Movements: Extraocular movements intact.   Neck:      Musculoskeletal: Normal range of motion and neck supple.      Thyroid: No thyromegaly.      Vascular: No JVD.   Cardiovascular:      Rate and Rhythm: Normal rate and regular rhythm.      Pulses: Normal pulses.      Heart sounds: Normal heart sounds. No murmur.   Pulmonary:      Effort: Pulmonary effort is normal.      Breath sounds: Normal breath sounds. No wheezing or rales.   Chest:      Chest wall: No tenderness.   Abdominal:      General: Abdomen is flat. There is no distension.      Palpations: Abdomen is soft. There is no mass.      Tenderness: There is no abdominal tenderness. There is no guarding or rebound.   Musculoskeletal:         General: Tenderness (right thigh) present.   Lymphadenopathy:      Cervical: No cervical adenopathy.   Skin:     General: Skin is warm and dry.      Findings: Erythema present. No rash.          Neurological:      General: No focal deficit present.      Mental Status: He is alert and oriented to person, place, and time.      Cranial Nerves: No cranial nerve deficit.      Deep Tendon Reflexes: Reflexes are normal and symmetric.   Psychiatric:         Mood and Affect: Mood normal.         Speech: Speech normal.         Thought Content: Thought content normal.         Fluids    Intake/Output Summary (Last 24 hours) at  2/14/2020 0645  Last data filed at 2/14/2020 0300  Gross per 24 hour   Intake 780 ml   Output 900 ml   Net -120 ml       Laboratory  Recent Labs     02/11/20 2234 02/13/20  0053   WBC 4.6* 3.4*   RBC 3.55* 3.29*   HEMOGLOBIN 10.7* 9.9*   HEMATOCRIT 33.1* 29.8*   MCV 93.2 90.6   MCH 30.1 30.1   MCHC 32.3* 33.2*   RDW 60.1* 55.6*   PLATELETCT 248 209   MPV 9.1 9.3     Recent Labs     02/11/20 2234 02/13/20  0053   SODIUM 136 136   POTASSIUM 3.8 4.2   CHLORIDE 103 102   CO2 24 27   GLUCOSE 130* 106*   BUN 18 14   CREATININE 0.76 0.82   CALCIUM 8.9 8.8                   Imaging  CT-EXTREMITY, LOWER WITH RIGHT   Final Result         1.  Subcutaneous fat stranding and fluid in the subcutaneous fat below the knee, appearance favors cellulitis   2.  Fluid collection lateral to the right greater trochanter with possible mild enhancement, could represent bursitis or small abscess.   3.  Small knee joint effusion.   4.  Large left inguinal hernia containing loop of colon without apparent obstructive changes.   5.  Fat-containing left femoral hernia.   6.  Fat-containing right inguinal hernia      DX-CHEST-PORTABLE (1 VIEW)   Final Result         1.  No acute cardiopulmonary disease.   2.  Atherosclerosis           Assessment/Plan  * Cellulitis of right leg- (present on admission)  Assessment & Plan  Recent h/o and admission for necrotizing fasciitis. Was having wound care at [facility], wound vac was recently removed and he noted that the wound was getting worse. States that wound care recommended he come in for further evaluation. Surrounding erythema has greatly improved.   - discussed case with ortho PA; surgeon on last admission was Dr. Blanc  - awaiting ortho recs; appreciate  - continue wound care  - pain control, avoiding IV narcotics  - abx changed to keflex    Leukopenia- (present on admission)  Assessment & Plan  New leukopenia this admission. No evidence of sepsis and no left shift seen on differential  -  monitor clinically  - repeat levels in the AM    Tobacco abuse- (present on admission)  Assessment & Plan  - nicotine replacement  - continued education and counseling    Anemia- (present on admission)  Assessment & Plan  No evidence of active bleeding, may have slow ooze over time from site of the thigh wound. Hemoglobin down to 9.9 yesterday.   - transfuse if hemoglobin < 7  - continue to trend    Chronic hepatitis C virus infection (HCC)- (present on admission)  Assessment & Plan  Known hep C infection. LFTs normal.   - recommend outpatient follow up  - has not been treated, likely 2/2 IVDU    IV drug abuse (HCC)- (present on admission)  Assessment & Plan  H/o IVDU  - continued counseling and education       VTE prophylaxis: SCDs

## 2020-02-14 NOTE — PROGRESS NOTES
59yoM with right thigh extensive necrotizing soft tissue infection and abscess s/p I&D on 1/16, 1/18 and 1/20.    S: Readmitted from SNF 2/12.  Says skin got inflamed from type of dressing they were using.  Seems better now.  Admitted to hospitalist service and treating with IV abx therapy.    O:  Vitals:    02/14/20 0940   BP:    Pulse:    Resp: 20   Temp:    SpO2:      Exam:  General-NAD, alert and following commands  RLE-thigh dressing c/d/i, gauze dressing in place, NVI distally, TTP at great trochanter, picture in Epic of wound reviewed and shows small area of exposed fascia posteriorly and hypertrophic granulation tissue.    A: 59yoM with right thigh extensive necrotizing soft tissue infection and abscess s/p I&D on 1/16, 1/18 and 1/20. Readmitted with cellulitis 2/12.  CT 2/12 consistent with cellulitis.  Nonspecific fluid collection adjacent to greater trochanter has benign appearance clinically.     Recs:  --I discussed findings with patient.  I recommend abx therapy for cellulitis.  --We discussed potential option for STSG at some point after resolution of cellulitis but he would prefer to avoid further surgery and, specifically STSG.    --Continue local wound care and recs per wound team.  VAC my not be needed at this point given hypertrophic granulation.

## 2020-02-14 NOTE — PROGRESS NOTES
Patient alert/oriented x4,room air,,assumed care given at shift change,call light and personal belongings within reach and bed in low,bed alarm on.

## 2020-02-15 LAB
ANION GAP SERPL CALC-SCNC: 6 MMOL/L (ref 0–11.9)
BUN SERPL-MCNC: 17 MG/DL (ref 8–22)
CALCIUM SERPL-MCNC: 9.1 MG/DL (ref 8.5–10.5)
CHLORIDE SERPL-SCNC: 104 MMOL/L (ref 96–112)
CO2 SERPL-SCNC: 25 MMOL/L (ref 20–33)
CREAT SERPL-MCNC: 0.75 MG/DL (ref 0.5–1.4)
ERYTHROCYTE [DISTWIDTH] IN BLOOD BY AUTOMATED COUNT: 54.2 FL (ref 35.9–50)
GLUCOSE SERPL-MCNC: 106 MG/DL (ref 65–99)
HCT VFR BLD AUTO: 33.6 % (ref 42–52)
HGB BLD-MCNC: 10.8 G/DL (ref 14–18)
MCH RBC QN AUTO: 29.2 PG (ref 27–33)
MCHC RBC AUTO-ENTMCNC: 32.1 G/DL (ref 33.7–35.3)
MCV RBC AUTO: 90.8 FL (ref 81.4–97.8)
PLATELET # BLD AUTO: 222 K/UL (ref 164–446)
PMV BLD AUTO: 8.9 FL (ref 9–12.9)
POTASSIUM SERPL-SCNC: 3.9 MMOL/L (ref 3.6–5.5)
RBC # BLD AUTO: 3.7 M/UL (ref 4.7–6.1)
SODIUM SERPL-SCNC: 135 MMOL/L (ref 135–145)
WBC # BLD AUTO: 4 K/UL (ref 4.8–10.8)

## 2020-02-15 PROCEDURE — 700111 HCHG RX REV CODE 636 W/ 250 OVERRIDE (IP): Performed by: HOSPITALIST

## 2020-02-15 PROCEDURE — 306263 VAC CANNISTER W/GEL 500ML: Performed by: HOSPITALIST

## 2020-02-15 PROCEDURE — 700102 HCHG RX REV CODE 250 W/ 637 OVERRIDE(OP): Performed by: HOSPITALIST

## 2020-02-15 PROCEDURE — 99232 SBSQ HOSP IP/OBS MODERATE 35: CPT | Performed by: HOSPITALIST

## 2020-02-15 PROCEDURE — A9270 NON-COVERED ITEM OR SERVICE: HCPCS | Performed by: HOSPITALIST

## 2020-02-15 PROCEDURE — 36415 COLL VENOUS BLD VENIPUNCTURE: CPT

## 2020-02-15 PROCEDURE — 85027 COMPLETE CBC AUTOMATED: CPT

## 2020-02-15 PROCEDURE — 700105 HCHG RX REV CODE 258: Performed by: HOSPITALIST

## 2020-02-15 PROCEDURE — 770006 HCHG ROOM/CARE - MED/SURG/GYN SEMI*

## 2020-02-15 PROCEDURE — 80048 BASIC METABOLIC PNL TOTAL CA: CPT

## 2020-02-15 RX ORDER — OXYCODONE HYDROCHLORIDE 5 MG/1
5 TABLET ORAL EVERY 6 HOURS PRN
Qty: 12 TAB | Refills: 0 | Status: SHIPPED | OUTPATIENT
Start: 2020-02-15 | End: 2020-02-19

## 2020-02-15 RX ORDER — HYDROMORPHONE HYDROCHLORIDE 1 MG/ML
1 INJECTION, SOLUTION INTRAMUSCULAR; INTRAVENOUS; SUBCUTANEOUS EVERY 6 HOURS PRN
Status: DISCONTINUED | OUTPATIENT
Start: 2020-02-15 | End: 2020-02-16

## 2020-02-15 RX ADMIN — SENNOSIDES AND DOCUSATE SODIUM 2 TABLET: 8.6; 5 TABLET ORAL at 04:56

## 2020-02-15 RX ADMIN — GABAPENTIN 100 MG: 100 CAPSULE ORAL at 17:22

## 2020-02-15 RX ADMIN — CEPHALEXIN 1000 MG: 500 CAPSULE ORAL at 17:22

## 2020-02-15 RX ADMIN — HYDROMORPHONE HYDROCHLORIDE 1 MG: 1 INJECTION, SOLUTION INTRAMUSCULAR; INTRAVENOUS; SUBCUTANEOUS at 14:01

## 2020-02-15 RX ADMIN — SENNOSIDES AND DOCUSATE SODIUM 2 TABLET: 8.6; 5 TABLET ORAL at 17:22

## 2020-02-15 RX ADMIN — HYDROMORPHONE HYDROCHLORIDE 1 MG: 1 INJECTION, SOLUTION INTRAMUSCULAR; INTRAVENOUS; SUBCUTANEOUS at 20:12

## 2020-02-15 RX ADMIN — HEPARIN SODIUM 5000 UNITS: 5000 INJECTION, SOLUTION INTRAVENOUS; SUBCUTANEOUS at 20:11

## 2020-02-15 RX ADMIN — HEPARIN SODIUM 5000 UNITS: 5000 INJECTION, SOLUTION INTRAVENOUS; SUBCUTANEOUS at 14:01

## 2020-02-15 RX ADMIN — CEPHALEXIN 1000 MG: 500 CAPSULE ORAL at 04:56

## 2020-02-15 RX ADMIN — SODIUM CHLORIDE: 9 INJECTION, SOLUTION INTRAVENOUS at 17:22

## 2020-02-15 RX ADMIN — CEPHALEXIN 1000 MG: 500 CAPSULE ORAL at 11:14

## 2020-02-15 RX ADMIN — HYDROMORPHONE HYDROCHLORIDE 1 MG: 1 INJECTION, SOLUTION INTRAMUSCULAR; INTRAVENOUS; SUBCUTANEOUS at 07:53

## 2020-02-15 RX ADMIN — TRAZODONE HYDROCHLORIDE 50 MG: 50 TABLET ORAL at 20:11

## 2020-02-15 RX ADMIN — GABAPENTIN 100 MG: 100 CAPSULE ORAL at 04:56

## 2020-02-15 ASSESSMENT — ENCOUNTER SYMPTOMS
CHILLS: 0
HEMOPTYSIS: 0
NERVOUS/ANXIOUS: 1
WHEEZING: 0
FLANK PAIN: 0
LOSS OF CONSCIOUSNESS: 0
DIZZINESS: 0
PALPITATIONS: 0
COUGH: 0
FOCAL WEAKNESS: 0
VOMITING: 0
ABDOMINAL PAIN: 0
DOUBLE VISION: 0
NAUSEA: 0
FEVER: 0
HEADACHES: 0
SEIZURES: 0
MYALGIAS: 1
BRUISES/BLEEDS EASILY: 0
CONSTIPATION: 0
DIAPHORESIS: 0

## 2020-02-15 ASSESSMENT — LIFESTYLE VARIABLES: SUBSTANCE_ABUSE: 1

## 2020-02-15 NOTE — CARE PLAN
Problem: Communication  Goal: The ability to communicate needs accurately and effectively will improve  Outcome: PROGRESSING AS EXPECTED     Problem: Safety  Goal: Will remain free from injury  Outcome: PROGRESSING AS EXPECTED  Goal: Will remain free from falls  Outcome: PROGRESSING AS EXPECTED     Problem: Pain Management  Goal: Pain level will decrease to patient's comfort goal  Outcome: PROGRESSING AS EXPECTED     Problem: Bowel/Gastric:  Goal: Normal bowel function is maintained or improved  Outcome: PROGRESSING AS EXPECTED

## 2020-02-15 NOTE — PROGRESS NOTES
"   Orthopaedic Progress Note    Interval changes:  Patient readmitted  Discussed yesterday with Dr Blanc and wound team- restart vac will reassess Monday    ROS - Patient denies any new issues.  Pain well controlled.    /67   Pulse 78   Temp 36.5 °C (97.7 °F) (Temporal)   Resp 18   Ht 1.93 m (6' 4\")   Wt 93.4 kg (205 lb 14.6 oz)   SpO2 95%       Patient seen and examined  No acute distress  Breathing non labored  RRR  RLE veraflo vac in place with no leak, DNVI, moves all toes, cap refill <2 sec.     Recent Labs     02/13/20  0053 02/15/20  0246   WBC 3.4* 4.0*   RBC 3.29* 3.70*   HEMOGLOBIN 9.9* 10.8*   HEMATOCRIT 29.8* 33.6*   MCV 90.6 90.8   MCH 30.1 29.2   MCHC 33.2* 32.1*   RDW 55.6* 54.2*   PLATELETCT 209 222   MPV 9.3 8.9*       Active Hospital Problems    Diagnosis   • Cellulitis of right leg [L03.115]     Priority: High   • Tobacco abuse [Z72.0]     Priority: Medium   • Leukopenia [D72.819]     Priority: Medium   • Anemia [D64.9]     Priority: Low   • Chronic hepatitis C virus infection (HCC) [B18.2]     Priority: Low   • IV drug abuse (HCC) [F19.10]     Priority: Low       Assessment/Plan:  Continue vac  No surgical intervention needed at this time  Reassess monday     "

## 2020-02-15 NOTE — WOUND TEAM
[facility]  Inpatient Services  Wound and Skin Care Progress Note    Admission Date: 2/11/2020     Last order of IP CONSULT TO WOUND CARE was found on 2/12/2020 from Hospital Encounter on 2/11/2020     HPI, PMH, SH: Reviewed    Unit where seen by Wound Team: [id number]    WOUND FOLLOW-UP RELATED TO: Right thigh incision, vac placement.    Self Report / Pain Level:  Pain with site care and movement to RLE. Complained that saline used to cleanse was too cold.       OBJECTIVE: dressing intact to R thigh. Pt able to move RLE with min assist.    WOUND TYPE, LOCATION, CHARACTERISTICS (Pressure Injuries: location, stage, POA or date identified)     Negative Pressure Wound Therapy 02/14/20 Surgical (Active)   NPWT Pump Mode / Pressure Setting Continuous;125 mmHg 2/14/2020     Dressing Type Black Foam (Veraflo);Medium    Number of Foam Pieces Used 2    Canister Changed Yes    Output (mL) 0 mL      Wound 02/11/20 Other (comment) Leg large approximated surgical incision site with erythema and redness surrounding  (Active)   Wound Image   2/14/2020     Site Assessment Red;Granulation tissue 2/14/2020    Periwound Assessment Pink    Margins Attached edges;Defined edges    Wound Length (cm) 30 cm 2/13/2020   Wound Width (cm) 9 cm 2/13/2020   Wound Depth (cm) 0 cm 2/13/2020    Wound Surface Area (cm^2) 270 cm^2 2/13/2020   Wound Volume (cm^3) 0 cm^3 2/13/2020     Tunneling (cm) 0 cm 2/13/2020   Undermining (cm) 0 cm 2/13/2020     Closure Secondary intention 2/14/2020   Drainage Amount Small    Drainage Description Serosanguineous;Yellow    Non-staged Wound Description Full thickness    Treatments Cleansed;Site care    Wound Cleansing Normal Saline Irrigation    Periwound Protectant Benzoin;Drape;Paste Ring    Dressing Options Wound Vac    Dressing Cleansing/Solutions Normal Saline    Dressing Changed New    Dressing Status Clean;Dry;Intact    Dressing Change/Treatment Frequency Monday, Wednesday, Friday, and As Needed     NEXT Dressing Change/Treatment Date 02/17/20    NEXT Weekly Photo (Inpatient Only) 02/21/20    Wound Odor None    Pulses N/A    Exposed Structures Tendon    WOUND NURSE ONLY - Tissue Type and Percentage 100% Beefy Granulation Tissue, Exposed Tendon         Vascular: dorsal pedal pulse not assessed during this visit.    SILVIA:   No results found.    Lab Values:    Lab Results   Component Value Date/Time    WBC 3.4 (L) 02/13/2020 12:53 AM    RBC 3.29 (L) 02/13/2020 12:53 AM    HEMOGLOBIN 9.9 (L) 02/13/2020 12:53 AM    HEMATOCRIT 29.8 (L) 02/13/2020 12:53 AM    CREACTPROT 4.99 (H) 02/11/2020 10:34 PM    SEDRATEWES 35 (H) 02/11/2020 10:34 PM    HBA1C 5.3 10/09/2019 11:16 AM        Culture:   NA,   Culture Results show:  Recent Results (from the past 720 hour(s))   CULTURE WOUND W/ GRAM STAIN    Collection Time: 01/16/20  6:09 PM   Result Value Ref Range    Significant Indicator POS (POS)     Source WND     Site Right Thigh Abscess     Culture Result - (A)     Gram Stain Result Moderate WBCs.  Many Gram positive cocci.       Culture Result Viridans Streptococcus  Heavy growth   (A)     Culture Result (A)      Staphylococcus aureus  Rare growth  See previous culture for sensitivity report.           INTERVENTIONS BY WOUND TEAM:  Dressing removed. Wound and zoë wound cleansed with NS. Periwound prepped with benzoin and paste ring. 2 pieces of foam placed to wound bed. Sealed with drape, trac pad applied. Veraflo therapy initiated at 125mmHg, 5 min soak of 36 ml every 2 hours.     Interdisciplinary consultation: Patient, Primary RN    EVALUATION:  Veraflo therapy will cleanse wound bed of any biofilm. Plan next change for compression dressing to decrease hypergranulation. Pt would benefit from skin graft - needs further education on amount of time to heal large wound without graft vs healing time of graft.     Goals: Steady decrease in wound area and depth weekly.    NURSING PLAN OF CARE ORDERS (X):    Dressing changes:  See Dressing Care orders: x  Skin care: See Skin Care orders:   Rectal tube care: See Rectal Tube Care orders:   Other orders:    WOUND TEAM PLAN OF CARE   Dressing changes by wound team:          Follow up 1-2 times weekly:    Follow up 3 times weekly:                NPWT change 3 times weekly:   X  Follow up as needed:       Other (explain):     Anticipated discharge plans:   LTACH:        SNF/Rehab:     X - return to [facility] when medically clear.             Home Care:           Outpatient Wound Center:            Self Care:

## 2020-02-15 NOTE — PROGRESS NOTES
[facility] Medicine Daily Progress Note    Date of Service  2/15/2020    Chief Complaint  59 y.o. male admitted 2/11/2020 with right thigh pain    Hospital Course    Patient has a history of necrotizing fasciitis.  The patient has undergone surgery and then flap surgery.  The patient also had a wound VAC placed afterwards for healing.  He was recuperating in [facility] where they remove the wound VAC.  Since then his pain is exacerbated and the patient was returned to [facility] for evaluation and treatment.  I have spoken with Dr. Kwong of infectious disease and he feels that the patient is stable to be on Keflex 3 g daily divided.      Interval Problem Update  Wound vac was replaced yesterday. States that he's been having increased pain of the thigh since it was placed. No overnight events.      Consultants/Specialty  Orthopedics  Infectious disease- case previously discussed with alberto    Code Status  Full code    Disposition  Back to [facility] when medically cleared.     Review of Systems  Review of Systems   Constitutional: Positive for malaise/fatigue. Negative for chills, diaphoresis and fever.   HENT: Negative.    Eyes: Negative for double vision.   Respiratory: Negative for cough, hemoptysis and wheezing.    Cardiovascular: Negative for chest pain, palpitations and leg swelling.   Gastrointestinal: Negative for abdominal pain, constipation, nausea and vomiting.   Genitourinary: Negative for flank pain and hematuria.   Musculoskeletal: Positive for myalgias. Negative for joint pain.   Skin: Negative for itching and rash.        Large skin wound on the right thigh; no purulent drainage   Neurological: Negative for dizziness, focal weakness, seizures, loss of consciousness and headaches.   Endo/Heme/Allergies: Does not bruise/bleed easily.   Psychiatric/Behavioral: Positive for substance abuse (h/o IVDU). Negative for suicidal ideas. The patient is nervous/anxious.         Physical Exam  Temp:  [36.1 °C (97  °F)-37.4 °C (99.3 °F)] 36.4 °C (97.6 °F)  Pulse:  [60-84] 62  Resp:  [17-20] 18  BP: (100-115)/(65-70) 115/70  SpO2:  [95 %-99 %] 97 %    Physical Exam  Vitals signs reviewed.   Constitutional:       General: He is not in acute distress.     Appearance: Normal appearance. He is well-developed. He is not toxic-appearing or diaphoretic.   HENT:      Head: Normocephalic.      Mouth/Throat:      Mouth: Mucous membranes are moist.   Eyes:      General: No scleral icterus.        Right eye: No discharge.         Left eye: No discharge.      Extraocular Movements: Extraocular movements intact.   Neck:      Musculoskeletal: Normal range of motion and neck supple.      Thyroid: No thyromegaly.      Vascular: No JVD.   Cardiovascular:      Rate and Rhythm: Normal rate and regular rhythm.      Pulses: Normal pulses.      Heart sounds: Normal heart sounds. No murmur.   Pulmonary:      Effort: Pulmonary effort is normal.      Breath sounds: Normal breath sounds. No wheezing or rales.   Chest:      Chest wall: No tenderness.   Abdominal:      General: Abdomen is flat. There is no distension.      Palpations: Abdomen is soft. There is no mass.      Tenderness: There is no abdominal tenderness. There is no guarding or rebound.   Musculoskeletal:         General: Tenderness (right thigh) present.   Lymphadenopathy:      Cervical: No cervical adenopathy.   Skin:     General: Skin is warm and dry.      Findings: Erythema present. No rash.          Neurological:      General: No focal deficit present.      Mental Status: He is alert and oriented to person, place, and time.      Cranial Nerves: No cranial nerve deficit.      Deep Tendon Reflexes: Reflexes are normal and symmetric.   Psychiatric:         Mood and Affect: Mood normal.         Speech: Speech normal.         Thought Content: Thought content normal.     Patient seen and examined today on 2/15, unchanged from 2/14.     Fluids    Intake/Output Summary (Last 24 hours) at  2/15/2020 0609  Last data filed at 2/15/2020 0400  Gross per 24 hour   Intake 750 ml   Output 2250 ml   Net -1500 ml       Laboratory  Recent Labs     02/13/20  0053 02/15/20  0246   WBC 3.4* 4.0*   RBC 3.29* 3.70*   HEMOGLOBIN 9.9* 10.8*   HEMATOCRIT 29.8* 33.6*   MCV 90.6 90.8   MCH 30.1 29.2   MCHC 33.2* 32.1*   RDW 55.6* 54.2*   PLATELETCT 209 222   MPV 9.3 8.9*     Recent Labs     02/13/20  0053 02/15/20  0246   SODIUM 136 135   POTASSIUM 4.2 3.9   CHLORIDE 102 104   CO2 27 25   GLUCOSE 106* 106*   BUN 14 17   CREATININE 0.82 0.75   CALCIUM 8.8 9.1                   Imaging  CT-EXTREMITY, LOWER WITH RIGHT   Final Result         1.  Subcutaneous fat stranding and fluid in the subcutaneous fat below the knee, appearance favors cellulitis   2.  Fluid collection lateral to the right greater trochanter with possible mild enhancement, could represent bursitis or small abscess.   3.  Small knee joint effusion.   4.  Large left inguinal hernia containing loop of colon without apparent obstructive changes.   5.  Fat-containing left femoral hernia.   6.  Fat-containing right inguinal hernia      DX-CHEST-PORTABLE (1 VIEW)   Final Result         1.  No acute cardiopulmonary disease.   2.  Atherosclerosis           Assessment/Plan  * Cellulitis of right leg- (present on admission)  Assessment & Plan  Recent h/o and admission for necrotizing fasciitis. Was having wound care at [facility], wound vac was recently removed and he noted that the wound was getting worse. States that wound care recommended he come in for further evaluation. Surrounding erythema has greatly improved.   - ortho following, appreciate recs  - awaiting ortho recs; appreciate  - continue wound care and wound vac  - pain control, avoiding IV narcotics  - abx changed to keflex; tolerating well    Leukopenia- (present on admission)  Assessment & Plan  New leukopenia this admission. No evidence of sepsis and no left shift seen on differential  - monitor  clinically  - repeat levels in the AM    Tobacco abuse- (present on admission)  Assessment & Plan  - nicotine replacement  - continued education and counseling    Anemia- (present on admission)  Assessment & Plan  No evidence of active bleeding, may have slow ooze over time from site of the thigh wound. Hemoglobin down to 9.9, now improved.   - transfuse if hemoglobin < 7  - continue to trend    Chronic hepatitis C virus infection (HCC)- (present on admission)  Assessment & Plan  Known hep C infection. LFTs normal.   - recommend outpatient follow up  - has not been treated, likely 2/2 IVDU    IV drug abuse (HCC)- (present on admission)  Assessment & Plan  H/o IVDU  - continued counseling and education       VTE prophylaxis: SCDs

## 2020-02-16 PROCEDURE — 306263 VAC CANNISTER W/GEL 500ML: Performed by: HOSPITALIST

## 2020-02-16 PROCEDURE — 770006 HCHG ROOM/CARE - MED/SURG/GYN SEMI*

## 2020-02-16 PROCEDURE — 700102 HCHG RX REV CODE 250 W/ 637 OVERRIDE(OP): Performed by: HOSPITALIST

## 2020-02-16 PROCEDURE — A9270 NON-COVERED ITEM OR SERVICE: HCPCS | Performed by: HOSPITALIST

## 2020-02-16 PROCEDURE — 700105 HCHG RX REV CODE 258: Performed by: HOSPITALIST

## 2020-02-16 PROCEDURE — 700111 HCHG RX REV CODE 636 W/ 250 OVERRIDE (IP): Performed by: HOSPITALIST

## 2020-02-16 PROCEDURE — 99232 SBSQ HOSP IP/OBS MODERATE 35: CPT | Performed by: HOSPITALIST

## 2020-02-16 RX ORDER — TRAZODONE HYDROCHLORIDE 50 MG/1
100 TABLET ORAL
Status: DISCONTINUED | OUTPATIENT
Start: 2020-02-16 | End: 2020-02-19 | Stop reason: HOSPADM

## 2020-02-16 RX ORDER — HYDROMORPHONE HYDROCHLORIDE 1 MG/ML
1 INJECTION, SOLUTION INTRAMUSCULAR; INTRAVENOUS; SUBCUTANEOUS 2 TIMES DAILY PRN
Status: DISCONTINUED | OUTPATIENT
Start: 2020-02-16 | End: 2020-02-18

## 2020-02-16 RX ADMIN — SODIUM CHLORIDE: 9 INJECTION, SOLUTION INTRAVENOUS at 04:45

## 2020-02-16 RX ADMIN — CEPHALEXIN 1000 MG: 500 CAPSULE ORAL at 06:43

## 2020-02-16 RX ADMIN — HYDROMORPHONE HYDROCHLORIDE 1 MG: 1 INJECTION, SOLUTION INTRAMUSCULAR; INTRAVENOUS; SUBCUTANEOUS at 18:25

## 2020-02-16 RX ADMIN — CEPHALEXIN 1000 MG: 500 CAPSULE ORAL at 02:29

## 2020-02-16 RX ADMIN — CEPHALEXIN 1000 MG: 500 CAPSULE ORAL at 13:40

## 2020-02-16 RX ADMIN — GABAPENTIN 100 MG: 100 CAPSULE ORAL at 04:45

## 2020-02-16 RX ADMIN — SENNOSIDES AND DOCUSATE SODIUM 2 TABLET: 8.6; 5 TABLET ORAL at 18:29

## 2020-02-16 RX ADMIN — TRAZODONE HYDROCHLORIDE 100 MG: 50 TABLET ORAL at 20:33

## 2020-02-16 RX ADMIN — HEPARIN SODIUM 5000 UNITS: 5000 INJECTION, SOLUTION INTRAVENOUS; SUBCUTANEOUS at 13:41

## 2020-02-16 RX ADMIN — GABAPENTIN 100 MG: 100 CAPSULE ORAL at 18:29

## 2020-02-16 RX ADMIN — HYDROMORPHONE HYDROCHLORIDE 1 MG: 1 INJECTION, SOLUTION INTRAMUSCULAR; INTRAVENOUS; SUBCUTANEOUS at 02:28

## 2020-02-16 RX ADMIN — HYDROMORPHONE HYDROCHLORIDE 1 MG: 1 INJECTION, SOLUTION INTRAMUSCULAR; INTRAVENOUS; SUBCUTANEOUS at 08:58

## 2020-02-16 RX ADMIN — CEPHALEXIN 1000 MG: 500 CAPSULE ORAL at 18:29

## 2020-02-16 ASSESSMENT — ENCOUNTER SYMPTOMS
HEMOPTYSIS: 0
CONSTIPATION: 0
NERVOUS/ANXIOUS: 0
SEIZURES: 0
MYALGIAS: 1
BRUISES/BLEEDS EASILY: 0
CHILLS: 0
FOCAL WEAKNESS: 0
HEADACHES: 0
LOSS OF CONSCIOUSNESS: 0
NAUSEA: 0
FLANK PAIN: 0
DIAPHORESIS: 0
ABDOMINAL PAIN: 0
PALPITATIONS: 0
VOMITING: 0
DOUBLE VISION: 0
WHEEZING: 0
FEVER: 0
COUGH: 0
DIZZINESS: 0

## 2020-02-16 ASSESSMENT — LIFESTYLE VARIABLES: SUBSTANCE_ABUSE: 1

## 2020-02-16 NOTE — CARE PLAN
Problem: Safety  Goal: Will remain free from falls  Outcome: PROGRESSING AS EXPECTED  Note: Non slip socks on, educate pt to call for help, bed locked and in lowest position      Problem: Pain Management  Goal: Pain level will decrease to patient's comfort goal  Outcome: PROGRESSING AS EXPECTED  Note: Dilaudid PRN      Problem: Venous Thromboembolism (VTW)/Deep Vein Thrombosis (DVT) Prevention:  Goal: Patient will participate in Venous Thrombosis (VTE)/Deep Vein Thrombosis (DVT)Prevention Measures  Outcome: PROGRESSING AS EXPECTED  Note: Patient has SCDs and heparin, no signs of DVT

## 2020-02-16 NOTE — CARE PLAN
Problem: Communication  Goal: The ability to communicate needs accurately and effectively will improve  Outcome: PROGRESSING AS EXPECTED  Note: Discussed POC with pt including pain management and wound vac. All questions answered.      Problem: Pain Management  Goal: Pain level will decrease to patient's comfort goal  Outcome: PROGRESSING AS EXPECTED  Note: Pt c/o pain in right leg 8/10, pt medicated with 1 mg Dilaudid PRN per MAR.

## 2020-02-16 NOTE — PROGRESS NOTES
Patient in bed, wound vac canister changed and had 500ml  Of serosanguinous fluid. Patient in pain, so dilaudid given. SCDs off for now because patient wanted a break from them. Patient has dry flaky feet and discolored legs. Wound vac clean dry and intact Last BM 2/15 per patient, will continue to monitor pt

## 2020-02-16 NOTE — PROGRESS NOTES
"Hospital Medicine Daily Progress Note    Date of Service  2/16/2020    Chief Complaint  59 y.o. male admitted 2/11/2020 with right thigh pain    Hospital Course    Patient has a history of necrotizing fasciitis.  The patient has undergone surgery and then flap surgery.  The patient also had a wound VAC placed afterwards for healing.  He was recuperating in [facility] where they remove the wound VAC.  Since then his pain is exacerbated and the patient was returned to [facility] for evaluation and treatment.  I have spoken with Dr. Kwong of infectious disease and he feels that the patient is stable to be on Keflex 3 g daily divided.      Interval Problem Update  Thigh pain slightly improved today. No overnight events. Asking for an increase in his sleeping medication as he was only able to sleep \"2-3 hours last night\".    Consultants/Specialty  Orthopedics  Infectious disease- case previously discussed with alberto    Code Status  Full code    Disposition  Back to [facility] when medically cleared. Wound to be re-assessed Monday.     Review of Systems  Review of Systems   Constitutional: Positive for malaise/fatigue (improving). Negative for chills, diaphoresis and fever.   HENT: Negative.    Eyes: Negative for double vision.   Respiratory: Negative for cough, hemoptysis and wheezing.    Cardiovascular: Negative for chest pain, palpitations and leg swelling.   Gastrointestinal: Negative for abdominal pain, constipation, nausea and vomiting.   Genitourinary: Negative for flank pain and hematuria.   Musculoskeletal: Positive for myalgias. Negative for joint pain.   Skin: Negative for itching and rash.        Large skin wound on the right thigh   Neurological: Negative for dizziness, focal weakness, seizures, loss of consciousness and headaches.   Endo/Heme/Allergies: Does not bruise/bleed easily.   Psychiatric/Behavioral: Positive for substance abuse (h/o IVDU). Negative for suicidal ideas. The patient is not " nervous/anxious.         Physical Exam  Temp:  [36.4 °C (97.6 °F)-36.7 °C (98.1 °F)] 36.7 °C (98.1 °F)  Pulse:  [65-78] 65  Resp:  [17-18] 18  BP: (100-117)/(67-68) 111/68  SpO2:  [95 %-97 %] 97 %    Physical Exam  Vitals signs reviewed.   Constitutional:       General: He is not in acute distress.     Appearance: Normal appearance. He is well-developed. He is not toxic-appearing or diaphoretic.   HENT:      Head: Normocephalic.      Mouth/Throat:      Mouth: Mucous membranes are moist.   Eyes:      General:         Right eye: No discharge.         Left eye: No discharge.      Extraocular Movements: Extraocular movements intact.   Neck:      Musculoskeletal: Normal range of motion and neck supple.      Thyroid: No thyromegaly.      Vascular: No JVD.   Cardiovascular:      Rate and Rhythm: Normal rate and regular rhythm.      Pulses: Normal pulses.      Heart sounds: Normal heart sounds.   Pulmonary:      Effort: Pulmonary effort is normal. No respiratory distress.   Abdominal:      General: Abdomen is flat. There is no distension.      Palpations: Abdomen is soft.      Tenderness: There is no abdominal tenderness. There is no guarding.   Musculoskeletal:         General: Tenderness (right thigh) present.   Lymphadenopathy:      Cervical: No cervical adenopathy.   Skin:     General: Skin is warm and dry.      Findings: Erythema present. No rash.      Comments: Wound vac in place on right thigh   Neurological:      General: No focal deficit present.      Mental Status: He is alert and oriented to person, place, and time.      Cranial Nerves: No cranial nerve deficit.      Deep Tendon Reflexes: Reflexes are normal and symmetric.   Psychiatric:         Mood and Affect: Mood normal.         Speech: Speech normal.         Behavior: Behavior is cooperative.         Fluids    Intake/Output Summary (Last 24 hours) at 2/16/2020 0776  Last data filed at 2/16/2020 0225  Gross per 24 hour   Intake 1820 ml   Output 3525 ml   Net  -1705 ml       Laboratory  Recent Labs     02/15/20  0246   WBC 4.0*   RBC 3.70*   HEMOGLOBIN 10.8*   HEMATOCRIT 33.6*   MCV 90.8   MCH 29.2   MCHC 32.1*   RDW 54.2*   PLATELETCT 222   MPV 8.9*     Recent Labs     02/15/20  0246   SODIUM 135   POTASSIUM 3.9   CHLORIDE 104   CO2 25   GLUCOSE 106*   BUN 17   CREATININE 0.75   CALCIUM 9.1                   Imaging  CT-EXTREMITY, LOWER WITH RIGHT   Final Result         1.  Subcutaneous fat stranding and fluid in the subcutaneous fat below the knee, appearance favors cellulitis   2.  Fluid collection lateral to the right greater trochanter with possible mild enhancement, could represent bursitis or small abscess.   3.  Small knee joint effusion.   4.  Large left inguinal hernia containing loop of colon without apparent obstructive changes.   5.  Fat-containing left femoral hernia.   6.  Fat-containing right inguinal hernia      DX-CHEST-PORTABLE (1 VIEW)   Final Result         1.  No acute cardiopulmonary disease.   2.  Atherosclerosis           Assessment/Plan  * Cellulitis of right leg- (present on admission)  Assessment & Plan  Recent h/o and admission for necrotizing fasciitis. Was having wound care at [facility], wound vac was recently removed and he noted that the wound was getting worse. States that wound care recommended he come in for further evaluation. Surrounding erythema has greatly improved.   - ortho following, appreciate recs  - continue wound care and wound vac; plan to re-assess wound tomorrow  - pain control, avoiding IV narcotics as able (taper to BID PRN today)  - abx changed to keflex; tolerating well    Leukopenia- (present on admission)  Assessment & Plan  New leukopenia this admission. No evidence of sepsis and no left shift seen on differential.  - monitor clinically  - repeat levels tomorrow    Tobacco abuse- (present on admission)  Assessment & Plan  - nicotine replacement ordered  - continued education and counseling    Anemia- (present on  admission)  Assessment & Plan  No evidence of active bleeding, may have slow ooze over time from site of the thigh wound. Hemoglobin down to 9.9, now improved.   - transfuse if hemoglobin < 7  - continue to trend, repeat tomorrow    Chronic hepatitis C virus infection (HCC)- (present on admission)  Assessment & Plan  Known hep C infection. LFTs normal.   - recommend outpatient follow up  - has not been treated, likely 2/2 IVDU    IV drug abuse (HCC)- (present on admission)  Assessment & Plan  H/o IVDU  - continued counseling and education       VTE prophylaxis: SCDs

## 2020-02-16 NOTE — PROGRESS NOTES
Assumed care of pt from day RN. Pt is lying in bed A&Ox4. Pt c/o pain in his right leg 8/10, pt medicated with 1 mg Dilaudid PRN per MAR. Wound vac in place to right lateral thigh. Pt educated on use of call light for needs. Will continue to monitor.

## 2020-02-17 LAB
BACTERIA BLD CULT: NORMAL
BACTERIA BLD CULT: NORMAL
BASOPHILS # BLD AUTO: 0.7 % (ref 0–1.8)
BASOPHILS # BLD: 0.03 K/UL (ref 0–0.12)
EOSINOPHIL # BLD AUTO: 0.25 K/UL (ref 0–0.51)
EOSINOPHIL NFR BLD: 5.8 % (ref 0–6.9)
ERYTHROCYTE [DISTWIDTH] IN BLOOD BY AUTOMATED COUNT: 52.9 FL (ref 35.9–50)
HCT VFR BLD AUTO: 33.1 % (ref 42–52)
HGB BLD-MCNC: 11.1 G/DL (ref 14–18)
IMM GRANULOCYTES # BLD AUTO: 0.06 K/UL (ref 0–0.11)
IMM GRANULOCYTES NFR BLD AUTO: 1.4 % (ref 0–0.9)
LYMPHOCYTES # BLD AUTO: 1.2 K/UL (ref 1–4.8)
LYMPHOCYTES NFR BLD: 27.6 % (ref 22–41)
MCH RBC QN AUTO: 29.8 PG (ref 27–33)
MCHC RBC AUTO-ENTMCNC: 33.5 G/DL (ref 33.7–35.3)
MCV RBC AUTO: 88.7 FL (ref 81.4–97.8)
MONOCYTES # BLD AUTO: 0.46 K/UL (ref 0–0.85)
MONOCYTES NFR BLD AUTO: 10.6 % (ref 0–13.4)
NEUTROPHILS # BLD AUTO: 2.34 K/UL (ref 1.82–7.42)
NEUTROPHILS NFR BLD: 53.9 % (ref 44–72)
NRBC # BLD AUTO: 0 K/UL
NRBC BLD-RTO: 0 /100 WBC
PLATELET # BLD AUTO: 200 K/UL (ref 164–446)
PMV BLD AUTO: 9 FL (ref 9–12.9)
RBC # BLD AUTO: 3.73 M/UL (ref 4.7–6.1)
SIGNIFICANT IND 70042: NORMAL
SIGNIFICANT IND 70042: NORMAL
SITE SITE: NORMAL
SITE SITE: NORMAL
SOURCE SOURCE: NORMAL
SOURCE SOURCE: NORMAL
WBC # BLD AUTO: 4.3 K/UL (ref 4.8–10.8)

## 2020-02-17 PROCEDURE — A9270 NON-COVERED ITEM OR SERVICE: HCPCS | Performed by: HOSPITALIST

## 2020-02-17 PROCEDURE — 306263 VAC CANNISTER W/GEL 500ML: Performed by: HOSPITALIST

## 2020-02-17 PROCEDURE — 36415 COLL VENOUS BLD VENIPUNCTURE: CPT

## 2020-02-17 PROCEDURE — A6206 CONTACT LAYER <= 16 SQ IN: HCPCS | Performed by: PHYSICIAN ASSISTANT

## 2020-02-17 PROCEDURE — 700111 HCHG RX REV CODE 636 W/ 250 OVERRIDE (IP): Performed by: HOSPITALIST

## 2020-02-17 PROCEDURE — 770006 HCHG ROOM/CARE - MED/SURG/GYN SEMI*

## 2020-02-17 PROCEDURE — 97606 NEG PRS WND THER DME>50 SQCM: CPT

## 2020-02-17 PROCEDURE — 700102 HCHG RX REV CODE 250 W/ 637 OVERRIDE(OP): Performed by: HOSPITALIST

## 2020-02-17 PROCEDURE — 85025 COMPLETE CBC W/AUTO DIFF WBC: CPT

## 2020-02-17 PROCEDURE — 99232 SBSQ HOSP IP/OBS MODERATE 35: CPT | Performed by: HOSPITALIST

## 2020-02-17 PROCEDURE — 306372 DRESSING,VAC SIMPLACE MED: Performed by: PHYSICIAN ASSISTANT

## 2020-02-17 RX ORDER — HYDROMORPHONE HYDROCHLORIDE 1 MG/ML
1 INJECTION, SOLUTION INTRAMUSCULAR; INTRAVENOUS; SUBCUTANEOUS
Status: DISCONTINUED | OUTPATIENT
Start: 2020-02-17 | End: 2020-02-18

## 2020-02-17 RX ORDER — OXYCODONE HYDROCHLORIDE 5 MG/1
5-10 TABLET ORAL EVERY 6 HOURS PRN
Status: DISCONTINUED | OUTPATIENT
Start: 2020-02-17 | End: 2020-02-18

## 2020-02-17 RX ADMIN — HEPARIN SODIUM 5000 UNITS: 5000 INJECTION, SOLUTION INTRAVENOUS; SUBCUTANEOUS at 04:55

## 2020-02-17 RX ADMIN — SENNOSIDES AND DOCUSATE SODIUM 2 TABLET: 8.6; 5 TABLET ORAL at 17:56

## 2020-02-17 RX ADMIN — GABAPENTIN 100 MG: 100 CAPSULE ORAL at 17:56

## 2020-02-17 RX ADMIN — CEPHALEXIN 1000 MG: 500 CAPSULE ORAL at 12:44

## 2020-02-17 RX ADMIN — CEPHALEXIN 1000 MG: 500 CAPSULE ORAL at 04:55

## 2020-02-17 RX ADMIN — HYDROMORPHONE HYDROCHLORIDE 1 MG: 1 INJECTION, SOLUTION INTRAMUSCULAR; INTRAVENOUS; SUBCUTANEOUS at 17:57

## 2020-02-17 RX ADMIN — OXYCODONE HYDROCHLORIDE 10 MG: 5 TABLET ORAL at 14:04

## 2020-02-17 RX ADMIN — HEPARIN SODIUM 5000 UNITS: 5000 INJECTION, SOLUTION INTRAVENOUS; SUBCUTANEOUS at 14:04

## 2020-02-17 RX ADMIN — CEPHALEXIN 1000 MG: 500 CAPSULE ORAL at 00:43

## 2020-02-17 RX ADMIN — HYDROMORPHONE HYDROCHLORIDE 1 MG: 1 INJECTION, SOLUTION INTRAMUSCULAR; INTRAVENOUS; SUBCUTANEOUS at 04:56

## 2020-02-17 RX ADMIN — GABAPENTIN 100 MG: 100 CAPSULE ORAL at 04:55

## 2020-02-17 RX ADMIN — OXYCODONE HYDROCHLORIDE 10 MG: 5 TABLET ORAL at 20:56

## 2020-02-17 RX ADMIN — TRAZODONE HYDROCHLORIDE 100 MG: 50 TABLET ORAL at 20:55

## 2020-02-17 RX ADMIN — HYDROMORPHONE HYDROCHLORIDE 1 MG: 1 INJECTION, SOLUTION INTRAMUSCULAR; INTRAVENOUS; SUBCUTANEOUS at 12:03

## 2020-02-17 RX ADMIN — SENNOSIDES AND DOCUSATE SODIUM 2 TABLET: 8.6; 5 TABLET ORAL at 04:55

## 2020-02-17 RX ADMIN — CEPHALEXIN 1000 MG: 500 CAPSULE ORAL at 17:56

## 2020-02-17 ASSESSMENT — ENCOUNTER SYMPTOMS
FEVER: 0
FOCAL WEAKNESS: 0
MYALGIAS: 1
COUGH: 0
SEIZURES: 0
NERVOUS/ANXIOUS: 0
DIZZINESS: 0
CHILLS: 0
HEMOPTYSIS: 0
NAUSEA: 0
LOSS OF CONSCIOUSNESS: 0
DIAPHORESIS: 0
HEADACHES: 0
FLANK PAIN: 0
ABDOMINAL PAIN: 0
PALPITATIONS: 0
CONSTIPATION: 0
BRUISES/BLEEDS EASILY: 0
WHEEZING: 0
DOUBLE VISION: 0
VOMITING: 0

## 2020-02-17 ASSESSMENT — LIFESTYLE VARIABLES: SUBSTANCE_ABUSE: 1

## 2020-02-17 NOTE — PROGRESS NOTES
Assumed care of pt from day RN. Pt is lying in bed A&Ox4. Pt c/o pain in his right leg 8/10, pt upset that the doctor titrated his pain medication PRN Dilaudid down to BID. Pt made aware he has PRN Oxy available if needed, pt declines at this time stating that they don't work. Wound vac in place to right lateral thigh, replaced bag of NS. Pt educated on use of call light for needs. Will continue to monitor.

## 2020-02-17 NOTE — PROGRESS NOTES
[facility] Medicine Daily Progress Note    Date of Service  2/17/2020    Chief Complaint  59 y.o. male admitted 2/11/2020 with right thigh pain    Hospital Course    Patient has a history of necrotizing fasciitis.  The patient has undergone surgery and then flap surgery.  The patient also had a wound VAC placed afterwards for healing.  He was recuperating in [facility] where they remove the wound VAC.  Since then his pain is exacerbated and the patient was returned to [facility] for evaluation and treatment.  I have spoken with Dr. Kwong of infectious disease and he feels that the patient is stable to be on Keflex 3 g daily divided.      Interval Problem Update  Was upset about his pain medications being reduced. Explained that he had only been taking dilaudid BID for the last 2 days so I changed it to BID PRN and were tapering his pain meds as we previously discussed. Plan for wound vac to be removed today and evaluate if it would be continued or skin graft done.    Consultants/Specialty  Orthopedics  Infectious disease- case previously discussed with alberto    Code Status  Full code    Disposition  Back to [facility] when medically cleared. Pending wound care and ortho rec.     Review of Systems  Review of Systems   Constitutional: Positive for malaise/fatigue (improving). Negative for chills, diaphoresis and fever.   HENT: Negative.    Eyes: Negative for double vision.   Respiratory: Negative for cough, hemoptysis and wheezing.    Cardiovascular: Negative for chest pain, palpitations and leg swelling.   Gastrointestinal: Negative for abdominal pain, constipation, nausea and vomiting.   Genitourinary: Negative for flank pain and hematuria.   Musculoskeletal: Positive for myalgias. Negative for joint pain.   Skin: Negative for itching and rash.        Large skin wound on the right thigh   Neurological: Negative for dizziness, focal weakness, seizures, loss of consciousness and headaches.   Endo/Heme/Allergies: Does  not bruise/bleed easily.   Psychiatric/Behavioral: Positive for substance abuse (h/o IVDU). Negative for suicidal ideas. The patient is not nervous/anxious.         Physical Exam  Temp:  [36.1 °C (97 °F)-37.4 °C (99.3 °F)] 37.4 °C (99.3 °F)  Pulse:  [60-72] 68  Resp:  [14-16] 14  BP: ()/(65-73) 109/73  SpO2:  [96 %-97 %] 96 %    Physical Exam  Vitals signs reviewed.   Constitutional:       General: He is not in acute distress.     Appearance: Normal appearance. He is well-developed. He is not toxic-appearing or diaphoretic.   HENT:      Head: Normocephalic.      Mouth/Throat:      Mouth: Mucous membranes are moist.   Eyes:      General:         Right eye: No discharge.         Left eye: No discharge.      Extraocular Movements: Extraocular movements intact.   Neck:      Musculoskeletal: Normal range of motion and neck supple.      Thyroid: No thyromegaly.      Vascular: No JVD.   Cardiovascular:      Rate and Rhythm: Normal rate and regular rhythm.      Pulses: Normal pulses.      Heart sounds: Normal heart sounds.   Pulmonary:      Effort: Pulmonary effort is normal. No respiratory distress.   Abdominal:      General: Abdomen is flat. There is no distension.      Palpations: Abdomen is soft.      Tenderness: There is no abdominal tenderness. There is no guarding.   Musculoskeletal:         General: Tenderness (right thigh) present.   Lymphadenopathy:      Cervical: No cervical adenopathy.   Skin:     General: Skin is warm and dry.      Findings: Erythema present. No rash.      Comments: Wound vac in place on right thigh   Neurological:      General: No focal deficit present.      Mental Status: He is alert and oriented to person, place, and time.      Cranial Nerves: No cranial nerve deficit.      Deep Tendon Reflexes: Reflexes are normal and symmetric.   Psychiatric:         Mood and Affect: Mood normal.         Speech: Speech normal.         Behavior: Behavior is cooperative.     Patient seen and examined  today on 2/17, unchanged from 2/16.     Fluids    Intake/Output Summary (Last 24 hours) at 2/17/2020 0608  Last data filed at 2/17/2020 0448  Gross per 24 hour   Intake 480 ml   Output 2705 ml   Net -2225 ml       Laboratory  Recent Labs     02/15/20  0246 02/17/20  0102   WBC 4.0* 4.3*   RBC 3.70* 3.73*   HEMOGLOBIN 10.8* 11.1*   HEMATOCRIT 33.6* 33.1*   MCV 90.8 88.7   MCH 29.2 29.8   MCHC 32.1* 33.5*   RDW 54.2* 52.9*   PLATELETCT 222 200   MPV 8.9* 9.0     Recent Labs     02/15/20  0246   SODIUM 135   POTASSIUM 3.9   CHLORIDE 104   CO2 25   GLUCOSE 106*   BUN 17   CREATININE 0.75   CALCIUM 9.1                   Imaging  CT-EXTREMITY, LOWER WITH RIGHT   Final Result         1.  Subcutaneous fat stranding and fluid in the subcutaneous fat below the knee, appearance favors cellulitis   2.  Fluid collection lateral to the right greater trochanter with possible mild enhancement, could represent bursitis or small abscess.   3.  Small knee joint effusion.   4.  Large left inguinal hernia containing loop of colon without apparent obstructive changes.   5.  Fat-containing left femoral hernia.   6.  Fat-containing right inguinal hernia      DX-CHEST-PORTABLE (1 VIEW)   Final Result         1.  No acute cardiopulmonary disease.   2.  Atherosclerosis           Assessment/Plan  * Cellulitis of right leg- (present on admission)  Assessment & Plan  Recent h/o and admission for necrotizing fasciitis. Was having wound care at [facility], wound vac was recently removed and he noted that the wound was getting worse. States that wound care recommended he come in for further evaluation. Surrounding erythema has greatly improved.   - ortho following, appreciate recs  - continue wound care and wound vac; plan to re-assess wound tomorrow  - pain control, avoiding IV narcotics as able (tapered to BID PRN)  - abx changed to keflex; tolerating well    Leukopenia- (present on admission)  Assessment & Plan  New leukopenia this admission.  "No evidence of sepsis and no left shift seen on differential.  - monitor clinically    Anemia- (present on admission)  Assessment & Plan  No evidence of active bleeding, may have slow ooze over time from site of the thigh wound. Hemoglobin down to 9.9, now improved.   - transfuse if hemoglobin < 7  - continue to trend, repeat tomorrow    Tobacco abuse- (present on admission)  Assessment & Plan  - nicotine replacement ordered  - continued education and counseling    Chronic hepatitis C virus infection (HCC)- (present on admission)  Assessment & Plan  Known hep C infection. LFTs normal.   - recommend outpatient follow up  - has not been treated, likely 2/2 IVDU    IV drug abuse (HCC)- (present on admission)  Assessment & Plan  H/o IVDU. Today the patient denied any \"street drug\" use.  - continued counseling and education       VTE prophylaxis: SCDs      "

## 2020-02-17 NOTE — CARE PLAN
Problem: Safety  Goal: Will remain free from injury  Outcome: PROGRESSING AS EXPECTED  Note: Call light in reach, bed in lowest position.      Problem: Pain Management  Goal: Pain level will decrease to patient's comfort goal  Outcome: PROGRESSING AS EXPECTED  Note: Utilize IV PRN pain medications during dressing changes.

## 2020-02-17 NOTE — PROGRESS NOTES
"   Orthopaedic Progress Note    Interval changes:  Patient doing well  Wound examined- no concerns continue vac to closure    ROS - Patient denies any new issues.  Pain well controlled.    /69   Pulse 66   Temp 37.2 °C (99 °F) (Temporal)   Resp 16   Ht 1.93 m (6' 4\")   Wt 93.4 kg (205 lb 14.6 oz)   SpO2 97%       Patient seen and examined  No acute distress  Breathing non labored  RRR  RLE vac in place with no leak, DNVI, moves all toes, cap refill <2 sec.     Recent Labs     02/15/20  0246 02/17/20  0102   WBC 4.0* 4.3*   RBC 3.70* 3.73*   HEMOGLOBIN 10.8* 11.1*   HEMATOCRIT 33.6* 33.1*   MCV 90.8 88.7   MCH 29.2 29.8   MCHC 32.1* 33.5*   RDW 54.2* 52.9*   PLATELETCT 222 200   MPV 8.9* 9.0       Active Hospital Problems    Diagnosis   • Cellulitis of right leg [L03.115]     Priority: High   • Tobacco abuse [Z72.0]     Priority: Medium   • Leukopenia [D72.819]     Priority: Medium   • Anemia [D64.9]     Priority: Low   • Chronic hepatitis C virus infection (HCC) [B18.2]     Priority: Low   • IV drug abuse (HCC) [F19.10]     Priority: Low       Assessment/Plan:  No surgical intervention needed at this time  Vac to closure- cleared for DC back to [facility] with specific orders to not DC vac until wound is fully closed and healed  "

## 2020-02-17 NOTE — PROGRESS NOTES
"   Orthopaedic Progress Note    Interval changes:  Patient doing well  Vac off tomorrow and will reassess     ROS - Patient denies any new issues.  Pain well controlled.    /65   Pulse 72   Temp 37.2 °C (99 °F) (Temporal)   Resp 16   Ht 1.93 m (6' 4\")   Wt 93.4 kg (205 lb 14.6 oz)   SpO2 97%       Patient seen and examined  No acute distress  Breathing non labored  RRR  RLE veraflo vac in place with no leak, DNVI, moves all toes, cap refill <2 sec.     Recent Labs     02/15/20  0246   WBC 4.0*   RBC 3.70*   HEMOGLOBIN 10.8*   HEMATOCRIT 33.6*   MCV 90.8   MCH 29.2   MCHC 32.1*   RDW 54.2*   PLATELETCT 222   MPV 8.9*       Active Hospital Problems    Diagnosis   • Cellulitis of right leg [L03.115]     Priority: High   • Tobacco abuse [Z72.0]     Priority: Medium   • Leukopenia [D72.819]     Priority: Medium   • Anemia [D64.9]     Priority: Low   • Chronic hepatitis C virus infection (HCC) [B18.2]     Priority: Low   • IV drug abuse (HCC) [F19.10]     Priority: Low       Assessment/Plan:  No surgical intervention needed at this time  Reassess wound tomorrow by wound team    "

## 2020-02-18 PROCEDURE — A9270 NON-COVERED ITEM OR SERVICE: HCPCS | Performed by: HOSPITALIST

## 2020-02-18 PROCEDURE — 700102 HCHG RX REV CODE 250 W/ 637 OVERRIDE(OP): Performed by: HOSPITALIST

## 2020-02-18 PROCEDURE — 770006 HCHG ROOM/CARE - MED/SURG/GYN SEMI*

## 2020-02-18 PROCEDURE — 700111 HCHG RX REV CODE 636 W/ 250 OVERRIDE (IP): Performed by: HOSPITALIST

## 2020-02-18 PROCEDURE — 99232 SBSQ HOSP IP/OBS MODERATE 35: CPT | Performed by: HOSPITALIST

## 2020-02-18 RX ORDER — HYDROMORPHONE HYDROCHLORIDE 2 MG/1
2 TABLET ORAL EVERY 4 HOURS PRN
Status: DISCONTINUED | OUTPATIENT
Start: 2020-02-18 | End: 2020-02-19 | Stop reason: HOSPADM

## 2020-02-18 RX ADMIN — CEPHALEXIN 1000 MG: 500 CAPSULE ORAL at 17:43

## 2020-02-18 RX ADMIN — HYDROMORPHONE HYDROCHLORIDE 2 MG: 2 TABLET ORAL at 17:43

## 2020-02-18 RX ADMIN — GABAPENTIN 100 MG: 100 CAPSULE ORAL at 05:51

## 2020-02-18 RX ADMIN — GABAPENTIN 100 MG: 100 CAPSULE ORAL at 17:43

## 2020-02-18 RX ADMIN — SENNOSIDES AND DOCUSATE SODIUM 2 TABLET: 8.6; 5 TABLET ORAL at 17:43

## 2020-02-18 RX ADMIN — CEPHALEXIN 1000 MG: 500 CAPSULE ORAL at 12:25

## 2020-02-18 RX ADMIN — SENNOSIDES AND DOCUSATE SODIUM 2 TABLET: 8.6; 5 TABLET ORAL at 05:52

## 2020-02-18 RX ADMIN — HYDROMORPHONE HYDROCHLORIDE 1 MG: 1 INJECTION, SOLUTION INTRAMUSCULAR; INTRAVENOUS; SUBCUTANEOUS at 09:43

## 2020-02-18 RX ADMIN — CEPHALEXIN 1000 MG: 500 CAPSULE ORAL at 05:52

## 2020-02-18 RX ADMIN — HYDROMORPHONE HYDROCHLORIDE 2 MG: 2 TABLET ORAL at 12:25

## 2020-02-18 RX ADMIN — HYDROMORPHONE HYDROCHLORIDE 2 MG: 2 TABLET ORAL at 22:35

## 2020-02-18 RX ADMIN — TRAZODONE HYDROCHLORIDE 100 MG: 50 TABLET ORAL at 21:00

## 2020-02-18 RX ADMIN — CEPHALEXIN 1000 MG: 500 CAPSULE ORAL at 00:38

## 2020-02-18 ASSESSMENT — ENCOUNTER SYMPTOMS
LOSS OF CONSCIOUSNESS: 0
VOMITING: 0
DIAPHORESIS: 0
FOCAL WEAKNESS: 0
ABDOMINAL PAIN: 0
NERVOUS/ANXIOUS: 0
DIZZINESS: 0
CONSTIPATION: 0
FEVER: 0
MYALGIAS: 1
BRUISES/BLEEDS EASILY: 0
FLANK PAIN: 0
SEIZURES: 0
WHEEZING: 0
EYE DISCHARGE: 0
NAUSEA: 0
EYE PAIN: 0
STRIDOR: 0
HEMOPTYSIS: 0
COUGH: 0
DOUBLE VISION: 0
CHILLS: 0
SINUS PAIN: 0
PALPITATIONS: 0
EYE REDNESS: 0
SORE THROAT: 0
HEADACHES: 0

## 2020-02-18 ASSESSMENT — LIFESTYLE VARIABLES: SUBSTANCE_ABUSE: 1

## 2020-02-18 NOTE — CARE PLAN
Problem: Pain Management  Goal: Pain level will decrease to patient's comfort goal  Outcome: PROGRESSING AS EXPECTED  Note: Pt c/o pain in his right leg 7/10, 10 mg PRN Oxy administered per MAR.      Problem: Infection  Goal: Will remain free from infection  Outcome: PROGRESSING AS EXPECTED  Note: Pt assessed for s/s of infection, PO Keflex administered q6hr as ordered per MAR.

## 2020-02-18 NOTE — WOUND TEAM
[facility]  Inpatient Services  Wound and Skin Care Progress Note    Admission Date: 2/11/2020     Last order of IP CONSULT TO WOUND CARE was found on 2/12/2020 from Hospital Encounter on 2/11/2020     HPI, PMH, SH: Reviewed    Unit where seen by Wound Team: [id number]    WOUND FOLLOW-UP RELATED TO: scheduled reassessment of full thickness right lateral thigh wound; ? Remove NPWT dressing or continue    Self Report / Pain Level:  Premedicated and pain with dressing removal as no contact layer in place due to veraflo dressing; tolerated well after Pain with site care and movement to RLE. Complained that saline used to cleanse was too cold.       OBJECTIVE:  dressing intact to R thigh    WOUND TYPE, LOCATION, CHARACTERISTICS (Pressure Injuries: location, stage, POA or date identified)      Negative Pressure Wound Therapy 02/14/20 Surgical (Active)   NPWT Pump Mode / Pressure Setting Continuous;125 mmHg 2/17/2020 12:30 PM   Dressing Type Medium;Black Foam (Regular) 2/17/2020 12:30 PM   Number of Foam Pieces Used 2 2/17/2020 12:30 PM   Canister Changed Yes 2/17/2020 12:30 PM   Output (mL)     NEXT Dressing Change/Treatment Date 02/19/20 2/17/2020 12:30 PM   VAC VeraFlo Irrigant       Wound 02/11/20 Other (comment) Leg large approximated surgical incision site with erythema and redness surrounding  (Active)   Wound Image   2/14/2020  5:00 PM   Site Assessment Clean;Intact;Red 2/17/2020 12:30 PM   Periwound Assessment Clean;Intact 2/17/2020 12:30 PM   Margins Attached edges 2/17/2020 12:30 PM   Wound Length (cm) 30 cm 2/13/2020  2:25 PM   Wound Width (cm) 9 cm 2/13/2020  2:25 PM   Wound Depth (cm) 0 cm 2/13/2020  2:25 PM   Wound Surface Area (cm^2) 270 cm^2 2/13/2020  2:25 PM   Wound Volume (cm^3) 0 cm^3 2/13/2020  2:25 PM   Tunneling (cm) 0 cm 2/17/2020 12:30 PM   Undermining (cm) 0 cm 2/17/2020 12:30 PM   Closure Secondary intention 2/17/2020 12:30 PM   Drainage Amount Scant 2/17/2020 12:30 PM   Drainage  Description Serosanguineous 2/17/2020 12:30 PM   Non-staged Wound Description Full thickness 2/17/2020 12:30 PM   Treatments Cleansed 2/17/2020 12:30 PM   Wound Cleansing Approved Wound Cleanser 2/17/2020 12:30 PM   Periwound Protectant Drape;Skin Protectant Wipes to Periwound 2/17/2020 12:30 PM   Dressing Options Wound Vac 2/17/2020 12:30 PM   Dressing Cleansing/Solutions Not Applicable 2/17/2020 12:30 PM   Dressing Changed Changed 2/17/2020 12:30 PM   Dressing Status Intact 2/17/2020 12:30 PM   Dressing Change/Treatment Frequency Monday, Wednesday, Friday, and As Needed 2/17/2020 12:30 PM   NEXT Dressing Change/Treatment Date 02/19/20 2/17/2020 12:30 PM   NEXT Weekly Photo (Inpatient Only) 02/19/20 2/17/2020 12:30 PM   Wound Odor None 2/17/2020 12:30 PM   Pulses N/A 2/14/2020  5:00 PM   Exposed Structures Tendon 2/17/2020 12:30 PM   WOUND NURSE ONLY - Tissue Type and Percentage red 2/17/2020 12:30 PM   WOUND NURSE ONLY - Time Spent with Patient (mins) 75 2/17/2020 12:30 PM      Vascular: dorsal pedal pulse not assessed during this visit.    SILVIA:   No results found.    Lab Values:    Lab Results   Component Value Date/Time    WBC 4.3 (L) 02/17/2020 01:02 AM    RBC 3.73 (L) 02/17/2020 01:02 AM    HEMOGLOBIN 11.1 (L) 02/17/2020 01:02 AM    HEMATOCRIT 33.1 (L) 02/17/2020 01:02 AM    CREACTPROT 4.99 (H) 02/11/2020 10:34 PM    SEDRATEWES 35 (H) 02/11/2020 10:34 PM    HBA1C 5.3 10/09/2019 11:16 AM        Culture:  NA   Culture Results show:  No results found for this or any previous visit (from the past 720 hour(s)).      INTERVENTIONS BY WOUND TEAM:  Dressing removed and wound cleansed with wound cleanser.  Skin prep and drape to zoë wound skin.  Covered wound bed with mepitel contact layer x 5 pieces and then covered wound bed with split medium black foam cut to fit.  Secured with drape and resumed NPWT at 125mmHg continuously.  Total black foam=2 pieces.  Discussion with case management that SNF needs to keep VAC  dressing in place until wound healed.  Discussed with Sterling Muniz PA-C      Interdisciplinary consultation: Patient, staff RN, case management, Sterling Muniz PA-C    EVALUATION:  Discontinued veraflo therapy and resumed regular NPWT dressing with contact layer that should promote granulation tissue without hypergranulation.  Decision to continue with NPWT due to wound size and prevention of increased biofilm; no STSG considered per patient prefers no further surgery     Goals: Steady decrease in wound area and depth weekly.    NURSING PLAN OF CARE ORDERS (X):    Dressing changes: See Dressing Care orders: X  Skin care: See Skin Care orders:   Rectal tube care: See Rectal Tube Care orders:   Other orders:    WOUND TEAM PLAN OF CARE   Dressing changes by wound team:          Follow up 1-2 times weekly:    Follow up 3 times weekly:                NPWT change 3 times weekly:   X  Follow up as needed:       Other (explain):     Anticipated discharge plans:   LTACH:        SNF/Rehab:     X - return to [facility] when medically clear.             Home Care:           Outpatient Wound Center:            Self Care:

## 2020-02-18 NOTE — CARE PLAN
Problem: Safety  Goal: Will remain free from injury  Outcome: PROGRESSING AS EXPECTED  Note: Skid socks, bed alarm, and locked in lowest position.      Problem: Pain Management  Goal: Pain level will decrease to patient's comfort goal  Outcome: PROGRESSING AS EXPECTED  Note: Pain medication changed prior to discharge.

## 2020-02-18 NOTE — DISCHARGE PLANNING
Anticipated Discharge Disposition: [facility]     Action: LSW received vm from Giancarlo from [facility] stating that they will take the pt back, but they do not have any beds available yet. Giancarlo requested a new referral be sent to them. LSW got signature from pt. LSW faxed over choice form to [facility] ext [phone].     Barriers to Discharge: none     Plan: LSW will continue to assess for any discharge needs.

## 2020-02-18 NOTE — DISCHARGE PLANNING
Anticipated Discharge Disposition: SNF     Action: LSW left vm for Giancarlo to see if pt can go back to [facility].     Barriers to Discharge: none     Plan: LSW will continue to assess for any discharge needs.

## 2020-02-18 NOTE — PROGRESS NOTES
Hospital Medicine Daily Progress Note    Date of Service  2/18/2020    Chief Complaint  59 y.o. male admitted 2/11/2020 with right thigh pain    Hospital Course    59 years old male with a history of necrotizing fasciitis, who came from [facility], admitted 2/11 for right thigh pain.  The patient has undergone surgery and then flap surgery.  The patient also had a wound VAC placed afterwards for healing.  He was recuperating in [facility] where they remove the wound VAC.  Since then his pain is exacerbated and the patient was returned to [facility] for evaluation and treatment.  Case discussed with Dr. Kwong of infectious disease who recommended Keflex 3 g daily divided.        Interval Problem Update  Heart rate 60s-80s, blood pressure within normal limits and saturating well on room air this morning.  I discussed switching IV pain medications to oral as this will be a barrier for discharge.  Patient is agreeable.  Wound VAC in place appears to be functioning.  Tolerating Keflex, continue.  Hemoglobin has been stable, check intermittently.  Discussed with patient, patient's nurse and with multidisciplinary team during rounds including , pharmacist and charge nurse.      Consultants/Specialty  Orthopedics  Infectious disease, discussed w Elenita     Code Status  Full code    Disposition  Back to [facility] when medically cleared. Pending wound care and ortho rec.     Review of Systems  Review of Systems   Constitutional: Positive for malaise/fatigue (improving). Negative for chills, diaphoresis and fever.   HENT: Negative.  Negative for sinus pain and sore throat.    Eyes: Negative for double vision, pain, discharge and redness.   Respiratory: Negative for cough, hemoptysis, wheezing and stridor.    Cardiovascular: Negative for chest pain, palpitations and leg swelling.   Gastrointestinal: Negative for abdominal pain, constipation, nausea and vomiting.   Genitourinary: Negative  for flank pain and hematuria.   Musculoskeletal: Positive for myalgias. Negative for joint pain.   Skin: Negative for itching and rash.        Large skin wound on the right thigh   Neurological: Negative for dizziness, focal weakness, seizures, loss of consciousness and headaches.   Endo/Heme/Allergies: Does not bruise/bleed easily.   Psychiatric/Behavioral: Positive for substance abuse (h/o IVDU). Negative for suicidal ideas. The patient is not nervous/anxious.         Physical Exam  Temp:  [36.4 °C (97.6 °F)-36.7 °C (98 °F)] 36.7 °C (98 °F)  Pulse:  [66-85] 85  Resp:  [15-18] 16  BP: (100-118)/(62-87) 108/69  SpO2:  [90 %-94 %] 93 %    Physical Exam  Vitals signs reviewed.   Constitutional:       General: He is not in acute distress.     Appearance: Normal appearance. He is well-developed. He is not toxic-appearing or diaphoretic.   HENT:      Head: Normocephalic.      Mouth/Throat:      Mouth: Mucous membranes are moist.   Eyes:      General:         Right eye: No discharge.         Left eye: No discharge.      Extraocular Movements: Extraocular movements intact.   Neck:      Musculoskeletal: Normal range of motion and neck supple.      Thyroid: No thyromegaly.      Vascular: No JVD.   Cardiovascular:      Rate and Rhythm: Normal rate and regular rhythm.      Pulses: Normal pulses.      Heart sounds: Normal heart sounds.   Pulmonary:      Effort: Pulmonary effort is normal. No respiratory distress.   Abdominal:      General: Abdomen is flat. There is no distension.      Palpations: Abdomen is soft.      Tenderness: There is no abdominal tenderness. There is no guarding.   Musculoskeletal:         General: Tenderness (right thigh) present.   Lymphadenopathy:      Cervical: No cervical adenopathy.   Skin:     General: Skin is warm and dry.      Findings: Erythema present. No rash.      Comments: Wound vac in place on right thigh   Neurological:      General: No focal deficit present.      Mental Status: He is alert  and oriented to person, place, and time.      Cranial Nerves: No cranial nerve deficit.      Deep Tendon Reflexes: Reflexes are normal and symmetric.   Psychiatric:         Mood and Affect: Mood normal.         Speech: Speech normal.         Behavior: Behavior is cooperative.       Fluids    Intake/Output Summary (Last 24 hours) at 2/18/2020 1701  Last data filed at 2/18/2020 0900  Gross per 24 hour   Intake 840 ml   Output 1850 ml   Net -1010 ml       Laboratory  Recent Labs     02/17/20  0102   WBC 4.3*   RBC 3.73*   HEMOGLOBIN 11.1*   HEMATOCRIT 33.1*   MCV 88.7   MCH 29.8   MCHC 33.5*   RDW 52.9*   PLATELETCT 200   MPV 9.0                       Imaging  CT-EXTREMITY, LOWER WITH RIGHT   Final Result         1.  Subcutaneous fat stranding and fluid in the subcutaneous fat below the knee, appearance favors cellulitis   2.  Fluid collection lateral to the right greater trochanter with possible mild enhancement, could represent bursitis or small abscess.   3.  Small knee joint effusion.   4.  Large left inguinal hernia containing loop of colon without apparent obstructive changes.   5.  Fat-containing left femoral hernia.   6.  Fat-containing right inguinal hernia      DX-CHEST-PORTABLE (1 VIEW)   Final Result         1.  No acute cardiopulmonary disease.   2.  Atherosclerosis         Assessment/Plan  * Cellulitis of right leg- (present on admission)  Assessment & Plan  Recent h/o and admission for necrotizing fasciitis. Was having wound care at [facility], wound vac was recently removed and he noted that the wound was getting worse. States that wound care recommended he come in for further evaluation. Surrounding erythema has greatly improved.   Ortho following, appreciate recs  Continue wound care and wound vac, do not remove until wound is completely closed  Multimodal pain control.   Tolerating Keflex.       Leukopenia- (present on admission)  Assessment & Plan  New leukopenia this admission. No evidence of  "sepsis and no left shift seen on differential.  Monitor clinically     Anemia- (present on admission)  Assessment & Plan  No evidence of active bleeding, may have slow ooze over time from site of the thigh wound.   Hemoglobin has been stable, check intermittently.   Transfuse if hemoglobin < 7     Tobacco abuse- (present on admission)  Assessment & Plan  Counseled by the previous provider     Chronic hepatitis C virus infection (HCC)- (present on admission)  Assessment & Plan  Known hep C infection. LFTs normal.   Has not been treated, likely 2/2 IVDU. Outpatient follow up recommended     IV drug abuse (HCC)- (present on admission)  Assessment & Plan  H/o IVDU. Patient has been denying \"street drug\" use.  Counseled      VTE prophylaxis: SCDs, heparin SC      "

## 2020-02-18 NOTE — DISCHARGE PLANNING
Received Choice form at [id number]  Agency/Facility Name: [facility]  Referral sent per Choice form @ [id number]

## 2020-02-18 NOTE — OR NURSING
Assumed care of pt from day RN. Pt is lying in bed A&Ox4. Pt c/o pain in his right leg 7/10, pt opted for PRN Oxy this evening, 10 mg given. Wound vac in place to right lateral thigh no longer irrigated with NS. Pt educated on use of call light for needs. Will continue to monitor.    This note was meant to be a nursing Progress Note, NOT an OR nursing note.

## 2020-02-19 VITALS
BODY MASS INDEX: 25.07 KG/M2 | OXYGEN SATURATION: 94 % | TEMPERATURE: 97.9 F | HEIGHT: 76 IN | WEIGHT: 205.91 LBS | RESPIRATION RATE: 18 BRPM | HEART RATE: 81 BPM | DIASTOLIC BLOOD PRESSURE: 71 MMHG | SYSTOLIC BLOOD PRESSURE: 100 MMHG

## 2020-02-19 LAB
BASOPHILS # BLD AUTO: 0.7 % (ref 0–1.8)
BASOPHILS # BLD: 0.04 K/UL (ref 0–0.12)
EOSINOPHIL # BLD AUTO: 0.3 K/UL (ref 0–0.51)
EOSINOPHIL NFR BLD: 5.5 % (ref 0–6.9)
ERYTHROCYTE [DISTWIDTH] IN BLOOD BY AUTOMATED COUNT: 54.2 FL (ref 35.9–50)
HCT VFR BLD AUTO: 36.3 % (ref 42–52)
HGB BLD-MCNC: 11.8 G/DL (ref 14–18)
IMM GRANULOCYTES # BLD AUTO: 0.11 K/UL (ref 0–0.11)
IMM GRANULOCYTES NFR BLD AUTO: 2 % (ref 0–0.9)
LYMPHOCYTES # BLD AUTO: 1.49 K/UL (ref 1–4.8)
LYMPHOCYTES NFR BLD: 27.2 % (ref 22–41)
MCH RBC QN AUTO: 29.4 PG (ref 27–33)
MCHC RBC AUTO-ENTMCNC: 32.5 G/DL (ref 33.7–35.3)
MCV RBC AUTO: 90.5 FL (ref 81.4–97.8)
MONOCYTES # BLD AUTO: 0.49 K/UL (ref 0–0.85)
MONOCYTES NFR BLD AUTO: 8.9 % (ref 0–13.4)
NEUTROPHILS # BLD AUTO: 3.05 K/UL (ref 1.82–7.42)
NEUTROPHILS NFR BLD: 55.7 % (ref 44–72)
NRBC # BLD AUTO: 0 K/UL
NRBC BLD-RTO: 0 /100 WBC
PLATELET # BLD AUTO: 201 K/UL (ref 164–446)
PMV BLD AUTO: 8.9 FL (ref 9–12.9)
RBC # BLD AUTO: 4.01 M/UL (ref 4.7–6.1)
WBC # BLD AUTO: 5.5 K/UL (ref 4.8–10.8)

## 2020-02-19 PROCEDURE — 85025 COMPLETE CBC W/AUTO DIFF WBC: CPT

## 2020-02-19 PROCEDURE — 36415 COLL VENOUS BLD VENIPUNCTURE: CPT

## 2020-02-19 PROCEDURE — 99239 HOSP IP/OBS DSCHRG MGMT >30: CPT | Performed by: HOSPITALIST

## 2020-02-19 PROCEDURE — A9270 NON-COVERED ITEM OR SERVICE: HCPCS | Performed by: HOSPITALIST

## 2020-02-19 PROCEDURE — 306372 DRESSING,VAC SIMPLACE MED: Performed by: ORTHOPAEDIC SURGERY

## 2020-02-19 PROCEDURE — A6206 CONTACT LAYER <= 16 SQ IN: HCPCS | Performed by: ORTHOPAEDIC SURGERY

## 2020-02-19 PROCEDURE — 700102 HCHG RX REV CODE 250 W/ 637 OVERRIDE(OP): Performed by: HOSPITALIST

## 2020-02-19 PROCEDURE — 97606 NEG PRS WND THER DME>50 SQCM: CPT

## 2020-02-19 RX ORDER — HYDROMORPHONE HYDROCHLORIDE 2 MG/1
2 TABLET ORAL EVERY 4 HOURS PRN
Qty: 16 TAB | Refills: 0 | Status: SHIPPED | OUTPATIENT
Start: 2020-02-19 | End: 2020-02-22

## 2020-02-19 RX ORDER — HYDROMORPHONE HYDROCHLORIDE 2 MG/1
2 TABLET ORAL EVERY 6 HOURS PRN
Qty: 12 TAB | Refills: 0 | Status: SHIPPED | OUTPATIENT
Start: 2020-02-19 | End: 2020-02-19

## 2020-02-19 RX ORDER — CEPHALEXIN 500 MG/1
1000 CAPSULE ORAL EVERY 6 HOURS
Qty: 8 CAP | Refills: 0
Start: 2020-02-19 | End: 2020-02-20

## 2020-02-19 RX ADMIN — SENNOSIDES AND DOCUSATE SODIUM 2 TABLET: 8.6; 5 TABLET ORAL at 04:43

## 2020-02-19 RX ADMIN — GABAPENTIN 100 MG: 100 CAPSULE ORAL at 04:40

## 2020-02-19 RX ADMIN — CEPHALEXIN 1000 MG: 500 CAPSULE ORAL at 04:42

## 2020-02-19 RX ADMIN — HYDROMORPHONE HYDROCHLORIDE 2 MG: 2 TABLET ORAL at 04:41

## 2020-02-19 RX ADMIN — HYDROMORPHONE HYDROCHLORIDE 2 MG: 2 TABLET ORAL at 09:15

## 2020-02-19 RX ADMIN — CEPHALEXIN 1000 MG: 500 CAPSULE ORAL at 12:14

## 2020-02-19 RX ADMIN — CEPHALEXIN 1000 MG: 500 CAPSULE ORAL at 00:17

## 2020-02-19 NOTE — PROGRESS NOTES
Report called to [facility] at 1120 to FERNANDO Kinney. Packet and COBRA complete with Dilaudid PO 2mg script in chart. The patient has been educated on discharge plan and has no questions at this time. Transportation expected at 1230.

## 2020-02-19 NOTE — DISCHARGE PLANNING
Anticipated Discharge Disposition: [facility]    Action: LSW left vm for Giancarlo to see when transportation is setup for pt.    Barriers to Discharge: none     Plan: LSW will continue to assess for any discharge needs.

## 2020-02-19 NOTE — DISCHARGE PLANNING
Anticipated Discharge Disposition: [facility]     Action: LSW was notified by Giancarlo at [facility] that pt is being transported to [facility] at 1230 today. LSW notified MD and bedside RN. LSW got signature from pt and MD for COBRA. LSW notified pt on discharge plan.     Barriers to Discharge: none     Plan: LSW will continue to assess for any discharge needs.

## 2020-02-19 NOTE — DISCHARGE SUMMARY
Discharge Summary    CHIEF COMPLAINT ON ADMISSION  Chief Complaint   Patient presents with   • Wound Check     BIBA from [facility], has hx of necrotizing fascitis of the right upper thigh. wound vac was removed yesterday and sent by wound nurse for worsening      Reason for Admission  Open wound      Admission Date  2/11/2020    CODE STATUS  FULL     HPI & HOSPITAL COURSE  59 years old male with a history of necrotizing fasciitis, admitted 2/11 as a transfer from [facility]. The patient has been in this facility after he had undergone surgery and then flap surgery.  The patient also had a wound VAC placed afterwards for healing.  He was recuperating in [facility] where they remove the wound VAC.  Since then his wound has opened, and his pain has exacerbated and the patient was returned to [facility] for evaluation and treatment.  Case discussed with Dr. Kwong of infectious disease who recommended Keflex 3 g daily divided.  Orthopedics were consulted, patient was replaced in a wound VAC.  It is highly recommended to continue wound VAC till there is complete wound closure and healing according to orthopedics recommendations.  Patient has chronic hepatitis C infection he was counseled to have outpatient treatment with primary care or infectious disease. Therefore, he is discharged in fair and stable condition to [facility] nursing [facility].    The patient met 2-midnight criteria for an inpatient stay at the time of discharge.    Discharge Date  2/19/2020     FOLLOW UP ITEMS POST DISCHARGE  It is highly recommended to continue wound VAC till there is complete wound closure and healing according to orthopedics recommendations.     DISCHARGE DIAGNOSES  Principal Problem:    Cellulitis of right leg POA: Yes  Active Problems:    Leukopenia POA: Yes    IV drug abuse (HCC) POA: Yes    Chronic hepatitis C virus infection (HCC) POA: Yes    Tobacco abuse POA: Yes    Anemia POA: Yes  Resolved  Problems:    * No resolved hospital problems. *    MEDICATIONS ON DISCHARGE     Medication List      START taking these medications      Instructions   cephALEXin 500 MG Caps  Commonly known as:  KEFLEX   Take 2 Caps by mouth every 6 hours for 1 day.  Dose:  1,000 mg     HYDROmorphone 2 MG Tabs  Commonly known as:  DILAUDID   Take 1 Tab by mouth every four hours as needed for up to 3 days.  Dose:  2 mg        CONTINUE taking these medications      Instructions   acetaminophen 500 MG Tabs  Commonly known as:  TYLENOL   Take 2 Tabs by mouth 3 times a day.  Dose:  1,000 mg     gabapentin 100 MG Caps  Commonly known as:  NEURONTIN   Take 1 Cap by mouth 2 Times a Day.  Dose:  100 mg     heparin 5000 UNIT/ML Soln   Inject 1 mL as instructed every 8 hours.  Dose:  5,000 Units     lidocaine 4 % Soln  Commonly known as:  XYLOCAINE   Apply 1 Application to affected area(s) as needed (Wound Care).  Dose:  1 Application     lidocaine 5 % Ptch  Commonly known as:  LIDODERM   Apply 1 Patch to skin as directed every 24 hours.  Dose:  1 Patch     miconazole 2%-zinc oxide 2 % Crea topical cream   Apply 1 g to affected area(s) every 6 hours. Apply to buttocks.  Dose:  1 g     MULTIVITAMIN PO   Take 1 Tab by mouth every day.  Dose:  1 Tab     polyethylene glycol/lytes Pack  Commonly known as:  MIRALAX   Take 1 Packet by mouth every day.  Dose:  17 g     senna-docusate 8.6-50 MG Tabs  Commonly known as:  PERICOLACE or SENOKOT S   Take 2 Tabs by mouth 2 Times a Day.  Dose:  2 Tab     traZODone 50 MG Tabs  Commonly known as:  DESYREL   Take 1 Tab by mouth every bedtime.  Dose:  50 mg            In prescribing controlled substances to this patient, I certify that I have obtained and reviewed the medical history of Gonsalo Hunt. I have also made a good carmen effort to obtain applicable records from other providers who have treated the patient and records demonstrating the following: Moderate risk of abuse, limited supply has been ordered  to be used on a controlled setting of skilled nursing facility..     I have conducted a physical exam and documented it. I have reviewed Mr. Hunt’s prescription history as maintained by the [place] Prescription Monitoring Program.     I have assessed the patient’s risk for abuse, dependency, and addiction using the validated Opioid Risk Tool available at https://www.mdcalc.com/kvzqii-xozn-jtud-ort-narcotic-abuse.     Given the above, I believe the benefits of controlled substance therapy outweigh the risks. The reasons for prescribing controlled substances include non-narcotic, oral analgesic alternatives have been inadequate for pain control. Accordingly, I have discussed the risk and benefits, treatment plan, and alternative therapies with the patient.  Moderate risk of abuse, limited supply has been ordered to be used on a controlled setting of skilled nursing facility.     Allergies  Allergies   Allergen Reactions   • Codeine Rash     Rash  Historical tolerance to hydromorphone   • Spinach    • Pcn [Penicillins] Unspecified     Unknown reaction. Tolerated ceftriaxone on 10/9/19       DIET  Orders Placed This Encounter   Procedures   • Diet Order Regular     Standing Status:   Standing     Number of Occurrences:   1     Order Specific Question:   Diet:     Answer:   Regular [1]     ACTIVITY  As tolerated and directed by skilled nursing.  Weight bearing as tolerated    CONSULTATIONS  Orthopedics.    LABORATORY  Lab Results   Component Value Date    SODIUM 135 02/15/2020    POTASSIUM 3.9 02/15/2020    CHLORIDE 104 02/15/2020    CO2 25 02/15/2020    GLUCOSE 106 (H) 02/15/2020    BUN 17 02/15/2020    CREATININE 0.75 02/15/2020    CREATININE 1.0 07/08/2007        Lab Results   Component Value Date    WBC 5.5 02/19/2020    HEMOGLOBIN 11.8 (L) 02/19/2020    HEMATOCRIT 36.3 (L) 02/19/2020    PLATELETCT 201 02/19/2020      Total time of the discharge process exceeds 36 minutes.

## 2020-02-19 NOTE — WOUND TEAM
[facility]  Inpatient Services  Wound and Skin Care Progress Note    Admission Date: 2/11/2020     Last order of IP CONSULT TO WOUND CARE was found on 2/12/2020 from Hospital Encounter on 2/11/2020     HPI, PMH, SH: Reviewed    Unit where seen by Wound Team: [id number]    WOUND FOLLOW-UP RELATED TO: scheduled reassessment of full thickness right lateral thigh wound; ? Remove NPWT dressing or continue    Self Report / Pain Level:  Premedicated and pain with dressing removal as no contact layer in place due to veraflo dressing; tolerated well after Pain with site care and movement to RLE. Complained that saline used to cleanse was too cold.       OBJECTIVE:  dressing intact to R thigh    WOUND TYPE, LOCATION, CHARACTERISTICS (Pressure Injuries: location, stage, POA or date identified)       Negative Pressure Wound Therapy 02/14/20 Surgical (Active)   NPWT Pump Mode / Pressure Setting Continuous;125 mmHg 2/19/2020 10:45 AM   Dressing Type Medium;Black Foam (Regular) 2/19/2020 10:45 AM   Number of Foam Pieces Used 2 2/19/2020 10:45 AM   Canister Changed No 2/19/2020 10:45 AM   Output (mL)     NEXT Dressing Change/Treatment Date 02/21/20 2/19/2020 10:45 AM   VAC VeraFlo Irrigant       Wound 02/11/20 Other (comment) Leg large approximated surgical incision site with erythema and redness surrounding  (Active)   Wound Image   2/14/2020  5:00 PM   Site Assessment Red;Clean;Early/partial granulation 2/19/2020 10:45 AM   Periwound Assessment Clean;Intact 2/19/2020 10:45 AM   Margins Attached edges 2/19/2020 10:45 AM   Wound Length (cm) 30 cm 2/13/2020  2:25 PM   Wound Width (cm) 9 cm 2/13/2020  2:25 PM   Wound Depth (cm) 0 cm 2/13/2020  2:25 PM   Wound Surface Area (cm^2) 270 cm^2 2/13/2020  2:25 PM   Wound Volume (cm^3) 0 cm^3 2/13/2020  2:25 PM   Tunneling (cm) 0 cm 2/19/2020 10:45 AM   Undermining (cm) 0 cm 2/19/2020 10:45 AM   Closure Secondary intention 2/19/2020 10:45 AM   Drainage Amount Small 2/19/2020  10:45 AM   Drainage Description Serosanguineous 2/19/2020 10:45 AM   Non-staged Wound Description Full thickness 2/19/2020 10:45 AM   Treatments Cleansed 2/19/2020 10:45 AM   Wound Cleansing Approved Wound Cleanser 2/19/2020 10:45 AM   Periwound Protectant Skin Protectant Wipes to Periwound;Drape 2/19/2020 10:45 AM   Dressing Options Wound Vac 2/19/2020 10:45 AM   Dressing Cleansing/Solutions Not Applicable 2/19/2020 10:45 AM   Dressing Changed Changed 2/19/2020 10:45 AM   Dressing Status Intact 2/19/2020 10:45 AM   Dressing Change/Treatment Frequency Monday, Wednesday, Friday, and As Needed 2/19/2020 10:45 AM   NEXT Dressing Change/Treatment Date 02/21/20 2/19/2020 10:45 AM   NEXT Weekly Photo (Inpatient Only) 02/21/20 2/19/2020 10:45 AM   Wound Odor None 2/19/2020 10:45 AM   Pulses     Exposed Structures Connective tissue 2/19/2020 10:45 AM   WOUND NURSE ONLY - Tissue Type and Percentage red 100% 2/19/2020 10:45 AM   WOUND NURSE ONLY - Time Spent with Patient (mins) 60 2/19/2020 10:45 AM      Vascular: dorsal pedal pulse not assessed during this visit.    SILVIA:   No results found.    Lab Values:    Lab Results   Component Value Date/Time    WBC 5.5 02/19/2020 02:33 AM    RBC 4.01 (L) 02/19/2020 02:33 AM    HEMOGLOBIN 11.8 (L) 02/19/2020 02:33 AM    HEMATOCRIT 36.3 (L) 02/19/2020 02:33 AM    CREACTPROT 4.99 (H) 02/11/2020 10:34 PM    SEDRATEWES 35 (H) 02/11/2020 10:34 PM    HBA1C 5.3 10/09/2019 11:16 AM        Culture:  NA   Culture Results show:  No results found for this or any previous visit (from the past 720 hour(s)).      INTERVENTIONS BY WOUND TEAM:  Dressing removed and wound cleansed with wound cleanser.  Skin prep and drape to zoë wound skin.  Covered wound bed with mepitel contact layer x 3 pieces and then covered wound bed with split medium black foam cut to fit.  Secured with drape and resumed NPWT at 125mmHg continuously.  Total black foam=2 pieces.  Discussion with case management that SNF needs to  keep VAC dressing in place until wound healed.  Discussed with staff RN.     Interdisciplinary consultation: Patient, staff RN    EVALUATION:  Wound less hypergranular; should continue to progress with NPWT to promote granulation and manage any drainage    Goals: Steady decrease in wound area and depth weekly.    NURSING PLAN OF CARE ORDERS (X):    Dressing changes: See Dressing Care orders: X  Skin care: See Skin Care orders:   Rectal tube care: See Rectal Tube Care orders:   Other orders:    WOUND TEAM PLAN OF CARE   Dressing changes by wound team:          Follow up 1-2 times weekly:    Follow up 3 times weekly:                NPWT change 3 times weekly:   X  Follow up as needed:       Other (explain):     Anticipated discharge plans:   LTACH:        SNF/Rehab:     X - return to [facility] when medically clear.             Home Care:           Outpatient Wound Center:            Self Care:

## 2020-02-19 NOTE — PROGRESS NOTES
Assumed care of pt from day RN. Pt is lying in bed A&Ox4. Pt c/o pain in his right leg 7/10, pt given 2 mg PO Dilaudid PRN per MAR. Wound vac in place to right lateral thigh. Pt up and walking around unit before bedtime. Pt educated on use of call light for needs. Will continue to monitor.

## 2020-02-19 NOTE — CARE PLAN
Problem: Safety  Goal: Will remain free from falls  Outcome: PROGRESSING AS EXPECTED  Note: Fall precautions in place: treaded socks on pt, bed locked and in the lowest position, call light and belongings within reach.      Problem: Pain Management  Goal: Pain level will decrease to patient's comfort goal  Outcome: PROGRESSING AS EXPECTED  Note: Pt c/o pain in his right leg 7/10, pt medicated with 2mg Dilaudid PO PRN per MAR.

## 2020-02-19 NOTE — DISCHARGE INSTRUCTIONS
Discharge Instructions    Discharged to other by medical transportation with self. Discharged via wheelchair, hospital escort: Yes.  Special equipment needed: Not Applicable    Be sure to schedule a follow-up appointment with your primary care doctor or any specialists as instructed.     Discharge Plan:   Diet Plan: Discussed  Activity Level: Discussed  Smoking Cessation Offered: Patient Refused  Confirmed Follow up Appointment: Appointment Scheduled  Confirmed Symptoms Management: Discussed  Medication Reconciliation Updated: Yes  Influenza Vaccine Indication: Not indicated: Previously immunized this influenza season and > 8 years of age    I understand that a diet low in cholesterol, fat, and sodium is recommended for good health. Unless I have been given specific instructions below for another diet, I accept this instruction as my diet prescription.   Other diet: Regular    Special Instructions: None    · Is patient discharged on Warfarin / Coumadin?   No     Depression / Suicide Risk    As you are discharged from this [facility] facility, it is important to learn how to keep safe from harming yourself.    Recognize the warning signs:  · Abrupt changes in personality, positive or negative- including increase in energy   · Giving away possessions  · Change in eating patterns- significant weight changes-  positive or negative  · Change in sleeping patterns- unable to sleep or sleeping all the time   · Unwillingness or inability to communicate  · Depression  · Unusual sadness, discouragement and loneliness  · Talk of wanting to die  · Neglect of personal appearance   · Rebelliousness- reckless behavior  · Withdrawal from people/activities they love  · Confusion- inability to concentrate     If you or a loved one observes any of these behaviors or has concerns about self-harm, here's what you can do:  · Talk about it- your feelings and reasons for harming yourself  · Remove any means that you might use to hurt  yourself (examples: pills, rope, extension cords, firearm)  · Get professional help from the community (Mental Health, Substance Abuse, psychological counseling)  · Do not be alone:Call your Safe Contact- someone whom you trust who will be there for you.  · Call your local CRISIS HOTLINE 163-8973 or 586-569-1240  · Call your local Children's Mobile Crisis Response Team [place] (266) 643-1443 or www.[employer]  · Call the toll free National Suicide Prevention Hotlines   · National Suicide Prevention Lifeline 109-734-AEIN (1064)  · National Hope Line Network 800-SUICIDE (136-5970)

## 2020-02-20 ENCOUNTER — PATIENT OUTREACH (OUTPATIENT)
Dept: HEALTH INFORMATION MANAGEMENT | Facility: OTHER | Age: 60
End: 2020-02-20

## 2020-03-13 LAB
MYCOBACTERIUM SPEC CULT: NORMAL
RHODAMINE-AURAMINE STN SPEC: NORMAL
SIGNIFICANT IND 70042: NORMAL
SITE SITE: NORMAL
SOURCE SOURCE: NORMAL

## 2020-04-08 ENCOUNTER — HOSPITAL ENCOUNTER (EMERGENCY)
Facility: MEDICAL CENTER | Age: 60
End: 2020-04-08
Attending: EMERGENCY MEDICINE
Payer: MEDICAID

## 2020-04-08 VITALS
WEIGHT: 225 LBS | DIASTOLIC BLOOD PRESSURE: 76 MMHG | TEMPERATURE: 98.1 F | SYSTOLIC BLOOD PRESSURE: 120 MMHG | HEIGHT: 77 IN | HEART RATE: 82 BPM | RESPIRATION RATE: 18 BRPM | BODY MASS INDEX: 26.57 KG/M2 | OXYGEN SATURATION: 96 %

## 2020-04-08 DIAGNOSIS — S81.802A WOUND OF LEFT LOWER EXTREMITY, INITIAL ENCOUNTER: ICD-10-CM

## 2020-04-08 DIAGNOSIS — R45.89 THREATENING TO OTHERS: ICD-10-CM

## 2020-04-08 LAB
ALBUMIN SERPL BCP-MCNC: 4.5 G/DL (ref 3.2–4.9)
ALBUMIN/GLOB SERPL: 1.3 G/DL
ALP SERPL-CCNC: 86 U/L (ref 30–99)
ALT SERPL-CCNC: 18 U/L (ref 2–50)
AMPHET UR QL SCN: NEGATIVE
ANION GAP SERPL CALC-SCNC: 13 MMOL/L (ref 7–16)
AST SERPL-CCNC: 18 U/L (ref 12–45)
BARBITURATES UR QL SCN: NEGATIVE
BASOPHILS # BLD AUTO: 0.6 % (ref 0–1.8)
BASOPHILS # BLD: 0.04 K/UL (ref 0–0.12)
BENZODIAZ UR QL SCN: NEGATIVE
BILIRUB SERPL-MCNC: 0.4 MG/DL (ref 0.1–1.5)
BUN SERPL-MCNC: 20 MG/DL (ref 8–22)
BZE UR QL SCN: NEGATIVE
CALCIUM SERPL-MCNC: 9.5 MG/DL (ref 8.5–10.5)
CANNABINOIDS UR QL SCN: POSITIVE
CHLORIDE SERPL-SCNC: 102 MMOL/L (ref 96–112)
CO2 SERPL-SCNC: 23 MMOL/L (ref 20–33)
CREAT SERPL-MCNC: 0.71 MG/DL (ref 0.5–1.4)
EOSINOPHIL # BLD AUTO: 0.25 K/UL (ref 0–0.51)
EOSINOPHIL NFR BLD: 3.8 % (ref 0–6.9)
ERYTHROCYTE [DISTWIDTH] IN BLOOD BY AUTOMATED COUNT: 49.4 FL (ref 35.9–50)
ETHANOL BLD-MCNC: <10.1 MG/DL (ref 0–10.1)
GLOBULIN SER CALC-MCNC: 3.5 G/DL (ref 1.9–3.5)
GLUCOSE SERPL-MCNC: 100 MG/DL (ref 65–99)
HCT VFR BLD AUTO: 41.5 % (ref 42–52)
HGB BLD-MCNC: 13.4 G/DL (ref 14–18)
IMM GRANULOCYTES # BLD AUTO: 0.02 K/UL (ref 0–0.11)
IMM GRANULOCYTES NFR BLD AUTO: 0.3 % (ref 0–0.9)
LYMPHOCYTES # BLD AUTO: 1.22 K/UL (ref 1–4.8)
LYMPHOCYTES NFR BLD: 18.6 % (ref 22–41)
MCH RBC QN AUTO: 27.1 PG (ref 27–33)
MCHC RBC AUTO-ENTMCNC: 32.3 G/DL (ref 33.7–35.3)
MCV RBC AUTO: 83.8 FL (ref 81.4–97.8)
METHADONE UR QL SCN: NEGATIVE
MONOCYTES # BLD AUTO: 0.69 K/UL (ref 0–0.85)
MONOCYTES NFR BLD AUTO: 10.5 % (ref 0–13.4)
NEUTROPHILS # BLD AUTO: 4.34 K/UL (ref 1.82–7.42)
NEUTROPHILS NFR BLD: 66.2 % (ref 44–72)
NRBC # BLD AUTO: 0 K/UL
NRBC BLD-RTO: 0 /100 WBC
OPIATES UR QL SCN: POSITIVE
OXYCODONE UR QL SCN: NEGATIVE
PCP UR QL SCN: NEGATIVE
PLATELET # BLD AUTO: 267 K/UL (ref 164–446)
PMV BLD AUTO: 10.2 FL (ref 9–12.9)
POTASSIUM SERPL-SCNC: 4.3 MMOL/L (ref 3.6–5.5)
PROPOXYPH UR QL SCN: NEGATIVE
PROT SERPL-MCNC: 8 G/DL (ref 6–8.2)
RBC # BLD AUTO: 4.95 M/UL (ref 4.7–6.1)
SODIUM SERPL-SCNC: 138 MMOL/L (ref 135–145)
WBC # BLD AUTO: 6.6 K/UL (ref 4.8–10.8)

## 2020-04-08 PROCEDURE — 96372 THER/PROPH/DIAG INJ SC/IM: CPT | Mod: XU

## 2020-04-08 PROCEDURE — 80307 DRUG TEST PRSMV CHEM ANLYZR: CPT

## 2020-04-08 PROCEDURE — 85025 COMPLETE CBC W/AUTO DIFF WBC: CPT

## 2020-04-08 PROCEDURE — 80053 COMPREHEN METABOLIC PANEL: CPT

## 2020-04-08 PROCEDURE — 99285 EMERGENCY DEPT VISIT HI MDM: CPT

## 2020-04-08 PROCEDURE — 96374 THER/PROPH/DIAG INJ IV PUSH: CPT

## 2020-04-08 PROCEDURE — 700111 HCHG RX REV CODE 636 W/ 250 OVERRIDE (IP): Performed by: EMERGENCY MEDICINE

## 2020-04-08 RX ORDER — DIPHENHYDRAMINE HYDROCHLORIDE 50 MG/ML
25 INJECTION INTRAMUSCULAR; INTRAVENOUS ONCE
Status: COMPLETED | OUTPATIENT
Start: 2020-04-08 | End: 2020-04-08

## 2020-04-08 RX ORDER — HALOPERIDOL 5 MG/ML
5 INJECTION INTRAMUSCULAR ONCE
Status: DISCONTINUED | OUTPATIENT
Start: 2020-04-08 | End: 2020-04-08

## 2020-04-08 RX ORDER — DIPHENHYDRAMINE HYDROCHLORIDE 50 MG/ML
25 INJECTION INTRAMUSCULAR; INTRAVENOUS ONCE
Status: DISCONTINUED | OUTPATIENT
Start: 2020-04-08 | End: 2020-04-08

## 2020-04-08 RX ORDER — HYDROCODONE BITARTRATE AND ACETAMINOPHEN 5; 325 MG/1; MG/1
1 TABLET ORAL ONCE
Status: DISCONTINUED | OUTPATIENT
Start: 2020-04-08 | End: 2020-04-08 | Stop reason: HOSPADM

## 2020-04-08 RX ORDER — ZIPRASIDONE MESYLATE 20 MG/ML
20 INJECTION, POWDER, LYOPHILIZED, FOR SOLUTION INTRAMUSCULAR ONCE
Status: COMPLETED | OUTPATIENT
Start: 2020-04-08 | End: 2020-04-08

## 2020-04-08 RX ADMIN — ZIPRASIDONE MESYLATE 20 MG: 20 INJECTION, POWDER, LYOPHILIZED, FOR SOLUTION INTRAMUSCULAR at 12:24

## 2020-04-08 RX ADMIN — DIPHENHYDRAMINE HYDROCHLORIDE 25 MG: 50 INJECTION INTRAMUSCULAR; INTRAVENOUS at 12:24

## 2020-04-08 ASSESSMENT — FIBROSIS 4 INDEX: FIB4 SCORE: 1.31

## 2020-04-08 ASSESSMENT — LIFESTYLE VARIABLES: DOES PATIENT WANT TO STOP DRINKING: NO

## 2020-04-08 NOTE — DISCHARGE PLANNING
"ALERT team BH note:  Writer RN reviewed community MH resources with pt, with written information given, including [facility], [facility], [facility], [facility]; [employer] insurance plan; writer RN spoke with Oumou Babin,outpt MH  at [place] [facility] [facility], [phone] to request pt services today; per Oumou, their services close at 4 PM today; pt can f/u with her tomorrow, 4/9/2020; writer RN reviewed this with pt,encouraged pt to refer to the men's homeless shelter [facility], and offered to send pt to the shelter overflow at the [facility] today; pt adamently declined assistance from writer FERNANDO stating \"I'm not gong to a shelter, I don't want the Corona virus, I don't want to die\"; pt also refused assistance with DC planning from [facility] ED medical case manager; pt denies SI, HI, or self-harm ideation; writer RN gave pt a 1 day kentrell pass; pt DC'd to self today with steady ambulation with his walker    Pt DC'd from [facility] today, with written notice given, d/t \"the safety of other individuals in the the Facility is endangered\"  "

## 2020-04-08 NOTE — ED NOTES
Pt. Being discharged at this time, alert nurse providing resources, case management attempting to provide wound care resources. Pt. Upset, vitals stable, patient belongings given back to patient.

## 2020-04-08 NOTE — ED TRIAGE NOTES
Pt. Was sent from [facility] for aggression and HI.  No trauma noted

## 2020-04-08 NOTE — ED NOTES
"Pt refused to wait until wound care consult could be set up for him.  The  at also called and was trying to get pt's belongings back to him.  Pt would not cooperate with me while I was attempting to cooridate getting his belongings to him, pt stated \"what the fuck am I going to do with them?  Throw the out for all I care\".      Pt refused to tell me what his plan was and where he is going to have his belongings dropped at his new residence.     Pt will be d/c'd as AMA as he refused to wait for would care.     "
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
## 2020-04-08 NOTE — DISCHARGE INSTRUCTIONS
You are not being accepted back to [facility] is a patient, you are being discharged on your own recognizance.  You have a wound in the right lower extremity I would recommend daily dressing changes but call the scheduling department with a number above, to make an appoint with the next available practitioner.  Return for any change or worsening symptoms
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
## 2020-04-08 NOTE — DISCHARGE PLANNING
note:  RN said that pt will be discharged and needs resources. Per RN Praful , pt needs woundcare services.      Pt does not have a PCP so unable to refer to Homehealth agency.    LVM for [place] community health worker to follow up and possibly obtain a PCP.

## 2020-04-08 NOTE — ED NOTES
Spoke with  at [facility] rehab, per  the wound vac the patient was  using is property of [facility].

## 2020-04-09 ENCOUNTER — PATIENT OUTREACH (OUTPATIENT)
Dept: HEALTH INFORMATION MANAGEMENT | Facility: OTHER | Age: 60
End: 2020-04-09

## 2020-04-12 ENCOUNTER — HOSPITAL ENCOUNTER (EMERGENCY)
Facility: MEDICAL CENTER | Age: 60
End: 2020-04-12
Attending: EMERGENCY MEDICINE
Payer: MEDICAID

## 2020-04-12 VITALS
BODY MASS INDEX: 28.58 KG/M2 | WEIGHT: 211 LBS | RESPIRATION RATE: 16 BRPM | DIASTOLIC BLOOD PRESSURE: 74 MMHG | SYSTOLIC BLOOD PRESSURE: 122 MMHG | HEIGHT: 72 IN | HEART RATE: 76 BPM | OXYGEN SATURATION: 97 % | TEMPERATURE: 98.4 F

## 2020-04-12 DIAGNOSIS — Z59.00 HOMELESSNESS: ICD-10-CM

## 2020-04-12 DIAGNOSIS — R46.89 UNCOOPERATIVE BEHAVIOR: ICD-10-CM

## 2020-04-12 PROCEDURE — 99284 EMERGENCY DEPT VISIT MOD MDM: CPT

## 2020-04-12 SDOH — ECONOMIC STABILITY - HOUSING INSECURITY: HOMELESSNESS UNSPECIFIED: Z59.00

## 2020-04-12 ASSESSMENT — FIBROSIS 4 INDEX: FIB4 SCORE: 0.92

## 2020-04-12 NOTE — NUR
PT GIVEN HIS DC PAPERWORK.  PT STATES "WHAT DO YOU WANT ME TO DO WITH THAT".  
THIS RN ATTEMPTED TO THEN READ HIM HIS DC PAPERWORK WHICH HE THEN CONTINUOUSLY 
SPOKE OVER THIS RN.  PT REFUSED TO REPEAT BACK DC TEACHINGS.  THIS RN ASKED THE 
PT IF WE COULD DRESS HIS WOUND BEFORE HE LEFT AND HE REFUSED.  PT WAS ASKED IF 
HE WANTED ASSISTANCE PUTTING HIS PANTS AND SHOES ON AND HE REFUSED.  PT REFUSED 
DC VITALS.  PT REFUSED TO SIGN DC PAPERWORK.
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
## 2020-04-12 NOTE — NUR
PT HAS DRESSED SELF.  PT NOW SITTING ON END OF BED.  PT WAS INFORMED IT IS TIME 
TO LEAVE, PT ARGUMENTATIVE.

## 2020-04-12 NOTE — NUR
AFTER A LONG WINDED CONVERSATION BY THIS RN AND MIKIE SHEPHERD PT CONTINUES TO 
REFUSE TO LEAVE.  SECURITY CALLED AT THIS TIME.

## 2020-04-12 NOTE — NUR
PT WAS OFFERED ASSISTANCE WITH HIS WALKER WHICH HE REFUSED.  PT WAS OFFERED 
[employer] [facility] AND A BUS PASS WHICH HE DECLINED.

## 2020-04-12 NOTE — DISCHARGE PLANNING
"Medical Social Work     MSW received consult for pt from [facility]. Pt came from the homeless shelter.     MSW met with pt. Pt asked why MSW was there. Pt stated he wanted his leg looked at. MSW explained the concern that pt is homeless. Pt stated the previous hospital kicked him out and just sent him to the shelter. MSW explained the community resources to pt. Pt stated \"no one helps.\" MSW tried to again explain community resources. Pt talking over MSW. MSW inquired about previous address on [facility] as an alternative housing. Pt stated \"what about it.\" Pt stated it was never his address. MSW again asked if pt would like additional  resources. Pt again started to talk over MSW. MSW ended interview as pt did not want to cooperate.    MSW left resource lists with bedside RN when pt is discharged.   "

## 2020-04-12 NOTE — ED NOTES
Pt appears to be mostly concerned that he was dropped of at a shelter last night and they would not let him in because it was after hours.  He does not believe that that he has anywhere to go and feels like he is just being shuffled around.
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
## 2020-04-12 NOTE — NUR
SECURITY WAS ABLE TO CONVINCE PT TO LEAVE WITH [employer] [facility]OUCHER.  [facility]ER 
PROVIDED TO PTS CHOICE OF THE [address] ON [address].  PT AMBULATES WITH [facility] 
[facility] WITH AND WITHOUT HIS WALKER.  HOWEVER ONCE IN LOBBY PT THEN STATES HE DOES 
NOT THE [employer] VOUCHER.  SECURITY REMAINS WITH PT IN THE LOBBY AT THIS TIME.

## 2020-04-25 NOTE — NUR
PT MEDICATED PER MAR FOR PAIN. PT REMOVED ALL VS MONITORS. PT EDUCATED ON NEED 
TO LEAVE ALONE. PT RESTING IN [facility] AT THIS TIME; JENNIFER

## 2020-04-25 NOTE — NUR
PT D/C WITH D/C SUMMARY AND SCRIPTS. ALL QUESTIONS ANSWERED. PT WOUND DRESSED 
BY TECH. PT DENIES ANY OTHER NEEDS PERTAINING TO THIS VISIT. PT AMBULATES TO 
REGISTRATION DESK WITH [facility] WITH ASSISTANCE OF WALKER FOR D/C HOME.

## 2020-05-22 ENCOUNTER — HOSPITAL ENCOUNTER (EMERGENCY)
Facility: MEDICAL CENTER | Age: 60
End: 2020-05-22
Attending: EMERGENCY MEDICINE
Payer: COMMERCIAL

## 2020-05-22 VITALS
BODY MASS INDEX: 28.86 KG/M2 | DIASTOLIC BLOOD PRESSURE: 86 MMHG | WEIGHT: 236.99 LBS | SYSTOLIC BLOOD PRESSURE: 147 MMHG | HEIGHT: 76 IN | OXYGEN SATURATION: 98 % | TEMPERATURE: 98.6 F | HEART RATE: 64 BPM | RESPIRATION RATE: 16 BRPM

## 2020-05-22 DIAGNOSIS — Z51.89 ENCOUNTER FOR WOUND RE-CHECK: ICD-10-CM

## 2020-05-22 PROCEDURE — 700102 HCHG RX REV CODE 250 W/ 637 OVERRIDE(OP): Performed by: EMERGENCY MEDICINE

## 2020-05-22 PROCEDURE — 51701 INSERT BLADDER CATHETER: CPT

## 2020-05-22 PROCEDURE — 304217 HCHG IRRIGATION SYSTEM

## 2020-05-22 PROCEDURE — A9270 NON-COVERED ITEM OR SERVICE: HCPCS | Performed by: EMERGENCY MEDICINE

## 2020-05-22 PROCEDURE — A6403 STERILE GAUZE>16 <= 48 SQ IN: HCPCS

## 2020-05-22 PROCEDURE — 303484 HCHG DRESSING LARGE

## 2020-05-22 PROCEDURE — 99283 EMERGENCY DEPT VISIT LOW MDM: CPT

## 2020-05-22 RX ORDER — CEPHALEXIN 500 MG/1
500 CAPSULE ORAL 4 TIMES DAILY
Qty: 28 CAP | Refills: 0 | Status: SHIPPED | OUTPATIENT
Start: 2020-05-22 | End: 2020-05-29

## 2020-05-22 RX ORDER — CEPHALEXIN 500 MG/1
500 CAPSULE ORAL ONCE
Status: COMPLETED | OUTPATIENT
Start: 2020-05-22 | End: 2020-05-22

## 2020-05-22 RX ORDER — HYDROCODONE BITARTRATE AND ACETAMINOPHEN 5; 325 MG/1; MG/1
1 TABLET ORAL ONCE
Status: COMPLETED | OUTPATIENT
Start: 2020-05-22 | End: 2020-05-22

## 2020-05-22 RX ADMIN — HYDROCODONE BITARTRATE AND ACETAMINOPHEN 1 TABLET: 5; 325 TABLET ORAL at 11:16

## 2020-05-22 RX ADMIN — CEPHALEXIN 500 MG: 500 CAPSULE ORAL at 11:16

## 2020-05-22 ASSESSMENT — ENCOUNTER SYMPTOMS
MYALGIAS: 1
FEVER: 0
VOMITING: 0
SHORTNESS OF BREATH: 0

## 2020-05-22 ASSESSMENT — FIBROSIS 4 INDEX: FIB4 SCORE: 0.92

## 2020-05-22 NOTE — ED PROVIDER NOTES
"ED Provider Note    ED Provider Note    Primary care provider: Pcp Pt States None  Means of arrival: POV  History obtained from: Patient  History limited by: NOne    CHIEF COMPLAINT  Chief Complaint   Patient presents with   • Wound Check     pt reports a large open wound on his right upper thigh. pt states he has been on abx but \"ran out of them\". increase pain and swelling.        HPI  Gonsalo Hunt is a 59 y.o. male who presents to the Emergency Department chief complaint of pain to his right lateral leg wound.  Patient reports that he is been doing his own wound dressings.  Overall, he reports that the wound appears much better given comparison to earlier this year.  However, he states he is out of pain medication, out of antibiotics and out of additional dressing materials.  No fever.  Complains of severe pain to the area.  Stating that he had to walk here, reporting that he stopped that a few other locations in attempt to get care including [facility] but was directed here to the emergency department.    REVIEW OF SYSTEMS  Review of Systems   Constitutional: Negative for fever.   HENT: Negative for congestion.    Respiratory: Negative for shortness of breath.    Cardiovascular: Negative for chest pain.   Gastrointestinal: Negative for vomiting.   Musculoskeletal: Positive for myalgias.   Skin: Negative for rash.       PAST MEDICAL HISTORY   has a past medical history of Back pain, Heroin use, and Necrotizing fasciitis of lower leg (HCC).    SURGICAL HISTORY   has a past surgical history that includes other orthopedic surgery; other abdominal surgery; irrigation & debridement ortho (Right, 1/16/2020); irrigation & debridement ortho (Right, 1/18/2020); and incision and drainage general (Right, 1/20/2020).    SOCIAL HISTORY  Social History     Tobacco Use   • Smoking status: Current Every Day Smoker     Packs/day: 1.00     Types: Cigarettes   • Smokeless tobacco: Never Used   Substance Use Topics   • Alcohol use: " "Not Currently     Comment: rarely   • Drug use: Yes     Types: Marijuana     Comment: previous IV meth      Social History     Substance and Sexual Activity   Drug Use Yes   • Types: Marijuana    Comment: previous IV meth       FAMILY HISTORY  Family History   Problem Relation Age of Onset   • No Known Problems Mother    • No Known Problems Father        CURRENT MEDICATIONS  Home Medications     Reviewed by Regina Waters R.N. (Registered Nurse) on 05/22/20 at 1000  Med List Status: Not Addressed   Medication Last Dose Status   acetaminophen (TYLENOL) 500 MG Tab  Active   gabapentin (NEURONTIN) 100 MG Cap  Active   heparin 5000 UNIT/ML Solution  Active   lidocaine (LIDODERM) 5 % Patch  Active   lidocaine (XYLOCAINE) 4 % Solution  Active   miconazole 2%-zinc oxide (INZO) 2 % Cream topical cream  Active   Multiple Vitamins-Minerals (MULTIVITAMIN PO)  Active   polyethylene glycol/lytes (MIRALAX) Pack  Active   senna-docusate (PERICOLACE OR SENOKOT S) 8.6-50 MG Tab  Active   traZODone (DESYREL) 50 MG Tab  Active                ALLERGIES  Allergies   Allergen Reactions   • Codeine Rash     Rash  Historical tolerance to hydromorphone   • Spinach    • Pcn [Penicillins] Unspecified     Unknown reaction. Tolerated ceftriaxone on 10/9/19         PHYSICAL EXAM  VITAL SIGNS: /86   Pulse 64   Temp 37.2 °C (98.9 °F) (Temporal)   Resp 16   Ht 1.93 m (6' 4\")   Wt 107.5 kg (236 lb 15.9 oz)   SpO2 98%   BMI 28.85 kg/m²   Vitals reviewed.  Constitutional: Patient is oriented to person, place, and time. Appears well-developed and well-nourished.   Head: Normocephalic and atraumatic.   Ears: Normal external ears bilaterally.   Mouth/Throat: Oropharynx is moist  Eyes: Conjunctivae are normal.   Neck: Normal range of motion.   Cardiovascular: Normal rate, regular rhythm and normal heart sounds. Normal peripheral pulses, right lower extremity.  Pulmonary/Chest: Effort normal and breath sounds normal. No respiratory distress, " no wheezes, rhonchi, or rales.   Musculoskeletal: No edema.  Neurological: No focal deficits.   Skin: Skin is warm and dry. No erythema. No pallor.  There is a wound on the lateral aspect of the patient's right thigh.  It is approximately 8 inches long, elliptical in shape.  Approximately centimeters across in its largest cross-section.  There is healthy-appearing, granulation tissue overlying this wound.  There is no surrounding acute cellulitic changes.  There are some hyperpigmented skin changes given its chronicity.  No foul smell.  Psychiatric: Patient has a normal mood and affect.     COURSE & MEDICAL DECISION MAKING  Pertinent Labs & Imaging studies reviewed. (See chart for details)    Obtained and reviewed past medical records.  Patient's last encounter was April 12 he was in the emergency department for psychiatric evaluation.  Noted to be homeless.  Prior to that, patient was seen April 8 of this year with homicidal ideations.  It is documented at this visit, that the patient was frustrating being discharged home from [facility] emergency department earlier that same day.  Patient is noted to have a history of IV drug abuse.  He was admitted in January for septic shock secondary to necrotizing fasciitis of his right lower extremity.      10:33 AM - Patient seen and examined at bedside.  This is a 59-year-old male, who presents with a chronic wound to his right lower extremity, he has been treated, surgically debrided in the past, most recently, in January of this year for necrotizing fasciitis.  The wound actually appears to be healing quite well.  There is no surrounding acute cellulitic changes.  There is healthy-appearing granulation tissue.  The patient is afebrile with normal vital signs.  I discussed with nursing staff, wound irrigation and redressing.  Will restart antibiotics and provide pain medication.    10:43am cussed with  who is familiar with this patient.  She tried to  provide him services at his most recent ED encounter April 12.  The patient was hostile and abusive and refused any services that she could provide.  I have ordered an outpatient wound care consult for evaluation and treatment.  The wound will be irrigated, cleaned, dressed and the patient will be provided some supplies for redressing until he can get into the wound care.  I have reviewed the patient's wound cultures from back in January of this year.  His right thigh wound grew out strep viridans and staph aureus.  Blood cultures were negative at the time.    12:10 PM patient's reevaluated the bedside.  He is resting and appears comfortable.  He is awaiting dressing supplies.  I anticipate, discharge to home, once his wound is redressed and he is given supplies.  He will be restarted on Keflex.  Is provided a short course of Norco.  He is made aware, referral has been made to wound care.  He also can follow-up with Dr. Blanc.  Patient is well-appearing and nontoxic.  Once wound is dressed, anticipate discharged home.  He is in stable condition.    FINAL IMPRESSION  1. Encounter for wound re-check

## 2020-05-22 NOTE — ED NOTES
Pt provided with discharge instructions, prescriptions x1, instructions for follow up appointment with Orthopedics/Wound care, s/s of when to seek emergency care.  Pt verbalizes understanding.  Pt discharged in good condition.

## 2020-05-22 NOTE — ED TRIAGE NOTES
"..  Chief Complaint   Patient presents with   • Wound Check     pt reports a large open wound on his right upper thigh. pt states he has been on abx but \"ran out of them\". increase pain and swelling.    ../78   Pulse 84   Temp 37.2 °C (98.9 °F) (Temporal)   Resp 16   Ht 1.93 m (6' 4\")   Wt 107.5 kg (236 lb 15.9 oz)   SpO2 97%   "

## 2020-05-22 NOTE — ED NOTES
Wound bed cleaned with NS. Minimal sanguinous drainage noted. Non-adhesive dressing applied to wound. Wrapped with kerlix and ace bandage.

## 2020-06-17 ENCOUNTER — HOSPITAL ENCOUNTER (EMERGENCY)
Dept: HOSPITAL 8 - ED | Age: 60
Discharge: HOME | End: 2020-06-17
Payer: MEDICAID

## 2020-06-17 VITALS — BODY MASS INDEX: 28.38 KG/M2 | WEIGHT: 233.03 LBS | HEIGHT: 76 IN

## 2020-06-17 VITALS — SYSTOLIC BLOOD PRESSURE: 112 MMHG | DIASTOLIC BLOOD PRESSURE: 74 MMHG

## 2020-06-17 DIAGNOSIS — K40.91: Primary | ICD-10-CM

## 2020-06-17 DIAGNOSIS — M79.651: ICD-10-CM

## 2020-06-17 DIAGNOSIS — Z93.3: ICD-10-CM

## 2020-06-17 PROCEDURE — 99283 EMERGENCY DEPT VISIT LOW MDM: CPT

## 2020-08-02 ENCOUNTER — HOSPITAL ENCOUNTER (EMERGENCY)
Dept: HOSPITAL 8 - ED | Age: 60
Discharge: HOME | End: 2020-08-02
Payer: MEDICAID

## 2020-08-02 VITALS — WEIGHT: 238.1 LBS | BODY MASS INDEX: 28.99 KG/M2 | HEIGHT: 76 IN

## 2020-08-02 VITALS — SYSTOLIC BLOOD PRESSURE: 143 MMHG | DIASTOLIC BLOOD PRESSURE: 82 MMHG

## 2020-08-02 DIAGNOSIS — L03.115: Primary | ICD-10-CM

## 2020-08-02 DIAGNOSIS — Z93.3: ICD-10-CM

## 2020-08-02 PROCEDURE — 99283 EMERGENCY DEPT VISIT LOW MDM: CPT

## 2020-09-14 ENCOUNTER — APPOINTMENT (OUTPATIENT)
Dept: RADIOLOGY | Facility: MEDICAL CENTER | Age: 60
DRG: 464 | End: 2020-09-14
Attending: EMERGENCY MEDICINE
Payer: COMMERCIAL

## 2020-09-14 ENCOUNTER — HOSPITAL ENCOUNTER (INPATIENT)
Facility: MEDICAL CENTER | Age: 60
LOS: 29 days | DRG: 464 | End: 2020-10-13
Attending: EMERGENCY MEDICINE | Admitting: INTERNAL MEDICINE
Payer: COMMERCIAL

## 2020-09-14 DIAGNOSIS — L03.115 CELLULITIS OF RIGHT LEG: Primary | ICD-10-CM

## 2020-09-14 DIAGNOSIS — M00.022 STAPHYLOCOCCAL ARTHRITIS OF LEFT ELBOW (HCC): ICD-10-CM

## 2020-09-14 DIAGNOSIS — M00.222: ICD-10-CM

## 2020-09-14 DIAGNOSIS — S81.801A WOUND OF RIGHT LOWER EXTREMITY, INITIAL ENCOUNTER: ICD-10-CM

## 2020-09-14 DIAGNOSIS — S81.801D WOUND OF RIGHT LOWER EXTREMITY, SUBSEQUENT ENCOUNTER: ICD-10-CM

## 2020-09-14 PROBLEM — M00.9 PYOGENIC ARTHRITIS OF LEFT ELBOW (HCC): Status: ACTIVE | Noted: 2020-09-14

## 2020-09-14 PROBLEM — R01.1 MURMUR: Status: ACTIVE | Noted: 2020-09-14

## 2020-09-14 LAB
ALBUMIN SERPL BCP-MCNC: 4.1 G/DL (ref 3.2–4.9)
ALBUMIN/GLOB SERPL: 1.1 G/DL
ALP SERPL-CCNC: 103 U/L (ref 30–99)
ALT SERPL-CCNC: 11 U/L (ref 2–50)
ANION GAP SERPL CALC-SCNC: 15 MMOL/L (ref 7–16)
AST SERPL-CCNC: 10 U/L (ref 12–45)
BASOPHILS # BLD AUTO: 0.3 % (ref 0–1.8)
BASOPHILS # BLD: 0.05 K/UL (ref 0–0.12)
BILIRUB SERPL-MCNC: 0.8 MG/DL (ref 0.1–1.5)
BUN SERPL-MCNC: 21 MG/DL (ref 8–22)
CALCIUM SERPL-MCNC: 9.6 MG/DL (ref 8.5–10.5)
CHLORIDE SERPL-SCNC: 93 MMOL/L (ref 96–112)
CK SERPL-CCNC: 25 U/L (ref 0–154)
CO2 SERPL-SCNC: 25 MMOL/L (ref 20–33)
COVID ORDER STATUS COVID19: NORMAL
CREAT SERPL-MCNC: 0.87 MG/DL (ref 0.5–1.4)
CRP SERPL HS-MCNC: 12.42 MG/DL (ref 0–0.75)
EOSINOPHIL # BLD AUTO: 0.18 K/UL (ref 0–0.51)
EOSINOPHIL NFR BLD: 1.2 % (ref 0–6.9)
ERYTHROCYTE [DISTWIDTH] IN BLOOD BY AUTOMATED COUNT: 48.4 FL (ref 35.9–50)
GLOBULIN SER CALC-MCNC: 3.8 G/DL (ref 1.9–3.5)
GLUCOSE SERPL-MCNC: 104 MG/DL (ref 65–99)
HCT VFR BLD AUTO: 45.8 % (ref 42–52)
HGB BLD-MCNC: 15.4 G/DL (ref 14–18)
IMM GRANULOCYTES # BLD AUTO: 0.08 K/UL (ref 0–0.11)
IMM GRANULOCYTES NFR BLD AUTO: 0.5 % (ref 0–0.9)
LACTATE BLD-SCNC: 1.2 MMOL/L (ref 0.5–2)
LYMPHOCYTES # BLD AUTO: 1.48 K/UL (ref 1–4.8)
LYMPHOCYTES NFR BLD: 9.9 % (ref 22–41)
MCH RBC QN AUTO: 28.5 PG (ref 27–33)
MCHC RBC AUTO-ENTMCNC: 33.6 G/DL (ref 33.7–35.3)
MCV RBC AUTO: 84.7 FL (ref 81.4–97.8)
MONOCYTES # BLD AUTO: 1.05 K/UL (ref 0–0.85)
MONOCYTES NFR BLD AUTO: 7 % (ref 0–13.4)
NEUTROPHILS # BLD AUTO: 12.08 K/UL (ref 1.82–7.42)
NEUTROPHILS NFR BLD: 81.1 % (ref 44–72)
NRBC # BLD AUTO: 0 K/UL
NRBC BLD-RTO: 0 /100 WBC
PLATELET # BLD AUTO: 279 K/UL (ref 164–446)
PMV BLD AUTO: 9.5 FL (ref 9–12.9)
POTASSIUM SERPL-SCNC: 4 MMOL/L (ref 3.6–5.5)
PROCALCITONIN SERPL-MCNC: <0.05 NG/ML
PROT SERPL-MCNC: 7.9 G/DL (ref 6–8.2)
RBC # BLD AUTO: 5.41 M/UL (ref 4.7–6.1)
SARS-COV-2 RNA RESP QL NAA+PROBE: NOTDETECTED
SODIUM SERPL-SCNC: 133 MMOL/L (ref 135–145)
SPECIMEN SOURCE: NORMAL
WBC # BLD AUTO: 14.9 K/UL (ref 4.8–10.8)

## 2020-09-14 PROCEDURE — 82550 ASSAY OF CK (CPK): CPT

## 2020-09-14 PROCEDURE — 99291 CRITICAL CARE FIRST HOUR: CPT

## 2020-09-14 PROCEDURE — 73552 X-RAY EXAM OF FEMUR 2/>: CPT | Mod: RT

## 2020-09-14 PROCEDURE — 87040 BLOOD CULTURE FOR BACTERIA: CPT

## 2020-09-14 PROCEDURE — 700111 HCHG RX REV CODE 636 W/ 250 OVERRIDE (IP): Performed by: INTERNAL MEDICINE

## 2020-09-14 PROCEDURE — U0003 INFECTIOUS AGENT DETECTION BY NUCLEIC ACID (DNA OR RNA); SEVERE ACUTE RESPIRATORY SYNDROME CORONAVIRUS 2 (SARS-COV-2) (CORONAVIRUS DISEASE [COVID-19]), AMPLIFIED PROBE TECHNIQUE, MAKING USE OF HIGH THROUGHPUT TECHNOLOGIES AS DESCRIBED BY CMS-2020-01-R: HCPCS

## 2020-09-14 PROCEDURE — 87070 CULTURE OTHR SPECIMN AEROBIC: CPT

## 2020-09-14 PROCEDURE — 83605 ASSAY OF LACTIC ACID: CPT

## 2020-09-14 PROCEDURE — 85025 COMPLETE CBC W/AUTO DIFF WBC: CPT

## 2020-09-14 PROCEDURE — 84145 PROCALCITONIN (PCT): CPT

## 2020-09-14 PROCEDURE — 700105 HCHG RX REV CODE 258: Performed by: EMERGENCY MEDICINE

## 2020-09-14 PROCEDURE — 86140 C-REACTIVE PROTEIN: CPT

## 2020-09-14 PROCEDURE — 73080 X-RAY EXAM OF ELBOW: CPT | Mod: LT

## 2020-09-14 PROCEDURE — 700111 HCHG RX REV CODE 636 W/ 250 OVERRIDE (IP): Performed by: EMERGENCY MEDICINE

## 2020-09-14 PROCEDURE — 770006 HCHG ROOM/CARE - MED/SURG/GYN SEMI*

## 2020-09-14 PROCEDURE — C9803 HOPD COVID-19 SPEC COLLECT: HCPCS | Performed by: EMERGENCY MEDICINE

## 2020-09-14 PROCEDURE — 80053 COMPREHEN METABOLIC PANEL: CPT

## 2020-09-14 PROCEDURE — 36415 COLL VENOUS BLD VENIPUNCTURE: CPT

## 2020-09-14 PROCEDURE — 87205 SMEAR GRAM STAIN: CPT

## 2020-09-14 PROCEDURE — 96375 TX/PRO/DX INJ NEW DRUG ADDON: CPT

## 2020-09-14 RX ORDER — ONDANSETRON 2 MG/ML
4 INJECTION INTRAMUSCULAR; INTRAVENOUS ONCE
Status: COMPLETED | OUTPATIENT
Start: 2020-09-14 | End: 2020-09-14

## 2020-09-14 RX ORDER — POLYETHYLENE GLYCOL 3350 17 G/17G
1 POWDER, FOR SOLUTION ORAL DAILY
Status: DISCONTINUED | OUTPATIENT
Start: 2020-09-15 | End: 2020-10-04

## 2020-09-14 RX ORDER — AMOXICILLIN 250 MG
2 CAPSULE ORAL EVERY EVENING
Status: DISCONTINUED | OUTPATIENT
Start: 2020-09-14 | End: 2020-10-13 | Stop reason: HOSPADM

## 2020-09-14 RX ORDER — GABAPENTIN 100 MG/1
100 CAPSULE ORAL 2 TIMES DAILY
Status: DISCONTINUED | OUTPATIENT
Start: 2020-09-14 | End: 2020-09-14

## 2020-09-14 RX ORDER — KETOROLAC TROMETHAMINE 30 MG/ML
15 INJECTION, SOLUTION INTRAMUSCULAR; INTRAVENOUS EVERY 6 HOURS PRN
Status: DISPENSED | OUTPATIENT
Start: 2020-09-14 | End: 2020-09-19

## 2020-09-14 RX ORDER — CEPHALEXIN 500 MG/1
1 CAPSULE ORAL DAILY
COMMUNITY
Start: 2020-08-02 | End: 2020-09-14

## 2020-09-14 RX ORDER — ACETAMINOPHEN 325 MG/1
650 TABLET ORAL 3 TIMES DAILY
Status: DISCONTINUED | OUTPATIENT
Start: 2020-09-15 | End: 2020-10-04

## 2020-09-14 RX ORDER — HYDROMORPHONE HYDROCHLORIDE 1 MG/ML
1 INJECTION, SOLUTION INTRAMUSCULAR; INTRAVENOUS; SUBCUTANEOUS ONCE
Status: COMPLETED | OUTPATIENT
Start: 2020-09-14 | End: 2020-09-14

## 2020-09-14 RX ORDER — SODIUM CHLORIDE, SODIUM LACTATE, POTASSIUM CHLORIDE, CALCIUM CHLORIDE 600; 310; 30; 20 MG/100ML; MG/100ML; MG/100ML; MG/100ML
1000 INJECTION, SOLUTION INTRAVENOUS ONCE
Status: COMPLETED | OUTPATIENT
Start: 2020-09-14 | End: 2020-09-14

## 2020-09-14 RX ORDER — TRAZODONE HYDROCHLORIDE 150 MG/1
150 TABLET ORAL
Status: DISCONTINUED | OUTPATIENT
Start: 2020-09-14 | End: 2020-09-14

## 2020-09-14 RX ADMIN — SODIUM CHLORIDE, POTASSIUM CHLORIDE, SODIUM LACTATE AND CALCIUM CHLORIDE 1000 ML: 600; 310; 30; 20 INJECTION, SOLUTION INTRAVENOUS at 22:43

## 2020-09-14 RX ADMIN — KETOROLAC TROMETHAMINE 15 MG: 30 INJECTION, SOLUTION INTRAMUSCULAR at 23:28

## 2020-09-14 RX ADMIN — HYDROMORPHONE HYDROCHLORIDE 1 MG: 1 INJECTION, SOLUTION INTRAMUSCULAR; INTRAVENOUS; SUBCUTANEOUS at 20:34

## 2020-09-14 RX ADMIN — ONDANSETRON 4 MG: 2 INJECTION INTRAMUSCULAR; INTRAVENOUS at 20:34

## 2020-09-14 ASSESSMENT — LIFESTYLE VARIABLES: DO YOU DRINK ALCOHOL: NO

## 2020-09-14 ASSESSMENT — PAIN DESCRIPTION - PAIN TYPE: TYPE: CHRONIC PAIN

## 2020-09-14 ASSESSMENT — FIBROSIS 4 INDEX: FIB4 SCORE: 0.94

## 2020-09-15 ENCOUNTER — ANESTHESIA (OUTPATIENT)
Dept: SURGERY | Facility: MEDICAL CENTER | Age: 60
DRG: 464 | End: 2020-09-15
Payer: COMMERCIAL

## 2020-09-15 ENCOUNTER — APPOINTMENT (OUTPATIENT)
Dept: CARDIOLOGY | Facility: MEDICAL CENTER | Age: 60
DRG: 464 | End: 2020-09-15
Attending: INTERNAL MEDICINE
Payer: COMMERCIAL

## 2020-09-15 ENCOUNTER — ANESTHESIA EVENT (OUTPATIENT)
Dept: SURGERY | Facility: MEDICAL CENTER | Age: 60
DRG: 464 | End: 2020-09-15
Payer: COMMERCIAL

## 2020-09-15 PROBLEM — F19.10 IV DRUG ABUSE (HCC): Status: RESOLVED | Noted: 2019-10-09 | Resolved: 2020-09-15

## 2020-09-15 PROBLEM — S81.801A LEG WOUND, RIGHT: Status: ACTIVE | Noted: 2020-09-15

## 2020-09-15 LAB
GRAM STN SPEC: NORMAL
GRAM STN SPEC: NORMAL
LV EJECT FRACT  99904: 65
LV EJECT FRACT MOD 2C 99903: 64.6
LV EJECT FRACT MOD 4C 99902: 63.45
LV EJECT FRACT MOD BP 99901: 64.31
SIGNIFICANT IND 70042: NORMAL
SIGNIFICANT IND 70042: NORMAL
SITE SITE: NORMAL
SITE SITE: NORMAL
SOURCE SOURCE: NORMAL
SOURCE SOURCE: NORMAL

## 2020-09-15 PROCEDURE — 160036 HCHG PACU - EA ADDL 30 MINS PHASE I: Performed by: ORTHOPAEDIC SURGERY

## 2020-09-15 PROCEDURE — 87205 SMEAR GRAM STAIN: CPT

## 2020-09-15 PROCEDURE — 700111 HCHG RX REV CODE 636 W/ 250 OVERRIDE (IP): Performed by: ANESTHESIOLOGY

## 2020-09-15 PROCEDURE — 700102 HCHG RX REV CODE 250 W/ 637 OVERRIDE(OP): Performed by: INTERNAL MEDICINE

## 2020-09-15 PROCEDURE — 99223 1ST HOSP IP/OBS HIGH 75: CPT | Performed by: INTERNAL MEDICINE

## 2020-09-15 PROCEDURE — 770006 HCHG ROOM/CARE - MED/SURG/GYN SEMI*

## 2020-09-15 PROCEDURE — 700102 HCHG RX REV CODE 250 W/ 637 OVERRIDE(OP): Performed by: ANESTHESIOLOGY

## 2020-09-15 PROCEDURE — 96365 THER/PROPH/DIAG IV INF INIT: CPT

## 2020-09-15 PROCEDURE — 700111 HCHG RX REV CODE 636 W/ 250 OVERRIDE (IP): Performed by: INTERNAL MEDICINE

## 2020-09-15 PROCEDURE — 500881 HCHG PACK, EXTREMITY: Performed by: ORTHOPAEDIC SURGERY

## 2020-09-15 PROCEDURE — 700111 HCHG RX REV CODE 636 W/ 250 OVERRIDE (IP)

## 2020-09-15 PROCEDURE — 87186 SC STD MICRODIL/AGAR DIL: CPT

## 2020-09-15 PROCEDURE — 160002 HCHG RECOVERY MINUTES (STAT): Performed by: ORTHOPAEDIC SURGERY

## 2020-09-15 PROCEDURE — 700105 HCHG RX REV CODE 258: Performed by: ANESTHESIOLOGY

## 2020-09-15 PROCEDURE — 87075 CULTR BACTERIA EXCEPT BLOOD: CPT

## 2020-09-15 PROCEDURE — 160035 HCHG PACU - 1ST 60 MINS PHASE I: Performed by: ORTHOPAEDIC SURGERY

## 2020-09-15 PROCEDURE — 160038 HCHG SURGERY MINUTES - EA ADDL 1 MIN LEVEL 2: Performed by: ORTHOPAEDIC SURGERY

## 2020-09-15 PROCEDURE — A9270 NON-COVERED ITEM OR SERVICE: HCPCS | Performed by: INTERNAL MEDICINE

## 2020-09-15 PROCEDURE — A9270 NON-COVERED ITEM OR SERVICE: HCPCS | Performed by: ANESTHESIOLOGY

## 2020-09-15 PROCEDURE — 501838 HCHG SUTURE GENERAL: Performed by: ORTHOPAEDIC SURGERY

## 2020-09-15 PROCEDURE — 93306 TTE W/DOPPLER COMPLETE: CPT | Mod: 26 | Performed by: INTERNAL MEDICINE

## 2020-09-15 PROCEDURE — 160027 HCHG SURGERY MINUTES - 1ST 30 MINS LEVEL 2: Performed by: ORTHOPAEDIC SURGERY

## 2020-09-15 PROCEDURE — 87070 CULTURE OTHR SPECIMN AEROBIC: CPT

## 2020-09-15 PROCEDURE — 87077 CULTURE AEROBIC IDENTIFY: CPT

## 2020-09-15 PROCEDURE — 700105 HCHG RX REV CODE 258: Performed by: INTERNAL MEDICINE

## 2020-09-15 PROCEDURE — 160009 HCHG ANES TIME/MIN: Performed by: ORTHOPAEDIC SURGERY

## 2020-09-15 PROCEDURE — 501445 HCHG STAPLER, SKIN DISP: Performed by: ORTHOPAEDIC SURGERY

## 2020-09-15 PROCEDURE — 0JDL0ZZ EXTRACTION OF RIGHT UPPER LEG SUBCUTANEOUS TISSUE AND FASCIA, OPEN APPROACH: ICD-10-PCS | Performed by: ORTHOPAEDIC SURGERY

## 2020-09-15 PROCEDURE — 93306 TTE W/DOPPLER COMPLETE: CPT

## 2020-09-15 PROCEDURE — 160048 HCHG OR STATISTICAL LEVEL 1-5: Performed by: ORTHOPAEDIC SURGERY

## 2020-09-15 PROCEDURE — 87147 CULTURE TYPE IMMUNOLOGIC: CPT

## 2020-09-15 PROCEDURE — 0KBB0ZZ EXCISION OF LEFT LOWER ARM AND WRIST MUSCLE, OPEN APPROACH: ICD-10-PCS | Performed by: ORTHOPAEDIC SURGERY

## 2020-09-15 RX ORDER — SODIUM CHLORIDE, SODIUM LACTATE, POTASSIUM CHLORIDE, CALCIUM CHLORIDE 600; 310; 30; 20 MG/100ML; MG/100ML; MG/100ML; MG/100ML
INJECTION, SOLUTION INTRAVENOUS CONTINUOUS
Status: DISCONTINUED | OUTPATIENT
Start: 2020-09-15 | End: 2020-09-15 | Stop reason: HOSPADM

## 2020-09-15 RX ORDER — DIPHENHYDRAMINE HYDROCHLORIDE 50 MG/ML
12.5 INJECTION INTRAMUSCULAR; INTRAVENOUS
Status: DISCONTINUED | OUTPATIENT
Start: 2020-09-15 | End: 2020-09-15 | Stop reason: HOSPADM

## 2020-09-15 RX ORDER — HYDROMORPHONE HYDROCHLORIDE 1 MG/ML
0.4 INJECTION, SOLUTION INTRAMUSCULAR; INTRAVENOUS; SUBCUTANEOUS
Status: DISCONTINUED | OUTPATIENT
Start: 2020-09-15 | End: 2020-09-15 | Stop reason: HOSPADM

## 2020-09-15 RX ORDER — CEFAZOLIN SODIUM 1 G/3ML
INJECTION, POWDER, FOR SOLUTION INTRAMUSCULAR; INTRAVENOUS PRN
Status: DISCONTINUED | OUTPATIENT
Start: 2020-09-15 | End: 2020-09-15 | Stop reason: SURG

## 2020-09-15 RX ORDER — ONDANSETRON 2 MG/ML
INJECTION INTRAMUSCULAR; INTRAVENOUS PRN
Status: DISCONTINUED | OUTPATIENT
Start: 2020-09-15 | End: 2020-09-15 | Stop reason: SURG

## 2020-09-15 RX ORDER — OXYCODONE HCL 5 MG/5 ML
5 SOLUTION, ORAL ORAL
Status: COMPLETED | OUTPATIENT
Start: 2020-09-15 | End: 2020-09-15

## 2020-09-15 RX ORDER — VANCOMYCIN HYDROCHLORIDE 1 G/20ML
INJECTION, POWDER, LYOPHILIZED, FOR SOLUTION INTRAVENOUS PRN
Status: DISCONTINUED | OUTPATIENT
Start: 2020-09-15 | End: 2020-09-15 | Stop reason: SURG

## 2020-09-15 RX ORDER — SODIUM CHLORIDE, SODIUM LACTATE, POTASSIUM CHLORIDE, CALCIUM CHLORIDE 600; 310; 30; 20 MG/100ML; MG/100ML; MG/100ML; MG/100ML
INJECTION, SOLUTION INTRAVENOUS
Status: DISCONTINUED | OUTPATIENT
Start: 2020-09-15 | End: 2020-09-15 | Stop reason: SURG

## 2020-09-15 RX ORDER — HYDROMORPHONE HYDROCHLORIDE 1 MG/ML
0.5 INJECTION, SOLUTION INTRAMUSCULAR; INTRAVENOUS; SUBCUTANEOUS
Status: DISCONTINUED | OUTPATIENT
Start: 2020-09-15 | End: 2020-09-18

## 2020-09-15 RX ORDER — OXYCODONE HCL 5 MG/5 ML
10 SOLUTION, ORAL ORAL
Status: COMPLETED | OUTPATIENT
Start: 2020-09-15 | End: 2020-09-15

## 2020-09-15 RX ORDER — ONDANSETRON 2 MG/ML
4 INJECTION INTRAMUSCULAR; INTRAVENOUS
Status: COMPLETED | OUTPATIENT
Start: 2020-09-15 | End: 2020-09-15

## 2020-09-15 RX ORDER — HALOPERIDOL 5 MG/ML
1 INJECTION INTRAMUSCULAR
Status: DISCONTINUED | OUTPATIENT
Start: 2020-09-15 | End: 2020-09-15 | Stop reason: HOSPADM

## 2020-09-15 RX ORDER — HYDROMORPHONE HYDROCHLORIDE 1 MG/ML
0.2 INJECTION, SOLUTION INTRAMUSCULAR; INTRAVENOUS; SUBCUTANEOUS
Status: DISCONTINUED | OUTPATIENT
Start: 2020-09-15 | End: 2020-09-15 | Stop reason: HOSPADM

## 2020-09-15 RX ORDER — HYDROMORPHONE HYDROCHLORIDE 1 MG/ML
INJECTION, SOLUTION INTRAMUSCULAR; INTRAVENOUS; SUBCUTANEOUS
Status: COMPLETED
Start: 2020-09-15 | End: 2020-09-15

## 2020-09-15 RX ORDER — MIDAZOLAM HYDROCHLORIDE 1 MG/ML
INJECTION INTRAMUSCULAR; INTRAVENOUS PRN
Status: DISCONTINUED | OUTPATIENT
Start: 2020-09-15 | End: 2020-09-15 | Stop reason: SURG

## 2020-09-15 RX ORDER — HYDROMORPHONE HYDROCHLORIDE 1 MG/ML
0.1 INJECTION, SOLUTION INTRAMUSCULAR; INTRAVENOUS; SUBCUTANEOUS
Status: DISCONTINUED | OUTPATIENT
Start: 2020-09-15 | End: 2020-09-15 | Stop reason: HOSPADM

## 2020-09-15 RX ADMIN — HYDROMORPHONE HYDROCHLORIDE 0.4 MG: 1 INJECTION, SOLUTION INTRAMUSCULAR; INTRAVENOUS; SUBCUTANEOUS at 12:35

## 2020-09-15 RX ADMIN — ACETAMINOPHEN 650 MG: 325 TABLET, FILM COATED ORAL at 17:32

## 2020-09-15 RX ADMIN — HYDROMORPHONE HYDROCHLORIDE 0.5 MG: 1 INJECTION, SOLUTION INTRAMUSCULAR; INTRAVENOUS; SUBCUTANEOUS at 20:45

## 2020-09-15 RX ADMIN — ONDANSETRON 4 MG: 2 INJECTION INTRAMUSCULAR; INTRAVENOUS at 11:22

## 2020-09-15 RX ADMIN — ONDANSETRON 4 MG: 2 INJECTION INTRAMUSCULAR; INTRAVENOUS at 12:16

## 2020-09-15 RX ADMIN — HYDROMORPHONE HYDROCHLORIDE 0.4 MG: 1 INJECTION, SOLUTION INTRAMUSCULAR; INTRAVENOUS; SUBCUTANEOUS at 13:17

## 2020-09-15 RX ADMIN — CEFTRIAXONE SODIUM 2 G: 2 INJECTION, POWDER, FOR SOLUTION INTRAMUSCULAR; INTRAVENOUS at 06:22

## 2020-09-15 RX ADMIN — CEFAZOLIN 2 G: 330 INJECTION, POWDER, FOR SOLUTION INTRAMUSCULAR; INTRAVENOUS at 11:26

## 2020-09-15 RX ADMIN — PROPOFOL 200 MG: 10 INJECTION, EMULSION INTRAVENOUS at 11:23

## 2020-09-15 RX ADMIN — DOCUSATE SODIUM 50 MG AND SENNOSIDES 8.6 MG 2 TABLET: 8.6; 5 TABLET, FILM COATED ORAL at 17:32

## 2020-09-15 RX ADMIN — FENTANYL CITRATE 100 MCG: 50 INJECTION, SOLUTION INTRAMUSCULAR; INTRAVENOUS at 11:33

## 2020-09-15 RX ADMIN — OXYCODONE HYDROCHLORIDE 10 MG: 5 SOLUTION ORAL at 12:15

## 2020-09-15 RX ADMIN — MIDAZOLAM HYDROCHLORIDE 2 MG: 1 INJECTION, SOLUTION INTRAMUSCULAR; INTRAVENOUS at 11:22

## 2020-09-15 RX ADMIN — SODIUM CHLORIDE, POTASSIUM CHLORIDE, SODIUM LACTATE AND CALCIUM CHLORIDE: 600; 310; 30; 20 INJECTION, SOLUTION INTRAVENOUS at 12:16

## 2020-09-15 RX ADMIN — HYDROMORPHONE HYDROCHLORIDE 0.5 MG: 1 INJECTION, SOLUTION INTRAMUSCULAR; INTRAVENOUS; SUBCUTANEOUS at 17:32

## 2020-09-15 RX ADMIN — FENTANYL CITRATE 50 MCG: 50 INJECTION INTRAMUSCULAR; INTRAVENOUS at 12:24

## 2020-09-15 RX ADMIN — VANCOMYCIN HYDROCHLORIDE 1 G: 1 INJECTION, POWDER, LYOPHILIZED, FOR SOLUTION INTRAVENOUS at 11:32

## 2020-09-15 RX ADMIN — FENTANYL CITRATE 150 MCG: 50 INJECTION, SOLUTION INTRAMUSCULAR; INTRAVENOUS at 11:23

## 2020-09-15 RX ADMIN — FENTANYL CITRATE 50 MCG: 50 INJECTION INTRAMUSCULAR; INTRAVENOUS at 12:16

## 2020-09-15 RX ADMIN — HALOPERIDOL LACTATE 1 MG: 5 INJECTION, SOLUTION INTRAMUSCULAR at 12:29

## 2020-09-15 RX ADMIN — SODIUM CHLORIDE, POTASSIUM CHLORIDE, SODIUM LACTATE AND CALCIUM CHLORIDE: 600; 310; 30; 20 INJECTION, SOLUTION INTRAVENOUS at 11:20

## 2020-09-15 ASSESSMENT — ENCOUNTER SYMPTOMS
BACK PAIN: 1
NERVOUS/ANXIOUS: 0
MEMORY LOSS: 0
HEMOPTYSIS: 0
RESPIRATORY NEGATIVE: 1
SHORTNESS OF BREATH: 0
NAUSEA: 0
BRUISES/BLEEDS EASILY: 0
TREMORS: 1
HEADACHES: 0
ABDOMINAL PAIN: 0
NECK PAIN: 0
HEARTBURN: 0
FEVER: 0
INSOMNIA: 0
COUGH: 1
SPUTUM PRODUCTION: 0
VOMITING: 0
DIZZINESS: 0
MYALGIAS: 1
EYES NEGATIVE: 1
SENSORY CHANGE: 0
FOCAL WEAKNESS: 0
TINGLING: 0
BACK PAIN: 0
CARDIOVASCULAR NEGATIVE: 1
WEIGHT LOSS: 1
POLYDIPSIA: 0
CHILLS: 1
NEUROLOGICAL NEGATIVE: 1
DIAPHORESIS: 0
GASTROINTESTINAL NEGATIVE: 1

## 2020-09-15 ASSESSMENT — LIFESTYLE VARIABLES
TOTAL SCORE: 0
CONSUMPTION TOTAL: NEGATIVE
HAVE YOU EVER FELT YOU SHOULD CUT DOWN ON YOUR DRINKING: NO
SUBSTANCE_ABUSE: 1
TOTAL SCORE: 0
TOTAL SCORE: 0
ALCOHOL_USE: NO
HAVE PEOPLE ANNOYED YOU BY CRITICIZING YOUR DRINKING: NO
AVERAGE NUMBER OF DAYS PER WEEK YOU HAVE A DRINK CONTAINING ALCOHOL: 0
EVER HAD A DRINK FIRST THING IN THE MORNING TO STEADY YOUR NERVES TO GET RID OF A HANGOVER: NO
ON A TYPICAL DAY WHEN YOU DRINK ALCOHOL HOW MANY DRINKS DO YOU HAVE: 0
HOW MANY TIMES IN THE PAST YEAR HAVE YOU HAD 5 OR MORE DRINKS IN A DAY: 0
EVER FELT BAD OR GUILTY ABOUT YOUR DRINKING: NO
DOES PATIENT WANT TO STOP DRINKING: NO

## 2020-09-15 ASSESSMENT — COGNITIVE AND FUNCTIONAL STATUS - GENERAL
STANDING UP FROM CHAIR USING ARMS: A LITTLE
SUGGESTED CMS G CODE MODIFIER DAILY ACTIVITY: CJ
DRESSING REGULAR UPPER BODY CLOTHING: A LITTLE
CLIMB 3 TO 5 STEPS WITH RAILING: A LITTLE
DRESSING REGULAR LOWER BODY CLOTHING: A LITTLE
SUGGESTED CMS G CODE MODIFIER MOBILITY: CK
MOVING FROM LYING ON BACK TO SITTING ON SIDE OF FLAT BED: A LITTLE
WALKING IN HOSPITAL ROOM: A LITTLE
DAILY ACTIVITIY SCORE: 21
MOBILITY SCORE: 19
MOVING TO AND FROM BED TO CHAIR: A LITTLE
HELP NEEDED FOR BATHING: A LITTLE

## 2020-09-15 ASSESSMENT — PAIN DESCRIPTION - PAIN TYPE
TYPE: SURGICAL PAIN

## 2020-09-15 ASSESSMENT — PAIN SCALES - GENERAL: PAIN_LEVEL: 3

## 2020-09-15 NOTE — OP REPORT
DATE OF SERVICE:  09/15/2020    PREOPERATIVE DIAGNOSES:  1.  Infected left antecubital fossa.  2.  Prior irrigation and debridement - now infected right thigh.    POSTOPERATIVE DIAGNOSES:  1.  Infected left antecubital fossa.  2.  Prior irrigation and debridement - now infected right thigh.    OPERATIONS PERFORMED:  1.  Irrigation and debridement, left antecubital fossa with application of   wound VAC.  2.  Exploration of prior I and D, necrotizing fasciitis, right thigh.    OPERATIVE FINDINGS:  1.  Gross pus antecubital fossa, left elbow with necrotic tissue.  2.  Healthy granulating right thigh without evidence of ongoing infection.    OPERATIONS PERFORMED:  1.  Irrigation and debridement to bone, left antecubital fossa with deep   cultures.  2.  Application of left antecubital fossa wound VAC.  3.  Exploration of prior I and D, right thigh without evidence of ongoing deep   or superficial infection - healthy granulating superficial tissue.    PREOPERATIVE CONSENT AND FAMILY CONFERENCE:  The patient was seen today   preoperatively.  Risks, benefits, alternatives were all explained.  Patient   consented for full surgical undertaking, answered all of his questions to his   satisfaction.    ANESTHESIOLOGIST:  Dr. Navarro.    COMPLICATIONS:  None.    SURGEON:  Ayush Martinez MD    ASSISTANT:  Tevin Carballo DO    OPERATION:  The patient was brought to the operating room, awake, alert,   placed on the operating room table in supine position.  The right lower   extremity and left upper extremity was then shaved, scrubbed, prepped and   draped in normal sterile routine fashion.  Time-out, the appropriate extremity   was identified.  Our attention was first turned to the left antecubital   fossa.  There was a fluctuant area just anterior to the antecubital fossa   crease near the lateral epicondyle.  We made a longitudinal incision along the   course of the humerus, lateral aspect of the elbow, and proximal  forearm.    Subcutaneous tissue dissected down to the extensor wad over the upper third of   the forearm.  There was gross pus, but without evidence of intramuscular   infection.  It appeared to be all subcutaneous.  We tracked up the biceps   anteriorly, distal and posterior to the lateral epicondyle.  There was gross   pus.  We took multiple deep cultures.    We now debrided both sharp and dull through skin, subcutaneous tissue, muscle,   fascia, and to the bone.  I then copiously irrigated with a liter of dilute   saline and then a liter of normal saline and applied a wound VAC in standard   fashion.    Our attention now turned to the right thigh.  There was healthy granulating   tissue approximately 12x6 cm in length.  I used a 15 blade with a hemostat to   the femur laterally and there was no evidence of deep or ongoing infection.    The wound was now just scraped cleaned and this was only debridement through   skin, subcutaneous tissue, and to the muscle.  We did not debride over the   periosteum or to the bone.    This wound was now irrigated and dressed sterilely with Xeroform, 4x4s, and   Ace wrap.    The patient was taken to recovery room in stable condition.  No intraoperative   or immediate postoperative complications.  Patient tolerated the procedure   well.       ____________________________________     MD NIKITA CHRISTIAN / SUZI    DD:  09/15/2020 12:02:09  DT:  09/15/2020 13:25:38    D#:  [id number]  Job#:  [id number]

## 2020-09-15 NOTE — PROGRESS NOTES
2 RN Skin Check    2 RN skin check complete with Chevy RN  Devices in place: SCDs.  Skin assessed under devices: yes.  Confirmed pressure ulcers found on: none.  New potential pressure ulcers noted on see note. Wound consult placed Yes.  The following interventions in place Pillows, pt turns self frequently in bed independently, silicone dressings, RN wound protocol initiated, nutrition consult, wound team consulted    · Right lateral knee, right medial ankle circular wounds scabbed-JEWELS   · Left wrist circular wound-JEWESL  · 2 circular wounds to posterior back with scant serosang drainage-silicone dressings applied  · Behind ears, b/l elbows and b/l heels-pink and blanching  · Scrotum enlarged due to hernia-no skin breakdown  · Dressing to right thigh from ID-c/d/i  · Wound vac to left arm

## 2020-09-15 NOTE — ANESTHESIA QCDR
2019 [facility] Clinical Data Registry (for Quality Improvement)     Postoperative nausea/vomiting risk protocol (Adult = 18 yrs and Pediatric 3-17 yrs)- (430 and 463)  General inhalation anesthetic (NOT TIVA) with PONV risk factors: No  Provision of anti-emetic therapy with at least 2 different classes of agents: N/A  Patient DID NOT receive anti-emetic therapy and reason is documented in Medical Record: N/A    Multimodal Pain Management- (477)  Non-emergent surgery AND patient age >= 18: No  Use of Multimodal Pain Management, two or more drugs and/or interventions, NOT including systemic opioids:   Exception: Documented allergy to multiple classes of analgesics:     Smoking Abstinence (404)  Patient is current smoker (cigarette, pipe, e-cig, marijuanna): Yes  Elective Surgery: No  Abstinence instructions provided prior to day of surgery:   Patient abstained from smoking on day of surgery:     Pre-Op Beta-Blocker in Isolated CABG (44)  Isolated CABG AND patient age >= 18: No  Beta-blocker admin within 24 hours of surgical incision:   Exception:of medical reason(s) for not administering beta blocker within 24 hours prior to surgical incision (e.g., not  indicated,other medical reason):     PACU assessment of acute postoperative pain prior to Anesthesia Care End- Applies to Patients Age = 18- (ABG7)  Initial PACU pain score is which of the following: < 7/10  Patient unable to report pain score: N/A    Post-anesthetic transfer of care checklist/protocol to PACU/ICU- (426 and 427)  Upon conclusion of case, patient transferred to which of the following locations: PACU/Non-ICU  Use of transfer checklist/protocol: Yes  Exclusion: Service Performed in Patient Hospital Room (and thus did not require transfer): N/A  Unplanned admission to ICU related to anesthesia service up through end of PACU care- (MD51)  Unplanned admission to ICU (not initially anticipated at anesthesia start time): No

## 2020-09-15 NOTE — ANESTHESIA PROCEDURE NOTES
Airway    Date/Time: 9/15/2020 11:24 AM  Performed by: Apolinar Godoy M.D.  Authorized by: Apolinar Godoy M.D.     Location:  OR  Urgency:  Elective  Difficult Airway: No    Indications for Airway Management:  Anesthesia      Spontaneous Ventilation: absent    Sedation Level:  Deep  Preoxygenated: Yes    Final Airway Type:  Supraglottic airway  Final Supraglottic Airway:  Standard LMA    SGA Size:  5  Number of Attempts at Approach:  1  Number of Other Approaches Attempted:  0

## 2020-09-15 NOTE — ANESTHESIA TIME REPORT
Anesthesia Start and Stop Event Times     Date Time Event    9/15/2020 1100 Ready for Procedure     1117 Anesthesia Start     1208 Anesthesia Stop        Responsible Staff  09/15/20    Name Role Begin End    Apolinar Godoy M.D. Anesth 1117 1208        Preop Diagnosis (Free Text):  Pre-op Diagnosis     left elbow abscess        Preop Diagnosis (Codes):    Post op Diagnosis  Abscess of bursa of left elbow      Premium Reason  Non-Premium    Comments:

## 2020-09-15 NOTE — ASSESSMENT & PLAN NOTE
Patient denies current use.  - barrier for acceptance to SNF; not a candidate for outpatient infusion  - avoid IV narcotics as possible; morphine prn for dressing changes okay

## 2020-09-15 NOTE — PROGRESS NOTES
Attending Hospitalist today is Dr Miles starting at 0700. Please call this Physician for orders, updates and questions.

## 2020-09-15 NOTE — PROGRESS NOTES
Ortho staff  HP reviewed, agree with Kait Chavez note  Needs ID right leg and left elbow  All explained  Will have prolonged hospitalization  RBA all explained  Answered all questions  Martinez

## 2020-09-15 NOTE — ANESTHESIA PREPROCEDURE EVALUATION
Relevant Problems   CARDIAC   (+) Murmur         (+) Chronic hepatitis C virus infection (HCC)      Other   (+) Pyogenic arthritis of left elbow (HCC)       Physical Exam    Airway   Mallampati: II  TM distance: >3 FB  Neck ROM: full       Cardiovascular - normal exam  Rhythm: regular  Rate: normal  (-) murmur     Dental - normal exam  (+) upper dentures, lower dentures           Pulmonary - normal exam  Breath sounds clear to auscultation     Abdominal    Neurological - normal exam                 Anesthesia Plan    ASA 2- EMERGENT   ASA physical status emergent criteria: acutely contaminated wound or identified infection source    Plan - general       Airway plan will be LMA      Plan Factors:   Patient was previously instructed to abstain from smoking on day of procedure.  Patient did not smoke on day of procedure.      Induction: intravenous    Postoperative Plan: Postoperative administration of opioids is intended.    Pertinent diagnostic labs and testing reviewed    Informed Consent:    Anesthetic plan and risks discussed with patient.    Use of blood products discussed with: patient whom consented to blood products.

## 2020-09-15 NOTE — OR NURSING
Patient A+Ox4. States pain 8/10. Cont iv and po meds given.  VSS. Wound vac on left elbow on and functions.  Right thigh dressing clean and dry.  Cont to monitor.  Patient states he does not want me to update anyone.

## 2020-09-15 NOTE — ED NOTES
Rounded on pt. Pt resting in bed, no distress noted. No further needs at this time. Call light within reach.

## 2020-09-15 NOTE — ASSESSMENT & PLAN NOTE
- Ortho and ID following, appreciate recs  - s/p I&D left antecubital fossa on 9/15  - completed Vancomycin through 9/30  - continue with bactrim x4 weeks starting 10/1, end date 10/28  - culture positive for beta hemolytic group A strep and MRSA  - pain control  - wound care

## 2020-09-15 NOTE — ED PROVIDER NOTES
"ED Provider Note    CHIEF COMPLAINT  Chief Complaint   Patient presents with   • Open Wound   • Wound Check       HPI  Gonsalo Hunt is a 60 y.o. male who presents for evaluation of several complaints including right thigh pain redness and oozing of pus and exquisite tenderness on the left elbow.  The patient has a known history of heroin use.  He was admitted at this facility several months ago for necrotizing fasciitis of the right lower extremity.  He went to the operating room for washout.  Wound VAC was apparently in place for a while but has not been on for several months.  He stopped following up.  He is intermittently homeless.  He denies ongoing heroin use.  He thinks he \"slept wrong\" on his left elbow and has significant erythema and pain in his left elbow and inability to fully flex and extend due to discomfort    REVIEW OF SYSTEMS  See HPI for further details.  Positive for subjective fevers chills right thigh pain left elbow pain all other systems are negative.     PAST MEDICAL HISTORY  Past Medical History:   Diagnosis Date   • Back pain    • Heroin use    • Necrotizing fasciitis of lower leg (HCC)        FAMILY HISTORY  Noncontributory    SOCIAL HISTORY  Social History     Socioeconomic History   • Marital status: Single     Spouse name: Not on file   • Number of children: Not on file   • Years of education: Not on file   • Highest education level: Not on file   Occupational History   • Not on file   Social Needs   • Financial resource strain: Hard   • Food insecurity     Worry: Never true     Inability: Never true   • Transportation needs     Medical: Yes     Non-medical: No   Tobacco Use   • Smoking status: Current Every Day Smoker     Packs/day: 1.00     Types: Cigarettes   • Smokeless tobacco: Never Used   Substance and Sexual Activity   • Alcohol use: Not Currently     Comment: rarely   • Drug use: Not Currently     Types: Marijuana     Comment: previous IV meth   • Sexual activity: Not on file "   Lifestyle   • Physical activity     Days per week: Not on file     Minutes per session: Not on file   • Stress: Not on file   Relationships   • Social connections     Talks on phone: Not on file     Gets together: Not on file     Attends Rastafari service: Not on file     Active member of club or organization: Not on file     Attends meetings of clubs or organizations: Not on file     Relationship status: Not on file   • Intimate partner violence     Fear of current or ex partner: Not on file     Emotionally abused: Not on file     Physically abused: Not on file     Forced sexual activity: Not on file   Other Topics Concern   • Not on file   Social History Narrative   • Not on file     History of IV drug use  SURGICAL HISTORY  Past Surgical History:   Procedure Laterality Date   • INCISION AND DRAINAGE GENERAL Right 1/20/2020    Procedure: INCISION AND DRAINAGE, WOUND VAC CHANGE;  Surgeon: Amari Blanc M.D.;  Location: SURGERY [facility];  Service: Orthopedics   • IRRIGATION & DEBRIDEMENT ORTHO Right 1/18/2020    Procedure: IRRIGATION AND DEBRIDEMENT, WOUND- THIGH AND WOUND VAC CHANGE;  Surgeon: Amari Blanc M.D.;  Location: SURGERY [facility];  Service: Orthopedics   • IRRIGATION & DEBRIDEMENT ORTHO Right 1/16/2020    Procedure: RIGHT THIGH ABSCESS INCISION AND DRAINAGE, WOUND VAC PLACEMENT;  Surgeon: Amari Blanc M.D.;  Location: SURGERY [facility];  Service: Orthopedics   • OTHER ABDOMINAL SURGERY      gall bladder   • OTHER ORTHOPEDIC SURGERY      laminectomy 1990       CURRENT MEDICATIONS  Home Medications     Reviewed by Purnima Hernandez R.N. (Registered Nurse) on 09/14/20 at 1851  Med List Status: <None>   Medication Last Dose Status   acetaminophen (TYLENOL) 500 MG Tab  Active   gabapentin (NEURONTIN) 100 MG Cap  Active   heparin 5000 UNIT/ML Solution  Active   lidocaine (LIDODERM) 5 % Patch  Active   lidocaine (XYLOCAINE) 4 % Solution  Active   miconazole 2%-zinc oxide  "(INZO) 2 % Cream topical cream  Active   Multiple Vitamins-Minerals (MULTIVITAMIN PO)  Active   polyethylene glycol/lytes (MIRALAX) Pack  Active   senna-docusate (PERICOLACE OR SENOKOT S) 8.6-50 MG Tab  Active   traZODone (DESYREL) 50 MG Tab  Active                ALLERGIES  Allergies   Allergen Reactions   • Codeine Rash     Rash  Historical tolerance to hydromorphone   • Spinach    • Pcn [Penicillins] Unspecified     Unknown reaction. Tolerated ceftriaxone on 10/9/19         PHYSICAL EXAM  VITAL SIGNS: /73   Pulse 76   Temp 37 °C (98.6 °F) (Temporal)   Resp 18   Ht 1.93 m (6' 4\")   Wt 105.7 kg (233 lb 0.4 oz)   SpO2 96%   BMI 28.36 kg/m²       Constitutional: Patient appears chronically ill   hENT: Normocephalic, Atraumatic, Bilateral external ears normal, Oropharynx moist, No oral exudates, Nose normal.   Eyes: PERRLA, EOMI, Conjunctiva normal, No discharge.   Neck: Normal range of motion, No tenderness, Supple, No stridor.   Cardiovascular: Normal heart rate, Normal rhythm, No murmurs, No rubs, No gallops.   Thorax & Lungs: Normal breath sounds, No respiratory distress, No wheezing, No chest tenderness.   Abdomen: Bowel sounds normal, Soft, No tenderness, No masses, No pulsatile masses.   Skin: Right lower extremity exam is notable for a 15 x 25 cm area of erythema with some pus consistent with his prior area of wound on the right lateral thigh.  Back: No tenderness, No CVA tenderness.   Extremities: Left elbow exam is notable for circumferential swelling from the mid humerus down to the mid forearm with fluctuance.  He is unable to extend or flex the elbow   neurologic: Alert & oriented x 3, Normal motor function, Normal sensory function, No focal deficits noted.   Psychiatric: Affect normal, Judgment normal, Mood normal.     DX-ELBOW-COMPLETE 3+ LEFT   Final Result      Soft tissue swelling without acute osseous abnormality.      DX-FEMUR-2+ RIGHT   Final Result      No acute osseous abnormality. "        Results for orders placed or performed during the hospital encounter of 09/14/20   CREATINE KINASE   Result Value Ref Range    CPK Total 25 0 - 154 U/L   CRP QUANTITIVE (NON-CARDIAC)   Result Value Ref Range    Stat C-Reactive Protein 12.42 (H) 0.00 - 0.75 mg/dL   LACTIC ACID   Result Value Ref Range    Lactic Acid 1.2 0.5 - 2.0 mmol/L   CBC WITH DIFFERENTIAL   Result Value Ref Range    WBC 14.9 (H) 4.8 - 10.8 K/uL    RBC 5.41 4.70 - 6.10 M/uL    Hemoglobin 15.4 14.0 - 18.0 g/dL    Hematocrit 45.8 42.0 - 52.0 %    MCV 84.7 81.4 - 97.8 fL    MCH 28.5 27.0 - 33.0 pg    MCHC 33.6 (L) 33.7 - 35.3 g/dL    RDW 48.4 35.9 - 50.0 fL    Platelet Count 279 164 - 446 K/uL    MPV 9.5 9.0 - 12.9 fL    Neutrophils-Polys 81.10 (H) 44.00 - 72.00 %    Lymphocytes 9.90 (L) 22.00 - 41.00 %    Monocytes 7.00 0.00 - 13.40 %    Eosinophils 1.20 0.00 - 6.90 %    Basophils 0.30 0.00 - 1.80 %    Immature Granulocytes 0.50 0.00 - 0.90 %    Nucleated RBC 0.00 /100 WBC    Neutrophils (Absolute) 12.08 (H) 1.82 - 7.42 K/uL    Lymphs (Absolute) 1.48 1.00 - 4.80 K/uL    Monos (Absolute) 1.05 (H) 0.00 - 0.85 K/uL    Eos (Absolute) 0.18 0.00 - 0.51 K/uL    Baso (Absolute) 0.05 0.00 - 0.12 K/uL    Immature Granulocytes (abs) 0.08 0.00 - 0.11 K/uL    NRBC (Absolute) 0.00 K/uL   Comp Metabolic Panel   Result Value Ref Range    Sodium 133 (L) 135 - 145 mmol/L    Potassium 4.0 3.6 - 5.5 mmol/L    Chloride 93 (L) 96 - 112 mmol/L    Co2 25 20 - 33 mmol/L    Anion Gap 15.0 7.0 - 16.0    Glucose 104 (H) 65 - 99 mg/dL    Bun 21 8 - 22 mg/dL    Creatinine 0.87 0.50 - 1.40 mg/dL    Calcium 9.6 8.5 - 10.5 mg/dL    AST(SGOT) 10 (L) 12 - 45 U/L    ALT(SGPT) 11 2 - 50 U/L    Alkaline Phosphatase 103 (H) 30 - 99 U/L    Total Bilirubin 0.8 0.1 - 1.5 mg/dL    Albumin 4.1 3.2 - 4.9 g/dL    Total Protein 7.9 6.0 - 8.2 g/dL    Globulin 3.8 (H) 1.9 - 3.5 g/dL    A-G Ratio 1.1 g/dL   COVID/SARS CoV-2 PCR    Specimen: Nasopharyngeal; Respirate   Result Value Ref Range     COVID Order Status Received    SARS-CoV-2, PCR (In-House)   Result Value Ref Range    SARS-CoV-2 Source NP Swab    ESTIMATED GFR   Result Value Ref Range    GFR If African American >60 >60 mL/min/1.73 m 2    GFR If Non African American >60 >60 mL/min/1.73 m 2       COURSE & MEDICAL DECISION MAKING  Pertinent Labs & Imaging studies reviewed. (See chart for details)  Septic protocol is initiated.  The patient will be given lactated Ringer's ringer.  Radiographs do not demonstrate any soft tissue gas.  His L RI Nec score is 2 suggesting low risk of necrotizing fasciitis.  There is no soft tissue gas on his radiograph.  He does not clinically appear to be septic.  Blood and wound cultures have been obtained.  Consultation with orthopedics was obtained with renal orthopedics.  The patient had prior ankle relationship with [facility] orthopedics with Dr. Blanc related to a right thigh wound almost 10 months ago.  The patient will be admitted to internal medicine.  Consultation with orthopedics was made as well.  We will hold off antibiotics until they decide about intraoperative cultures etc.    FINAL IMPRESSION  1.  Left forearm and elbow cellulitis with possible abscess and septic joint  2.  Chronic right thigh wound with cellulitis         Electronically signed by: Elijah Mak M.D., 9/14/2020 7:53 PM

## 2020-09-15 NOTE — PROGRESS NOTES
Hospital Medicine Daily Progress Note    Date of Service  9/15/2020    Chief Complaint  60 y.o. homeless male admitted 9/14/2020 with multiple abscesses on upper and lower extremity.  He has a 4 mm ulcer with serosanguineous drainage on his left lateral forearm.    He has a history of IV drug abuse, MRSA cellulitis, right lower extremity necrotizing fasciitis s/p fasciotomy in January 2020.    Hospital Course  Vancomycin and Rocephin was started.  Blood cultures pending.  Orthopedic surgery were consulted from the ER.  Scheduled for OR today for irrigation and debridement of left antecubital fossa and superficial right thigh wound.    Interval Problem Update  Right hip wound culture grew beta-hemolytic group B strep    Consultants/Specialty  Orthopedic surgery    Code Status  DNAR/DNI    Disposition  Home when stable    Review of Systems  Review of Systems   Constitutional: Positive for malaise/fatigue.   Eyes: Negative.    Respiratory: Negative.    Cardiovascular: Negative.    Gastrointestinal: Negative.    Genitourinary: Negative.    Musculoskeletal: Positive for back pain.   Skin:        Pain in left UE surgical wound   Neurological: Negative.    Psychiatric/Behavioral:        Chronic depression. Not suicidal        Physical Exam  Temp:  [36.2 °C (97.2 °F)-37 °C (98.6 °F)] 36.6 °C (97.9 °F)  Pulse:  [54-96] 63  Resp:  [14-20] 18  BP: ()/(48-73) 116/69  SpO2:  [91 %-100 %] 97 %    Physical Exam  HENT:      Head: Normocephalic.      Nose: Nose normal.      Mouth/Throat:      Mouth: Mucous membranes are moist.   Neck:      Musculoskeletal: Normal range of motion.   Cardiovascular:      Rate and Rhythm: Normal rate.   Pulmonary:      Effort: Pulmonary effort is normal.   Abdominal:      Palpations: Abdomen is soft.   Musculoskeletal: Normal range of motion.   Skin:     Comments: Left arm and left wound    Neurological:      General: No focal deficit present.      Mental Status: He is alert.   Psychiatric:          Mood and Affect: Mood normal.         Fluids    Intake/Output Summary (Last 24 hours) at 9/15/2020 1820  Last data filed at 9/15/2020 1208  Gross per 24 hour   Intake 900 ml   Output 10 ml   Net 890 ml       Laboratory  Recent Labs     09/14/20 2030   WBC 14.9*   RBC 5.41   HEMOGLOBIN 15.4   HEMATOCRIT 45.8   MCV 84.7   MCH 28.5   MCHC 33.6*   RDW 48.4   PLATELETCT 279   MPV 9.5     Recent Labs     09/14/20 2030   SODIUM 133*   POTASSIUM 4.0   CHLORIDE 93*   CO2 25   GLUCOSE 104*   BUN 21   CREATININE 0.87   CALCIUM 9.6                   Imaging  DX-ELBOW-COMPLETE 3+ LEFT   Final Result      Soft tissue swelling without acute osseous abnormality.      DX-FEMUR-2+ RIGHT   Final Result      No acute osseous abnormality.      EC-ECHOCARDIOGRAM COMPLETE W/O CONT    (Results Pending)        Assessment/Plan  Pyogenic arthritis of left elbow (HCC)  Assessment & Plan  Status post irrigation and debridement of left antecubital fossa.  Wound VAC in place.  Continue Rocephin and vancomycin.  Continue pain management as indicated.  Orthopedic surgery following    Murmur  Assessment & Plan  History of IV drug abuse with concern for bacteremia and endocarditis.  Blood culture results and echocardiogram are pending    Leg wound, right  Assessment & Plan  Underwent irrigation and debridement of right wound.  Culture grew beta-hemolytic group B strep.  On ceftriaxone.  Pain control as needed    Homeless- (present on admission)  Assessment & Plan  Social work and case management to assist in discharge planning       VTE prophylaxis:

## 2020-09-15 NOTE — ED NOTES
Med rec complete via interview with pt at bedside. Pt sates that he is not tk any medications within the past few wks. Allergies reviewed. Pt denies antibiotic use in past 14 days.    Home pharmacy [address]

## 2020-09-15 NOTE — OR NURSING
Patient states he feels alot better now.  VSS. Dressing to right thigh clean and dry.  Left elbow wound vac in place and functioning well.  Waiting for room clean.  Plan to transfer to [facility] when ready.  Belongings brought to bedside and patient identified bags x2

## 2020-09-15 NOTE — OR SURGEON
Immediate Post OP Note    PreOp Diagnosis: infected left antecubital fossa, superficial right thigh    PostOp Diagnosis: same    Procedure(s):  IRRIGATION AND DEBRIDEMENT, WOUND LEFT ARM AND RIGHT THIGH. WOUND VAC PLACEMENT ON LEFT ARM - Wound Class: Dirty or Infected    Surgeon(s):  POLLO Sorto M.D.surgeon    Anesthesiologist/Type of Anesthesia:  Anesthesiologist: Apolinar Godoy M.D./General    Surgical Staff:  Circulator: Enzo Cabezas R.N.  Relief Circulator: Ruth Massey R.N.  Scrub Person: Damaris Beltran Assist: Jasmin Orozco    Specimens removed if any:  ID Type Source Tests Collected by Time Destination   1 : Left elbow Other Other AEROBIC/ANAEROBIC CULTURE (SURGERY) Ayush Martinez M.D. 9/15/2020 11:42 AM        Estimated Blood Loss: 10ccs    Findings: as described    Complications: none        9/15/2020 12:02 PM Ayush Martinez M.D.

## 2020-09-15 NOTE — H&P
Hospital Medicine History & Physical Note    Date of Service  9/15/2020    Primary Care Physician  Pcp Pt States None    Consultants  Orthopedic surgery    Code Status  DNAR/DNI    Chief Complaint  Chief Complaint   Patient presents with   • Open Wound   • Wound Check       History of Presenting Illness  60 y.o. male who presented 9/14/2020 with history of IV drug abuse, MRSA cellulitis and right lower extremity necrotizing fasciitis status post fasciotomy January 2020.  He presents with approximately 48 hours of the sudden appearance of multiple abscesses on the upper and lower extremity.  The worst being on his left lateral forearm which is markedly  edematous though without crepitus it is associated with reduced range of motion, a 4 mm ulcer with serosanguineous drainage.    In the emergency department he has a CBC revealing leukocytosis a plain film radiograph of the left arm confirming edema without subcutaneous gas.  Orthopedic surgery is consulted for septic arthritis, he is started on vancomycin and referred to the hospitalist for admission.  He denies chest pain nausea vomiting shortness of breath.    Review of Systems  Review of Systems   Constitutional: Positive for chills, malaise/fatigue and weight loss. Negative for diaphoresis and fever.   HENT: Negative.    Eyes: Negative.    Respiratory: Positive for cough. Negative for hemoptysis, sputum production and shortness of breath.    Cardiovascular: Negative.    Gastrointestinal: Negative for abdominal pain, heartburn, nausea and vomiting.   Genitourinary: Negative.    Musculoskeletal: Positive for myalgias. Negative for back pain, joint pain and neck pain.   Skin: Positive for itching and rash.   Neurological: Positive for tremors. Negative for dizziness, tingling, sensory change, focal weakness and headaches.   Endo/Heme/Allergies: Negative for environmental allergies and polydipsia. Does not bruise/bleed easily.   Psychiatric/Behavioral: Positive for  substance abuse. Negative for memory loss. The patient is not nervous/anxious and does not have insomnia.        Past Medical History   has a past medical history of Back pain, Heroin use, and Necrotizing fasciitis of lower leg (HCC).    Surgical History   has a past surgical history that includes other orthopedic surgery; other abdominal surgery; irrigation & debridement ortho (Right, 1/16/2020); irrigation & debridement ortho (Right, 1/18/2020); and incision and drainage general (Right, 1/20/2020).     Family History  family history includes No Known Problems in his father and mother.     Social History   reports that he has been smoking cigarettes. He has been smoking about 1.00 pack per day. He has never used smokeless tobacco. He reports previous alcohol use. He reports previous drug use. Drug: Marijuana.    Allergies  Allergies   Allergen Reactions   • Codeine Rash     Rash  Historical tolerance to hydromorphone   • Spinach    • Pcn [Penicillins] Unspecified     Unknown reaction. Tolerated ceftriaxone on 10/9/19         Medications  None       Physical Exam  Temp:  [37 °C (98.6 °F)] 37 °C (98.6 °F)  Pulse:  [58-96] 58  Resp:  [16-18] 18  BP: (101-113)/(65-73) 106/72  SpO2:  [94 %-100 %] 100 %    Physical Exam  Vitals signs and nursing note reviewed. Exam conducted with a chaperone present.   Constitutional:       General: He is in acute distress.      Appearance: He is ill-appearing and toxic-appearing. He is not diaphoretic.      Comments: Acute and chronically ill-appearing with poor dentition  6 or 7 large  abscesses on the extremities suggestive of septic emboli   HENT:      Head: Normocephalic and atraumatic.      Nose: Nose normal.      Mouth/Throat:      Mouth: Mucous membranes are dry.      Comments: Midline lower lip ulcer  Eyes:      Extraocular Movements: Extraocular movements intact.      Conjunctiva/sclera: Conjunctivae normal.      Pupils: Pupils are equal, round, and reactive to light.   Neck:       Musculoskeletal: Normal range of motion and neck supple. No neck rigidity or muscular tenderness.      Vascular: No carotid bruit.   Cardiovascular:      Rate and Rhythm: Normal rate and regular rhythm.      Pulses: Normal pulses.      Heart sounds: Murmur present. No gallop.    Pulmonary:      Effort: Pulmonary effort is normal. No respiratory distress.      Breath sounds: Normal breath sounds. No stridor. No rhonchi.   Abdominal:      General: Abdomen is flat. Bowel sounds are normal. There is no distension.      Palpations: Abdomen is soft.      Tenderness: There is no abdominal tenderness. There is no guarding.   Musculoskeletal:      Comments: Left upper extremity with +2 edema, 4 mm ulcer on the anterior lateral aspect of the antecubital fossa withserosanguineous drainage   erythema spreading from mid upper arm to mid forearm  Flexion and extension at the elbow is limited by pain and edema   Skin:     General: Skin is warm.      Capillary Refill: Capillary refill takes less than 2 seconds.      Findings: Erythema, lesion and rash present.      Comments: Left upper extremity with +2 edema, 4 mm ulcer on the anterior lateral aspect of the antecubital fossa withserosanguineous drainage   erythema spreading from mid upper arm to mid forearm  Flexion and extension at the elbow is limited by pain and edema   Neurological:      General: No focal deficit present.      Mental Status: He is alert and oriented to person, place, and time.   Psychiatric:         Mood and Affect: Mood normal.         Thought Content: Thought content normal.         Laboratory:  Recent Labs     09/14/20 2030   WBC 14.9*   RBC 5.41   HEMOGLOBIN 15.4   HEMATOCRIT 45.8   MCV 84.7   MCH 28.5   MCHC 33.6*   RDW 48.4   PLATELETCT 279   MPV 9.5     Recent Labs     09/14/20 2030   SODIUM 133*   POTASSIUM 4.0   CHLORIDE 93*   CO2 25   GLUCOSE 104*   BUN 21   CREATININE 0.87   CALCIUM 9.6     Recent Labs     09/14/20 2030   ALTSGPT 11    ASTSGOT 10*   ALKPHOSPHAT 103*   TBILIRUBIN 0.8   GLUCOSE 104*         No results for input(s): NTPROBNP in the last 72 hours.      No results for input(s): TROPONINT in the last 72 hours.    Imaging:  DX-ELBOW-COMPLETE 3+ LEFT   Final Result      Soft tissue swelling without acute osseous abnormality.      DX-FEMUR-2+ RIGHT   Final Result      No acute osseous abnormality.      EC-ECHOCARDIOGRAM COMPLETE W/ CONT    (Results Pending)         Assessment/Plan:  I anticipate this patient will require at least two midnights for appropriate medical management, necessitating inpatient admission.    Pyogenic arthritis of left elbow (HCC)  Assessment & Plan  No crepitus no gas in the tissues,   history of MRSA, orthopedic surgery consulted for OR in the a.m.  Empiric vancomycin and Rocephin ordered.  Symptomatic management      Murmur  Assessment & Plan  History of IV drug abuse with concern for bacteremia and endocarditis.    Empiric vancomycin and Rocephin  Follow-up blood cultures and 2D echo    Homeless- (present on admission)  Assessment & Plan  Discharge planning consult    IV drug abuse (HCC)- (present on admission)  Assessment & Plan  Unclear credibility though patient denies current use may anticipate heroin withdrawal

## 2020-09-15 NOTE — PROGRESS NOTES
Pt oriented x 4.  No complaints of pain at this time.  Surgery scheduled for 1030.  Pt NPO.  Call light within reach.

## 2020-09-15 NOTE — ED TRIAGE NOTES
Chief Complaint   Patient presents with   • Open Wound   • Wound Check   Pt to triage in NAD.  Pt reports left arm redness/swelling x 2 days and that he has a would to his right leg that is not healing and worsening.  Pt wearing pants and unable to visualize leg wound but has a history of necrotizing fascitis with I&D and previous wound vac that is not in place.  Pt educated on triage process and instructed to notify triage RN of any change in status.

## 2020-09-15 NOTE — ANESTHESIA POSTPROCEDURE EVALUATION
Patient: Gonsalo Hunt    Procedure Summary     Date: 09/15/20 Room / Location: [place] [id number] / SURGERY [facility]    Anesthesia Start: 1117 Anesthesia Stop: 1208    Procedure: IRRIGATION AND DEBRIDEMENT, WOUND LEFT ARM AND RIGHT THIGH. WOUND VAC PLACEMENT ON LEFT ARM (Left Elbow) Diagnosis: (left elbow abscess)    Surgeon: Ayush Martinez M.D. Responsible Provider: Apolinar Godoy M.D.    Anesthesia Type: general ASA Status: 2 - Emergent          Final Anesthesia Type: general  Last vitals  BP   Blood Pressure: (!) 98/56    Temp   37 °C (98.6 °F)    Pulse   Pulse: 64   Resp   17    SpO2   98 %      Anesthesia Post Evaluation    Patient location during evaluation: PACU  Patient participation: complete - patient participated  Level of consciousness: awake and alert  Pain score: 3    Airway patency: patent  Anesthetic complications: no  Cardiovascular status: hemodynamically stable  Respiratory status: acceptable  Hydration status: euvolemic    PONV: none           Nurse Pain Score: 2 (NPRS)

## 2020-09-15 NOTE — CONSULTS
9/14/2020    Time Called: 2157  Time Arrived: 2205    Gonsalo Hunt is a 60 y.o. male who presents with a left elbow abscess and is here for management. Patient states he began with left elbow wound over the mobile wad 2 days ago. It has progressed since that time and become increasingly painful, red, and swollen. It has recently began leaking purulent material. He has had history of necrotizing soft tissue infection earlier this year treated by Dr. Blanc in his right thigh. Patient denies numbness, parasthesias. Denies fevers, chills, or systemic signs of infection. He also states his right thigh has worsened over the last 2 weeks as he has not been caring for it.    Past Medical History:   Diagnosis Date   • Back pain    • Heroin use    • Necrotizing fasciitis of lower leg (HCC)        Past Surgical History:   Procedure Laterality Date   • INCISION AND DRAINAGE GENERAL Right 1/20/2020    Procedure: INCISION AND DRAINAGE, WOUND VAC CHANGE;  Surgeon: Amari Blanc M.D.;  Location: SURGERY [facility];  Service: Orthopedics   • IRRIGATION & DEBRIDEMENT ORTHO Right 1/18/2020    Procedure: IRRIGATION AND DEBRIDEMENT, WOUND- THIGH AND WOUND VAC CHANGE;  Surgeon: Amari Blanc M.D.;  Location: SURGERY [facility];  Service: Orthopedics   • IRRIGATION & DEBRIDEMENT ORTHO Right 1/16/2020    Procedure: RIGHT THIGH ABSCESS INCISION AND DRAINAGE, WOUND VAC PLACEMENT;  Surgeon: Amari Blanc M.D.;  Location: SURGERY [facility];  Service: Orthopedics   • OTHER ABDOMINAL SURGERY      gall bladder   • OTHER ORTHOPEDIC SURGERY      laminectomy 1990       Medications  No current facility-administered medications on file prior to encounter.      Current Outpatient Medications on File Prior to Encounter   Medication Sig Dispense Refill   • acetaminophen (TYLENOL) 500 MG Tab Take 2 Tabs by mouth 3 times a day. 30 Tab 0   • gabapentin (NEURONTIN) 100 MG Cap Take 1 Cap by mouth 2 Times a Day. 90 Cap    • heparin  5000 UNIT/ML Solution Inject 1 mL as instructed every 8 hours.  0   • lidocaine (LIDODERM) 5 % Patch Apply 1 Patch to skin as directed every 24 hours. 10 Patch    • lidocaine (XYLOCAINE) 4 % Solution Apply 1 Application to affected area(s) as needed (Wound Care). 1 Bottle    • miconazole 2%-zinc oxide (INZO) 2 % Cream topical cream Apply 1 g to affected area(s) every 6 hours. Apply to buttocks.     • polyethylene glycol/lytes (MIRALAX) Pack Take 1 Packet by mouth every day.  3   • senna-docusate (PERICOLACE OR SENOKOT S) 8.6-50 MG Tab Take 2 Tabs by mouth 2 Times a Day. 30 Tab 0   • traZODone (DESYREL) 50 MG Tab Take 1 Tab by mouth every bedtime. 30 Tab 3   • Multiple Vitamins-Minerals (MULTIVITAMIN PO) Take 1 Tab by mouth every day.         Allergies  Codeine, Spinach, and Pcn [penicillins]    ROS  Left elbow pain, redness, swelling. Right thigh wound. All other systems were reviewed and found to be negative    Family History   Problem Relation Age of Onset   • No Known Problems Mother    • No Known Problems Father        Social History     Socioeconomic History   • Marital status: Single     Spouse name: Not on file   • Number of children: Not on file   • Years of education: Not on file   • Highest education level: Not on file   Occupational History   • Not on file   Social Needs   • Financial resource strain: Hard   • Food insecurity     Worry: Never true     Inability: Never true   • Transportation needs     Medical: Yes     Non-medical: No   Tobacco Use   • Smoking status: Current Every Day Smoker     Packs/day: 1.00     Types: Cigarettes   • Smokeless tobacco: Never Used   Substance and Sexual Activity   • Alcohol use: Not Currently     Comment: rarely   • Drug use: Not Currently     Types: Marijuana     Comment: previous IV meth   • Sexual activity: Not on file   Lifestyle   • Physical activity     Days per week: Not on file     Minutes per session: Not on file   • Stress: Not on file   Relationships   •  "Social connections     Talks on phone: Not on file     Gets together: Not on file     Attends [employer] service: Not on file     Active member of club or organization: Not on file     Attends meetings of clubs or organizations: Not on file     Relationship status: Not on file   • Intimate partner violence     Fear of current or ex partner: Not on file     Emotionally abused: Not on file     Physically abused: Not on file     Forced sexual activity: Not on file   Other Topics Concern   • Not on file   Social History Narrative   • Not on file       Physical Exam  Vitals  /73   Pulse 76   Temp 37 °C (98.6 °F) (Temporal)   Resp 18   Ht 1.93 m (6' 4\")   Wt 105.7 kg (233 lb 0.4 oz)   SpO2 96%   General: Well Developed, Well Nourished, no acute distress  HEENT: Normocephalic, atraumatic  Eyes: Anicteric, PERRLA, EOMI  Neck: Supple, nontender, no masses  Lungs: CTA, no wheezes or crackles  Heart: RRR, no murmurs, rubs or gallops  Abdomen: Soft, NT, ND  Pelvis: Stable to AP and Lateral Compression  Skin: Intact, no open wounds  Extremities:   LUE:    There is significant swelling and induration over the lateral elbow with associated tenderness. There is an area which is not actively draining but does appear to have been draining purulent fluid   There is an 8x4cm area of induration and underlying soft tissue swelling concerning for abscess. No expressible drainage   There are multiple circular wounds without underlying fluctuance on the left wrist   No subcutaneous emphysema or crepitus   No pain with pronation/supination but there is pain with elbow flexion/extension   Motor/sensory intact to AIN, PIN, Ulnar, Radial, Median   2+ Radial pulse with brisk capillary refill to all digits    RLE:   There is circular wound present over the medial ankle without active drainage or induration   There is large wound on the lateral thigh from prior surgery for necrotizing infection. The wound has healthy granulation bed, " however, there is surrounding erythema and a small area of drainage nearby to the prior surgical incision. No palpable underlying fluctuance   Compartments soft and compressible. No palpable subcutaneous emphysema    Motor intact EHL/FHL/GSC/TA   SILT DP/SP/Kamar/Saph/Tib   1+ DP pulse, brisk capillary refill    Radiographs:  DX-ELBOW-COMPLETE 3+ LEFT   Final Result      Soft tissue swelling without acute osseous abnormality.      DX-FEMUR-2+ RIGHT   Final Result      No acute osseous abnormality.        There is soft tissue swelling without signs of subcutaneous emphysema on x-ray      Laboratory Values  Recent Labs     09/14/20 2030   WBC 14.9*   RBC 5.41   HEMOGLOBIN 15.4   HEMATOCRIT 45.8   MCV 84.7   MCH 28.5   MCHC 33.6*   RDW 48.4   PLATELETCT 279   MPV 9.5     Recent Labs     09/14/20 2030   SODIUM 133*   POTASSIUM 4.0   CHLORIDE 93*   CO2 25   GLUCOSE 104*   BUN 21   CPKTOTAL 25             Impression:  Gonsalo is a 59yo M with left elbow abscess and cellulitis   H/o right thigh necrotizing infection    Plan:'  We discussed treatment options at beside. We do recommend operative intervention. Risks and benefits of surgery were discussed which include but are not limited to bleeding, infection, neurovascular damage, malunion, nonunion, instability, limb length discrepancy, DVT, PE, MI, Stroke and death. They understand these risks and wish to proceed.  We will keep him NPO after midnight. He will be admitted to medicine service for antibiotics and infection workup. We will plan for operative I&D of his left elbow. We will have Dr. Blanc assess his right thigh wound from prior surgery. COVID pending.    DO [facility]  Trauma Fellow

## 2020-09-16 LAB
ANION GAP SERPL CALC-SCNC: 13 MMOL/L (ref 7–16)
BACTERIA WND AEROBE CULT: ABNORMAL
BACTERIA WND AEROBE CULT: ABNORMAL
BASOPHILS # BLD AUTO: 0.6 % (ref 0–1.8)
BASOPHILS # BLD: 0.04 K/UL (ref 0–0.12)
BUN SERPL-MCNC: 15 MG/DL (ref 8–22)
CALCIUM SERPL-MCNC: 8.5 MG/DL (ref 8.5–10.5)
CHLORIDE SERPL-SCNC: 99 MMOL/L (ref 96–112)
CO2 SERPL-SCNC: 23 MMOL/L (ref 20–33)
CREAT SERPL-MCNC: 0.66 MG/DL (ref 0.5–1.4)
EOSINOPHIL # BLD AUTO: 0.22 K/UL (ref 0–0.51)
EOSINOPHIL NFR BLD: 3.2 % (ref 0–6.9)
ERYTHROCYTE [DISTWIDTH] IN BLOOD BY AUTOMATED COUNT: 47.5 FL (ref 35.9–50)
GLUCOSE SERPL-MCNC: 131 MG/DL (ref 65–99)
GRAM STN SPEC: ABNORMAL
HCT VFR BLD AUTO: 39.6 % (ref 42–52)
HGB BLD-MCNC: 13.5 G/DL (ref 14–18)
IMM GRANULOCYTES # BLD AUTO: 0.05 K/UL (ref 0–0.11)
IMM GRANULOCYTES NFR BLD AUTO: 0.7 % (ref 0–0.9)
LYMPHOCYTES # BLD AUTO: 1.35 K/UL (ref 1–4.8)
LYMPHOCYTES NFR BLD: 19.6 % (ref 22–41)
MCH RBC QN AUTO: 28.8 PG (ref 27–33)
MCHC RBC AUTO-ENTMCNC: 34.1 G/DL (ref 33.7–35.3)
MCV RBC AUTO: 84.6 FL (ref 81.4–97.8)
MONOCYTES # BLD AUTO: 0.56 K/UL (ref 0–0.85)
MONOCYTES NFR BLD AUTO: 8.1 % (ref 0–13.4)
NEUTROPHILS # BLD AUTO: 4.67 K/UL (ref 1.82–7.42)
NEUTROPHILS NFR BLD: 67.8 % (ref 44–72)
NRBC # BLD AUTO: 0 K/UL
NRBC BLD-RTO: 0 /100 WBC
PLATELET # BLD AUTO: 241 K/UL (ref 164–446)
PMV BLD AUTO: 9.5 FL (ref 9–12.9)
POTASSIUM SERPL-SCNC: 4.5 MMOL/L (ref 3.6–5.5)
RBC # BLD AUTO: 4.68 M/UL (ref 4.7–6.1)
SIGNIFICANT IND 70042: ABNORMAL
SITE SITE: ABNORMAL
SODIUM SERPL-SCNC: 135 MMOL/L (ref 135–145)
SOURCE SOURCE: ABNORMAL
WBC # BLD AUTO: 6.9 K/UL (ref 4.8–10.8)

## 2020-09-16 PROCEDURE — 99232 SBSQ HOSP IP/OBS MODERATE 35: CPT | Performed by: STUDENT IN AN ORGANIZED HEALTH CARE EDUCATION/TRAINING PROGRAM

## 2020-09-16 PROCEDURE — 770021 HCHG ROOM/CARE - ISO PRIVATE

## 2020-09-16 PROCEDURE — 700102 HCHG RX REV CODE 250 W/ 637 OVERRIDE(OP): Performed by: INTERNAL MEDICINE

## 2020-09-16 PROCEDURE — 700105 HCHG RX REV CODE 258

## 2020-09-16 PROCEDURE — 80048 BASIC METABOLIC PNL TOTAL CA: CPT

## 2020-09-16 PROCEDURE — 700111 HCHG RX REV CODE 636 W/ 250 OVERRIDE (IP): Performed by: STUDENT IN AN ORGANIZED HEALTH CARE EDUCATION/TRAINING PROGRAM

## 2020-09-16 PROCEDURE — 700111 HCHG RX REV CODE 636 W/ 250 OVERRIDE (IP): Performed by: INTERNAL MEDICINE

## 2020-09-16 PROCEDURE — 85025 COMPLETE CBC W/AUTO DIFF WBC: CPT

## 2020-09-16 PROCEDURE — A9270 NON-COVERED ITEM OR SERVICE: HCPCS | Performed by: INTERNAL MEDICINE

## 2020-09-16 PROCEDURE — 700105 HCHG RX REV CODE 258: Performed by: INTERNAL MEDICINE

## 2020-09-16 PROCEDURE — 36415 COLL VENOUS BLD VENIPUNCTURE: CPT

## 2020-09-16 RX ORDER — OXYCODONE HYDROCHLORIDE 10 MG/1
10 TABLET ORAL EVERY 6 HOURS PRN
Status: DISCONTINUED | OUTPATIENT
Start: 2020-09-16 | End: 2020-09-18

## 2020-09-16 RX ORDER — SODIUM CHLORIDE 9 MG/ML
INJECTION, SOLUTION INTRAVENOUS
Status: COMPLETED
Start: 2020-09-16 | End: 2020-09-16

## 2020-09-16 RX ORDER — HEPARIN SODIUM 5000 [USP'U]/ML
5000 INJECTION, SOLUTION INTRAVENOUS; SUBCUTANEOUS EVERY 8 HOURS
Status: DISPENSED | OUTPATIENT
Start: 2020-09-16 | End: 2020-09-30

## 2020-09-16 RX ORDER — DIPHENHYDRAMINE HCL 25 MG
25 TABLET ORAL EVERY 6 HOURS PRN
Status: DISCONTINUED | OUTPATIENT
Start: 2020-09-16 | End: 2020-10-13 | Stop reason: HOSPADM

## 2020-09-16 RX ADMIN — ACETAMINOPHEN 650 MG: 325 TABLET, FILM COATED ORAL at 12:14

## 2020-09-16 RX ADMIN — DOCUSATE SODIUM 50 MG AND SENNOSIDES 8.6 MG 2 TABLET: 8.6; 5 TABLET, FILM COATED ORAL at 18:04

## 2020-09-16 RX ADMIN — HYDROMORPHONE HYDROCHLORIDE 0.5 MG: 1 INJECTION, SOLUTION INTRAMUSCULAR; INTRAVENOUS; SUBCUTANEOUS at 00:05

## 2020-09-16 RX ADMIN — ACETAMINOPHEN 650 MG: 325 TABLET, FILM COATED ORAL at 18:04

## 2020-09-16 RX ADMIN — HYDROMORPHONE HYDROCHLORIDE 0.5 MG: 1 INJECTION, SOLUTION INTRAMUSCULAR; INTRAVENOUS; SUBCUTANEOUS at 15:20

## 2020-09-16 RX ADMIN — HYDROMORPHONE HYDROCHLORIDE 0.5 MG: 1 INJECTION, SOLUTION INTRAMUSCULAR; INTRAVENOUS; SUBCUTANEOUS at 01:29

## 2020-09-16 RX ADMIN — ACETAMINOPHEN 650 MG: 325 TABLET, FILM COATED ORAL at 05:06

## 2020-09-16 RX ADMIN — SODIUM CHLORIDE 500 ML: 9 INJECTION, SOLUTION INTRAVENOUS at 05:07

## 2020-09-16 RX ADMIN — HYDROMORPHONE HYDROCHLORIDE 0.5 MG: 1 INJECTION, SOLUTION INTRAMUSCULAR; INTRAVENOUS; SUBCUTANEOUS at 08:36

## 2020-09-16 RX ADMIN — CEFTRIAXONE SODIUM 2 G: 2 INJECTION, POWDER, FOR SOLUTION INTRAMUSCULAR; INTRAVENOUS at 05:06

## 2020-09-16 RX ADMIN — HEPARIN SODIUM 5000 UNITS: 5000 INJECTION, SOLUTION INTRAVENOUS; SUBCUTANEOUS at 21:15

## 2020-09-16 RX ADMIN — HYDROMORPHONE HYDROCHLORIDE 0.5 MG: 1 INJECTION, SOLUTION INTRAMUSCULAR; INTRAVENOUS; SUBCUTANEOUS at 12:13

## 2020-09-16 RX ADMIN — HYDROMORPHONE HYDROCHLORIDE 0.5 MG: 1 INJECTION, SOLUTION INTRAMUSCULAR; INTRAVENOUS; SUBCUTANEOUS at 18:04

## 2020-09-16 RX ADMIN — HYDROMORPHONE HYDROCHLORIDE 0.5 MG: 1 INJECTION, SOLUTION INTRAMUSCULAR; INTRAVENOUS; SUBCUTANEOUS at 21:15

## 2020-09-16 RX ADMIN — HYDROMORPHONE HYDROCHLORIDE 0.5 MG: 1 INJECTION, SOLUTION INTRAMUSCULAR; INTRAVENOUS; SUBCUTANEOUS at 05:06

## 2020-09-16 RX ADMIN — HEPARIN SODIUM 5000 UNITS: 5000 INJECTION, SOLUTION INTRAVENOUS; SUBCUTANEOUS at 15:20

## 2020-09-16 ASSESSMENT — ENCOUNTER SYMPTOMS
CARDIOVASCULAR NEGATIVE: 1
BACK PAIN: 1
GASTROINTESTINAL NEGATIVE: 1
EYES NEGATIVE: 1
RESPIRATORY NEGATIVE: 1
NEUROLOGICAL NEGATIVE: 1

## 2020-09-16 ASSESSMENT — PAIN DESCRIPTION - PAIN TYPE
TYPE: SURGICAL PAIN

## 2020-09-16 ASSESSMENT — FIBROSIS 4 INDEX: FIB4 SCORE: 0.74

## 2020-09-16 NOTE — WOUND TEAM
Wound team consulted for back wounds.  Patient able to turn to left side without issue.  Partial thickness moist pink wound circular in nature.  Silicone adhesive replaced, no other wound care needed, change PRN.  Wound VAC working properly.  Right  Thigh dressing intact.  Dr. Martinez contacted regarding wound VAC changes, patient will be going back to surgery tomorrow per MD. Wound team signing off, please consult if needed.

## 2020-09-16 NOTE — CARE PLAN
Problem: Communication  Goal: The ability to communicate needs accurately and effectively will improve  9/16/2020 0945 by Jazzy Lambert R.N.  Outcome: PROGRESSING AS EXPECTED  9/16/2020 0945 by Jazzy Lambert R.N.  Outcome: PROGRESSING AS EXPECTED     Problem: Safety  Goal: Will remain free from injury  9/16/2020 0945 by Jazzy Lambert R.N.  Outcome: PROGRESSING AS EXPECTED  9/16/2020 0945 by Jazzy Lambert R.N.  Outcome: PROGRESSING AS EXPECTED  Goal: Will remain free from falls  9/16/2020 0945 by Jazzy Lambert R.N.  Outcome: PROGRESSING AS EXPECTED  9/16/2020 0945 by Jazzy Lambert R.N.  Outcome: PROGRESSING AS EXPECTED     Problem: Infection  Goal: Will remain free from infection  9/16/2020 0945 by Jazzy Lambert R.N.  Outcome: PROGRESSING AS EXPECTED  9/16/2020 0945 by Jazzy Lambert R.N.  Outcome: PROGRESSING AS EXPECTED     Problem: Venous Thromboembolism (VTW)/Deep Vein Thrombosis (DVT) Prevention:  Goal: Patient will participate in Venous Thrombosis (VTE)/Deep Vein Thrombosis (DVT)Prevention Measures  Outcome: PROGRESSING AS EXPECTED     Problem: Pain Management  Goal: Pain level will decrease to patient's comfort goal  9/16/2020 0945 by Jazzy Lambert R.N.  Outcome: PROGRESSING AS EXPECTED  9/16/2020 0945 by Jazzy Lambert R.N.  Outcome: PROGRESSING AS EXPECTED     Problem: Psychosocial Needs:  Goal: Level of anxiety will decrease  9/16/2020 0945 by Jazzy Lambert R.N.  Outcome: PROGRESSING AS EXPECTED  9/16/2020 0945 by Jazzy Lambert R.N.  Outcome: PROGRESSING AS EXPECTED

## 2020-09-16 NOTE — CARE PLAN
Problem: Communication  Goal: The ability to communicate needs accurately and effectively will improve  Outcome: PROGRESSING AS EXPECTED   Updated on POC  Problem: Safety  Goal: Will remain free from injury  Outcome: PROGRESSING AS EXPECTED   Pt uses call light appropriately   Problem: Infection  Goal: Will remain free from infection  Outcome: PROGRESSING AS EXPECTED   Pt on IV rocephin  Problem: Pain Management  Goal: Pain level will decrease to patient's comfort goal  Outcome: PROGRESSING AS EXPECTED   Pain managed with PRN dilaudid

## 2020-09-16 NOTE — PROGRESS NOTES
Pt up from PACU, A+O x4. VSS. Sating 97% on RA. IV dilaudid 0.5 mg given for pain, refuses toradol. LUE wound vac patent, c/d/i. RLE w ACE wrap. Skin check done with 2nd RNGlenna (see note). Tolerating reg diet. Voiding in urinal. Will continue to monitor and assess.

## 2020-09-16 NOTE — PROGRESS NOTES
Bedside Report received   Assumed care of patient at 1907.    Pt is A&O x4  RA pt denies SOB, dizziness, nausea or chest pain.  Pain reported at 8/10. PRN dilaudid given  Tolerating Regular Diet   circular red wounds to eft wrist, right lower leg, JEWELS lower back has mepilex lite dressing c/d/i  Left upper arm wound vac in place c/d/i and right thigh ACE wrap dressing c/d/i  + Urine output  Last BM 9/14   + Flatus  Up x1 assist with walker  SCD's left leg only  Bed in lowest position and locked.  Pt resting comfortably now.  Review plan of care with patient  Call light within reach  Hourly rounds in place  All needs met at this time

## 2020-09-16 NOTE — CARE PLAN
Problem: Communication  Goal: The ability to communicate needs accurately and effectively will improve  Outcome: PROGRESSING AS EXPECTED     Problem: Safety  Goal: Will remain free from injury  Outcome: PROGRESSING AS EXPECTED  Goal: Will remain free from falls  Outcome: PROGRESSING AS EXPECTED     Problem: Infection  Goal: Will remain free from infection  Outcome: PROGRESSING AS EXPECTED     Problem: Venous Thromboembolism (VTW)/Deep Vein Thrombosis (DVT) Prevention:  Goal: Patient will participate in Venous Thrombosis (VTE)/Deep Vein Thrombosis (DVT)Prevention Measures  Outcome: PROGRESSING AS EXPECTED     Problem: Bowel/Gastric:  Goal: Normal bowel function is maintained or improved  Outcome: PROGRESSING AS EXPECTED  Goal: Will not experience complications related to bowel motility  Outcome: PROGRESSING AS EXPECTED     Problem: Knowledge Deficit  Goal: Knowledge of disease process/condition, treatment plan, diagnostic tests, and medications will improve  Outcome: PROGRESSING AS EXPECTED  Goal: Knowledge of the prescribed therapeutic regimen will improve  Outcome: PROGRESSING AS EXPECTED     Problem: Discharge Barriers/Planning  Goal: Patient's continuum of care needs will be met  Outcome: PROGRESSING AS EXPECTED     Problem: Pain Management  Goal: Pain level will decrease to patient's comfort goal  Outcome: PROGRESSING AS EXPECTED     Problem: Psychosocial Needs:  Goal: Level of anxiety will decrease  Outcome: PROGRESSING AS EXPECTED      Ivermectin Counseling:  Patient instructed to take medication on an empty stomach with a full glass of water.  Patient informed of potential adverse effects including but not limited to nausea, diarrhea, dizziness, itching, and swelling of the extremities or lymph nodes.  The patient verbalized understanding of the proper use and possible adverse effects of ivermectin.  All of the patient's questions and concerns were addressed.

## 2020-09-16 NOTE — ASSESSMENT & PLAN NOTE
Has h/o necrotizing fascitis.   - S/p I&D of right thigh wound and split thickness skin graft on 9/17  - underwent repeat split thickness skin graft 10/1  - pain control  - wound care, will need wound vac for at least 5 days from 10/2 per ortho team  - outpatient wound care clinic referral placed by Ortho IVONNE garza as noted above

## 2020-09-16 NOTE — CARE PLAN
Problem: Nutritional:  Goal: Achieve adequate nutritional intake  Description: Patient will consume ~75% of meals/supplement  Outcome: PROGRESSING AS EXPECTED

## 2020-09-16 NOTE — DIETARY
"Nutrition Services: Day 2 of admit. Gonsalo Hunt is a 60 y.o. male with admitting DX of septic arthritis.  Consult received for 34 or more lb weight loss x 3 months.     UBW is 230 - 250 lbs per pt verbalization. Pt recalls weighing 248 lbs 3 months ago. Additionally, pt believes he has lost 20 lbs in 3 months. When asked how many meals he consumes in a day, pt reports \"sometimes nothing\" and explained further by confirming he was eating less than 50% of usual PO intake since he was discharged [facility] \"[facility]\" from a SNF. Pt stated he is established with a food pantry and getting food items. Boost Plus TID being provided. However, pt stated he would like Boost Plus daily. This was reported to nutritional representative. Breakfast tray seen with 100% consumed. Pt reports getting single portions instead of double at this time. This RD followed up with NR to ensure pt gets double portions due to tall stature and multiple wounds.     Assessment:  Ht: 193 cm, Wt 9/16: 118.5 kg via bed scale, BMI: 31.80 - class I obesity  Diet/Intake: regular + Boost Plus TID - Per chart pt PO % x 1 meal     Evaluation:   1. Pt appeared well-nourished.   2. Weight seems to have increased from pt reported wt 3 months ago.   3. Labs: glucose 131  4. Meds: miralx (refused)   5. Fluids: sodium chloride  6. POD #1 I&D of left arm and right thigh. Ortho following.   7. Multiple wounds noted in flow sheet. Wound team consult pending.   8. Last BM: 9/14    Malnutrition Risk: Pt at-risk due to prolonged poor PO intake.     Recommendations/Plan:  1. Will await wound staging to make recommendations if appropriate.  2. Provide Boost Plus daily per patient preference.   3. Encourage intake of meals/supplement.  4. Document intake of all meals/supplement as % taken in ADL's to provide interdisciplinary communication across all shifts.   5. Monitor weight.  6. Nutrition rep will continue to see patient for ongoing meal and snack preferences.    RD " following.

## 2020-09-16 NOTE — PROGRESS NOTES
Hospital Medicine Daily Progress Note    Date of Service  9/16/2020    Chief Complaint  60 y.o. homeless male admitted 9/14/2020 with multiple abscesses on upper and lower extremity.  He has a 4 mm ulcer with serosanguineous drainage on his left lateral forearm.    He has a history of IV drug abuse, MRSA cellulitis, right lower extremity necrotizing fasciitis s/p fasciotomy in January 2020.    Hospital Course  Vancomycin and Rocephin was started.  Blood cultures pending.  Orthopedic surgery were consulted from the ER.  S/P irrigation and debridement of left antecubital fossa and superficial right thigh wound in the OR. Right hip wound culture grew beta-hemolytic group B strep, patient already on rocephin and vanc. Blood cultures were negative. Transthoracic Echo performed to r/o endocarditis was performed and was negative for vegetations, EF 65 with mild tricuspid regurg. RV systolic pressure of 25mmHg.    Interval Problem Update  -Pain management today, cont abx, TTE and BCx negative, no wbc count.  -f/u Ortho recs.    Consultants/Specialty  Orthopedic surgery    Code Status  DNAR/DNI    Disposition  Home when stable    Review of Systems  Review of Systems   Constitutional: Positive for malaise/fatigue.   Eyes: Negative.    Respiratory: Negative.    Cardiovascular: Negative.    Gastrointestinal: Negative.    Genitourinary: Negative.    Musculoskeletal: Positive for back pain.   Skin:        Pain in left UE surgical wound   Neurological: Negative.    Psychiatric/Behavioral:        Chronic depression. Not suicidal        Physical Exam  Temp:  [36.2 °C (97.1 °F)-37 °C (98.6 °F)] 36.2 °C (97.1 °F)  Pulse:  [54-76] 58  Resp:  [14-20] 16  BP: ()/(48-71) 107/55  SpO2:  [96 %-100 %] 97 %    Physical Exam  HENT:      Head: Normocephalic.      Nose: Nose normal.      Mouth/Throat:      Mouth: Mucous membranes are moist.   Neck:      Musculoskeletal: Normal range of motion.   Cardiovascular:      Rate and Rhythm: Normal  rate.   Pulmonary:      Effort: Pulmonary effort is normal.   Abdominal:      Palpations: Abdomen is soft.   Musculoskeletal: Normal range of motion.   Skin:     Comments: Left arm and left s/p I&D wound with wound vac in place, no bleeding or erythema noted.  Pain 4/10 intensity.   Neurological:      General: No focal deficit present.      Mental Status: He is alert.   Psychiatric:         Mood and Affect: Mood normal.         Fluids    Intake/Output Summary (Last 24 hours) at 9/16/2020 0943  Last data filed at 9/16/2020 0519  Gross per 24 hour   Intake 1380 ml   Output 1060 ml   Net 320 ml       Laboratory  Recent Labs     09/14/20 2030 09/16/20  0554   WBC 14.9* 6.9   RBC 5.41 4.68*   HEMOGLOBIN 15.4 13.5*   HEMATOCRIT 45.8 39.6*   MCV 84.7 84.6   MCH 28.5 28.8   MCHC 33.6* 34.1   RDW 48.4 47.5   PLATELETCT 279 241   MPV 9.5 9.5     Recent Labs     09/14/20 2030 09/16/20  0554   SODIUM 133* 135   POTASSIUM 4.0 4.5   CHLORIDE 93* 99   CO2 25 23   GLUCOSE 104* 131*   BUN 21 15   CREATININE 0.87 0.66   CALCIUM 9.6 8.5                   Imaging  EC-ECHOCARDIOGRAM COMPLETE W/O CONT   Final Result      DX-ELBOW-COMPLETE 3+ LEFT   Final Result      Soft tissue swelling without acute osseous abnormality.      DX-FEMUR-2+ RIGHT   Final Result      No acute osseous abnormality.           Assessment/Plan  Pyogenic arthritis of left elbow (HCC)- (present on admission)  Assessment & Plan  Status post irrigation and debridement of left antecubital fossa.  Wound VAC in place.  Continue Rocephin and vancomycin.  Continue pain management as indicated.  Orthopedic surgery following.  -BCx Neg.  -TTE negative for endocarditis.  -No wbc count.  >Managing Pain.  >Cont Rocephin and Vancomycin.    Murmur- (present on admission)  Assessment & Plan  History of IV drug abuse with concern for bacteremia and endocarditis.    -BCx neg  -TTE neg    Leg wound, right- (present on admission)  Assessment & Plan  Underwent irrigation and  debridement of right wound.  Culture grew beta-hemolytic group B strep.  On ceftriaxone.  Pain control as needed    Homeless- (present on admission)  Assessment & Plan  Social work and case management to assist in discharge planning       VTE prophylaxis: SCD's. Added Hep SQ today.

## 2020-09-16 NOTE — PROGRESS NOTES
"   Orthopaedic PA Progress Note    Interval changes:Doing well, c/o pain,oral analgesia ordered. NPO p MN for repeat I+D tomorrow    ROS - Patient denies any new issues. No chest pain, dyspnea, or fever.  Pain well controlled.    /55   Pulse (!) 58   Temp 36.2 °C (97.1 °F) (Temporal)   Resp 16   Ht 1.93 m (6' 4\")   Wt 105.7 kg (233 lb 0.4 oz)   SpO2 97%     Patient seen and examined  No acute distress  Breathing non labored  RRR  Surgical dressing is clean, dry, and intact. Patient clearly fires forearm flexors, forearm extensors, and moves all five fingers without issue . Sensation is intact to light touch throughout median, ulnar, and radial nerve distributions. Strong and palpable radial pulses with capillary refill less than 2 seconds. No arm or hand discomfort.    Recent Labs     09/14/20 2030 09/16/20  0554   WBC 14.9* 6.9   RBC 5.41 4.68*   HEMOGLOBIN 15.4 13.5*   HEMATOCRIT 45.8 39.6*   MCV 84.7 84.6   MCH 28.5 28.8   MCHC 33.6* 34.1   RDW 48.4 47.5   PLATELETCT 279 241   MPV 9.5 9.5       Active Hospital Problems    Diagnosis   • Pyogenic arthritis of left elbow (HCC) [M00.9]     Priority: High     No crepitus no gas in the tissues,   history of MRSA, orthopedic surgery consulted for OR in the a.m.  Empiric vancomycin and Rocephin ordered.  Symptomatic management     • Murmur [R01.1]     Priority: Medium   • Homeless [Z59.0]     Priority: Low   • Leg wound, right [S81.801A]       Assessment/Plan:  POD#1  S/P 1.  Irrigation and debridement, left antecubital fossa with application of   wound VAC.  2.  Exploration of prior I and D, necrotizing fasciitis, right thigh.  Wt bearing status - AT  PT/OT-initiated  Wound care:dressings left in place  Drains - Vac Care only (deep wound vac)  Lundberg-no  Sutures/Staples out- 10-14 days post operatively  Antibiotics: per Med/ID  DVT Prophylaxis- TEDS/SCDs/Foot pumps.   Future Procedures - vac change tomorrow or Friday  Case Coordination for Discharge Planning " - Disposition pending medicine needs

## 2020-09-17 ENCOUNTER — ANESTHESIA EVENT (OUTPATIENT)
Dept: SURGERY | Facility: MEDICAL CENTER | Age: 60
DRG: 464 | End: 2020-09-17
Payer: COMMERCIAL

## 2020-09-17 ENCOUNTER — ANESTHESIA (OUTPATIENT)
Dept: SURGERY | Facility: MEDICAL CENTER | Age: 60
DRG: 464 | End: 2020-09-17
Payer: COMMERCIAL

## 2020-09-17 LAB
BACTERIA WND AEROBE CULT: ABNORMAL
GRAM STN SPEC: ABNORMAL
SIGNIFICANT IND 70042: ABNORMAL
SITE SITE: ABNORMAL
SOURCE SOURCE: ABNORMAL

## 2020-09-17 PROCEDURE — 700105 HCHG RX REV CODE 258: Performed by: INTERNAL MEDICINE

## 2020-09-17 PROCEDURE — 700102 HCHG RX REV CODE 250 W/ 637 OVERRIDE(OP): Performed by: ANESTHESIOLOGY

## 2020-09-17 PROCEDURE — 700102 HCHG RX REV CODE 250 W/ 637 OVERRIDE(OP): Performed by: PHYSICIAN ASSISTANT

## 2020-09-17 PROCEDURE — 160009 HCHG ANES TIME/MIN: Performed by: ORTHOPAEDIC SURGERY

## 2020-09-17 PROCEDURE — A9270 NON-COVERED ITEM OR SERVICE: HCPCS | Performed by: ANESTHESIOLOGY

## 2020-09-17 PROCEDURE — 0HRJXK3 REPLACEMENT OF LEFT UPPER LEG SKIN WITH NONAUTOLOGOUS TISSUE SUBSTITUTE, FULL THICKNESS, EXTERNAL APPROACH: ICD-10-PCS | Performed by: ORTHOPAEDIC SURGERY

## 2020-09-17 PROCEDURE — 700111 HCHG RX REV CODE 636 W/ 250 OVERRIDE (IP): Performed by: INTERNAL MEDICINE

## 2020-09-17 PROCEDURE — 99232 SBSQ HOSP IP/OBS MODERATE 35: CPT | Performed by: STUDENT IN AN ORGANIZED HEALTH CARE EDUCATION/TRAINING PROGRAM

## 2020-09-17 PROCEDURE — 500881 HCHG PACK, EXTREMITY: Performed by: ORTHOPAEDIC SURGERY

## 2020-09-17 PROCEDURE — 502000 HCHG MISC OR IMPLANTS RC 0278: Performed by: ORTHOPAEDIC SURGERY

## 2020-09-17 PROCEDURE — 700102 HCHG RX REV CODE 250 W/ 637 OVERRIDE(OP): Performed by: STUDENT IN AN ORGANIZED HEALTH CARE EDUCATION/TRAINING PROGRAM

## 2020-09-17 PROCEDURE — 700111 HCHG RX REV CODE 636 W/ 250 OVERRIDE (IP): Performed by: ANESTHESIOLOGY

## 2020-09-17 PROCEDURE — 770021 HCHG ROOM/CARE - ISO PRIVATE

## 2020-09-17 PROCEDURE — 160002 HCHG RECOVERY MINUTES (STAT): Performed by: ORTHOPAEDIC SURGERY

## 2020-09-17 PROCEDURE — 160048 HCHG OR STATISTICAL LEVEL 1-5: Performed by: ORTHOPAEDIC SURGERY

## 2020-09-17 PROCEDURE — 160035 HCHG PACU - 1ST 60 MINS PHASE I: Performed by: ORTHOPAEDIC SURGERY

## 2020-09-17 PROCEDURE — 501838 HCHG SUTURE GENERAL: Performed by: ORTHOPAEDIC SURGERY

## 2020-09-17 PROCEDURE — A9270 NON-COVERED ITEM OR SERVICE: HCPCS | Performed by: STUDENT IN AN ORGANIZED HEALTH CARE EDUCATION/TRAINING PROGRAM

## 2020-09-17 PROCEDURE — A6223 GAUZE >16<=48 NO W/SAL W/O B: HCPCS | Performed by: ORTHOPAEDIC SURGERY

## 2020-09-17 PROCEDURE — 0KBQ0ZZ EXCISION OF RIGHT UPPER LEG MUSCLE, OPEN APPROACH: ICD-10-PCS | Performed by: ORTHOPAEDIC SURGERY

## 2020-09-17 PROCEDURE — A9270 NON-COVERED ITEM OR SERVICE: HCPCS | Performed by: INTERNAL MEDICINE

## 2020-09-17 PROCEDURE — 160038 HCHG SURGERY MINUTES - EA ADDL 1 MIN LEVEL 2: Performed by: ORTHOPAEDIC SURGERY

## 2020-09-17 PROCEDURE — 700102 HCHG RX REV CODE 250 W/ 637 OVERRIDE(OP): Performed by: INTERNAL MEDICINE

## 2020-09-17 PROCEDURE — 0HBJXZZ EXCISION OF LEFT UPPER LEG SKIN, EXTERNAL APPROACH: ICD-10-PCS | Performed by: ORTHOPAEDIC SURGERY

## 2020-09-17 PROCEDURE — 160027 HCHG SURGERY MINUTES - 1ST 30 MINS LEVEL 2: Performed by: ORTHOPAEDIC SURGERY

## 2020-09-17 PROCEDURE — A9270 NON-COVERED ITEM OR SERVICE: HCPCS | Performed by: PHYSICIAN ASSISTANT

## 2020-09-17 PROCEDURE — 700101 HCHG RX REV CODE 250: Performed by: ANESTHESIOLOGY

## 2020-09-17 PROCEDURE — 0HRHX74 REPLACEMENT OF RIGHT UPPER LEG SKIN WITH AUTOLOGOUS TISSUE SUBSTITUTE, PARTIAL THICKNESS, EXTERNAL APPROACH: ICD-10-PCS | Performed by: ORTHOPAEDIC SURGERY

## 2020-09-17 DEVICE — MATRISTEM WOUND MATRIX 5CM X 5CM 2 LAYER: Type: IMPLANTABLE DEVICE | Site: ELBOW | Status: FUNCTIONAL

## 2020-09-17 DEVICE — MATRISTEM MICROMATRIX 500MG (1/EA): Type: IMPLANTABLE DEVICE | Site: LEG | Status: FUNCTIONAL

## 2020-09-17 DEVICE — MATRISTEM MICROMATRIX 1000MG (1/EA): Type: IMPLANTABLE DEVICE | Site: ELBOW | Status: FUNCTIONAL

## 2020-09-17 RX ORDER — CEFAZOLIN SODIUM 1 G/3ML
INJECTION, POWDER, FOR SOLUTION INTRAMUSCULAR; INTRAVENOUS PRN
Status: DISCONTINUED | OUTPATIENT
Start: 2020-09-17 | End: 2020-09-17 | Stop reason: SURG

## 2020-09-17 RX ORDER — MEPERIDINE HYDROCHLORIDE 25 MG/ML
6.25 INJECTION INTRAMUSCULAR; INTRAVENOUS; SUBCUTANEOUS
Status: DISCONTINUED | OUTPATIENT
Start: 2020-09-17 | End: 2020-09-17 | Stop reason: HOSPADM

## 2020-09-17 RX ORDER — ONDANSETRON 2 MG/ML
4 INJECTION INTRAMUSCULAR; INTRAVENOUS
Status: COMPLETED | OUTPATIENT
Start: 2020-09-17 | End: 2020-09-17

## 2020-09-17 RX ORDER — DEXAMETHASONE SODIUM PHOSPHATE 4 MG/ML
INJECTION, SOLUTION INTRA-ARTICULAR; INTRALESIONAL; INTRAMUSCULAR; INTRAVENOUS; SOFT TISSUE PRN
Status: DISCONTINUED | OUTPATIENT
Start: 2020-09-17 | End: 2020-09-17 | Stop reason: SURG

## 2020-09-17 RX ORDER — NICOTINE 21 MG/24HR
21 PATCH, TRANSDERMAL 24 HOURS TRANSDERMAL
Status: DISCONTINUED | OUTPATIENT
Start: 2020-09-17 | End: 2020-10-04

## 2020-09-17 RX ORDER — DIPHENHYDRAMINE HYDROCHLORIDE 50 MG/ML
12.5 INJECTION INTRAMUSCULAR; INTRAVENOUS
Status: DISCONTINUED | OUTPATIENT
Start: 2020-09-17 | End: 2020-09-17 | Stop reason: HOSPADM

## 2020-09-17 RX ORDER — OXYCODONE HCL 5 MG/5 ML
10 SOLUTION, ORAL ORAL
Status: COMPLETED | OUTPATIENT
Start: 2020-09-17 | End: 2020-09-17

## 2020-09-17 RX ORDER — HYDROMORPHONE HYDROCHLORIDE 1 MG/ML
0.4 INJECTION, SOLUTION INTRAMUSCULAR; INTRAVENOUS; SUBCUTANEOUS
Status: DISCONTINUED | OUTPATIENT
Start: 2020-09-17 | End: 2020-09-17 | Stop reason: HOSPADM

## 2020-09-17 RX ORDER — OXYCODONE HCL 5 MG/5 ML
5 SOLUTION, ORAL ORAL
Status: COMPLETED | OUTPATIENT
Start: 2020-09-17 | End: 2020-09-17

## 2020-09-17 RX ORDER — HYDROMORPHONE HYDROCHLORIDE 1 MG/ML
0.1 INJECTION, SOLUTION INTRAMUSCULAR; INTRAVENOUS; SUBCUTANEOUS
Status: DISCONTINUED | OUTPATIENT
Start: 2020-09-17 | End: 2020-09-17 | Stop reason: HOSPADM

## 2020-09-17 RX ORDER — LIDOCAINE HYDROCHLORIDE 20 MG/ML
INJECTION, SOLUTION EPIDURAL; INFILTRATION; INTRACAUDAL; PERINEURAL PRN
Status: DISCONTINUED | OUTPATIENT
Start: 2020-09-17 | End: 2020-09-17 | Stop reason: SURG

## 2020-09-17 RX ORDER — HYDROMORPHONE HYDROCHLORIDE 1 MG/ML
0.2 INJECTION, SOLUTION INTRAMUSCULAR; INTRAVENOUS; SUBCUTANEOUS
Status: DISCONTINUED | OUTPATIENT
Start: 2020-09-17 | End: 2020-09-17 | Stop reason: HOSPADM

## 2020-09-17 RX ORDER — LABETALOL HYDROCHLORIDE 5 MG/ML
5 INJECTION, SOLUTION INTRAVENOUS
Status: DISCONTINUED | OUTPATIENT
Start: 2020-09-17 | End: 2020-09-17 | Stop reason: HOSPADM

## 2020-09-17 RX ORDER — HALOPERIDOL 5 MG/ML
1 INJECTION INTRAMUSCULAR
Status: DISCONTINUED | OUTPATIENT
Start: 2020-09-17 | End: 2020-09-17 | Stop reason: HOSPADM

## 2020-09-17 RX ORDER — SODIUM CHLORIDE, SODIUM LACTATE, POTASSIUM CHLORIDE, CALCIUM CHLORIDE 600; 310; 30; 20 MG/100ML; MG/100ML; MG/100ML; MG/100ML
INJECTION, SOLUTION INTRAVENOUS CONTINUOUS
Status: DISCONTINUED | OUTPATIENT
Start: 2020-09-17 | End: 2020-09-17 | Stop reason: HOSPADM

## 2020-09-17 RX ADMIN — FENTANYL CITRATE 50 MCG: 50 INJECTION, SOLUTION INTRAMUSCULAR; INTRAVENOUS at 14:38

## 2020-09-17 RX ADMIN — CEFAZOLIN 2 G: 330 INJECTION, POWDER, FOR SOLUTION INTRAMUSCULAR; INTRAVENOUS at 14:34

## 2020-09-17 RX ADMIN — OXYCODONE HYDROCHLORIDE 10 MG: 5 SOLUTION ORAL at 15:56

## 2020-09-17 RX ADMIN — FENTANYL CITRATE 50 MCG: 50 INJECTION INTRAMUSCULAR; INTRAVENOUS at 15:56

## 2020-09-17 RX ADMIN — DOCUSATE SODIUM 50 MG AND SENNOSIDES 8.6 MG 2 TABLET: 8.6; 5 TABLET, FILM COATED ORAL at 18:31

## 2020-09-17 RX ADMIN — HYDROMORPHONE HYDROCHLORIDE 0.5 MG: 1 INJECTION, SOLUTION INTRAMUSCULAR; INTRAVENOUS; SUBCUTANEOUS at 03:34

## 2020-09-17 RX ADMIN — FENTANYL CITRATE 50 MCG: 50 INJECTION, SOLUTION INTRAMUSCULAR; INTRAVENOUS at 15:25

## 2020-09-17 RX ADMIN — HYDROMORPHONE HYDROCHLORIDE 0.5 MG: 1 INJECTION, SOLUTION INTRAMUSCULAR; INTRAVENOUS; SUBCUTANEOUS at 09:20

## 2020-09-17 RX ADMIN — FENTANYL CITRATE 50 MCG: 50 INJECTION, SOLUTION INTRAMUSCULAR; INTRAVENOUS at 14:45

## 2020-09-17 RX ADMIN — HYDROMORPHONE HYDROCHLORIDE 0.5 MG: 1 INJECTION, SOLUTION INTRAMUSCULAR; INTRAVENOUS; SUBCUTANEOUS at 21:42

## 2020-09-17 RX ADMIN — HYDROMORPHONE HYDROCHLORIDE 0.5 MG: 1 INJECTION, SOLUTION INTRAMUSCULAR; INTRAVENOUS; SUBCUTANEOUS at 12:39

## 2020-09-17 RX ADMIN — CEFTRIAXONE SODIUM 2 G: 2 INJECTION, POWDER, FOR SOLUTION INTRAMUSCULAR; INTRAVENOUS at 04:28

## 2020-09-17 RX ADMIN — HYDROMORPHONE HYDROCHLORIDE 0.5 MG: 1 INJECTION, SOLUTION INTRAMUSCULAR; INTRAVENOUS; SUBCUTANEOUS at 00:15

## 2020-09-17 RX ADMIN — LIDOCAINE HYDROCHLORIDE 50 MG: 20 INJECTION, SOLUTION EPIDURAL; INFILTRATION; INTRACAUDAL at 14:37

## 2020-09-17 RX ADMIN — HYDROMORPHONE HYDROCHLORIDE 0.5 MG: 1 INJECTION, SOLUTION INTRAMUSCULAR; INTRAVENOUS; SUBCUTANEOUS at 18:31

## 2020-09-17 RX ADMIN — HYDROMORPHONE HYDROCHLORIDE 0.5 MG: 1 INJECTION, SOLUTION INTRAMUSCULAR; INTRAVENOUS; SUBCUTANEOUS at 06:33

## 2020-09-17 RX ADMIN — DIPHENHYDRAMINE HYDROCHLORIDE 25 MG: 25 TABLET ORAL at 01:43

## 2020-09-17 RX ADMIN — PROPOFOL 150 MG: 10 INJECTION, EMULSION INTRAVENOUS at 14:37

## 2020-09-17 RX ADMIN — EPHEDRINE SULFATE 10 MG: 50 INJECTION, SOLUTION INTRAVENOUS at 15:14

## 2020-09-17 RX ADMIN — FENTANYL CITRATE 50 MCG: 50 INJECTION INTRAMUSCULAR; INTRAVENOUS at 15:58

## 2020-09-17 RX ADMIN — ONDANSETRON 4 MG: 2 INJECTION INTRAMUSCULAR; INTRAVENOUS at 15:56

## 2020-09-17 RX ADMIN — FENTANYL CITRATE 50 MCG: 50 INJECTION, SOLUTION INTRAMUSCULAR; INTRAVENOUS at 15:32

## 2020-09-17 RX ADMIN — FENTANYL CITRATE 50 MCG: 50 INJECTION, SOLUTION INTRAMUSCULAR; INTRAVENOUS at 15:30

## 2020-09-17 RX ADMIN — DEXAMETHASONE SODIUM PHOSPHATE 4 MG: 4 INJECTION, SOLUTION INTRA-ARTICULAR; INTRALESIONAL; INTRAMUSCULAR; INTRAVENOUS; SOFT TISSUE at 14:38

## 2020-09-17 RX ADMIN — NICOTINE TRANSDERMAL SYSTEM 21 MG: 21 PATCH, EXTENDED RELEASE TRANSDERMAL at 18:32

## 2020-09-17 ASSESSMENT — PAIN DESCRIPTION - PAIN TYPE
TYPE: SURGICAL PAIN
TYPE: ACUTE PAIN;SURGICAL PAIN
TYPE: SURGICAL PAIN

## 2020-09-17 ASSESSMENT — PAIN SCALES - GENERAL: PAIN_LEVEL: 4

## 2020-09-17 NOTE — ANESTHESIA TIME REPORT
Anesthesia Start and Stop Event Times     Date Time Event    9/17/2020 1418 Ready for Procedure     1434 Anesthesia Start     1537 Anesthesia Stop        Responsible Staff  09/17/20    Name Role Begin End    Mao No M.D. Anesth 1434 1537        Preop Diagnosis (Free Text):  Pre-op Diagnosis             Preop Diagnosis (Codes):    Post op Diagnosis  Open wound of thigh  open wound elbow Left    Premium Reason  A. 3PM - 7AM    Comments:

## 2020-09-17 NOTE — ANESTHESIA PREPROCEDURE EVALUATION
Relevant Problems   ANESTHESIA (within normal limits)      PULMONARY (within normal limits)      NEURO (within normal limits)      CARDIAC   (+) Murmur         (+) Chronic hepatitis C virus infection (HCC)      ENDO (within normal limits)      Other   (+) Cannabis use disorder, mild, abuse   (+) Cellulitis of right leg   (+) Heroin use   (+) Leg wound, right   (+) Normochromic anemia   (+) Pyogenic arthritis of left elbow (HCC)   (+) Smoking   (+) Tobacco abuse       Physical Exam    Airway   Mallampati: II  TM distance: >3 FB  Neck ROM: full       Cardiovascular - normal exam  Rhythm: regular  Rate: normal  (-) murmur     Dental       Very poor dentition   Pulmonary - normal exam  Breath sounds clear to auscultation     Abdominal   (+) obese     Neurological - normal exam                 Anesthesia Plan    ASA 3   ASA physical status 3 criteria: alcohol and/or substance dependence or abuse    Plan - general       Airway plan will be LMA              Pertinent diagnostic labs and testing reviewed    Informed Consent:    Anesthetic plan and risks discussed with patient.

## 2020-09-17 NOTE — OP REPORT
DATE OF SERVICE:  09/17/2020    PREOPERATIVE DIAGNOSES:    1.  Prior right thigh fasciitis with granulating healthy wound 12x6 cm.    2.  Open left lateral wound connecting with antecubital fossa.      OPERATIONS PERFORMED:    1.  Irrigation and debridement of superficial skin, fascia, and muscle, right   thigh.    2.  Split-thickness skin graft from left thigh to right thigh.    3.  Application of ACell MicroMatrix right thigh skin graft.    4.  Irrigation and debridement, left arm skin, subcutaneous tissue, fascia,   and bone.    5.  Application of ACell MicroMatrix and Gentrix graft.    6.  Application of wound VAC, right thigh.      SURGEON:  Ayush Martinez MD    ASSISTANT:  Ernesto.      INDICATIONS FOR THE OPERATION:  Prior patient of Dr. Blanc who underwent   multiple irrigation and debridement of necrotizing fasciitis of the right   thigh, now being brought to surgery for irrigation, debridement and skin   grafting.  Patient was readmitted to [facility] this week with a   new left arm abscess that was taken to surgery for irrigation, debridement and   now the patient is being brought back to the operating room for definitive   wound management.      BLOOD LOSS:  10 mL.      MEDICATIONS UTILIZED:  Ancef.      COMPLICATIONS:  None.      TOURNIQUET TIME:  18 minutes, left arm.      OPERATION:  The patient was brought to the operating room, awake, alert,   placed on the operating room table in supine position.  Timeout, appropriate   extremity was identified, right and left lower extremities and left upper   extremity.  Our attention was first turned to the left thigh.      Using routine split-thickness skin graft technique, mineral oil, a 2-inch   dermatome and 0.12 thickness, 2 strips of approximately 12 cm x 4 cm   split-thickness graft from the left thigh and 2 separate strips.  The graft   was then meshed 2:1 and stapled into position.  I used 1 gram of MicroMatrix,   sprinkled on the graft  to enhance biological activity due to the patient's   poor metabolic status.  We then used Adaptic and application of a wound VAC in   standard fashion.      Attention was now turned to the left thigh.  We dressed the left thigh wound   with epinephrine-soaked 4x4s and Tegaderm.  Now, our attention was turned to   the left lateral wound, copiously irrigated, it was nice and clean, healthy   and pink.  We used 1 gram of MicroMatrix deep into the wound and then packed   our 3-layer Gentrix graft over the top.  We dressed this sterilely without   VACs.      The operation was completed.  There were no complications.  Patient was taken   to recovery room in stable condition.  No intraoperative or immediate   postoperative complications.       ____________________________________     MD NIKITA CHRISTIAN / SUZI    DD:  09/17/2020 15:37:05  DT:  09/17/2020 15:56:07    D#:  [id number]  Job#:  [id number]    cc: Amari Blanc MD

## 2020-09-17 NOTE — PROGRESS NOTES
Ortho  Patient seen and examined  Right thigh and left upper arm for ID closure  Answered all questions  Juan

## 2020-09-17 NOTE — CARE PLAN
Problem: Safety  Goal: Will remain free from injury  Outcome: PROGRESSING AS EXPECTED  Patient calls appropriately.     Problem: Knowledge Deficit  Goal: Knowledge of disease process/condition, treatment plan, diagnostic tests, and medications will improve  Outcome: PROGRESSING AS EXPECTED  Patient aware of POC.  All questions answered.

## 2020-09-17 NOTE — PROGRESS NOTES
Received report from PACU RN, and assumed pt care.  A&O x4.  Bed alarm and treaded socks on.  Call light and personal belongings within reach.  Bed locked and at lowest position.  RAFITA MILLER.

## 2020-09-17 NOTE — ANESTHESIA POSTPROCEDURE EVALUATION
Patient: Gonsalo Hunt    Procedure Summary     Date: 09/17/20 Room / Location: [place] [id number] / SURGERY [facility]    Anesthesia Start: 1434 Anesthesia Stop: 1537    Procedure: IRRIGATION AND DEBRIDEMENT, WOUND-THIGH SKIN GRAFT WITH ACELL (Right ) Diagnosis:     Surgeon: Ayush Martinez M.D. Responsible Provider: Mao No M.D.    Anesthesia Type: general ASA Status: 3          Final Anesthesia Type: general  Last vitals  BP   Blood Pressure: 111/65    Temp   36.6 °C (97.8 °F)    Pulse   Pulse: 61   Resp   16    SpO2   97 %      Anesthesia Post Evaluation    Patient location during evaluation: PACU  Patient participation: complete - patient participated  Level of consciousness: awake and alert  Pain score: 4    Airway patency: patent  Anesthetic complications: no  Cardiovascular status: hemodynamically stable  Respiratory status: acceptable  Hydration status: euvolemic    PONV: none

## 2020-09-17 NOTE — PROGRESS NOTES
Received report and assumed pt care.  A&O x4.  Bed alarm and treaded socks on.  Call light and personal belongings within reach.  Bed locked and at lowest position.  CRAFT.  VSS.  Wound Vac in use on left arm wound.  Contact isolation for MRSA in bilateral wounds.

## 2020-09-17 NOTE — ANESTHESIA QCDR
2019 [facility] Clinical Data Registry (for Quality Improvement)     Postoperative nausea/vomiting risk protocol (Adult = 18 yrs and Pediatric 3-17 yrs)- (430 and 463)  General inhalation anesthetic (NOT TIVA) with PONV risk factors: No  Provision of anti-emetic therapy with at least 2 different classes of agents: N/A  Patient DID NOT receive anti-emetic therapy and reason is documented in Medical Record: N/A    Multimodal Pain Management- (477)  Non-emergent surgery AND patient age >= 18: No  Use of Multimodal Pain Management, two or more drugs and/or interventions, NOT including systemic opioids:   Exception: Documented allergy to multiple classes of analgesics:     Smoking Abstinence (404)  Patient is current smoker (cigarette, pipe, e-cig, marijuanna): No  Elective Surgery:   Abstinence instructions provided prior to day of surgery:   Patient abstained from smoking on day of surgery:     Pre-Op Beta-Blocker in Isolated CABG (44)  Isolated CABG AND patient age >= 18: No  Beta-blocker admin within 24 hours of surgical incision:   Exception:of medical reason(s) for not administering beta blocker within 24 hours prior to surgical incision (e.g., not  indicated,other medical reason):     PACU assessment of acute postoperative pain prior to Anesthesia Care End- Applies to Patients Age = 18- (ABG7)  Initial PACU pain score is which of the following: < 7/10  Patient unable to report pain score: N/A    Post-anesthetic transfer of care checklist/protocol to PACU/ICU- (426 and 427)  Upon conclusion of case, patient transferred to which of the following locations: PACU/Non-ICU  Use of transfer checklist/protocol: Yes  Exclusion: Service Performed in Patient Hospital Room (and thus did not require transfer): N/A  Unplanned admission to ICU related to anesthesia service up through end of PACU care- (MD51)  Unplanned admission to ICU (not initially anticipated at anesthesia start time): No

## 2020-09-17 NOTE — OR SURGEON
Immediate Post OP Note    PreOp Diagnosis: right thigh and left arm abscesses    PostOp Diagnosis: same    Procedure(s):  IRRIGATION AND DEBRIDEMENT right thigh and left arm  Application of Acell right thigh and left arm  stsg left thigh to right thigh  Application of vac dressings to right thigh    Surgeon(s):  Ayush Martinez M.D. surgeon  austin assist    Anesthesiologist/Type of Anesthesia:  Anesthesiologist: Mao No M.D./* No anesthesia type entered *    Surgical Staff:  Circulator: Kirstin Batista R.N.  Relief Circulator: Subhash Hanley R.N.  Scrub Person: Johny Beltran Assist: Jasmin Orozco  Count Golden: Joe Torres R.N.    Specimens removed if any:  * No specimens in log *    Estimated Blood Loss: minimal    Findings: as describedx    Complications: none        9/17/2020 3:42 PM Ayush Martinez M.D.

## 2020-09-17 NOTE — PROGRESS NOTES
Ortho  Patient seen and examined  For debridement and wound care thigh and arm  Answered all questions  Juan

## 2020-09-17 NOTE — ANESTHESIA PROCEDURE NOTES
Airway    Date/Time: 9/17/2020 2:37 PM  Performed by: Mao No M.D.  Authorized by: Mao No M.D.     Location:  OR  Urgency:  Elective  Indications for Airway Management:  Anesthesia      Spontaneous Ventilation: absent    Sedation Level:  Deep  Preoxygenated: Yes    Final Airway Type:  Supraglottic airway  Final Supraglottic Airway:  Standard LMA    SGA Size:  4  Number of Attempts at Approach:  1

## 2020-09-18 PROCEDURE — A9270 NON-COVERED ITEM OR SERVICE: HCPCS | Performed by: PHYSICIAN ASSISTANT

## 2020-09-18 PROCEDURE — A9270 NON-COVERED ITEM OR SERVICE: HCPCS | Performed by: STUDENT IN AN ORGANIZED HEALTH CARE EDUCATION/TRAINING PROGRAM

## 2020-09-18 PROCEDURE — 770021 HCHG ROOM/CARE - ISO PRIVATE

## 2020-09-18 PROCEDURE — 99232 SBSQ HOSP IP/OBS MODERATE 35: CPT | Performed by: STUDENT IN AN ORGANIZED HEALTH CARE EDUCATION/TRAINING PROGRAM

## 2020-09-18 PROCEDURE — 700102 HCHG RX REV CODE 250 W/ 637 OVERRIDE(OP): Performed by: PHYSICIAN ASSISTANT

## 2020-09-18 PROCEDURE — 700111 HCHG RX REV CODE 636 W/ 250 OVERRIDE (IP): Performed by: INTERNAL MEDICINE

## 2020-09-18 PROCEDURE — 700102 HCHG RX REV CODE 250 W/ 637 OVERRIDE(OP): Performed by: STUDENT IN AN ORGANIZED HEALTH CARE EDUCATION/TRAINING PROGRAM

## 2020-09-18 PROCEDURE — 700102 HCHG RX REV CODE 250 W/ 637 OVERRIDE(OP): Performed by: INTERNAL MEDICINE

## 2020-09-18 PROCEDURE — 700101 HCHG RX REV CODE 250: Performed by: INTERNAL MEDICINE

## 2020-09-18 PROCEDURE — 700105 HCHG RX REV CODE 258: Performed by: INTERNAL MEDICINE

## 2020-09-18 PROCEDURE — 700111 HCHG RX REV CODE 636 W/ 250 OVERRIDE (IP): Performed by: PHYSICIAN ASSISTANT

## 2020-09-18 PROCEDURE — A9270 NON-COVERED ITEM OR SERVICE: HCPCS | Performed by: INTERNAL MEDICINE

## 2020-09-18 PROCEDURE — 700111 HCHG RX REV CODE 636 W/ 250 OVERRIDE (IP): Performed by: STUDENT IN AN ORGANIZED HEALTH CARE EDUCATION/TRAINING PROGRAM

## 2020-09-18 RX ORDER — DOCUSATE SODIUM 100 MG/1
100 CAPSULE, LIQUID FILLED ORAL 2 TIMES DAILY
Status: DISCONTINUED | OUTPATIENT
Start: 2020-09-18 | End: 2020-10-13 | Stop reason: HOSPADM

## 2020-09-18 RX ORDER — HYDROMORPHONE HYDROCHLORIDE 1 MG/ML
0.5 INJECTION, SOLUTION INTRAMUSCULAR; INTRAVENOUS; SUBCUTANEOUS
Status: DISCONTINUED | OUTPATIENT
Start: 2020-09-18 | End: 2020-09-25

## 2020-09-18 RX ORDER — PREGABALIN 25 MG/1
25 CAPSULE ORAL 3 TIMES DAILY
Status: DISCONTINUED | OUTPATIENT
Start: 2020-09-18 | End: 2020-09-20

## 2020-09-18 RX ORDER — OXYCODONE HYDROCHLORIDE 10 MG/1
10 TABLET ORAL
Status: DISCONTINUED | OUTPATIENT
Start: 2020-09-18 | End: 2020-09-22

## 2020-09-18 RX ADMIN — HYDROMORPHONE HYDROCHLORIDE 0.5 MG: 1 INJECTION, SOLUTION INTRAMUSCULAR; INTRAVENOUS; SUBCUTANEOUS at 00:37

## 2020-09-18 RX ADMIN — HYDROMORPHONE HYDROCHLORIDE 0.5 MG: 1 INJECTION, SOLUTION INTRAMUSCULAR; INTRAVENOUS; SUBCUTANEOUS at 13:30

## 2020-09-18 RX ADMIN — ACETAMINOPHEN 650 MG: 325 TABLET, FILM COATED ORAL at 05:30

## 2020-09-18 RX ADMIN — DOCUSATE SODIUM 50 MG AND SENNOSIDES 8.6 MG 2 TABLET: 8.6; 5 TABLET, FILM COATED ORAL at 16:58

## 2020-09-18 RX ADMIN — DOCUSATE SODIUM 100 MG: 100 CAPSULE ORAL at 16:58

## 2020-09-18 RX ADMIN — HEPARIN SODIUM 5000 UNITS: 5000 INJECTION, SOLUTION INTRAVENOUS; SUBCUTANEOUS at 05:30

## 2020-09-18 RX ADMIN — OXYCODONE HYDROCHLORIDE 10 MG: 10 TABLET ORAL at 18:48

## 2020-09-18 RX ADMIN — HEPARIN SODIUM 5000 UNITS: 5000 INJECTION, SOLUTION INTRAVENOUS; SUBCUTANEOUS at 20:25

## 2020-09-18 RX ADMIN — PREGABALIN 25 MG: 25 CAPSULE ORAL at 16:58

## 2020-09-18 RX ADMIN — CEFTRIAXONE SODIUM 2 G: 2 INJECTION, POWDER, FOR SOLUTION INTRAMUSCULAR; INTRAVENOUS at 05:31

## 2020-09-18 RX ADMIN — DOCUSATE SODIUM 100 MG: 100 CAPSULE ORAL at 13:29

## 2020-09-18 RX ADMIN — HYDROMORPHONE HYDROCHLORIDE 0.5 MG: 1 INJECTION, SOLUTION INTRAMUSCULAR; INTRAVENOUS; SUBCUTANEOUS at 16:59

## 2020-09-18 RX ADMIN — HYDROMORPHONE HYDROCHLORIDE 0.5 MG: 1 INJECTION, SOLUTION INTRAMUSCULAR; INTRAVENOUS; SUBCUTANEOUS at 20:25

## 2020-09-18 RX ADMIN — NICOTINE TRANSDERMAL SYSTEM 21 MG: 21 PATCH, EXTENDED RELEASE TRANSDERMAL at 05:30

## 2020-09-18 RX ADMIN — DIPHENHYDRAMINE HYDROCHLORIDE 25 MG: 25 TABLET ORAL at 00:36

## 2020-09-18 RX ADMIN — OXYCODONE HYDROCHLORIDE 10 MG: 10 TABLET ORAL at 15:22

## 2020-09-18 RX ADMIN — PREGABALIN 25 MG: 25 CAPSULE ORAL at 13:30

## 2020-09-18 RX ADMIN — HYDROMORPHONE HYDROCHLORIDE 0.5 MG: 1 INJECTION, SOLUTION INTRAMUSCULAR; INTRAVENOUS; SUBCUTANEOUS at 05:30

## 2020-09-18 RX ADMIN — OXYCODONE HYDROCHLORIDE 10 MG: 10 TABLET ORAL at 23:16

## 2020-09-18 RX ADMIN — HYDROMORPHONE HYDROCHLORIDE 0.5 MG: 1 INJECTION, SOLUTION INTRAMUSCULAR; INTRAVENOUS; SUBCUTANEOUS at 09:17

## 2020-09-18 ASSESSMENT — PAIN DESCRIPTION - PAIN TYPE
TYPE: SURGICAL PAIN

## 2020-09-18 ASSESSMENT — ENCOUNTER SYMPTOMS
EYES NEGATIVE: 1
CARDIOVASCULAR NEGATIVE: 1
RESPIRATORY NEGATIVE: 1
BACK PAIN: 1
GASTROINTESTINAL NEGATIVE: 1
NEUROLOGICAL NEGATIVE: 1

## 2020-09-18 NOTE — PROGRESS NOTES
Ortho staff  POday 1 Acell left elbow,  StSG right thigh with vac  Comfortable donor site clean and dry, will change to Xeroform ace wraps tomorrow  Graft vac for 5 days, left elbow no dressing change or only reinforcement if moist  Expect some foul smell from ACell product in elbow, this is NOT infection  DvT Rx  Martinez

## 2020-09-18 NOTE — PROGRESS NOTES
0650 Patient's in bed. Bedside report received from Fadia WILSON RN at the beginning of the shift. Isolation precaution observed.    0917 Patient's sitting up in bed. Medicated with Dilaudid (see MAR) or c/o's left arm and right leg, rates pain 8/10. Wound vac to RLE patent. Fall Protocol in effect. Call light within reach. Reminded patient to call for assist. Assessment completed. No distress noted. Plan of care reviewed with the patient. verbalzed understanding. Isolation precaution observed.     1000 CAROL Alexander visited. POC discussed with the patient. New order received and acknowledged.     1155 Patient's sitting up in bed, having lunch. No distress noted.    1330 Medicated with Dilaudid (see MAR) for c/o's left arm and right leg pain, rates pain 8/10.    1520 Patient's in bed. No distress noted.    1659 Medicated with Dilaudid (see MAR) for c/o's left arm and right leg, rates pain 8/10.    1710 Patient's sitting up in bed, having Dinner. No distress noted.    1848 Medicated with Oxycodone (see MAR) for c/o's left arm and right leg pain, rates pain 7/10.    1920 Patient's in bed. No changes in status. Bedside report given to Oncoming STEVE RN's (Dewayne and Fadia).

## 2020-09-18 NOTE — PROGRESS NOTES
Hospital Medicine Daily Progress Note    Date of Service  9/18/2020    Chief Complaint  60 y.o. homeless male admitted 9/14/2020 with multiple abscesses on upper and lower extremity.  He has a 4 mm ulcer with serosanguineous drainage on his left lateral forearm.    He has a history of IV drug abuse, MRSA cellulitis, right lower extremity necrotizing fasciitis s/p fasciotomy in January 2020.    Hospital Course  Vancomycin and Rocephin was started.  Blood cultures pending.  Orthopedic surgery were consulted from the ER.  S/P irrigation and debridement of left antecubital fossa and superficial right thigh wound in the OR. Right hip wound culture grew beta-hemolytic group B strep, patient already on rocephin and vanc. Blood cultures were negative. Transthoracic Echo performed to r/o endocarditis was performed and was negative for vegetations, EF 65 with mild tricuspid regurg. RV systolic pressure of 25mmHg.    Interval Problem Update  -Pain management today, cont abx, TTE and BCx negative, no wbc count.  -f/u Ortho recs, I&D closure yesterday with wound vac on left elbow and right thigh.  -Increased pain medication slightly.    Consultants/Specialty  Orthopedic surgery    Code Status  DNAR/DNI    Disposition  Home when stable    Review of Systems  Review of Systems   Constitutional: Positive for malaise/fatigue.   Eyes: Negative.    Respiratory: Negative.    Cardiovascular: Negative.    Gastrointestinal: Negative.    Genitourinary: Negative.    Musculoskeletal: Positive for back pain.   Skin:        Pain in left UE surgical wound   Neurological: Negative.    Psychiatric/Behavioral:        Chronic depression. Not suicidal        Physical Exam  Temp:  [35.8 °C (96.5 °F)-36.9 °C (98.5 °F)] 35.8 °C (96.5 °F)  Pulse:  [60-83] 70  Resp:  [13-18] 18  BP: (101-142)/(60-75) 101/60  SpO2:  [90 %-100 %] 97 %    Physical Exam  HENT:      Head: Normocephalic.      Nose: Nose normal.      Mouth/Throat:      Mouth: Mucous membranes  are moist.   Neck:      Musculoskeletal: Normal range of motion.   Cardiovascular:      Rate and Rhythm: Normal rate.   Pulmonary:      Effort: Pulmonary effort is normal.   Abdominal:      Palpations: Abdomen is soft.   Musculoskeletal: Normal range of motion.   Skin:     Comments: Left arm and left s/p I&D wound with wound vac in place, no bleeding or erythema noted.  Pain 4/10 intensity.   Neurological:      General: No focal deficit present.      Mental Status: He is alert.   Psychiatric:         Mood and Affect: Mood normal.         Fluids    Intake/Output Summary (Last 24 hours) at 9/18/2020 0651  Last data filed at 9/18/2020 0531  Gross per 24 hour   Intake 240 ml   Output 1610 ml   Net -1370 ml       Laboratory  Recent Labs     09/16/20  0554   WBC 6.9   RBC 4.68*   HEMOGLOBIN 13.5*   HEMATOCRIT 39.6*   MCV 84.6   MCH 28.8   MCHC 34.1   RDW 47.5   PLATELETCT 241   MPV 9.5     Recent Labs     09/16/20  0554   SODIUM 135   POTASSIUM 4.5   CHLORIDE 99   CO2 23   GLUCOSE 131*   BUN 15   CREATININE 0.66   CALCIUM 8.5                   Imaging  EC-ECHOCARDIOGRAM COMPLETE W/O CONT   Final Result      DX-ELBOW-COMPLETE 3+ LEFT   Final Result      Soft tissue swelling without acute osseous abnormality.      DX-FEMUR-2+ RIGHT   Final Result      No acute osseous abnormality.           Assessment/Plan  Pyogenic arthritis of left elbow (HCC)- (present on admission)  Assessment & Plan  Status post irrigation and debridement of left antecubital fossa.  Wound VAC in place.  Continue Rocephin and vancomycin.  Continue pain management as indicated.  Orthopedic surgery following.  -BCx Neg.  -TTE negative for endocarditis.  -No wbc count.  >Managing Pain.  >Cont Rocephin and Vancomycin.    Murmur- (present on admission)  Assessment & Plan  History of IV drug abuse with concern for bacteremia and endocarditis.    -BCx neg  -TTE neg    Leg wound, right- (present on admission)  Assessment & Plan  Underwent irrigation and  debridement of right wound.  Culture grew beta-hemolytic group B strep.  On ceftriaxone.  Pain control as needed    Homeless- (present on admission)  Assessment & Plan  Social work and case management to assist in discharge planning       VTE prophylaxis: SCD's. Added Hep SQ today.

## 2020-09-18 NOTE — CARE PLAN
Problem: Communication  Goal: The ability to communicate needs accurately and effectively will improve  Outcome: PROGRESSING AS EXPECTED  Note: Discussed POC with patient Updated white board Calls appropriately  Call light within reach      Problem: Pain Management  Goal: Pain level will decrease to patient's comfort goal  Outcome: PROGRESSING AS EXPECTED  Note: Discussed pain management and pain control. Medicated per MAR with some relief. Refuses to take PO oxy first before IV pain medication, pt states oxy does not work for him and that he talked to the MD about this already.

## 2020-09-18 NOTE — PROGRESS NOTES
Hospital Medicine Daily Progress Note    Date of Service  9/18/2020    Chief Complaint  60 y.o. homeless male admitted 9/14/2020 with multiple abscesses on upper and lower extremity.  He has a 4 mm ulcer with serosanguineous drainage on his left lateral forearm.    He has a history of IV drug abuse, MRSA cellulitis, right lower extremity necrotizing fasciitis s/p fasciotomy in January 2020.    Hospital Course  Vancomycin and Rocephin was started.  Blood cultures pending.  Orthopedic surgery were consulted from the ER.  S/P irrigation and debridement of left antecubital fossa and superficial right thigh wound in the OR. Right hip wound culture grew beta-hemolytic group B strep, patient already on rocephin and vanc. Blood cultures were negative. Transthoracic Echo performed to r/o endocarditis was performed and was negative for vegetations, EF 65 with mild tricuspid regurg. RV systolic pressure of 25mmHg.    Interval Problem Update  -Pain management today, cont abx, TTE and BCx negative, no wbc count.  -f/u Ortho recs, for I&D closure today.    Consultants/Specialty  Orthopedic surgery    Code Status  DNAR/DNI    Disposition  Home when stable    Review of Systems  Review of Systems   Constitutional: Positive for malaise/fatigue.   Eyes: Negative.    Respiratory: Negative.    Cardiovascular: Negative.    Gastrointestinal: Negative.    Genitourinary: Negative.    Musculoskeletal: Positive for back pain.   Skin:        Pain in left UE surgical wound   Neurological: Negative.    Psychiatric/Behavioral:        Chronic depression. Not suicidal        Physical Exam  Temp:  [35.8 °C (96.5 °F)-36.9 °C (98.5 °F)] 35.8 °C (96.5 °F)  Pulse:  [60-83] 70  Resp:  [13-18] 18  BP: (101-142)/(60-75) 101/60  SpO2:  [90 %-100 %] 97 %    Physical Exam  HENT:      Head: Normocephalic.      Nose: Nose normal.      Mouth/Throat:      Mouth: Mucous membranes are moist.   Neck:      Musculoskeletal: Normal range of motion.   Cardiovascular:       Rate and Rhythm: Normal rate.   Pulmonary:      Effort: Pulmonary effort is normal.   Abdominal:      Palpations: Abdomen is soft.   Musculoskeletal: Normal range of motion.   Skin:     Comments: Left arm and left s/p I&D wound with wound vac in place, no bleeding or erythema noted.  Pain 4/10 intensity.   Neurological:      General: No focal deficit present.      Mental Status: He is alert.   Psychiatric:         Mood and Affect: Mood normal.         Fluids    Intake/Output Summary (Last 24 hours) at 9/18/2020 0650  Last data filed at 9/18/2020 0531  Gross per 24 hour   Intake 240 ml   Output 1610 ml   Net -1370 ml       Laboratory  Recent Labs     09/16/20  0554   WBC 6.9   RBC 4.68*   HEMOGLOBIN 13.5*   HEMATOCRIT 39.6*   MCV 84.6   MCH 28.8   MCHC 34.1   RDW 47.5   PLATELETCT 241   MPV 9.5     Recent Labs     09/16/20  0554   SODIUM 135   POTASSIUM 4.5   CHLORIDE 99   CO2 23   GLUCOSE 131*   BUN 15   CREATININE 0.66   CALCIUM 8.5                   Imaging  EC-ECHOCARDIOGRAM COMPLETE W/O CONT   Final Result      DX-ELBOW-COMPLETE 3+ LEFT   Final Result      Soft tissue swelling without acute osseous abnormality.      DX-FEMUR-2+ RIGHT   Final Result      No acute osseous abnormality.           Assessment/Plan  Pyogenic arthritis of left elbow (HCC)- (present on admission)  Assessment & Plan  Status post irrigation and debridement of left antecubital fossa.  Wound VAC in place.  Continue Rocephin and vancomycin.  Continue pain management as indicated.  Orthopedic surgery following.  -BCx Neg.  -TTE negative for endocarditis.  -No wbc count.  >Managing Pain.  >Cont Rocephin and Vancomycin.    Murmur- (present on admission)  Assessment & Plan  History of IV drug abuse with concern for bacteremia and endocarditis.    -BCx neg  -TTE neg    Leg wound, right- (present on admission)  Assessment & Plan  Underwent irrigation and debridement of right wound.  Culture grew beta-hemolytic group B strep.  On ceftriaxone.   Pain control as needed    Homeless- (present on admission)  Assessment & Plan  Social work and case management to assist in discharge planning       VTE prophylaxis: SCD's. Added Hep SQ today.

## 2020-09-18 NOTE — DISCHARGE PLANNING
Anticipated Discharge Disposition: LTAC vs. SNF    Action: Pt was discussed in IDT rounds as needing placement.  Pt has [facility], will blanket [place] [facility]/SNF choices pending orders.    Pt has IV ABX- Rocephin/vanco and wound vac needs.      Barriers to Discharge: Placement    Plan: F/u w/ referrals

## 2020-09-18 NOTE — CARE PLAN
Problem: Safety  Goal: Will remain free from injury  Outcome: PROGRESSING AS EXPECTED  Note: Safety precaution in effect. Non skid socks in use. Call light within reach. Reminded patient to call for assist. Hourly rounds in effect. Verbalized understanding.     Problem: Infection  Goal: Will remain free from infection  Outcome: PROGRESSING AS EXPECTED  Note: Implement standard precaution. Hand washing every encounter & before and after patient care. Verbalized understanding.      Problem: Knowledge Deficit  Goal: Knowledge of disease process/condition, treatment plan, diagnostic tests, and medications will improve  Outcome: PROGRESSING AS EXPECTED  Note: Discussed Plan of care. Questions answered. Isolation precaution observed.  Verbalized understanding.     Problem: Pain Management  Goal: Pain level will decrease to patient's comfort goal  Outcome: PROGRESSING AS EXPECTED  Note: Educated on pain scale. Encouraged to verbalize pain. Will medicate as per MAR.

## 2020-09-18 NOTE — PROGRESS NOTES
"C/O pain donor site and elbow  No CP,  Dyspnea or fever  20 minute discussion regarding overreliance on IV meds  Reintroducing orals  Add pregabalin, may titrate for effect  Addressed constipation concerns  Meds adjusted, will d/w Med attending  Recommend All meals OOB  Ambulate at least TID, DC Chem PPtx when able to ambulate 150'    /68   Pulse 60   Temp 36.1 °C (97 °F) (Temporal)   Resp 17   Ht 1.93 m (6' 4\")   Wt 118.5 kg (261 lb 3.9 oz) Comment: per RN  SpO2 94%   BMI 31.80 kg/m²     Recent Labs     09/16/20  0554   WBC 6.9   RBC 4.68*   HEMOGLOBIN 13.5*   HEMATOCRIT 39.6*   MCV 84.6   MCH 28.8   RDW 47.5   PLATELETCT 241   MPV 9.5   NEUTSPOLYS 67.80   LYMPHOCYTES 19.60*   MONOCYTES 8.10   EOSINOPHILS 3.20   BASOPHILS 0.60     Recent Labs     09/16/20  0554   CREATININE 0.66     Recent Labs     09/16/20  0554   SODIUM 135   POTASSIUM 4.5   CHLORIDE 99   CO2 23   GLUCOSE 131*   BUN 15       "

## 2020-09-19 ENCOUNTER — APPOINTMENT (OUTPATIENT)
Dept: RADIOLOGY | Facility: MEDICAL CENTER | Age: 60
DRG: 464 | End: 2020-09-19
Attending: STUDENT IN AN ORGANIZED HEALTH CARE EDUCATION/TRAINING PROGRAM
Payer: COMMERCIAL

## 2020-09-19 PROCEDURE — 700111 HCHG RX REV CODE 636 W/ 250 OVERRIDE (IP): Performed by: INTERNAL MEDICINE

## 2020-09-19 PROCEDURE — A9270 NON-COVERED ITEM OR SERVICE: HCPCS | Performed by: PHYSICIAN ASSISTANT

## 2020-09-19 PROCEDURE — 99232 SBSQ HOSP IP/OBS MODERATE 35: CPT | Performed by: STUDENT IN AN ORGANIZED HEALTH CARE EDUCATION/TRAINING PROGRAM

## 2020-09-19 PROCEDURE — 700101 HCHG RX REV CODE 250: Performed by: INTERNAL MEDICINE

## 2020-09-19 PROCEDURE — 770021 HCHG ROOM/CARE - ISO PRIVATE

## 2020-09-19 PROCEDURE — A9270 NON-COVERED ITEM OR SERVICE: HCPCS | Performed by: INTERNAL MEDICINE

## 2020-09-19 PROCEDURE — 700102 HCHG RX REV CODE 250 W/ 637 OVERRIDE(OP): Performed by: INTERNAL MEDICINE

## 2020-09-19 PROCEDURE — 700102 HCHG RX REV CODE 250 W/ 637 OVERRIDE(OP): Performed by: STUDENT IN AN ORGANIZED HEALTH CARE EDUCATION/TRAINING PROGRAM

## 2020-09-19 PROCEDURE — 700105 HCHG RX REV CODE 258: Performed by: INTERNAL MEDICINE

## 2020-09-19 PROCEDURE — 700111 HCHG RX REV CODE 636 W/ 250 OVERRIDE (IP): Performed by: PHYSICIAN ASSISTANT

## 2020-09-19 PROCEDURE — A9270 NON-COVERED ITEM OR SERVICE: HCPCS | Performed by: STUDENT IN AN ORGANIZED HEALTH CARE EDUCATION/TRAINING PROGRAM

## 2020-09-19 PROCEDURE — 700111 HCHG RX REV CODE 636 W/ 250 OVERRIDE (IP): Performed by: STUDENT IN AN ORGANIZED HEALTH CARE EDUCATION/TRAINING PROGRAM

## 2020-09-19 PROCEDURE — 700102 HCHG RX REV CODE 250 W/ 637 OVERRIDE(OP): Performed by: PHYSICIAN ASSISTANT

## 2020-09-19 RX ORDER — ACETAMINOPHEN 325 MG/1
650 TABLET ORAL 3 TIMES DAILY
Qty: 30 TAB | Refills: 0
Start: 2020-09-19 | End: 2020-10-02

## 2020-09-19 RX ORDER — NICOTINE 21 MG/24HR
1 PATCH, TRANSDERMAL 24 HOURS TRANSDERMAL EVERY 24 HOURS
Qty: 30 PATCH | Refills: 0 | Status: SHIPPED | OUTPATIENT
Start: 2020-09-19 | End: 2020-10-02

## 2020-09-19 RX ORDER — DIPHENHYDRAMINE HCL 25 MG
25 TABLET ORAL EVERY 6 HOURS PRN
Qty: 30 TAB | Refills: 0 | Status: ON HOLD
Start: 2020-09-19 | End: 2021-04-06

## 2020-09-19 RX ORDER — PSEUDOEPHEDRINE HCL 30 MG
100 TABLET ORAL 2 TIMES DAILY
Qty: 60 CAP | Refills: 0 | Status: ON HOLD
Start: 2020-09-19 | End: 2021-04-06

## 2020-09-19 RX ORDER — OXYCODONE HYDROCHLORIDE 10 MG/1
10 TABLET ORAL
Qty: 24 TAB | Refills: 0
Start: 2020-09-19 | End: 2020-10-02

## 2020-09-19 RX ORDER — POLYETHYLENE GLYCOL 3350 17 G/17G
17 POWDER, FOR SOLUTION ORAL DAILY
Qty: 1 EACH | Refills: 3
Start: 2020-09-20 | End: 2021-12-28

## 2020-09-19 RX ORDER — HYDROMORPHONE HYDROCHLORIDE 1 MG/ML
0.5 INJECTION, SOLUTION INTRAMUSCULAR; INTRAVENOUS; SUBCUTANEOUS
Qty: 30 ML | Refills: 0
Start: 2020-09-19 | End: 2020-10-02

## 2020-09-19 RX ORDER — HEPARIN SODIUM 5000 [USP'U]/ML
5000 INJECTION, SOLUTION INTRAVENOUS; SUBCUTANEOUS EVERY 8 HOURS
Qty: 1 ML | Refills: 0
Start: 2020-09-19 | End: 2020-10-02

## 2020-09-19 RX ORDER — PREGABALIN 25 MG/1
25 CAPSULE ORAL 3 TIMES DAILY
Qty: 15 CAP | Refills: 0
Start: 2020-09-19 | End: 2020-10-02

## 2020-09-19 RX ORDER — AMOXICILLIN 250 MG
2 CAPSULE ORAL EVERY EVENING
Qty: 30 TAB | Refills: 0 | Status: ON HOLD
Start: 2020-09-19 | End: 2021-04-06 | Stop reason: SDUPTHER

## 2020-09-19 RX ADMIN — HYDROMORPHONE HYDROCHLORIDE 0.5 MG: 1 INJECTION, SOLUTION INTRAMUSCULAR; INTRAVENOUS; SUBCUTANEOUS at 09:06

## 2020-09-19 RX ADMIN — NICOTINE TRANSDERMAL SYSTEM 21 MG: 21 PATCH, EXTENDED RELEASE TRANSDERMAL at 04:53

## 2020-09-19 RX ADMIN — HYDROMORPHONE HYDROCHLORIDE 0.5 MG: 1 INJECTION, SOLUTION INTRAMUSCULAR; INTRAVENOUS; SUBCUTANEOUS at 01:05

## 2020-09-19 RX ADMIN — POLYETHYLENE GLYCOL 3350 1 PACKET: 17 POWDER, FOR SOLUTION ORAL at 04:54

## 2020-09-19 RX ADMIN — HYDROMORPHONE HYDROCHLORIDE 0.5 MG: 1 INJECTION, SOLUTION INTRAMUSCULAR; INTRAVENOUS; SUBCUTANEOUS at 13:07

## 2020-09-19 RX ADMIN — PREGABALIN 25 MG: 25 CAPSULE ORAL at 17:15

## 2020-09-19 RX ADMIN — HYDROMORPHONE HYDROCHLORIDE 0.5 MG: 1 INJECTION, SOLUTION INTRAMUSCULAR; INTRAVENOUS; SUBCUTANEOUS at 17:15

## 2020-09-19 RX ADMIN — HEPARIN SODIUM 5000 UNITS: 5000 INJECTION, SOLUTION INTRAVENOUS; SUBCUTANEOUS at 04:55

## 2020-09-19 RX ADMIN — DOCUSATE SODIUM 100 MG: 100 CAPSULE ORAL at 04:52

## 2020-09-19 RX ADMIN — OXYCODONE HYDROCHLORIDE 10 MG: 10 TABLET ORAL at 19:31

## 2020-09-19 RX ADMIN — ACETAMINOPHEN 650 MG: 325 TABLET, FILM COATED ORAL at 17:15

## 2020-09-19 RX ADMIN — HYDROMORPHONE HYDROCHLORIDE 0.5 MG: 1 INJECTION, SOLUTION INTRAMUSCULAR; INTRAVENOUS; SUBCUTANEOUS at 20:56

## 2020-09-19 RX ADMIN — CEFTRIAXONE SODIUM 2 G: 2 INJECTION, POWDER, FOR SOLUTION INTRAMUSCULAR; INTRAVENOUS at 04:56

## 2020-09-19 RX ADMIN — OXYCODONE HYDROCHLORIDE 10 MG: 10 TABLET ORAL at 11:15

## 2020-09-19 RX ADMIN — HEPARIN SODIUM 5000 UNITS: 5000 INJECTION, SOLUTION INTRAVENOUS; SUBCUTANEOUS at 20:56

## 2020-09-19 RX ADMIN — HEPARIN SODIUM 5000 UNITS: 5000 INJECTION, SOLUTION INTRAVENOUS; SUBCUTANEOUS at 13:08

## 2020-09-19 RX ADMIN — OXYCODONE HYDROCHLORIDE 10 MG: 10 TABLET ORAL at 22:41

## 2020-09-19 RX ADMIN — OXYCODONE HYDROCHLORIDE 10 MG: 10 TABLET ORAL at 15:42

## 2020-09-19 RX ADMIN — PREGABALIN 25 MG: 25 CAPSULE ORAL at 11:15

## 2020-09-19 RX ADMIN — DOCUSATE SODIUM 100 MG: 100 CAPSULE ORAL at 17:15

## 2020-09-19 RX ADMIN — DOCUSATE SODIUM 50 MG AND SENNOSIDES 8.6 MG 2 TABLET: 8.6; 5 TABLET, FILM COATED ORAL at 17:15

## 2020-09-19 RX ADMIN — ACETAMINOPHEN 650 MG: 325 TABLET, FILM COATED ORAL at 04:52

## 2020-09-19 RX ADMIN — HYDROMORPHONE HYDROCHLORIDE 0.5 MG: 1 INJECTION, SOLUTION INTRAMUSCULAR; INTRAVENOUS; SUBCUTANEOUS at 04:55

## 2020-09-19 RX ADMIN — PREGABALIN 25 MG: 25 CAPSULE ORAL at 04:53

## 2020-09-19 ASSESSMENT — ENCOUNTER SYMPTOMS
EYES NEGATIVE: 1
CARDIOVASCULAR NEGATIVE: 1
NEUROLOGICAL NEGATIVE: 1
RESPIRATORY NEGATIVE: 1
GASTROINTESTINAL NEGATIVE: 1
BACK PAIN: 1

## 2020-09-19 ASSESSMENT — PAIN DESCRIPTION - PAIN TYPE
TYPE: SURGICAL PAIN

## 2020-09-19 NOTE — CARE PLAN
Problem: Communication  Goal: The ability to communicate needs accurately and effectively will improve  Outcome: PROGRESSING AS EXPECTED     Problem: Safety  Goal: Will remain free from injury  Outcome: PROGRESSING AS EXPECTED     Problem: Infection  Goal: Will remain free from infection  Outcome: PROGRESSING AS EXPECTED     Problem: Pain Management  Goal: Pain level will decrease to patient's comfort goal  Outcome: PROGRESSING AS EXPECTED

## 2020-09-19 NOTE — CARE PLAN
Problem: Safety  Goal: Will remain free from injury  Outcome: PROGRESSING AS EXPECTED  Note: Safety precaution in effect. Non skid socks in use. Call light within reach. Reminded patient to call for assist. Hourly rounds in effect. Verbalized understanding.     Problem: Infection  Goal: Will remain free from infection  Outcome: PROGRESSING AS EXPECTED  Note: Implement standard precaution. Hand washing every encounter & before and after patient care. Verbalized understanding. Isolation precaution observed.      Problem: Knowledge Deficit  Goal: Knowledge of disease process/condition, treatment plan, diagnostic tests, and medications will improve  Outcome: PROGRESSING AS EXPECTED  Note: Discussed Plan of care. Questions answered. Isolation precaution observed.  Verbalized understanding.     Problem: Pain Management  Goal: Pain level will decrease to patient's comfort goal  Outcome: PROGRESSING AS EXPECTED  Note: Educated on pain scale. Encouraged to verbalize pain. Will medicate as per MAR.

## 2020-09-19 NOTE — PROGRESS NOTES
Hospital Medicine Daily Progress Note    Date of Service  9/19/2020    Chief Complaint  60 y.o. homeless male admitted 9/14/2020 with multiple abscesses on upper and lower extremity.  He has a 4 mm ulcer with serosanguineous drainage on his left lateral forearm.    He has a history of IV drug abuse, MRSA cellulitis, right lower extremity necrotizing fasciitis s/p fasciotomy in January 2020.    Hospital Course  Vancomycin and Rocephin was started.  Blood cultures pending.  Orthopedic surgery were consulted from the ER.  S/P irrigation and debridement of left antecubital fossa and superficial right thigh wound in the OR. Right hip wound culture grew beta-hemolytic group B strep, patient already on rocephin and vanc. Blood cultures were negative. Transthoracic Echo performed to r/o endocarditis was performed and was negative for vegetations, EF 65 with mild tricuspid regurg. RV systolic pressure of 25mmHg.    Interval Problem Update  -Pain management today, cont abx, TTE and BCx negative, no wbc count.  -f/u Ortho recs, I&D closure yesterday with wound vac on left elbow and right thigh.  -Increased pain medication slightly.  -Dr. Ritchie Kwong followed this patient earlier in 2020, consulted for final dispo  -PICC line/midline for abx pending ID recs.  -Cont Rocephin 2g q24hrs and Vancomycin.    Consultants/Specialty  Orthopedic surgery  ID Dr. Ritchie Kwong    Code Status  DNAR/DNI    Disposition  Home when stable    Review of Systems  Review of Systems   Constitutional: Positive for malaise/fatigue.   Eyes: Negative.    Respiratory: Negative.    Cardiovascular: Negative.    Gastrointestinal: Negative.    Genitourinary: Negative.    Musculoskeletal: Positive for back pain.   Skin:        Pain in left UE surgical wound   Neurological: Negative.    Psychiatric/Behavioral:        Chronic depression. Not suicidal        Physical Exam  Temp:  [36.1 °C (97 °F)-36.6 °C (97.9 °F)] 36.6 °C (97.9 °F)  Pulse:  [60-80]  60  Resp:  [17] 17  BP: (101-110)/(63-71) 110/70  SpO2:  [92 %-96 %] 94 %    Physical Exam  HENT:      Head: Normocephalic.      Nose: Nose normal.      Mouth/Throat:      Mouth: Mucous membranes are moist.   Neck:      Musculoskeletal: Normal range of motion.   Cardiovascular:      Rate and Rhythm: Normal rate.   Pulmonary:      Effort: Pulmonary effort is normal.   Abdominal:      Palpations: Abdomen is soft.   Musculoskeletal: Normal range of motion.   Skin:     Comments: Left elbow s/p I&D wound with wound vac in place, no bleeding or erythema noted.  Pain 4/10 intensity.   Neurological:      General: No focal deficit present.      Mental Status: He is alert.   Psychiatric:         Mood and Affect: Mood normal.         Fluids    Intake/Output Summary (Last 24 hours) at 9/19/2020 0939  Last data filed at 9/19/2020 0526  Gross per 24 hour   Intake 480 ml   Output 0 ml   Net 480 ml       Laboratory                        Imaging  EC-ECHOCARDIOGRAM COMPLETE W/O CONT   Final Result      DX-ELBOW-COMPLETE 3+ LEFT   Final Result      Soft tissue swelling without acute osseous abnormality.      DX-FEMUR-2+ RIGHT   Final Result      No acute osseous abnormality.      IR-PICC LINE PLACEMENT W/ GUIDANCE > AGE 5    (Results Pending)        Assessment/Plan  Pyogenic arthritis of left elbow (HCC)- (present on admission)  Assessment & Plan  -Pain management today, cont abx, TTE and BCx negative, no wbc count.  -Ortho signed off, I&D closure yesterday with wound vac on left elbow and right thigh.  -Increased pain medication slightly.  -Dr. Ritchie Kwong followed this patient earlier in 2020, consulted for final dispo  -PICC line ordered for long term antibiotics. Consider midline if ID wants shorter course of 4 weeks, however the severity of the joint infection, anticipating possible longer course.  -Cont Rocephin 2g q24hrs & Vancomycin.    Murmur- (present on admission)  Assessment & Plan  History of IV drug abuse with  concern for bacteremia and endocarditis.    -BCx neg  -TTE neg    Leg wound, right- (present on admission)  Assessment & Plan  Underwent irrigation and debridement of right wound.  Culture grew beta-hemolytic group B strep.  On ceftriaxone.  Pain control as needed    Homeless- (present on admission)  Assessment & Plan  Social work and case management to assist in discharge planning       VTE prophylaxis: SCD's. Added Hep SQ today.

## 2020-09-19 NOTE — PROGRESS NOTES
Report received from Day Shift RN at 1915. Assumed care of patient. Plan of care discussed, questions answered. Assessment completed. VSS, A&O x4, states pain in arm and leg. PRN med given. Call light in reach. Patient educated on use of call light for assistance.    Wound vac in place.

## 2020-09-19 NOTE — PROGRESS NOTES
0655 Patient's in bed. Bedside report received from STEVE Mccormick RN's at the beginning of the shift. Isolation precaution observed.    0856 New orders received and acknowledged from Dr Chacon (see MAR).     0906 Patient's sitting up in bed. Medicated with Dilaudid (see MAR) or c/o's left arm and right leg, rates pain 9/10. Wound vac to RLE patent. Fall Protocol in effect. Call light within reach. Reminded patient to call for assist. Dressing to left thigh changed as per order.  Assessment completed. No distress noted. Plan of care reviewed with the patient. verbalzed understanding. Isolation precaution observed.      1115 Medicated with Oxycodone (see MAR) for c/o's left arm and bilateral lower extremity, rates pain 8/10.    1120 Dr Chacon visited.  POC discussed with the patient.    1307 Medicated with Dilaudid (see MAR) for c/o's left arm and bilateral leg pain, rates pain 8/10.     1535 Medicated with Oxycodone (see MAR) for c/o's left arm and bilateral leg pain, rates pain 8/10.    1715 Patient's sitting up I bed, having Dinner. Medicated with Dilaudid (see MAR) for c/o's left arm and bilateral leg pain, rates pain 8/10.    1920  Patient's in bed. No changes in status. Bedside report given to Blanca WILSON RN (Mary).

## 2020-09-20 LAB
BACTERIA BLD CULT: NORMAL
BACTERIA BLD CULT: NORMAL
BACTERIA SPEC ANAEROBE CULT: NORMAL
SIGNIFICANT IND 70042: NORMAL
SITE SITE: NORMAL
SOURCE SOURCE: NORMAL

## 2020-09-20 PROCEDURE — 700102 HCHG RX REV CODE 250 W/ 637 OVERRIDE(OP): Performed by: PHYSICIAN ASSISTANT

## 2020-09-20 PROCEDURE — A9270 NON-COVERED ITEM OR SERVICE: HCPCS | Performed by: PHYSICIAN ASSISTANT

## 2020-09-20 PROCEDURE — 700101 HCHG RX REV CODE 250: Performed by: INTERNAL MEDICINE

## 2020-09-20 PROCEDURE — 700111 HCHG RX REV CODE 636 W/ 250 OVERRIDE (IP): Performed by: STUDENT IN AN ORGANIZED HEALTH CARE EDUCATION/TRAINING PROGRAM

## 2020-09-20 PROCEDURE — 99232 SBSQ HOSP IP/OBS MODERATE 35: CPT | Performed by: STUDENT IN AN ORGANIZED HEALTH CARE EDUCATION/TRAINING PROGRAM

## 2020-09-20 PROCEDURE — 700105 HCHG RX REV CODE 258: Performed by: INTERNAL MEDICINE

## 2020-09-20 PROCEDURE — 700111 HCHG RX REV CODE 636 W/ 250 OVERRIDE (IP): Performed by: PHYSICIAN ASSISTANT

## 2020-09-20 PROCEDURE — A9270 NON-COVERED ITEM OR SERVICE: HCPCS | Performed by: INTERNAL MEDICINE

## 2020-09-20 PROCEDURE — 700111 HCHG RX REV CODE 636 W/ 250 OVERRIDE (IP): Performed by: INTERNAL MEDICINE

## 2020-09-20 PROCEDURE — 700102 HCHG RX REV CODE 250 W/ 637 OVERRIDE(OP): Performed by: INTERNAL MEDICINE

## 2020-09-20 PROCEDURE — 700102 HCHG RX REV CODE 250 W/ 637 OVERRIDE(OP): Performed by: STUDENT IN AN ORGANIZED HEALTH CARE EDUCATION/TRAINING PROGRAM

## 2020-09-20 PROCEDURE — A9270 NON-COVERED ITEM OR SERVICE: HCPCS | Performed by: STUDENT IN AN ORGANIZED HEALTH CARE EDUCATION/TRAINING PROGRAM

## 2020-09-20 PROCEDURE — 770021 HCHG ROOM/CARE - ISO PRIVATE

## 2020-09-20 RX ORDER — PREGABALIN 75 MG/1
75 CAPSULE ORAL 3 TIMES DAILY
Status: DISCONTINUED | OUTPATIENT
Start: 2020-09-20 | End: 2020-09-22

## 2020-09-20 RX ADMIN — HEPARIN SODIUM 5000 UNITS: 5000 INJECTION, SOLUTION INTRAVENOUS; SUBCUTANEOUS at 21:56

## 2020-09-20 RX ADMIN — HYDROMORPHONE HYDROCHLORIDE 0.5 MG: 1 INJECTION, SOLUTION INTRAMUSCULAR; INTRAVENOUS; SUBCUTANEOUS at 20:53

## 2020-09-20 RX ADMIN — PREGABALIN 75 MG: 75 CAPSULE ORAL at 16:54

## 2020-09-20 RX ADMIN — ACETAMINOPHEN 650 MG: 325 TABLET, FILM COATED ORAL at 05:30

## 2020-09-20 RX ADMIN — HEPARIN SODIUM 5000 UNITS: 5000 INJECTION, SOLUTION INTRAVENOUS; SUBCUTANEOUS at 13:44

## 2020-09-20 RX ADMIN — HYDROMORPHONE HYDROCHLORIDE 0.5 MG: 1 INJECTION, SOLUTION INTRAMUSCULAR; INTRAVENOUS; SUBCUTANEOUS at 16:54

## 2020-09-20 RX ADMIN — HEPARIN SODIUM 5000 UNITS: 5000 INJECTION, SOLUTION INTRAVENOUS; SUBCUTANEOUS at 05:30

## 2020-09-20 RX ADMIN — NICOTINE TRANSDERMAL SYSTEM 21 MG: 21 PATCH, EXTENDED RELEASE TRANSDERMAL at 05:30

## 2020-09-20 RX ADMIN — HYDROMORPHONE HYDROCHLORIDE 0.5 MG: 1 INJECTION, SOLUTION INTRAMUSCULAR; INTRAVENOUS; SUBCUTANEOUS at 05:29

## 2020-09-20 RX ADMIN — OXYCODONE HYDROCHLORIDE 10 MG: 10 TABLET ORAL at 03:10

## 2020-09-20 RX ADMIN — DOCUSATE SODIUM 50 MG AND SENNOSIDES 8.6 MG 2 TABLET: 8.6; 5 TABLET, FILM COATED ORAL at 16:54

## 2020-09-20 RX ADMIN — OXYCODONE HYDROCHLORIDE 10 MG: 10 TABLET ORAL at 18:42

## 2020-09-20 RX ADMIN — OXYCODONE HYDROCHLORIDE 10 MG: 10 TABLET ORAL at 07:48

## 2020-09-20 RX ADMIN — HYDROMORPHONE HYDROCHLORIDE 0.5 MG: 1 INJECTION, SOLUTION INTRAMUSCULAR; INTRAVENOUS; SUBCUTANEOUS at 09:27

## 2020-09-20 RX ADMIN — HYDROMORPHONE HYDROCHLORIDE 0.5 MG: 1 INJECTION, SOLUTION INTRAMUSCULAR; INTRAVENOUS; SUBCUTANEOUS at 01:54

## 2020-09-20 RX ADMIN — DOCUSATE SODIUM 100 MG: 100 CAPSULE ORAL at 16:54

## 2020-09-20 RX ADMIN — OXYCODONE HYDROCHLORIDE 10 MG: 10 TABLET ORAL at 11:31

## 2020-09-20 RX ADMIN — PREGABALIN 25 MG: 25 CAPSULE ORAL at 05:30

## 2020-09-20 RX ADMIN — ACETAMINOPHEN 650 MG: 325 TABLET, FILM COATED ORAL at 11:31

## 2020-09-20 RX ADMIN — OXYCODONE HYDROCHLORIDE 10 MG: 10 TABLET ORAL at 15:22

## 2020-09-20 RX ADMIN — CEFTRIAXONE SODIUM 2 G: 2 INJECTION, POWDER, FOR SOLUTION INTRAMUSCULAR; INTRAVENOUS at 05:31

## 2020-09-20 RX ADMIN — PREGABALIN 75 MG: 75 CAPSULE ORAL at 11:31

## 2020-09-20 RX ADMIN — DOCUSATE SODIUM 100 MG: 100 CAPSULE ORAL at 05:30

## 2020-09-20 RX ADMIN — ACETAMINOPHEN 650 MG: 325 TABLET, FILM COATED ORAL at 16:54

## 2020-09-20 RX ADMIN — HYDROMORPHONE HYDROCHLORIDE 0.5 MG: 1 INJECTION, SOLUTION INTRAMUSCULAR; INTRAVENOUS; SUBCUTANEOUS at 13:44

## 2020-09-20 RX ADMIN — POLYETHYLENE GLYCOL 3350 1 PACKET: 17 POWDER, FOR SOLUTION ORAL at 05:31

## 2020-09-20 RX ADMIN — OXYCODONE HYDROCHLORIDE 10 MG: 10 TABLET ORAL at 21:56

## 2020-09-20 ASSESSMENT — PAIN DESCRIPTION - PAIN TYPE
TYPE: SURGICAL PAIN

## 2020-09-20 ASSESSMENT — ENCOUNTER SYMPTOMS
GASTROINTESTINAL NEGATIVE: 1
BACK PAIN: 1
EYES NEGATIVE: 1
RESPIRATORY NEGATIVE: 1
NEUROLOGICAL NEGATIVE: 1
CARDIOVASCULAR NEGATIVE: 1

## 2020-09-20 NOTE — CARE PLAN
Problem: Communication  Goal: The ability to communicate needs accurately and effectively will improve  Outcome: PROGRESSING AS EXPECTED   Patient uses call light appropriately. Patient is communicating his needs effectively.     Problem: Infection  Goal: Will remain free from infection  Outcome: PROGRESSING AS EXPECTED  Antibiotics administered per MAR. Patient given education about hand hygiene.

## 2020-09-20 NOTE — PROGRESS NOTES
Pt aaox4. Pt c/o severe pain, Oxy and Dilaudid given with some +results. +BS, Excellent appetite. Voiding w/o difficulty. Wound vac to RLE. L elbow and LLE with ace wrap bandages in place- CDI.  Reviewed poc with pt-verbalized understanding. On isolation precautions. Call light in reach.

## 2020-09-20 NOTE — PROGRESS NOTES
"Seen and examined.  Doing OK.  Asking what the plan is for wound on his elbow.  Complains that oxycodone does not seem to work and that in the past he has been dismissed on oral Dilaudid.    /67   Pulse 64   Temp 36.6 °C (97.8 °F) (Temporal)   Resp 17   Ht 1.93 m (6' 4\")   Wt 118.5 kg (261 lb 3.9 oz)   SpO2 96%     Exam:  LUE fires median/radial/ulnar distributions, sensation intact in left hand, CR < 2s on left.  R thigh wound vac to suction    #1 Left elbow I&D  #2 Right thigh STSG    Plan:    - ABX per medicine  - SCD's, Lovenox  - No changes in activity status  - Wound care per Dr. Martinez  "

## 2020-09-20 NOTE — PROGRESS NOTES
"   Orthopaedic PA Progress Note    Interval changes:resting, c/o pain    ROS - Patient denies any new issues. No chest pain, dyspnea, or fever.  Pain well controlled.    /67   Pulse 64   Temp 36.6 °C (97.8 °F) (Temporal)   Resp 17   Ht 1.93 m (6' 4\")   Wt 118.5 kg (261 lb 3.9 oz)   SpO2 96%     Patient seen and examined  No acute distress  Breathing non labored  RRR  Surgical dressing is clean, dry, and intact. Patient clearly fires forearm flexors, forearm extensors, and moves all five fingers without issue . Sensation is intact to light touch throughout median, ulnar, and radial nerve distributions. Strong and palpable radial pulses with capillary refill less than 2 seconds. No arm or hand discomfort.  Surgical dressing is clean, dry, and intact. Patient clearly fires tibialis anterior, EHL, and gastrocnemius/soleus. Sensation is intact to light touch throughout superficial peroneal, deep peroneal, tibial, saphenous, and sural nerve distributions. Strong and palpable 2+ dorsalis pedis and posterior tibial pulses with capillary refill less than 2 seconds. No lower leg tenderness or discomfort.    Active Hospital Problems    Diagnosis   • Pyogenic arthritis of left elbow (HCC) [M00.9]     Priority: High     No crepitus no gas in the tissues,   history of MRSA, orthopedic surgery consulted for OR in the a.m.  Empiric vancomycin and Rocephin ordered.  Symptomatic management     • Murmur [R01.1]     Priority: Medium   • Homeless [Z59.0]     Priority: Low   • Leg wound, right [S85.206A]     Assessment/Plan:  POD#3 ACell graft L elbow, STSG R thigh with vac  Wt bearing status - AT  PT/OT-initiated  Wound care:Graft vac for 5 days, left elbow no dressing change or only reinforcement if moist, donor site drsng Left in place  Drains - no   Lundberg-no  Sutures/Staples out- 10-14 days post operatively  Antibiotics: per Med  DVT Prophylaxis- TEDS/SCDs/Foot pumps.   [facility]  Future Procedures - none  Case Coordination for " Discharge Planning - Disposition per Med  Titrating pregabalin       Acell left elbow,  StSG right thigh with vac

## 2020-09-20 NOTE — CARE PLAN
Problem: Safety  Goal: Will remain free from falls  Outcome: PROGRESSING AS EXPECTED  Call light w/in reach. Instructed pt to call for assistance before getting OOB- pt verbalized understanding.     Problem: Knowledge Deficit  Goal: Knowledge of disease process/condition, treatment plan, diagnostic tests, and medications will improve  Outcome: PROGRESSING AS EXPECTED   POC discussed.     Problem: Pain Management  Goal: Pain level will decrease to patient's comfort goal  Outcome: PROGRESSING AS EXPECTED   Oxy and Dilaudid given PRN with +results. Educated pt on importance of pain control- pt verbalized understanding.

## 2020-09-21 ENCOUNTER — APPOINTMENT (OUTPATIENT)
Dept: RADIOLOGY | Facility: MEDICAL CENTER | Age: 60
DRG: 464 | End: 2020-09-21
Attending: STUDENT IN AN ORGANIZED HEALTH CARE EDUCATION/TRAINING PROGRAM
Payer: COMMERCIAL

## 2020-09-21 LAB
ANION GAP SERPL CALC-SCNC: 14 MMOL/L (ref 7–16)
BASOPHILS # BLD AUTO: 1.6 % (ref 0–1.8)
BASOPHILS # BLD: 0.1 K/UL (ref 0–0.12)
BUN SERPL-MCNC: 23 MG/DL (ref 8–22)
CALCIUM SERPL-MCNC: 9 MG/DL (ref 8.5–10.5)
CHLORIDE SERPL-SCNC: 98 MMOL/L (ref 96–112)
CO2 SERPL-SCNC: 23 MMOL/L (ref 20–33)
CREAT SERPL-MCNC: 0.74 MG/DL (ref 0.5–1.4)
EOSINOPHIL # BLD AUTO: 0.42 K/UL (ref 0–0.51)
EOSINOPHIL NFR BLD: 6.6 % (ref 0–6.9)
ERYTHROCYTE [DISTWIDTH] IN BLOOD BY AUTOMATED COUNT: 49.6 FL (ref 35.9–50)
GLUCOSE SERPL-MCNC: 114 MG/DL (ref 65–99)
HCT VFR BLD AUTO: 44 % (ref 42–52)
HGB BLD-MCNC: 14.6 G/DL (ref 14–18)
INR PPP: 0.94 (ref 0.87–1.13)
LYMPHOCYTES # BLD AUTO: 1.03 K/UL (ref 1–4.8)
LYMPHOCYTES NFR BLD: 16.4 % (ref 22–41)
MANUAL DIFF BLD: NORMAL
MCH RBC QN AUTO: 29 PG (ref 27–33)
MCHC RBC AUTO-ENTMCNC: 33.2 G/DL (ref 33.7–35.3)
MCV RBC AUTO: 87.3 FL (ref 81.4–97.8)
METAMYELOCYTES NFR BLD MANUAL: 0.8 %
MONOCYTES # BLD AUTO: 0.36 K/UL (ref 0–0.85)
MONOCYTES NFR BLD AUTO: 5.7 % (ref 0–13.4)
MORPHOLOGY BLD-IMP: NORMAL
NEUTROPHILS # BLD AUTO: 4.34 K/UL (ref 1.82–7.42)
NEUTROPHILS NFR BLD: 68.9 % (ref 44–72)
NRBC # BLD AUTO: 0 K/UL
NRBC BLD-RTO: 0 /100 WBC
PLATELET # BLD AUTO: 256 K/UL (ref 164–446)
PLATELET BLD QL SMEAR: NORMAL
PMV BLD AUTO: 9.4 FL (ref 9–12.9)
POTASSIUM SERPL-SCNC: 4.1 MMOL/L (ref 3.6–5.5)
PROTHROMBIN TIME: 12.8 SEC (ref 12–14.6)
RBC # BLD AUTO: 5.04 M/UL (ref 4.7–6.1)
RBC BLD AUTO: NORMAL
SODIUM SERPL-SCNC: 135 MMOL/L (ref 135–145)
WBC # BLD AUTO: 6.3 K/UL (ref 4.8–10.8)

## 2020-09-21 PROCEDURE — 700102 HCHG RX REV CODE 250 W/ 637 OVERRIDE(OP): Performed by: INTERNAL MEDICINE

## 2020-09-21 PROCEDURE — 700105 HCHG RX REV CODE 258: Performed by: INTERNAL MEDICINE

## 2020-09-21 PROCEDURE — A9270 NON-COVERED ITEM OR SERVICE: HCPCS | Performed by: PHYSICIAN ASSISTANT

## 2020-09-21 PROCEDURE — 85007 BL SMEAR W/DIFF WBC COUNT: CPT

## 2020-09-21 PROCEDURE — 80048 BASIC METABOLIC PNL TOTAL CA: CPT

## 2020-09-21 PROCEDURE — 700102 HCHG RX REV CODE 250 W/ 637 OVERRIDE(OP): Performed by: STUDENT IN AN ORGANIZED HEALTH CARE EDUCATION/TRAINING PROGRAM

## 2020-09-21 PROCEDURE — 85027 COMPLETE CBC AUTOMATED: CPT

## 2020-09-21 PROCEDURE — A9270 NON-COVERED ITEM OR SERVICE: HCPCS | Performed by: INTERNAL MEDICINE

## 2020-09-21 PROCEDURE — 700101 HCHG RX REV CODE 250: Performed by: INTERNAL MEDICINE

## 2020-09-21 PROCEDURE — A9270 NON-COVERED ITEM OR SERVICE: HCPCS | Performed by: STUDENT IN AN ORGANIZED HEALTH CARE EDUCATION/TRAINING PROGRAM

## 2020-09-21 PROCEDURE — 36415 COLL VENOUS BLD VENIPUNCTURE: CPT

## 2020-09-21 PROCEDURE — 99253 IP/OBS CNSLTJ NEW/EST LOW 45: CPT | Performed by: INTERNAL MEDICINE

## 2020-09-21 PROCEDURE — 306000 HYDROGEL 1.0 OZ: Performed by: STUDENT IN AN ORGANIZED HEALTH CARE EDUCATION/TRAINING PROGRAM

## 2020-09-21 PROCEDURE — 700111 HCHG RX REV CODE 636 W/ 250 OVERRIDE (IP): Performed by: PHYSICIAN ASSISTANT

## 2020-09-21 PROCEDURE — 700111 HCHG RX REV CODE 636 W/ 250 OVERRIDE (IP): Performed by: STUDENT IN AN ORGANIZED HEALTH CARE EDUCATION/TRAINING PROGRAM

## 2020-09-21 PROCEDURE — 99232 SBSQ HOSP IP/OBS MODERATE 35: CPT | Performed by: STUDENT IN AN ORGANIZED HEALTH CARE EDUCATION/TRAINING PROGRAM

## 2020-09-21 PROCEDURE — 700102 HCHG RX REV CODE 250 W/ 637 OVERRIDE(OP): Performed by: PHYSICIAN ASSISTANT

## 2020-09-21 PROCEDURE — 700111 HCHG RX REV CODE 636 W/ 250 OVERRIDE (IP): Performed by: INTERNAL MEDICINE

## 2020-09-21 PROCEDURE — 85610 PROTHROMBIN TIME: CPT

## 2020-09-21 PROCEDURE — 770021 HCHG ROOM/CARE - ISO PRIVATE

## 2020-09-21 RX ADMIN — HYDROMORPHONE HYDROCHLORIDE 0.5 MG: 1 INJECTION, SOLUTION INTRAMUSCULAR; INTRAVENOUS; SUBCUTANEOUS at 22:59

## 2020-09-21 RX ADMIN — POLYETHYLENE GLYCOL 3350 1 PACKET: 17 POWDER, FOR SOLUTION ORAL at 05:19

## 2020-09-21 RX ADMIN — HYDROMORPHONE HYDROCHLORIDE 0.5 MG: 1 INJECTION, SOLUTION INTRAMUSCULAR; INTRAVENOUS; SUBCUTANEOUS at 04:22

## 2020-09-21 RX ADMIN — HYDROMORPHONE HYDROCHLORIDE 0.5 MG: 1 INJECTION, SOLUTION INTRAMUSCULAR; INTRAVENOUS; SUBCUTANEOUS at 18:23

## 2020-09-21 RX ADMIN — HEPARIN SODIUM 5000 UNITS: 5000 INJECTION, SOLUTION INTRAVENOUS; SUBCUTANEOUS at 12:08

## 2020-09-21 RX ADMIN — OXYCODONE HYDROCHLORIDE 10 MG: 10 TABLET ORAL at 05:22

## 2020-09-21 RX ADMIN — PREGABALIN 75 MG: 75 CAPSULE ORAL at 12:07

## 2020-09-21 RX ADMIN — HYDROMORPHONE HYDROCHLORIDE 0.5 MG: 1 INJECTION, SOLUTION INTRAMUSCULAR; INTRAVENOUS; SUBCUTANEOUS at 20:33

## 2020-09-21 RX ADMIN — HEPARIN SODIUM 5000 UNITS: 5000 INJECTION, SOLUTION INTRAVENOUS; SUBCUTANEOUS at 22:59

## 2020-09-21 RX ADMIN — HYDROMORPHONE HYDROCHLORIDE 0.5 MG: 1 INJECTION, SOLUTION INTRAMUSCULAR; INTRAVENOUS; SUBCUTANEOUS at 06:25

## 2020-09-21 RX ADMIN — HYDROMORPHONE HYDROCHLORIDE 0.5 MG: 1 INJECTION, SOLUTION INTRAMUSCULAR; INTRAVENOUS; SUBCUTANEOUS at 00:01

## 2020-09-21 RX ADMIN — CEFTRIAXONE SODIUM 2 G: 2 INJECTION, POWDER, FOR SOLUTION INTRAMUSCULAR; INTRAVENOUS at 05:19

## 2020-09-21 RX ADMIN — HYDROMORPHONE HYDROCHLORIDE 0.5 MG: 1 INJECTION, SOLUTION INTRAMUSCULAR; INTRAVENOUS; SUBCUTANEOUS at 13:33

## 2020-09-21 RX ADMIN — OXYCODONE HYDROCHLORIDE 10 MG: 10 TABLET ORAL at 12:07

## 2020-09-21 RX ADMIN — HYDROMORPHONE HYDROCHLORIDE 0.5 MG: 1 INJECTION, SOLUTION INTRAMUSCULAR; INTRAVENOUS; SUBCUTANEOUS at 02:16

## 2020-09-21 RX ADMIN — ACETAMINOPHEN 650 MG: 325 TABLET, FILM COATED ORAL at 12:07

## 2020-09-21 RX ADMIN — OXYCODONE HYDROCHLORIDE 10 MG: 10 TABLET ORAL at 09:00

## 2020-09-21 RX ADMIN — NICOTINE TRANSDERMAL SYSTEM 21 MG: 21 PATCH, EXTENDED RELEASE TRANSDERMAL at 05:18

## 2020-09-21 RX ADMIN — OXYCODONE HYDROCHLORIDE 10 MG: 10 TABLET ORAL at 01:07

## 2020-09-21 RX ADMIN — ACETAMINOPHEN 650 MG: 325 TABLET, FILM COATED ORAL at 16:02

## 2020-09-21 RX ADMIN — ACETAMINOPHEN 650 MG: 325 TABLET, FILM COATED ORAL at 05:18

## 2020-09-21 RX ADMIN — HYDROMORPHONE HYDROCHLORIDE 0.5 MG: 1 INJECTION, SOLUTION INTRAMUSCULAR; INTRAVENOUS; SUBCUTANEOUS at 10:01

## 2020-09-21 RX ADMIN — VANCOMYCIN HYDROCHLORIDE 3000 MG: 500 INJECTION, POWDER, LYOPHILIZED, FOR SOLUTION INTRAVENOUS at 15:59

## 2020-09-21 RX ADMIN — HYDROMORPHONE HYDROCHLORIDE 0.5 MG: 1 INJECTION, SOLUTION INTRAMUSCULAR; INTRAVENOUS; SUBCUTANEOUS at 15:41

## 2020-09-21 RX ADMIN — DOCUSATE SODIUM 100 MG: 100 CAPSULE ORAL at 16:02

## 2020-09-21 RX ADMIN — PREGABALIN 75 MG: 75 CAPSULE ORAL at 16:02

## 2020-09-21 RX ADMIN — PREGABALIN 75 MG: 75 CAPSULE ORAL at 05:17

## 2020-09-21 RX ADMIN — HEPARIN SODIUM 5000 UNITS: 5000 INJECTION, SOLUTION INTRAVENOUS; SUBCUTANEOUS at 05:18

## 2020-09-21 RX ADMIN — DOCUSATE SODIUM 50 MG AND SENNOSIDES 8.6 MG 2 TABLET: 8.6; 5 TABLET, FILM COATED ORAL at 16:02

## 2020-09-21 RX ADMIN — DOCUSATE SODIUM 100 MG: 100 CAPSULE ORAL at 05:18

## 2020-09-21 ASSESSMENT — PAIN DESCRIPTION - PAIN TYPE
TYPE: SURGICAL PAIN

## 2020-09-21 ASSESSMENT — ENCOUNTER SYMPTOMS
GASTROINTESTINAL NEGATIVE: 1
EYES NEGATIVE: 1
CARDIOVASCULAR NEGATIVE: 1
RESPIRATORY NEGATIVE: 1
BACK PAIN: 1
NEUROLOGICAL NEGATIVE: 1

## 2020-09-21 NOTE — PROGRESS NOTES
0710 Patient's in bed. Bedside report received from STEVE Alexandre RN at the beginning of the shift. Isolation precaution observed.     0900 Patient's sitting up in bed. Medicated with Oxycodone (see MAR) or c/o's left arm and bilateral leg, rates pain 9/10. Wound vac to RLE patent. Fall Protocol in effect. Call light within reach. Reminded patient to call for assist. Dressing to left thigh changed as per order.  Assessment completed. No distress noted. Plan of care reviewed with the patient. verbalzed understanding. Isolation precaution observed.    1001 Medicated with Dilaudid (see MAR) for c/o's left arm and bilateral leg pain, rates pain 7/10.    1100 CAROL Alexander visited. Changed dressing to left arm.    1207 Patient's sitting up in bed, having lunch. Medicated with Oxycodone (see MAR) for c/o's left arm and bilateral leg pain, rates pain 8/10.    1254 new order received and acknowledged from Dr Kwong  (see MAR).      1333 Medicated with Dilaudid (see MAR) for c/o's left arm and bilateral leg pain, rates pain 8/10.    1418 New order received ad acknowledged from Dr Chacon - IR midline insertion.     1515 Spoke to Pharmacy Tech that Vancomycin is needed.    1522 Patient refused Oxycodone wanted Dilaudid. Educated still refused Oxycodone. Notified patient it's not due yet. Wasted medication with another RN (Kiesha).    1541 Medicated with Dilaudid (see MAR) for c/o's left arm and bilateral pain, rates pain 8/10.    1710 Patient's sitting up in bed, having Dinner. No distress noted. IV ABX patent & infusing.    1823 Medicated with Dilaudid (see MAR) for c/o's left arm and bilateral leg pain, rates pain 8/10.    1905  Patient's in ed. No changes in status. Bedside report given to Oncoming STEVE RN (Bettie).

## 2020-09-21 NOTE — PROGRESS NOTES
"   Orthopaedic PA Progress Note    Interval changes:concerned about wound failure R elbow, will D/W Dr. Martinez    ROS - Patient denies any new issues. No chest pain, dyspnea, or fever.  Pain well controlled.    /69   Pulse 62   Temp 35.9 °C (96.6 °F) (Temporal)   Resp 18   Ht 1.93 m (6' 4\")   Wt 118.5 kg (261 lb 3.9 oz)   SpO2 96%     Patient seen and examined  No acute distress  Breathing non labored  RRR  Surgical dressing is clean, dry, and intact. Underlying wound with suture failue, now with elliptical opening. Images obtained and sent to Dr. Martinez.  Surgical dressing is clean, dry, and intact. Patient clearly fires tibialis anterior, EHL, and gastrocnemius/soleus. Sensation is intact to light touch throughout superficial peroneal, deep peroneal, tibial, saphenous, and sural nerve distributions. Strong and palpable 2+ dorsalis pedis and posterior tibial pulses with capillary refill less than 2 seconds. No lower leg tenderness or discomfort.    Active Hospital Problems    Diagnosis   • Pyogenic arthritis of left elbow (HCC) [M00.9]     Priority: High     No crepitus no gas in the tissues,   history of MRSA, orthopedic surgery consulted for OR in the a.m.  Empiric vancomycin and Rocephin ordered.  Symptomatic management     • Murmur [R01.1]     Priority: Medium   • Homeless [Z59.0]     Priority: Low   • Leg wound, right [S80.801A]     Assessment/Plan:  POD#4 ACell graft L elbow, STSG R thigh with vac  Wt bearing status - AT  PT/OT-initiated  Wound care:Graft vac for 5 days, left elbow no dressing change or only reinforcement if moist, donor site drsng Left in place  Drains - no   Lundberg-no  Sutures/Staples out- 10-14 days post operatively  Antibiotics: per Med  DVT Prophylaxis- TEDS/SCDs/Foot pumps.   [facility]  Future Procedures - DC Vac Tuesday and check Graft  Case Coordination for Discharge Planning - Disposition per Med  Titrating pregabalin          "

## 2020-09-21 NOTE — PROGRESS NOTES
"Pharmacy Kinetics 60 y.o. male on vancomycin day # 1 2020    Currently on Vancomycin 1500 mg iv q12hr starting   Vancomycin 3000 mg x1 on      Indication for Treatment: SSTI     Proposed duration of treatment: 4-6 weeks of IV antibiotics per ID    Pertinent history per medical record: Admitted on 2020 for multiple abscesses on his upper and lower extremity.  He has a 4 mm ulcer with serosanguineous drainage on his left lateral forearm. S/P irrigation and debridement of left antecubital fossa and superficial right thigh wound  on  with a wound vac on both the left elbow and right thigh. He has a history of IV drug abuse, MSSA cellulitis, right lower extremity necrotizing fasciitis s/p fasciotomy in 2020. ID is consulting on the case.     Other antibiotics: N/A    Allergies: Codeine, Spinach, and Pcn [penicillins]     List concerns for renal function:  BUN/SCr ratio > 20:1 (~31) & Obesity (BMI 31.8)    Pertinent cultures to date:   9/15 Left elbow wound w/ GAS and MRSA   BC x2 NGTD   Right hip wound w/ GAS      Recent Labs     20  0957   WBC 6.3   NEUTSPOLYS 68.90     Recent Labs     20  0957   BUN 23*   CREATININE 0.74       Intake/Output Summary (Last 24 hours) at 2020 1421  Last data filed at 2020 1207  Gross per 24 hour   Intake 600 ml   Output 2050 ml   Net -1450 ml      /67   Pulse 79   Temp 36.6 °C (97.9 °F) (Temporal)   Resp 18   Ht 1.93 m (6' 4\")   Wt 118.5 kg (261 lb 3.9 oz)   SpO2 100%  Temp (24hrs), Av.2 °C (97.1 °F), Min:35.9 °C (96.6 °F), Max:36.6 °C (97.9 °F)      A/P   1. Vancomycin dose change: New start of 1500mg q12hrs (~13mg/kg)  2. Next vancomycin level:  at 1230 (Plan to order once PICC line is placed)  3. Goal trough: 10-15 mcg/ml  4. Patient currently has a peripheral line, however will have a PICC line placed today per ID's note   5. Comments: Patient has a past history of vancomycin in 2020. Patient " was on 1400mg q12hrs with a trough of 13.3 mcg/mL and with similar renal function. Dosing done lower than nomagram per patient's past history. Patient's Scr/BUN has remained stable since admission. Pharmacy will continue to follow and make appropriate adjustments based on renal indices, trough levels, and other patient factors influencing the dosing of vancomycin.    Mehnaz Ventura, PharmD  T: [phone]

## 2020-09-21 NOTE — DISCHARGE PLANNING
Care Transition Team Assessment  In the case of an emergency, pt's legal NOK is sonGavin     RNCM met with pt at bedside and obtained the information used in this assessment. Pt verified accuracy of facesheet. Pt is homeless, was living with friend, unable to go back there.   Prior to current hospitalization, pt was completely independent in ADLS/IADLS. Pt has a limited support system. Pt admits any hx of substance use and denies any dx of mh. Pt has been to [facility] in the past and does not want to go back there or to any facility associated with [facility]. Pt agreeable to SNF placement. Referred to all local facilities except [facility].    Information Source:Pt  Orientation : Oriented x 4  Information Given By: Patient  Informant's Name: (Gonsalo)  Who is responsible for making decisions for patient? : Patient         Elopement Risk  Legal Hold: No  Ambulatory or Self Mobile in Wheelchair: No-Not an Elopement Risk  Disoriented: No  Psychiatric Symptoms: None  History of Wandering: No  Elopement this Admit: No  Vocalizing Wanting to Leave: No  Displays Behaviors, Body Language Wanting to Leave: No-Not at Risk for Elopement  Elopement Risk: Not at Risk for Elopement    Interdisciplinary Discharge Planning  Primary Care Physician: (none)  Lives with - Patient's Self Care Capacity: (homeless)  Patient or legal guardian wants to designate a caregiver: No  Mobility Issues: Yes  Prior Services: None    Discharge Preparedness  What is your plan after discharge?: Skilled nursing facility  Prior Functional Level: Ambulatory, Independent with Activities of Daily Living, Independent with Medication Management  Difficulity with ADLs: None  Difficulity with IADLs: None    Functional Assesment  Prior Functional Level: Ambulatory, Independent with Activities of Daily Living, Independent with Medication Management         Vision / Hearing Impairment  Vision Impairment : No  Hearing Impairment :  Yes  Hearing Impairment: Both Ears, Hearing Device Not Available              Domestic Abuse  Have you ever been the victim of abuse or violence?: No  Physical Abuse or Sexual Abuse: No  Verbal Abuse or Emotional Abuse: No  Possible Abuse/Neglect Reported to:: Not Applicable    Psychological Assessment  History of Substance Abuse: Alcohol, Methamphetamine    Discharge Risks or Barriers  Discharge risks or barriers?: No PCP, Substance abuse, Post-acute placement / services, Complex medical needs, Homeless / couch surfing  Patient risk factors: Complex medical needs, Homeless, Lack of outside supports, No PCP

## 2020-09-21 NOTE — PROGRESS NOTES
Report given and RN assumed care. Patient A/Ox4. VSS. Patient reports pain in BUE and BLE. Woundvac to RLE. Assessment completed. Personal belongings and call light within reach. Treaded socks in place. Will continue to monitor patient.

## 2020-09-21 NOTE — CONSULTS
DATE OF SERVICE:  09/21/2020    INFECTIOUS DISEASES CONSULTATION    REQUESTING PHYSICIAN:  Cristi Chacon MD    IDENTIFICATION:  A 60-year-old male with history of previous soft tissue   infections, now seen for MRSA infection of his left arm.    HISTORY OF PRESENT ILLNESS:  I am familiar with the patient from his previous   admission in 01/2020.  At that time, he presented with fever, low blood   pressure and a painful right thigh.  He denied drug use, but was positive for   amphetamines on his tox screen.  The right thigh was swollen, indurated,   necrotic lesions, no odor, and he had gas in his thigh.  He was taken to   surgery multiple times for debridement of MSSA necrotizing fasciitis.  He was   seen in the ICU, treated with IV cefazolin.  He was then transferred for wound   care to a nursing facility in [place].  Apparently, he was unhappy with their   care and he was discharged from the facility.  The wound site  never really healed very   well afterwards.  He lost all his property and has been basically homeless   since then and he was staying at his friend's house.  He thought he may have   been bitten on his left leg or on his left arm and got admitted with multiple   abscesses on his left arm.    There was an 8x4 cm area of   induration in the left arm, concerning for abscess, multiple other lesions.    Because of this significant infection and because his right thigh wound had   never really healed, he was then taken to surgery by Dr. Ayush Martinez, and at   that time, he had irrigation and debridement of the wound on his left arm and   the placement of a right thigh skin graft with wound VAC placement after that.    The surgical  diagnoses were prior right thigh fasciitis with granulating healthy   wound, 12x6 cm in size,  and an open left lateral arm wound connecting with the antecubital   Fossa. He underwent irrigation and debridement of the superficial skin,   fascia, muscle of the right thigh;  skin graft,   application of a MicroMatrix to the right thigh, irrigation and debridement of   left arm skin, subcutaneous tissue, fascia, and bone.  Cultures grew out group A strep and MRSA from multiple sites and the patient has been continued on vancomycin and ceftriaxone since.  He is   now seen for antibiotic advice and continuing care.    ALLERGIES:  THE PATIENT WITH A HISTORY OF POSSIBLY PENICILLIN ALLERGY, BUT HE   HAS NOT HAD ANY PROBLEMS WITH THE REGIMEN HE HAS BEEN ON HERE.    SOCIAL HISTORY:  He is homeless, couch surfing at a friend's house.    PAST MEDICAL HISTORY:  He has hepatitis C for which he has not been treated   because of active drug use, but he does have normal liver function; and also   history of a staph infection of his hand the year previously, but this is his   first MRSA infection.    REVIEW OF SYSTEMS:  CONSTITUTIONAL:  He denies fever or chills.  He is in some pain and feels that   he is not getting enough pain medications.  RESPIRATORY:  He has no hemoptysis, shortness of breath.  He has not been   COVID infected and has been wearing a mask.  CARDIAC:  No chest pain or palpitations.  GASTROINTESTINAL:  No diarrhea, no vomiting.  GENITOURINARY:  Negative for dysuria or hematuria.  MUSCULOSKELETAL:  As noted above in his HPI.  SKIN:  There is no rash.    PHYSICAL EXAMINATION:  GENERAL:  The patient is resting in bed with a wound VAC in place.  He is   cooperative.  VITAL SIGNS:  He is 36.6 degrees, pulse 60s, his blood pressure is 110/70s,   oxygenating well on room air.  HEENT:  Unremarkable.  There are no lesions in his mouth.  NECK:  Supple.  HEART:  I hear no murmurs.  LUNGS:  Clear anteriorly.  ABDOMEN:  I do not palpate his liver or spleen.  There is no tenderness.  EXTREMITIES:  Left elbow is in a postoperative wound dressing.  He is able to   relatively strain out the arm.  Right arm is unremarkable.  His left leg, he   has a bandage over it where he had a skin graft.  In the  right leg, there is a   wound VAC over his thigh where he underwent debridement.  PSYCHIATRIC:  He is appropriate and cooperative.    LABORATORY DATA:  His white count on admission was 14.9.  His white count   today is 6.3.  His chemistry panel shows normal transaminases.  Albumin of   4.1.  Urine drug screen positive for marijuana and opiates.  Amphetamines were   negative.  Alcohol was not detectable.  His renal function, creatinine 0.74.    Microbiology, blood cultures were negative.  The left elbow multiple cultures   are positive for group A strep as well as MRSA susceptible to clindamycin,   daptomycin, tetracycline, and Bactrim, as well as a vanco TORITO of 1.    Immunology, his procalcitonin was negative.  His COVID test was negative.    Stat CRP was 12.42.  He had a positive hep C RNA at 6.5 million last year.    HIV last year was negative, not tested this time.    ASSESSMENT:  A 60-year-old male with history of drug use and history of staph   infections  status post debridement of the right thigh for   necrotizing fasciitis earlier this  year, now comes in with multiple staph   abscesses of his left arm, which has been thoroughly debrided down to the bone   on the left arm and a nonhealing ulcer on his right thigh, which he has   undergone skin graft placement.  He was not bacteremic, but there may be bone   involvement by the surgical report and I think we need to treat him for a   longer course.   RECOMMENDATIONS:  My recommendation at the present time are:  1.  Discontinue ceftriaxone.  There is no need for gram-negative coverage and   vancomycin covers MRSA and group A strep.  2.  Continue vancomycin.  He is going to have a PICC line placed today.  3.  We will continue to follow him.  I suspect he will be a difficult nursing   home placement.  At some point, may be when the wound VAC is gone, we can   switch him to oral antibiotics such as Bactrim or clindamycin to continue with   therapy.  I think we are  looking at least 2 weeks of IV, followed by 4 weeks of oral   antibiotics.  I have discussed this with the patient and he is agreeable.    Thank you for allowing me to consult on this patient.       ____________________________________     MD JANEY CAHVIS / SUZI    DD:  09/21/2020 13:00:49  DT:  09/21/2020 13:36:02    D#:  [id number]  Job#:  [id number]

## 2020-09-21 NOTE — DISCHARGE PLANNING
Received Choice form at 1500  Agency/Facility Name: [facility] [place]  Referral sent per Choice form @ 1515    RN CM informed

## 2020-09-21 NOTE — PROGRESS NOTES
Hospital Medicine Daily Progress Note    Date of Service  9/21/2020    Chief Complaint  60 y.o. homeless male admitted 9/14/2020 with multiple abscesses on upper and lower extremity.  He has a 4 mm ulcer with serosanguineous drainage on his left lateral forearm.    He has a history of IV drug abuse, MRSA cellulitis, right lower extremity necrotizing fasciitis s/p fasciotomy in January 2020.    Hospital Course  Vancomycin and Rocephin was started.  Blood cultures pending.  Orthopedic surgery were consulted from the ER.  S/P irrigation and debridement of left antecubital fossa and superficial right thigh wound in the OR. Right hip wound culture grew beta-hemolytic group B strep, patient already on rocephin and vanc. Blood cultures were negative. Transthoracic Echo performed to r/o endocarditis was performed and was negative for vegetations, EF 65 with mild tricuspid regurg. RV systolic pressure of 25mmHg.    Interval Problem Update  -Pain management today, cont abx, TTE and BCx negative, no wbc count.  -f/u Ortho recs, I&D closure with wound vac on left elbow and right thigh.  -Increased pain medication slightly.  -Dr. Ritchie Kwong followed this patient earlier in 2020, consulted for final dispo  -PICC line/midline for abx pending ID recs.  -discontinued Rocephin 2g q24hrs  -Continue Vancomycin IV while wound vac in place for 1 week, will transition to orals after that per ID    Consultants/Specialty  Orthopedic surgery  ID Dr. Ritchie Kwong    Code Status  DNAR/DNI    Disposition  Home when stable    Review of Systems  Review of Systems   Eyes: Negative.    Respiratory: Negative.    Cardiovascular: Negative.    Gastrointestinal: Negative.    Genitourinary: Negative.    Musculoskeletal: Positive for back pain.   Skin:        Pain in left UE surgical wound   Neurological: Negative.    Psychiatric/Behavioral:        Chronic depression. Not suicidal        Physical Exam  Temp:  [35.9 °C (96.6 °F)-36.6 °C (97.9 °F)]  36.6 °C (97.9 °F)  Pulse:  [62-79] 79  Resp:  [17-18] 18  BP: ()/(64-77) 108/67  SpO2:  [93 %-100 %] 100 %    Physical Exam  HENT:      Head: Normocephalic.      Nose: Nose normal.      Mouth/Throat:      Mouth: Mucous membranes are moist.   Neck:      Musculoskeletal: Normal range of motion.   Cardiovascular:      Rate and Rhythm: Normal rate.   Pulmonary:      Effort: Pulmonary effort is normal.   Abdominal:      Palpations: Abdomen is soft.   Musculoskeletal: Normal range of motion.   Skin:     Comments: Left elbow s/p I&D wound with wound vac in place, no bleeding or erythema noted.  Pain 4/10 intensity.   Neurological:      General: No focal deficit present.      Mental Status: He is alert.   Psychiatric:         Mood and Affect: Mood normal.         Fluids    Intake/Output Summary (Last 24 hours) at 9/21/2020 1422  Last data filed at 9/21/2020 1207  Gross per 24 hour   Intake 600 ml   Output 2050 ml   Net -1450 ml       Laboratory  Recent Labs     09/21/20  0957   WBC 6.3   RBC 5.04   HEMOGLOBIN 14.6   HEMATOCRIT 44.0   MCV 87.3   MCH 29.0   MCHC 33.2*   RDW 49.6   PLATELETCT 256   MPV 9.4     Recent Labs     09/21/20  0957   SODIUM 135   POTASSIUM 4.1   CHLORIDE 98   CO2 23   GLUCOSE 114*   BUN 23*   CREATININE 0.74   CALCIUM 9.0     Recent Labs     09/21/20  0957   INR 0.94               Imaging  EC-ECHOCARDIOGRAM COMPLETE W/O CONT   Final Result      DX-ELBOW-COMPLETE 3+ LEFT   Final Result      Soft tissue swelling without acute osseous abnormality.      DX-FEMUR-2+ RIGHT   Final Result      No acute osseous abnormality.      IR-MIDLINE CATHETER INSERTION WO GUIDANCE > AGE 3    (Results Pending)        Assessment/Plan  Pyogenic arthritis of left elbow (HCC)- (present on admission)  Assessment & Plan  -Pain management today, cont abx, TTE and BCx negative, no wbc count.  -f/u Ortho recs, I&D closure with wound vac on left elbow and right thigh.  -Increased pain medication slightly.  -Dr. Dugan  Elenita followed this patient earlier in 2020, consulted for final dispo  -midline for abx  -discontinued Rocephin 2g q24hrs  -Continue Vancomycin IV while wound vac in place for 1 week, will transition to orals after that per ID    Murmur- (present on admission)  Assessment & Plan  History of IV drug abuse with concern for bacteremia and endocarditis.    -BCx neg  -TTE neg    Leg wound, right- (present on admission)  Assessment & Plan  Underwent irrigation and debridement of right wound.  Culture grew beta-hemolytic group B strep.  On ceftriaxone.  Pain control as needed    Homeless- (present on admission)  Assessment & Plan  Social work and case management to assist in discharge planning       VTE prophylaxis: SCD's. Added Hep SQ today.

## 2020-09-21 NOTE — WOUND TEAM
Wound team consulted to teach bedside RN to change dressings for left elbow.   Hydrogel to acell bioglogic, adaptic, 4x4 and soft roll and ACE wrap with Lennie KING.

## 2020-09-21 NOTE — CARE PLAN
Problem: Safety  Goal: Will remain free from falls  Outcome: PROGRESSING AS EXPECTED  Patient remains free from falls and injury. Treaded socks in place. Call light within reach. Patient calls for assistance     Problem: Infection  Goal: Will remain free from infection  Outcome: PROGRESSING AS EXPECTED   Patient remains afebrile, VS q 4 hours

## 2020-09-21 NOTE — PROGRESS NOTES
Hospital Medicine Daily Progress Note    Date of Service  9/20/2020    Chief Complaint  60 y.o. homeless male admitted 9/14/2020 with multiple abscesses on upper and lower extremity.  He has a 4 mm ulcer with serosanguineous drainage on his left lateral forearm.    He has a history of IV drug abuse, MRSA cellulitis, right lower extremity necrotizing fasciitis s/p fasciotomy in January 2020.    Hospital Course  Vancomycin and Rocephin was started.  Blood cultures pending.  Orthopedic surgery were consulted from the ER.  S/P irrigation and debridement of left antecubital fossa and superficial right thigh wound in the OR. Right hip wound culture grew beta-hemolytic group B strep, patient already on rocephin and vanc. Blood cultures were negative. Transthoracic Echo performed to r/o endocarditis was performed and was negative for vegetations, EF 65 with mild tricuspid regurg. RV systolic pressure of 25mmHg.    Interval Problem Update  -Pain management today, cont abx, TTE and BCx negative, no wbc count.  -f/u Ortho recs, I&D closure yesterday with wound vac on left elbow and right thigh.  -Increased pain medication slightly.  -Dr. Ritchie Kwong followed this patient earlier in 2020, consulted for final dispo  -PICC line/midline for abx pending ID recs.  -Cont Rocephin 2g q24hrs and Vancomycin.    Consultants/Specialty  Orthopedic surgery  ID Dr. Ritchie Kwong    Code Status  DNAR/DNI    Disposition  Home when stable    Review of Systems  Review of Systems   Eyes: Negative.    Respiratory: Negative.    Cardiovascular: Negative.    Gastrointestinal: Negative.    Genitourinary: Negative.    Musculoskeletal: Positive for back pain.   Skin:        Pain in left UE surgical wound   Neurological: Negative.    Psychiatric/Behavioral:        Chronic depression. Not suicidal        Physical Exam  Temp:  [35.8 °C (96.5 °F)-37 °C (98.6 °F)] 36.6 °C (97.8 °F)  Pulse:  [60-68] 64  Resp:  [17] 17  BP: (100-104)/(64-67)  104/67  SpO2:  [95 %-97 %] 96 %    Physical Exam  HENT:      Head: Normocephalic.      Nose: Nose normal.      Mouth/Throat:      Mouth: Mucous membranes are moist.   Neck:      Musculoskeletal: Normal range of motion.   Cardiovascular:      Rate and Rhythm: Normal rate.   Pulmonary:      Effort: Pulmonary effort is normal.   Abdominal:      Palpations: Abdomen is soft.   Musculoskeletal: Normal range of motion.   Skin:     Comments: Left elbow s/p I&D wound with wound vac in place, no bleeding or erythema noted.  Pain 4/10 intensity.   Neurological:      General: No focal deficit present.      Mental Status: He is alert.   Psychiatric:         Mood and Affect: Mood normal.         Fluids    Intake/Output Summary (Last 24 hours) at 9/20/2020 1808  Last data filed at 9/20/2020 1630  Gross per 24 hour   Intake 1200 ml   Output 800 ml   Net 400 ml       Laboratory                        Imaging  EC-ECHOCARDIOGRAM COMPLETE W/O CONT   Final Result      DX-ELBOW-COMPLETE 3+ LEFT   Final Result      Soft tissue swelling without acute osseous abnormality.      DX-FEMUR-2+ RIGHT   Final Result      No acute osseous abnormality.           Assessment/Plan  Pyogenic arthritis of left elbow (HCC)- (present on admission)  Assessment & Plan  -Pain management today, cont abx, TTE and BCx negative, no wbc count.  -Ortho signed off, I&D closure yesterday with wound vac on left elbow and right thigh.  -Increased pain medication slightly.  -Dr. Ritchie Kwong followed this patient earlier in 2020, consulted for final dispo  -PICC line ordered for long term antibiotics. Consider midline if ID wants shorter course of 4 weeks, however the severity of the joint infection, anticipating possible longer course.  -Cont Rocephin 2g q24hrs & Vancomycin.    Murmur- (present on admission)  Assessment & Plan  History of IV drug abuse with concern for bacteremia and endocarditis.    -BCx neg  -TTE neg    Leg wound, right- (present on  admission)  Assessment & Plan  Underwent irrigation and debridement of right wound.  Culture grew beta-hemolytic group B strep.  On ceftriaxone.  Pain control as needed    Homeless- (present on admission)  Assessment & Plan  Social work and case management to assist in discharge planning       VTE prophylaxis: SCD's. Added Hep SQ today.

## 2020-09-22 ENCOUNTER — APPOINTMENT (OUTPATIENT)
Dept: RADIOLOGY | Facility: MEDICAL CENTER | Age: 60
DRG: 464 | End: 2020-09-22
Attending: STUDENT IN AN ORGANIZED HEALTH CARE EDUCATION/TRAINING PROGRAM
Payer: COMMERCIAL

## 2020-09-22 PROCEDURE — 700102 HCHG RX REV CODE 250 W/ 637 OVERRIDE(OP): Performed by: STUDENT IN AN ORGANIZED HEALTH CARE EDUCATION/TRAINING PROGRAM

## 2020-09-22 PROCEDURE — 700102 HCHG RX REV CODE 250 W/ 637 OVERRIDE(OP): Performed by: INTERNAL MEDICINE

## 2020-09-22 PROCEDURE — 99232 SBSQ HOSP IP/OBS MODERATE 35: CPT | Performed by: HOSPITALIST

## 2020-09-22 PROCEDURE — 700111 HCHG RX REV CODE 636 W/ 250 OVERRIDE (IP): Performed by: PHYSICIAN ASSISTANT

## 2020-09-22 PROCEDURE — 05HY33Z INSERTION OF INFUSION DEVICE INTO UPPER VEIN, PERCUTANEOUS APPROACH: ICD-10-PCS | Performed by: STUDENT IN AN ORGANIZED HEALTH CARE EDUCATION/TRAINING PROGRAM

## 2020-09-22 PROCEDURE — A9270 NON-COVERED ITEM OR SERVICE: HCPCS | Performed by: STUDENT IN AN ORGANIZED HEALTH CARE EDUCATION/TRAINING PROGRAM

## 2020-09-22 PROCEDURE — 770021 HCHG ROOM/CARE - ISO PRIVATE

## 2020-09-22 PROCEDURE — 700101 HCHG RX REV CODE 250: Performed by: INTERNAL MEDICINE

## 2020-09-22 PROCEDURE — 700102 HCHG RX REV CODE 250 W/ 637 OVERRIDE(OP): Performed by: PHYSICIAN ASSISTANT

## 2020-09-22 PROCEDURE — 700105 HCHG RX REV CODE 258: Performed by: INTERNAL MEDICINE

## 2020-09-22 PROCEDURE — A9270 NON-COVERED ITEM OR SERVICE: HCPCS | Performed by: INTERNAL MEDICINE

## 2020-09-22 PROCEDURE — 76937 US GUIDE VASCULAR ACCESS: CPT

## 2020-09-22 PROCEDURE — 700111 HCHG RX REV CODE 636 W/ 250 OVERRIDE (IP): Performed by: INTERNAL MEDICINE

## 2020-09-22 PROCEDURE — 700111 HCHG RX REV CODE 636 W/ 250 OVERRIDE (IP): Performed by: STUDENT IN AN ORGANIZED HEALTH CARE EDUCATION/TRAINING PROGRAM

## 2020-09-22 PROCEDURE — A9270 NON-COVERED ITEM OR SERVICE: HCPCS | Performed by: PHYSICIAN ASSISTANT

## 2020-09-22 RX ORDER — HYDROMORPHONE HYDROCHLORIDE 2 MG/1
2 TABLET ORAL EVERY 4 HOURS PRN
Status: DISCONTINUED | OUTPATIENT
Start: 2020-09-22 | End: 2020-09-25

## 2020-09-22 RX ORDER — HYDROMORPHONE HYDROCHLORIDE 1 MG/ML
0.5 INJECTION, SOLUTION INTRAMUSCULAR; INTRAVENOUS; SUBCUTANEOUS ONCE
Status: COMPLETED | OUTPATIENT
Start: 2020-09-22 | End: 2020-09-22

## 2020-09-22 RX ORDER — PREGABALIN 150 MG/1
150 CAPSULE ORAL 3 TIMES DAILY
Status: DISCONTINUED | OUTPATIENT
Start: 2020-09-22 | End: 2020-10-13 | Stop reason: HOSPADM

## 2020-09-22 RX ADMIN — PREGABALIN 75 MG: 75 CAPSULE ORAL at 12:18

## 2020-09-22 RX ADMIN — ACETAMINOPHEN 650 MG: 325 TABLET, FILM COATED ORAL at 17:21

## 2020-09-22 RX ADMIN — HEPARIN SODIUM 5000 UNITS: 5000 INJECTION, SOLUTION INTRAVENOUS; SUBCUTANEOUS at 12:18

## 2020-09-22 RX ADMIN — ACETAMINOPHEN 650 MG: 325 TABLET, FILM COATED ORAL at 12:18

## 2020-09-22 RX ADMIN — HEPARIN SODIUM 5000 UNITS: 5000 INJECTION, SOLUTION INTRAVENOUS; SUBCUTANEOUS at 06:00

## 2020-09-22 RX ADMIN — HYDROMORPHONE HYDROCHLORIDE 0.5 MG: 1 INJECTION, SOLUTION INTRAMUSCULAR; INTRAVENOUS; SUBCUTANEOUS at 14:32

## 2020-09-22 RX ADMIN — HYDROMORPHONE HYDROCHLORIDE 0.5 MG: 1 INJECTION, SOLUTION INTRAMUSCULAR; INTRAVENOUS; SUBCUTANEOUS at 14:00

## 2020-09-22 RX ADMIN — ACETAMINOPHEN 650 MG: 325 TABLET, FILM COATED ORAL at 05:59

## 2020-09-22 RX ADMIN — VANCOMYCIN HYDROCHLORIDE 1500 MG: 500 INJECTION, POWDER, LYOPHILIZED, FOR SOLUTION INTRAVENOUS at 17:23

## 2020-09-22 RX ADMIN — OXYCODONE HYDROCHLORIDE 10 MG: 10 TABLET ORAL at 12:18

## 2020-09-22 RX ADMIN — HYDROMORPHONE HYDROCHLORIDE 0.5 MG: 1 INJECTION, SOLUTION INTRAMUSCULAR; INTRAVENOUS; SUBCUTANEOUS at 01:33

## 2020-09-22 RX ADMIN — DOCUSATE SODIUM 100 MG: 100 CAPSULE ORAL at 05:59

## 2020-09-22 RX ADMIN — PREGABALIN 75 MG: 75 CAPSULE ORAL at 05:59

## 2020-09-22 RX ADMIN — HYDROMORPHONE HYDROCHLORIDE 2 MG: 2 TABLET ORAL at 21:36

## 2020-09-22 RX ADMIN — DOCUSATE SODIUM 100 MG: 100 CAPSULE ORAL at 17:22

## 2020-09-22 RX ADMIN — HYDROMORPHONE HYDROCHLORIDE 0.5 MG: 1 INJECTION, SOLUTION INTRAMUSCULAR; INTRAVENOUS; SUBCUTANEOUS at 03:33

## 2020-09-22 RX ADMIN — HYDROMORPHONE HYDROCHLORIDE 2 MG: 2 TABLET ORAL at 17:22

## 2020-09-22 RX ADMIN — HEPARIN SODIUM 5000 UNITS: 5000 INJECTION, SOLUTION INTRAVENOUS; SUBCUTANEOUS at 21:37

## 2020-09-22 RX ADMIN — NICOTINE TRANSDERMAL SYSTEM 21 MG: 21 PATCH, EXTENDED RELEASE TRANSDERMAL at 06:01

## 2020-09-22 RX ADMIN — POLYETHYLENE GLYCOL 3350 1 PACKET: 17 POWDER, FOR SOLUTION ORAL at 05:59

## 2020-09-22 RX ADMIN — VANCOMYCIN HYDROCHLORIDE 1500 MG: 500 INJECTION, POWDER, LYOPHILIZED, FOR SOLUTION INTRAVENOUS at 03:33

## 2020-09-22 RX ADMIN — DOCUSATE SODIUM 50 MG AND SENNOSIDES 8.6 MG 2 TABLET: 8.6; 5 TABLET, FILM COATED ORAL at 17:22

## 2020-09-22 RX ADMIN — HYDROMORPHONE HYDROCHLORIDE 0.5 MG: 1 INJECTION, SOLUTION INTRAMUSCULAR; INTRAVENOUS; SUBCUTANEOUS at 05:52

## 2020-09-22 RX ADMIN — HYDROMORPHONE HYDROCHLORIDE 0.5 MG: 1 INJECTION, SOLUTION INTRAMUSCULAR; INTRAVENOUS; SUBCUTANEOUS at 09:28

## 2020-09-22 RX ADMIN — PREGABALIN 150 MG: 150 CAPSULE ORAL at 17:22

## 2020-09-22 ASSESSMENT — PAIN DESCRIPTION - PAIN TYPE
TYPE: SURGICAL PAIN
TYPE: SURGICAL PAIN
TYPE: ACUTE PAIN;SURGICAL PAIN
TYPE: SURGICAL PAIN
TYPE: SURGICAL PAIN

## 2020-09-22 ASSESSMENT — ENCOUNTER SYMPTOMS
CARDIOVASCULAR NEGATIVE: 1
RESPIRATORY NEGATIVE: 1
BACK PAIN: 1
GASTROINTESTINAL NEGATIVE: 1
EYES NEGATIVE: 1
CONSTITUTIONAL NEGATIVE: 1
NEUROLOGICAL NEGATIVE: 1

## 2020-09-22 NOTE — CARE PLAN
Problem: Nutritional:  Goal: Achieve adequate nutritional intake  Description: Patient will consume ~75% of meals/supplement  Outcome: MET   Recorded PO intake consistently %. Encourage continued good PO intake.

## 2020-09-22 NOTE — PROGRESS NOTES
Report given and RN assumed care. Patient A/Ox4, but anxious. VSS. Patient reports pain in BLE and LLE. Assessment completed. Wound vac to RLE. Personal belongings and call light within reach. Treaded socks in place. Will continue to monitor patient.

## 2020-09-22 NOTE — PROGRESS NOTES
"Pharmacy Kinetics 60 y.o. male on vancomycin day #2  2020    Currently on Vancomycin 1500 mg iv q12hr (0400 & 1600)    Indication for Treatment: SSTI     Proposed duration of treatment: 2 weeks of IV antibiotics and then followed by 4 weeks PO    Pertinent history per medical record: Admitted on 2020 for multiple abscesses on his upper and lower extremity.  He has a 4 mm ulcer with serosanguineous drainage on his left lateral forearm. S/P irrigation and debridement of left antecubital fossa and superficial right thigh wound  on  with a wound vac on both the left elbow and right thigh. He has a history of IV drug abuse, MSSA cellulitis, right lower extremity necrotizing fasciitis s/p fasciotomy in 2020. ID is consulting on the case.     Other antibiotics: N/A    Allergies: Codeine, Spinach, and Pcn [penicillins]     List concerns for renal function: BUN/SCr ratio > 20:1 (~31) & Obesity (BMI 31.8)    Pertinent cultures to date:   9/15 Left elbow wound w/ GAS and MRSA   BC x2 NGTD   Right hip wound w/ GAS    Recent Labs     20  0957   WBC 6.3   NEUTSPOLYS 68.90     Recent Labs     20  0957   BUN 23*   CREATININE 0.74     No results for input(s): VANCOTROUGH, VANCOPEAK, VANCORANDOM in the last 72 hours.    Intake/Output Summary (Last 24 hours) at 2020 0920  Last data filed at 2020 0800  Gross per 24 hour   Intake 2180 ml   Output 2800 ml   Net -620 ml      /73   Pulse 80   Temp 36.3 °C (97.4 °F) (Temporal)   Resp 17   Ht 1.93 m (6' 4\")   Wt 118.5 kg (261 lb 3.9 oz)   SpO2 98%  Temp (24hrs), Av.3 °C (97.3 °F), Min:36.1 °C (97 °F), Max:36.6 °C (97.9 °F)      A/P   1. Vancomycin dose change: continue Vancomycin 1500. mg Q 12 hr (13 mg/kg)  2. Next vancomycin level:  at 1530  3. Goal trough: 10-15 mcg/ml  4. Patient has a peripheral  5. Comments: Renal function stable,?however, patient has no new labs today to assess renal function. Prior vancomycin " history noted and dosing is lower per nomogram d/t past history. Patient still has a peripheral line. Continue to watch for PICC placement. Pharmacy will continue to follow and make appropriate adjustments based on renal indices, trough levels, and other patient factors influencing the dosing of vancomycin.    Mehnaz Ventura, PharmD  T: [phone]

## 2020-09-22 NOTE — PROGRESS NOTES
Hospital Medicine Daily Progress Note    Date of Service  9/22/2020    Chief Complaint  60 y.o. homeless male admitted 9/14/2020 with multiple abscesses on upper and lower extremity.  He has a 4 mm ulcer with serosanguineous drainage on his left lateral forearm.    He has a history of IV drug abuse, MRSA cellulitis, right lower extremity necrotizing fasciitis s/p fasciotomy in January 2020.    Hospital Course  Vancomycin and Rocephin was started.  Blood cultures pending.  Orthopedic surgery were consulted from the ER.  S/P irrigation and debridement of left antecubital fossa and superficial right thigh wound in the OR. Right hip wound culture grew beta-hemolytic group B strep, patient already on rocephin and vanc. Blood cultures were negative. Transthoracic Echo performed to r/o endocarditis was performed and was negative for vegetations, EF 65 with mild tricuspid regurg. RV systolic pressure of 25mmHg.    Interval Problem Update  -    Noted infectious disease note in regards to antibiotics    Patient currently on IV vancomycin    Patient has no new complaints    Patient advised and encouraged to use his oral analgesics for pain control before requesting any intravenous medication  .    Due to his history of IVDA we will hopefully be able to transition to oral antibiotics but we will await infectious disease final recommendations      Consultants/Specialty  Orthopedic surgery  ID Dr. Ritchie Kwong    Code Status  DNAR/DNI    Disposition  Home when stable    Review of Systems  Review of Systems   Constitutional: Negative.    Eyes: Negative.    Respiratory: Negative.    Cardiovascular: Negative.    Gastrointestinal: Negative.    Genitourinary: Negative.    Musculoskeletal: Positive for back pain.   Skin:        Pain in left UE surgical wound   Neurological: Negative.    Psychiatric/Behavioral:        Chronic depression. Not suicidal        Physical Exam  Temp:  [36.1 °C (97 °F)-36.6 °C (97.9 °F)] 36.3 °C (97.4  °F)  Pulse:  [66-80] 80  Resp:  [16-18] 17  BP: (102-112)/(61-74) 111/73  SpO2:  [93 %-100 %] 98 %    Physical Exam  HENT:      Head: Normocephalic.      Nose: Nose normal.      Mouth/Throat:      Mouth: Mucous membranes are moist.   Neck:      Musculoskeletal: Normal range of motion.   Cardiovascular:      Rate and Rhythm: Normal rate.   Pulmonary:      Effort: Pulmonary effort is normal.   Abdominal:      Palpations: Abdomen is soft.   Musculoskeletal: Normal range of motion.   Skin:     Comments: Left elbow s/p I&D wound with wound vac in place, no bleeding or erythema noted.     Neurological:      General: No focal deficit present.      Mental Status: He is alert.   Psychiatric:         Mood and Affect: Mood normal.         Fluids    Intake/Output Summary (Last 24 hours) at 9/22/2020 1043  Last data filed at 9/22/2020 0800  Gross per 24 hour   Intake 2180 ml   Output 2800 ml   Net -620 ml       Laboratory  Recent Labs     09/21/20  0957   WBC 6.3   RBC 5.04   HEMOGLOBIN 14.6   HEMATOCRIT 44.0   MCV 87.3   MCH 29.0   MCHC 33.2*   RDW 49.6   PLATELETCT 256   MPV 9.4     Recent Labs     09/21/20  0957   SODIUM 135   POTASSIUM 4.1   CHLORIDE 98   CO2 23   GLUCOSE 114*   BUN 23*   CREATININE 0.74   CALCIUM 9.0     Recent Labs     09/21/20  0957   INR 0.94               Imaging  EC-ECHOCARDIOGRAM COMPLETE W/O CONT   Final Result      DX-ELBOW-COMPLETE 3+ LEFT   Final Result      Soft tissue swelling without acute osseous abnormality.      DX-FEMUR-2+ RIGHT   Final Result      No acute osseous abnormality.      IR-MIDLINE CATHETER INSERTION WO GUIDANCE > AGE 3    (Results Pending)        Assessment/Plan  Pyogenic arthritis of left elbow (HCC)- (present on admission)  Assessment & Plan  cont abx, TTE and BCx negative, no wbc count.  -f/u Ortho recs, I&D closure with wound vac on left elbow and right thigh.  -pain management  -Dr. Ritchie Kwong  ID--> iv vancomycin  -midline for abx  -d  -Continue Vancomycin IV while  wound vac in place for 1 week, will transition to orals after that per ID    Murmur- (present on admission)  Assessment & Plan  History of IV drug abuse with concern for bacteremia and endocarditis.    -BCx neg  -TTE neg    Leg wound, right- (present on admission)  Assessment & Plan  Underwent irrigation and debridement of right wound.  Culture grew beta-hemolytic group B strep.  On ceftriaxone.  Pain control as needed    Homeless- (present on admission)  Assessment & Plan  Social work and case management to assist in discharge planning       VTE prophylaxis: heparin    Cbc, bmp

## 2020-09-22 NOTE — OP REPORT
DATE OF SERVICE:  09/17/2020    ADDENDUM    Irrigation and debridement of superficial skin, fascia, and muscle of the   right thigh.  During the procedure, there was a 12x4 cm healthy-appearing   wound.  Using a combination of pickups, scissors, curettes and irrigation, we   irrigated the superficial skin, the fascia and the muscle of the right thigh.    No additional treatment or change in the intraoperative management of the   patient occurred because of this deletion.       ____________________________________     MD NIKITA CHRISTIAN / SUZI    DD:  09/21/2020 15:48:38  DT:  09/21/2020 16:09:03    D#:  [id number]  Job#:  [id number]

## 2020-09-22 NOTE — CARE PLAN
Problem: Safety  Goal: Will remain free from falls  Outcome: PROGRESSING AS EXPECTED   Patient remains free from falls, treaded socks in place, call light within reach, patient calls for assistance    Problem: Pain Management  Goal: Pain level will decrease to patient's comfort goal  Outcome: PROGRESSING SLOWER THAN EXPECTED   Patient's pain is controlled with IV dilaudid, patient refusing oxycodone po prn

## 2020-09-22 NOTE — CARE PLAN
Problem: Safety  Goal: Will remain free from falls  Outcome: PROGRESSING AS EXPECTED  Note: Hourly rounding.  Non-skid socks. Bed locked & in low position. Personal belongings and call light  within reach. .      Problem: Pain Management  Goal: Pain level will decrease to patient's comfort goal  Outcome: PROGRESSING AS EXPECTED  Note: Taught pt 0-10 pain scale and  non pharmacological method of pain mgt, encouraged to verbalize when in pain. Administered PRN pain med as needed.      Problem: Respiratory:  Goal: Respiratory status will improve  Outcome: PROGRESSING AS EXPECTED  Note: Encouraged on deep breathing exercise and the use of incentive spirometer,  on room air, O2 sat  98%

## 2020-09-22 NOTE — PROCEDURES
Vascular Access Team    Date of Insertion: 9/22/20  Arm Circumference: n/a  Line Length: 10  Line Size: 18  Vein Occupancy %: 25  Reason for Midline: Antibiotics  Labs: WBC 6.3, , BUN 23, Cr 0.74, GFR >60, INR 0.94    Orders confirmed, vessel patency confirmed with ultrasound. Risks and benefits of procedure explained to patient and education regarding line associated bloodstream infections provided. Questions answered.     PowerGlide Midline placed in RUE per licensed provider order with ultrasound guidance. 18g, 10 cm line placed in Bacilic vein after 1 attempt(s).  Catheter inserted with brisk blood return. Secured with 0 cm external from insertion site.  Line flushed without resistance with 10 mL 0.9% normal saline.  Midline secured with Biopatch and Tegaderm.     Midline placement is confirmed by nurse using ultrasound and ability to flush and draw blood. Midline is appropriate for use at this time.  No X-ray is needed for placement confirmation. Pt tolerated procedure well.  Patient condition relayed to unit RN or ordering physician via this post procedure note in the EMR.    Ultrasound images uploaded to PACS and viewable in the EMR - yes  Ultrasound imaged printed and placed in paper chart - no      BARD PowerGlide Midline ref # [id number], Lot # [id number], Expiration Date 7/31/21

## 2020-09-22 NOTE — PROGRESS NOTES
"   Orthopaedic PA Progress Note    Interval changes:Not ready for discharge.  Acell graft R elbow doing well, reassured patient. Daily wound care by Nursing for A-Cell Graft Matrix. STSG vac removed this afternoon, graft with 50% adherence. Nursing to apply adaptic to R thigh skin graft . Cover with 4x4s, secure (not tight) with ace wrap, change QOD and PRN if soaked.    ROS - Patient denies any new issues. No chest pain, dyspnea, or fever.  Pain well controlled.    /73   Pulse 80   Temp 36.3 °C (97.4 °F) (Temporal)   Resp 17   Ht 1.93 m (6' 4\")   Wt 118.5 kg (261 lb 3.9 oz)   SpO2 98%     Patient seen and examined  No acute distress  Breathing non labored  RRR  Surgical dressing is clean, dry, and intact. Underlying wound with suture failue, now with elliptical opening. Images obtained and sent to Dr. Martinez.  Surgical dressing is clean, dry, and intact. Patient clearly fires tibialis anterior, EHL, and gastrocnemius/soleus. Sensation is intact to light touch throughout superficial peroneal, deep peroneal, tibial, saphenous, and sural nerve distributions. Strong and palpable 2+ dorsalis pedis and posterior tibial pulses with capillary refill less than 2 seconds. No lower leg tenderness or discomfort.    Active Hospital Problems    Diagnosis   • Pyogenic arthritis of left elbow (HCC) [M00.9]     Priority: High     No crepitus no gas in the tissues,   history of MRSA, orthopedic surgery consulted for OR in the a.m.  Empiric vancomycin and Rocephin ordered.  Symptomatic management     • Murmur [R01.1]     Priority: Medium   • Homeless [Z59.0]     Priority: Low   • Leg wound, right [S81.801A]     Assessment/Plan:  POD#5 ACell graft L elbow, STSG R thigh with vac  Wt bearing status - AT  PT/OT-initiated  Wound care:Graft vac for 5 days, left elbow no dressing change or only reinforcement if moist, donor site drsng Left in place  Drains - no   Lundberg-no  Sutures/Staples out- 10-14 days post " operatively  Antibiotics: per Med  DVT Prophylaxis- TEDS/SCDs/Foot pumps.   [facility]  Future Procedures - DC Vac Tuesday and check Graft  Case Coordination for Discharge Planning - Disposition per Med  Titrating pregabalin  Change Roxicodone to Dilaudid PO  Re-evaluate for placement in ~ 2 days.

## 2020-09-23 LAB — VANCOMYCIN TROUGH SERPL-MCNC: 11.1 UG/ML (ref 10–20)

## 2020-09-23 PROCEDURE — 700111 HCHG RX REV CODE 636 W/ 250 OVERRIDE (IP): Performed by: STUDENT IN AN ORGANIZED HEALTH CARE EDUCATION/TRAINING PROGRAM

## 2020-09-23 PROCEDURE — 36415 COLL VENOUS BLD VENIPUNCTURE: CPT

## 2020-09-23 PROCEDURE — 700102 HCHG RX REV CODE 250 W/ 637 OVERRIDE(OP): Performed by: PHYSICIAN ASSISTANT

## 2020-09-23 PROCEDURE — 700102 HCHG RX REV CODE 250 W/ 637 OVERRIDE(OP): Performed by: STUDENT IN AN ORGANIZED HEALTH CARE EDUCATION/TRAINING PROGRAM

## 2020-09-23 PROCEDURE — 700102 HCHG RX REV CODE 250 W/ 637 OVERRIDE(OP): Performed by: INTERNAL MEDICINE

## 2020-09-23 PROCEDURE — A9270 NON-COVERED ITEM OR SERVICE: HCPCS | Performed by: INTERNAL MEDICINE

## 2020-09-23 PROCEDURE — 700111 HCHG RX REV CODE 636 W/ 250 OVERRIDE (IP): Performed by: INTERNAL MEDICINE

## 2020-09-23 PROCEDURE — 700101 HCHG RX REV CODE 250: Performed by: INTERNAL MEDICINE

## 2020-09-23 PROCEDURE — A9270 NON-COVERED ITEM OR SERVICE: HCPCS | Performed by: PHYSICIAN ASSISTANT

## 2020-09-23 PROCEDURE — 80202 ASSAY OF VANCOMYCIN: CPT

## 2020-09-23 PROCEDURE — 700105 HCHG RX REV CODE 258: Performed by: INTERNAL MEDICINE

## 2020-09-23 PROCEDURE — 99232 SBSQ HOSP IP/OBS MODERATE 35: CPT | Performed by: HOSPITALIST

## 2020-09-23 PROCEDURE — 770021 HCHG ROOM/CARE - ISO PRIVATE

## 2020-09-23 PROCEDURE — A9270 NON-COVERED ITEM OR SERVICE: HCPCS | Performed by: STUDENT IN AN ORGANIZED HEALTH CARE EDUCATION/TRAINING PROGRAM

## 2020-09-23 RX ADMIN — NICOTINE TRANSDERMAL SYSTEM 21 MG: 21 PATCH, EXTENDED RELEASE TRANSDERMAL at 05:30

## 2020-09-23 RX ADMIN — VANCOMYCIN HYDROCHLORIDE 1500 MG: 500 INJECTION, POWDER, LYOPHILIZED, FOR SOLUTION INTRAVENOUS at 17:28

## 2020-09-23 RX ADMIN — ACETAMINOPHEN 650 MG: 325 TABLET, FILM COATED ORAL at 05:30

## 2020-09-23 RX ADMIN — ACETAMINOPHEN 650 MG: 325 TABLET, FILM COATED ORAL at 13:22

## 2020-09-23 RX ADMIN — HYDROMORPHONE HYDROCHLORIDE 2 MG: 2 TABLET ORAL at 09:30

## 2020-09-23 RX ADMIN — DOCUSATE SODIUM 50 MG AND SENNOSIDES 8.6 MG 2 TABLET: 8.6; 5 TABLET, FILM COATED ORAL at 17:31

## 2020-09-23 RX ADMIN — PREGABALIN 150 MG: 150 CAPSULE ORAL at 05:30

## 2020-09-23 RX ADMIN — HYDROMORPHONE HYDROCHLORIDE 2 MG: 2 TABLET ORAL at 13:22

## 2020-09-23 RX ADMIN — HEPARIN SODIUM 5000 UNITS: 5000 INJECTION, SOLUTION INTRAVENOUS; SUBCUTANEOUS at 05:31

## 2020-09-23 RX ADMIN — ACETAMINOPHEN 650 MG: 325 TABLET, FILM COATED ORAL at 17:28

## 2020-09-23 RX ADMIN — VANCOMYCIN HYDROCHLORIDE 1500 MG: 500 INJECTION, POWDER, LYOPHILIZED, FOR SOLUTION INTRAVENOUS at 04:13

## 2020-09-23 RX ADMIN — DOCUSATE SODIUM 100 MG: 100 CAPSULE ORAL at 17:28

## 2020-09-23 RX ADMIN — HYDROMORPHONE HYDROCHLORIDE 2 MG: 2 TABLET ORAL at 21:33

## 2020-09-23 RX ADMIN — HEPARIN SODIUM 5000 UNITS: 5000 INJECTION, SOLUTION INTRAVENOUS; SUBCUTANEOUS at 13:23

## 2020-09-23 RX ADMIN — HYDROMORPHONE HYDROCHLORIDE 2 MG: 2 TABLET ORAL at 17:28

## 2020-09-23 RX ADMIN — PREGABALIN 150 MG: 150 CAPSULE ORAL at 17:28

## 2020-09-23 RX ADMIN — HYDROMORPHONE HYDROCHLORIDE 2 MG: 2 TABLET ORAL at 05:34

## 2020-09-23 RX ADMIN — HEPARIN SODIUM 5000 UNITS: 5000 INJECTION, SOLUTION INTRAVENOUS; SUBCUTANEOUS at 21:33

## 2020-09-23 RX ADMIN — HYDROMORPHONE HYDROCHLORIDE 2 MG: 2 TABLET ORAL at 01:38

## 2020-09-23 RX ADMIN — PREGABALIN 150 MG: 150 CAPSULE ORAL at 13:22

## 2020-09-23 RX ADMIN — POLYETHYLENE GLYCOL 3350 1 PACKET: 17 POWDER, FOR SOLUTION ORAL at 05:31

## 2020-09-23 RX ADMIN — DOCUSATE SODIUM 100 MG: 100 CAPSULE ORAL at 05:30

## 2020-09-23 ASSESSMENT — PAIN DESCRIPTION - PAIN TYPE
TYPE: SURGICAL PAIN
TYPE: ACUTE PAIN
TYPE: SURGICAL PAIN
TYPE: ACUTE PAIN;SURGICAL PAIN
TYPE: ACUTE PAIN;SURGICAL PAIN

## 2020-09-23 ASSESSMENT — ENCOUNTER SYMPTOMS
VOMITING: 0
BACK PAIN: 1
EYE REDNESS: 0
COUGH: 0
EYE DISCHARGE: 0
FEVER: 0
FLANK PAIN: 0
DEPRESSION: 1
HEMOPTYSIS: 0
SEIZURES: 0
STRIDOR: 0
ABDOMINAL PAIN: 0

## 2020-09-23 NOTE — CARE PLAN
Problem: Safety  Goal: Will remain free from falls  Outcome: PROGRESSING AS EXPECTED   Patient remains free from falls, treaded socks in place, call light within reach. Patient calls for assistance    Problem: Infection  Goal: Will remain free from infection  Outcome: PROGRESSING AS EXPECTED   Patient remains afebrile, VS q 8 hours, IV abx as ordered

## 2020-09-23 NOTE — PROGRESS NOTES
Hospital Medicine Daily Progress Note    Date of Service  9/23/2020    Chief Complaint  Wound pain, drainage    Hospital Course  60 y.o. male with a past medical history of previous MRSA cellulitis, intravenous drug use, homelessness, Hx right lower extremity necrotizing fasciitis s/p fasciotomy in January 2020.  Admitted 9/14/2020 with multiple abscesses on upper and lower extremity.  He has a 4 mm ulcer with serosanguineous drainage on his left lateral forearm.   Vancomycin and Rocephin was started.  Orthopedic surgery were consulted, S/P irrigation and debridement of left antecubital fossa and superficial right thigh wound in the OR. Right hip wound culture grew beta-hemolytic group B strep. Transthoracic Echo performed to r/o endocarditis was performed and was negative for vegetations, EF 65 with mild tricuspid regurg. RV systolic pressure of 25mmHg.     Interval Problem Update  Heart rate 60s-70s, saturating well on room air.  Seen by infectious disease, will need 2 weeks of IV, followed by 4 weeks of orals   Last creatinine 0.74, continue to monitor renal function as long dose nephrotoxic medications.  Discussed with patient, patient's nurse      Consultants/Specialty  Orthopedic surgery  ID Dr. Ritchie Kwong    Code Status  DNAR/DNI    Disposition  2 weeks of IV, followed by 4 weeks of orals      Review of Systems  Review of Systems   Constitutional: Negative for fever.   Eyes: Negative for discharge and redness.   Respiratory: Negative for cough, hemoptysis and stridor.    Cardiovascular: Negative for chest pain.   Gastrointestinal: Negative for abdominal pain and vomiting.   Genitourinary: Negative for flank pain and hematuria.   Musculoskeletal: Positive for back pain.   Skin: Negative for itching.   Neurological: Negative for seizures.   Psychiatric/Behavioral: Positive for depression.        Chronic depression      Physical Exam  Temp:  [36.2 °C (97.2 °F)-36.8 °C (98.2 °F)] 36.2 °C (97.2 °F)  Pulse:   [68-82] 68  Resp:  [15-18] 15  BP: (103-135)/(62-68) 103/65  SpO2:  [90 %-96 %] 96 %    Physical Exam  HENT:      Head: Normocephalic.      Nose: Nose normal.      Mouth/Throat:      Mouth: Mucous membranes are moist.   Neck:      Musculoskeletal: Normal range of motion.   Cardiovascular:      Rate and Rhythm: Normal rate.   Pulmonary:      Effort: Pulmonary effort is normal.   Abdominal:      Palpations: Abdomen is soft.   Musculoskeletal: Normal range of motion.   Skin:     Comments: Left elbow s/p I&D wound with wound vac in place, no bleeding or erythema noted.     Neurological:      General: No focal deficit present.      Mental Status: He is alert.   Psychiatric:         Mood and Affect: Mood normal.       Fluids    Intake/Output Summary (Last 24 hours) at 9/23/2020 1345  Last data filed at 9/23/2020 0800  Gross per 24 hour   Intake 480 ml   Output 300 ml   Net 180 ml       Laboratory  Recent Labs     09/21/20  0957   WBC 6.3   RBC 5.04   HEMOGLOBIN 14.6   HEMATOCRIT 44.0   MCV 87.3   MCH 29.0   MCHC 33.2*   RDW 49.6   PLATELETCT 256   MPV 9.4     Recent Labs     09/21/20  0957   SODIUM 135   POTASSIUM 4.1   CHLORIDE 98   CO2 23   GLUCOSE 114*   BUN 23*   CREATININE 0.74   CALCIUM 9.0     Recent Labs     09/21/20  0957   INR 0.94               Imaging  IR-MIDLINE CATHETER INSERTION WO GUIDANCE > AGE 3   Final Result                  Ultrasound-guided midline placement performed by qualified nursing staff    as above.          EC-ECHOCARDIOGRAM COMPLETE W/O CONT   Final Result      DX-ELBOW-COMPLETE 3+ LEFT   Final Result      Soft tissue swelling without acute osseous abnormality.      DX-FEMUR-2+ RIGHT   Final Result      No acute osseous abnormality.         Assessment/Plan  Pyogenic arthritis of left elbow (HCC)- (present on admission)  Assessment & Plan  Ortho consulted   Seen by IDDr. Kwong > IV vancomycin, 2 weeks of IV, followed by 4 weeks of orals        Murmur- (present on admission)  Assessment &  Plan  History of IV drug abuse with concern for bacteremia and endocarditis.    BCx neg, TTE neg     Leg wound, right- (present on admission)  Assessment & Plan  Underwent irrigation and debridement of right wound.    On vanco per ID recs      Homeless- (present on admission)  Assessment & Plan  Social work and case management to assist in discharge planning     VTE prophylaxis: heparin SC

## 2020-09-23 NOTE — CARE PLAN
Problem: Bowel/Gastric:  Goal: Normal bowel function is maintained or improved  Outcome: PROGRESSING AS EXPECTED  Note: Bowel protocol in place. Encouraged to drink more water and eat fruits. Ambulating frequently.        Problem: Knowledge Deficit  Goal: Knowledge of disease process/condition, treatment plan, diagnostic tests, and medications will improve  Outcome: PROGRESSING AS EXPECTED  Note: Information regarding disease process/condition explained,question answered.  Updated with plan of care, verbalized understanding.      Problem: Pain Management  Goal: Pain level will decrease to patient's comfort goal  Outcome: PROGRESSING AS EXPECTED  Note: Taught pt 0-10 pain scale and  non pharmacological method of pain mgt, encouraged to verbalize when in pain. Administered PRN pain med as needed.

## 2020-09-23 NOTE — DISCHARGE PLANNING
Anticipated Discharge Disposition: [facility] to [facility]    Action: Pt's insurance is contracted with [facility]Fs, received and left msg rosalva Moss at [facility] [phone].      Spoke corby/ Nenita, the liaison w/ [facility], she confirmed that pt has been adamantly declined at all [facility] facilities due to past behavior.    Barriers to Discharge: Placement, medical clearance    Plan:  F/u rosalva Moss at [facility] to inquire about any other placement options

## 2020-09-23 NOTE — PROGRESS NOTES
Report given and RN assumed care. Patient A/Ox4. VSS. Assessment completed. LUE, NAYELIE and RLE dressing CDI. Midline to RUE. Personal belongings and call light within reach. Treaded socks in place. Will continue to monitor patient.

## 2020-09-23 NOTE — PROGRESS NOTES
"   Orthopaedic Progress Note    Interval changes:  Patient doing well   Dressings CDI  Pending facility placement    ROS - Patient denies any new issues.  Pain well controlled.    /66   Pulse 69   Temp 36.9 °C (98.4 °F) (Temporal)   Resp 15   Ht 1.93 m (6' 4\")   Wt 118.5 kg (261 lb 3.9 oz)   SpO2 95%       Patient seen and examined  No acute distress  Breathing non labored  RRR  BLE dressings CDI, DNVI, moves all toes, cap refill <2 sec.  LUE dressing CDI, DNVI, moves all fingers, cap refill <2 sec.     Recent Labs     09/21/20  0957   WBC 6.3   RBC 5.04   HEMOGLOBIN 14.6   HEMATOCRIT 44.0   MCV 87.3   MCH 29.0   MCHC 33.2*   RDW 49.6   PLATELETCT 256   MPV 9.4       Active Hospital Problems    Diagnosis   • Pyogenic arthritis of left elbow (HCC) [M00.9]     Priority: High     No crepitus no gas in the tissues,   history of MRSA, orthopedic surgery consulted for OR in the a.m.  Empiric vancomycin and Rocephin ordered.  Symptomatic management     • Murmur [R01.1]     Priority: Medium   • Homeless [Z59.0]     Priority: Low   • Leg wound, right [S81.801A]       Assessment/Plan:  Pending placement  Wound check tomorrow  POD#6 S/P:  1.  Irrigation and debridement of superficial skin, fascia, and muscle, right thigh.    2.  Split-thickness skin graft from left thigh to right thigh.    3.  Application of ACell MicroMatrix right thigh skin graft.    4.  Irrigation and debridement, left arm skin, subcutaneous tissue, fascia, and bone.    5.  Application of ACell MicroMatrix and Gentrix graft.    6.  Application of wound VAC, right thigh.    Wt bearing status - WBAT  Wound care/Drains - dressings left in place  Future Procedures - none planned   Sutures/Staples out- 10-14 days post operatively  PT/OT-initiated  Antibiotics: vanco 1500mg iv Q12  DVT Prophylaxis- TEDS/SCDs/Foot pumps/heparin  Lundberg-none  Case Coordination for Discharge Planning - Disposition SNF   "

## 2020-09-24 LAB
ANION GAP SERPL CALC-SCNC: 11 MMOL/L (ref 7–16)
BUN SERPL-MCNC: 19 MG/DL (ref 8–22)
CALCIUM SERPL-MCNC: 9 MG/DL (ref 8.5–10.5)
CHLORIDE SERPL-SCNC: 100 MMOL/L (ref 96–112)
CO2 SERPL-SCNC: 24 MMOL/L (ref 20–33)
CREAT SERPL-MCNC: 0.63 MG/DL (ref 0.5–1.4)
ERYTHROCYTE [DISTWIDTH] IN BLOOD BY AUTOMATED COUNT: 51 FL (ref 35.9–50)
GLUCOSE SERPL-MCNC: 119 MG/DL (ref 65–99)
HCT VFR BLD AUTO: 41.3 % (ref 42–52)
HGB BLD-MCNC: 13.7 G/DL (ref 14–18)
MCH RBC QN AUTO: 29.3 PG (ref 27–33)
MCHC RBC AUTO-ENTMCNC: 33.2 G/DL (ref 33.7–35.3)
MCV RBC AUTO: 88.2 FL (ref 81.4–97.8)
PLATELET # BLD AUTO: 207 K/UL (ref 164–446)
PMV BLD AUTO: 9.5 FL (ref 9–12.9)
POTASSIUM SERPL-SCNC: 4.3 MMOL/L (ref 3.6–5.5)
RBC # BLD AUTO: 4.68 M/UL (ref 4.7–6.1)
SODIUM SERPL-SCNC: 135 MMOL/L (ref 135–145)
WBC # BLD AUTO: 5.7 K/UL (ref 4.8–10.8)

## 2020-09-24 PROCEDURE — 700102 HCHG RX REV CODE 250 W/ 637 OVERRIDE(OP): Performed by: INTERNAL MEDICINE

## 2020-09-24 PROCEDURE — 700111 HCHG RX REV CODE 636 W/ 250 OVERRIDE (IP): Performed by: INTERNAL MEDICINE

## 2020-09-24 PROCEDURE — 700111 HCHG RX REV CODE 636 W/ 250 OVERRIDE (IP): Performed by: STUDENT IN AN ORGANIZED HEALTH CARE EDUCATION/TRAINING PROGRAM

## 2020-09-24 PROCEDURE — 700105 HCHG RX REV CODE 258: Performed by: INTERNAL MEDICINE

## 2020-09-24 PROCEDURE — 700102 HCHG RX REV CODE 250 W/ 637 OVERRIDE(OP): Performed by: PHYSICIAN ASSISTANT

## 2020-09-24 PROCEDURE — 85027 COMPLETE CBC AUTOMATED: CPT

## 2020-09-24 PROCEDURE — 700105 HCHG RX REV CODE 258

## 2020-09-24 PROCEDURE — 80048 BASIC METABOLIC PNL TOTAL CA: CPT

## 2020-09-24 PROCEDURE — 99232 SBSQ HOSP IP/OBS MODERATE 35: CPT | Performed by: HOSPITALIST

## 2020-09-24 PROCEDURE — 36415 COLL VENOUS BLD VENIPUNCTURE: CPT

## 2020-09-24 PROCEDURE — 770021 HCHG ROOM/CARE - ISO PRIVATE

## 2020-09-24 PROCEDURE — 700102 HCHG RX REV CODE 250 W/ 637 OVERRIDE(OP): Performed by: STUDENT IN AN ORGANIZED HEALTH CARE EDUCATION/TRAINING PROGRAM

## 2020-09-24 PROCEDURE — A9270 NON-COVERED ITEM OR SERVICE: HCPCS | Performed by: INTERNAL MEDICINE

## 2020-09-24 PROCEDURE — A9270 NON-COVERED ITEM OR SERVICE: HCPCS | Performed by: PHYSICIAN ASSISTANT

## 2020-09-24 PROCEDURE — A9270 NON-COVERED ITEM OR SERVICE: HCPCS | Performed by: STUDENT IN AN ORGANIZED HEALTH CARE EDUCATION/TRAINING PROGRAM

## 2020-09-24 RX ORDER — SODIUM CHLORIDE 9 MG/ML
INJECTION, SOLUTION INTRAVENOUS
Status: COMPLETED
Start: 2020-09-24 | End: 2020-09-24

## 2020-09-24 RX ADMIN — HEPARIN SODIUM 5000 UNITS: 5000 INJECTION, SOLUTION INTRAVENOUS; SUBCUTANEOUS at 05:19

## 2020-09-24 RX ADMIN — VANCOMYCIN HYDROCHLORIDE 1500 MG: 500 INJECTION, POWDER, LYOPHILIZED, FOR SOLUTION INTRAVENOUS at 17:22

## 2020-09-24 RX ADMIN — ACETAMINOPHEN 650 MG: 325 TABLET, FILM COATED ORAL at 14:03

## 2020-09-24 RX ADMIN — PREGABALIN 150 MG: 150 CAPSULE ORAL at 05:19

## 2020-09-24 RX ADMIN — HYDROMORPHONE HYDROCHLORIDE 2 MG: 2 TABLET ORAL at 14:04

## 2020-09-24 RX ADMIN — DOCUSATE SODIUM 100 MG: 100 CAPSULE ORAL at 17:22

## 2020-09-24 RX ADMIN — SODIUM CHLORIDE: 9 INJECTION, SOLUTION INTRAVENOUS at 17:30

## 2020-09-24 RX ADMIN — HYDROMORPHONE HYDROCHLORIDE 2 MG: 2 TABLET ORAL at 05:19

## 2020-09-24 RX ADMIN — NICOTINE TRANSDERMAL SYSTEM 21 MG: 21 PATCH, EXTENDED RELEASE TRANSDERMAL at 05:18

## 2020-09-24 RX ADMIN — DOCUSATE SODIUM 100 MG: 100 CAPSULE ORAL at 05:19

## 2020-09-24 RX ADMIN — PREGABALIN 150 MG: 150 CAPSULE ORAL at 18:20

## 2020-09-24 RX ADMIN — POLYETHYLENE GLYCOL 3350 1 PACKET: 17 POWDER, FOR SOLUTION ORAL at 05:19

## 2020-09-24 RX ADMIN — VANCOMYCIN HYDROCHLORIDE 1500 MG: 500 INJECTION, POWDER, LYOPHILIZED, FOR SOLUTION INTRAVENOUS at 04:55

## 2020-09-24 RX ADMIN — HEPARIN SODIUM 5000 UNITS: 5000 INJECTION, SOLUTION INTRAVENOUS; SUBCUTANEOUS at 14:04

## 2020-09-24 RX ADMIN — ACETAMINOPHEN 650 MG: 325 TABLET, FILM COATED ORAL at 18:20

## 2020-09-24 RX ADMIN — ACETAMINOPHEN 650 MG: 325 TABLET, FILM COATED ORAL at 05:19

## 2020-09-24 RX ADMIN — HYDROMORPHONE HYDROCHLORIDE 2 MG: 2 TABLET ORAL at 18:21

## 2020-09-24 RX ADMIN — PREGABALIN 150 MG: 150 CAPSULE ORAL at 14:04

## 2020-09-24 RX ADMIN — HYDROMORPHONE HYDROCHLORIDE 2 MG: 2 TABLET ORAL at 22:23

## 2020-09-24 RX ADMIN — HYDROMORPHONE HYDROCHLORIDE 2 MG: 2 TABLET ORAL at 01:26

## 2020-09-24 RX ADMIN — HYDROMORPHONE HYDROCHLORIDE 2 MG: 2 TABLET ORAL at 09:23

## 2020-09-24 ASSESSMENT — ENCOUNTER SYMPTOMS
FLANK PAIN: 0
BACK PAIN: 1
STRIDOR: 0
FEVER: 0
DEPRESSION: 1
EYE REDNESS: 0
ABDOMINAL PAIN: 0
SEIZURES: 0
VOMITING: 0
HEMOPTYSIS: 0
EYE DISCHARGE: 0
COUGH: 0

## 2020-09-24 ASSESSMENT — PAIN DESCRIPTION - PAIN TYPE
TYPE: ACUTE PAIN

## 2020-09-24 NOTE — DISCHARGE PLANNING
Anticipated Discharge Disposition: Inpatient    Action: Pt to remain inpatient until medically cleared.    Barriers to Discharge: Medical clearance, no placement available

## 2020-09-24 NOTE — PROGRESS NOTES
Hospital Medicine Daily Progress Note    Date of Service  9/24/2020    Chief Complaint  Wound pain, drainage    Hospital Course  60 y.o. male with a past medical history of previous MRSA cellulitis, intravenous drug use, homelessness, Hx right lower extremity necrotizing fasciitis s/p fasciotomy in January 2020.  Admitted 9/14/2020 with multiple abscesses on upper and lower extremity.  He has a 4 mm ulcer with serosanguineous drainage on his left lateral forearm.   Vancomycin and Rocephin was started.  Orthopedic surgery were consulted, S/P irrigation and debridement of left antecubital fossa and superficial right thigh wound in the OR. Right hip wound culture grew beta-hemolytic group B strep. Transthoracic Echo performed to r/o endocarditis was performed and was negative for vegetations, EF 65 with mild tricuspid regurg. RV systolic pressure of 25mmHg.     Interval Problem Update  Heart rate 60s, blood pressure within normal limits this morning, saturating well on room air.  Need 2 weeks of IV vancomycin, to be followed by 4 weeks of orals according to ID.  Creatinine 0.634, continue to monitor renal function as long dose nephrotoxic medications.  Discussed with patient, patient's nurse       Consultants/Specialty  Orthopedic surgery  ID Dr. Ritchie Kwong    Code Status  DNAR/DNI    Disposition  Cleared for discharge for SNF on IV antibiotics, being a drug user may be a barrier for acceptance.    Needs 2 weeks of IV, followed by 4 weeks of orals       Review of Systems  Review of Systems   Constitutional: Negative for fever.   Eyes: Negative for discharge and redness.   Respiratory: Negative for cough, hemoptysis and stridor.    Cardiovascular: Negative for chest pain.   Gastrointestinal: Negative for abdominal pain and vomiting.   Genitourinary: Negative for flank pain and hematuria.   Musculoskeletal: Positive for back pain.   Skin: Negative for itching.   Neurological: Negative for seizures.    Psychiatric/Behavioral: Positive for depression.        Chronic depression      Physical Exam  Temp:  [36.4 °C (97.6 °F)-36.9 °C (98.4 °F)] 36.4 °C (97.6 °F)  Pulse:  [63-71] 63  Resp:  [15-18] 15  BP: (100-121)/(61-77) 100/61  SpO2:  [94 %-98 %] 97 %    Physical Exam  HENT:      Head: Normocephalic.      Nose: Nose normal.      Mouth/Throat:      Mouth: Mucous membranes are moist.   Neck:      Musculoskeletal: Normal range of motion.   Cardiovascular:      Rate and Rhythm: Normal rate.   Pulmonary:      Effort: Pulmonary effort is normal.   Abdominal:      Palpations: Abdomen is soft.   Musculoskeletal: Normal range of motion.   Skin:     Comments: Left elbow s/p I&D wound with wound vac in place, no bleeding or erythema noted.     Neurological:      General: No focal deficit present.      Mental Status: He is alert.   Psychiatric:         Mood and Affect: Mood normal.       Fluids    Intake/Output Summary (Last 24 hours) at 9/24/2020 1407  Last data filed at 9/24/2020 0800  Gross per 24 hour   Intake 240 ml   Output 300 ml   Net -60 ml     Laboratory  Recent Labs     09/24/20  0659   WBC 5.7   RBC 4.68*   HEMOGLOBIN 13.7*   HEMATOCRIT 41.3*   MCV 88.2   MCH 29.3   MCHC 33.2*   RDW 51.0*   PLATELETCT 207   MPV 9.5     Recent Labs     09/24/20  0659   SODIUM 135   POTASSIUM 4.3   CHLORIDE 100   CO2 24   GLUCOSE 119*   BUN 19   CREATININE 0.63   CALCIUM 9.0                   Imaging  IR-MIDLINE CATHETER INSERTION WO GUIDANCE > AGE 3   Final Result                  Ultrasound-guided midline placement performed by qualified nursing staff    as above.          EC-ECHOCARDIOGRAM COMPLETE W/O CONT   Final Result      DX-ELBOW-COMPLETE 3+ LEFT   Final Result      Soft tissue swelling without acute osseous abnormality.      DX-FEMUR-2+ RIGHT   Final Result      No acute osseous abnormality.         Assessment/Plan  Leg wound, right- (present on admission)  Assessment & Plan  Underwent irrigation and debridement of right  wound.    On vanco per ID recs   [Need 2 weeks of IV vancomycin, to be followed by 4 weeks of orals]     Pyogenic arthritis of left elbow (HCC)- (present on admission)  Assessment & Plan  Ortho consulted   Seen by ID, Dr. Kwong > IV vancomycin, 2 weeks of IV, followed by 4 weeks of orals         Murmur- (present on admission)  Assessment & Plan  History of IV drug abuse with concern for bacteremia and endocarditis.    BCx neg, TTE neg     IV drug abuse (HCC)- (present on admission)  Assessment & Plan  Patient denies current use   This may be a barrier for acceptance in SNF      Homeless  Assessment & Plan  Social work and case management to assist in discharge planning     VTE prophylaxis: heparin SC

## 2020-09-24 NOTE — PROGRESS NOTES
"Pharmacy Kinetics 60 y.o. male on vancomycin day # 3 2020    Currently on Vancomycin 1500 mg iv q12hr (0400 & 1600)    Indication for Treatment: SSTI    Proposed duration of treatment: 2 weeks of IV antibiotics and then followed by 4 weeks PO    Pertinent history per medical record: Admitted on 2020 for multiple abscesses on his upper and lower extremity.  He has a 4 mm ulcer with serosanguineous drainage on his left lateral forearm. S/P irrigation and debridement of left antecubital fossa and superficial right thigh wound  on  with a wound vac on both the left elbow and right thigh. He has a history of IV drug abuse, MSSA cellulitis, right lower extremity necrotizing fasciitis s/p fasciotomy in 2020. ID is consulting on the case.     Other antibiotics: N/A    Allergies: Codeine, Spinach, and Pcn [penicillins]     List concerns for renal function: BUN/SCr ratio > 20:1 (~31) & Obesity (BMI 31.8)    Pertinent cultures to date:   9/15 Left elbow wound w/ GAS and MRSA   BC x2 NGTD   Right hip wound w/ GAS    Recent Labs     20  0957   WBC 6.3   NEUTSPOLYS 68.90     Recent Labs     20  0957   BUN 23*   CREATININE 0.74     Recent Labs     20  1601   VANCOTROUGH 11.1       Intake/Output Summary (Last 24 hours) at 2020 1725  Last data filed at 2020 1200  Gross per 24 hour   Intake 720 ml   Output 300 ml   Net 420 ml      /66   Pulse 69   Temp 36.9 °C (98.4 °F) (Temporal)   Resp 15   Ht 1.93 m (6' 4\")   Wt 118.5 kg (261 lb 3.9 oz)   SpO2 95%  Temp (24hrs), Av.5 °C (97.7 °F), Min:36.2 °C (97.2 °F), Max:36.9 °C (98.4 °F)      A/P   1. Vancomycin dose change: continue Vancomycin 1500mg IV Q12hr (0400 & 1600)   2. Next vancomycin level:  at 0330   3. Goal trough: 10-15 mcg/ml  4. Patient has a peripheral line  5. Comments: Vancomycin trough of 11.1mcg/mL was drawn appropriately (~12hrs) and therapeutic. Renal function has been stable. No new labs today " to assess but BMP ordered for tomorrow morning. Pharmacy will continue to follow and make appropriate adjustments based on renal indices, trough levels, and other patient factors influencing the dosing of vancomycin.    Mehnaz Ventura, PharmD  T: [phone]

## 2020-09-25 LAB
ANION GAP SERPL CALC-SCNC: 15 MMOL/L (ref 7–16)
BUN SERPL-MCNC: 18 MG/DL (ref 8–22)
CALCIUM SERPL-MCNC: 9 MG/DL (ref 8.5–10.5)
CHLORIDE SERPL-SCNC: 101 MMOL/L (ref 96–112)
CO2 SERPL-SCNC: 19 MMOL/L (ref 20–33)
CREAT SERPL-MCNC: 0.71 MG/DL (ref 0.5–1.4)
GLUCOSE SERPL-MCNC: 112 MG/DL (ref 65–99)
POTASSIUM SERPL-SCNC: 4.2 MMOL/L (ref 3.6–5.5)
SODIUM SERPL-SCNC: 135 MMOL/L (ref 135–145)

## 2020-09-25 PROCEDURE — 700102 HCHG RX REV CODE 250 W/ 637 OVERRIDE(OP): Performed by: STUDENT IN AN ORGANIZED HEALTH CARE EDUCATION/TRAINING PROGRAM

## 2020-09-25 PROCEDURE — A9270 NON-COVERED ITEM OR SERVICE: HCPCS | Performed by: INTERNAL MEDICINE

## 2020-09-25 PROCEDURE — 700102 HCHG RX REV CODE 250 W/ 637 OVERRIDE(OP): Performed by: HOSPITALIST

## 2020-09-25 PROCEDURE — 770021 HCHG ROOM/CARE - ISO PRIVATE

## 2020-09-25 PROCEDURE — 99232 SBSQ HOSP IP/OBS MODERATE 35: CPT | Performed by: HOSPITALIST

## 2020-09-25 PROCEDURE — A9270 NON-COVERED ITEM OR SERVICE: HCPCS | Performed by: PHYSICIAN ASSISTANT

## 2020-09-25 PROCEDURE — 700101 HCHG RX REV CODE 250: Performed by: INTERNAL MEDICINE

## 2020-09-25 PROCEDURE — 700102 HCHG RX REV CODE 250 W/ 637 OVERRIDE(OP): Performed by: PHYSICIAN ASSISTANT

## 2020-09-25 PROCEDURE — 700111 HCHG RX REV CODE 636 W/ 250 OVERRIDE (IP): Performed by: INTERNAL MEDICINE

## 2020-09-25 PROCEDURE — 700111 HCHG RX REV CODE 636 W/ 250 OVERRIDE (IP): Performed by: STUDENT IN AN ORGANIZED HEALTH CARE EDUCATION/TRAINING PROGRAM

## 2020-09-25 PROCEDURE — 80048 BASIC METABOLIC PNL TOTAL CA: CPT

## 2020-09-25 PROCEDURE — 36415 COLL VENOUS BLD VENIPUNCTURE: CPT

## 2020-09-25 PROCEDURE — 700105 HCHG RX REV CODE 258: Performed by: INTERNAL MEDICINE

## 2020-09-25 PROCEDURE — A9270 NON-COVERED ITEM OR SERVICE: HCPCS | Performed by: STUDENT IN AN ORGANIZED HEALTH CARE EDUCATION/TRAINING PROGRAM

## 2020-09-25 PROCEDURE — 700102 HCHG RX REV CODE 250 W/ 637 OVERRIDE(OP): Performed by: INTERNAL MEDICINE

## 2020-09-25 PROCEDURE — A9270 NON-COVERED ITEM OR SERVICE: HCPCS | Performed by: HOSPITALIST

## 2020-09-25 RX ORDER — HYDROMORPHONE HYDROCHLORIDE 2 MG/1
2 TABLET ORAL
Status: DISCONTINUED | OUTPATIENT
Start: 2020-09-25 | End: 2020-10-04

## 2020-09-25 RX ADMIN — ACETAMINOPHEN 650 MG: 325 TABLET, FILM COATED ORAL at 05:18

## 2020-09-25 RX ADMIN — HYDROMORPHONE HYDROCHLORIDE 2 MG: 2 TABLET ORAL at 06:14

## 2020-09-25 RX ADMIN — HYDROMORPHONE HYDROCHLORIDE 2 MG: 2 TABLET ORAL at 02:29

## 2020-09-25 RX ADMIN — VANCOMYCIN HYDROCHLORIDE 1500 MG: 500 INJECTION, POWDER, LYOPHILIZED, FOR SOLUTION INTRAVENOUS at 05:18

## 2020-09-25 RX ADMIN — DOCUSATE SODIUM 50 MG AND SENNOSIDES 8.6 MG 2 TABLET: 8.6; 5 TABLET, FILM COATED ORAL at 17:44

## 2020-09-25 RX ADMIN — VANCOMYCIN HYDROCHLORIDE 1500 MG: 500 INJECTION, POWDER, LYOPHILIZED, FOR SOLUTION INTRAVENOUS at 15:59

## 2020-09-25 RX ADMIN — PREGABALIN 150 MG: 150 CAPSULE ORAL at 17:44

## 2020-09-25 RX ADMIN — HEPARIN SODIUM 5000 UNITS: 5000 INJECTION, SOLUTION INTRAVENOUS; SUBCUTANEOUS at 20:41

## 2020-09-25 RX ADMIN — PREGABALIN 150 MG: 150 CAPSULE ORAL at 10:22

## 2020-09-25 RX ADMIN — HYDROMORPHONE HYDROCHLORIDE 2 MG: 2 TABLET ORAL at 14:33

## 2020-09-25 RX ADMIN — HYDROMORPHONE HYDROCHLORIDE 2 MG: 2 TABLET ORAL at 23:39

## 2020-09-25 RX ADMIN — PREGABALIN 150 MG: 150 CAPSULE ORAL at 05:18

## 2020-09-25 RX ADMIN — HYDROMORPHONE HYDROCHLORIDE 2 MG: 2 TABLET ORAL at 10:22

## 2020-09-25 RX ADMIN — HYDROMORPHONE HYDROCHLORIDE 2 MG: 2 TABLET ORAL at 20:41

## 2020-09-25 RX ADMIN — HEPARIN SODIUM 5000 UNITS: 5000 INJECTION, SOLUTION INTRAVENOUS; SUBCUTANEOUS at 05:18

## 2020-09-25 RX ADMIN — HEPARIN SODIUM 5000 UNITS: 5000 INJECTION, SOLUTION INTRAVENOUS; SUBCUTANEOUS at 14:33

## 2020-09-25 RX ADMIN — NICOTINE TRANSDERMAL SYSTEM 21 MG: 21 PATCH, EXTENDED RELEASE TRANSDERMAL at 05:18

## 2020-09-25 RX ADMIN — DOCUSATE SODIUM 100 MG: 100 CAPSULE ORAL at 17:44

## 2020-09-25 RX ADMIN — DIPHENHYDRAMINE HYDROCHLORIDE 25 MG: 25 TABLET ORAL at 23:39

## 2020-09-25 RX ADMIN — DOCUSATE SODIUM 100 MG: 100 CAPSULE ORAL at 05:18

## 2020-09-25 RX ADMIN — POLYETHYLENE GLYCOL 3350 1 PACKET: 17 POWDER, FOR SOLUTION ORAL at 06:14

## 2020-09-25 RX ADMIN — ACETAMINOPHEN 650 MG: 325 TABLET, FILM COATED ORAL at 10:22

## 2020-09-25 RX ADMIN — HYDROMORPHONE HYDROCHLORIDE 2 MG: 2 TABLET ORAL at 17:44

## 2020-09-25 RX ADMIN — ACETAMINOPHEN 650 MG: 325 TABLET, FILM COATED ORAL at 17:44

## 2020-09-25 ASSESSMENT — ENCOUNTER SYMPTOMS
BACK PAIN: 1
FLANK PAIN: 0
EYE REDNESS: 0
SEIZURES: 0
FEVER: 0
STRIDOR: 0
ABDOMINAL PAIN: 0
COUGH: 0
DEPRESSION: 1
VOMITING: 0
EYE DISCHARGE: 0
HEMOPTYSIS: 0

## 2020-09-25 ASSESSMENT — PAIN DESCRIPTION - PAIN TYPE
TYPE: ACUTE PAIN;SURGICAL PAIN

## 2020-09-25 ASSESSMENT — PATIENT HEALTH QUESTIONNAIRE - PHQ9
SUM OF ALL RESPONSES TO PHQ9 QUESTIONS 1 AND 2: 0
1. LITTLE INTEREST OR PLEASURE IN DOING THINGS: NOT AT ALL
2. FEELING DOWN, DEPRESSED, IRRITABLE, OR HOPELESS: NOT AT ALL

## 2020-09-25 NOTE — PROGRESS NOTES
"   Orthopaedic Progress Note    Interval changes:  Patient doing well   Dressings CDI  Not cleared for DC by ortho   Patient to stay in house till healed per Juan CAMPA - Patient denies any new issues.  Pain well controlled.    /67   Pulse 73   Temp 36.4 °C (97.5 °F) (Temporal)   Resp 17   Ht 1.93 m (6' 4\")   Wt 118.5 kg (261 lb 3.9 oz)   SpO2 95%       Patient seen and examined  No acute distress  Breathing non labored  RRR  BLE dressings CDI, DNVI, moves all toes, cap refill <2 sec.  LUE dressing CDI, DNVI, moves all fingers, cap refill <2 sec.     Recent Labs     09/24/20  0659   WBC 5.7   RBC 4.68*   HEMOGLOBIN 13.7*   HEMATOCRIT 41.3*   MCV 88.2   MCH 29.3   MCHC 33.2*   RDW 51.0*   PLATELETCT 207   MPV 9.5       Active Hospital Problems    Diagnosis   • Leg wound, right [S81.801A]     Priority: High   • Pyogenic arthritis of left elbow (HCC) [M00.9]     Priority: High     No crepitus no gas in the tissues,   history of MRSA, orthopedic surgery consulted for OR in the a.m.  Empiric vancomycin and Rocephin ordered.  Symptomatic management     • Murmur [R01.1]     Priority: Medium   • IV drug abuse (HCC) [F19.10]     Priority: Medium       Assessment/Plan:  Dressings to be left in place   Not cleared for DC by ortho  POD#7 S/P:  1.  Irrigation and debridement of superficial skin, fascia, and muscle, right thigh.    2.  Split-thickness skin graft from left thigh to right thigh.    3.  Application of ACell MicroMatrix right thigh skin graft.    4.  Irrigation and debridement, left arm skin, subcutaneous tissue, fascia, and bone.    5.  Application of ACell MicroMatrix and Gentrix graft.    6.  Application of wound VAC, right thigh.    Wt bearing status - WBAT  Wound care/Drains - dressings left in place  Future Procedures - none planned   Sutures/Staples out- 10-14 days post operatively  PT/OT-initiated  Antibiotics: vanco 1500mg iv Q12  DVT Prophylaxis- TEDS/SCDs/Foot pumps/heparin  Lundberg-none  Case " Coordination for Discharge Planning - Disposition SNF

## 2020-09-25 NOTE — PROGRESS NOTES
"Pharmacy Kinetics 60 y.o. male on vancomycin day # 5 2020    Currently on Vancomycin 1500 mg iv q12hr    Indication for Treatment: Pyogenic arthritis of left elbow     Proposed duration of treatment: 2 weeks of IV, followed by 4 weeks of oral antibiotics per ID      Pertinent history per medical record: Admitted on 2020 for multiple abscesses on his upper and lower extremity.  He has a 4 mm ulcer with serosanguineous drainage on his left lateral forearm. S/P irrigation and debridement of left antecubital fossa and superficial right thigh wound  on  with a wound vac on both the left elbow and right thigh. He has a history of IV drug abuse, MSSA cellulitis, right lower extremity necrotizing fasciitis s/p fasciotomy in 2020. TTE neg  ID is consulting on the case. .    Other antibiotics: none    Allergies: Codeine, Spinach, and Pcn [penicillins]     List concerns for renal function BUN/SCr ratio > 20:1    Pertinent cultures to date:   9/15 Left elbow wound w/ GAS and MRSA   BC x2 NGTD   Right hip wound w/ GAS    Recent Labs     20  0659   WBC 5.7     Recent Labs     20  0659   BUN 19   CREATININE 0.63     Recent Labs     20  1601   VANCOTROUGH 11.1       Intake/Output Summary (Last 24 hours) at 2020 0943  Last data filed at 2020 1200  Gross per 24 hour   Intake 240 ml   Output --   Net 240 ml      /64   Pulse 73   Temp 36.4 °C (97.6 °F) (Temporal)   Resp 16   Ht 1.93 m (6' 4\")   Wt 118.5 kg (261 lb 3.9 oz)   SpO2 96%  Temp (24hrs), Av.6 °C (97.8 °F), Min:36.4 °C (97.5 °F), Max:36.8 °C (98.2 °F)      A/P   1. Vancomycin dose change: continue Vancomycin 1500 mg Q 12 hr (13 mg/kg) (0400 1600)   2. Next vancomycin level:  at 0330 (before the 9th 1500 mg dose)   3. Goal trough: 10-15 mcg/ml  4. Comments: Renal function stable?,. Dosing done per troughs. Patient has low risk for accumulation, if next trough is stable consider pushing vanco trough " out.  Pharmacy will follow renal function, cultures, troughs, and patient's clinical status to guide subsequent recommendations.      Piero Landon PharmD  [phone]

## 2020-09-25 NOTE — PROGRESS NOTES
Hospital Medicine Daily Progress Note    Date of Service  9/25/2020    Chief Complaint  Wound pain, drainage    Hospital Course  60 y.o. male with a past medical history of previous MRSA cellulitis, intravenous drug use, homelessness, Hx right lower extremity necrotizing fasciitis s/p fasciotomy in January 2020.  Admitted 9/14/2020 with multiple abscesses on upper and lower extremity.  He has a 4 mm ulcer with serosanguineous drainage on his left lateral forearm.   Vancomycin and Rocephin was started.  Orthopedic surgery were consulted, S/P irrigation and debridement of left antecubital fossa and superficial right thigh wound in the OR. Right hip wound culture grew beta-hemolytic group B strep. Transthoracic Echo performed to r/o endocarditis was performed and was negative for vegetations, EF 65 with mild tricuspid regurg. RV systolic pressure of 25mmHg.     Interval Problem Update  Heart rate 70s, saturating well on room air this morning.  Complaining that he is having breakthrough pain.  I will adjust his pain medications.  Need 2 weeks of IV vancomycin, to be followed by 4 weeks of orals according to ID.  Creatinine 0.71, continue to monitor renal function as long dose nephrotoxic medications.  Discussed with patient, patient's nurse        Consultants/Specialty  Orthopedic surgery  ID Dr. Ritchie Kwong    Code Status  DNAR/DNI    Disposition  Cleared for discharge for SNF on IV antibiotics, being a drug user may be a barrier for acceptance.     Needs 2 weeks of IV, followed by 4 weeks of orals       Review of Systems  Review of Systems   Constitutional: Negative for fever.   Eyes: Negative for discharge and redness.   Respiratory: Negative for cough, hemoptysis and stridor.    Cardiovascular: Negative for chest pain.   Gastrointestinal: Negative for abdominal pain and vomiting.   Genitourinary: Negative for flank pain and hematuria.   Musculoskeletal: Positive for back pain.   Skin: Negative for itching.    Neurological: Negative for seizures.   Psychiatric/Behavioral: Positive for depression.        Chronic depression      Physical Exam  Temp:  [36.4 °C (97.5 °F)-36.8 °C (98.2 °F)] 36.4 °C (97.6 °F)  Pulse:  [72-74] 73  Resp:  [16-18] 16  BP: (102-118)/(64-75) 102/64  SpO2:  [93 %-97 %] 96 %    Physical Exam  HENT:      Head: Normocephalic.      Nose: Nose normal.      Mouth/Throat:      Mouth: Mucous membranes are moist.   Neck:      Musculoskeletal: Normal range of motion.   Cardiovascular:      Rate and Rhythm: Normal rate.   Pulmonary:      Effort: Pulmonary effort is normal.   Abdominal:      Palpations: Abdomen is soft.   Musculoskeletal: Normal range of motion.   Skin:     Comments: Left elbow s/p I&D wound with wound vac in place, no bleeding or erythema noted.     Neurological:      General: No focal deficit present.      Mental Status: He is alert.   Psychiatric:         Mood and Affect: Mood normal.       Fluids  No intake or output data in the 24 hours ending 09/25/20 1511  Laboratory  Recent Labs     09/24/20  0659   WBC 5.7   RBC 4.68*   HEMOGLOBIN 13.7*   HEMATOCRIT 41.3*   MCV 88.2   MCH 29.3   MCHC 33.2*   RDW 51.0*   PLATELETCT 207   MPV 9.5     Recent Labs     09/24/20  0659 09/25/20  1110   SODIUM 135 135   POTASSIUM 4.3 4.2   CHLORIDE 100 101   CO2 24 19*   GLUCOSE 119* 112*   BUN 19 18   CREATININE 0.63 0.71   CALCIUM 9.0 9.0                   Imaging  IR-MIDLINE CATHETER INSERTION WO GUIDANCE > AGE 3   Final Result                  Ultrasound-guided midline placement performed by qualified nursing staff    as above.          EC-ECHOCARDIOGRAM COMPLETE W/O CONT   Final Result      DX-ELBOW-COMPLETE 3+ LEFT   Final Result      Soft tissue swelling without acute osseous abnormality.      DX-FEMUR-2+ RIGHT   Final Result      No acute osseous abnormality.         Assessment/Plan  Leg wound, right- (present on admission)  Assessment & Plan  Underwent irrigation and debridement of right wound.    On  vanco per ID recs   [Need 2 weeks of IV vancomycin, to be followed by 4 weeks of orals]     Pyogenic arthritis of left elbow (HCC)- (present on admission)  Assessment & Plan  Ortho consulted   Seen by ID, Dr. Kwong > IV vancomycin, 2 weeks of IV, followed by 4 weeks of orals         Murmur- (present on admission)  Assessment & Plan  History of IV drug abuse with concern for bacteremia and endocarditis.    BCx neg, TTE neg     IV drug abuse (HCC)- (present on admission)  Assessment & Plan  Patient denies current use   This may be a barrier for acceptance in SNF    Try to avoid IV narcotics as possible     Homeless  Assessment & Plan  Social work and case management to assist in discharge planning     VTE prophylaxis: heparin SC

## 2020-09-26 LAB
ANION GAP SERPL CALC-SCNC: 11 MMOL/L (ref 7–16)
BUN SERPL-MCNC: 21 MG/DL (ref 8–22)
CALCIUM SERPL-MCNC: 9.1 MG/DL (ref 8.5–10.5)
CHLORIDE SERPL-SCNC: 101 MMOL/L (ref 96–112)
CO2 SERPL-SCNC: 25 MMOL/L (ref 20–33)
CREAT SERPL-MCNC: 0.75 MG/DL (ref 0.5–1.4)
GLUCOSE SERPL-MCNC: 103 MG/DL (ref 65–99)
POTASSIUM SERPL-SCNC: 4.3 MMOL/L (ref 3.6–5.5)
SODIUM SERPL-SCNC: 137 MMOL/L (ref 135–145)
VANCOMYCIN TROUGH SERPL-MCNC: 13.9 UG/ML (ref 10–20)

## 2020-09-26 PROCEDURE — 36415 COLL VENOUS BLD VENIPUNCTURE: CPT

## 2020-09-26 PROCEDURE — 700105 HCHG RX REV CODE 258: Performed by: INTERNAL MEDICINE

## 2020-09-26 PROCEDURE — A9270 NON-COVERED ITEM OR SERVICE: HCPCS | Performed by: PHYSICIAN ASSISTANT

## 2020-09-26 PROCEDURE — 700102 HCHG RX REV CODE 250 W/ 637 OVERRIDE(OP): Performed by: PHYSICIAN ASSISTANT

## 2020-09-26 PROCEDURE — 770021 HCHG ROOM/CARE - ISO PRIVATE

## 2020-09-26 PROCEDURE — 700102 HCHG RX REV CODE 250 W/ 637 OVERRIDE(OP): Performed by: INTERNAL MEDICINE

## 2020-09-26 PROCEDURE — 99232 SBSQ HOSP IP/OBS MODERATE 35: CPT | Performed by: HOSPITALIST

## 2020-09-26 PROCEDURE — 700102 HCHG RX REV CODE 250 W/ 637 OVERRIDE(OP): Performed by: STUDENT IN AN ORGANIZED HEALTH CARE EDUCATION/TRAINING PROGRAM

## 2020-09-26 PROCEDURE — 80048 BASIC METABOLIC PNL TOTAL CA: CPT

## 2020-09-26 PROCEDURE — A9270 NON-COVERED ITEM OR SERVICE: HCPCS | Performed by: HOSPITALIST

## 2020-09-26 PROCEDURE — 80202 ASSAY OF VANCOMYCIN: CPT

## 2020-09-26 PROCEDURE — A9270 NON-COVERED ITEM OR SERVICE: HCPCS | Performed by: INTERNAL MEDICINE

## 2020-09-26 PROCEDURE — 700111 HCHG RX REV CODE 636 W/ 250 OVERRIDE (IP): Performed by: STUDENT IN AN ORGANIZED HEALTH CARE EDUCATION/TRAINING PROGRAM

## 2020-09-26 PROCEDURE — 700111 HCHG RX REV CODE 636 W/ 250 OVERRIDE (IP): Performed by: INTERNAL MEDICINE

## 2020-09-26 PROCEDURE — 700101 HCHG RX REV CODE 250: Performed by: INTERNAL MEDICINE

## 2020-09-26 PROCEDURE — 700102 HCHG RX REV CODE 250 W/ 637 OVERRIDE(OP): Performed by: HOSPITALIST

## 2020-09-26 PROCEDURE — A9270 NON-COVERED ITEM OR SERVICE: HCPCS | Performed by: STUDENT IN AN ORGANIZED HEALTH CARE EDUCATION/TRAINING PROGRAM

## 2020-09-26 RX ADMIN — HYDROMORPHONE HYDROCHLORIDE 2 MG: 2 TABLET ORAL at 19:08

## 2020-09-26 RX ADMIN — NICOTINE TRANSDERMAL SYSTEM 21 MG: 21 PATCH, EXTENDED RELEASE TRANSDERMAL at 05:27

## 2020-09-26 RX ADMIN — ACETAMINOPHEN 650 MG: 325 TABLET, FILM COATED ORAL at 12:55

## 2020-09-26 RX ADMIN — HEPARIN SODIUM 5000 UNITS: 5000 INJECTION, SOLUTION INTRAVENOUS; SUBCUTANEOUS at 22:10

## 2020-09-26 RX ADMIN — HYDROMORPHONE HYDROCHLORIDE 2 MG: 2 TABLET ORAL at 05:27

## 2020-09-26 RX ADMIN — HYDROMORPHONE HYDROCHLORIDE 2 MG: 2 TABLET ORAL at 22:10

## 2020-09-26 RX ADMIN — DOCUSATE SODIUM 50 MG AND SENNOSIDES 8.6 MG 2 TABLET: 8.6; 5 TABLET, FILM COATED ORAL at 16:08

## 2020-09-26 RX ADMIN — PREGABALIN 150 MG: 150 CAPSULE ORAL at 12:55

## 2020-09-26 RX ADMIN — HYDROMORPHONE HYDROCHLORIDE 2 MG: 2 TABLET ORAL at 02:38

## 2020-09-26 RX ADMIN — HYDROMORPHONE HYDROCHLORIDE 2 MG: 2 TABLET ORAL at 16:08

## 2020-09-26 RX ADMIN — DOCUSATE SODIUM 100 MG: 100 CAPSULE ORAL at 16:08

## 2020-09-26 RX ADMIN — VANCOMYCIN HYDROCHLORIDE 1500 MG: 500 INJECTION, POWDER, LYOPHILIZED, FOR SOLUTION INTRAVENOUS at 16:13

## 2020-09-26 RX ADMIN — PREGABALIN 150 MG: 150 CAPSULE ORAL at 16:08

## 2020-09-26 RX ADMIN — HEPARIN SODIUM 5000 UNITS: 5000 INJECTION, SOLUTION INTRAVENOUS; SUBCUTANEOUS at 05:27

## 2020-09-26 RX ADMIN — ACETAMINOPHEN 650 MG: 325 TABLET, FILM COATED ORAL at 16:08

## 2020-09-26 RX ADMIN — HYDROMORPHONE HYDROCHLORIDE 2 MG: 2 TABLET ORAL at 12:55

## 2020-09-26 RX ADMIN — HYDROMORPHONE HYDROCHLORIDE 2 MG: 2 TABLET ORAL at 09:38

## 2020-09-26 RX ADMIN — HEPARIN SODIUM 5000 UNITS: 5000 INJECTION, SOLUTION INTRAVENOUS; SUBCUTANEOUS at 13:58

## 2020-09-26 RX ADMIN — VANCOMYCIN HYDROCHLORIDE 1500 MG: 500 INJECTION, POWDER, LYOPHILIZED, FOR SOLUTION INTRAVENOUS at 05:00

## 2020-09-26 RX ADMIN — POLYETHYLENE GLYCOL 3350 1 PACKET: 17 POWDER, FOR SOLUTION ORAL at 05:27

## 2020-09-26 RX ADMIN — ACETAMINOPHEN 650 MG: 325 TABLET, FILM COATED ORAL at 05:27

## 2020-09-26 RX ADMIN — PREGABALIN 150 MG: 150 CAPSULE ORAL at 05:27

## 2020-09-26 RX ADMIN — DOCUSATE SODIUM 100 MG: 100 CAPSULE ORAL at 05:27

## 2020-09-26 ASSESSMENT — PATIENT HEALTH QUESTIONNAIRE - PHQ9
1. LITTLE INTEREST OR PLEASURE IN DOING THINGS: NOT AT ALL
2. FEELING DOWN, DEPRESSED, IRRITABLE, OR HOPELESS: NOT AT ALL
SUM OF ALL RESPONSES TO PHQ9 QUESTIONS 1 AND 2: 0

## 2020-09-26 ASSESSMENT — PAIN DESCRIPTION - PAIN TYPE
TYPE: ACUTE PAIN;SURGICAL PAIN

## 2020-09-26 ASSESSMENT — ENCOUNTER SYMPTOMS
EYE DISCHARGE: 0
EYE REDNESS: 0
VOMITING: 0
SEIZURES: 0
HEMOPTYSIS: 0
ABDOMINAL PAIN: 0
COUGH: 0
FLANK PAIN: 0
BACK PAIN: 1
FEVER: 0
DEPRESSION: 1
STRIDOR: 0

## 2020-09-26 NOTE — PROGRESS NOTES
"Pharmacy Kinetics 60 y.o. male on vancomycin day # 6 2020    Currently on Vancomycin 1500 mg iv q12hr (0400 & 1600)     Indication for Treatment: Left elbow pyogenic arthritis/SSTI    Proposed duration of treatment: 2 weeks of IV, followed by 4 weeks of oral antibiotics per ID      Pertinent history per medical record: Admitted on 2020 for Admitted on 2020 for multiple abscesses on his upper and lower extremity.  He has a 4 mm ulcer with serosanguineous drainage on his left lateral forearm. S/P irrigation and debridement of left antecubital fossa and superficial right thigh wound  on  with a wound vac on both the left elbow and right thigh. He has a history of IV drug abuse, MSSA cellulitis, right lower extremity necrotizing fasciitis s/p fasciotomy in 2020. ID is consulting on the case.     Other antibiotics: None    Allergies: Codeine, Spinach, and Pcn [penicillins]     List concerns for renal function: BUN/SCr ratio > 20:1 (~28) & Obesity (BMI 31.8)    Pertinent cultures to date:   9/15 Left elbow wound w/ GAS and MRSA   BC x2 NGTD   Right hip wound w/ GAS    Recent Labs     20  0659   WBC 5.7     Recent Labs     20  0659 20  1110 20  0317   BUN 19 18 21   CREATININE 0.63 0.71 0.75     Recent Labs     20  1601 20  0317   VANCOTROUGH 11.1 13.9       Intake/Output Summary (Last 24 hours) at 2020 0739  Last data filed at 2020 1730  Gross per 24 hour   Intake 720 ml   Output --   Net 720 ml      BP (!) 99/65   Pulse 70   Temp 36.6 °C (97.8 °F) (Temporal)   Resp 18   Ht 1.93 m (6' 4\")   Wt 118.5 kg (261 lb 3.9 oz)   SpO2 92%  Temp (24hrs), Av.6 °C (97.9 °F), Min:36.4 °C (97.6 °F), Max:36.7 °C (98 °F)      A/P   1. Vancomycin dose change: continue Vancomycin 1500 mg Q 12 hr (13 mg/kg)   2. Next vancomycin level:  at 0330 (if SCr doesn't improve)  3. Goal trough: 10-15 mcg/ml  4. Patient has a peripheral line  5. Comments: " Trough of 13.9mcg/mL was drawn appropriately (~11.5hrs) and therapeutic. Patient's renal function has been slowly decreasing the past couple of days and will continue to watch closely since it has improved before. BMP ordered for tomorrow morning. Pharmacy will continue to follow and make appropriate adjustments based on renal indices, trough levels, and other patient factors influencing the dosing of vancomycin.    Mehnaz Ventura, PharmD  T: [phone]

## 2020-09-26 NOTE — PROGRESS NOTES
Hospital Medicine Daily Progress Note    Date of Service  9/26/2020    Chief Complaint  Wound pain, drainage    Hospital Course  60 y.o. male with a past medical history of previous MRSA cellulitis, intravenous drug use, homelessness, Hx right lower extremity necrotizing fasciitis s/p fasciotomy in January 2020.  Admitted 9/14/2020 with multiple abscesses on upper and lower extremity.  He has a 4 mm ulcer with serosanguineous drainage on his left lateral forearm.   Vancomycin and Rocephin was started.  Orthopedic surgery were consulted, S/P irrigation and debridement of left antecubital fossa and superficial right thigh wound in the OR. Right hip wound culture grew beta-hemolytic group B strep. Transthoracic Echo performed to r/o endocarditis was performed and was negative for vegetations, EF 65 with mild tricuspid regurg. RV systolic pressure of 25mmHg.     Interval Problem Update  Hemodynamically stable overnight, saturating well on room air this morning.  Tolerating antibiotics, needs 2 weeks of IV vancomycin, to be followed by 4 weeks of orals according to ID.   Creatinine 0.75, continue to monitor renal function as long dose nephrotoxic medications.  Cleared for discharge for SNF on IV antibiotics   No SNF takers so far given the history of IV drug use     Consultants/Specialty  Orthopedic surgery  ID Dr. Ritchie Kwong    Code Status  DNAR/DNI    Disposition  Cleared for discharge for SNF on IV antibiotics, being a drug user may be a barrier for acceptance.     Needs 2 weeks of IV, followed by 4 weeks of orals       Review of Systems  Review of Systems   Constitutional: Negative for fever.   Eyes: Negative for discharge and redness.   Respiratory: Negative for cough, hemoptysis and stridor.    Cardiovascular: Negative for chest pain.   Gastrointestinal: Negative for abdominal pain and vomiting.   Genitourinary: Negative for flank pain and hematuria.   Musculoskeletal: Positive for back pain.   Skin: Negative  for itching.   Neurological: Negative for seizures.   Psychiatric/Behavioral: Positive for depression.        Chronic depression      Physical Exam  Temp:  [36.4 °C (97.6 °F)-36.7 °C (98 °F)] 36.4 °C (97.6 °F)  Pulse:  [70-77] 77  Resp:  [16-18] 16  BP: ()/(44-76) 113/76  SpO2:  [91 %-96 %] 95 %    Physical Exam  HENT:      Head: Normocephalic.      Nose: Nose normal.      Mouth/Throat:      Mouth: Mucous membranes are moist.   Neck:      Musculoskeletal: Normal range of motion.   Cardiovascular:      Rate and Rhythm: Normal rate.   Pulmonary:      Effort: Pulmonary effort is normal.   Abdominal:      Palpations: Abdomen is soft.   Musculoskeletal: Normal range of motion.   Skin:     Comments: Left elbow s/p I&D wound with wound vac in place, no bleeding or erythema noted.     Neurological:      General: No focal deficit present.      Mental Status: He is alert.   Psychiatric:         Mood and Affect: Mood normal.       Fluids    Intake/Output Summary (Last 24 hours) at 9/26/2020 1243  Last data filed at 9/26/2020 0800  Gross per 24 hour   Intake 640 ml   Output --   Net 640 ml     Laboratory  Recent Labs     09/24/20  0659   WBC 5.7   RBC 4.68*   HEMOGLOBIN 13.7*   HEMATOCRIT 41.3*   MCV 88.2   MCH 29.3   MCHC 33.2*   RDW 51.0*   PLATELETCT 207   MPV 9.5     Recent Labs     09/24/20  0659 09/25/20  1110 09/26/20  0317   SODIUM 135 135 137   POTASSIUM 4.3 4.2 4.3   CHLORIDE 100 101 101   CO2 24 19* 25   GLUCOSE 119* 112* 103*   BUN 19 18 21   CREATININE 0.63 0.71 0.75   CALCIUM 9.0 9.0 9.1                   Imaging  IR-MIDLINE CATHETER INSERTION WO GUIDANCE > AGE 3   Final Result                  Ultrasound-guided midline placement performed by qualified nursing staff    as above.          EC-ECHOCARDIOGRAM COMPLETE W/O CONT   Final Result      DX-ELBOW-COMPLETE 3+ LEFT   Final Result      Soft tissue swelling without acute osseous abnormality.      DX-FEMUR-2+ RIGHT   Final Result      No acute osseous  abnormality.         Assessment/Plan  Leg wound, right- (present on admission)  Assessment & Plan  Underwent irrigation and debridement of right wound.    On vanco per ID recs   [Need 2 weeks of IV vancomycin, to be followed by 4 weeks of orals]   Cleared for discharge for SNF on IV antibiotics   No SNF takers so far given the history of IV drug use      Pyogenic arthritis of left elbow (HCC)- (present on admission)  Assessment & Plan  Ortho consulted   Seen by ID, Dr. Kwong > IV vancomycin, 2 weeks of IV, followed by 4 weeks of orals         Murmur- (present on admission)  Assessment & Plan  History of IV drug abuse with concern for bacteremia and endocarditis.    BCx neg, TTE neg      IV drug abuse (HCC)- (present on admission)  Assessment & Plan  Patient denies current use   This may be a barrier for acceptance in SNF    Try to avoid IV narcotics as possible     Homeless  Assessment & Plan  Social work and case management to assist in discharge planning     VTE prophylaxis: heparin SC

## 2020-09-26 NOTE — PROGRESS NOTES
"   Orthopaedic Progress Note    Interval changes:  Patient doing well   Dressings CDI next change by ortho team start of this coming week  Not cleared for DC by ortho   Patient to stay in house till healed per Juan    ROS - Patient denies any new issues.  Pain well controlled.    /44   Pulse 70   Temp 36.7 °C (98 °F) (Temporal)   Resp 17   Ht 1.93 m (6' 4\")   Wt 118.5 kg (261 lb 3.9 oz)   SpO2 96%       Patient seen and examined  No acute distress  Breathing non labored  RRR  BLE dressings CDI, DNVI, moves all toes, cap refill <2 sec.  LUE dressing CDI, DNVI, moves all fingers, cap refill <2 sec.     Recent Labs     09/24/20  0659   WBC 5.7   RBC 4.68*   HEMOGLOBIN 13.7*   HEMATOCRIT 41.3*   MCV 88.2   MCH 29.3   MCHC 33.2*   RDW 51.0*   PLATELETCT 207   MPV 9.5       Active Hospital Problems    Diagnosis   • Leg wound, right [S81.801A]     Priority: High   • Pyogenic arthritis of left elbow (HCC) [M00.9]     Priority: High     No crepitus no gas in the tissues,   history of MRSA, orthopedic surgery consulted for OR in the a.m.  Empiric vancomycin and Rocephin ordered.  Symptomatic management     • Murmur [R01.1]     Priority: Medium   • IV drug abuse (HCC) [F19.10]     Priority: Medium       Assessment/Plan:  Dressings to be left in place   Not cleared for DC by ortho  POD#8 S/P:  1.  Irrigation and debridement of superficial skin, fascia, and muscle, right thigh.    2.  Split-thickness skin graft from left thigh to right thigh.    3.  Application of ACell MicroMatrix right thigh skin graft.    4.  Irrigation and debridement, left arm skin, subcutaneous tissue, fascia, and bone.    5.  Application of ACell MicroMatrix and Gentrix graft.    6.  Application of wound VAC, right thigh.    Wt bearing status - WBAT  Wound care/Drains - dressings left in place  Future Procedures - none planned   Sutures/Staples out- 10-14 days post operatively  PT/OT-initiated  Antibiotics: vanco 1500mg iv Q12  DVT " Prophylaxis- TEDS/SCDs/Foot pumps/heparin  [place]-none  Case Coordination for Discharge Planning - Disposition SNF

## 2020-09-26 NOTE — CARE PLAN
Problem: Communication  Goal: The ability to communicate needs accurately and effectively will improve  Outcome: PROGRESSING AS EXPECTED  Patient encouraged to communicate needs to staff as needed. Call light is within reach. Patient is A+O x 4 and verbalized understanding.       Problem: Infection  Goal: Will remain free from infection  Outcome: PROGRESSING AS EXPECTED  Patient receiving IV Vancomycin.

## 2020-09-26 NOTE — PROGRESS NOTES
Ortho PA Shane at bedside to assess R thigh wound and L elbow wound. Shane replaced the dressings with Xeroform (yellow) gauze, sterile 4x4 gauze, roll gauze & Ace wrap. Verbal order to leave both dressings in place & he will update in Orders.

## 2020-09-26 NOTE — CARE PLAN
Problem: Safety  Goal: Will remain free from injury  Outcome: PROGRESSING AS EXPECTED  Patient is a no fall risk. Safety precautions are in place including bed locked and in lowest position, upper bed rails up, bed alarm on, call light within reach, treaded socks on, tray table and personal belongings within reach.      Problem: Mobility  Goal: Risk for activity intolerance will decrease  Outcome: PROGRESSING AS EXPECTED  Patient ambulates often within room and to/from bathroom.

## 2020-09-26 NOTE — PROGRESS NOTES
"   Orthopaedic Progress Note    Interval changes:  Call received from RN about graft site dressing/ace not staying in place and graft exposed.   Proximal graft site found exposed and desiccated with dressing partially falling off  Attempt to place new dressing revealed likely failure of STSG  Case discussed with Dr Juan CAMPA - Patient denies any new issues except per above.  Pain well controlled.    /76   Pulse 77   Temp 36.4 °C (97.6 °F) (Temporal)   Resp 16   Ht 1.93 m (6' 4\")   Wt 118.5 kg (261 lb 3.9 oz)   SpO2 95%       Patient seen and examined  No acute distress  Breathing non labored  RRR  RLE dressings failing and falling off, R thigh STSG desicated and necrosed and not adhered, new dressings placed. BLE DNVI, moves all toes, cap refill <2 sec.  LUE dressing CDI, DNVI, moves all fingers, cap refill <2 sec.     Recent Labs     09/24/20  0659   WBC 5.7   RBC 4.68*   HEMOGLOBIN 13.7*   HEMATOCRIT 41.3*   MCV 88.2   MCH 29.3   MCHC 33.2*   RDW 51.0*   PLATELETCT 207   MPV 9.5       Active Hospital Problems    Diagnosis   • Leg wound, right [S81.801A]     Priority: High   • Pyogenic arthritis of left elbow (HCC) [M00.9]     Priority: High     No crepitus no gas in the tissues,   history of MRSA, orthopedic surgery consulted for OR in the a.m.  Empiric vancomycin and Rocephin ordered.  Symptomatic management     • Murmur [R01.1]     Priority: Medium   • IV drug abuse (HCC) [F19.10]     Priority: Medium       Assessment/Plan:  Dressings to be left in place   Not cleared for DC by ortho  POD#9 S/P:  1.  Irrigation and debridement of superficial skin, fascia, and muscle, right thigh.    2.  Split-thickness skin graft from left thigh to right thigh.    3.  Application of ACell MicroMatrix right thigh skin graft.    4.  Irrigation and debridement, left arm skin, subcutaneous tissue, fascia, and bone.    5.  Application of ACell MicroMatrix and Gentrix graft.    6.  Application of wound VAC, right thigh.  "   Wt bearing status - WBAT  Wound care/Drains - dressings left in place  Future Procedures - none planned   Sutures/Staples out- 10-14 days post operatively  PT/OT-initiated  Antibiotics: vanco 1500mg iv Q12  DVT Prophylaxis- TEDS/SCDs/Foot pumps/heparin  [place]-none  Case Coordination for Discharge Planning - Disposition SNF

## 2020-09-27 PROCEDURE — 700105 HCHG RX REV CODE 258: Performed by: FAMILY MEDICINE

## 2020-09-27 PROCEDURE — 700102 HCHG RX REV CODE 250 W/ 637 OVERRIDE(OP): Performed by: INTERNAL MEDICINE

## 2020-09-27 PROCEDURE — 700101 HCHG RX REV CODE 250: Performed by: INTERNAL MEDICINE

## 2020-09-27 PROCEDURE — 700111 HCHG RX REV CODE 636 W/ 250 OVERRIDE (IP): Performed by: INTERNAL MEDICINE

## 2020-09-27 PROCEDURE — 700102 HCHG RX REV CODE 250 W/ 637 OVERRIDE(OP): Performed by: HOSPITALIST

## 2020-09-27 PROCEDURE — A9270 NON-COVERED ITEM OR SERVICE: HCPCS | Performed by: INTERNAL MEDICINE

## 2020-09-27 PROCEDURE — 700102 HCHG RX REV CODE 250 W/ 637 OVERRIDE(OP): Performed by: PHYSICIAN ASSISTANT

## 2020-09-27 PROCEDURE — 99231 SBSQ HOSP IP/OBS SF/LOW 25: CPT | Performed by: FAMILY MEDICINE

## 2020-09-27 PROCEDURE — A9270 NON-COVERED ITEM OR SERVICE: HCPCS | Performed by: STUDENT IN AN ORGANIZED HEALTH CARE EDUCATION/TRAINING PROGRAM

## 2020-09-27 PROCEDURE — A9270 NON-COVERED ITEM OR SERVICE: HCPCS | Performed by: PHYSICIAN ASSISTANT

## 2020-09-27 PROCEDURE — 700102 HCHG RX REV CODE 250 W/ 637 OVERRIDE(OP): Performed by: STUDENT IN AN ORGANIZED HEALTH CARE EDUCATION/TRAINING PROGRAM

## 2020-09-27 PROCEDURE — A9270 NON-COVERED ITEM OR SERVICE: HCPCS | Performed by: HOSPITALIST

## 2020-09-27 PROCEDURE — 700111 HCHG RX REV CODE 636 W/ 250 OVERRIDE (IP): Performed by: STUDENT IN AN ORGANIZED HEALTH CARE EDUCATION/TRAINING PROGRAM

## 2020-09-27 PROCEDURE — 700111 HCHG RX REV CODE 636 W/ 250 OVERRIDE (IP): Performed by: FAMILY MEDICINE

## 2020-09-27 PROCEDURE — 770021 HCHG ROOM/CARE - ISO PRIVATE

## 2020-09-27 PROCEDURE — 700105 HCHG RX REV CODE 258: Performed by: INTERNAL MEDICINE

## 2020-09-27 RX ADMIN — DOCUSATE SODIUM 50 MG AND SENNOSIDES 8.6 MG 2 TABLET: 8.6; 5 TABLET, FILM COATED ORAL at 16:03

## 2020-09-27 RX ADMIN — HEPARIN SODIUM 5000 UNITS: 5000 INJECTION, SOLUTION INTRAVENOUS; SUBCUTANEOUS at 04:29

## 2020-09-27 RX ADMIN — HYDROMORPHONE HYDROCHLORIDE 2 MG: 2 TABLET ORAL at 04:29

## 2020-09-27 RX ADMIN — PREGABALIN 150 MG: 150 CAPSULE ORAL at 04:29

## 2020-09-27 RX ADMIN — POLYETHYLENE GLYCOL 3350 1 PACKET: 17 POWDER, FOR SOLUTION ORAL at 04:29

## 2020-09-27 RX ADMIN — HYDROMORPHONE HYDROCHLORIDE 2 MG: 2 TABLET ORAL at 16:03

## 2020-09-27 RX ADMIN — DIPHENHYDRAMINE HYDROCHLORIDE 25 MG: 25 TABLET ORAL at 01:28

## 2020-09-27 RX ADMIN — VANCOMYCIN HYDROCHLORIDE 1500 MG: 500 INJECTION, POWDER, LYOPHILIZED, FOR SOLUTION INTRAVENOUS at 04:28

## 2020-09-27 RX ADMIN — HEPARIN SODIUM 5000 UNITS: 5000 INJECTION, SOLUTION INTRAVENOUS; SUBCUTANEOUS at 14:09

## 2020-09-27 RX ADMIN — ACETAMINOPHEN 650 MG: 325 TABLET, FILM COATED ORAL at 16:03

## 2020-09-27 RX ADMIN — HYDROMORPHONE HYDROCHLORIDE 2 MG: 2 TABLET ORAL at 19:08

## 2020-09-27 RX ADMIN — HYDROMORPHONE HYDROCHLORIDE 2 MG: 2 TABLET ORAL at 11:35

## 2020-09-27 RX ADMIN — PREGABALIN 150 MG: 150 CAPSULE ORAL at 11:35

## 2020-09-27 RX ADMIN — HEPARIN SODIUM 5000 UNITS: 5000 INJECTION, SOLUTION INTRAVENOUS; SUBCUTANEOUS at 22:18

## 2020-09-27 RX ADMIN — HYDROMORPHONE HYDROCHLORIDE 2 MG: 2 TABLET ORAL at 01:26

## 2020-09-27 RX ADMIN — ACETAMINOPHEN 650 MG: 325 TABLET, FILM COATED ORAL at 11:35

## 2020-09-27 RX ADMIN — VANCOMYCIN HYDROCHLORIDE 1500 MG: 500 INJECTION, POWDER, LYOPHILIZED, FOR SOLUTION INTRAVENOUS at 17:04

## 2020-09-27 RX ADMIN — NICOTINE TRANSDERMAL SYSTEM 21 MG: 21 PATCH, EXTENDED RELEASE TRANSDERMAL at 04:29

## 2020-09-27 RX ADMIN — DOCUSATE SODIUM 100 MG: 100 CAPSULE ORAL at 04:29

## 2020-09-27 RX ADMIN — HYDROMORPHONE HYDROCHLORIDE 2 MG: 2 TABLET ORAL at 08:31

## 2020-09-27 RX ADMIN — ACETAMINOPHEN 650 MG: 325 TABLET, FILM COATED ORAL at 04:28

## 2020-09-27 RX ADMIN — PREGABALIN 150 MG: 150 CAPSULE ORAL at 16:03

## 2020-09-27 RX ADMIN — HYDROMORPHONE HYDROCHLORIDE 2 MG: 2 TABLET ORAL at 22:17

## 2020-09-27 RX ADMIN — DOCUSATE SODIUM 100 MG: 100 CAPSULE ORAL at 16:03

## 2020-09-27 ASSESSMENT — ENCOUNTER SYMPTOMS
DEPRESSION: 1
FEVER: 0
ABDOMINAL PAIN: 0
BACK PAIN: 1
STRIDOR: 0
BLURRED VISION: 0
EYE DISCHARGE: 0
CHILLS: 0
SEIZURES: 0
NAUSEA: 0
PALPITATIONS: 0
VOMITING: 0
COUGH: 0

## 2020-09-27 ASSESSMENT — PAIN DESCRIPTION - PAIN TYPE
TYPE: ACUTE PAIN;SURGICAL PAIN

## 2020-09-27 NOTE — PROGRESS NOTES
Mobility Progress Note    Surgery patient: yes  Date of surgery: 9/15/20 and 9/17/20  Ambulated 50 ft on day of surgery? (N/A if patient did not undergo surgery today): n/a  Number of times ambulated 50 feet or greater today: 3  Patient has been up to chair, edge of bed or HOB 90 degrees for all meals?: yes  Goal met? (goal is ambulating at least 50 feet 2 times on day shift, one time on night shift): yes  If patient did not meet mobility goal, why?: n/a, pt meet goals

## 2020-09-27 NOTE — CARE PLAN
Problem: Communication  Goal: The ability to communicate needs accurately and effectively will improve  Outcome: PROGRESSING AS EXPECTED   The pt communicates needs and concerns to staff.   Problem: Knowledge Deficit  Goal: Knowledge of disease process/condition, treatment plan, diagnostic tests, and medications will improve  Outcome: PROGRESSING AS EXPECTED   The pt is updated on his plan of care, treatments and medications.   Problem: Pain Management  Goal: Pain level will decrease to patient's comfort goal  Outcome: PROGRESSING AS EXPECTED   Pt's pain is being managed well by medications.

## 2020-09-27 NOTE — PROGRESS NOTES
"   Orthopaedic Progress Note    Interval changes:  Dressings CDI    ROS - Patient denies any new issues except per above.  Pain well controlled.    /70   Pulse 74   Temp 36.4 °C (97.5 °F) (Temporal)   Resp 16   Ht 1.93 m (6' 4\")   Wt 118.5 kg (261 lb 3.9 oz)   SpO2 93%       Patient seen and examined  No acute distress  Breathing non labored  RRR  RLE dressings CDI. BLE DNVI, moves all toes, cap refill <2 sec.  LUE dressing CDI, DNVI, moves all fingers, cap refill <2 sec.           Active Hospital Problems    Diagnosis   • Leg wound, right [S81.801A]     Priority: High   • Pyogenic arthritis of left elbow (HCC) [M00.9]     Priority: High     No crepitus no gas in the tissues,   history of MRSA, orthopedic surgery consulted for OR in the a.m.  Empiric vancomycin and Rocephin ordered.  Symptomatic management     • Murmur [R01.1]     Priority: Medium   • IV drug abuse (HCC) [F19.10]     Priority: Medium       Assessment/Plan:  Dressings to be left in place   Not cleared for DC by ortho  POD#10 S/P:  1.  Irrigation and debridement of superficial skin, fascia, and muscle, right thigh.    2.  Split-thickness skin graft from left thigh to right thigh.    3.  Application of ACell MicroMatrix right thigh skin graft.    4.  Irrigation and debridement, left arm skin, subcutaneous tissue, fascia, and bone.    5.  Application of ACell MicroMatrix and Gentrix graft.    6.  Application of wound VAC, right thigh.    Wt bearing status - WBAT  Wound care/Drains - dressings left in place  Future Procedures - none planned   Sutures/Staples out- 10-14 days post operatively  PT/OT-initiated  Antibiotics: vanco 1500mg iv Q12  DVT Prophylaxis- TEDS/SCDs/Foot pumps/heparin  [place]-none  Case Coordination for Discharge Planning - Disposition SNF   "

## 2020-09-27 NOTE — PROGRESS NOTES
Called VAT FERNANDO Oropeza to request midline dressing change. He states he will change it today. This RN reinforced the dressing with 2 small Tegaderm dressings in the interim.

## 2020-09-27 NOTE — PROGRESS NOTES
"Pharmacy Kinetics 60 y.o. male on vancomycin day # 7 2020    Currently on Vancomycin 1500 mg iv q12hr (0400 & 1600)    Indication for Treatment: Left elbow pyogenic arthritis/SSTI    Proposed duration of treatment: 2 weeks of IV, followed by 4 weeks of oral antibiotics per ID      Pertinent history per medical record: Admitted on 2020 for multiple abscesses on his upper and lower extremity.  He has a 4 mm ulcer with serosanguineous drainage on his left lateral forearm. S/P irrigation and debridement of left antecubital fossa and superficial right thigh wound  on  with a wound vac on both the left elbow and right thigh. He has a history of IV drug abuse, MSSA cellulitis, right lower extremity necrotizing fasciitis s/p fasciotomy in 2020. ID is consulting on the case.     Other antibiotics: None    Allergies: Codeine, Spinach, and Pcn [penicillins]     List concerns for renal function: BUN/SCr ratio > 20:1 (~28) & Obesity (BMI 31.8)    Pertinent cultures to date:   9/15 Left elbow wound w/ GAS and MRSA   BC x2 NGTD   Right hip wound w/ GAS    No results for input(s): WBC, NEUTSPOLYS, BANDSSTABS in the last 72 hours.  Recent Labs     20  1110 20  0317   BUN 18 21   CREATININE 0.71 0.75     Recent Labs     20  0317   VANCOTROUGH 13.9       Intake/Output Summary (Last 24 hours) at 2020 0751  Last data filed at 2020 2045  Gross per 24 hour   Intake 400 ml   Output 300 ml   Net 100 ml      /51   Pulse 68   Temp 36.6 °C (97.8 °F) (Temporal)   Resp 18   Ht 1.93 m (6' 4\")   Wt 118.5 kg (261 lb 3.9 oz)   SpO2 96%  Temp (24hrs), Av.6 °C (97.8 °F), Min:36.4 °C (97.6 °F), Max:36.6 °C (97.9 °F)      A/P   1. Vancomycin dose change: continue Vancomycin 1500 mg Q 12 hr (13 mg/kg)  2. Next vancomycin level:  at 0330  3. Goal trough: 10-15 mcg/ml  4. Patient has a peripheral line  5. Comments: Will continue to watch renal function closely. Historical dosing " noted and dosing was done lower than nomagram d/t past vancomycin history. Pharmacy will continue to follow and make appropriate adjustments based on renal indices, trough levels, and other patient factors influencing the dosing of vancomycin.    Mehnaz Ventura, PharmD  T: [phone]

## 2020-09-27 NOTE — PROGRESS NOTES
Hospital Medicine Daily Progress Note    Date of Service  9/27/2020    Chief Complaint  Wound pain, drainage    Hospital Course  60 y.o. male with a past medical history of previous MRSA cellulitis, intravenous drug use, homelessness, Hx right lower extremity necrotizing fasciitis s/p fasciotomy in January 2020.  Admitted 9/14/2020 with multiple abscesses on upper and lower extremity.  He has a 4 mm ulcer with serosanguineous drainage on his left lateral forearm.   Vancomycin and Rocephin was started.  Orthopedic surgery were consulted, S/P irrigation and debridement of left antecubital fossa and superficial right thigh wound in the OR. Right hip wound culture grew beta-hemolytic group B strep. Transthoracic Echo performed to r/o endocarditis was performed and was negative for vegetations, EF 65 with mild tricuspid regurg. RV systolic pressure of 25mmHg.     Interval Problem Update  Hemodynamically stable overnight, saturating well on room air this morning.  Tolerating antibiotics, needs 2 weeks of IV vancomycin, to be followed by 4 weeks of orals according to ID.   Creatinine 0.75, continue to monitor renal function as long dose nephrotoxic medications.  Cleared for discharge for SNF on IV antibiotics   No SNF takers so far given the history of IV drug use   9/27: Resting in bed comfortably. No acute distress. Afebrile. No issues overnight per staff.   Consultants/Specialty  Orthopedic surgery  ID Dr. Ritchie Kwong    Code Status  DNAR/DNI    Disposition  Cleared for discharge for SNF on IV antibiotics, being a drug user may be a barrier for acceptance.     Needs 2 weeks of IV, followed by 4 weeks of orals       Review of Systems  Review of Systems   Constitutional: Negative for chills and fever.   Eyes: Negative for blurred vision and discharge.   Respiratory: Negative for cough and stridor.    Cardiovascular: Negative for chest pain and palpitations.   Gastrointestinal: Negative for abdominal pain, nausea and  vomiting.   Genitourinary: Negative for dysuria and hematuria.   Musculoskeletal: Positive for back pain.   Skin: Negative for itching.   Neurological: Negative for seizures.   Psychiatric/Behavioral: Positive for depression.        Chronic depression      Physical Exam  Temp:  [36.4 °C (97.5 °F)-36.6 °C (97.9 °F)] 36.4 °C (97.5 °F)  Pulse:  [68-75] 74  Resp:  [16-18] 16  BP: (100-117)/(51-70) 117/70  SpO2:  [90 %-96 %] 93 %    Physical Exam  Constitutional:       General: He is not in acute distress.  HENT:      Nose: Nose normal.      Mouth/Throat:      Mouth: Mucous membranes are moist.   Eyes:      Extraocular Movements: Extraocular movements intact.      Pupils: Pupils are equal, round, and reactive to light.   Neck:      Musculoskeletal: Normal range of motion.   Cardiovascular:      Rate and Rhythm: Normal rate.   Pulmonary:      Effort: Pulmonary effort is normal. No respiratory distress.   Abdominal:      Palpations: Abdomen is soft.   Musculoskeletal: Normal range of motion.   Skin:     Comments: Left elbow dressing s/p I&D C/D/I   Neurological:      General: No focal deficit present.      Mental Status: He is alert.   Psychiatric:         Mood and Affect: Mood normal.       Fluids    Intake/Output Summary (Last 24 hours) at 9/27/2020 1354  Last data filed at 9/27/2020 1200  Gross per 24 hour   Intake 800 ml   Output 300 ml   Net 500 ml     Laboratory      Recent Labs     09/25/20  1110 09/26/20  0317   SODIUM 135 137   POTASSIUM 4.2 4.3   CHLORIDE 101 101   CO2 19* 25   GLUCOSE 112* 103*   BUN 18 21   CREATININE 0.71 0.75   CALCIUM 9.0 9.1                   Imaging  IR-MIDLINE CATHETER INSERTION WO GUIDANCE > AGE 3   Final Result                  Ultrasound-guided midline placement performed by qualified nursing staff    as above.          EC-ECHOCARDIOGRAM COMPLETE W/O CONT   Final Result      DX-ELBOW-COMPLETE 3+ LEFT   Final Result      Soft tissue swelling without acute osseous abnormality.       DX-FEMUR-2+ RIGHT   Final Result      No acute osseous abnormality.         Assessment/Plan  Leg wound, right- (present on admission)  Assessment & Plan  Underwent irrigation and debridement of right wound.    On vanco per ID recs   [Need 2 weeks of IV vancomycin, to be followed by 4 weeks of orals]   Cleared for discharge for SNF on IV antibiotics   No SNF takers so far given the history of IV drug use      Pyogenic arthritis of left elbow (HCC)- (present on admission)  Assessment & Plan  Ortho consulted   Seen by ID, Dr. Kwong > IV vancomycin, 2 weeks of IV, followed by 4 weeks of orals         Murmur- (present on admission)  Assessment & Plan  History of IV drug abuse with concern for bacteremia and endocarditis.    BCx neg, TTE neg      IV drug abuse (HCC)- (present on admission)  Assessment & Plan  Patient denies current use   This may be a barrier for acceptance in SNF    Try to avoid IV narcotics as possible   Not a candidate for outpatient abx infusion     Homeless  Assessment & Plan  Social work and case management to assist in discharge planning     VTE prophylaxis: heparin SC

## 2020-09-28 LAB
ANION GAP SERPL CALC-SCNC: 10 MMOL/L (ref 7–16)
BUN SERPL-MCNC: 23 MG/DL (ref 8–22)
CALCIUM SERPL-MCNC: 9 MG/DL (ref 8.5–10.5)
CHLORIDE SERPL-SCNC: 97 MMOL/L (ref 96–112)
CO2 SERPL-SCNC: 24 MMOL/L (ref 20–33)
CREAT SERPL-MCNC: 0.87 MG/DL (ref 0.5–1.4)
GLUCOSE SERPL-MCNC: 110 MG/DL (ref 65–99)
POTASSIUM SERPL-SCNC: 4 MMOL/L (ref 3.6–5.5)
SODIUM SERPL-SCNC: 131 MMOL/L (ref 135–145)
VANCOMYCIN TROUGH SERPL-MCNC: 10.1 UG/ML (ref 10–20)

## 2020-09-28 PROCEDURE — A9270 NON-COVERED ITEM OR SERVICE: HCPCS | Performed by: STUDENT IN AN ORGANIZED HEALTH CARE EDUCATION/TRAINING PROGRAM

## 2020-09-28 PROCEDURE — 700105 HCHG RX REV CODE 258: Performed by: FAMILY MEDICINE

## 2020-09-28 PROCEDURE — A9270 NON-COVERED ITEM OR SERVICE: HCPCS | Performed by: INTERNAL MEDICINE

## 2020-09-28 PROCEDURE — 99232 SBSQ HOSP IP/OBS MODERATE 35: CPT | Performed by: HOSPITALIST

## 2020-09-28 PROCEDURE — A9270 NON-COVERED ITEM OR SERVICE: HCPCS | Performed by: HOSPITALIST

## 2020-09-28 PROCEDURE — 700111 HCHG RX REV CODE 636 W/ 250 OVERRIDE (IP): Performed by: FAMILY MEDICINE

## 2020-09-28 PROCEDURE — 700102 HCHG RX REV CODE 250 W/ 637 OVERRIDE(OP): Performed by: STUDENT IN AN ORGANIZED HEALTH CARE EDUCATION/TRAINING PROGRAM

## 2020-09-28 PROCEDURE — 700102 HCHG RX REV CODE 250 W/ 637 OVERRIDE(OP): Performed by: HOSPITALIST

## 2020-09-28 PROCEDURE — 700101 HCHG RX REV CODE 250: Performed by: INTERNAL MEDICINE

## 2020-09-28 PROCEDURE — A9270 NON-COVERED ITEM OR SERVICE: HCPCS | Performed by: PHYSICIAN ASSISTANT

## 2020-09-28 PROCEDURE — 80202 ASSAY OF VANCOMYCIN: CPT

## 2020-09-28 PROCEDURE — 700111 HCHG RX REV CODE 636 W/ 250 OVERRIDE (IP): Performed by: STUDENT IN AN ORGANIZED HEALTH CARE EDUCATION/TRAINING PROGRAM

## 2020-09-28 PROCEDURE — 36415 COLL VENOUS BLD VENIPUNCTURE: CPT

## 2020-09-28 PROCEDURE — 80048 BASIC METABOLIC PNL TOTAL CA: CPT

## 2020-09-28 PROCEDURE — 700102 HCHG RX REV CODE 250 W/ 637 OVERRIDE(OP): Performed by: PHYSICIAN ASSISTANT

## 2020-09-28 PROCEDURE — 770021 HCHG ROOM/CARE - ISO PRIVATE

## 2020-09-28 PROCEDURE — 700102 HCHG RX REV CODE 250 W/ 637 OVERRIDE(OP): Performed by: INTERNAL MEDICINE

## 2020-09-28 RX ORDER — TRAZODONE HYDROCHLORIDE 50 MG/1
50 TABLET ORAL
Status: DISCONTINUED | OUTPATIENT
Start: 2020-09-28 | End: 2020-10-13 | Stop reason: HOSPADM

## 2020-09-28 RX ADMIN — HEPARIN SODIUM 5000 UNITS: 5000 INJECTION, SOLUTION INTRAVENOUS; SUBCUTANEOUS at 04:14

## 2020-09-28 RX ADMIN — TRAZODONE HYDROCHLORIDE 50 MG: 50 TABLET ORAL at 22:57

## 2020-09-28 RX ADMIN — PREGABALIN 150 MG: 150 CAPSULE ORAL at 04:13

## 2020-09-28 RX ADMIN — DOCUSATE SODIUM 50 MG AND SENNOSIDES 8.6 MG 2 TABLET: 8.6; 5 TABLET, FILM COATED ORAL at 16:23

## 2020-09-28 RX ADMIN — HYDROMORPHONE HYDROCHLORIDE 2 MG: 2 TABLET ORAL at 04:14

## 2020-09-28 RX ADMIN — ACETAMINOPHEN 650 MG: 325 TABLET, FILM COATED ORAL at 12:26

## 2020-09-28 RX ADMIN — HYDROMORPHONE HYDROCHLORIDE 2 MG: 2 TABLET ORAL at 07:31

## 2020-09-28 RX ADMIN — HYDROMORPHONE HYDROCHLORIDE 2 MG: 2 TABLET ORAL at 19:57

## 2020-09-28 RX ADMIN — HYDROMORPHONE HYDROCHLORIDE 2 MG: 2 TABLET ORAL at 10:36

## 2020-09-28 RX ADMIN — HYDROMORPHONE HYDROCHLORIDE 2 MG: 2 TABLET ORAL at 13:55

## 2020-09-28 RX ADMIN — HYDROMORPHONE HYDROCHLORIDE 2 MG: 2 TABLET ORAL at 22:56

## 2020-09-28 RX ADMIN — NICOTINE TRANSDERMAL SYSTEM 21 MG: 21 PATCH, EXTENDED RELEASE TRANSDERMAL at 04:14

## 2020-09-28 RX ADMIN — DOCUSATE SODIUM 100 MG: 100 CAPSULE ORAL at 16:23

## 2020-09-28 RX ADMIN — PREGABALIN 150 MG: 150 CAPSULE ORAL at 12:26

## 2020-09-28 RX ADMIN — HYDROMORPHONE HYDROCHLORIDE 2 MG: 2 TABLET ORAL at 01:13

## 2020-09-28 RX ADMIN — HEPARIN SODIUM 5000 UNITS: 5000 INJECTION, SOLUTION INTRAVENOUS; SUBCUTANEOUS at 12:26

## 2020-09-28 RX ADMIN — ACETAMINOPHEN 650 MG: 325 TABLET, FILM COATED ORAL at 04:14

## 2020-09-28 RX ADMIN — DIPHENHYDRAMINE HYDROCHLORIDE 25 MG: 25 TABLET ORAL at 22:56

## 2020-09-28 RX ADMIN — HEPARIN SODIUM 5000 UNITS: 5000 INJECTION, SOLUTION INTRAVENOUS; SUBCUTANEOUS at 19:57

## 2020-09-28 RX ADMIN — DOCUSATE SODIUM 100 MG: 100 CAPSULE ORAL at 05:54

## 2020-09-28 RX ADMIN — POLYETHYLENE GLYCOL 3350 1 PACKET: 17 POWDER, FOR SOLUTION ORAL at 05:53

## 2020-09-28 RX ADMIN — VANCOMYCIN HYDROCHLORIDE 1500 MG: 500 INJECTION, POWDER, LYOPHILIZED, FOR SOLUTION INTRAVENOUS at 05:43

## 2020-09-28 RX ADMIN — PREGABALIN 150 MG: 150 CAPSULE ORAL at 16:23

## 2020-09-28 RX ADMIN — ACETAMINOPHEN 650 MG: 325 TABLET, FILM COATED ORAL at 16:23

## 2020-09-28 RX ADMIN — VANCOMYCIN HYDROCHLORIDE 1500 MG: 500 INJECTION, POWDER, LYOPHILIZED, FOR SOLUTION INTRAVENOUS at 16:23

## 2020-09-28 RX ADMIN — HYDROMORPHONE HYDROCHLORIDE 2 MG: 2 TABLET ORAL at 17:02

## 2020-09-28 ASSESSMENT — ENCOUNTER SYMPTOMS
DIZZINESS: 0
MYALGIAS: 1
SEIZURES: 0
CHILLS: 0
EYE DISCHARGE: 0
VOMITING: 0
ABDOMINAL PAIN: 0
NERVOUS/ANXIOUS: 0
BACK PAIN: 1
PALPITATIONS: 0
SHORTNESS OF BREATH: 0
NAUSEA: 0
BLURRED VISION: 0
HEADACHES: 0
COUGH: 0
INSOMNIA: 1
FEVER: 0

## 2020-09-28 ASSESSMENT — PAIN DESCRIPTION - PAIN TYPE
TYPE: ACUTE PAIN;SURGICAL PAIN
TYPE: ACUTE PAIN
TYPE: ACUTE PAIN
TYPE: ACUTE PAIN;SURGICAL PAIN

## 2020-09-28 NOTE — PROGRESS NOTES
Hospital Medicine Daily Progress Note    Date of Service  9/28/2020    Chief Complaint  Wound pain, drainage    Hospital Course  60 y.o. male with a past medical history of previous MRSA cellulitis, intravenous drug use, homelessness, Hx right lower extremity necrotizing fasciitis s/p fasciotomy in January 2020. Admitted 9/14/2020 with multiple abscesses on upper and lower extremity.  He has a 4 mm ulcer with serosanguineous drainage on his left lateral forearm.   Vancomycin and Rocephin was started.  Orthopedic surgery were consulted, S/P irrigation and debridement of left antecubital fossa and superficial right thigh wound in the OR. Right hip wound culture grew beta-hemolytic group B strep. Transthoracic Echo performed to r/o endocarditis was performed and was negative for vegetations, EF 65 with mild tricuspid regurg. RV systolic pressure of 25mmHg.     Interval Problem Update  No overnight events. Reports that the benadryl didn't help him sleep. He is concerned that is left elbow is more swollen today.     Consultants/Specialty  Orthopedic surgery  ID - Krasner    Code Status  DNAR/DNI    Disposition  Anticipate d/c home pending completion of IV abx and ortho clearance.     Review of Systems  Review of Systems   Constitutional: Positive for malaise/fatigue. Negative for chills and fever.   Eyes: Negative for blurred vision and discharge.   Respiratory: Negative for cough and shortness of breath.    Cardiovascular: Negative for chest pain, palpitations and leg swelling.   Gastrointestinal: Negative for abdominal pain, nausea and vomiting.   Genitourinary: Negative for dysuria and hematuria.   Musculoskeletal: Positive for back pain, joint pain and myalgias.   Skin: Negative for itching.   Neurological: Negative for dizziness, seizures and headaches.   Psychiatric/Behavioral: The patient has insomnia. The patient is not nervous/anxious.         Chronic depression      Physical Exam  Temp:  [36.4 °C (97.5 °F)-36.7  °C (98.1 °F)] 36.7 °C (98.1 °F)  Pulse:  [74-78] 78  Resp:  [15-17] 17  BP: ()/(64-74) 127/73  SpO2:  [94 %-97 %] 95 %    Physical Exam  Vitals signs reviewed.   Constitutional:       General: He is not in acute distress.     Appearance: He is not diaphoretic.   HENT:      Head: Normocephalic.      Mouth/Throat:      Mouth: Mucous membranes are moist.   Eyes:      General:         Right eye: No discharge.         Left eye: No discharge.      Extraocular Movements: Extraocular movements intact.   Neck:      Musculoskeletal: Normal range of motion. No neck rigidity.   Cardiovascular:      Rate and Rhythm: Normal rate.      Heart sounds: Murmur present.   Pulmonary:      Effort: Pulmonary effort is normal. No respiratory distress.      Breath sounds: No wheezing.   Abdominal:      General: There is no distension.      Palpations: Abdomen is soft.      Tenderness: There is no abdominal tenderness. There is no guarding.   Musculoskeletal: Normal range of motion.      Comments: Left UE ROM has improved today   Skin:     General: Skin is dry.      Comments: Left elbow dressing; ace bandage unwrapped to evaluate; right thigh wrapped   Neurological:      General: No focal deficit present.      Mental Status: He is alert. He is disoriented.   Psychiatric:         Mood and Affect: Mood normal.       Fluids    Intake/Output Summary (Last 24 hours) at 9/28/2020 0825  Last data filed at 9/27/2020 1200  Gross per 24 hour   Intake 800 ml   Output --   Net 800 ml     Laboratory      Recent Labs     09/25/20  1110 09/26/20  0317 09/28/20  0455   SODIUM 135 137 131*   POTASSIUM 4.2 4.3 4.0   CHLORIDE 101 101 97   CO2 19* 25 24   GLUCOSE 112* 103* 110*   BUN 18 21 23*   CREATININE 0.71 0.75 0.87   CALCIUM 9.0 9.1 9.0                   Imaging  IR-MIDLINE CATHETER INSERTION WO GUIDANCE > AGE 3   Final Result                  Ultrasound-guided midline placement performed by qualified nursing staff    as above.           EC-ECHOCARDIOGRAM COMPLETE W/O CONT   Final Result      DX-ELBOW-COMPLETE 3+ LEFT   Final Result      Soft tissue swelling without acute osseous abnormality.      DX-FEMUR-2+ RIGHT   Final Result      No acute osseous abnormality.         Assessment/Plan  Leg wound, right- (present on admission)  Assessment & Plan  Has h/o necrotizing fascitis.   - S/p I&D of right thigh wound and split thickness skin graft on 9/17  - pain control  - wound care  - surgery following, appreciate recs  - outpatient wound care clinic referral placed by Ortho PA  - abx as noted above    Pyogenic arthritis of left elbow (HCC)- (present on admission)  Assessment & Plan  - Ortho and ID following, appreciate recs  - s/p I&D left antecubital fossa on 9/15  - continue with Vancomycin through 9/30 then continue with bactrim x4 weeks  - culture positive for beta hemolytic group A strep and MRSA  - pain control  - wound care    Murmur- (present on admission)  Assessment & Plan  In the setting of his h/o IV drug abuse.   - blood cultures negative   - TTE negative    IV drug abuse (HCC)- (present on admission)  Assessment & Plan  Patient denies current use.  - barrier for acceptance to SNF; not a candidate for outpatient infusion  - avoid IV narcotics as possible     Homeless  Assessment & Plan  Social work and case management to assist in discharge planning     VTE prophylaxis: heparin

## 2020-09-28 NOTE — PROGRESS NOTES
"   Orthopaedic Progress Note    Interval changes:  Dressings CDI- PA to change tomorrow    ROS - Patient denies any new issues except per above.  Pain well controlled.    /73   Pulse 78   Temp 36.7 °C (98.1 °F) (Temporal)   Resp 17   Ht 1.93 m (6' 4\")   Wt 118.5 kg (261 lb 3.9 oz)   SpO2 95%     Patient seen and examined  No acute distress  Breathing non labored  RRR  RLE dressings CDI. BLE DNVI, moves all toes, cap refill <2 sec.  LUE dressing CDI, DNVI, moves all fingers, cap refill <2 sec.         Active Hospital Problems    Diagnosis   • Leg wound, right [S81.801A]     Priority: High   • Pyogenic arthritis of left elbow (HCC) [M00.9]     Priority: High     No crepitus no gas in the tissues,   history of MRSA, orthopedic surgery consulted for OR in the a.m.  Empiric vancomycin and Rocephin ordered.  Symptomatic management     • Murmur [R01.1]     Priority: Medium   • IV drug abuse (HCC) [F19.10]     Priority: Medium     Assessment/Plan:  Dressings to be left in place   Not cleared for DC by ortho  POD#11 S/P:  1.  Irrigation and debridement of superficial skin, fascia, and muscle, right thigh.    2.  Split-thickness skin graft from left thigh to right thigh.    3.  Application of ACell MicroMatrix right thigh skin graft.    4.  Irrigation and debridement, left arm skin, subcutaneous tissue, fascia, and bone.    5.  Application of ACell MicroMatrix and Gentrix graft.    6.  Application of wound VAC, right thigh.    Wt bearing status - WBAT  Wound care/Drains - dressings left in place  Future Procedures - none planned   Sutures/Staples out- 10-14 days post operatively  PT/OT-initiated  Antibiotics: vanco 1500mg iv Q12  DVT Prophylaxis- TEDS/SCDs/Foot pumps/heparin  [place]-none  Case Coordination for Discharge Planning - Disposition SNF   "

## 2020-09-28 NOTE — PROGRESS NOTES
"Pharmacy Kinetics 60 y.o. male on vancomycin day # 8 2020    Currently on Vancomycin 1500 mg iv q12hr (0400 & 1600)    Indication for Treatment: Left elbow pyogenic arthritis/SSTI    Proposed duration of treatment: 2 weeks of IV, followed by 4 weeks of oral antibiotics per ID      Pertinent history per medical record: Admitted on 2020 for multiple abscesses on his upper and lower extremity.  He has a 4 mm ulcer with serosanguineous drainage on his left lateral forearm. S/P irrigation and debridement of left antecubital fossa and superficial right thigh wound  on  with a wound vac on both the left elbow and right thigh. He has a history of IV drug abuse, MSSA cellulitis, right lower extremity necrotizing fasciitis s/p fasciotomy in 2020. ID was consulting on the case.     Other antibiotics: None    Allergies: Codeine, Spinach, and Pcn [penicillins]     List concerns for renal function: BUN/SCr ratio > 20:1 (~26) & Obesity (BMI 31.8)    Pertinent cultures to date:   9/15 Left elbow wound w/ GAS and MRSA   BC x2 NGTD   Right hip wound w/ GAS    No results for input(s): WBC, NEUTSPOLYS, BANDSSTABS in the last 72 hours.  Recent Labs     20  0317 20  0455   BUN 21 23*   CREATININE 0.75 0.87     Recent Labs     20  0317 20  0455   VANCOTROUGH 13.9 10.1       Intake/Output Summary (Last 24 hours) at 2020 1421  Last data filed at 2020 0900  Gross per 24 hour   Intake 240 ml   Output --   Net 240 ml      /73   Pulse 78   Temp 36.7 °C (98.1 °F) (Temporal)   Resp 17   Ht 1.93 m (6' 4\")   Wt 118.5 kg (261 lb 3.9 oz)   SpO2 95%  Temp (24hrs), Av.6 °C (97.8 °F), Min:36.4 °C (97.5 °F), Max:36.7 °C (98.1 °F)      A/P   1. Vancomycin dose change: continue Vancomycin 1500 mg Q 12 hr (13 mg/kg)  2. Next vancomycin level: 10/2 at 0330 depending on renal function  3. Goal trough: 10-15 mcg/ml  4. Patient has a peripheral line  5. Comments: Trough of 10.1 " mcg/mL was drawn appropriately (~12hrs) and therapeutic. Patient is on the lower end of his goal but his SCr has been rising. Will continue to monitor his renal function closely. Historical dosing noted and dosing was done lower than nomagram d/t past vancomycin history. Pharmacy will continue to follow and make appropriate adjustments based on renal indices, trough levels, and other patient factors influencing the dosing of vancomycin.    Mehnaz Ventura, PharmD  T: [phone]

## 2020-09-28 NOTE — DISCHARGE PLANNING
Anticipated Discharge Disposition: home    Action: Medical team will not allow discharge until wounds healed. Has no stable living situation    Barriers to Discharge: No accepting SNF    Plan: Remain in [facility] until ready for discharge to streets.

## 2020-09-28 NOTE — CARE PLAN
Problem: Infection  Goal: Will remain free from infection  Outcome: PROGRESSING AS EXPECTED   Pt is on IV antibiotics at this time for his infection: no s/s of a further developing infection.   Problem: Mobility  Goal: Risk for activity intolerance will decrease  Outcome: PROGRESSING AS EXPECTED   Pt is up-self in room with no assistance issues.   Problem: Respiratory:  Goal: Respiratory status will improve  Outcome: PROGRESSING AS EXPECTED   The pt is on room air with normal oxygen saturations.

## 2020-09-29 PROCEDURE — A9270 NON-COVERED ITEM OR SERVICE: HCPCS | Performed by: PHYSICIAN ASSISTANT

## 2020-09-29 PROCEDURE — 700102 HCHG RX REV CODE 250 W/ 637 OVERRIDE(OP): Performed by: PHYSICIAN ASSISTANT

## 2020-09-29 PROCEDURE — 700102 HCHG RX REV CODE 250 W/ 637 OVERRIDE(OP): Performed by: INTERNAL MEDICINE

## 2020-09-29 PROCEDURE — 700102 HCHG RX REV CODE 250 W/ 637 OVERRIDE(OP): Performed by: HOSPITALIST

## 2020-09-29 PROCEDURE — A9270 NON-COVERED ITEM OR SERVICE: HCPCS | Performed by: INTERNAL MEDICINE

## 2020-09-29 PROCEDURE — 700105 HCHG RX REV CODE 258: Performed by: FAMILY MEDICINE

## 2020-09-29 PROCEDURE — 700105 HCHG RX REV CODE 258

## 2020-09-29 PROCEDURE — 700101 HCHG RX REV CODE 250: Performed by: INTERNAL MEDICINE

## 2020-09-29 PROCEDURE — A9270 NON-COVERED ITEM OR SERVICE: HCPCS | Performed by: STUDENT IN AN ORGANIZED HEALTH CARE EDUCATION/TRAINING PROGRAM

## 2020-09-29 PROCEDURE — 700111 HCHG RX REV CODE 636 W/ 250 OVERRIDE (IP): Performed by: STUDENT IN AN ORGANIZED HEALTH CARE EDUCATION/TRAINING PROGRAM

## 2020-09-29 PROCEDURE — 99232 SBSQ HOSP IP/OBS MODERATE 35: CPT | Performed by: STUDENT IN AN ORGANIZED HEALTH CARE EDUCATION/TRAINING PROGRAM

## 2020-09-29 PROCEDURE — 700102 HCHG RX REV CODE 250 W/ 637 OVERRIDE(OP): Performed by: STUDENT IN AN ORGANIZED HEALTH CARE EDUCATION/TRAINING PROGRAM

## 2020-09-29 PROCEDURE — 700111 HCHG RX REV CODE 636 W/ 250 OVERRIDE (IP): Performed by: FAMILY MEDICINE

## 2020-09-29 PROCEDURE — A9270 NON-COVERED ITEM OR SERVICE: HCPCS | Performed by: HOSPITALIST

## 2020-09-29 PROCEDURE — 770021 HCHG ROOM/CARE - ISO PRIVATE

## 2020-09-29 RX ORDER — SODIUM CHLORIDE 9 MG/ML
INJECTION, SOLUTION INTRAVENOUS
Status: COMPLETED
Start: 2020-09-29 | End: 2020-09-29

## 2020-09-29 RX ADMIN — ACETAMINOPHEN 650 MG: 325 TABLET, FILM COATED ORAL at 12:04

## 2020-09-29 RX ADMIN — DOCUSATE SODIUM 100 MG: 100 CAPSULE ORAL at 17:09

## 2020-09-29 RX ADMIN — SODIUM CHLORIDE 500 ML: 9 INJECTION, SOLUTION INTRAVENOUS at 17:15

## 2020-09-29 RX ADMIN — PREGABALIN 150 MG: 150 CAPSULE ORAL at 12:04

## 2020-09-29 RX ADMIN — HYDROMORPHONE HYDROCHLORIDE 2 MG: 2 TABLET ORAL at 12:04

## 2020-09-29 RX ADMIN — ACETAMINOPHEN 650 MG: 325 TABLET, FILM COATED ORAL at 17:09

## 2020-09-29 RX ADMIN — TRAZODONE HYDROCHLORIDE 50 MG: 50 TABLET ORAL at 21:02

## 2020-09-29 RX ADMIN — HEPARIN SODIUM 5000 UNITS: 5000 INJECTION, SOLUTION INTRAVENOUS; SUBCUTANEOUS at 21:02

## 2020-09-29 RX ADMIN — NICOTINE TRANSDERMAL SYSTEM 21 MG: 21 PATCH, EXTENDED RELEASE TRANSDERMAL at 05:00

## 2020-09-29 RX ADMIN — HYDROMORPHONE HYDROCHLORIDE 2 MG: 2 TABLET ORAL at 01:49

## 2020-09-29 RX ADMIN — HYDROMORPHONE HYDROCHLORIDE 2 MG: 2 TABLET ORAL at 21:02

## 2020-09-29 RX ADMIN — DOCUSATE SODIUM 100 MG: 100 CAPSULE ORAL at 05:00

## 2020-09-29 RX ADMIN — VANCOMYCIN HYDROCHLORIDE 1500 MG: 500 INJECTION, POWDER, LYOPHILIZED, FOR SOLUTION INTRAVENOUS at 05:00

## 2020-09-29 RX ADMIN — HYDROMORPHONE HYDROCHLORIDE 2 MG: 2 TABLET ORAL at 08:54

## 2020-09-29 RX ADMIN — PREGABALIN 150 MG: 150 CAPSULE ORAL at 04:59

## 2020-09-29 RX ADMIN — POLYETHYLENE GLYCOL 3350 1 PACKET: 17 POWDER, FOR SOLUTION ORAL at 05:00

## 2020-09-29 RX ADMIN — ACETAMINOPHEN 650 MG: 325 TABLET, FILM COATED ORAL at 04:59

## 2020-09-29 RX ADMIN — VANCOMYCIN HYDROCHLORIDE 1500 MG: 500 INJECTION, POWDER, LYOPHILIZED, FOR SOLUTION INTRAVENOUS at 17:15

## 2020-09-29 RX ADMIN — HYDROMORPHONE HYDROCHLORIDE 2 MG: 2 TABLET ORAL at 17:09

## 2020-09-29 RX ADMIN — HYDROMORPHONE HYDROCHLORIDE 2 MG: 2 TABLET ORAL at 05:00

## 2020-09-29 RX ADMIN — HEPARIN SODIUM 5000 UNITS: 5000 INJECTION, SOLUTION INTRAVENOUS; SUBCUTANEOUS at 12:04

## 2020-09-29 RX ADMIN — DOCUSATE SODIUM 50 MG AND SENNOSIDES 8.6 MG 2 TABLET: 8.6; 5 TABLET, FILM COATED ORAL at 17:09

## 2020-09-29 RX ADMIN — PREGABALIN 150 MG: 150 CAPSULE ORAL at 17:09

## 2020-09-29 ASSESSMENT — ENCOUNTER SYMPTOMS
DIZZINESS: 0
NERVOUS/ANXIOUS: 0
HEADACHES: 0
BLURRED VISION: 0
CHILLS: 0
FEVER: 0
PALPITATIONS: 0
VOMITING: 0
INSOMNIA: 1
SHORTNESS OF BREATH: 0
EYE DISCHARGE: 0
COUGH: 0
SEIZURES: 0
NAUSEA: 0
ABDOMINAL PAIN: 0
BACK PAIN: 1
MYALGIAS: 1

## 2020-09-29 ASSESSMENT — PAIN DESCRIPTION - PAIN TYPE
TYPE: ACUTE PAIN;SURGICAL PAIN

## 2020-09-29 NOTE — PROGRESS NOTES
"Pharmacy Kinetics 60 y.o. male on vancomycin day # 9 2020    Currently on Vancomycin 1500 mg iv q12hr (0400 & 1600)    Indication for Treatment:Left elbow pyogenic arthritis/SSTI    Proposed duration of treatment: 2 weeks of IV, followed by 4 weeks of oral antibiotics per ID      Pertinent history per medical record: Admitted on 2020 for multiple abscesses on his upper and lower extremity.  He has a 4 mm ulcer with serosanguineous drainage on his left lateral forearm. S/P irrigation and debridement of left antecubital fossa and superficial right thigh wound  on  with a wound vac on both the left elbow and right thigh. He has a history of IV drug abuse, MSSA cellulitis, right lower extremity necrotizing fasciitis s/p fasciotomy in 2020. ID was consulting on the case.     Other antibiotics: None    Allergies: Codeine, Spinach, and Pcn [penicillins]     List concerns for renal function: BUN/SCr ratio > 20:1 (~26) & Obesity (BMI 31.8)    Pertinent cultures to date:   9/15 Left elbow wound w/ GAS and MRSA   BC x2 NGTD   Right hip wound w/ GAS    No results for input(s): WBC, NEUTSPOLYS, BANDSSTABS in the last 72 hours.  Recent Labs     20  0455   BUN 23*   CREATININE 0.87     Recent Labs     20  0455   VANCOTROUGH 10.1       Intake/Output Summary (Last 24 hours) at 2020 0805  Last data filed at 2020  Gross per 24 hour   Intake 960 ml   Output --   Net 960 ml      /79   Pulse 80   Temp 36.1 °C (97 °F) (Temporal)   Resp 16   Ht 1.93 m (6' 4\")   Wt 118.5 kg (261 lb 3.9 oz)   SpO2 97%  Temp (24hrs), Av.6 °C (97.9 °F), Min:36.1 °C (97 °F), Max:36.9 °C (98.4 °F)      A/P   1. Vancomycin dose change: Continue Vancomycin 1500 mg Q 12 hr (13 mg/kg)   2. Next vancomycin level: 10/2 at 0330 depending on renal function  3. Goal trough: 10-15 mcg/ml  4. Patient has a peripheral line  5. Comments: BMP ordered for 0300 this morning has not been collected yet. " Unable to assess patient's renal function today, but will continue to monitor closely. Pharmacy will continue to follow and make appropriate adjustments based on renal indices, trough levels, and other patient factors influencing the dosing of vancomycin.    Mehnaz Ventura, PharmD  T: [phone]

## 2020-09-29 NOTE — PROGRESS NOTES
Orthopaedic Progress Note:  HPI: Patient seen and examined. No new questions or concerns. Discussed need for repeat STSG for failed R thigh graft. Plan for OR Thursday. Patient is agreeable. Overall well except reports falling behind on pain medications intermittently. No new fevers or chills.    Exam:  NAD  No audible wheeze  RRR by peripheral pulses  Dressings CDI LUE and RLE, to be changed today by PA. Distally NVI throughout. Brisk capillary refill to all digits.    Assessment:   61yo M w L arm and R thigh wound, failed RLE STSG    Plan:  OR Thursday for repeat STSG  WBAT  Dressing changes    DO [facility]  Trauma Fellow

## 2020-09-29 NOTE — PROGRESS NOTES
Hospital Medicine Daily Progress Note    Date of Service  9/29/2020    Chief Complaint  Wound pain, drainage    Hospital Course  60 y.o. male with a past medical history of previous MRSA cellulitis, intravenous drug use, homelessness, Hx right lower extremity necrotizing fasciitis s/p fasciotomy in January 2020. Admitted 9/14/2020 with multiple abscesses on upper and lower extremity.  He has a 4 mm ulcer with serosanguineous drainage on his left lateral forearm.   Vancomycin and Rocephin was started.  Orthopedic surgery were consulted, S/P irrigation and debridement of left antecubital fossa and superficial right thigh wound in the OR. Right hip wound culture grew beta-hemolytic group B strep. Transthoracic Echo performed to r/o endocarditis was performed and was negative for vegetations, EF 65 with mild tricuspid regurg. RV systolic pressure of 25mmHg.     Interval Problem Update  No acute events overnight. Patient states he is going to OR on Thursday for repeat thigh skin graft. He states otherwise he feels well.    Consultants/Specialty  Orthopedic surgery  ID    Code Status  DNAR/DNI    Disposition  Anticipate d/c home pending completion of IV abx and ortho clearance.     Review of Systems  Review of Systems   Constitutional: Positive for malaise/fatigue. Negative for chills and fever.   Eyes: Negative for blurred vision and discharge.   Respiratory: Negative for cough and shortness of breath.    Cardiovascular: Negative for chest pain, palpitations and leg swelling.   Gastrointestinal: Negative for abdominal pain, nausea and vomiting.   Genitourinary: Negative for dysuria and hematuria.   Musculoskeletal: Positive for back pain, joint pain and myalgias.   Skin: Negative for itching.   Neurological: Negative for dizziness, seizures and headaches.   Psychiatric/Behavioral: The patient has insomnia. The patient is not nervous/anxious.         Chronic depression      Physical Exam  Temp:  [36.1 °C (97 °F)-36.9 °C  (98.4 °F)] 36.1 °C (97 °F)  Pulse:  [79-82] 79  Resp:  [16-17] 17  BP: (104-132)/(75-79) 104/75  SpO2:  [96 %-97 %] 97 %    Physical Exam  Vitals signs reviewed.   Constitutional:       General: He is not in acute distress.     Appearance: He is not diaphoretic.   HENT:      Head: Normocephalic.      Mouth/Throat:      Mouth: Mucous membranes are moist.   Eyes:      General:         Right eye: No discharge.         Left eye: No discharge.      Extraocular Movements: Extraocular movements intact.   Neck:      Musculoskeletal: Normal range of motion. No neck rigidity.   Cardiovascular:      Rate and Rhythm: Normal rate.      Heart sounds: Murmur present.   Pulmonary:      Effort: Pulmonary effort is normal. No respiratory distress.      Breath sounds: No wheezing.   Abdominal:      General: There is no distension.      Palpations: Abdomen is soft.      Tenderness: There is no abdominal tenderness. There is no guarding.   Musculoskeletal: Normal range of motion.      Comments: Left UE good ROM, right thigh under ace wrap/dressing, no pain with light palpation   Skin:     General: Skin is dry.   Neurological:      General: No focal deficit present.      Mental Status: He is alert.   Psychiatric:         Mood and Affect: Mood normal.       Fluids    Intake/Output Summary (Last 24 hours) at 9/29/2020 1023  Last data filed at 9/28/2020 2000  Gross per 24 hour   Intake 720 ml   Output --   Net 720 ml     Laboratory      Recent Labs     09/28/20  0455   SODIUM 131*   POTASSIUM 4.0   CHLORIDE 97   CO2 24   GLUCOSE 110*   BUN 23*   CREATININE 0.87   CALCIUM 9.0                   Imaging  IR-MIDLINE CATHETER INSERTION WO GUIDANCE > AGE 3   Final Result                  Ultrasound-guided midline placement performed by qualified nursing staff    as above.          EC-ECHOCARDIOGRAM COMPLETE W/O CONT   Final Result      DX-ELBOW-COMPLETE 3+ LEFT   Final Result      Soft tissue swelling without acute osseous abnormality.       DX-FEMUR-2+ RIGHT   Final Result      No acute osseous abnormality.         Assessment/Plan  Leg wound, right- (present on admission)  Assessment & Plan  Has h/o necrotizing fascitis.   - S/p I&D of right thigh wound and split thickness skin graft on 9/17  - surgery following, plan for repeat split thickness skin graft 10/1  - pain control  - wound care  - outpatient wound care clinic referral placed by Ortho PA  - abx as noted above    Pyogenic arthritis of left elbow (HCC)- (present on admission)  Assessment & Plan  - Ortho and ID following, appreciate recs  - s/p I&D left antecubital fossa on 9/15  - continue with Vancomycin through 9/30 then continue with bactrim x4 weeks  - culture positive for beta hemolytic group A strep and MRSA  - pain control  - wound care    Murmur- (present on admission)  Assessment & Plan  In the setting of his h/o IV drug abuse.   - blood cultures negative   - TTE negative    IV drug abuse (HCC)- (present on admission)  Assessment & Plan  Patient denies current use.  - barrier for acceptance to SNF; not a candidate for outpatient infusion  - avoid IV narcotics as possible     Homeless  Assessment & Plan  Social work and case management to assist in discharge planning     VTE prophylaxis: heparin

## 2020-09-30 LAB
ANION GAP SERPL CALC-SCNC: 12 MMOL/L (ref 7–16)
BUN SERPL-MCNC: 20 MG/DL (ref 8–22)
CALCIUM SERPL-MCNC: 9.1 MG/DL (ref 8.5–10.5)
CHLORIDE SERPL-SCNC: 102 MMOL/L (ref 96–112)
CO2 SERPL-SCNC: 24 MMOL/L (ref 20–33)
COVID ORDER STATUS COVID19: NORMAL
CREAT SERPL-MCNC: 0.69 MG/DL (ref 0.5–1.4)
GLUCOSE SERPL-MCNC: 106 MG/DL (ref 65–99)
POTASSIUM SERPL-SCNC: 3.9 MMOL/L (ref 3.6–5.5)
SARS-COV-2 RDRP RESP QL NAA+PROBE: NOTDETECTED
SODIUM SERPL-SCNC: 138 MMOL/L (ref 135–145)
SPECIMEN SOURCE: NORMAL

## 2020-09-30 PROCEDURE — 700102 HCHG RX REV CODE 250 W/ 637 OVERRIDE(OP): Performed by: HOSPITALIST

## 2020-09-30 PROCEDURE — A9270 NON-COVERED ITEM OR SERVICE: HCPCS | Performed by: HOSPITALIST

## 2020-09-30 PROCEDURE — 770021 HCHG ROOM/CARE - ISO PRIVATE

## 2020-09-30 PROCEDURE — 700111 HCHG RX REV CODE 636 W/ 250 OVERRIDE (IP): Performed by: STUDENT IN AN ORGANIZED HEALTH CARE EDUCATION/TRAINING PROGRAM

## 2020-09-30 PROCEDURE — 700101 HCHG RX REV CODE 250: Performed by: INTERNAL MEDICINE

## 2020-09-30 PROCEDURE — 36415 COLL VENOUS BLD VENIPUNCTURE: CPT

## 2020-09-30 PROCEDURE — A9270 NON-COVERED ITEM OR SERVICE: HCPCS | Performed by: PHYSICIAN ASSISTANT

## 2020-09-30 PROCEDURE — 700105 HCHG RX REV CODE 258: Performed by: STUDENT IN AN ORGANIZED HEALTH CARE EDUCATION/TRAINING PROGRAM

## 2020-09-30 PROCEDURE — A9270 NON-COVERED ITEM OR SERVICE: HCPCS | Performed by: STUDENT IN AN ORGANIZED HEALTH CARE EDUCATION/TRAINING PROGRAM

## 2020-09-30 PROCEDURE — 700102 HCHG RX REV CODE 250 W/ 637 OVERRIDE(OP): Performed by: INTERNAL MEDICINE

## 2020-09-30 PROCEDURE — C9803 HOPD COVID-19 SPEC COLLECT: HCPCS | Performed by: STUDENT IN AN ORGANIZED HEALTH CARE EDUCATION/TRAINING PROGRAM

## 2020-09-30 PROCEDURE — 99231 SBSQ HOSP IP/OBS SF/LOW 25: CPT | Performed by: STUDENT IN AN ORGANIZED HEALTH CARE EDUCATION/TRAINING PROGRAM

## 2020-09-30 PROCEDURE — 700102 HCHG RX REV CODE 250 W/ 637 OVERRIDE(OP): Performed by: PHYSICIAN ASSISTANT

## 2020-09-30 PROCEDURE — A9270 NON-COVERED ITEM OR SERVICE: HCPCS | Performed by: INTERNAL MEDICINE

## 2020-09-30 PROCEDURE — 80048 BASIC METABOLIC PNL TOTAL CA: CPT

## 2020-09-30 PROCEDURE — U0004 COV-19 TEST NON-CDC HGH THRU: HCPCS

## 2020-09-30 PROCEDURE — 700102 HCHG RX REV CODE 250 W/ 637 OVERRIDE(OP): Performed by: STUDENT IN AN ORGANIZED HEALTH CARE EDUCATION/TRAINING PROGRAM

## 2020-09-30 RX ADMIN — HYDROMORPHONE HYDROCHLORIDE 2 MG: 2 TABLET ORAL at 16:43

## 2020-09-30 RX ADMIN — HYDROMORPHONE HYDROCHLORIDE 2 MG: 2 TABLET ORAL at 13:39

## 2020-09-30 RX ADMIN — POLYETHYLENE GLYCOL 3350 1 PACKET: 17 POWDER, FOR SOLUTION ORAL at 06:30

## 2020-09-30 RX ADMIN — PREGABALIN 150 MG: 150 CAPSULE ORAL at 12:07

## 2020-09-30 RX ADMIN — HYDROMORPHONE HYDROCHLORIDE 2 MG: 2 TABLET ORAL at 19:50

## 2020-09-30 RX ADMIN — HEPARIN SODIUM 5000 UNITS: 5000 INJECTION, SOLUTION INTRAVENOUS; SUBCUTANEOUS at 22:57

## 2020-09-30 RX ADMIN — HYDROMORPHONE HYDROCHLORIDE 2 MG: 2 TABLET ORAL at 07:35

## 2020-09-30 RX ADMIN — ACETAMINOPHEN 650 MG: 325 TABLET, FILM COATED ORAL at 12:07

## 2020-09-30 RX ADMIN — VANCOMYCIN HYDROCHLORIDE 1500 MG: 500 INJECTION, POWDER, LYOPHILIZED, FOR SOLUTION INTRAVENOUS at 17:07

## 2020-09-30 RX ADMIN — PREGABALIN 150 MG: 150 CAPSULE ORAL at 18:01

## 2020-09-30 RX ADMIN — VANCOMYCIN HYDROCHLORIDE 1500 MG: 500 INJECTION, POWDER, LYOPHILIZED, FOR SOLUTION INTRAVENOUS at 04:17

## 2020-09-30 RX ADMIN — NICOTINE TRANSDERMAL SYSTEM 21 MG: 21 PATCH, EXTENDED RELEASE TRANSDERMAL at 06:30

## 2020-09-30 RX ADMIN — HYDROMORPHONE HYDROCHLORIDE 2 MG: 2 TABLET ORAL at 10:39

## 2020-09-30 RX ADMIN — TRAZODONE HYDROCHLORIDE 50 MG: 50 TABLET ORAL at 22:58

## 2020-09-30 RX ADMIN — DOCUSATE SODIUM 100 MG: 100 CAPSULE ORAL at 18:02

## 2020-09-30 RX ADMIN — HYDROMORPHONE HYDROCHLORIDE 2 MG: 2 TABLET ORAL at 04:17

## 2020-09-30 RX ADMIN — ACETAMINOPHEN 650 MG: 325 TABLET, FILM COATED ORAL at 18:01

## 2020-09-30 RX ADMIN — HEPARIN SODIUM 5000 UNITS: 5000 INJECTION, SOLUTION INTRAVENOUS; SUBCUTANEOUS at 06:30

## 2020-09-30 RX ADMIN — DOCUSATE SODIUM 50 MG AND SENNOSIDES 8.6 MG 2 TABLET: 8.6; 5 TABLET, FILM COATED ORAL at 18:01

## 2020-09-30 RX ADMIN — DOCUSATE SODIUM 100 MG: 100 CAPSULE ORAL at 06:30

## 2020-09-30 RX ADMIN — HYDROMORPHONE HYDROCHLORIDE 2 MG: 2 TABLET ORAL at 22:58

## 2020-09-30 RX ADMIN — PREGABALIN 150 MG: 150 CAPSULE ORAL at 06:30

## 2020-09-30 RX ADMIN — HYDROMORPHONE HYDROCHLORIDE 2 MG: 2 TABLET ORAL at 00:02

## 2020-09-30 RX ADMIN — ACETAMINOPHEN 650 MG: 325 TABLET, FILM COATED ORAL at 06:30

## 2020-09-30 RX ADMIN — HEPARIN SODIUM 5000 UNITS: 5000 INJECTION, SOLUTION INTRAVENOUS; SUBCUTANEOUS at 13:39

## 2020-09-30 ASSESSMENT — ENCOUNTER SYMPTOMS
INSOMNIA: 1
FEVER: 0
MYALGIAS: 1
HEADACHES: 0
EYE DISCHARGE: 0
PALPITATIONS: 0
ABDOMINAL PAIN: 0
SEIZURES: 0
VOMITING: 0
SHORTNESS OF BREATH: 0
DIZZINESS: 0
BACK PAIN: 1
COUGH: 0
CHILLS: 0
BLURRED VISION: 0
NERVOUS/ANXIOUS: 0
NAUSEA: 0

## 2020-09-30 ASSESSMENT — PAIN DESCRIPTION - PAIN TYPE
TYPE: SURGICAL PAIN

## 2020-09-30 NOTE — PROGRESS NOTES
RAPID3 (0-30) Cumulative Score  6.3          RAPID3 Weighted Score (divide #4 by 3 and that is the weighted score)  2.1     Kassidy Dave CMA Rheumatology  1/29/2020 10:09 AM         "Pharmacy Kinetics 60 y.o. male on vancomycin day # 10 2020    Currently on Vancomycin 1500 mg iv q12hr (0400 & 1600)    Indication for Treatment: Left elbow pyogenic arthritis/SSTI    Proposed duration of treatment: 2 weeks of IV, followed by 4 weeks of oral antibiotics per ID      Pertinent history per medical record: Admitted on 2020 for multiple abscesses on his upper and lower extremity.  He has a 4 mm ulcer with serosanguineous drainage on his left lateral forearm. S/P irrigation and debridement of left antecubital fossa and superficial right thigh wound  on  with a wound vac on both the left elbow and right thigh. He has a history of IV drug abuse, MSSA cellulitis, right lower extremity necrotizing fasciitis s/p fasciotomy in 2020. ID was consulted on the case. Will undergo a repeat skin graft on 10/1.     Other antibiotics: None    Allergies: Codeine, Spinach, and Pcn [penicillins]     List concerns for renal function: BUN/SCr ratio > 20:1 (~29) & Obesity (BMI 31.8)    Pertinent cultures to date:   9/15 Left elbow wound w/ GAS and MRSA   BC x2 NGTD   Right hip wound w/ GAS    No results for input(s): WBC, NEUTSPOLYS, BANDSSTABS in the last 72 hours.  Recent Labs     20  0455 20  1123   BUN 23* 20   CREATININE 0.87 0.69     Recent Labs     20  0455   VANCOTROUGH 10.1   No intake or output data in the 24 hours ending 20 1545   /72   Pulse 73   Temp 36.6 °C (97.9 °F) (Temporal)   Resp 17   Ht 1.93 m (6' 4\")   Wt 118.5 kg (261 lb 3.9 oz)   SpO2 95%  Temp (24hrs), Av.6 °C (97.9 °F), Min:36.1 °C (97 °F), Max:37.1 °C (98.7 °F)      A/P   1. Vancomycin dose change: Continue Vancomycin 1500 mg Q 12 hr   2. Next vancomycin level: 10/2 at 0330   3. Goal trough: 10-15 mcg/ml  4. Patient has a peripheral line  5. Comments: Renal function has improved over the last couple of days. Plan is to switch over to Bactrim tomorrow. If vancomycin is continued, " may extend out the vancomycin trough if renal function continues to improve/remain stable. Repeat skin graft will occur on 10/1. Pharmacy will continue to follow and make appropriate adjustments based on renal indices, trough levels, and other patient factors influencing the dosing of vancomycin.    Mehnaz Ventura, PharmD  T: [phone]

## 2020-09-30 NOTE — PROGRESS NOTES
Hospital Medicine Daily Progress Note    Date of Service  9/30/2020    Chief Complaint  Wound pain, drainage    Hospital Course  60 y.o. male with a past medical history of previous MRSA cellulitis, intravenous drug use, homelessness, Hx right lower extremity necrotizing fasciitis s/p fasciotomy in January 2020. Admitted 9/14/2020 with multiple abscesses on upper and lower extremity.  He has a 4 mm ulcer with serosanguineous drainage on his left lateral forearm.   Vancomycin and Rocephin was started.  Orthopedic surgery were consulted, S/P irrigation and debridement of left antecubital fossa and superficial right thigh wound in the OR. Right hip wound culture grew beta-hemolytic group B strep. Transthoracic Echo performed to r/o endocarditis was performed and was negative for vegetations, EF 65 with mild tricuspid regurg. RV systolic pressure of 25mmHg.     Interval Problem Update  No acute events overnight.   Plan for OR tomorrow 10/1 for repeat skin graft.  Patient without complaints, voiding well with normal BM's.    Consultants/Specialty  Orthopedic surgery  ID    Code Status  DNAR/DNI    Disposition  Anticipate d/c home pending completion of IV abx and ortho clearance.     Review of Systems  Review of Systems   Constitutional: Positive for malaise/fatigue. Negative for chills and fever.   Eyes: Negative for blurred vision and discharge.   Respiratory: Negative for cough and shortness of breath.    Cardiovascular: Negative for chest pain, palpitations and leg swelling.   Gastrointestinal: Negative for abdominal pain, nausea and vomiting.   Genitourinary: Negative for dysuria and hematuria.   Musculoskeletal: Positive for back pain, joint pain and myalgias.   Skin: Negative for itching.   Neurological: Negative for dizziness, seizures and headaches.   Psychiatric/Behavioral: The patient has insomnia. The patient is not nervous/anxious.         Chronic depression      Physical Exam  Temp:  [36.1 °C (97 °F)-37.1 °C  (98.7 °F)] 36.6 °C (97.9 °F)  Pulse:  [64-80] 73  Resp:  [16-18] 17  BP: (106-120)/(65-72) 111/72  SpO2:  [95 %-98 %] 95 %    Physical Exam  Vitals signs reviewed.   Constitutional:       General: He is not in acute distress.     Appearance: He is not diaphoretic.   HENT:      Head: Normocephalic.      Mouth/Throat:      Mouth: Mucous membranes are moist.   Eyes:      General:         Right eye: No discharge.         Left eye: No discharge.      Extraocular Movements: Extraocular movements intact.   Neck:      Musculoskeletal: Normal range of motion. No neck rigidity.   Cardiovascular:      Rate and Rhythm: Normal rate.      Heart sounds: Murmur present.   Pulmonary:      Effort: Pulmonary effort is normal. No respiratory distress.      Breath sounds: No wheezing.   Abdominal:      General: There is no distension.      Palpations: Abdomen is soft.      Tenderness: There is no abdominal tenderness. There is no guarding.   Musculoskeletal: Normal range of motion.      Comments: Left UE good ROM, right thigh under ace wrap/dressing, no pain with light palpation   Skin:     General: Skin is dry.   Neurological:      General: No focal deficit present.      Mental Status: He is alert.   Psychiatric:         Mood and Affect: Mood normal.       Fluids  No intake or output data in the 24 hours ending 09/30/20 1005  Laboratory      Recent Labs     09/28/20  0455   SODIUM 131*   POTASSIUM 4.0   CHLORIDE 97   CO2 24   GLUCOSE 110*   BUN 23*   CREATININE 0.87   CALCIUM 9.0                   Imaging  IR-MIDLINE CATHETER INSERTION WO GUIDANCE > AGE 3   Final Result                  Ultrasound-guided midline placement performed by qualified nursing staff    as above.          EC-ECHOCARDIOGRAM COMPLETE W/O CONT   Final Result      DX-ELBOW-COMPLETE 3+ LEFT   Final Result      Soft tissue swelling without acute osseous abnormality.      DX-FEMUR-2+ RIGHT   Final Result      No acute osseous abnormality.         Assessment/Plan  Leg  wound, right- (present on admission)  Assessment & Plan  Has h/o necrotizing fascitis.   - S/p I&D of right thigh wound and split thickness skin graft on 9/17  - surgery following, plan for repeat split thickness skin graft 10/1  - pain control  - wound care  - outpatient wound care clinic referral placed by Ortho PA  - abx as noted above    Pyogenic arthritis of left elbow (HCC)- (present on admission)  Assessment & Plan  - Ortho and ID following, appreciate recs  - s/p I&D left antecubital fossa on 9/15  - continue with Vancomycin through 9/30 then continue with bactrim x4 weeks  - culture positive for beta hemolytic group A strep and MRSA  - pain control  - wound care    Murmur- (present on admission)  Assessment & Plan  In the setting of his h/o IV drug abuse.   - blood cultures negative   - TTE negative    IV drug abuse (HCC)- (present on admission)  Assessment & Plan  Patient denies current use.  - barrier for acceptance to SNF; not a candidate for outpatient infusion  - avoid IV narcotics as possible     Homeless  Assessment & Plan  Social work and case management to assist in discharge planning     VTE prophylaxis: heparin

## 2020-09-30 NOTE — PROGRESS NOTES
"   Orthopaedic Progress Note    Interval changes:  To OR for repeat STSG tomorrow    ROS - No chest pain, dyspnea nor fever.  Pain well controlled.    /72   Pulse 73   Temp 36.6 °C (97.9 °F) (Temporal)   Resp 17   Ht 1.93 m (6' 4\")   Wt 118.5 kg (261 lb 3.9 oz)   SpO2 95%     Patient seen and examined  No acute distress  Breathing non labored  RRR  RLE dressings CDI. BLE DNVI, moves all toes, cap refill <2 sec.  LUE dressing CDI, DNVI, moves all fingers, cap refill <2 sec.         Active Hospital Problems    Diagnosis   • Leg wound, right [S81.801A]     Priority: High   • Pyogenic arthritis of left elbow (HCC) [M00.9]     Priority: High     No crepitus no gas in the tissues,   history of MRSA, orthopedic surgery consulted for OR in the a.m.  Empiric vancomycin and Rocephin ordered.  Symptomatic management     • Murmur [R01.1]     Priority: Medium   • IV drug abuse (HCC) [F19.10]     Priority: Medium     Assessment/Plan:  Dressings to be left in place   Not cleared for DC by ortho  POD#13 S/P:  1.  Irrigation and debridement of superficial skin, fascia, and muscle, right thigh.  2.  Split-thickness skin graft from left thigh to right thigh.    3.  Application of ACell MicroMatrix right thigh skin graft.    4.  Irrigation and debridement, left arm skin, subcutaneous tissue, fascia, and bone.    5.  Application of ACell MicroMatrix and Gentrix graft.    6.  Application of wound VAC, right thigh.    Wt bearing status - WBAT  Wound care/Drains - dressings left in place  Future Procedures - OR tomorrow as above   Sutures/Staples out- 10-14 days post operatively  PT/OT-initiated  Antibiotics: vanco 1500mg iv Q12  DVT Prophylaxis- TEDS/SCDs/Foot pumps/heparin  [place]-none  Case Coordination for Discharge Planning - Disposition SNF   "

## 2020-10-01 ENCOUNTER — ANESTHESIA (OUTPATIENT)
Dept: SURGERY | Facility: MEDICAL CENTER | Age: 60
DRG: 464 | End: 2020-10-01
Payer: COMMERCIAL

## 2020-10-01 ENCOUNTER — ANESTHESIA EVENT (OUTPATIENT)
Dept: SURGERY | Facility: MEDICAL CENTER | Age: 60
DRG: 464 | End: 2020-10-01
Payer: COMMERCIAL

## 2020-10-01 PROBLEM — Z86.14 HISTORY OF MRSA INFECTION: Status: ACTIVE | Noted: 2020-10-01

## 2020-10-01 PROCEDURE — 700111 HCHG RX REV CODE 636 W/ 250 OVERRIDE (IP): Performed by: STUDENT IN AN ORGANIZED HEALTH CARE EDUCATION/TRAINING PROGRAM

## 2020-10-01 PROCEDURE — 700102 HCHG RX REV CODE 250 W/ 637 OVERRIDE(OP): Performed by: ANESTHESIOLOGY

## 2020-10-01 PROCEDURE — A9270 NON-COVERED ITEM OR SERVICE: HCPCS | Performed by: STUDENT IN AN ORGANIZED HEALTH CARE EDUCATION/TRAINING PROGRAM

## 2020-10-01 PROCEDURE — 160035 HCHG PACU - 1ST 60 MINS PHASE I: Performed by: ORTHOPAEDIC SURGERY

## 2020-10-01 PROCEDURE — 700111 HCHG RX REV CODE 636 W/ 250 OVERRIDE (IP): Performed by: ANESTHESIOLOGY

## 2020-10-01 PROCEDURE — A9270 NON-COVERED ITEM OR SERVICE: HCPCS | Performed by: ORTHOPAEDIC SURGERY

## 2020-10-01 PROCEDURE — A9270 NON-COVERED ITEM OR SERVICE: HCPCS | Performed by: HOSPITALIST

## 2020-10-01 PROCEDURE — 700101 HCHG RX REV CODE 250: Performed by: ANESTHESIOLOGY

## 2020-10-01 PROCEDURE — 700102 HCHG RX REV CODE 250 W/ 637 OVERRIDE(OP): Performed by: HOSPITALIST

## 2020-10-01 PROCEDURE — 700102 HCHG RX REV CODE 250 W/ 637 OVERRIDE(OP): Performed by: STUDENT IN AN ORGANIZED HEALTH CARE EDUCATION/TRAINING PROGRAM

## 2020-10-01 PROCEDURE — 160002 HCHG RECOVERY MINUTES (STAT): Performed by: ORTHOPAEDIC SURGERY

## 2020-10-01 PROCEDURE — 700105 HCHG RX REV CODE 258: Performed by: STUDENT IN AN ORGANIZED HEALTH CARE EDUCATION/TRAINING PROGRAM

## 2020-10-01 PROCEDURE — 0HRHX74 REPLACEMENT OF RIGHT UPPER LEG SKIN WITH AUTOLOGOUS TISSUE SUBSTITUTE, PARTIAL THICKNESS, EXTERNAL APPROACH: ICD-10-PCS | Performed by: ORTHOPAEDIC SURGERY

## 2020-10-01 PROCEDURE — 500881 HCHG PACK, EXTREMITY: Performed by: ORTHOPAEDIC SURGERY

## 2020-10-01 PROCEDURE — 99232 SBSQ HOSP IP/OBS MODERATE 35: CPT | Performed by: STUDENT IN AN ORGANIZED HEALTH CARE EDUCATION/TRAINING PROGRAM

## 2020-10-01 PROCEDURE — 160009 HCHG ANES TIME/MIN: Performed by: ORTHOPAEDIC SURGERY

## 2020-10-01 PROCEDURE — 160048 HCHG OR STATISTICAL LEVEL 1-5: Performed by: ORTHOPAEDIC SURGERY

## 2020-10-01 PROCEDURE — 700102 HCHG RX REV CODE 250 W/ 637 OVERRIDE(OP): Performed by: ORTHOPAEDIC SURGERY

## 2020-10-01 PROCEDURE — 700101 HCHG RX REV CODE 250: Performed by: ORTHOPAEDIC SURGERY

## 2020-10-01 PROCEDURE — 700102 HCHG RX REV CODE 250 W/ 637 OVERRIDE(OP): Performed by: INTERNAL MEDICINE

## 2020-10-01 PROCEDURE — 160028 HCHG SURGERY MINUTES - 1ST 30 MINS LEVEL 3: Performed by: ORTHOPAEDIC SURGERY

## 2020-10-01 PROCEDURE — 0HBJXZZ EXCISION OF LEFT UPPER LEG SKIN, EXTERNAL APPROACH: ICD-10-PCS | Performed by: ORTHOPAEDIC SURGERY

## 2020-10-01 PROCEDURE — A9270 NON-COVERED ITEM OR SERVICE: HCPCS | Performed by: PHYSICIAN ASSISTANT

## 2020-10-01 PROCEDURE — 700105 HCHG RX REV CODE 258: Performed by: ANESTHESIOLOGY

## 2020-10-01 PROCEDURE — 700102 HCHG RX REV CODE 250 W/ 637 OVERRIDE(OP): Performed by: PHYSICIAN ASSISTANT

## 2020-10-01 PROCEDURE — 160039 HCHG SURGERY MINUTES - EA ADDL 1 MIN LEVEL 3: Performed by: ORTHOPAEDIC SURGERY

## 2020-10-01 PROCEDURE — A9270 NON-COVERED ITEM OR SERVICE: HCPCS | Performed by: ANESTHESIOLOGY

## 2020-10-01 PROCEDURE — 770021 HCHG ROOM/CARE - ISO PRIVATE

## 2020-10-01 PROCEDURE — A9270 NON-COVERED ITEM OR SERVICE: HCPCS | Performed by: INTERNAL MEDICINE

## 2020-10-01 PROCEDURE — 160036 HCHG PACU - EA ADDL 30 MINS PHASE I: Performed by: ORTHOPAEDIC SURGERY

## 2020-10-01 RX ORDER — ONDANSETRON 2 MG/ML
4 INJECTION INTRAMUSCULAR; INTRAVENOUS
Status: COMPLETED | OUTPATIENT
Start: 2020-10-01 | End: 2020-10-01

## 2020-10-01 RX ORDER — CEFAZOLIN SODIUM 1 G/3ML
INJECTION, POWDER, FOR SOLUTION INTRAMUSCULAR; INTRAVENOUS PRN
Status: DISCONTINUED | OUTPATIENT
Start: 2020-10-01 | End: 2020-10-01 | Stop reason: SURG

## 2020-10-01 RX ORDER — MIDAZOLAM HYDROCHLORIDE 1 MG/ML
INJECTION INTRAMUSCULAR; INTRAVENOUS PRN
Status: DISCONTINUED | OUTPATIENT
Start: 2020-10-01 | End: 2020-10-01 | Stop reason: SURG

## 2020-10-01 RX ORDER — ONDANSETRON 2 MG/ML
INJECTION INTRAMUSCULAR; INTRAVENOUS PRN
Status: DISCONTINUED | OUTPATIENT
Start: 2020-10-01 | End: 2020-10-01 | Stop reason: SURG

## 2020-10-01 RX ORDER — LIDOCAINE HYDROCHLORIDE 20 MG/ML
INJECTION, SOLUTION EPIDURAL; INFILTRATION; INTRACAUDAL; PERINEURAL PRN
Status: DISCONTINUED | OUTPATIENT
Start: 2020-10-01 | End: 2020-10-01 | Stop reason: SURG

## 2020-10-01 RX ORDER — HALOPERIDOL 5 MG/ML
1 INJECTION INTRAMUSCULAR
Status: DISCONTINUED | OUTPATIENT
Start: 2020-10-01 | End: 2020-10-01 | Stop reason: HOSPADM

## 2020-10-01 RX ORDER — HYDROMORPHONE HYDROCHLORIDE 1 MG/ML
0.4 INJECTION, SOLUTION INTRAMUSCULAR; INTRAVENOUS; SUBCUTANEOUS
Status: DISCONTINUED | OUTPATIENT
Start: 2020-10-01 | End: 2020-10-01 | Stop reason: HOSPADM

## 2020-10-01 RX ORDER — METOPROLOL TARTRATE 1 MG/ML
1 INJECTION, SOLUTION INTRAVENOUS
Status: DISCONTINUED | OUTPATIENT
Start: 2020-10-01 | End: 2020-10-01 | Stop reason: HOSPADM

## 2020-10-01 RX ORDER — DIPHENHYDRAMINE HYDROCHLORIDE 50 MG/ML
12.5 INJECTION INTRAMUSCULAR; INTRAVENOUS
Status: DISCONTINUED | OUTPATIENT
Start: 2020-10-01 | End: 2020-10-01 | Stop reason: HOSPADM

## 2020-10-01 RX ORDER — KETOROLAC TROMETHAMINE 30 MG/ML
INJECTION, SOLUTION INTRAMUSCULAR; INTRAVENOUS PRN
Status: DISCONTINUED | OUTPATIENT
Start: 2020-10-01 | End: 2020-10-01 | Stop reason: SURG

## 2020-10-01 RX ORDER — LIDOCAINE HYDROCHLORIDE AND EPINEPHRINE 10; 10 MG/ML; UG/ML
INJECTION, SOLUTION INFILTRATION; PERINEURAL
Status: DISCONTINUED | OUTPATIENT
Start: 2020-10-01 | End: 2020-10-01 | Stop reason: HOSPADM

## 2020-10-01 RX ORDER — SODIUM CHLORIDE, SODIUM LACTATE, POTASSIUM CHLORIDE, CALCIUM CHLORIDE 600; 310; 30; 20 MG/100ML; MG/100ML; MG/100ML; MG/100ML
INJECTION, SOLUTION INTRAVENOUS
Status: DISCONTINUED | OUTPATIENT
Start: 2020-10-01 | End: 2020-10-01 | Stop reason: SURG

## 2020-10-01 RX ORDER — SULFAMETHOXAZOLE AND TRIMETHOPRIM 800; 160 MG/1; MG/1
2 TABLET ORAL EVERY 12 HOURS
Status: DISCONTINUED | OUTPATIENT
Start: 2020-10-01 | End: 2020-10-13 | Stop reason: HOSPADM

## 2020-10-01 RX ORDER — SODIUM CHLORIDE, SODIUM LACTATE, POTASSIUM CHLORIDE, CALCIUM CHLORIDE 600; 310; 30; 20 MG/100ML; MG/100ML; MG/100ML; MG/100ML
INJECTION, SOLUTION INTRAVENOUS CONTINUOUS
Status: DISCONTINUED | OUTPATIENT
Start: 2020-10-01 | End: 2020-10-01 | Stop reason: HOSPADM

## 2020-10-01 RX ORDER — MAGNESIUM CARB/ALUMINUM HYDROX 105-160MG
TABLET,CHEWABLE ORAL
Status: DISCONTINUED | OUTPATIENT
Start: 2020-10-01 | End: 2020-10-01 | Stop reason: HOSPADM

## 2020-10-01 RX ORDER — HYDROMORPHONE HYDROCHLORIDE 1 MG/ML
0.1 INJECTION, SOLUTION INTRAMUSCULAR; INTRAVENOUS; SUBCUTANEOUS
Status: DISCONTINUED | OUTPATIENT
Start: 2020-10-01 | End: 2020-10-01 | Stop reason: HOSPADM

## 2020-10-01 RX ORDER — HYDROMORPHONE HYDROCHLORIDE 1 MG/ML
0.2 INJECTION, SOLUTION INTRAMUSCULAR; INTRAVENOUS; SUBCUTANEOUS
Status: DISCONTINUED | OUTPATIENT
Start: 2020-10-01 | End: 2020-10-01 | Stop reason: HOSPADM

## 2020-10-01 RX ORDER — LABETALOL HYDROCHLORIDE 5 MG/ML
5 INJECTION, SOLUTION INTRAVENOUS
Status: DISCONTINUED | OUTPATIENT
Start: 2020-10-01 | End: 2020-10-01 | Stop reason: HOSPADM

## 2020-10-01 RX ADMIN — SULFAMETHOXAZOLE AND TRIMETHOPRIM 2 TABLET: 800; 160 TABLET ORAL at 09:39

## 2020-10-01 RX ADMIN — FENTANYL CITRATE 100 MCG: 50 INJECTION, SOLUTION INTRAMUSCULAR; INTRAVENOUS at 14:34

## 2020-10-01 RX ADMIN — ACETAMINOPHEN 650 MG: 325 TABLET, FILM COATED ORAL at 11:26

## 2020-10-01 RX ADMIN — ACETAMINOPHEN 650 MG: 325 TABLET, FILM COATED ORAL at 05:06

## 2020-10-01 RX ADMIN — HYDROMORPHONE HYDROCHLORIDE 0.4 MG: 1 INJECTION, SOLUTION INTRAMUSCULAR; INTRAVENOUS; SUBCUTANEOUS at 16:36

## 2020-10-01 RX ADMIN — FENTANYL CITRATE 50 MCG: 50 INJECTION, SOLUTION INTRAMUSCULAR; INTRAVENOUS at 15:30

## 2020-10-01 RX ADMIN — SODIUM CHLORIDE, POTASSIUM CHLORIDE, SODIUM LACTATE AND CALCIUM CHLORIDE: 600; 310; 30; 20 INJECTION, SOLUTION INTRAVENOUS at 14:15

## 2020-10-01 RX ADMIN — MIDAZOLAM HYDROCHLORIDE 2 MG: 1 INJECTION, SOLUTION INTRAMUSCULAR; INTRAVENOUS at 14:32

## 2020-10-01 RX ADMIN — ONDANSETRON 4 MG: 2 INJECTION INTRAMUSCULAR; INTRAVENOUS at 15:05

## 2020-10-01 RX ADMIN — PROPOFOL 170 MG: 10 INJECTION, EMULSION INTRAVENOUS at 14:36

## 2020-10-01 RX ADMIN — TRAZODONE HYDROCHLORIDE 50 MG: 50 TABLET ORAL at 22:48

## 2020-10-01 RX ADMIN — HYDROMORPHONE HYDROCHLORIDE 2 MG: 2 TABLET ORAL at 21:04

## 2020-10-01 RX ADMIN — LIDOCAINE HYDROCHLORIDE 100 MG: 20 INJECTION, SOLUTION EPIDURAL; INFILTRATION; INTRACAUDAL at 14:35

## 2020-10-01 RX ADMIN — HYDROMORPHONE HYDROCHLORIDE 2 MG: 2 TABLET ORAL at 05:04

## 2020-10-01 RX ADMIN — NICOTINE TRANSDERMAL SYSTEM 21 MG: 21 PATCH, EXTENDED RELEASE TRANSDERMAL at 05:04

## 2020-10-01 RX ADMIN — CEFAZOLIN 2 G: 330 INJECTION, POWDER, FOR SOLUTION INTRAMUSCULAR; INTRAVENOUS at 14:38

## 2020-10-01 RX ADMIN — HYDROMORPHONE HYDROCHLORIDE 0.2 MG: 1 INJECTION, SOLUTION INTRAMUSCULAR; INTRAVENOUS; SUBCUTANEOUS at 16:01

## 2020-10-01 RX ADMIN — FENTANYL CITRATE 50 MCG: 50 INJECTION, SOLUTION INTRAMUSCULAR; INTRAVENOUS at 15:47

## 2020-10-01 RX ADMIN — HYDROMORPHONE HYDROCHLORIDE 0.2 MG: 1 INJECTION, SOLUTION INTRAMUSCULAR; INTRAVENOUS; SUBCUTANEOUS at 16:26

## 2020-10-01 RX ADMIN — PREGABALIN 150 MG: 150 CAPSULE ORAL at 11:26

## 2020-10-01 RX ADMIN — DOCUSATE SODIUM 100 MG: 100 CAPSULE ORAL at 17:53

## 2020-10-01 RX ADMIN — HYDROCODONE BITARTRATE AND ACETAMINOPHEN 30 ML: 7.5; 325 SOLUTION ORAL at 15:27

## 2020-10-01 RX ADMIN — SULFAMETHOXAZOLE AND TRIMETHOPRIM 2 TABLET: 800; 160 TABLET ORAL at 17:53

## 2020-10-01 RX ADMIN — PREGABALIN 150 MG: 150 CAPSULE ORAL at 05:06

## 2020-10-01 RX ADMIN — HYDROMORPHONE HYDROCHLORIDE 0.4 MG: 1 INJECTION, SOLUTION INTRAMUSCULAR; INTRAVENOUS; SUBCUTANEOUS at 15:56

## 2020-10-01 RX ADMIN — HYDROMORPHONE HYDROCHLORIDE 0.4 MG: 1 INJECTION, SOLUTION INTRAMUSCULAR; INTRAVENOUS; SUBCUTANEOUS at 16:10

## 2020-10-01 RX ADMIN — HYDROMORPHONE HYDROCHLORIDE 2 MG: 2 TABLET ORAL at 08:10

## 2020-10-01 RX ADMIN — VANCOMYCIN HYDROCHLORIDE 1500 MG: 500 INJECTION, POWDER, LYOPHILIZED, FOR SOLUTION INTRAVENOUS at 05:09

## 2020-10-01 RX ADMIN — HYDROMORPHONE HYDROCHLORIDE 0.4 MG: 1 INJECTION, SOLUTION INTRAMUSCULAR; INTRAVENOUS; SUBCUTANEOUS at 16:31

## 2020-10-01 RX ADMIN — HYDROMORPHONE HYDROCHLORIDE 0.2 MG: 1 INJECTION, SOLUTION INTRAMUSCULAR; INTRAVENOUS; SUBCUTANEOUS at 16:16

## 2020-10-01 RX ADMIN — EPHEDRINE SULFATE 25 MG: 50 INJECTION, SOLUTION INTRAVENOUS at 15:04

## 2020-10-01 RX ADMIN — EPHEDRINE SULFATE 10 MG: 50 INJECTION, SOLUTION INTRAVENOUS at 14:45

## 2020-10-01 RX ADMIN — HYDROMORPHONE HYDROCHLORIDE 2 MG: 2 TABLET ORAL at 01:56

## 2020-10-01 RX ADMIN — ACETAMINOPHEN 650 MG: 325 TABLET, FILM COATED ORAL at 17:53

## 2020-10-01 RX ADMIN — KETOROLAC TROMETHAMINE 30 MG: 30 INJECTION, SOLUTION INTRAMUSCULAR at 14:36

## 2020-10-01 RX ADMIN — HYDROMORPHONE HYDROCHLORIDE 2 MG: 2 TABLET ORAL at 17:53

## 2020-10-01 RX ADMIN — HYDROMORPHONE HYDROCHLORIDE 0.4 MG: 1 INJECTION, SOLUTION INTRAMUSCULAR; INTRAVENOUS; SUBCUTANEOUS at 16:11

## 2020-10-01 RX ADMIN — DOCUSATE SODIUM 100 MG: 100 CAPSULE ORAL at 05:06

## 2020-10-01 RX ADMIN — HYDROMORPHONE HYDROCHLORIDE 2 MG: 2 TABLET ORAL at 11:26

## 2020-10-01 RX ADMIN — DIPHENHYDRAMINE HYDROCHLORIDE 25 MG: 25 TABLET ORAL at 01:56

## 2020-10-01 RX ADMIN — DOCUSATE SODIUM 50 MG AND SENNOSIDES 8.6 MG 2 TABLET: 8.6; 5 TABLET, FILM COATED ORAL at 17:53

## 2020-10-01 RX ADMIN — ONDANSETRON 4 MG: 2 INJECTION INTRAMUSCULAR; INTRAVENOUS at 15:30

## 2020-10-01 RX ADMIN — PREGABALIN 150 MG: 150 CAPSULE ORAL at 17:53

## 2020-10-01 RX ADMIN — HYDROMORPHONE HYDROCHLORIDE 0.4 MG: 1 INJECTION, SOLUTION INTRAMUSCULAR; INTRAVENOUS; SUBCUTANEOUS at 15:51

## 2020-10-01 ASSESSMENT — PAIN DESCRIPTION - PAIN TYPE
TYPE: SURGICAL PAIN

## 2020-10-01 ASSESSMENT — ENCOUNTER SYMPTOMS
BLURRED VISION: 0
ABDOMINAL PAIN: 0
FEVER: 0
HEADACHES: 0
SEIZURES: 0
PALPITATIONS: 0
EYE DISCHARGE: 0
NAUSEA: 0
COUGH: 0
CHILLS: 0
INSOMNIA: 1
BACK PAIN: 1
SHORTNESS OF BREATH: 0
VOMITING: 0
NERVOUS/ANXIOUS: 0
MYALGIAS: 1
DIZZINESS: 0

## 2020-10-01 ASSESSMENT — PAIN SCALES - GENERAL: PAIN_LEVEL: 6

## 2020-10-01 NOTE — PROGRESS NOTES
Ortho  Patient seen and examined  Skin graft left thigh to  Right thigh  RBA all explained  Obi Martinez

## 2020-10-01 NOTE — ANESTHESIA POSTPROCEDURE EVALUATION
Patient: Gonsalo Hunt    Procedure Summary     Date: 10/01/20 Room / Location: [facility] OR 06 / SURGERY [facility]    Anesthesia Start: 1430 Anesthesia Stop:     Procedure: APPLICATION, GRAFT, SKIN, [id number] FROM LEFT THIGH TO RIGHT THIGH, WOUND VAC APPLICATION (Right Thigh) Diagnosis: (Failed skin graft right thigh)    Surgeon: Ayush Martinez M.D. Responsible Provider: Christopher Couch M.D.    Anesthesia Type: general ASA Status: 3          Final Anesthesia Type: general  Last vitals  BP   Blood Pressure: (!) 98/58, NIBP: 101/63    Temp   36.1 °C (97 °F)    Pulse   Pulse: 72   Resp   17    SpO2   95 %      Anesthesia Post Evaluation    Patient location during evaluation: PACU  Patient participation: complete - patient participated  Level of consciousness: awake and alert  Pain score: 6    Airway patency: patent  Anesthetic complications: no  Cardiovascular status: hemodynamically stable  Respiratory status: acceptable  Hydration status: euvolemic    PONV: controlled           Nurse Pain Score: 6 (NPRS)

## 2020-10-01 NOTE — OR NURSING
Pt on 2 L NC.  Nausea treated with Zofran, now tolerating PO fluids/juice box lunch and medication.  Right leg surgical site CDI, wound vac compressed, patent and functioning.  Left leg dressing CDI.  Surgical pain down from 9/10 to 6/10, tolerable at this time per pt.  VSS, afebrile, CRAFT, A/O x4.      No belongings in PACU.  Surgical mask in place.    Oxygen tank greater than 75% full.

## 2020-10-01 NOTE — ANESTHESIA TIME REPORT
Anesthesia Start and Stop Event Times     Date Time Event    10/1/2020 1427 Ready for Procedure     1430 Anesthesia Start     1527 Anesthesia Stop        Responsible Staff  10/01/20    Name Role Begin End    Christopher Couch M.D. Anesth 1430 1527        Preop Diagnosis (Free Text):  Pre-op Diagnosis     Failed skin graft right thigh        Preop Diagnosis (Codes):    Post op Diagnosis  Skin graft (allograft) (autograft) failure  Failed skin graft right thigh    Premium Reason  A. 3PM - 7AM    Comments: Premium for Iza, 9th call.

## 2020-10-01 NOTE — OR SURGEON
Immediate Post OP Note    PreOp Diagnosis: failed STSG right thigh    PostOp Diagnosis: same    Procedure(s):  APPLICATION, GRAFT, SKIN, [id number] FROM LEFT THIGH TO RIGHT THIGH, WOUND VAC APPLICATION - Wound Class: Contaminated    Surgeon(s):  Ayush Martinez M.D.    Anesthesiologist/Type of Anesthesia:  Anesthesiologist: Christopher Couch M.D./General    Surgical Staff:  Circulator: Risa Arvizu R.N.  Relief Circulator: Anita A Reyes, R.N.  Scrub Person: Janay Beltran Assist: Jasmin Orozco    Specimens removed if any:  * No specimens in log *    Estimated Blood Loss: 20ccs    Findings: as described    Complications: none        10/1/2020 3:11 PM Ayush Martinez M.D.

## 2020-10-01 NOTE — ANESTHESIA QCDR
2019 [facility] Clinical Data Registry (for Quality Improvement)     Postoperative nausea/vomiting risk protocol (Adult = 18 yrs and Pediatric 3-17 yrs)- (430 and 463)  General inhalation anesthetic (NOT TIVA) with PONV risk factors: No  Provision of anti-emetic therapy with at least 2 different classes of agents: N/A  Patient DID NOT receive anti-emetic therapy and reason is documented in Medical Record: N/A    Multimodal Pain Management- (477)  Non-emergent surgery AND patient age >= 18: Yes  Use of Multimodal Pain Management, two or more drugs and/or interventions, NOT including systemic opioids: Yes  Exception: Documented allergy to multiple classes of analgesics: N/A    Smoking Abstinence (404)  Patient is current smoker (cigarette, pipe, e-cig, marijuanna): No  Elective Surgery:   Abstinence instructions provided prior to day of surgery:   Patient abstained from smoking on day of surgery:     Pre-Op Beta-Blocker in Isolated CABG (44)  Isolated CABG AND patient age >= 18: No  Beta-blocker admin within 24 hours of surgical incision:   Exception:of medical reason(s) for not administering beta blocker within 24 hours prior to surgical incision (e.g., not  indicated,other medical reason):     PACU assessment of acute postoperative pain prior to Anesthesia Care End- Applies to Patients Age = 18- (ABG7)  Initial PACU pain score is which of the following: < 7/10  Patient unable to report pain score: N/A    Post-anesthetic transfer of care checklist/protocol to PACU/ICU- (426 and 427)  Upon conclusion of case, patient transferred to which of the following locations: PACU/Non-ICU  Use of transfer checklist/protocol: Yes  Exclusion: Service Performed in Patient Hospital Room (and thus did not require transfer): N/A  Unplanned admission to ICU related to anesthesia service up through end of PACU care- (MD51)  Unplanned admission to ICU (not initially anticipated at anesthesia start time): No

## 2020-10-01 NOTE — PROGRESS NOTES
Hospital Medicine Daily Progress Note    Date of Service  10/1/2020    Chief Complaint  Wound pain, drainage    Hospital Course  60 y.o. male with a past medical history of previous MRSA cellulitis, intravenous drug use, homelessness, Hx right lower extremity necrotizing fasciitis s/p fasciotomy in January 2020. Admitted 9/14/2020 with multiple abscesses on upper and lower extremity.  He has a 4 mm ulcer with serosanguineous drainage on his left lateral forearm.   Vancomycin and Rocephin was started.  Orthopedic surgery were consulted, S/P irrigation and debridement of left antecubital fossa and superficial right thigh wound in the OR. Right hip wound culture grew beta-hemolytic group B strep. Transthoracic Echo performed to r/o endocarditis was performed and was negative for vegetations, EF 65 with mild tricuspid regurg. RV systolic pressure of 25mmHg.     Interval Problem Update  No acute events overnight.   Repeat skin graft today 10/1  Pain well controlled  Vancomycin completed yesterday 9/30, start on bactrim today x4 weeks    Consultants/Specialty  Orthopedic surgery  ID    Code Status  DNAR/DNI    Disposition  Anticipate d/c home pending completion of IV abx and ortho clearance.     Review of Systems  Review of Systems   Constitutional: Positive for malaise/fatigue. Negative for chills and fever.   Eyes: Negative for blurred vision and discharge.   Respiratory: Negative for cough and shortness of breath.    Cardiovascular: Negative for chest pain, palpitations and leg swelling.   Gastrointestinal: Negative for abdominal pain, nausea and vomiting.   Genitourinary: Negative for dysuria and hematuria.   Musculoskeletal: Positive for back pain, joint pain and myalgias.   Skin: Negative for itching.   Neurological: Negative for dizziness, seizures and headaches.   Psychiatric/Behavioral: The patient has insomnia. The patient is not nervous/anxious.         Chronic depression      Physical Exam  Temp:  [36 °C (96.8  °F)-36.6 °C (97.9 °F)] 36 °C (96.8 °F)  Pulse:  [69-81] 69  Resp:  [17-18] 18  BP: (113-131)/(70-75) 113/70  SpO2:  [96 %-97 %] 96 %    Physical Exam  Vitals signs reviewed.   Constitutional:       General: He is not in acute distress.     Appearance: He is not diaphoretic.   HENT:      Head: Normocephalic.      Mouth/Throat:      Mouth: Mucous membranes are moist.   Eyes:      General:         Right eye: No discharge.         Left eye: No discharge.      Extraocular Movements: Extraocular movements intact.   Neck:      Musculoskeletal: Normal range of motion. No neck rigidity.   Cardiovascular:      Rate and Rhythm: Normal rate.      Heart sounds: Murmur present.   Pulmonary:      Effort: Pulmonary effort is normal. No respiratory distress.      Breath sounds: No wheezing.   Abdominal:      General: There is no distension.      Palpations: Abdomen is soft.      Tenderness: There is no abdominal tenderness. There is no guarding.   Musculoskeletal: Normal range of motion.      Comments: Left UE good ROM, right thigh under ace wrap/dressing, no pain with light palpation   Skin:     General: Skin is dry.   Neurological:      General: No focal deficit present.      Mental Status: He is alert.   Psychiatric:         Mood and Affect: Mood normal.       Fluids  No intake or output data in the 24 hours ending 10/01/20 1256  Laboratory      Recent Labs     09/30/20  1123   SODIUM 138   POTASSIUM 3.9   CHLORIDE 102   CO2 24   GLUCOSE 106*   BUN 20   CREATININE 0.69   CALCIUM 9.1                   Imaging  IR-MIDLINE CATHETER INSERTION WO GUIDANCE > AGE 3   Final Result                  Ultrasound-guided midline placement performed by qualified nursing staff    as above.          EC-ECHOCARDIOGRAM COMPLETE W/O CONT   Final Result      DX-ELBOW-COMPLETE 3+ LEFT   Final Result      Soft tissue swelling without acute osseous abnormality.      DX-FEMUR-2+ RIGHT   Final Result      No acute osseous abnormality.          Assessment/Plan  Leg wound, right- (present on admission)  Assessment & Plan  Has h/o necrotizing fascitis.   - S/p I&D of right thigh wound and split thickness skin graft on 9/17  - surgery following, plan for repeat split thickness skin graft 10/1  - pain control  - wound care  - outpatient wound care clinic referral placed by Ortho PA  - abx as noted above    Pyogenic arthritis of left elbow (HCC)- (present on admission)  Assessment & Plan  - Ortho and ID following, appreciate recs  - s/p I&D left antecubital fossa on 9/15  - completed Vancomycin through 9/30  - continue with bactrim x4 weeks starting 10/1  - culture positive for beta hemolytic group A strep and MRSA  - pain control  - wound care    Murmur- (present on admission)  Assessment & Plan  In the setting of his h/o IV drug abuse.   - blood cultures negative   - TTE negative    IV drug abuse (HCC)- (present on admission)  Assessment & Plan  Patient denies current use.  - barrier for acceptance to SNF; not a candidate for outpatient infusion  - avoid IV narcotics as possible     Homeless  Assessment & Plan  Social work and case management to assist in discharge planning     VTE prophylaxis: heparin

## 2020-10-01 NOTE — CARE PLAN
Pt resting in bed eyes closed, respirations quiet and easy. No visual signs of pain or discomfort noted thus far. Safety maintained, call bell within reach.

## 2020-10-01 NOTE — CARE PLAN
Problem: Communication  Goal: The ability to communicate needs accurately and effectively will improve  Outcome: PROGRESSING AS EXPECTED     Problem: Safety  Goal: Will remain free from injury  Outcome: PROGRESSING AS EXPECTED     Problem: Safety  Goal: Will remain free from falls  Outcome: PROGRESSING AS EXPECTED     Problem: Infection  Goal: Will remain free from infection  Outcome: PROGRESSING AS EXPECTED   Pt resting in bed eyes closed, respirations quiet and easy. No visual signs of pain or discomfort noted thus far. Safety maintained, call bell within reach.

## 2020-10-01 NOTE — ANESTHESIA PREPROCEDURE EVALUATION
Relevant Problems   PULMONARY   (+) History of MRSA infection      NEURO   (+) History of MRSA infection      CARDIAC   (+) Murmur         (+) Chronic hepatitis C virus infection (HCC)      Other   (+) Anemia   (+) Cannabis use disorder, mild, abuse   (+) Heroin use   (+) Homeless   (+) IV drug abuse (HCC)   (+) Leg wound, right   (+) Tobacco abuse   (+) Unilateral recurrent inguinal hernia without obstruction or gangrene       Physical Exam    Airway   Mallampati: II  TM distance: >3 FB  Neck ROM: full       Cardiovascular - normal exam  Rhythm: regular  Rate: normal  (-) murmur  Comments: By palpation of radial artery   Dental - normal exam           Pulmonary   Comments: No concerns   Abdominal    Neurological     Comments: A&Ox4, grossly intact             Anesthesia Plan    ASA 3 (Hepatitis C. Heroin abuse/dependence.)   ASA physical status 3 criteria: other (comment)    Plan - general             Induction: intravenous    Postoperative Plan: Postoperative administration of opioids is intended.    Pertinent diagnostic labs and testing reviewed    Informed Consent:    Anesthetic plan and risks discussed with patient.    Use of blood products discussed with: patient whom consented to blood products.

## 2020-10-02 ENCOUNTER — PHARMACY VISIT (OUTPATIENT)
Dept: PHARMACY | Facility: MEDICAL CENTER | Age: 60
End: 2020-10-02
Payer: MEDICARE

## 2020-10-02 PROCEDURE — 700102 HCHG RX REV CODE 250 W/ 637 OVERRIDE(OP): Performed by: STUDENT IN AN ORGANIZED HEALTH CARE EDUCATION/TRAINING PROGRAM

## 2020-10-02 PROCEDURE — 99232 SBSQ HOSP IP/OBS MODERATE 35: CPT | Performed by: STUDENT IN AN ORGANIZED HEALTH CARE EDUCATION/TRAINING PROGRAM

## 2020-10-02 PROCEDURE — A9270 NON-COVERED ITEM OR SERVICE: HCPCS | Performed by: STUDENT IN AN ORGANIZED HEALTH CARE EDUCATION/TRAINING PROGRAM

## 2020-10-02 PROCEDURE — 700102 HCHG RX REV CODE 250 W/ 637 OVERRIDE(OP): Performed by: HOSPITALIST

## 2020-10-02 PROCEDURE — 700102 HCHG RX REV CODE 250 W/ 637 OVERRIDE(OP): Performed by: PHYSICIAN ASSISTANT

## 2020-10-02 PROCEDURE — A9270 NON-COVERED ITEM OR SERVICE: HCPCS | Performed by: INTERNAL MEDICINE

## 2020-10-02 PROCEDURE — A9270 NON-COVERED ITEM OR SERVICE: HCPCS | Performed by: PHYSICIAN ASSISTANT

## 2020-10-02 PROCEDURE — A9270 NON-COVERED ITEM OR SERVICE: HCPCS | Performed by: HOSPITALIST

## 2020-10-02 PROCEDURE — 700102 HCHG RX REV CODE 250 W/ 637 OVERRIDE(OP): Performed by: INTERNAL MEDICINE

## 2020-10-02 PROCEDURE — RXMED WILLOW AMBULATORY MEDICATION CHARGE: Performed by: STUDENT IN AN ORGANIZED HEALTH CARE EDUCATION/TRAINING PROGRAM

## 2020-10-02 PROCEDURE — 700101 HCHG RX REV CODE 250: Performed by: INTERNAL MEDICINE

## 2020-10-02 PROCEDURE — 770021 HCHG ROOM/CARE - ISO PRIVATE

## 2020-10-02 RX ORDER — OXYCODONE HYDROCHLORIDE 10 MG/1
10 TABLET ORAL
Qty: 24 TAB | Refills: 0 | Status: SHIPPED | OUTPATIENT
Start: 2020-10-02 | End: 2020-10-06

## 2020-10-02 RX ORDER — GABAPENTIN 100 MG/1
100 CAPSULE ORAL 3 TIMES DAILY
Qty: 15 CAP | Refills: 0 | OUTPATIENT
Start: 2020-10-02 | End: 2020-10-02 | Stop reason: SDUPTHER

## 2020-10-02 RX ORDER — GABAPENTIN 300 MG/1
300 CAPSULE ORAL 3 TIMES DAILY
Qty: 15 CAP | Refills: 0 | Status: SHIPPED | OUTPATIENT
Start: 2020-10-02 | End: 2020-10-07

## 2020-10-02 RX ORDER — ACETAMINOPHEN 325 MG/1
650 TABLET ORAL 3 TIMES DAILY
Qty: 30 TAB | Refills: 0 | Status: SHIPPED | OUTPATIENT
Start: 2020-10-02 | End: 2021-12-28

## 2020-10-02 RX ORDER — SULFAMETHOXAZOLE AND TRIMETHOPRIM 800; 160 MG/1; MG/1
2 TABLET ORAL EVERY 12 HOURS
Qty: 104 TAB | Refills: 0 | Status: SHIPPED | OUTPATIENT
Start: 2020-10-02 | End: 2020-10-28

## 2020-10-02 RX ORDER — PREGABALIN 25 MG/1
25 CAPSULE ORAL 3 TIMES DAILY
Qty: 15 CAP | Refills: 0 | Status: SHIPPED | OUTPATIENT
Start: 2020-10-02 | End: 2020-10-02 | Stop reason: CLARIF

## 2020-10-02 RX ADMIN — HYDROMORPHONE HYDROCHLORIDE 2 MG: 2 TABLET ORAL at 08:36

## 2020-10-02 RX ADMIN — DOCUSATE SODIUM 50 MG AND SENNOSIDES 8.6 MG 2 TABLET: 8.6; 5 TABLET, FILM COATED ORAL at 16:23

## 2020-10-02 RX ADMIN — DOCUSATE SODIUM 100 MG: 100 CAPSULE ORAL at 05:37

## 2020-10-02 RX ADMIN — HYDROMORPHONE HYDROCHLORIDE 2 MG: 2 TABLET ORAL at 03:23

## 2020-10-02 RX ADMIN — TRAZODONE HYDROCHLORIDE 50 MG: 50 TABLET ORAL at 20:47

## 2020-10-02 RX ADMIN — POLYETHYLENE GLYCOL 3350 1 PACKET: 17 POWDER, FOR SOLUTION ORAL at 05:43

## 2020-10-02 RX ADMIN — PREGABALIN 150 MG: 150 CAPSULE ORAL at 12:30

## 2020-10-02 RX ADMIN — SULFAMETHOXAZOLE AND TRIMETHOPRIM 2 TABLET: 800; 160 TABLET ORAL at 16:23

## 2020-10-02 RX ADMIN — HYDROMORPHONE HYDROCHLORIDE 2 MG: 2 TABLET ORAL at 00:22

## 2020-10-02 RX ADMIN — NICOTINE TRANSDERMAL SYSTEM 21 MG: 21 PATCH, EXTENDED RELEASE TRANSDERMAL at 05:38

## 2020-10-02 RX ADMIN — HYDROMORPHONE HYDROCHLORIDE 2 MG: 2 TABLET ORAL at 23:49

## 2020-10-02 RX ADMIN — ACETAMINOPHEN 650 MG: 325 TABLET, FILM COATED ORAL at 16:23

## 2020-10-02 RX ADMIN — DOCUSATE SODIUM 100 MG: 100 CAPSULE ORAL at 16:23

## 2020-10-02 RX ADMIN — HYDROMORPHONE HYDROCHLORIDE 2 MG: 2 TABLET ORAL at 16:23

## 2020-10-02 RX ADMIN — ACETAMINOPHEN 650 MG: 325 TABLET, FILM COATED ORAL at 12:30

## 2020-10-02 RX ADMIN — PREGABALIN 150 MG: 150 CAPSULE ORAL at 16:23

## 2020-10-02 RX ADMIN — ACETAMINOPHEN 650 MG: 325 TABLET, FILM COATED ORAL at 05:37

## 2020-10-02 RX ADMIN — HYDROMORPHONE HYDROCHLORIDE 2 MG: 2 TABLET ORAL at 20:47

## 2020-10-02 RX ADMIN — HYDROMORPHONE HYDROCHLORIDE 2 MG: 2 TABLET ORAL at 12:30

## 2020-10-02 RX ADMIN — SULFAMETHOXAZOLE AND TRIMETHOPRIM 2 TABLET: 800; 160 TABLET ORAL at 05:37

## 2020-10-02 RX ADMIN — PREGABALIN 150 MG: 150 CAPSULE ORAL at 05:38

## 2020-10-02 ASSESSMENT — PAIN DESCRIPTION - PAIN TYPE
TYPE: SURGICAL PAIN

## 2020-10-02 ASSESSMENT — ENCOUNTER SYMPTOMS
FEVER: 0
PALPITATIONS: 0
DIZZINESS: 0
INSOMNIA: 1
VOMITING: 0
SEIZURES: 0
BACK PAIN: 1
COUGH: 0
BLURRED VISION: 0
EYE DISCHARGE: 0
NAUSEA: 0
SHORTNESS OF BREATH: 0
CHILLS: 0
NERVOUS/ANXIOUS: 0
MYALGIAS: 1
HEADACHES: 0
ABDOMINAL PAIN: 0

## 2020-10-02 NOTE — CARE PLAN
Problem: Communication  Goal: The ability to communicate needs accurately and effectively will improve  Outcome: PROGRESSING AS EXPECTED   Patient uses call light to communicate with staff and verbalize needs. Patient updated on plan of care.   Problem: Pain Management  Goal: Pain level will decrease to patient's comfort goal  Outcome: PROGRESSING AS EXPECTED   Patient is able to verbalize pain on a 0/10 scale. Pain being controlled with prn pain medication.

## 2020-10-02 NOTE — DISCHARGE PLANNING
Anticipated Discharge Disposition: Home tomorrow    Action: Pt had surgery yesterday for skin graft.  Pt discussed in IDT rounds, expected to be ready to dc home tomorrow.    Barriers to Discharge: medical clearance    Plan: Pt reports being homeless, dc to shelter tomorrow pending medical clearance.      UPDATE:  Attending MD Loaiza reported that pt currently has a wound vac and will need to stay inpatient until wound vac is removed.

## 2020-10-02 NOTE — CARE PLAN
Problem: Communication  Goal: The ability to communicate needs accurately and effectively will improve  Outcome: PROGRESSING AS EXPECTED   Pt communicates needs effectively.   Call light w/in reach. Hourly rounding in place.      Problem: Knowledge Deficit  Goal: Knowledge of the prescribed therapeutic regimen will improve  Outcome: PROGRESSING AS EXPECTED  Pt educated on the plan of care & he verbalized understanding.

## 2020-10-02 NOTE — PROGRESS NOTES
Patient returned from PACU. Resting comfortably in bed. Complains of pain  To B/L legs and left arm. Medicated per MAR with oral hydromorphone. Diet resumed.

## 2020-10-02 NOTE — OP REPORT
DATE OF SERVICE:  10/01/2020    PREOPERATIVE DIAGNOSIS:  Failed split-thickness skin graft, right thigh.    POSTOPERATIVE DIAGNOSIS:  Failed split-thickness skin graft, right thigh.    OPERATIONS PERFORMED:  1.  Repeat split-thickness skin graft, left thigh to right thigh.  2.  Application of ACell crystals.  3.  Application of right thigh wound VAC.    PREOP CONSENT AND FAMILY CONFERENCE:  Patient was seen today preoperatively.    Risks, benefits, alternatives all explained.  Patient consented full surgical   undertaking, answered all of his questions.  Basically, we will be repeating   the split-thickness skin graft that failed last week.    COMPLICATIONS:  None.    SURGEON:  Ayush Martinez MD    ASSISTANT:  Ernesto    BLOOD LOSS:  20 mL.    OPERATION:  The patient was brought to the operating room, awake, alert,   placed on the operating room table in supine position, delivered general   endotracheal anesthetic.  Both lower extremities shaved, scrubbed, prepped and   draped in normal sterile routine fashion.    The left thigh was used as the donor site in the same spot as the previous   grafts were taken.  Using a Brown dermatome with mineral oil, we harvested a   0.02 thickness graft.  We harvested the graft and then meshed it from 2:1.    We dressed the donor site with epinephrine-soaked 4x4s, while we did the skin   grafting technique on the right side.  After the meshing, we simply placed the   graft in its appropriate orientation, trimmed the excess skin graft and then   stapled it, and then used ACell crystals as an attempt to biologically   increase the stimulus for healing and then placed Xeroform and then our wound   VAC.    The patient was then taken to recovery room in stable condition.  No   intraoperative or immediate postoperative complications.  Patient tolerated   the procedure well.       ____________________________________     MD NIKITA CHRISTIAN / SUZI    DD:  10/01/2020 15:11:32  DT:   10/01/2020 17:31:45    D#:  [id number]  Job#:  [id number]

## 2020-10-02 NOTE — PROGRESS NOTES
Hospital Medicine Daily Progress Note    Date of Service  10/2/2020    Chief Complaint  Wound pain, drainage    Hospital Course  60 y.o. male with a past medical history of previous MRSA cellulitis, intravenous drug use, homelessness, Hx right lower extremity necrotizing fasciitis s/p fasciotomy in January 2020. Admitted 9/14/2020 with multiple abscesses on upper and lower extremity.  He has a 4 mm ulcer with serosanguineous drainage on his left lateral forearm.   Vancomycin and Rocephin was started.  Orthopedic surgery were consulted, S/P irrigation and debridement of left antecubital fossa and superficial right thigh wound in the OR. Right hip wound culture grew beta-hemolytic group B strep. Transthoracic Echo performed to r/o endocarditis was performed and was negative for vegetations, EF 65 with mild tricuspid regurg. RV systolic pressure of 25mmHg.     Interval Problem Update  No acute events overnight.   Underwent repeat skin graft yesterday 10/1  Pain well controlled  On bactrim today x4 weeks  Will discuss with ortho length of wound vac need, may need to stay in house for continued wound care given no SNF options for patient    Consultants/Specialty  Orthopedic surgery  ID    Code Status  DNAR/DNI    Disposition  Anticipate d/c home pending ortho clearance.     Review of Systems  Review of Systems   Constitutional: Positive for malaise/fatigue. Negative for chills and fever.   Eyes: Negative for blurred vision and discharge.   Respiratory: Negative for cough and shortness of breath.    Cardiovascular: Negative for chest pain, palpitations and leg swelling.   Gastrointestinal: Negative for abdominal pain, nausea and vomiting.   Genitourinary: Negative for dysuria and hematuria.   Musculoskeletal: Positive for back pain, joint pain and myalgias.   Skin: Negative for itching.   Neurological: Negative for dizziness, seizures and headaches.   Psychiatric/Behavioral: The patient has insomnia. The patient is not  nervous/anxious.         Chronic depression      Physical Exam  Temp:  [35.8 °C (96.5 °F)-36.7 °C (98.1 °F)] 36.4 °C (97.6 °F)  Pulse:  [68-83] 83  Resp:  [12-20] 16  BP: ()/(55-68) 92/57  SpO2:  [92 %-97 %] 94 %    Physical Exam  Vitals signs reviewed.   Constitutional:       General: He is not in acute distress.     Appearance: He is not diaphoretic.   HENT:      Head: Normocephalic.      Mouth/Throat:      Mouth: Mucous membranes are moist.   Eyes:      General:         Right eye: No discharge.         Left eye: No discharge.      Extraocular Movements: Extraocular movements intact.   Neck:      Musculoskeletal: Normal range of motion. No neck rigidity.   Cardiovascular:      Rate and Rhythm: Normal rate.      Heart sounds: Murmur present.   Pulmonary:      Effort: Pulmonary effort is normal. No respiratory distress.      Breath sounds: No wheezing.   Abdominal:      General: There is no distension.      Palpations: Abdomen is soft.      Tenderness: There is no abdominal tenderness. There is no guarding.   Musculoskeletal: Normal range of motion.      Comments: Left UE good ROM, right thigh under ace wrap/dressing, no pain with light palpation, wound vac applied to right thigh; left thigh under ace wrap/dressing with dried blood seeped through   Skin:     General: Skin is dry.   Neurological:      General: No focal deficit present.      Mental Status: He is alert.   Psychiatric:         Mood and Affect: Mood normal.       Fluids    Intake/Output Summary (Last 24 hours) at 10/2/2020 1149  Last data filed at 10/2/2020 0400  Gross per 24 hour   Intake 568 ml   Output 965 ml   Net -397 ml     Laboratory      Recent Labs     09/30/20  1123   SODIUM 138   POTASSIUM 3.9   CHLORIDE 102   CO2 24   GLUCOSE 106*   BUN 20   CREATININE 0.69   CALCIUM 9.1                   Imaging  IR-MIDLINE CATHETER INSERTION WO GUIDANCE > AGE 3   Final Result                  Ultrasound-guided midline placement performed by qualified  nursing staff    as above.          EC-ECHOCARDIOGRAM COMPLETE W/O CONT   Final Result      DX-ELBOW-COMPLETE 3+ LEFT   Final Result      Soft tissue swelling without acute osseous abnormality.      DX-FEMUR-2+ RIGHT   Final Result      No acute osseous abnormality.         Assessment/Plan  Leg wound, right- (present on admission)  Assessment & Plan  Has h/o necrotizing fascitis.   - S/p I&D of right thigh wound and split thickness skin graft on 9/17  - underwent repeat split thickness skin graft 10/1  - pain control  - wound care, need to clarify length of wound vac use  - outpatient wound care clinic referral placed by Ortho PA  - abx as noted above    Pyogenic arthritis of left elbow (HCC)- (present on admission)  Assessment & Plan  - Ortho and ID following, appreciate recs  - s/p I&D left antecubital fossa on 9/15  - completed Vancomycin through 9/30  - continue with bactrim x4 weeks starting 10/1  - culture positive for beta hemolytic group A strep and MRSA  - pain control  - wound care    Murmur- (present on admission)  Assessment & Plan  In the setting of his h/o IV drug abuse.   - blood cultures negative   - TTE negative    IV drug abuse (HCC)- (present on admission)  Assessment & Plan  Patient denies current use.  - barrier for acceptance to SNF; not a candidate for outpatient infusion  - avoid IV narcotics as possible     Homeless  Assessment & Plan  Social work and case management to assist in discharge planning     VTE prophylaxis: heparin

## 2020-10-02 NOTE — DISCHARGE PLANNING
Meds-to-Beds: Discharge prescription orders listed below delivered to patient's bedside and sent to central pharmacy as home medications as patient is anticipated to DC over the weekend per provider (bag #[id number]). Patient counseled.       Gonsalo Hunt   Home Medication Instructions ELIUD:[id number]    Printed on:10/02/20 5846   Medication Information                      acetaminophen (TYLENOL) 325 MG Tab  Take 2 Tabs by mouth 3 times a day.             gabapentin (NEURONTIN) 300 MG Cap  Take 1 Cap by mouth 3 times a day for 5 days.             oxyCODONE immediate release (ROXICODONE) 10 MG immediate release tablet  Take 1 Tab by mouth every 3 hours as needed for up to 3 days.             sulfamethoxazole-trimethoprim (BACTRIM DS) 800-160 MG tablet  Take 2 Tabs by mouth every 12 hours for 26 days.               Odalis Castillo, PharmD

## 2020-10-02 NOTE — CARE PLAN
Problem: Safety  Goal: Will remain free from falls  Outcome: PROGRESSING AS EXPECTED   Calls for assistance if needed, makes no unsafe attempts at ambulation.    Problem: Mobility  Goal: Risk for activity intolerance will decrease  Outcome: PROGRESSING AS EXPECTED   Patient on bedrest with bathroom privileges post operatively.

## 2020-10-03 PROCEDURE — A9270 NON-COVERED ITEM OR SERVICE: HCPCS | Performed by: INTERNAL MEDICINE

## 2020-10-03 PROCEDURE — A9270 NON-COVERED ITEM OR SERVICE: HCPCS | Performed by: HOSPITALIST

## 2020-10-03 PROCEDURE — A9270 NON-COVERED ITEM OR SERVICE: HCPCS | Performed by: STUDENT IN AN ORGANIZED HEALTH CARE EDUCATION/TRAINING PROGRAM

## 2020-10-03 PROCEDURE — 770021 HCHG ROOM/CARE - ISO PRIVATE

## 2020-10-03 PROCEDURE — A9270 NON-COVERED ITEM OR SERVICE: HCPCS | Performed by: PHYSICIAN ASSISTANT

## 2020-10-03 PROCEDURE — 700102 HCHG RX REV CODE 250 W/ 637 OVERRIDE(OP): Performed by: STUDENT IN AN ORGANIZED HEALTH CARE EDUCATION/TRAINING PROGRAM

## 2020-10-03 PROCEDURE — 700102 HCHG RX REV CODE 250 W/ 637 OVERRIDE(OP): Performed by: PHYSICIAN ASSISTANT

## 2020-10-03 PROCEDURE — 700102 HCHG RX REV CODE 250 W/ 637 OVERRIDE(OP): Performed by: HOSPITALIST

## 2020-10-03 PROCEDURE — 99232 SBSQ HOSP IP/OBS MODERATE 35: CPT | Performed by: STUDENT IN AN ORGANIZED HEALTH CARE EDUCATION/TRAINING PROGRAM

## 2020-10-03 PROCEDURE — 700102 HCHG RX REV CODE 250 W/ 637 OVERRIDE(OP): Performed by: INTERNAL MEDICINE

## 2020-10-03 PROCEDURE — 700101 HCHG RX REV CODE 250: Performed by: INTERNAL MEDICINE

## 2020-10-03 RX ADMIN — ACETAMINOPHEN 650 MG: 325 TABLET, FILM COATED ORAL at 05:05

## 2020-10-03 RX ADMIN — SULFAMETHOXAZOLE AND TRIMETHOPRIM 2 TABLET: 800; 160 TABLET ORAL at 05:05

## 2020-10-03 RX ADMIN — SULFAMETHOXAZOLE AND TRIMETHOPRIM 2 TABLET: 800; 160 TABLET ORAL at 16:48

## 2020-10-03 RX ADMIN — PREGABALIN 150 MG: 150 CAPSULE ORAL at 05:05

## 2020-10-03 RX ADMIN — PREGABALIN 150 MG: 150 CAPSULE ORAL at 12:00

## 2020-10-03 RX ADMIN — HYDROMORPHONE HYDROCHLORIDE 2 MG: 2 TABLET ORAL at 10:14

## 2020-10-03 RX ADMIN — TRAZODONE HYDROCHLORIDE 50 MG: 50 TABLET ORAL at 19:56

## 2020-10-03 RX ADMIN — ACETAMINOPHEN 650 MG: 325 TABLET, FILM COATED ORAL at 16:48

## 2020-10-03 RX ADMIN — DOCUSATE SODIUM 100 MG: 100 CAPSULE ORAL at 16:57

## 2020-10-03 RX ADMIN — HYDROMORPHONE HYDROCHLORIDE 2 MG: 2 TABLET ORAL at 19:56

## 2020-10-03 RX ADMIN — HYDROMORPHONE HYDROCHLORIDE 2 MG: 2 TABLET ORAL at 03:00

## 2020-10-03 RX ADMIN — HYDROMORPHONE HYDROCHLORIDE 2 MG: 2 TABLET ORAL at 16:49

## 2020-10-03 RX ADMIN — HYDROMORPHONE HYDROCHLORIDE 2 MG: 2 TABLET ORAL at 13:25

## 2020-10-03 RX ADMIN — DOCUSATE SODIUM 50 MG AND SENNOSIDES 8.6 MG 2 TABLET: 8.6; 5 TABLET, FILM COATED ORAL at 16:48

## 2020-10-03 RX ADMIN — DOCUSATE SODIUM 100 MG: 100 CAPSULE ORAL at 05:05

## 2020-10-03 RX ADMIN — HYDROMORPHONE HYDROCHLORIDE 2 MG: 2 TABLET ORAL at 23:23

## 2020-10-03 RX ADMIN — NICOTINE TRANSDERMAL SYSTEM 21 MG: 21 PATCH, EXTENDED RELEASE TRANSDERMAL at 05:06

## 2020-10-03 RX ADMIN — POLYETHYLENE GLYCOL 3350 1 PACKET: 17 POWDER, FOR SOLUTION ORAL at 05:05

## 2020-10-03 RX ADMIN — PREGABALIN 150 MG: 150 CAPSULE ORAL at 16:48

## 2020-10-03 RX ADMIN — HYDROMORPHONE HYDROCHLORIDE 2 MG: 2 TABLET ORAL at 06:52

## 2020-10-03 RX ADMIN — ACETAMINOPHEN 650 MG: 325 TABLET, FILM COATED ORAL at 11:57

## 2020-10-03 ASSESSMENT — ENCOUNTER SYMPTOMS
FEVER: 0
SHORTNESS OF BREATH: 0
NERVOUS/ANXIOUS: 0
BLURRED VISION: 0
CHILLS: 0
DIZZINESS: 0
MYALGIAS: 1
HEADACHES: 0
VOMITING: 0
EYE DISCHARGE: 0
COUGH: 0
ABDOMINAL PAIN: 0
NAUSEA: 0
BACK PAIN: 1
PALPITATIONS: 0
SEIZURES: 0
INSOMNIA: 1

## 2020-10-03 ASSESSMENT — PAIN DESCRIPTION - PAIN TYPE
TYPE: SURGICAL PAIN

## 2020-10-03 NOTE — PROGRESS NOTES
Hospital Medicine Daily Progress Note    Date of Service  10/3/2020    Chief Complaint  Wound pain, drainage    Hospital Course  60 y.o. male with a past medical history of previous MRSA cellulitis, intravenous drug use, homelessness, Hx right lower extremity necrotizing fasciitis s/p fasciotomy in January 2020. Admitted 9/14/2020 with multiple abscesses on upper and lower extremity.  He has a 4 mm ulcer with serosanguineous drainage on his left lateral forearm.   Vancomycin and Rocephin was started.  Orthopedic surgery were consulted, S/P irrigation and debridement of left antecubital fossa and superficial right thigh wound in the OR. Right hip wound culture grew beta-hemolytic group B strep. Transthoracic Echo performed to r/o endocarditis was performed and was negative for vegetations, EF 65 with mild tricuspid regurg. RV systolic pressure of 25mmHg.     Interval Problem Update  No acute events overnight.   Patient feeling well, pain well controlled  Will need wound vac for right thigh at least 5 days per ortho  Continue bactrim for total 4 week course    Consultants/Specialty  Orthopedic surgery  ID    Code Status  DNAR/DNI    Disposition  Anticipate d/c home pending ortho clearance.     Review of Systems  Review of Systems   Constitutional: Positive for malaise/fatigue. Negative for chills and fever.   Eyes: Negative for blurred vision and discharge.   Respiratory: Negative for cough and shortness of breath.    Cardiovascular: Negative for chest pain, palpitations and leg swelling.   Gastrointestinal: Negative for abdominal pain, nausea and vomiting.   Genitourinary: Negative for dysuria and hematuria.   Musculoskeletal: Positive for back pain, joint pain and myalgias.   Skin: Negative for itching.   Neurological: Negative for dizziness, seizures and headaches.   Psychiatric/Behavioral: The patient has insomnia. The patient is not nervous/anxious.         Chronic depression      Physical Exam  Temp:  [36.6 °C  (97.8 °F)-37.2 °C (99 °F)] 36.7 °C (98 °F)  Pulse:  [78-86] 85  Resp:  [16-18] 16  BP: ()/(54-67) 114/67  SpO2:  [93 %-97 %] 93 %    Physical Exam  Vitals signs reviewed.   Constitutional:       General: He is not in acute distress.     Appearance: He is not diaphoretic.   HENT:      Head: Normocephalic.      Mouth/Throat:      Mouth: Mucous membranes are moist.   Eyes:      General:         Right eye: No discharge.         Left eye: No discharge.      Extraocular Movements: Extraocular movements intact.   Neck:      Musculoskeletal: Normal range of motion. No neck rigidity.   Cardiovascular:      Rate and Rhythm: Normal rate.      Heart sounds: Murmur present.   Pulmonary:      Effort: Pulmonary effort is normal. No respiratory distress.      Breath sounds: No wheezing.   Abdominal:      General: There is no distension.      Palpations: Abdomen is soft.      Tenderness: There is no abdominal tenderness. There is no guarding.   Musculoskeletal: Normal range of motion.      Comments: Left UE good ROM, right thigh under ace wrap/dressing, no pain with light palpation, wound vac applied to right thigh; left thigh under ace wrap/dressing with dried blood seeped through   Skin:     General: Skin is dry.   Neurological:      General: No focal deficit present.      Mental Status: He is alert.   Psychiatric:         Mood and Affect: Mood normal.       Fluids    Intake/Output Summary (Last 24 hours) at 10/3/2020 1111  Last data filed at 10/2/2020 2349  Gross per 24 hour   Intake --   Output 1250 ml   Net -1250 ml     Laboratory      Recent Labs     09/30/20  1123   SODIUM 138   POTASSIUM 3.9   CHLORIDE 102   CO2 24   GLUCOSE 106*   BUN 20   CREATININE 0.69   CALCIUM 9.1                   Imaging  IR-MIDLINE CATHETER INSERTION WO GUIDANCE > AGE 3   Final Result                  Ultrasound-guided midline placement performed by qualified nursing staff    as above.          EC-ECHOCARDIOGRAM COMPLETE W/O CONT   Final  Result      DX-ELBOW-COMPLETE 3+ LEFT   Final Result      Soft tissue swelling without acute osseous abnormality.      DX-FEMUR-2+ RIGHT   Final Result      No acute osseous abnormality.         Assessment/Plan  Leg wound, right- (present on admission)  Assessment & Plan  Has h/o necrotizing fascitis.   - S/p I&D of right thigh wound and split thickness skin graft on 9/17  - underwent repeat split thickness skin graft 10/1  - pain control  - wound care, will need wound vac for at least 5 days per ortho team  - outpatient wound care clinic referral placed by Ortho PA  - abx as noted above    Pyogenic arthritis of left elbow (HCC)- (present on admission)  Assessment & Plan  - Ortho and ID following, appreciate recs  - s/p I&D left antecubital fossa on 9/15  - completed Vancomycin through 9/30  - continue with bactrim x4 weeks starting 10/1  - culture positive for beta hemolytic group A strep and MRSA  - pain control  - wound care    Murmur- (present on admission)  Assessment & Plan  In the setting of his h/o IV drug abuse.   - blood cultures negative   - TTE negative    IV drug abuse (HCC)- (present on admission)  Assessment & Plan  Patient denies current use.  - barrier for acceptance to SNF; not a candidate for outpatient infusion  - avoid IV narcotics as possible     Homeless  Assessment & Plan  Social work and case management to assist in discharge planning     VTE prophylaxis: heparin

## 2020-10-03 NOTE — PROGRESS NOTES
Report received from Day Shift RN at 1915. Assumed care of patient. Plan of care discussed, questions answered. Assessment completed. VSS, A&O x4, states pain in lower extremities. PRN med given. Call light in reach. Patient educated on use of call light for assistance.    Wound vac in place. Contact precautions.

## 2020-10-03 NOTE — CARE PLAN
Problem: Communication  Goal: The ability to communicate needs accurately and effectively will improve  Outcome: PROGRESSING AS EXPECTED  Note: Plan of care discussed with patient, questions answered. Patient encouraged to verbalize feelings and concerns. Verbalized understanding.   Problem: Safety  Goal: Will remain free from injury  Outcome: PROGRESSING AS EXPECTED  Note: Call light in reach, bed in lowest and locked position, necessary belongings in reach. Patient educated on use of call light for assistance. Verbalized understanding.   Problem: Infection  Goal: Will remain free from infection  Outcome: PROGRESSING AS EXPECTED  Note: Patient educated on proper hand hygiene. Verbalized understanding. Healthcare team educated on proper hand hygiene while providing patient care and after patient care.    Problem: Pain Management  Goal: Pain level will decrease to patient's comfort goal  Outcome: PROGRESSING AS EXPECTED  Note: Patient medicated for pain as per MAR. Patient encouraged to verbalize pain levels. Patient provided with nonpharmacologic methods of pain management. Verbalized understanding.

## 2020-10-03 NOTE — PROGRESS NOTES
"   Orthopaedic PA Progress Note    Interval changes:Doing well s/p STSG #2 yesterday    ROS - Patient denies any new issues. No chest pain, dyspnea, or fever.  Pain well controlled.    BP (!) 92/57   Pulse 83   Temp 36.4 °C (97.6 °F) (Temporal)   Resp 16   Ht 1.93 m (6' 4\")   Wt 118.5 kg (261 lb 3.9 oz)   SpO2 94%     Patient seen and examined  No acute distress  Breathing non labored  RRR  Surgical dressing is clean, dry, and intact. Patient clearly fires tibialis anterior, EHL, and gastrocnemius/soleus. Sensation is intact to light touch throughout superficial peroneal, deep peroneal, tibial, saphenous, and sural nerve distributions. Strong and palpable 2+ dorsalis pedis and posterior tibial pulses with capillary refill less than 2 seconds. No lower leg tenderness or discomfort.    Active Hospital Problems    Diagnosis   • Leg wound, right [S81.801A]     Priority: High   • Pyogenic arthritis of left elbow (HCC) [M00.9]     Priority: High     No crepitus no gas in the tissues,   history of MRSA, orthopedic surgery consulted for OR in the a.m.  Empiric vancomycin and Rocephin ordered.  Symptomatic management     • Murmur [R01.1]     Priority: Medium   • IV drug abuse (HCC) [F19.10]     Priority: Medium   • History of MRSA infection [Z86.14]       Assessment/Plan:  POD#1 S/P repeat grafting L-R thigh  Wt bearing status - ok for transfers  PT/OT-initiated  Wound care:vac to suction minimum 5 days  Drains - no  Lundberg-no  Sutures/Staples out- 10-14 days post operatively  Antibiotics: per Med  DVT Prophylaxis- TEDS/SCDs/Foot pumps.   Lovenox: Start 40 mg daily, Duration-until ambulatory > 150'  Future Procedures - none planned  Case Coordination for Discharge Planning - Disposition pending placement, which is unlikely      "

## 2020-10-03 NOTE — CARE PLAN
Problem: Pain Management  Goal: Pain level will decrease to patient's comfort goal  Outcome: PROGRESSING AS EXPECTED  Pain management per ordered medications     Problem: Mobility  Goal: Risk for activity intolerance will decrease  Outcome: PROGRESSING AS EXPECTED  Educated to call for assistance as and when needed

## 2020-10-04 LAB
ALBUMIN SERPL BCP-MCNC: 3.9 G/DL (ref 3.2–4.9)
ALBUMIN/GLOB SERPL: 1.4 G/DL
ALP SERPL-CCNC: 84 U/L (ref 30–99)
ALT SERPL-CCNC: 42 U/L (ref 2–50)
ANION GAP SERPL CALC-SCNC: 11 MMOL/L (ref 7–16)
AST SERPL-CCNC: 28 U/L (ref 12–45)
BILIRUB SERPL-MCNC: 0.4 MG/DL (ref 0.1–1.5)
BUN SERPL-MCNC: 19 MG/DL (ref 8–22)
CALCIUM SERPL-MCNC: 8.7 MG/DL (ref 8.5–10.5)
CHLORIDE SERPL-SCNC: 97 MMOL/L (ref 96–112)
CO2 SERPL-SCNC: 23 MMOL/L (ref 20–33)
CREAT SERPL-MCNC: 0.77 MG/DL (ref 0.5–1.4)
GLOBULIN SER CALC-MCNC: 2.7 G/DL (ref 1.9–3.5)
GLUCOSE SERPL-MCNC: 122 MG/DL (ref 65–99)
POTASSIUM SERPL-SCNC: 4 MMOL/L (ref 3.6–5.5)
PROT SERPL-MCNC: 6.6 G/DL (ref 6–8.2)
SODIUM SERPL-SCNC: 131 MMOL/L (ref 135–145)

## 2020-10-04 PROCEDURE — 700102 HCHG RX REV CODE 250 W/ 637 OVERRIDE(OP): Performed by: STUDENT IN AN ORGANIZED HEALTH CARE EDUCATION/TRAINING PROGRAM

## 2020-10-04 PROCEDURE — 700102 HCHG RX REV CODE 250 W/ 637 OVERRIDE(OP): Performed by: PHYSICIAN ASSISTANT

## 2020-10-04 PROCEDURE — A9270 NON-COVERED ITEM OR SERVICE: HCPCS | Performed by: HOSPITALIST

## 2020-10-04 PROCEDURE — 80053 COMPREHEN METABOLIC PANEL: CPT

## 2020-10-04 PROCEDURE — 700101 HCHG RX REV CODE 250: Performed by: INTERNAL MEDICINE

## 2020-10-04 PROCEDURE — A9270 NON-COVERED ITEM OR SERVICE: HCPCS | Performed by: PHYSICIAN ASSISTANT

## 2020-10-04 PROCEDURE — 700102 HCHG RX REV CODE 250 W/ 637 OVERRIDE(OP): Performed by: HOSPITALIST

## 2020-10-04 PROCEDURE — 700101 HCHG RX REV CODE 250: Performed by: STUDENT IN AN ORGANIZED HEALTH CARE EDUCATION/TRAINING PROGRAM

## 2020-10-04 PROCEDURE — 770021 HCHG ROOM/CARE - ISO PRIVATE

## 2020-10-04 PROCEDURE — A9270 NON-COVERED ITEM OR SERVICE: HCPCS | Performed by: INTERNAL MEDICINE

## 2020-10-04 PROCEDURE — A9270 NON-COVERED ITEM OR SERVICE: HCPCS | Performed by: STUDENT IN AN ORGANIZED HEALTH CARE EDUCATION/TRAINING PROGRAM

## 2020-10-04 PROCEDURE — 700102 HCHG RX REV CODE 250 W/ 637 OVERRIDE(OP): Performed by: INTERNAL MEDICINE

## 2020-10-04 PROCEDURE — 99232 SBSQ HOSP IP/OBS MODERATE 35: CPT | Performed by: STUDENT IN AN ORGANIZED HEALTH CARE EDUCATION/TRAINING PROGRAM

## 2020-10-04 PROCEDURE — 36415 COLL VENOUS BLD VENIPUNCTURE: CPT

## 2020-10-04 RX ORDER — ACETAMINOPHEN 500 MG
1000 TABLET ORAL EVERY 6 HOURS PRN
Status: DISCONTINUED | OUTPATIENT
Start: 2020-10-04 | End: 2020-10-13 | Stop reason: HOSPADM

## 2020-10-04 RX ORDER — POLYETHYLENE GLYCOL 3350 17 G/17G
1 POWDER, FOR SOLUTION ORAL 2 TIMES DAILY PRN
Status: DISCONTINUED | OUTPATIENT
Start: 2020-10-04 | End: 2020-10-04

## 2020-10-04 RX ORDER — NICOTINE 21 MG/24HR
14 PATCH, TRANSDERMAL 24 HOURS TRANSDERMAL
Status: DISCONTINUED | OUTPATIENT
Start: 2020-10-05 | End: 2020-10-13 | Stop reason: HOSPADM

## 2020-10-04 RX ORDER — HYDROMORPHONE HYDROCHLORIDE 2 MG/1
1 TABLET ORAL
Status: DISCONTINUED | OUTPATIENT
Start: 2020-10-04 | End: 2020-10-13 | Stop reason: HOSPADM

## 2020-10-04 RX ORDER — MORPHINE SULFATE 4 MG/ML
3 INJECTION, SOLUTION INTRAMUSCULAR; INTRAVENOUS PRN
Status: DISCONTINUED | OUTPATIENT
Start: 2020-10-04 | End: 2020-10-13 | Stop reason: HOSPADM

## 2020-10-04 RX ORDER — BISACODYL 10 MG
10 SUPPOSITORY, RECTAL RECTAL
Status: DISCONTINUED | OUTPATIENT
Start: 2020-10-04 | End: 2020-10-13 | Stop reason: HOSPADM

## 2020-10-04 RX ORDER — POLYETHYLENE GLYCOL 3350 17 G/17G
1 POWDER, FOR SOLUTION ORAL 2 TIMES DAILY
Status: DISCONTINUED | OUTPATIENT
Start: 2020-10-04 | End: 2020-10-13 | Stop reason: HOSPADM

## 2020-10-04 RX ADMIN — HYDROMORPHONE HYDROCHLORIDE 1 MG: 2 TABLET ORAL at 14:02

## 2020-10-04 RX ADMIN — HYDROMORPHONE HYDROCHLORIDE 2 MG: 2 TABLET ORAL at 05:50

## 2020-10-04 RX ADMIN — ACETAMINOPHEN 650 MG: 325 TABLET, FILM COATED ORAL at 11:04

## 2020-10-04 RX ADMIN — HYDROMORPHONE HYDROCHLORIDE 1 MG: 2 TABLET ORAL at 17:10

## 2020-10-04 RX ADMIN — DOCUSATE SODIUM 100 MG: 100 CAPSULE ORAL at 05:50

## 2020-10-04 RX ADMIN — HYDROMORPHONE HYDROCHLORIDE 1 MG: 2 TABLET ORAL at 20:21

## 2020-10-04 RX ADMIN — PREGABALIN 150 MG: 150 CAPSULE ORAL at 05:50

## 2020-10-04 RX ADMIN — POLYETHYLENE GLYCOL 3350 1 PACKET: 17 POWDER, FOR SOLUTION ORAL at 05:51

## 2020-10-04 RX ADMIN — PREGABALIN 150 MG: 150 CAPSULE ORAL at 11:04

## 2020-10-04 RX ADMIN — PREGABALIN 150 MG: 150 CAPSULE ORAL at 17:11

## 2020-10-04 RX ADMIN — HYDROMORPHONE HYDROCHLORIDE 2 MG: 2 TABLET ORAL at 02:25

## 2020-10-04 RX ADMIN — SULFAMETHOXAZOLE AND TRIMETHOPRIM 2 TABLET: 800; 160 TABLET ORAL at 05:50

## 2020-10-04 RX ADMIN — HYDROMORPHONE HYDROCHLORIDE 1 MG: 2 TABLET ORAL at 10:11

## 2020-10-04 RX ADMIN — SULFAMETHOXAZOLE AND TRIMETHOPRIM 2 TABLET: 800; 160 TABLET ORAL at 17:12

## 2020-10-04 RX ADMIN — ACETAMINOPHEN 650 MG: 325 TABLET, FILM COATED ORAL at 05:49

## 2020-10-04 RX ADMIN — DOCUSATE SODIUM 100 MG: 100 CAPSULE ORAL at 17:11

## 2020-10-04 RX ADMIN — TRAZODONE HYDROCHLORIDE 50 MG: 50 TABLET ORAL at 20:21

## 2020-10-04 RX ADMIN — DOCUSATE SODIUM 50 MG AND SENNOSIDES 8.6 MG 2 TABLET: 8.6; 5 TABLET, FILM COATED ORAL at 17:11

## 2020-10-04 RX ADMIN — HYDROMORPHONE HYDROCHLORIDE 1 MG: 2 TABLET ORAL at 23:34

## 2020-10-04 RX ADMIN — NICOTINE TRANSDERMAL SYSTEM 21 MG: 21 PATCH, EXTENDED RELEASE TRANSDERMAL at 05:50

## 2020-10-04 RX ADMIN — POLYETHYLENE GLYCOL 3350 1 PACKET: 17 POWDER, FOR SOLUTION ORAL at 17:36

## 2020-10-04 ASSESSMENT — ENCOUNTER SYMPTOMS
EYE DISCHARGE: 0
NERVOUS/ANXIOUS: 0
DIZZINESS: 0
BLURRED VISION: 0
MYALGIAS: 1
SHORTNESS OF BREATH: 0
NAUSEA: 0
HEADACHES: 0
BACK PAIN: 1
INSOMNIA: 1
VOMITING: 0
FEVER: 0
CHILLS: 0
ABDOMINAL PAIN: 0
COUGH: 0
SEIZURES: 0
PALPITATIONS: 0

## 2020-10-04 ASSESSMENT — PAIN DESCRIPTION - PAIN TYPE
TYPE: SURGICAL PAIN

## 2020-10-04 NOTE — CARE PLAN
Problem: Communication  Goal: The ability to communicate needs accurately and effectively will improve  Outcome: PROGRESSING AS EXPECTED  Plan of care reviewed.      Problem: Pain Management  Goal: Pain level will decrease to patient's comfort goal  Outcome: PROGRESSING AS EXPECTED   Pain control reviewed, also d/w Dr. Loaiza at bedside.

## 2020-10-04 NOTE — PROGRESS NOTES
Report received from Day Shift RN at 1915. Assumed care of patient. Plan of care discussed, questions answered. Assessment completed. VSS, A&O x4, states pain in lower extremities. PRN med given. Call light in reach. Patient educated on use of call light for assistance.    Wound vac in place. Contact precautions in place.

## 2020-10-04 NOTE — CARE PLAN
Problem: Communication  Goal: The ability to communicate needs accurately and effectively will improve  Outcome: PROGRESSING AS EXPECTED  Note: Plan of care discussed with patient, questions answered. Patient encouraged to verbalize feelings and concerns. Verbalized understanding.   Problem: Safety  Goal: Will remain free from injury  Outcome: PROGRESSING AS EXPECTED  Note: Call light in reach, bed in lowest and locked position, necessary belongings in reach. Patient educated on use of call light for assistance. Verbalized understanding.   Problem: Infection  Goal: Will remain free from infection  Outcome: PROGRESSING AS EXPECTED  Note: Patient educated on proper hand hygiene. Verbalized understanding. Healthcare team educated on proper hand hygiene while providing patient care and after patient care.    Problem: Bowel/Gastric:  Goal: Normal bowel function is maintained or improved  Outcome: PROGRESSING AS EXPECTED  Problem: Pain Management  Goal: Pain level will decrease to patient's comfort goal  Outcome: PROGRESSING AS EXPECTED  Note: Patient medicated for pain as per MAR. Patient encouraged to verbalize pain levels. Patient provided with nonpharmacologic methods of pain management. Verbalized understanding.

## 2020-10-04 NOTE — PROGRESS NOTES
"   Orthopaedic PA Progress Note    Interval changes:Doing well s/p STSG #2 on 10/1 by Dr. Juan CAMPA - Patient denies any new issues. No chest pain, dyspnea, or fever.  Pain well controlled.    /63   Pulse 79   Temp 36.2 °C (97.1 °F) (Temporal)   Resp 17   Ht 1.93 m (6' 4\")   Wt 118.5 kg (261 lb 3.9 oz)   SpO2 95%     Patient seen and examined  No acute distress  Breathing non labored  RRR  Surgical dressing is clean, dry, and intact. Patient clearly fires tibialis anterior, EHL, and gastrocnemius/soleus. Sensation is intact to light touch throughout superficial peroneal, deep peroneal, tibial, saphenous, and sural nerve distributions. Strong and palpable 2+ dorsalis pedis and posterior tibial pulses with capillary refill less than 2 seconds. No lower leg tenderness or discomfort.    Active Hospital Problems    Diagnosis   • Leg wound, right [S81.801A]     Priority: High   • Pyogenic arthritis of left elbow (HCC) [M00.9]     Priority: High     No crepitus no gas in the tissues,   history of MRSA, orthopedic surgery consulted for OR in the a.m.  Empiric vancomycin and Rocephin ordered.  Symptomatic management     • Murmur [R01.1]     Priority: Medium   • IV drug abuse (HCC) [F19.10]     Priority: Medium   • History of MRSA infection [Z86.14]     Assessment/Plan:  POD#3 S/P repeat grafting L-R thigh  Wt bearing status - ok for transfers  PT/OT-initiated  Wound care:vac to suction minimum 5 days  Drains - no  Lundberg-no  Sutures/Staples out- 10-14 days post operatively  Antibiotics: per Med  DVT Prophylaxis- TEDS/SCDs/Foot pumps.   Lovenox: Start 40 mg daily, Duration-until ambulatory > 150'  Future Procedures - none planned  Case Coordination for Discharge Planning - Disposition pending placement, which is unlikely      "

## 2020-10-04 NOTE — PROGRESS NOTES
Attempted to complete left thigh dressing change as ordered, patient was unable to tolerate a complete dressing change due to severe pain with attempted change, and sticking of the underside dressing which appears to be a combination of xeroform and gauze with significant bloody drainage.  Spoke with Piero CORONADO to notify of patient unable to tolerate complete change.  Request to secure in place, done with xeroform and ACE wrap over the part that could not be removed.

## 2020-10-04 NOTE — PROGRESS NOTES
Patient with c/o pain management dilaudid was just decreased, and has increased pain since dressing change yesterday he states. Call placed to Dr. Loaiza.

## 2020-10-04 NOTE — PROGRESS NOTES
Hospital Medicine Daily Progress Note    Date of Service  10/4/2020    Chief Complaint  Wound pain, drainage    Hospital Course  60 y.o. male with a past medical history of previous MRSA cellulitis, intravenous drug use, homelessness, Hx right lower extremity necrotizing fasciitis s/p fasciotomy in January 2020. Admitted 9/14/2020 with multiple abscesses on upper and lower extremity.  He has a 4 mm ulcer with serosanguineous drainage on his left lateral forearm.   Vancomycin and Rocephin was started.  Orthopedic surgery were consulted, S/P irrigation and debridement of left antecubital fossa and superficial right thigh wound in the OR. Right hip wound culture grew beta-hemolytic group B strep. Transthoracic Echo performed to r/o endocarditis was performed and was negative for vegetations, EF 65 with mild tricuspid regurg. RV systolic pressure of 25mmHg.     Interval Problem Update  No acute events overnight.   SBP 90's this morning, patient denies lightheadedness or symptoms.  He reports exquisite pain when skin graft dressing was attempted to be pulled off.  Will decrease dilaudid dose from 2 mg to 1 mg q3h due to low BP.  Add on morphine prn for dressing changes.  Continue wound vac per surgery, will need wound vac for minimum 5 days since 10/2.  Continue bactrim for total 4 week course    Consultants/Specialty  Orthopedic surgery  ID    Code Status  DNAR/DNI    Disposition  Anticipate d/c home pending ortho clearance, with outpatient wound clinic follow up.    Review of Systems  Review of Systems   Constitutional: Positive for malaise/fatigue. Negative for chills and fever.   Eyes: Negative for blurred vision and discharge.   Respiratory: Negative for cough and shortness of breath.    Cardiovascular: Negative for chest pain, palpitations and leg swelling.   Gastrointestinal: Negative for abdominal pain, nausea and vomiting.   Genitourinary: Negative for dysuria and hematuria.   Musculoskeletal: Positive for back  pain, joint pain and myalgias.   Skin: Negative for itching.   Neurological: Negative for dizziness, seizures and headaches.   Psychiatric/Behavioral: The patient has insomnia. The patient is not nervous/anxious.         Chronic depression      Physical Exam  Temp:  [36 °C (96.8 °F)-36.7 °C (98.1 °F)] 36.2 °C (97.1 °F)  Pulse:  [74-87] 79  Resp:  [16-17] 17  BP: ()/(55-66) 103/63  SpO2:  [92 %-99 %] 95 %    Physical Exam  Vitals signs reviewed.   Constitutional:       General: He is not in acute distress.     Appearance: He is not diaphoretic.   HENT:      Head: Normocephalic.      Mouth/Throat:      Mouth: Mucous membranes are moist.   Eyes:      General:         Right eye: No discharge.         Left eye: No discharge.      Extraocular Movements: Extraocular movements intact.   Neck:      Musculoskeletal: Normal range of motion. No neck rigidity.   Cardiovascular:      Rate and Rhythm: Normal rate.      Heart sounds: Murmur present.   Pulmonary:      Effort: Pulmonary effort is normal. No respiratory distress.      Breath sounds: No wheezing.   Abdominal:      General: There is no distension.      Palpations: Abdomen is soft.      Tenderness: There is no abdominal tenderness. There is no guarding.   Musculoskeletal: Normal range of motion.      Comments: Left UE good ROM, right thigh under ace wrap/dressing, no pain with light palpation, wound vac applied to right thigh; left thigh under ace wrap/dressing with dried blood seeped through   Skin:     General: Skin is dry.   Neurological:      General: No focal deficit present.      Mental Status: He is alert.   Psychiatric:         Mood and Affect: Mood normal.       Fluids    Intake/Output Summary (Last 24 hours) at 10/4/2020 1018  Last data filed at 10/4/2020 0702  Gross per 24 hour   Intake 240 ml   Output 2050 ml   Net -1810 ml     Laboratory                        Imaging  IR-MIDLINE CATHETER INSERTION WO GUIDANCE > AGE 3   Final Result                   Ultrasound-guided midline placement performed by qualified nursing staff    as above.          EC-ECHOCARDIOGRAM COMPLETE W/O CONT   Final Result      DX-ELBOW-COMPLETE 3+ LEFT   Final Result      Soft tissue swelling without acute osseous abnormality.      DX-FEMUR-2+ RIGHT   Final Result      No acute osseous abnormality.         Assessment/Plan  Leg wound, right- (present on admission)  Assessment & Plan  Has h/o necrotizing fascitis.   - S/p I&D of right thigh wound and split thickness skin graft on 9/17  - underwent repeat split thickness skin graft 10/1  - pain control  - wound care, will need wound vac for at least 5 days from 10/2 per ortho team  - outpatient wound care clinic referral placed by Ortho PA  - abx as noted above    Pyogenic arthritis of left elbow (HCC)- (present on admission)  Assessment & Plan  - Ortho and ID following, appreciate recs  - s/p I&D left antecubital fossa on 9/15  - completed Vancomycin through 9/30  - continue with bactrim x4 weeks starting 10/1  - culture positive for beta hemolytic group A strep and MRSA  - pain control  - wound care    Murmur- (present on admission)  Assessment & Plan  In the setting of his h/o IV drug abuse.   - blood cultures negative   - TTE negative    IV drug abuse (HCC)- (present on admission)  Assessment & Plan  Patient denies current use.  - barrier for acceptance to SNF; not a candidate for outpatient infusion  - avoid IV narcotics as possible; morphine prn for dressing changes okay    Homeless  Assessment & Plan  Social work and case management to assist in discharge planning     VTE prophylaxis: heparin

## 2020-10-05 PROCEDURE — 700102 HCHG RX REV CODE 250 W/ 637 OVERRIDE(OP): Performed by: HOSPITALIST

## 2020-10-05 PROCEDURE — A9270 NON-COVERED ITEM OR SERVICE: HCPCS | Performed by: INTERNAL MEDICINE

## 2020-10-05 PROCEDURE — 700101 HCHG RX REV CODE 250: Performed by: STUDENT IN AN ORGANIZED HEALTH CARE EDUCATION/TRAINING PROGRAM

## 2020-10-05 PROCEDURE — A9270 NON-COVERED ITEM OR SERVICE: HCPCS | Performed by: HOSPITALIST

## 2020-10-05 PROCEDURE — 99232 SBSQ HOSP IP/OBS MODERATE 35: CPT | Performed by: STUDENT IN AN ORGANIZED HEALTH CARE EDUCATION/TRAINING PROGRAM

## 2020-10-05 PROCEDURE — A9270 NON-COVERED ITEM OR SERVICE: HCPCS | Performed by: STUDENT IN AN ORGANIZED HEALTH CARE EDUCATION/TRAINING PROGRAM

## 2020-10-05 PROCEDURE — 90686 IIV4 VACC NO PRSV 0.5 ML IM: CPT | Performed by: STUDENT IN AN ORGANIZED HEALTH CARE EDUCATION/TRAINING PROGRAM

## 2020-10-05 PROCEDURE — 700102 HCHG RX REV CODE 250 W/ 637 OVERRIDE(OP): Performed by: INTERNAL MEDICINE

## 2020-10-05 PROCEDURE — 770021 HCHG ROOM/CARE - ISO PRIVATE

## 2020-10-05 PROCEDURE — 700102 HCHG RX REV CODE 250 W/ 637 OVERRIDE(OP): Performed by: STUDENT IN AN ORGANIZED HEALTH CARE EDUCATION/TRAINING PROGRAM

## 2020-10-05 PROCEDURE — 700111 HCHG RX REV CODE 636 W/ 250 OVERRIDE (IP): Performed by: STUDENT IN AN ORGANIZED HEALTH CARE EDUCATION/TRAINING PROGRAM

## 2020-10-05 PROCEDURE — 700102 HCHG RX REV CODE 250 W/ 637 OVERRIDE(OP): Performed by: PHYSICIAN ASSISTANT

## 2020-10-05 PROCEDURE — A9270 NON-COVERED ITEM OR SERVICE: HCPCS | Performed by: PHYSICIAN ASSISTANT

## 2020-10-05 PROCEDURE — 3E02340 INTRODUCTION OF INFLUENZA VACCINE INTO MUSCLE, PERCUTANEOUS APPROACH: ICD-10-PCS | Performed by: STUDENT IN AN ORGANIZED HEALTH CARE EDUCATION/TRAINING PROGRAM

## 2020-10-05 RX ADMIN — INFLUENZA A VIRUS A/GUANGDONG-MAONAN/SWL1536/2019 CNIC-1909 (H1N1) ANTIGEN (FORMALDEHYDE INACTIVATED), INFLUENZA A VIRUS A/HONG KONG/2671/2019 (H3N2) ANTIGEN (FORMALDEHYDE INACTIVATED), INFLUENZA B VIRUS B/PHUKET/3073/2013 ANTIGEN (FORMALDEHYDE INACTIVATED), AND INFLUENZA B VIRUS B/WASHINGTON/02/2019 ANTIGEN (FORMALDEHYDE INACTIVATED) 0.5 ML: 15; 15; 15; 15 INJECTION, SUSPENSION INTRAMUSCULAR at 17:31

## 2020-10-05 RX ADMIN — PREGABALIN 150 MG: 150 CAPSULE ORAL at 17:11

## 2020-10-05 RX ADMIN — DOCUSATE SODIUM 100 MG: 100 CAPSULE ORAL at 17:12

## 2020-10-05 RX ADMIN — NICOTINE 14 MG: 14 PATCH TRANSDERMAL at 05:19

## 2020-10-05 RX ADMIN — DOCUSATE SODIUM 100 MG: 100 CAPSULE ORAL at 05:19

## 2020-10-05 RX ADMIN — TRAZODONE HYDROCHLORIDE 50 MG: 50 TABLET ORAL at 20:18

## 2020-10-05 RX ADMIN — PREGABALIN 150 MG: 150 CAPSULE ORAL at 05:19

## 2020-10-05 RX ADMIN — HYDROMORPHONE HYDROCHLORIDE 1 MG: 2 TABLET ORAL at 13:22

## 2020-10-05 RX ADMIN — HYDROMORPHONE HYDROCHLORIDE 1 MG: 2 TABLET ORAL at 09:42

## 2020-10-05 RX ADMIN — HYDROMORPHONE HYDROCHLORIDE 1 MG: 2 TABLET ORAL at 17:12

## 2020-10-05 RX ADMIN — SULFAMETHOXAZOLE AND TRIMETHOPRIM 2 TABLET: 800; 160 TABLET ORAL at 05:19

## 2020-10-05 RX ADMIN — DOCUSATE SODIUM 50 MG AND SENNOSIDES 8.6 MG 2 TABLET: 8.6; 5 TABLET, FILM COATED ORAL at 17:12

## 2020-10-05 RX ADMIN — POLYETHYLENE GLYCOL 3350 1 PACKET: 17 POWDER, FOR SOLUTION ORAL at 05:20

## 2020-10-05 RX ADMIN — POLYETHYLENE GLYCOL 3350 1 PACKET: 17 POWDER, FOR SOLUTION ORAL at 17:12

## 2020-10-05 RX ADMIN — HYDROMORPHONE HYDROCHLORIDE 1 MG: 2 TABLET ORAL at 02:42

## 2020-10-05 RX ADMIN — HYDROMORPHONE HYDROCHLORIDE 1 MG: 2 TABLET ORAL at 05:57

## 2020-10-05 RX ADMIN — PREGABALIN 150 MG: 150 CAPSULE ORAL at 13:22

## 2020-10-05 RX ADMIN — SULFAMETHOXAZOLE AND TRIMETHOPRIM 2 TABLET: 800; 160 TABLET ORAL at 17:11

## 2020-10-05 RX ADMIN — HYDROMORPHONE HYDROCHLORIDE 1 MG: 2 TABLET ORAL at 20:18

## 2020-10-05 ASSESSMENT — ENCOUNTER SYMPTOMS
VOMITING: 0
PALPITATIONS: 0
CHILLS: 0
NAUSEA: 0
SEIZURES: 0
FEVER: 0
COUGH: 0
DIZZINESS: 0
BACK PAIN: 1
BLURRED VISION: 0
HEADACHES: 0
NERVOUS/ANXIOUS: 0
INSOMNIA: 1
SHORTNESS OF BREATH: 0
ABDOMINAL PAIN: 0
EYE DISCHARGE: 0
MYALGIAS: 1

## 2020-10-05 ASSESSMENT — PAIN DESCRIPTION - PAIN TYPE
TYPE: ACUTE PAIN
TYPE: ACUTE PAIN
TYPE: SURGICAL PAIN
TYPE: ACUTE PAIN
TYPE: SURGICAL PAIN

## 2020-10-05 NOTE — PROGRESS NOTES
Patient with constipation d/w Dr. Loaiza orders given to increase bowel protocol. D/w patient agrees, able to BM but difficult.

## 2020-10-05 NOTE — CARE PLAN
Problem: Safety  Goal: Will remain free from falls  Outcome: PROGRESSING AS EXPECTED  Note: Pt. Bed in lowest position, locked, upper side rails up, treaded socks in place, educated about fall risk, using the call light appropriately      Problem: Infection  Goal: Will remain free from infection  Outcome: PROGRESSING AS EXPECTED  Note: Wound vac in place, on antibiotics, check the MAR, Afebrile, educated about handwashing

## 2020-10-05 NOTE — PROGRESS NOTES
Hospital Medicine Daily Progress Note    Date of Service  10/5/2020    Chief Complaint  Wound pain, drainage    Hospital Course  60 y.o. male with a past medical history of previous MRSA cellulitis, intravenous drug use, homelessness, Hx right lower extremity necrotizing fasciitis s/p fasciotomy in January 2020. Admitted 9/14/2020 with multiple abscesses on upper and lower extremity.  He has a 4 mm ulcer with serosanguineous drainage on his left lateral forearm.   Vancomycin and Rocephin was started.  Orthopedic surgery were consulted, S/P irrigation and debridement of left antecubital fossa and superficial right thigh wound in the OR. Right hip wound culture grew beta-hemolytic group B strep. Transthoracic Echo performed to r/o endocarditis was performed and was negative for vegetations, EF 65 with mild tricuspid regurg. RV systolic pressure of 25mmHg.     Interval Problem Update  No acute events overnight.   BP improved after dilaudid dose decrease.  Pain well controlled.  Continue wound vac per surgery, will need wound vac for minimum 5 days since 10/2.  Continue bactrim for total 4 week course, end date 10/28.    Consultants/Specialty  Orthopedic surgery  ID    Code Status  DNAR/DNI    Disposition  Anticipate d/c home/street pending surgical clearance, with outpatient wound clinic follow up. Med to beds provided to patient.    Review of Systems  Review of Systems   Constitutional: Positive for malaise/fatigue. Negative for chills and fever.   Eyes: Negative for blurred vision and discharge.   Respiratory: Negative for cough and shortness of breath.    Cardiovascular: Negative for chest pain, palpitations and leg swelling.   Gastrointestinal: Negative for abdominal pain, nausea and vomiting.   Genitourinary: Negative for dysuria and hematuria.   Musculoskeletal: Positive for back pain, joint pain and myalgias.   Skin: Negative for itching.   Neurological: Negative for dizziness, seizures and headaches.    Psychiatric/Behavioral: The patient has insomnia. The patient is not nervous/anxious.         Chronic depression      Physical Exam  Temp:  [36.4 °C (97.5 °F)-36.9 °C (98.5 °F)] 36.4 °C (97.6 °F)  Pulse:  [73-86] 76  Resp:  [16-18] 17  BP: (101-108)/(57-73) 101/64  SpO2:  [94 %-97 %] 94 %    Physical Exam  Vitals signs reviewed.   Constitutional:       General: He is not in acute distress.     Appearance: He is not diaphoretic.   HENT:      Head: Normocephalic.      Mouth/Throat:      Mouth: Mucous membranes are moist.   Eyes:      General:         Right eye: No discharge.         Left eye: No discharge.      Extraocular Movements: Extraocular movements intact.   Neck:      Musculoskeletal: Normal range of motion. No neck rigidity.   Cardiovascular:      Rate and Rhythm: Normal rate.      Heart sounds: Murmur present.   Pulmonary:      Effort: Pulmonary effort is normal. No respiratory distress.      Breath sounds: No wheezing.   Abdominal:      General: There is no distension.      Palpations: Abdomen is soft.      Tenderness: There is no abdominal tenderness. There is no guarding.   Musculoskeletal: Normal range of motion.      Comments: Left UE good ROM, right thigh under ace wrap/dressing, no pain with light palpation, wound vac applied to right thigh; left thigh under ace wrap/dressing with dried blood seeped through   Skin:     General: Skin is dry.   Neurological:      General: No focal deficit present.      Mental Status: He is alert.   Psychiatric:         Mood and Affect: Mood normal.       Fluids    Intake/Output Summary (Last 24 hours) at 10/5/2020 0903  Last data filed at 10/5/2020 0500  Gross per 24 hour   Intake 720 ml   Output 1600 ml   Net -880 ml     Laboratory      Recent Labs     10/04/20  1149   SODIUM 131*   POTASSIUM 4.0   CHLORIDE 97   CO2 23   GLUCOSE 122*   BUN 19   CREATININE 0.77   CALCIUM 8.7                   Imaging  IR-MIDLINE CATHETER INSERTION WO GUIDANCE > AGE 3   Final Result                   Ultrasound-guided midline placement performed by qualified nursing staff    as above.          EC-ECHOCARDIOGRAM COMPLETE W/O CONT   Final Result      DX-ELBOW-COMPLETE 3+ LEFT   Final Result      Soft tissue swelling without acute osseous abnormality.      DX-FEMUR-2+ RIGHT   Final Result      No acute osseous abnormality.         Assessment/Plan  Leg wound, right- (present on admission)  Assessment & Plan  Has h/o necrotizing fascitis.   - S/p I&D of right thigh wound and split thickness skin graft on 9/17  - underwent repeat split thickness skin graft 10/1  - pain control  - wound care, will need wound vac for at least 5 days from 10/2 per ortho team  - outpatient wound care clinic referral placed by Ortho PA  - abx as noted above    Pyogenic arthritis of left elbow (HCC)- (present on admission)  Assessment & Plan  - Ortho and ID following, appreciate recs  - s/p I&D left antecubital fossa on 9/15  - completed Vancomycin through 9/30  - continue with bactrim x4 weeks starting 10/1  - culture positive for beta hemolytic group A strep and MRSA  - pain control  - wound care    Murmur- (present on admission)  Assessment & Plan  In the setting of his h/o IV drug abuse.   - blood cultures negative   - TTE negative    IV drug abuse (HCC)- (present on admission)  Assessment & Plan  Patient denies current use.  - barrier for acceptance to SNF; not a candidate for outpatient infusion  - avoid IV narcotics as possible; morphine prn for dressing changes okay    Homeless  Assessment & Plan  Social work and case management to assist in discharge planning     VTE prophylaxis: heparin

## 2020-10-05 NOTE — PROGRESS NOTES
Report received from Day Shift RN at 1915. Assumed care of patient. Plan of care discussed, questions answered. Assessment completed. VSS, A&O x4, states pain in BLEs. PRN med given. Call light in reach. Patient educated on use of call light for assistance.    Wound vac in place. Contact precautions in place.

## 2020-10-05 NOTE — PROGRESS NOTES
"   Orthopaedic PA Progress Note    Interval changes:Doing well s/p STSG #2 on 10/1 by Dr. Juan CAMPA - Patient denies any new issues. No chest pain, dyspnea, or fever.  Pain well controlled.    /64   Pulse 76   Temp 36.4 °C (97.6 °F) (Temporal)   Resp 17   Ht 1.93 m (6' 4\")   Wt 118.5 kg (261 lb 3.9 oz)   SpO2 94%     Patient seen and examined  No acute distress  Breathing non labored  RRR  Surgical dressing is clean, dry, and intact. Patient clearly fires tibialis anterior, EHL, and gastrocnemius/soleus. Sensation is intact to light touch throughout superficial peroneal, deep peroneal, tibial, saphenous, and sural nerve distributions. Strong and palpable 2+ dorsalis pedis and posterior tibial pulses with capillary refill less than 2 seconds. No lower leg tenderness or discomfort.    Active Hospital Problems    Diagnosis   • Leg wound, right [S81.801A]     Priority: High   • Pyogenic arthritis of left elbow (HCC) [M00.9]     Priority: High     No crepitus no gas in the tissues,   history of MRSA, orthopedic surgery consulted for OR in the a.m.  Empiric vancomycin and Rocephin ordered.  Symptomatic management     • Murmur [R01.1]     Priority: Medium   • IV drug abuse (HCC) [F19.10]     Priority: Medium   • History of MRSA infection [Z86.14]     Assessment/Plan:  POD#4 S/P repeat grafting L-R thigh  Wt bearing status - ok for transfers  PT/OT-initiated  Wound care:vac to suction minimum 5 days  Drains - no  Lundberg-no  Sutures/Staples out- 10-14 days post operatively  Antibiotics: per Med  DVT Prophylaxis- TEDS/SCDs/Foot pumps.   Lovenox: Start 40 mg daily, Duration-until ambulatory > 150'  Future Procedures - none planned  Case Coordination for Discharge Planning - Disposition pending placement, which is unlikely      "

## 2020-10-05 NOTE — DISCHARGE SUMMARY
Discharge Summary    CHIEF COMPLAINT ON ADMISSION  Chief Complaint   Patient presents with   • Open Wound   • Wound Check       Reason for Admission  Wound Check     Admission Date  9/14/2020    CODE STATUS  DNAR/DNI    HPI & HOSPITAL COURSE  Patient is a 60 year old male with past history of MRSA cellulitis, intravenous drug use, homelessness, right lower extremity necrotizing fasciitis s/p fasciotomy in January 2020, who was admitted for multiple abscesses on upper and lower extremities.  Vancomycin and Rocephin was started.  Orthopedic surgery were consulted, S/P irrigation and debridement of left antecubital fossa and superficial right thigh wound in the OR. Right hip wound culture grew beta-hemolytic group A strep and MRSA. Transthoracic Echo performed to r/o endocarditis was performed and was negative for vegetations, EF 65 with mild tricuspid regurg. RV systolic pressure of 25mmHg. Patient also underwent split thickness skin graft for right thigh wound on 9/17 and 10/1. Infectious disease consulted who recommended vancomycin for 2 weeks, followed by bactrim for for 4 weeks, end date 10/28. Patient was not able to be discharged to SNF given history of IV drug abuse. He therefore completed his vancomycin dosing in the hospital, then was discharged to home/streets when cleared by orthopedic surgery.  Medications to beds service provided for patient upon discharge. Patient to follow up with wound care clinic for continued wound care.  All results and plan of action were discussed with patient for which she understood and agreed with the primary care team.  Patient was instructed to return to emergency department symptoms were to worsen.    Therefore, he is discharged in fair and stable condition to home with close outpatient follow-up.    The patient met 2-midnight criteria for an inpatient stay at the time of discharge.    Discharge Date  10/13/2020    FOLLOW UP ITEMS POST DISCHARGE  Follow up with wound care  clinic as scheduled.    DISCHARGE DIAGNOSES  Active Problems:    Pyogenic arthritis of left elbow (HCC) POA: Yes      Overview: No crepitus no gas in the tissues,       history of MRSA, orthopedic surgery consulted for OR in the a.m.      Empiric vancomycin and Rocephin ordered.      Symptomatic management    Leg wound, right POA: Yes    IV drug abuse (HCC) POA: Yes    Murmur POA: Yes    History of MRSA infection POA: Unknown  Resolved Problems:    * No resolved hospital problems. *      FOLLOW UP  No future appointments.  No follow-up provider specified.    MEDICATIONS ON DISCHARGE     Medication List      START taking these medications      Instructions   acetaminophen 325 MG Tabs  Commonly known as: TYLENOL   Take 2 Tabs by mouth 3 times a day.  Dose: 650 mg     diphenhydrAMINE 25 MG Tabs  Commonly known as: BENADRYL   Take 1 tablet by mouth every 6 hours as needed for Sleep (give liberally for insomnia and pain.).  Dose: 25 mg     docusate sodium 100 MG Caps   Take 100 mg by mouth 2 Times a Day.  Dose: 100 mg     gabapentin 300 MG Caps  Commonly known as: NEURONTIN   Doctor's comments: meds to beds  Take 1 Cap by mouth 3 times a day for 5 days.  Dose: 300 mg     oxyCODONE immediate release 10 MG immediate release tablet  Commonly known as: ROXICODONE   Take 1 Tab by mouth every 3 hours as needed for up to 3 days.  Dose: 10 mg     sulfamethoxazole-trimethoprim 800-160 MG tablet  Commonly known as: BACTRIM DS   Take 2 Tabs by mouth every 12 hours for 26 days.  Dose: 2 Tab        CHANGE how you take these medications      Instructions   senna-docusate 8.6-50 MG Tabs  What changed: when to take this  Commonly known as: PERICOLACE or SENOKOT S   Take 2 Tabs by mouth every evening.  Dose: 2 Tab        CONTINUE taking these medications      Instructions   polyethylene glycol/lytes 17 g Pack  Commonly known as: MIRALAX   Take 1 Packet by mouth every day.  Dose: 17 g            Allergies  Allergies   Allergen Reactions    • Codeine Rash     Rash  Historical tolerance to hydromorphone   • Spinach    • Pcn [Penicillins] Unspecified     Unknown reaction. Tolerated ceftriaxone on 10/9/19         DIET  Orders Placed This Encounter   Procedures   • Diet Order Regular     Standing Status:   Standing     Number of Occurrences:   1     Order Specific Question:   Diet:     Answer:   Regular [1]       ACTIVITY  As tolerated.  Weight bearing as tolerated    CONSULTATIONS  Orthopedic surgery  Infectious disease    PROCEDURES  9/15:  IRRIGATION AND DEBRIDEMENT, WOUND LEFT ARM AND RIGHT THIGH. WOUND VAC PLACEMENT ON LEFT ARM   9/17:  IRRIGATION AND DEBRIDEMENT right thigh and left arm  Application of Acell right thigh and left arm  stsg left thigh to right thigh  Application of vac dressings to right thigh  9/22: Midline placement, right upper extremity  10/1:  APPLICATION, GRAFT, SKIN, GIRHP-FOWTBVAVN-BLMXHR FROM LEFT THIGH TO RIGHT THIGH, WOUND VAC APPLICATION    LABORATORY  Lab Results   Component Value Date    SODIUM 131 (L) 10/04/2020    POTASSIUM 4.0 10/04/2020    CHLORIDE 97 10/04/2020    CO2 23 10/04/2020    GLUCOSE 122 (H) 10/04/2020    BUN 19 10/04/2020    CREATININE 0.77 10/04/2020    CREATININE 1.0 07/08/2007        Lab Results   Component Value Date    WBC 5.7 09/24/2020    HEMOGLOBIN 13.7 (L) 09/24/2020    HEMATOCRIT 41.3 (L) 09/24/2020    PLATELETCT 207 09/24/2020        Total time of the discharge process exceeds 41 minutes.

## 2020-10-05 NOTE — CARE PLAN
Problem: Communication  Goal: The ability to communicate needs accurately and effectively will improve  Outcome: PROGRESSING AS EXPECTED     Problem: Safety  Goal: Will remain free from injury  Outcome: PROGRESSING AS EXPECTED     Problem: Infection  Goal: Will remain free from infection  Outcome: PROGRESSING AS EXPECTED     Problem: Venous Thromboembolism (VTW)/Deep Vein Thrombosis (DVT) Prevention:  Goal: Patient will participate in Venous Thrombosis (VTE)/Deep Vein Thrombosis (DVT)Prevention Measures  Outcome: PROGRESSING AS EXPECTED     Problem: Bowel/Gastric:  Goal: Normal bowel function is maintained or improved  Outcome: PROGRESSING AS EXPECTED     Problem: Pain Management  Goal: Pain level will decrease to patient's comfort goal  Outcome: PROGRESSING AS EXPECTED     Problem: Mobility  Goal: Risk for activity intolerance will decrease  Outcome: PROGRESSING AS EXPECTED

## 2020-10-06 PROCEDURE — 700101 HCHG RX REV CODE 250: Performed by: STUDENT IN AN ORGANIZED HEALTH CARE EDUCATION/TRAINING PROGRAM

## 2020-10-06 PROCEDURE — 99232 SBSQ HOSP IP/OBS MODERATE 35: CPT | Performed by: INTERNAL MEDICINE

## 2020-10-06 PROCEDURE — 700102 HCHG RX REV CODE 250 W/ 637 OVERRIDE(OP): Performed by: HOSPITALIST

## 2020-10-06 PROCEDURE — A9270 NON-COVERED ITEM OR SERVICE: HCPCS | Performed by: HOSPITALIST

## 2020-10-06 PROCEDURE — A9270 NON-COVERED ITEM OR SERVICE: HCPCS | Performed by: INTERNAL MEDICINE

## 2020-10-06 PROCEDURE — 700102 HCHG RX REV CODE 250 W/ 637 OVERRIDE(OP): Performed by: STUDENT IN AN ORGANIZED HEALTH CARE EDUCATION/TRAINING PROGRAM

## 2020-10-06 PROCEDURE — A9270 NON-COVERED ITEM OR SERVICE: HCPCS | Performed by: PHYSICIAN ASSISTANT

## 2020-10-06 PROCEDURE — 700102 HCHG RX REV CODE 250 W/ 637 OVERRIDE(OP): Performed by: PHYSICIAN ASSISTANT

## 2020-10-06 PROCEDURE — 700102 HCHG RX REV CODE 250 W/ 637 OVERRIDE(OP): Performed by: INTERNAL MEDICINE

## 2020-10-06 PROCEDURE — A9270 NON-COVERED ITEM OR SERVICE: HCPCS | Performed by: STUDENT IN AN ORGANIZED HEALTH CARE EDUCATION/TRAINING PROGRAM

## 2020-10-06 PROCEDURE — 770021 HCHG ROOM/CARE - ISO PRIVATE

## 2020-10-06 RX ADMIN — HYDROMORPHONE HYDROCHLORIDE 1 MG: 2 TABLET ORAL at 09:35

## 2020-10-06 RX ADMIN — DOCUSATE SODIUM 100 MG: 100 CAPSULE ORAL at 06:30

## 2020-10-06 RX ADMIN — HYDROMORPHONE HYDROCHLORIDE 1 MG: 2 TABLET ORAL at 20:41

## 2020-10-06 RX ADMIN — HYDROMORPHONE HYDROCHLORIDE 1 MG: 2 TABLET ORAL at 06:31

## 2020-10-06 RX ADMIN — HYDROMORPHONE HYDROCHLORIDE 1 MG: 2 TABLET ORAL at 00:06

## 2020-10-06 RX ADMIN — SULFAMETHOXAZOLE AND TRIMETHOPRIM 2 TABLET: 800; 160 TABLET ORAL at 17:41

## 2020-10-06 RX ADMIN — POLYETHYLENE GLYCOL 3350 1 PACKET: 17 POWDER, FOR SOLUTION ORAL at 17:41

## 2020-10-06 RX ADMIN — SULFAMETHOXAZOLE AND TRIMETHOPRIM 2 TABLET: 800; 160 TABLET ORAL at 06:30

## 2020-10-06 RX ADMIN — HYDROMORPHONE HYDROCHLORIDE 1 MG: 2 TABLET ORAL at 23:47

## 2020-10-06 RX ADMIN — POLYETHYLENE GLYCOL 3350 1 PACKET: 17 POWDER, FOR SOLUTION ORAL at 06:29

## 2020-10-06 RX ADMIN — PREGABALIN 150 MG: 150 CAPSULE ORAL at 06:30

## 2020-10-06 RX ADMIN — NICOTINE 14 MG: 14 PATCH TRANSDERMAL at 06:29

## 2020-10-06 RX ADMIN — PREGABALIN 150 MG: 150 CAPSULE ORAL at 17:41

## 2020-10-06 RX ADMIN — TRAZODONE HYDROCHLORIDE 50 MG: 50 TABLET ORAL at 20:40

## 2020-10-06 RX ADMIN — HYDROMORPHONE HYDROCHLORIDE 1 MG: 2 TABLET ORAL at 16:27

## 2020-10-06 RX ADMIN — HYDROMORPHONE HYDROCHLORIDE 1 MG: 2 TABLET ORAL at 03:23

## 2020-10-06 RX ADMIN — PREGABALIN 150 MG: 150 CAPSULE ORAL at 11:34

## 2020-10-06 RX ADMIN — HYDROMORPHONE HYDROCHLORIDE 1 MG: 2 TABLET ORAL at 13:08

## 2020-10-06 RX ADMIN — DOCUSATE SODIUM 50 MG AND SENNOSIDES 8.6 MG 2 TABLET: 8.6; 5 TABLET, FILM COATED ORAL at 17:42

## 2020-10-06 RX ADMIN — DOCUSATE SODIUM 100 MG: 100 CAPSULE ORAL at 17:41

## 2020-10-06 ASSESSMENT — ENCOUNTER SYMPTOMS
MYALGIAS: 1
NERVOUS/ANXIOUS: 0
NAUSEA: 0
PALPITATIONS: 0
COUGH: 0
BACK PAIN: 1
VOMITING: 0
ABDOMINAL PAIN: 0
DIZZINESS: 0
INSOMNIA: 1
HEADACHES: 0
FEVER: 0
BLURRED VISION: 0
SHORTNESS OF BREATH: 0
SEIZURES: 0
EYE DISCHARGE: 0
CHILLS: 0

## 2020-10-06 ASSESSMENT — FIBROSIS 4 INDEX: FIB4 SCORE: 1.23

## 2020-10-06 ASSESSMENT — PAIN DESCRIPTION - PAIN TYPE
TYPE: ACUTE PAIN

## 2020-10-06 NOTE — PROGRESS NOTES
Hospital Medicine Daily Progress Note    Date of Service  10/6/2020    Chief Complaint  Wound pain, drainage    Hospital Course  60 y.o. male with a past medical history of previous MRSA cellulitis, intravenous drug use, homelessness, Hx right lower extremity necrotizing fasciitis s/p fasciotomy in January 2020. Admitted 9/14/2020 with multiple abscesses on upper and lower extremity.  He has a 4 mm ulcer with serosanguineous drainage on his left lateral forearm.   Vancomycin and Rocephin was started.  Orthopedic surgery were consulted, S/P irrigation and debridement of left antecubital fossa and superficial right thigh wound in the OR. Right hip wound culture grew beta-hemolytic group B strep. Transthoracic Echo performed to r/o endocarditis was performed and was negative for vegetations, EF 65 with mild tricuspid regurg. RV systolic pressure of 25mmHg.     Interval Problem Update  Pt seen and examined no acute events overnight. Resting in bed   BP improved after dilaudid dose decrease.  Pain well controlled.  Continue wound vac per surgery,  Continue bactrim for total 4 week course, end date 10/28.    Consultants/Specialty  Orthopedic surgery  ID    Code Status  DNAR/DNI    Disposition  Anticipate d/c home/street pending surgical clearance, with outpatient wound clinic follow up. Med to beds provided to patient.    Review of Systems  Review of Systems   Constitutional: Positive for malaise/fatigue. Negative for chills and fever.   Eyes: Negative for blurred vision and discharge.   Respiratory: Negative for cough and shortness of breath.    Cardiovascular: Negative for chest pain, palpitations and leg swelling.   Gastrointestinal: Negative for abdominal pain, nausea and vomiting.   Genitourinary: Negative for dysuria and hematuria.   Musculoskeletal: Positive for back pain, joint pain and myalgias.   Skin: Negative for itching.   Neurological: Negative for dizziness, seizures and headaches.   Psychiatric/Behavioral:  The patient has insomnia. The patient is not nervous/anxious.         Chronic depression      Physical Exam  Temp:  [36.6 °C (97.8 °F)-37.5 °C (99.5 °F)] 36.6 °C (97.8 °F)  Pulse:  [78-90] 90  Resp:  [17-18] 17  BP: ()/(61-72) 99/61  SpO2:  [92 %-95 %] 95 %    Physical Exam  Vitals signs reviewed.   Constitutional:       General: He is not in acute distress.     Appearance: He is not diaphoretic.   HENT:      Head: Normocephalic.      Mouth/Throat:      Mouth: Mucous membranes are moist.   Eyes:      General:         Right eye: No discharge.         Left eye: No discharge.      Extraocular Movements: Extraocular movements intact.   Neck:      Musculoskeletal: Normal range of motion. No neck rigidity.   Cardiovascular:      Rate and Rhythm: Normal rate.      Heart sounds: Murmur present.   Pulmonary:      Effort: Pulmonary effort is normal. No respiratory distress.      Breath sounds: No wheezing.   Abdominal:      General: There is no distension.      Palpations: Abdomen is soft.      Tenderness: There is no abdominal tenderness. There is no guarding.   Musculoskeletal: Normal range of motion.      Comments: Left UE good ROM, right thigh under ace wrap/dressing, no pain with light palpation, wound vac applied to right thigh; left thigh under ace wrap/dressing with dried blood seeped through   Skin:     General: Skin is dry.   Neurological:      General: No focal deficit present.      Mental Status: He is alert.   Psychiatric:         Mood and Affect: Mood normal.       Fluids    Intake/Output Summary (Last 24 hours) at 10/6/2020 1253  Last data filed at 10/6/2020 0927  Gross per 24 hour   Intake 1080 ml   Output 1500 ml   Net -420 ml     Laboratory      Recent Labs     10/04/20  1149   SODIUM 131*   POTASSIUM 4.0   CHLORIDE 97   CO2 23   GLUCOSE 122*   BUN 19   CREATININE 0.77   CALCIUM 8.7                   Imaging  IR-MIDLINE CATHETER INSERTION WO GUIDANCE > AGE 3   Final Result                   Ultrasound-guided midline placement performed by qualified nursing staff    as above.          EC-ECHOCARDIOGRAM COMPLETE W/O CONT   Final Result      DX-ELBOW-COMPLETE 3+ LEFT   Final Result      Soft tissue swelling without acute osseous abnormality.      DX-FEMUR-2+ RIGHT   Final Result      No acute osseous abnormality.         Assessment/Plan  Leg wound, right- (present on admission)  Assessment & Plan  Has h/o necrotizing fascitis.   - S/p I&D of right thigh wound and split thickness skin graft on 9/17  - underwent repeat split thickness skin graft 10/1  - pain control  - wound care, will need wound vac for at least 5 days from 10/2 per ortho team  - outpatient wound care clinic referral placed by Ortho PA  - abx as noted above    Pyogenic arthritis of left elbow (HCC)- (present on admission)  Assessment & Plan  - Ortho and ID following, appreciate recs  - s/p I&D left antecubital fossa on 9/15  - completed Vancomycin through 9/30  - continue with bactrim x4 weeks starting 10/1, end date 10/28  - culture positive for beta hemolytic group A strep and MRSA  - pain control  - wound care    Murmur- (present on admission)  Assessment & Plan  In the setting of his h/o IV drug abuse.   - blood cultures negative   - TTE negative    IV drug abuse (HCC)- (present on admission)  Assessment & Plan  Patient denies current use.  - barrier for acceptance to SNF; not a candidate for outpatient infusion  - avoid IV narcotics as possible; morphine prn for dressing changes okay    Homeless  Assessment & Plan  Social work and case management to assist in discharge planning     VTE prophylaxis: heparin

## 2020-10-06 NOTE — CARE PLAN
Problem: Venous Thromboembolism (VTW)/Deep Vein Thrombosis (DVT) Prevention:  Goal: Patient will participate in Venous Thrombosis (VTE)/Deep Vein Thrombosis (DVT)Prevention Measures  10/6/2020 1105 by Kathe Julian RYasmeenN.  Flowsheets  Taken 10/6/2020 1105  Mechanical Prophylaxis: SCDs, Sequential Compression Device  SCDs, Sequential Compression Device: On  Taken 10/6/2020 0948  Risk Assessment Score: 3  VTE RISK: High  Note: SCDs on; patient educated on purpose of SCDs  10/6/2020 0954 by Kathe Julian R.N.  Outcome: PROGRESSING AS EXPECTED  Flowsheets (Taken 10/6/2020 0948)  Mechanical Prophylaxis: SCDs, Sequential Compression Device  Note: SCDs in place     Problem: Infection  Goal: Will remain free from infection  10/6/2020 1105 by Kathe Julian, R.N.  Outcome: PROGRESSING AS EXPECTED  Note: Pt educated about disease process; aseptic technique used; afebrile; educated on handwashing; RN wearing mask

## 2020-10-06 NOTE — PROGRESS NOTES
Clarified dressing changes to LUE. Change dressing today and should be done every day per Ortho PA Benjamin

## 2020-10-06 NOTE — PROGRESS NOTES
Notified Dr. Quesada of no prophylaxis of VTE. Pt has SCDs and is ambulating occasionally. Per Dr. Quesada, will look into it.

## 2020-10-06 NOTE — CARE PLAN
Problem: Pain Management  Goal: Pain level will decrease to patient's comfort goal  Outcome: PROGRESSING SLOWER THAN EXPECTED     Problem: Mobility  Goal: Risk for activity intolerance will decrease  Outcome: PROGRESSING SLOWER THAN EXPECTED       Patient unable to tolerate removal of dressing to left thigh. MD states to leave in place and let it fall off on it's own.

## 2020-10-06 NOTE — PROGRESS NOTES
Assumed care of pt at 0745. Report received and bedside rounding completed with night RN. Pt is calm no SOB, or in any acute distress noted.     Isolation precautions and fall precautions in place- treaded non slip socks. Bed locked. Communication board updated with POC. Call light and pt belongings within reach - hourly rounding in place.

## 2020-10-07 LAB
ANION GAP SERPL CALC-SCNC: 13 MMOL/L (ref 7–16)
BUN SERPL-MCNC: 21 MG/DL (ref 8–22)
CALCIUM SERPL-MCNC: 9 MG/DL (ref 8.5–10.5)
CHLORIDE SERPL-SCNC: 95 MMOL/L (ref 96–112)
CO2 SERPL-SCNC: 21 MMOL/L (ref 20–33)
CREAT SERPL-MCNC: 0.96 MG/DL (ref 0.5–1.4)
ERYTHROCYTE [DISTWIDTH] IN BLOOD BY AUTOMATED COUNT: 51.5 FL (ref 35.9–50)
GLUCOSE SERPL-MCNC: 119 MG/DL (ref 65–99)
HCT VFR BLD AUTO: 41.8 % (ref 42–52)
HGB BLD-MCNC: 14.1 G/DL (ref 14–18)
MCH RBC QN AUTO: 28.7 PG (ref 27–33)
MCHC RBC AUTO-ENTMCNC: 33.7 G/DL (ref 33.7–35.3)
MCV RBC AUTO: 85 FL (ref 81.4–97.8)
PLATELET # BLD AUTO: 138 K/UL (ref 164–446)
PMV BLD AUTO: 9.8 FL (ref 9–12.9)
POTASSIUM SERPL-SCNC: 4.4 MMOL/L (ref 3.6–5.5)
RBC # BLD AUTO: 4.92 M/UL (ref 4.7–6.1)
SODIUM SERPL-SCNC: 129 MMOL/L (ref 135–145)
WBC # BLD AUTO: 5.2 K/UL (ref 4.8–10.8)

## 2020-10-07 PROCEDURE — 99232 SBSQ HOSP IP/OBS MODERATE 35: CPT | Performed by: INTERNAL MEDICINE

## 2020-10-07 PROCEDURE — 700102 HCHG RX REV CODE 250 W/ 637 OVERRIDE(OP): Performed by: PHYSICIAN ASSISTANT

## 2020-10-07 PROCEDURE — A9270 NON-COVERED ITEM OR SERVICE: HCPCS | Performed by: INTERNAL MEDICINE

## 2020-10-07 PROCEDURE — 700102 HCHG RX REV CODE 250 W/ 637 OVERRIDE(OP): Performed by: HOSPITALIST

## 2020-10-07 PROCEDURE — 700102 HCHG RX REV CODE 250 W/ 637 OVERRIDE(OP): Performed by: STUDENT IN AN ORGANIZED HEALTH CARE EDUCATION/TRAINING PROGRAM

## 2020-10-07 PROCEDURE — 85027 COMPLETE CBC AUTOMATED: CPT

## 2020-10-07 PROCEDURE — A9270 NON-COVERED ITEM OR SERVICE: HCPCS | Performed by: HOSPITALIST

## 2020-10-07 PROCEDURE — 700102 HCHG RX REV CODE 250 W/ 637 OVERRIDE(OP): Performed by: INTERNAL MEDICINE

## 2020-10-07 PROCEDURE — 80048 BASIC METABOLIC PNL TOTAL CA: CPT

## 2020-10-07 PROCEDURE — 700101 HCHG RX REV CODE 250: Performed by: STUDENT IN AN ORGANIZED HEALTH CARE EDUCATION/TRAINING PROGRAM

## 2020-10-07 PROCEDURE — A9270 NON-COVERED ITEM OR SERVICE: HCPCS | Performed by: STUDENT IN AN ORGANIZED HEALTH CARE EDUCATION/TRAINING PROGRAM

## 2020-10-07 PROCEDURE — 36415 COLL VENOUS BLD VENIPUNCTURE: CPT

## 2020-10-07 PROCEDURE — 770021 HCHG ROOM/CARE - ISO PRIVATE

## 2020-10-07 PROCEDURE — A9270 NON-COVERED ITEM OR SERVICE: HCPCS | Performed by: PHYSICIAN ASSISTANT

## 2020-10-07 RX ADMIN — HYDROMORPHONE HYDROCHLORIDE 1 MG: 2 TABLET ORAL at 18:44

## 2020-10-07 RX ADMIN — POLYETHYLENE GLYCOL 3350 1 PACKET: 17 POWDER, FOR SOLUTION ORAL at 05:38

## 2020-10-07 RX ADMIN — HYDROMORPHONE HYDROCHLORIDE 1 MG: 2 TABLET ORAL at 09:24

## 2020-10-07 RX ADMIN — PREGABALIN 150 MG: 150 CAPSULE ORAL at 05:37

## 2020-10-07 RX ADMIN — HYDROMORPHONE HYDROCHLORIDE 1 MG: 2 TABLET ORAL at 22:22

## 2020-10-07 RX ADMIN — PREGABALIN 150 MG: 150 CAPSULE ORAL at 16:56

## 2020-10-07 RX ADMIN — HYDROMORPHONE HYDROCHLORIDE 1 MG: 2 TABLET ORAL at 12:29

## 2020-10-07 RX ADMIN — DOCUSATE SODIUM 100 MG: 100 CAPSULE ORAL at 05:37

## 2020-10-07 RX ADMIN — HYDROMORPHONE HYDROCHLORIDE 1 MG: 2 TABLET ORAL at 15:30

## 2020-10-07 RX ADMIN — TRAZODONE HYDROCHLORIDE 50 MG: 50 TABLET ORAL at 21:08

## 2020-10-07 RX ADMIN — SULFAMETHOXAZOLE AND TRIMETHOPRIM 2 TABLET: 800; 160 TABLET ORAL at 16:56

## 2020-10-07 RX ADMIN — HYDROMORPHONE HYDROCHLORIDE 1 MG: 2 TABLET ORAL at 05:37

## 2020-10-07 RX ADMIN — DOCUSATE SODIUM 50 MG AND SENNOSIDES 8.6 MG 2 TABLET: 8.6; 5 TABLET, FILM COATED ORAL at 16:56

## 2020-10-07 RX ADMIN — PREGABALIN 150 MG: 150 CAPSULE ORAL at 11:49

## 2020-10-07 RX ADMIN — DOCUSATE SODIUM 100 MG: 100 CAPSULE ORAL at 16:56

## 2020-10-07 RX ADMIN — POLYETHYLENE GLYCOL 3350 1 PACKET: 17 POWDER, FOR SOLUTION ORAL at 16:56

## 2020-10-07 RX ADMIN — SULFAMETHOXAZOLE AND TRIMETHOPRIM 2 TABLET: 800; 160 TABLET ORAL at 05:37

## 2020-10-07 RX ADMIN — NICOTINE 14 MG: 14 PATCH TRANSDERMAL at 05:37

## 2020-10-07 ASSESSMENT — ENCOUNTER SYMPTOMS
ABDOMINAL PAIN: 0
DIZZINESS: 0
FEVER: 0
NERVOUS/ANXIOUS: 0
CHILLS: 0
INSOMNIA: 1
SHORTNESS OF BREATH: 0
BLURRED VISION: 0
MYALGIAS: 1
VOMITING: 0
COUGH: 0
EYE DISCHARGE: 0
BACK PAIN: 1
SEIZURES: 0
HEADACHES: 0
NAUSEA: 0
PALPITATIONS: 0

## 2020-10-07 ASSESSMENT — PAIN DESCRIPTION - PAIN TYPE
TYPE: ACUTE PAIN

## 2020-10-07 NOTE — CARE PLAN
Problem: Communication  Goal: The ability to communicate needs accurately and effectively will improve  Outcome: PROGRESSING AS EXPECTED   Patient communicates needs effectively to staff.    Problem: Mobility  Goal: Risk for activity intolerance will decrease  Outcome: PROGRESSING AS EXPECTED  Patient mobilizing to the bathroom.

## 2020-10-07 NOTE — CARE PLAN
Problem: Safety  Goal: Will remain free from falls  Outcome: PROGRESSING AS EXPECTED  Intervention: Implement fall precautions  Flowsheets (Taken 10/7/2020 0997)  Environmental Precautions:   Personal Belongings, Wastebasket, Call Bell etc. in Easy Reach   Transferred to Stronger Side   Bed in Low Position   Communication Sign for Patients & Families   Mobility Assessed & Appropriate Sign Placed   Report Given to Other Health Care Providers Regarding Fall Risk   Treaded Slipper Socks on Patient  Note: Safety precautions in place. Call light within reach. Bed is low and in locked position. Hourly rounding in place.       Problem: Discharge Barriers/Planning  Goal: Patient's continuum of care needs will be met  Outcome: PROGRESSING AS EXPECTED  Note: Continue wound vac per Dr. Quesada       Problem: Pain Management  Goal: Pain level will decrease to patient's comfort goal  Outcome: PROGRESSING AS EXPECTED  Intervention: Follow pain managment plan developed in collaboration with patient and Interdisciplinary Team  Note: Dilaudid PO given per MAR for pain  Pt resting in bed comfortably

## 2020-10-07 NOTE — PROGRESS NOTES
Hospital Medicine Daily Progress Note    Date of Service  10/7/2020    Chief Complaint  Wound pain, drainage    Hospital Course  60 y.o. male with a past medical history of previous MRSA cellulitis, intravenous drug use, homelessness, Hx right lower extremity necrotizing fasciitis s/p fasciotomy in January 2020. Admitted 9/14/2020 with multiple abscesses on upper and lower extremity.  He has a 4 mm ulcer with serosanguineous drainage on his left lateral forearm.   Vancomycin and Rocephin was started.  Orthopedic surgery were consulted, S/P irrigation and debridement of left antecubital fossa and superficial right thigh wound in the OR. Right hip wound culture grew beta-hemolytic group B strep. Transthoracic Echo performed to r/o endocarditis was performed and was negative for vegetations, EF 65 with mild tricuspid regurg. RV systolic pressure of 25mmHg.     Interval Problem Update  No acute events overnight. Resting in bed   Pain well controlled.  Continue wound vac per surgery,  Continue bactrim for total 4 week course, end date 10/28.    Consultants/Specialty  Orthopedic surgery  ID    Code Status  DNAR/DNI    Disposition  Anticipate d/c home/street pending surgical clearance, with outpatient wound clinic follow up. Med to beds provided to patient.    Review of Systems  Review of Systems   Constitutional: Positive for malaise/fatigue. Negative for chills and fever.   Eyes: Negative for blurred vision and discharge.   Respiratory: Negative for cough and shortness of breath.    Cardiovascular: Negative for chest pain, palpitations and leg swelling.   Gastrointestinal: Negative for abdominal pain, nausea and vomiting.   Genitourinary: Negative for dysuria and hematuria.   Musculoskeletal: Positive for back pain, joint pain and myalgias.   Skin: Negative for itching.   Neurological: Negative for dizziness, seizures and headaches.   Psychiatric/Behavioral: The patient has insomnia. The patient is not nervous/anxious.          Chronic depression      Physical Exam  Temp:  [36.2 °C (97.1 °F)-37.5 °C (99.5 °F)] 36.6 °C (97.9 °F)  Pulse:  [75-90] 83  Resp:  [18] 18  BP: ()/(61-72) 104/63  SpO2:  [92 %-97 %] 96 %    Physical Exam  Vitals signs reviewed.   Constitutional:       General: He is not in acute distress.     Appearance: He is not diaphoretic.   HENT:      Head: Normocephalic.      Mouth/Throat:      Mouth: Mucous membranes are moist.   Eyes:      General:         Right eye: No discharge.         Left eye: No discharge.      Extraocular Movements: Extraocular movements intact.   Neck:      Musculoskeletal: Normal range of motion. No neck rigidity.   Cardiovascular:      Rate and Rhythm: Normal rate.      Heart sounds: Murmur present.   Pulmonary:      Effort: Pulmonary effort is normal. No respiratory distress.      Breath sounds: No wheezing.   Abdominal:      General: There is no distension.      Palpations: Abdomen is soft.      Tenderness: There is no abdominal tenderness. There is no guarding.   Musculoskeletal: Normal range of motion.      Comments: Left UE good ROM, right thigh under ace wrap/dressing, no pain with light palpation, wound vac applied to right thigh; left thigh under ace wrap/dressing with dried blood seeped through   Skin:     General: Skin is dry.   Neurological:      General: No focal deficit present.      Mental Status: He is alert.   Psychiatric:         Mood and Affect: Mood normal.       Fluids    Intake/Output Summary (Last 24 hours) at 10/7/2020 1225  Last data filed at 10/6/2020 2048  Gross per 24 hour   Intake 960 ml   Output 300 ml   Net 660 ml     Laboratory  Recent Labs     10/07/20  0045   WBC 5.2   RBC 4.92   HEMOGLOBIN 14.1   HEMATOCRIT 41.8*   MCV 85.0   MCH 28.7   MCHC 33.7   RDW 51.5*   PLATELETCT 138*   MPV 9.8     Recent Labs     10/07/20  0045   SODIUM 129*   POTASSIUM 4.4   CHLORIDE 95*   CO2 21   GLUCOSE 119*   BUN 21   CREATININE 0.96   CALCIUM 9.0                    Imaging  IR-MIDLINE CATHETER INSERTION WO GUIDANCE > AGE 3   Final Result                  Ultrasound-guided midline placement performed by qualified nursing staff    as above.          EC-ECHOCARDIOGRAM COMPLETE W/O CONT   Final Result      DX-ELBOW-COMPLETE 3+ LEFT   Final Result      Soft tissue swelling without acute osseous abnormality.      DX-FEMUR-2+ RIGHT   Final Result      No acute osseous abnormality.         Assessment/Plan  Leg wound, right- (present on admission)  Assessment & Plan  Has h/o necrotizing fascitis.   - S/p I&D of right thigh wound and split thickness skin graft on 9/17  - underwent repeat split thickness skin graft 10/1  - pain control  - wound care, will need wound vac for at least 5 days from 10/2 per ortho team  - outpatient wound care clinic referral placed by Ortho PA  - abx as noted above    Pyogenic arthritis of left elbow (HCC)- (present on admission)  Assessment & Plan  - Ortho and ID following, appreciate recs  - s/p I&D left antecubital fossa on 9/15  - completed Vancomycin through 9/30  - continue with bactrim x4 weeks starting 10/1, end date 10/28  - culture positive for beta hemolytic group A strep and MRSA  - pain control  - wound care    Murmur- (present on admission)  Assessment & Plan  In the setting of his h/o IV drug abuse.   - blood cultures negative   - TTE negative    IV drug abuse (HCC)- (present on admission)  Assessment & Plan  Patient denies current use.  - barrier for acceptance to SNF; not a candidate for outpatient infusion  - avoid IV narcotics as possible; morphine prn for dressing changes okay    Homeless  Assessment & Plan  Social work and case management to assist in discharge planning     VTE prophylaxis: heparin

## 2020-10-08 LAB
ANION GAP SERPL CALC-SCNC: 12 MMOL/L (ref 7–16)
BUN SERPL-MCNC: 23 MG/DL (ref 8–22)
CALCIUM SERPL-MCNC: 9.2 MG/DL (ref 8.5–10.5)
CHLORIDE SERPL-SCNC: 95 MMOL/L (ref 96–112)
CO2 SERPL-SCNC: 24 MMOL/L (ref 20–33)
CREAT SERPL-MCNC: 1.03 MG/DL (ref 0.5–1.4)
ERYTHROCYTE [DISTWIDTH] IN BLOOD BY AUTOMATED COUNT: 52.9 FL (ref 35.9–50)
GLUCOSE SERPL-MCNC: 128 MG/DL (ref 65–99)
HCT VFR BLD AUTO: 44.7 % (ref 42–52)
HGB BLD-MCNC: 14.9 G/DL (ref 14–18)
MCH RBC QN AUTO: 28.9 PG (ref 27–33)
MCHC RBC AUTO-ENTMCNC: 33.3 G/DL (ref 33.7–35.3)
MCV RBC AUTO: 86.6 FL (ref 81.4–97.8)
PLATELET # BLD AUTO: 127 K/UL (ref 164–446)
PMV BLD AUTO: 10.5 FL (ref 9–12.9)
POTASSIUM SERPL-SCNC: 4.5 MMOL/L (ref 3.6–5.5)
RBC # BLD AUTO: 5.16 M/UL (ref 4.7–6.1)
SODIUM SERPL-SCNC: 131 MMOL/L (ref 135–145)
WBC # BLD AUTO: 6.5 K/UL (ref 4.8–10.8)

## 2020-10-08 PROCEDURE — A9270 NON-COVERED ITEM OR SERVICE: HCPCS | Performed by: INTERNAL MEDICINE

## 2020-10-08 PROCEDURE — 700102 HCHG RX REV CODE 250 W/ 637 OVERRIDE(OP): Performed by: PHYSICIAN ASSISTANT

## 2020-10-08 PROCEDURE — 700102 HCHG RX REV CODE 250 W/ 637 OVERRIDE(OP): Performed by: STUDENT IN AN ORGANIZED HEALTH CARE EDUCATION/TRAINING PROGRAM

## 2020-10-08 PROCEDURE — 36415 COLL VENOUS BLD VENIPUNCTURE: CPT

## 2020-10-08 PROCEDURE — A9270 NON-COVERED ITEM OR SERVICE: HCPCS | Performed by: PHYSICIAN ASSISTANT

## 2020-10-08 PROCEDURE — 770021 HCHG ROOM/CARE - ISO PRIVATE

## 2020-10-08 PROCEDURE — 85027 COMPLETE CBC AUTOMATED: CPT

## 2020-10-08 PROCEDURE — 700102 HCHG RX REV CODE 250 W/ 637 OVERRIDE(OP): Performed by: HOSPITALIST

## 2020-10-08 PROCEDURE — A9270 NON-COVERED ITEM OR SERVICE: HCPCS | Performed by: STUDENT IN AN ORGANIZED HEALTH CARE EDUCATION/TRAINING PROGRAM

## 2020-10-08 PROCEDURE — 99231 SBSQ HOSP IP/OBS SF/LOW 25: CPT | Performed by: INTERNAL MEDICINE

## 2020-10-08 PROCEDURE — 80048 BASIC METABOLIC PNL TOTAL CA: CPT

## 2020-10-08 PROCEDURE — 700101 HCHG RX REV CODE 250: Performed by: STUDENT IN AN ORGANIZED HEALTH CARE EDUCATION/TRAINING PROGRAM

## 2020-10-08 PROCEDURE — 700102 HCHG RX REV CODE 250 W/ 637 OVERRIDE(OP): Performed by: INTERNAL MEDICINE

## 2020-10-08 PROCEDURE — A9270 NON-COVERED ITEM OR SERVICE: HCPCS | Performed by: HOSPITALIST

## 2020-10-08 RX ADMIN — POLYETHYLENE GLYCOL 3350 1 PACKET: 17 POWDER, FOR SOLUTION ORAL at 04:51

## 2020-10-08 RX ADMIN — HYDROMORPHONE HYDROCHLORIDE 1 MG: 2 TABLET ORAL at 04:52

## 2020-10-08 RX ADMIN — DOCUSATE SODIUM 100 MG: 100 CAPSULE ORAL at 18:03

## 2020-10-08 RX ADMIN — POLYETHYLENE GLYCOL 3350 1 PACKET: 17 POWDER, FOR SOLUTION ORAL at 18:03

## 2020-10-08 RX ADMIN — HYDROMORPHONE HYDROCHLORIDE 1 MG: 2 TABLET ORAL at 08:17

## 2020-10-08 RX ADMIN — HYDROMORPHONE HYDROCHLORIDE 1 MG: 2 TABLET ORAL at 01:45

## 2020-10-08 RX ADMIN — HYDROMORPHONE HYDROCHLORIDE 1 MG: 2 TABLET ORAL at 11:38

## 2020-10-08 RX ADMIN — PREGABALIN 150 MG: 150 CAPSULE ORAL at 18:04

## 2020-10-08 RX ADMIN — HYDROMORPHONE HYDROCHLORIDE 1 MG: 2 TABLET ORAL at 14:56

## 2020-10-08 RX ADMIN — SULFAMETHOXAZOLE AND TRIMETHOPRIM 2 TABLET: 800; 160 TABLET ORAL at 04:51

## 2020-10-08 RX ADMIN — SULFAMETHOXAZOLE AND TRIMETHOPRIM 2 TABLET: 800; 160 TABLET ORAL at 18:03

## 2020-10-08 RX ADMIN — HYDROMORPHONE HYDROCHLORIDE 1 MG: 2 TABLET ORAL at 18:03

## 2020-10-08 RX ADMIN — HYDROMORPHONE HYDROCHLORIDE 1 MG: 2 TABLET ORAL at 21:30

## 2020-10-08 RX ADMIN — PREGABALIN 150 MG: 150 CAPSULE ORAL at 11:38

## 2020-10-08 RX ADMIN — TRAZODONE HYDROCHLORIDE 50 MG: 50 TABLET ORAL at 21:30

## 2020-10-08 RX ADMIN — DOCUSATE SODIUM 100 MG: 100 CAPSULE ORAL at 04:51

## 2020-10-08 RX ADMIN — NICOTINE 14 MG: 14 PATCH TRANSDERMAL at 04:51

## 2020-10-08 RX ADMIN — DOCUSATE SODIUM 50 MG AND SENNOSIDES 8.6 MG 2 TABLET: 8.6; 5 TABLET, FILM COATED ORAL at 18:03

## 2020-10-08 RX ADMIN — PREGABALIN 150 MG: 150 CAPSULE ORAL at 04:50

## 2020-10-08 ASSESSMENT — ENCOUNTER SYMPTOMS
VOMITING: 0
DIZZINESS: 0
HEADACHES: 0
CHILLS: 0
BLURRED VISION: 0
INSOMNIA: 1
NERVOUS/ANXIOUS: 0
SHORTNESS OF BREATH: 0
FEVER: 0
MYALGIAS: 1
EYE DISCHARGE: 0
ABDOMINAL PAIN: 0
SEIZURES: 0
COUGH: 0
BACK PAIN: 1
NAUSEA: 0
PALPITATIONS: 0

## 2020-10-08 ASSESSMENT — PAIN DESCRIPTION - PAIN TYPE
TYPE: ACUTE PAIN

## 2020-10-08 NOTE — PROGRESS NOTES
Hospital Medicine Daily Progress Note    Date of Service  10/8/2020    Chief Complaint  Wound pain, drainage    Hospital Course  60 y.o. male with a past medical history of previous MRSA cellulitis, intravenous drug use, homelessness, Hx right lower extremity necrotizing fasciitis s/p fasciotomy in January 2020. Admitted 9/14/2020 with multiple abscesses on upper and lower extremity.  He has a 4 mm ulcer with serosanguineous drainage on his left lateral forearm.   Vancomycin and Rocephin was started.  Orthopedic surgery were consulted, S/P irrigation and debridement of left antecubital fossa and superficial right thigh wound in the OR. Right hip wound culture grew beta-hemolytic group B strep. Transthoracic Echo performed to r/o endocarditis was performed and was negative for vegetations, EF 65 with mild tricuspid regurg. RV systolic pressure of 25mmHg.     Interval Problem Update  Pt seen and examined, afebrile, no acute events overnights.   Pain well controlled.  Continue wound vac per surgery,  Continue bactrim for total 4 week course, end date 10/28.    Consultants/Specialty  Orthopedic surgery  ID    Code Status  DNAR/DNI    Disposition  Anticipate d/c home/street pending surgical clearance, with outpatient wound clinic follow up. Med to beds provided to patient.    Review of Systems  Review of Systems   Constitutional: Positive for malaise/fatigue. Negative for chills and fever.   Eyes: Negative for blurred vision and discharge.   Respiratory: Negative for cough and shortness of breath.    Cardiovascular: Negative for chest pain, palpitations and leg swelling.   Gastrointestinal: Negative for abdominal pain, nausea and vomiting.   Genitourinary: Negative for dysuria and hematuria.   Musculoskeletal: Positive for back pain, joint pain and myalgias.   Skin: Negative for itching.   Neurological: Negative for dizziness, seizures and headaches.   Psychiatric/Behavioral: The patient has insomnia. The patient is not  nervous/anxious.         Chronic depression      Physical Exam  Temp:  [36.6 °C (97.8 °F)-37.8 °C (100 °F)] 36.6 °C (97.8 °F)  Pulse:  [82-90] 87  Resp:  [16-18] 16  BP: (101-118)/(54-73) 101/73  SpO2:  [93 %-95 %] 95 %    Physical Exam  Vitals signs reviewed.   Constitutional:       General: He is not in acute distress.     Appearance: He is not diaphoretic.   HENT:      Head: Normocephalic.      Mouth/Throat:      Mouth: Mucous membranes are moist.   Eyes:      General:         Right eye: No discharge.         Left eye: No discharge.      Extraocular Movements: Extraocular movements intact.   Neck:      Musculoskeletal: Normal range of motion. No neck rigidity.   Cardiovascular:      Rate and Rhythm: Normal rate.      Heart sounds: Murmur present.   Pulmonary:      Effort: Pulmonary effort is normal. No respiratory distress.      Breath sounds: No wheezing.   Abdominal:      General: There is no distension.      Palpations: Abdomen is soft.      Tenderness: There is no abdominal tenderness. There is no guarding.   Musculoskeletal: Normal range of motion.      Comments: Left UE good ROM, right thigh under ace wrap/dressing, no pain with light palpation, wound vac applied to right thigh; left thigh under ace wrap/dressing with dried blood seeped through   Skin:     General: Skin is dry.   Neurological:      General: No focal deficit present.      Mental Status: He is alert.   Psychiatric:         Mood and Affect: Mood normal.       Fluids    Intake/Output Summary (Last 24 hours) at 10/8/2020 1317  Last data filed at 10/8/2020 0930  Gross per 24 hour   Intake 240 ml   Output --   Net 240 ml     Laboratory  Recent Labs     10/07/20  0045 10/08/20  0130   WBC 5.2 6.5   RBC 4.92 5.16   HEMOGLOBIN 14.1 14.9   HEMATOCRIT 41.8* 44.7   MCV 85.0 86.6   MCH 28.7 28.9   MCHC 33.7 33.3*   RDW 51.5* 52.9*   PLATELETCT 138* 127*   MPV 9.8 10.5     Recent Labs     10/07/20  0045 10/08/20  0130   SODIUM 129* 131*   POTASSIUM 4.4  4.5   CHLORIDE 95* 95*   CO2 21 24   GLUCOSE 119* 128*   BUN 21 23*   CREATININE 0.96 1.03   CALCIUM 9.0 9.2                   Imaging  IR-MIDLINE CATHETER INSERTION WO GUIDANCE > AGE 3   Final Result                  Ultrasound-guided midline placement performed by qualified nursing staff    as above.          EC-ECHOCARDIOGRAM COMPLETE W/O CONT   Final Result      DX-ELBOW-COMPLETE 3+ LEFT   Final Result      Soft tissue swelling without acute osseous abnormality.      DX-FEMUR-2+ RIGHT   Final Result      No acute osseous abnormality.         Assessment/Plan  Leg wound, right- (present on admission)  Assessment & Plan  Has h/o necrotizing fascitis.   - S/p I&D of right thigh wound and split thickness skin graft on 9/17  - underwent repeat split thickness skin graft 10/1  - pain control  - wound care, will need wound vac for at least 5 days from 10/2 per ortho team  - outpatient wound care clinic referral placed by Ortho PA  - abx as noted above    Pyogenic arthritis of left elbow (HCC)- (present on admission)  Assessment & Plan  - Ortho and ID following, appreciate recs  - s/p I&D left antecubital fossa on 9/15  - completed Vancomycin through 9/30  - continue with bactrim x4 weeks starting 10/1, end date 10/28  - culture positive for beta hemolytic group A strep and MRSA  - pain control  - wound care    Murmur- (present on admission)  Assessment & Plan  In the setting of his h/o IV drug abuse.   - blood cultures negative   - TTE negative    IV drug abuse (HCC)- (present on admission)  Assessment & Plan  Patient denies current use.  - barrier for acceptance to SNF; not a candidate for outpatient infusion  - avoid IV narcotics as possible; morphine prn for dressing changes okay    Homeless  Assessment & Plan  Social work and case management to assist in discharge planning     VTE prophylaxis: heparin

## 2020-10-08 NOTE — PROGRESS NOTES
Report received from Day Shift RN at 1915. Assumed care of patient. Plan of care discussed, questions answered. Assessment completed. VSS, A&O x4, states pain on LLE. PRN med given. Call light in reach. Patient educated on use of call light for assistance.    Wound vac in place.

## 2020-10-08 NOTE — PROGRESS NOTES
"   Orthopaedic Progress Note    Interval changes:  Not Cleared for DC by ortho   HV in place to right thigh  LUE dressing changed - daily changes xeroform, gauze, tegaderm    ROS - Patient denies any new issues except per above.  Pain well controlled.    /68   Pulse 82   Temp 36.7 °C (98 °F) (Temporal)   Resp 17   Ht 1.93 m (6' 4\")   Wt 113 kg (249 lb 1.9 oz)   SpO2 93%     Patient seen and examined  No acute distress  Breathing non labored  RRR  RLE dressings CDI. BLE DNVI, moves all toes, cap refill <2 sec.  LUE dressing CDI, DNVI, moves all fingers, cap refill <2 sec.   Recent Labs     10/07/20  0045   WBC 5.2   RBC 4.92   HEMOGLOBIN 14.1   HEMATOCRIT 41.8*   MCV 85.0   MCH 28.7   MCHC 33.7   RDW 51.5*   PLATELETCT 138*   MPV 9.8       Active Hospital Problems    Diagnosis   • Leg wound, right [S81.801A]     Priority: High   • Pyogenic arthritis of left elbow (HCC) [M00.9]     Priority: High     No crepitus no gas in the tissues,   history of MRSA, orthopedic surgery consulted for OR in the a.m.  Empiric vancomycin and Rocephin ordered.  Symptomatic management     • Murmur [R01.1]     Priority: Medium   • IV drug abuse (HCC) [F19.10]     Priority: Medium   • History of MRSA infection [Z86.14]     Assessment/Plan:  Dressings to be left in place   Not cleared for DC by ortho  POD#6 S/P:  1.  Repeat split-thickness skin graft, left thigh to right thigh.  2.  Application of ACell crystals.  3.  Application of right thigh wound VAC.  POD#20 S/P:  1.  Irrigation and debridement of superficial skin, fascia, and muscle, right thigh.    2.  Split-thickness skin graft from left thigh to right thigh.    3.  Application of ACell MicroMatrix right thigh skin graft.    4.  Irrigation and debridement, left arm skin, subcutaneous tissue, fascia, and bone.    5.  Application of ACell MicroMatrix and Gentrix graft.    6.  Application of wound VAC, right thigh.    Wt bearing status - WBAT  Wound care/Drains - dressings " left in place  Future Procedures - none planned   Sutures/Staples out- 21 days post operatively  PT/OT-initiated  Antibiotics: bactrim DS  DVT Prophylaxis- TEDS/SCDs/Foot pumps/heparin  Lundberg-none  Case Coordination for Discharge Planning - Disposition SNF

## 2020-10-08 NOTE — CARE PLAN
Problem: Communication  Goal: The ability to communicate needs accurately and effectively will improve  Outcome: PROGRESSING AS EXPECTED     Problem: Safety  Goal: Will remain free from injury  Outcome: PROGRESSING AS EXPECTED     Problem: Infection  Goal: Will remain free from infection  Outcome: PROGRESSING AS EXPECTED     Problem: Venous Thromboembolism (VTW)/Deep Vein Thrombosis (DVT) Prevention:  Goal: Patient will participate in Venous Thrombosis (VTE)/Deep Vein Thrombosis (DVT)Prevention Measures  Outcome: PROGRESSING AS EXPECTED     Problem: Pain Management  Goal: Pain level will decrease to patient's comfort goal  Outcome: PROGRESSING AS EXPECTED

## 2020-10-08 NOTE — CARE PLAN
Problem: Communication  Goal: The ability to communicate needs accurately and effectively will improve  Outcome: PROGRESSING AS EXPECTED  Intervention: Educate patient and significant other/support system about the plan of care, procedures, treatments, medications and allow for questions  Note: Discussed POC with pt, all questions and concerns were addressed.      Problem: Safety  Goal: Will remain free from falls  Outcome: PROGRESSING AS EXPECTED  Intervention: Implement fall precautions  Note: Standard precautions in place.

## 2020-10-09 PROCEDURE — 700101 HCHG RX REV CODE 250: Performed by: STUDENT IN AN ORGANIZED HEALTH CARE EDUCATION/TRAINING PROGRAM

## 2020-10-09 PROCEDURE — A9270 NON-COVERED ITEM OR SERVICE: HCPCS | Performed by: STUDENT IN AN ORGANIZED HEALTH CARE EDUCATION/TRAINING PROGRAM

## 2020-10-09 PROCEDURE — 700102 HCHG RX REV CODE 250 W/ 637 OVERRIDE(OP): Performed by: HOSPITALIST

## 2020-10-09 PROCEDURE — 700102 HCHG RX REV CODE 250 W/ 637 OVERRIDE(OP): Performed by: STUDENT IN AN ORGANIZED HEALTH CARE EDUCATION/TRAINING PROGRAM

## 2020-10-09 PROCEDURE — 99231 SBSQ HOSP IP/OBS SF/LOW 25: CPT | Performed by: INTERNAL MEDICINE

## 2020-10-09 PROCEDURE — 700102 HCHG RX REV CODE 250 W/ 637 OVERRIDE(OP): Performed by: INTERNAL MEDICINE

## 2020-10-09 PROCEDURE — 700102 HCHG RX REV CODE 250 W/ 637 OVERRIDE(OP): Performed by: PHYSICIAN ASSISTANT

## 2020-10-09 PROCEDURE — A9270 NON-COVERED ITEM OR SERVICE: HCPCS | Performed by: PHYSICIAN ASSISTANT

## 2020-10-09 PROCEDURE — A9270 NON-COVERED ITEM OR SERVICE: HCPCS | Performed by: INTERNAL MEDICINE

## 2020-10-09 PROCEDURE — A9270 NON-COVERED ITEM OR SERVICE: HCPCS | Performed by: HOSPITALIST

## 2020-10-09 PROCEDURE — 770021 HCHG ROOM/CARE - ISO PRIVATE

## 2020-10-09 RX ADMIN — HYDROMORPHONE HYDROCHLORIDE 1 MG: 2 TABLET ORAL at 08:24

## 2020-10-09 RX ADMIN — PREGABALIN 150 MG: 150 CAPSULE ORAL at 05:17

## 2020-10-09 RX ADMIN — DOCUSATE SODIUM 100 MG: 100 CAPSULE ORAL at 18:06

## 2020-10-09 RX ADMIN — HYDROMORPHONE HYDROCHLORIDE 1 MG: 2 TABLET ORAL at 01:47

## 2020-10-09 RX ADMIN — SULFAMETHOXAZOLE AND TRIMETHOPRIM 2 TABLET: 800; 160 TABLET ORAL at 05:17

## 2020-10-09 RX ADMIN — POLYETHYLENE GLYCOL 3350 1 PACKET: 17 POWDER, FOR SOLUTION ORAL at 05:18

## 2020-10-09 RX ADMIN — SULFAMETHOXAZOLE AND TRIMETHOPRIM 2 TABLET: 800; 160 TABLET ORAL at 18:07

## 2020-10-09 RX ADMIN — TRAZODONE HYDROCHLORIDE 50 MG: 50 TABLET ORAL at 21:21

## 2020-10-09 RX ADMIN — PREGABALIN 150 MG: 150 CAPSULE ORAL at 11:35

## 2020-10-09 RX ADMIN — DOCUSATE SODIUM 100 MG: 100 CAPSULE ORAL at 05:17

## 2020-10-09 RX ADMIN — POLYETHYLENE GLYCOL 3350 1 PACKET: 17 POWDER, FOR SOLUTION ORAL at 18:07

## 2020-10-09 RX ADMIN — HYDROMORPHONE HYDROCHLORIDE 1 MG: 2 TABLET ORAL at 18:06

## 2020-10-09 RX ADMIN — ACETAMINOPHEN 1000 MG: 500 TABLET ORAL at 08:24

## 2020-10-09 RX ADMIN — HYDROMORPHONE HYDROCHLORIDE 1 MG: 2 TABLET ORAL at 05:17

## 2020-10-09 RX ADMIN — NICOTINE 14 MG: 14 PATCH TRANSDERMAL at 05:17

## 2020-10-09 RX ADMIN — DOCUSATE SODIUM 50 MG AND SENNOSIDES 8.6 MG 2 TABLET: 8.6; 5 TABLET, FILM COATED ORAL at 18:06

## 2020-10-09 RX ADMIN — HYDROMORPHONE HYDROCHLORIDE 1 MG: 2 TABLET ORAL at 11:35

## 2020-10-09 RX ADMIN — PREGABALIN 150 MG: 150 CAPSULE ORAL at 18:07

## 2020-10-09 RX ADMIN — HYDROMORPHONE HYDROCHLORIDE 1 MG: 2 TABLET ORAL at 21:21

## 2020-10-09 RX ADMIN — HYDROMORPHONE HYDROCHLORIDE 1 MG: 2 TABLET ORAL at 14:53

## 2020-10-09 ASSESSMENT — PAIN DESCRIPTION - PAIN TYPE
TYPE: ACUTE PAIN

## 2020-10-09 ASSESSMENT — ENCOUNTER SYMPTOMS
HEADACHES: 0
INSOMNIA: 1
ABDOMINAL PAIN: 0
CHILLS: 0
NAUSEA: 0
PALPITATIONS: 0
BACK PAIN: 1
COUGH: 0
SEIZURES: 0
MYALGIAS: 1
FEVER: 0
BLURRED VISION: 0
NERVOUS/ANXIOUS: 0
EYE DISCHARGE: 0
VOMITING: 0
DIZZINESS: 0
SHORTNESS OF BREATH: 0

## 2020-10-09 NOTE — CARE PLAN
Problem: Communication  Goal: The ability to communicate needs accurately and effectively will improve  Outcome: PROGRESSING AS EXPECTED     Problem: Safety  Goal: Will remain free from injury  Outcome: PROGRESSING AS EXPECTED     Problem: Infection  Goal: Will remain free from infection  Outcome: PROGRESSING AS EXPECTED     Problem: Venous Thromboembolism (VTW)/Deep Vein Thrombosis (DVT) Prevention:  Goal: Patient will participate in Venous Thrombosis (VTE)/Deep Vein Thrombosis (DVT)Prevention Measures  Outcome: PROGRESSING AS EXPECTED     Problem: Discharge Barriers/Planning  Goal: Patient's continuum of care needs will be met  Outcome: PROGRESSING AS EXPECTED     Problem: Pain Management  Goal: Pain level will decrease to patient's comfort goal  Outcome: PROGRESSING AS EXPECTED

## 2020-10-09 NOTE — PROGRESS NOTES
Hospital Medicine Daily Progress Note    Date of Service  10/9/2020    Chief Complaint  Wound pain, drainage    Hospital Course  60 y.o. male with a past medical history of previous MRSA cellulitis, intravenous drug use, homelessness, Hx right lower extremity necrotizing fasciitis s/p fasciotomy in January 2020. Admitted 9/14/2020 with multiple abscesses on upper and lower extremity.  He has a 4 mm ulcer with serosanguineous drainage on his left lateral forearm.   Vancomycin and Rocephin was started.  Orthopedic surgery were consulted, S/P irrigation and debridement of left antecubital fossa and superficial right thigh wound in the OR. Right hip wound culture grew beta-hemolytic group B strep. Transthoracic Echo performed to r/o endocarditis was performed and was negative for vegetations, EF 65 with mild tricuspid regurg. RV systolic pressure of 25mmHg.     Interval Problem Update  Afebrile, no acute events overnights.   Pain well controlled.  Ortho following appreciate rec   Continue bactrim for total 4 week course, end date 10/28.    Consultants/Specialty  Orthopedic surgery  ID    Code Status  DNAR/DNI    Disposition  Anticipate d/c home/street pending surgical clearance, with outpatient wound clinic follow up. Med to beds provided to patient.    Review of Systems  Review of Systems   Constitutional: Positive for malaise/fatigue. Negative for chills and fever.   Eyes: Negative for blurred vision and discharge.   Respiratory: Negative for cough and shortness of breath.    Cardiovascular: Negative for chest pain, palpitations and leg swelling.   Gastrointestinal: Negative for abdominal pain, nausea and vomiting.   Genitourinary: Negative for dysuria and hematuria.   Musculoskeletal: Positive for back pain, joint pain and myalgias.   Skin: Negative for itching.   Neurological: Negative for dizziness, seizures and headaches.   Psychiatric/Behavioral: The patient has insomnia. The patient is not nervous/anxious.          Chronic depression      Physical Exam  Temp:  [36.7 °C (98.1 °F)-37.8 °C (100 °F)] 36.8 °C (98.3 °F)  Pulse:  [78-92] 85  Resp:  [15-18] 16  BP: ()/(53-76) 111/64  SpO2:  [96 %-97 %] 96 %    Physical Exam  Vitals signs reviewed.   Constitutional:       General: He is not in acute distress.     Appearance: He is not diaphoretic.   HENT:      Head: Normocephalic.      Mouth/Throat:      Mouth: Mucous membranes are moist.   Eyes:      General:         Right eye: No discharge.         Left eye: No discharge.      Extraocular Movements: Extraocular movements intact.   Neck:      Musculoskeletal: Normal range of motion. No neck rigidity.   Cardiovascular:      Rate and Rhythm: Normal rate.      Heart sounds: Murmur present.   Pulmonary:      Effort: Pulmonary effort is normal. No respiratory distress.      Breath sounds: No wheezing.   Abdominal:      General: There is no distension.      Palpations: Abdomen is soft.      Tenderness: There is no abdominal tenderness. There is no guarding.   Musculoskeletal: Normal range of motion.      Comments: Left UE good ROM, right thigh under ace wrap/dressing, no pain with light palpation, wound vac applied to right thigh; left thigh under ace wrap/dressing with dried blood seeped through   Skin:     General: Skin is dry.   Neurological:      General: No focal deficit present.      Mental Status: He is alert.   Psychiatric:         Mood and Affect: Mood normal.       Fluids    Intake/Output Summary (Last 24 hours) at 10/9/2020 1226  Last data filed at 10/9/2020 0824  Gross per 24 hour   Intake 240 ml   Output --   Net 240 ml     Laboratory  Recent Labs     10/07/20  0045 10/08/20  0130   WBC 5.2 6.5   RBC 4.92 5.16   HEMOGLOBIN 14.1 14.9   HEMATOCRIT 41.8* 44.7   MCV 85.0 86.6   MCH 28.7 28.9   MCHC 33.7 33.3*   RDW 51.5* 52.9*   PLATELETCT 138* 127*   MPV 9.8 10.5     Recent Labs     10/07/20  0045 10/08/20  0130   SODIUM 129* 131*   POTASSIUM 4.4 4.5   CHLORIDE 95* 95*    CO2 21 24   GLUCOSE 119* 128*   BUN 21 23*   CREATININE 0.96 1.03   CALCIUM 9.0 9.2                   Imaging  IR-MIDLINE CATHETER INSERTION WO GUIDANCE > AGE 3   Final Result                  Ultrasound-guided midline placement performed by qualified nursing staff    as above.          EC-ECHOCARDIOGRAM COMPLETE W/O CONT   Final Result      DX-ELBOW-COMPLETE 3+ LEFT   Final Result      Soft tissue swelling without acute osseous abnormality.      DX-FEMUR-2+ RIGHT   Final Result      No acute osseous abnormality.         Assessment/Plan  Leg wound, right- (present on admission)  Assessment & Plan  Has h/o necrotizing fascitis.   - S/p I&D of right thigh wound and split thickness skin graft on 9/17  - underwent repeat split thickness skin graft 10/1  - pain control  - wound care, will need wound vac for at least 5 days from 10/2 per ortho team  - outpatient wound care clinic referral placed by Ortho PA  - abx as noted above    Pyogenic arthritis of left elbow (HCC)- (present on admission)  Assessment & Plan  - Ortho and ID following, appreciate recs  - s/p I&D left antecubital fossa on 9/15  - completed Vancomycin through 9/30  - continue with bactrim x4 weeks starting 10/1, end date 10/28  - culture positive for beta hemolytic group A strep and MRSA  - pain control  - wound care    Murmur- (present on admission)  Assessment & Plan  In the setting of his h/o IV drug abuse.   - blood cultures negative   - TTE negative    IV drug abuse (HCC)- (present on admission)  Assessment & Plan  Patient denies current use.  - barrier for acceptance to SNF; not a candidate for outpatient infusion  - avoid IV narcotics as possible; morphine prn for dressing changes okay    Homeless  Assessment & Plan  Social work and case management to assist in discharge planning     VTE prophylaxis: heparin

## 2020-10-09 NOTE — CARE PLAN
Problem: Venous Thromboembolism (VTW)/Deep Vein Thrombosis (DVT) Prevention:  Goal: Patient will participate in Venous Thrombosis (VTE)/Deep Vein Thrombosis (DVT)Prevention Measures  Outcome: PROGRESSING AS EXPECTED  Intervention: Ensure patient wears graduated elastic stockings (HANNA hose) and/or SCDs, if ordered, when in bed or chair (Remove at least once per shift for skin check)  Note: SCDs in use, pt ambulates frequently independently.     Problem: Bowel/Gastric:  Goal: Normal bowel function is maintained or improved  Outcome: PROGRESSING AS EXPECTED  Intervention: Educate patient and significant other/support system about diet, fluid intake, medications and activity to promote bowel function  Note: Pt maintaining normal bowel function

## 2020-10-09 NOTE — PROGRESS NOTES
Report received from Day Shift RN at 1915. Assumed care of patient. Plan of care discussed, questions answered. Assessment completed. VSS, A&O x4, states pain in LLE. PRN med given. Call light in reach. Patient educated on use of call light for assistance.

## 2020-10-09 NOTE — PROGRESS NOTES
"   Orthopaedic Progress Note    Interval changes:  Not Cleared for DC by ortho   RLE vac removed and simple dressing placed, PA to do daily changes   LUE dressing CDI  DC home Monday if graft looks good    ROS - Patient denies any new issues.  Pain well controlled.    /76   Pulse 83   Temp 36.7 °C (98.1 °F) (Temporal)   Resp 16   Ht 1.93 m (6' 4\")   Wt 113 kg (249 lb 1.9 oz)   SpO2 97%     Patient seen and examined  No acute distress  Breathing non labored  RRR  RLE dressings changed. BLE DNVI, moves all toes, cap refill <2 sec.  LUE dressing CDI, DNVI, moves all fingers, cap refill <2 sec.   Recent Labs     10/07/20  0045 10/08/20  0130   WBC 5.2 6.5   RBC 4.92 5.16   HEMOGLOBIN 14.1 14.9   HEMATOCRIT 41.8* 44.7   MCV 85.0 86.6   MCH 28.7 28.9   MCHC 33.7 33.3*   RDW 51.5* 52.9*   PLATELETCT 138* 127*   MPV 9.8 10.5       Active Hospital Problems    Diagnosis   • Leg wound, right [S81.801A]     Priority: High   • Pyogenic arthritis of left elbow (HCC) [M00.9]     Priority: High     No crepitus no gas in the tissues,   history of MRSA, orthopedic surgery consulted for OR in the a.m.  Empiric vancomycin and Rocephin ordered.  Symptomatic management     • Murmur [R01.1]     Priority: Medium   • IV drug abuse (HCC) [F19.10]     Priority: Medium   • History of MRSA infection [Z86.14]     Assessment/Plan:  Dressings to be changed to RLE daily by PA  Not cleared for DC by ortho- likely DC Monday   POD#7 S/P:  1.  Repeat split-thickness skin graft, left thigh to right thigh.  2.  Application of ACell crystals.  3.  Application of right thigh wound VAC.  POD#21 S/P:  1.  Irrigation and debridement of superficial skin, fascia, and muscle, right thigh.    2.  Split-thickness skin graft from left thigh to right thigh.    3.  Application of ACell MicroMatrix right thigh skin graft.    4.  Irrigation and debridement, left arm skin, subcutaneous tissue, fascia, and bone.    5.  Application of ACell MicroMatrix and " Gentrix graft.    6.  Application of wound VAC, right thigh.    Wt bearing status - WBAT  Wound care/Drains - dressings left in place  Future Procedures - none planned   Sutures/Staples out- 21 days post operatively  PT/OT-initiated  Antibiotics: bactrim DS  DVT Prophylaxis- TEDS/SCDs/Foot pumps/heparin  [place]und[place]-none  Case Coordination for Discharge Planning - Disposition SNF

## 2020-10-09 NOTE — PROGRESS NOTES
"   Orthopaedic Progress Note    Interval changes:  Not Cleared for DC by ortho   RLE dressing changed, graft looking great, PA to do daily changes   LUE dressing CDI  DC home Monday if graft continues looks good  Will need outpatient wound care for left elbow wound     ROS - Patient denies any new issues.  Pain well controlled.    /64   Pulse 85   Temp 36.8 °C (98.3 °F) (Oral)   Resp 16   Ht 1.93 m (6' 4\")   Wt 113 kg (249 lb 1.9 oz)   SpO2 96%     Patient seen and examined  No acute distress  Breathing non labored  RRR  RLE dressings changed. BLE DNVI, moves all toes, cap refill <2 sec.  LUE dressing CDI, DNVI, moves all fingers, cap refill <2 sec.     Recent Labs     10/07/20  0045 10/08/20  0130   WBC 5.2 6.5   RBC 4.92 5.16   HEMOGLOBIN 14.1 14.9   HEMATOCRIT 41.8* 44.7   MCV 85.0 86.6   MCH 28.7 28.9   MCHC 33.7 33.3*   RDW 51.5* 52.9*   PLATELETCT 138* 127*   MPV 9.8 10.5       Active Hospital Problems    Diagnosis   • Leg wound, right [S81.801A]     Priority: High   • Pyogenic arthritis of left elbow (HCC) [M00.9]     Priority: High     No crepitus no gas in the tissues,   history of MRSA, orthopedic surgery consulted for OR in the a.m.  Empiric vancomycin and Rocephin ordered.  Symptomatic management     • Murmur [R01.1]     Priority: Medium   • IV drug abuse (HCC) [F19.10]     Priority: Medium   • History of MRSA infection [Z86.14]     Assessment/Plan:  RLE dressings to be changed to RLE daily by PA  Not cleared for DC by ortho- likely DC Monday   Will need outpatient wound care for left elbow wound   POD#8 S/P:  1.  Repeat split-thickness skin graft, left thigh to right thigh.  2.  Application of ACell crystals.  3.  Application of right thigh wound VAC.  POD#22 S/P:  1.  Irrigation and debridement of superficial skin, fascia, and muscle, right thigh.    2.  Split-thickness skin graft from left thigh to right thigh.    3.  Application of ACell MicroMatrix right thigh skin graft.    4.  " Irrigation and debridement, left arm skin, subcutaneous tissue, fascia, and bone.    5.  Application of ACell MicroMatrix and Gentrix graft.    6.  Application of wound VAC, right thigh.    Wt bearing status - WBAT  Wound care/Drains - dressings changed daily by PARUBY  Future Procedures - none planned   Sutures/Staples out- n/a  PT/OT-initiated  Antibiotics: bactrim DS  DVT Prophylaxis- TEDS/SCDs/Foot pumps/heparin  [place]-none  Case Coordination for Discharge Planning - Disposition SNF

## 2020-10-10 LAB
ANION GAP SERPL CALC-SCNC: 8 MMOL/L (ref 7–16)
BUN SERPL-MCNC: 20 MG/DL (ref 8–22)
CALCIUM SERPL-MCNC: 8.8 MG/DL (ref 8.5–10.5)
CHLORIDE SERPL-SCNC: 97 MMOL/L (ref 96–112)
CO2 SERPL-SCNC: 24 MMOL/L (ref 20–33)
CREAT SERPL-MCNC: 0.83 MG/DL (ref 0.5–1.4)
ERYTHROCYTE [DISTWIDTH] IN BLOOD BY AUTOMATED COUNT: 52.5 FL (ref 35.9–50)
GLUCOSE SERPL-MCNC: 106 MG/DL (ref 65–99)
HCT VFR BLD AUTO: 40.5 % (ref 42–52)
HGB BLD-MCNC: 13.4 G/DL (ref 14–18)
MCH RBC QN AUTO: 28.6 PG (ref 27–33)
MCHC RBC AUTO-ENTMCNC: 33.1 G/DL (ref 33.7–35.3)
MCV RBC AUTO: 86.4 FL (ref 81.4–97.8)
PLATELET # BLD AUTO: 167 K/UL (ref 164–446)
PMV BLD AUTO: 10.3 FL (ref 9–12.9)
POTASSIUM SERPL-SCNC: 4.1 MMOL/L (ref 3.6–5.5)
RBC # BLD AUTO: 4.69 M/UL (ref 4.7–6.1)
SODIUM SERPL-SCNC: 129 MMOL/L (ref 135–145)
WBC # BLD AUTO: 7.5 K/UL (ref 4.8–10.8)

## 2020-10-10 PROCEDURE — 99231 SBSQ HOSP IP/OBS SF/LOW 25: CPT | Performed by: INTERNAL MEDICINE

## 2020-10-10 PROCEDURE — 700101 HCHG RX REV CODE 250: Performed by: STUDENT IN AN ORGANIZED HEALTH CARE EDUCATION/TRAINING PROGRAM

## 2020-10-10 PROCEDURE — 36415 COLL VENOUS BLD VENIPUNCTURE: CPT

## 2020-10-10 PROCEDURE — A9270 NON-COVERED ITEM OR SERVICE: HCPCS | Performed by: PHYSICIAN ASSISTANT

## 2020-10-10 PROCEDURE — A9270 NON-COVERED ITEM OR SERVICE: HCPCS | Performed by: STUDENT IN AN ORGANIZED HEALTH CARE EDUCATION/TRAINING PROGRAM

## 2020-10-10 PROCEDURE — 770021 HCHG ROOM/CARE - ISO PRIVATE

## 2020-10-10 PROCEDURE — 80048 BASIC METABOLIC PNL TOTAL CA: CPT

## 2020-10-10 PROCEDURE — 700102 HCHG RX REV CODE 250 W/ 637 OVERRIDE(OP): Performed by: STUDENT IN AN ORGANIZED HEALTH CARE EDUCATION/TRAINING PROGRAM

## 2020-10-10 PROCEDURE — 700102 HCHG RX REV CODE 250 W/ 637 OVERRIDE(OP): Performed by: HOSPITALIST

## 2020-10-10 PROCEDURE — A9270 NON-COVERED ITEM OR SERVICE: HCPCS | Performed by: INTERNAL MEDICINE

## 2020-10-10 PROCEDURE — A9270 NON-COVERED ITEM OR SERVICE: HCPCS | Performed by: HOSPITALIST

## 2020-10-10 PROCEDURE — 700102 HCHG RX REV CODE 250 W/ 637 OVERRIDE(OP): Performed by: PHYSICIAN ASSISTANT

## 2020-10-10 PROCEDURE — 700102 HCHG RX REV CODE 250 W/ 637 OVERRIDE(OP): Performed by: INTERNAL MEDICINE

## 2020-10-10 PROCEDURE — 85027 COMPLETE CBC AUTOMATED: CPT

## 2020-10-10 RX ADMIN — PREGABALIN 150 MG: 150 CAPSULE ORAL at 05:02

## 2020-10-10 RX ADMIN — HYDROMORPHONE HYDROCHLORIDE 1 MG: 2 TABLET ORAL at 00:24

## 2020-10-10 RX ADMIN — DIPHENHYDRAMINE HYDROCHLORIDE 25 MG: 25 TABLET ORAL at 00:30

## 2020-10-10 RX ADMIN — HYDROMORPHONE HYDROCHLORIDE 1 MG: 2 TABLET ORAL at 16:34

## 2020-10-10 RX ADMIN — PREGABALIN 150 MG: 150 CAPSULE ORAL at 12:40

## 2020-10-10 RX ADMIN — HYDROMORPHONE HYDROCHLORIDE 1 MG: 2 TABLET ORAL at 12:40

## 2020-10-10 RX ADMIN — SULFAMETHOXAZOLE AND TRIMETHOPRIM 2 TABLET: 800; 160 TABLET ORAL at 05:02

## 2020-10-10 RX ADMIN — SULFAMETHOXAZOLE AND TRIMETHOPRIM 2 TABLET: 800; 160 TABLET ORAL at 16:34

## 2020-10-10 RX ADMIN — POLYETHYLENE GLYCOL 3350 1 PACKET: 17 POWDER, FOR SOLUTION ORAL at 05:02

## 2020-10-10 RX ADMIN — DOCUSATE SODIUM 100 MG: 100 CAPSULE ORAL at 16:34

## 2020-10-10 RX ADMIN — POLYETHYLENE GLYCOL 3350 1 PACKET: 17 POWDER, FOR SOLUTION ORAL at 16:34

## 2020-10-10 RX ADMIN — HYDROMORPHONE HYDROCHLORIDE 1 MG: 2 TABLET ORAL at 19:52

## 2020-10-10 RX ADMIN — DOCUSATE SODIUM 100 MG: 100 CAPSULE ORAL at 05:02

## 2020-10-10 RX ADMIN — NICOTINE 14 MG: 14 PATCH TRANSDERMAL at 05:02

## 2020-10-10 RX ADMIN — PREGABALIN 150 MG: 150 CAPSULE ORAL at 16:34

## 2020-10-10 RX ADMIN — HYDROMORPHONE HYDROCHLORIDE 1 MG: 2 TABLET ORAL at 23:33

## 2020-10-10 RX ADMIN — DOCUSATE SODIUM 50 MG AND SENNOSIDES 8.6 MG 2 TABLET: 8.6; 5 TABLET, FILM COATED ORAL at 16:34

## 2020-10-10 RX ADMIN — HYDROMORPHONE HYDROCHLORIDE 1 MG: 2 TABLET ORAL at 08:48

## 2020-10-10 RX ADMIN — HYDROMORPHONE HYDROCHLORIDE 1 MG: 2 TABLET ORAL at 05:02

## 2020-10-10 RX ADMIN — TRAZODONE HYDROCHLORIDE 50 MG: 50 TABLET ORAL at 19:52

## 2020-10-10 ASSESSMENT — ENCOUNTER SYMPTOMS
COUGH: 0
EYE DISCHARGE: 0
ABDOMINAL PAIN: 0
INSOMNIA: 1
SEIZURES: 0
HEADACHES: 0
PALPITATIONS: 0
NERVOUS/ANXIOUS: 0
FEVER: 0
DIZZINESS: 0
VOMITING: 0
BACK PAIN: 1
MYALGIAS: 1
SHORTNESS OF BREATH: 0
NAUSEA: 0
BLURRED VISION: 0
CHILLS: 0

## 2020-10-10 ASSESSMENT — COGNITIVE AND FUNCTIONAL STATUS - GENERAL
DAILY ACTIVITIY SCORE: 24
MOBILITY SCORE: 24
SUGGESTED CMS G CODE MODIFIER DAILY ACTIVITY: CH
SUGGESTED CMS G CODE MODIFIER MOBILITY: CH

## 2020-10-10 ASSESSMENT — PAIN DESCRIPTION - PAIN TYPE
TYPE: ACUTE PAIN

## 2020-10-10 NOTE — PROGRESS NOTES
Pt A&Ox4, stated he has generalized numbness and tingling, pain is 7/10 (pain med given).    Pt refused bed alarm despite educ, call light within reach, pt educated on importance of using the call light when he needs assistance, pt verbalized understanding

## 2020-10-10 NOTE — PROGRESS NOTES
Hospital Medicine Daily Progress Note    Date of Service  10/10/2020    Chief Complaint  Wound pain, drainage    Hospital Course  60 y.o. male with a past medical history of previous MRSA cellulitis, intravenous drug use, homelessness, Hx right lower extremity necrotizing fasciitis s/p fasciotomy in January 2020. Admitted 9/14/2020 with multiple abscesses on upper and lower extremity.  He has a 4 mm ulcer with serosanguineous drainage on his left lateral forearm.   Vancomycin and Rocephin was started.  Orthopedic surgery were consulted, S/P irrigation and debridement of left antecubital fossa and superficial right thigh wound in the OR. Right hip wound culture grew beta-hemolytic group B strep. Transthoracic Echo performed to r/o endocarditis was performed and was negative for vegetations, EF 65 with mild tricuspid regurg. RV systolic pressure of 25mmHg.     Interval Problem Update  Pt seen and examined, afebrile  no acute events overnights.   Pain well controlled.  Ortho following appreciate rec   Continue bactrim for total 4 week course, end date 10/28.    Consultants/Specialty  Orthopedic surgery  ID    Code Status  DNAR/DNI    Disposition  Anticipate d/c home/street pending surgical clearance, with outpatient wound clinic follow up. Med to beds provided to patient.    Review of Systems  Review of Systems   Constitutional: Positive for malaise/fatigue. Negative for chills and fever.   Eyes: Negative for blurred vision and discharge.   Respiratory: Negative for cough and shortness of breath.    Cardiovascular: Negative for chest pain, palpitations and leg swelling.   Gastrointestinal: Negative for abdominal pain, nausea and vomiting.   Genitourinary: Negative for dysuria and hematuria.   Musculoskeletal: Positive for back pain, joint pain and myalgias.   Skin: Negative for itching.   Neurological: Negative for dizziness, seizures and headaches.   Psychiatric/Behavioral: The patient has insomnia. The patient is not  nervous/anxious.         Chronic depression      Physical Exam  Temp:  [36.4 °C (97.5 °F)-37 °C (98.6 °F)] 37 °C (98.6 °F)  Pulse:  [72-89] 75  Resp:  [16] 16  BP: (100-115)/(57-69) 105/61  SpO2:  [95 %-98 %] 96 %    Physical Exam  Vitals signs reviewed.   Constitutional:       General: He is not in acute distress.     Appearance: He is not diaphoretic.   HENT:      Head: Normocephalic.      Mouth/Throat:      Mouth: Mucous membranes are moist.   Eyes:      General:         Right eye: No discharge.         Left eye: No discharge.      Extraocular Movements: Extraocular movements intact.   Neck:      Musculoskeletal: Normal range of motion. No neck rigidity.   Cardiovascular:      Rate and Rhythm: Normal rate.      Heart sounds: Murmur present.   Pulmonary:      Effort: Pulmonary effort is normal. No respiratory distress.      Breath sounds: No wheezing.   Abdominal:      General: There is no distension.      Palpations: Abdomen is soft.      Tenderness: There is no abdominal tenderness. There is no guarding.   Musculoskeletal: Normal range of motion.      Comments: Left UE good ROM, right thigh under ace wrap/dressing, no pain with light palpation, wound vac applied to right thigh; left thigh under ace wrap/dressing with dried blood seeped through   Skin:     General: Skin is dry.   Neurological:      General: No focal deficit present.      Mental Status: He is alert.   Psychiatric:         Mood and Affect: Mood normal.       Fluids    Intake/Output Summary (Last 24 hours) at 10/10/2020 1140  Last data filed at 10/9/2020 1230  Gross per 24 hour   Intake 240 ml   Output --   Net 240 ml     Laboratory  Recent Labs     10/08/20  0130 10/10/20  0357   WBC 6.5 7.5   RBC 5.16 4.69*   HEMOGLOBIN 14.9 13.4*   HEMATOCRIT 44.7 40.5*   MCV 86.6 86.4   MCH 28.9 28.6   MCHC 33.3* 33.1*   RDW 52.9* 52.5*   PLATELETCT 127* 167   MPV 10.5 10.3     Recent Labs     10/08/20  0130 10/10/20  0357   SODIUM 131* 129*   POTASSIUM 4.5 4.1    CHLORIDE 95* 97   CO2 24 24   GLUCOSE 128* 106*   BUN 23* 20   CREATININE 1.03 0.83   CALCIUM 9.2 8.8                   Imaging  IR-MIDLINE CATHETER INSERTION WO GUIDANCE > AGE 3   Final Result                  Ultrasound-guided midline placement performed by qualified nursing staff    as above.          EC-ECHOCARDIOGRAM COMPLETE W/O CONT   Final Result      DX-ELBOW-COMPLETE 3+ LEFT   Final Result      Soft tissue swelling without acute osseous abnormality.      DX-FEMUR-2+ RIGHT   Final Result      No acute osseous abnormality.         Assessment/Plan  Leg wound, right- (present on admission)  Assessment & Plan  Has h/o necrotizing fascitis.   - S/p I&D of right thigh wound and split thickness skin graft on 9/17  - underwent repeat split thickness skin graft 10/1  - pain control  - wound care, will need wound vac for at least 5 days from 10/2 per ortho team  - outpatient wound care clinic referral placed by Ortho PA  - abx as noted above    Pyogenic arthritis of left elbow (HCC)- (present on admission)  Assessment & Plan  - Ortho and ID following, appreciate recs  - s/p I&D left antecubital fossa on 9/15  - completed Vancomycin through 9/30  - continue with bactrim x4 weeks starting 10/1, end date 10/28  - culture positive for beta hemolytic group A strep and MRSA  - pain control  - wound care    Murmur- (present on admission)  Assessment & Plan  In the setting of his h/o IV drug abuse.   - blood cultures negative   - TTE negative    IV drug abuse (HCC)- (present on admission)  Assessment & Plan  Patient denies current use.  - barrier for acceptance to SNF; not a candidate for outpatient infusion  - avoid IV narcotics as possible; morphine prn for dressing changes okay    Homeless  Assessment & Plan  Social work and case management to assist in discharge planning     VTE prophylaxis: heparin

## 2020-10-10 NOTE — CARE PLAN
Problem: Knowledge Deficit  Goal: Knowledge of disease process/condition, treatment plan, diagnostic tests, and medications will improve  Note: Pt updated on plan of care

## 2020-10-10 NOTE — PROGRESS NOTES
"   Orthopaedic Progress Note    Interval changes:  Not Cleared for DC by ortho   RLE dressing changed, graft looks great,  continue daily changes to RLE by CAROL   LUE dressing CDI  DC home Monday if graft continues looks good  Will need outpatient wound care for left elbow wound     ROS - Patient denies any new issues.  Pain well controlled.    /61   Pulse 75   Temp 37 °C (98.6 °F) (Temporal)   Resp 16   Ht 1.93 m (6' 4\")   Wt 113 kg (249 lb 1.9 oz)   SpO2 96%     Patient seen and examined  No acute distress  Breathing non labored  RRR  RLE dressings changed. BLE DNVI, moves all toes, cap refill <2 sec.  LUE dressing CDI, DNVI, moves all fingers, cap refill <2 sec.     Recent Labs     10/08/20  0130 10/10/20  0357   WBC 6.5 7.5   RBC 5.16 4.69*   HEMOGLOBIN 14.9 13.4*   HEMATOCRIT 44.7 40.5*   MCV 86.6 86.4   MCH 28.9 28.6   MCHC 33.3* 33.1*   RDW 52.9* 52.5*   PLATELETCT 127* 167   MPV 10.5 10.3       Active Hospital Problems    Diagnosis   • Leg wound, right [S81.801A]     Priority: High   • Pyogenic arthritis of left elbow (HCC) [M00.9]     Priority: High     No crepitus no gas in the tissues,   history of MRSA, orthopedic surgery consulted for OR in the a.m.  Empiric vancomycin and Rocephin ordered.  Symptomatic management     • Murmur [R01.1]     Priority: Medium   • IV drug abuse (HCC) [F19.10]     Priority: Medium   • History of MRSA infection [Z86.14]     Assessment/Plan:  RLE dressings to be changed to RLE daily by PA  Not cleared for DC by ortho- likely DC Monday   Will need outpatient wound care for left elbow wound   POD#9 S/P:  1.  Repeat split-thickness skin graft, left thigh to right thigh.  2.  Application of ACell crystals.  3.  Application of right thigh wound VAC.  POD#23 S/P:  1.  Irrigation and debridement of superficial skin, fascia, and muscle, right thigh.    2.  Split-thickness skin graft from left thigh to right thigh.    3.  Application of ACell MicroMatrix right thigh skin " graft.    4.  Irrigation and debridement, left arm skin, subcutaneous tissue, fascia, and bone.    5.  Application of ACell MicroMatrix and Gentrix graft.    6.  Application of wound VAC, right thigh.    Wt bearing status - WBAT  Wound care/Drains - dressings changed daily by PARUBY  Future Procedures - none planned   Sutures/Staples out- n/a  PT/OT-initiated  Antibiotics: bactrim DS  DVT Prophylaxis- TEDS/SCDs/Foot pumps/heparin  Lundberg-none  Case Coordination for Discharge Planning - Disposition SNF

## 2020-10-11 LAB
ALBUMIN SERPL BCP-MCNC: 3.4 G/DL (ref 3.2–4.9)
ALBUMIN/GLOB SERPL: 0.9 G/DL
ALP SERPL-CCNC: 95 U/L (ref 30–99)
ALT SERPL-CCNC: 23 U/L (ref 2–50)
ANION GAP SERPL CALC-SCNC: 10 MMOL/L (ref 7–16)
ANION GAP SERPL CALC-SCNC: 9 MMOL/L (ref 7–16)
AST SERPL-CCNC: 14 U/L (ref 12–45)
BILIRUB SERPL-MCNC: 0.3 MG/DL (ref 0.1–1.5)
BUN SERPL-MCNC: 19 MG/DL (ref 8–22)
BUN SERPL-MCNC: 20 MG/DL (ref 8–22)
CALCIUM SERPL-MCNC: 8.9 MG/DL (ref 8.5–10.5)
CALCIUM SERPL-MCNC: 9.3 MG/DL (ref 8.5–10.5)
CHLORIDE SERPL-SCNC: 99 MMOL/L (ref 96–112)
CHLORIDE SERPL-SCNC: 99 MMOL/L (ref 96–112)
CO2 SERPL-SCNC: 24 MMOL/L (ref 20–33)
CO2 SERPL-SCNC: 25 MMOL/L (ref 20–33)
CREAT SERPL-MCNC: 0.8 MG/DL (ref 0.5–1.4)
CREAT SERPL-MCNC: 0.89 MG/DL (ref 0.5–1.4)
ERYTHROCYTE [DISTWIDTH] IN BLOOD BY AUTOMATED COUNT: 51.5 FL (ref 35.9–50)
GLOBULIN SER CALC-MCNC: 3.6 G/DL (ref 1.9–3.5)
GLUCOSE SERPL-MCNC: 110 MG/DL (ref 65–99)
GLUCOSE SERPL-MCNC: 122 MG/DL (ref 65–99)
HCT VFR BLD AUTO: 43.6 % (ref 42–52)
HGB BLD-MCNC: 14.2 G/DL (ref 14–18)
MCH RBC QN AUTO: 28.3 PG (ref 27–33)
MCHC RBC AUTO-ENTMCNC: 32.6 G/DL (ref 33.7–35.3)
MCV RBC AUTO: 87 FL (ref 81.4–97.8)
PLATELET # BLD AUTO: 205 K/UL (ref 164–446)
PMV BLD AUTO: 10 FL (ref 9–12.9)
POTASSIUM SERPL-SCNC: 4.4 MMOL/L (ref 3.6–5.5)
POTASSIUM SERPL-SCNC: 4.5 MMOL/L (ref 3.6–5.5)
PROT SERPL-MCNC: 7 G/DL (ref 6–8.2)
RBC # BLD AUTO: 5.01 M/UL (ref 4.7–6.1)
SODIUM SERPL-SCNC: 133 MMOL/L (ref 135–145)
SODIUM SERPL-SCNC: 133 MMOL/L (ref 135–145)
WBC # BLD AUTO: 7 K/UL (ref 4.8–10.8)

## 2020-10-11 PROCEDURE — 700101 HCHG RX REV CODE 250: Performed by: STUDENT IN AN ORGANIZED HEALTH CARE EDUCATION/TRAINING PROGRAM

## 2020-10-11 PROCEDURE — A9270 NON-COVERED ITEM OR SERVICE: HCPCS | Performed by: PHYSICIAN ASSISTANT

## 2020-10-11 PROCEDURE — 700102 HCHG RX REV CODE 250 W/ 637 OVERRIDE(OP): Performed by: INTERNAL MEDICINE

## 2020-10-11 PROCEDURE — A9270 NON-COVERED ITEM OR SERVICE: HCPCS | Performed by: HOSPITALIST

## 2020-10-11 PROCEDURE — 80053 COMPREHEN METABOLIC PANEL: CPT

## 2020-10-11 PROCEDURE — 700102 HCHG RX REV CODE 250 W/ 637 OVERRIDE(OP): Performed by: STUDENT IN AN ORGANIZED HEALTH CARE EDUCATION/TRAINING PROGRAM

## 2020-10-11 PROCEDURE — 700102 HCHG RX REV CODE 250 W/ 637 OVERRIDE(OP): Performed by: PHYSICIAN ASSISTANT

## 2020-10-11 PROCEDURE — 80048 BASIC METABOLIC PNL TOTAL CA: CPT

## 2020-10-11 PROCEDURE — 700102 HCHG RX REV CODE 250 W/ 637 OVERRIDE(OP): Performed by: HOSPITALIST

## 2020-10-11 PROCEDURE — 36415 COLL VENOUS BLD VENIPUNCTURE: CPT

## 2020-10-11 PROCEDURE — 99231 SBSQ HOSP IP/OBS SF/LOW 25: CPT | Performed by: INTERNAL MEDICINE

## 2020-10-11 PROCEDURE — A9270 NON-COVERED ITEM OR SERVICE: HCPCS | Performed by: INTERNAL MEDICINE

## 2020-10-11 PROCEDURE — A9270 NON-COVERED ITEM OR SERVICE: HCPCS | Performed by: STUDENT IN AN ORGANIZED HEALTH CARE EDUCATION/TRAINING PROGRAM

## 2020-10-11 PROCEDURE — 770021 HCHG ROOM/CARE - ISO PRIVATE

## 2020-10-11 PROCEDURE — 85027 COMPLETE CBC AUTOMATED: CPT

## 2020-10-11 RX ADMIN — HYDROMORPHONE HYDROCHLORIDE 1 MG: 2 TABLET ORAL at 10:02

## 2020-10-11 RX ADMIN — DOCUSATE SODIUM 50 MG AND SENNOSIDES 8.6 MG 2 TABLET: 8.6; 5 TABLET, FILM COATED ORAL at 18:45

## 2020-10-11 RX ADMIN — TRAZODONE HYDROCHLORIDE 50 MG: 50 TABLET ORAL at 21:01

## 2020-10-11 RX ADMIN — HYDROMORPHONE HYDROCHLORIDE 1 MG: 2 TABLET ORAL at 18:45

## 2020-10-11 RX ADMIN — PREGABALIN 150 MG: 150 CAPSULE ORAL at 11:58

## 2020-10-11 RX ADMIN — NICOTINE 14 MG: 14 PATCH TRANSDERMAL at 06:37

## 2020-10-11 RX ADMIN — DOCUSATE SODIUM 100 MG: 100 CAPSULE ORAL at 06:33

## 2020-10-11 RX ADMIN — HYDROMORPHONE HYDROCHLORIDE 1 MG: 2 TABLET ORAL at 04:14

## 2020-10-11 RX ADMIN — SULFAMETHOXAZOLE AND TRIMETHOPRIM 2 TABLET: 800; 160 TABLET ORAL at 06:32

## 2020-10-11 RX ADMIN — POLYETHYLENE GLYCOL 3350 1 PACKET: 17 POWDER, FOR SOLUTION ORAL at 06:32

## 2020-10-11 RX ADMIN — SULFAMETHOXAZOLE AND TRIMETHOPRIM 2 TABLET: 800; 160 TABLET ORAL at 18:45

## 2020-10-11 RX ADMIN — DOCUSATE SODIUM 100 MG: 100 CAPSULE ORAL at 18:45

## 2020-10-11 RX ADMIN — PREGABALIN 150 MG: 150 CAPSULE ORAL at 06:33

## 2020-10-11 RX ADMIN — HYDROMORPHONE HYDROCHLORIDE 1 MG: 2 TABLET ORAL at 21:50

## 2020-10-11 RX ADMIN — PREGABALIN 150 MG: 150 CAPSULE ORAL at 18:45

## 2020-10-11 RX ADMIN — HYDROMORPHONE HYDROCHLORIDE 1 MG: 2 TABLET ORAL at 14:06

## 2020-10-11 ASSESSMENT — ENCOUNTER SYMPTOMS
BLURRED VISION: 0
ABDOMINAL PAIN: 0
FEVER: 0
HEADACHES: 0
EYE DISCHARGE: 0
BACK PAIN: 1
NERVOUS/ANXIOUS: 0
SEIZURES: 0
SHORTNESS OF BREATH: 0
MYALGIAS: 1
CHILLS: 0
DIZZINESS: 0
INSOMNIA: 1
PALPITATIONS: 0
COUGH: 0
NAUSEA: 0
VOMITING: 0

## 2020-10-11 ASSESSMENT — PAIN DESCRIPTION - PAIN TYPE: TYPE: ACUTE PAIN

## 2020-10-11 NOTE — PROGRESS NOTES
Hospital Medicine Daily Progress Note    Date of Service  10/11/2020    Chief Complaint  Wound pain, drainage    Hospital Course  60 y.o. male with a past medical history of previous MRSA cellulitis, intravenous drug use, homelessness, Hx right lower extremity necrotizing fasciitis s/p fasciotomy in January 2020. Admitted 9/14/2020 with multiple abscesses on upper and lower extremity.  He has a 4 mm ulcer with serosanguineous drainage on his left lateral forearm.   Vancomycin and Rocephin was started.  Orthopedic surgery were consulted, S/P irrigation and debridement of left antecubital fossa and superficial right thigh wound in the OR. Right hip wound culture grew beta-hemolytic group B strep. Transthoracic Echo performed to r/o endocarditis was performed and was negative for vegetations, EF 65 with mild tricuspid regurg. RV systolic pressure of 25mmHg.     Interval Problem Update  Afebrile  no acute events overnights. No nausea or vomiting   Pain well controlled.  Ortho following appreciate rec   Continue bactrim for total 4 week course, end date 10/28.    Consultants/Specialty  Orthopedic surgery  ID    Code Status  DNAR/DNI    Disposition  Anticipate d/c home/street pending surgical clearance, with outpatient wound clinic follow up. Med to beds provided to patient.    Review of Systems  Review of Systems   Constitutional: Positive for malaise/fatigue. Negative for chills and fever.   Eyes: Negative for blurred vision and discharge.   Respiratory: Negative for cough and shortness of breath.    Cardiovascular: Negative for chest pain, palpitations and leg swelling.   Gastrointestinal: Negative for abdominal pain, nausea and vomiting.   Genitourinary: Negative for dysuria and hematuria.   Musculoskeletal: Positive for back pain, joint pain and myalgias.   Skin: Negative for itching.   Neurological: Negative for dizziness, seizures and headaches.   Psychiatric/Behavioral: The patient has insomnia. The patient is not  nervous/anxious.         Chronic depression      Physical Exam  Temp:  [36.2 °C (97.1 °F)-37.3 °C (99.1 °F)] 36.7 °C (98 °F)  Pulse:  [68-86] 76  Resp:  [16-18] 16  BP: ()/(51-69) 107/67  SpO2:  [94 %-97 %] 97 %    Physical Exam  Vitals signs reviewed.   Constitutional:       General: He is not in acute distress.     Appearance: He is not diaphoretic.   HENT:      Head: Normocephalic.      Mouth/Throat:      Mouth: Mucous membranes are moist.   Eyes:      General:         Right eye: No discharge.         Left eye: No discharge.      Extraocular Movements: Extraocular movements intact.   Neck:      Musculoskeletal: Normal range of motion. No neck rigidity.   Cardiovascular:      Rate and Rhythm: Normal rate.      Heart sounds: Murmur present.   Pulmonary:      Effort: Pulmonary effort is normal. No respiratory distress.      Breath sounds: No wheezing.   Abdominal:      General: There is no distension.      Palpations: Abdomen is soft.      Tenderness: There is no abdominal tenderness. There is no guarding.   Musculoskeletal: Normal range of motion.      Comments: Left UE good ROM, right thigh under ace wrap/dressing, no pain with light palpation, wound vac applied to right thigh; left thigh under ace wrap/dressing with dried blood seeped through   Skin:     General: Skin is dry.   Neurological:      General: No focal deficit present.      Mental Status: He is alert.   Psychiatric:         Mood and Affect: Mood normal.       Fluids    Intake/Output Summary (Last 24 hours) at 10/11/2020 1042  Last data filed at 10/10/2020 2100  Gross per 24 hour   Intake 200 ml   Output --   Net 200 ml     Laboratory  Recent Labs     10/10/20  0357 10/11/20  0411   WBC 7.5 7.0   RBC 4.69* 5.01   HEMOGLOBIN 13.4* 14.2   HEMATOCRIT 40.5* 43.6   MCV 86.4 87.0   MCH 28.6 28.3   MCHC 33.1* 32.6*   RDW 52.5* 51.5*   PLATELETCT 167 205   MPV 10.3 10.0     Recent Labs     10/10/20  0357 10/11/20  0411   SODIUM 129* 133*   POTASSIUM 4.1  4.4   CHLORIDE 97 99   CO2 24 25   GLUCOSE 106* 110*   BUN 20 20   CREATININE 0.83 0.89   CALCIUM 8.8 9.3                   Imaging  IR-MIDLINE CATHETER INSERTION WO GUIDANCE > AGE 3   Final Result                  Ultrasound-guided midline placement performed by qualified nursing staff    as above.          EC-ECHOCARDIOGRAM COMPLETE W/O CONT   Final Result      DX-ELBOW-COMPLETE 3+ LEFT   Final Result      Soft tissue swelling without acute osseous abnormality.      DX-FEMUR-2+ RIGHT   Final Result      No acute osseous abnormality.         Assessment/Plan  Leg wound, right- (present on admission)  Assessment & Plan  Has h/o necrotizing fascitis.   - S/p I&D of right thigh wound and split thickness skin graft on 9/17  - underwent repeat split thickness skin graft 10/1  - pain control  - wound care, will need wound vac for at least 5 days from 10/2 per ortho team  - outpatient wound care clinic referral placed by Ortho PA  - abx as noted above    Pyogenic arthritis of left elbow (HCC)- (present on admission)  Assessment & Plan  - Ortho and ID following, appreciate recs  - s/p I&D left antecubital fossa on 9/15  - completed Vancomycin through 9/30  - continue with bactrim x4 weeks starting 10/1, end date 10/28  - culture positive for beta hemolytic group A strep and MRSA  - pain control  - wound care    Murmur- (present on admission)  Assessment & Plan  In the setting of his h/o IV drug abuse.   - blood cultures negative   - TTE negative    IV drug abuse (HCC)- (present on admission)  Assessment & Plan  Patient denies current use.  - barrier for acceptance to SNF; not a candidate for outpatient infusion  - avoid IV narcotics as possible; morphine prn for dressing changes okay    Homeless  Assessment & Plan  Social work and case management to assist in discharge planning     VTE prophylaxis: heparin

## 2020-10-11 NOTE — PROGRESS NOTES
"   Orthopaedic Progress Note    Interval changes:  Not Cleared for DC by ortho   RLE and LUE dressing changed no complications noted  DC home tomorrow if graft continues looks good  Will need outpatient wound care for left elbow wound     ROS - Patient denies any new issues.  Pain well controlled.    /67   Pulse 76   Temp 36.7 °C (98 °F) (Temporal)   Resp 16   Ht 1.93 m (6' 4\")   Wt 113 kg (249 lb 1.9 oz)   SpO2 97%     Patient seen and examined  No acute distress  Breathing non labored  RRR  RLE dressings changed. BLE DNVI, moves all toes, cap refill <2 sec.  LUE dressing changed, DNVI, moves all fingers, cap refill <2 sec.     Recent Labs     10/10/20  0357 10/11/20  0411   WBC 7.5 7.0   RBC 4.69* 5.01   HEMOGLOBIN 13.4* 14.2   HEMATOCRIT 40.5* 43.6   MCV 86.4 87.0   MCH 28.6 28.3   MCHC 33.1* 32.6*   RDW 52.5* 51.5*   PLATELETCT 167 205   MPV 10.3 10.0       Active Hospital Problems    Diagnosis   • Leg wound, right [S81.801A]     Priority: High   • Pyogenic arthritis of left elbow (HCC) [M00.9]     Priority: High     No crepitus no gas in the tissues,   history of MRSA, orthopedic surgery consulted for OR in the a.m.  Empiric vancomycin and Rocephin ordered.  Symptomatic management     • Murmur [R01.1]     Priority: Medium   • IV drug abuse (HCC) [F19.10]     Priority: Medium   • History of MRSA infection [Z86.14]     Assessment/Plan:  RLE dressings to be changed to RLE daily by PA  Not cleared for DC by ortho- likely DC tomorrow  Will need outpatient wound care for left elbow wound   POD#10 S/P:  1.  Repeat split-thickness skin graft, left thigh to right thigh.  2.  Application of ACell crystals.  3.  Application of right thigh wound VAC.  POD#24 S/P:  1.  Irrigation and debridement of superficial skin, fascia, and muscle, right thigh.    2.  Split-thickness skin graft from left thigh to right thigh.    3.  Application of ACell MicroMatrix right thigh skin graft.    4.  Irrigation and debridement, " left arm skin, subcutaneous tissue, fascia, and bone.    5.  Application of ACell MicroMatrix and Gentrix graft.    6.  Application of wound VAC, right thigh.    Wt bearing status - WBAT  Wound care/Drains - dressings changed daily by PARUBY  Future Procedures - none planned   Sutures/Staples out- n/a  PT/OT-initiated  Antibiotics: bactrim DS  DVT Prophylaxis- TEDS/SCDs/Foot pumps/heparin  Lundberg-none  Case Coordination for Discharge Planning - Disposition SNF

## 2020-10-11 NOTE — PROGRESS NOTES
0725: Assumed care of pt after report. PT sleeping in bed. No s/s of distress noted. Call light with in reach.

## 2020-10-11 NOTE — CARE PLAN
Problem: Communication  Goal: The ability to communicate needs accurately and effectively will improve  Outcome: PROGRESSING AS EXPECTED     Problem: Safety  Goal: Will remain free from falls  Outcome: PROGRESSING AS EXPECTED   Patient calls appropriately and gait is steady. Patient is up self.

## 2020-10-11 NOTE — CARE PLAN
Problem: Safety  Goal: Will remain free from injury  Outcome: PROGRESSING AS EXPECTED     Problem: Infection  Goal: Will remain free from infection  Outcome: PROGRESSING AS EXPECTED  Intervention: Assess signs and symptoms of infection  Note: Monitor for s/s of infection, notify MD for changes from baseline     Problem: Knowledge Deficit  Goal: Knowledge of disease process/condition, treatment plan, diagnostic tests, and medications will improve  Outcome: PROGRESSING AS EXPECTED     Problem: Pain Management  Goal: Pain level will decrease to patient's comfort goal  Outcome: PROGRESSING AS EXPECTED  Intervention: Follow pain managment plan developed in collaboration with patient and Interdisciplinary Team  Note: Monitor pain, medicate per MAR to keep pain at pts tolerable level

## 2020-10-12 PROCEDURE — 700102 HCHG RX REV CODE 250 W/ 637 OVERRIDE(OP): Performed by: PHYSICIAN ASSISTANT

## 2020-10-12 PROCEDURE — A9270 NON-COVERED ITEM OR SERVICE: HCPCS | Performed by: STUDENT IN AN ORGANIZED HEALTH CARE EDUCATION/TRAINING PROGRAM

## 2020-10-12 PROCEDURE — A9270 NON-COVERED ITEM OR SERVICE: HCPCS | Performed by: PHYSICIAN ASSISTANT

## 2020-10-12 PROCEDURE — 700102 HCHG RX REV CODE 250 W/ 637 OVERRIDE(OP): Performed by: HOSPITALIST

## 2020-10-12 PROCEDURE — A9270 NON-COVERED ITEM OR SERVICE: HCPCS | Performed by: INTERNAL MEDICINE

## 2020-10-12 PROCEDURE — A9270 NON-COVERED ITEM OR SERVICE: HCPCS | Performed by: HOSPITALIST

## 2020-10-12 PROCEDURE — 700102 HCHG RX REV CODE 250 W/ 637 OVERRIDE(OP): Performed by: INTERNAL MEDICINE

## 2020-10-12 PROCEDURE — 700101 HCHG RX REV CODE 250: Performed by: STUDENT IN AN ORGANIZED HEALTH CARE EDUCATION/TRAINING PROGRAM

## 2020-10-12 PROCEDURE — 770021 HCHG ROOM/CARE - ISO PRIVATE

## 2020-10-12 PROCEDURE — 99231 SBSQ HOSP IP/OBS SF/LOW 25: CPT | Performed by: INTERNAL MEDICINE

## 2020-10-12 PROCEDURE — 700102 HCHG RX REV CODE 250 W/ 637 OVERRIDE(OP): Performed by: STUDENT IN AN ORGANIZED HEALTH CARE EDUCATION/TRAINING PROGRAM

## 2020-10-12 RX ADMIN — PREGABALIN 150 MG: 150 CAPSULE ORAL at 04:52

## 2020-10-12 RX ADMIN — DOCUSATE SODIUM 100 MG: 100 CAPSULE ORAL at 17:52

## 2020-10-12 RX ADMIN — ACETAMINOPHEN 1000 MG: 500 TABLET ORAL at 08:33

## 2020-10-12 RX ADMIN — SULFAMETHOXAZOLE AND TRIMETHOPRIM 2 TABLET: 800; 160 TABLET ORAL at 04:53

## 2020-10-12 RX ADMIN — HYDROMORPHONE HYDROCHLORIDE 1 MG: 2 TABLET ORAL at 03:14

## 2020-10-12 RX ADMIN — HYDROMORPHONE HYDROCHLORIDE 1 MG: 2 TABLET ORAL at 12:22

## 2020-10-12 RX ADMIN — POLYETHYLENE GLYCOL 3350 1 PACKET: 17 POWDER, FOR SOLUTION ORAL at 06:00

## 2020-10-12 RX ADMIN — DOCUSATE SODIUM 50 MG AND SENNOSIDES 8.6 MG 2 TABLET: 8.6; 5 TABLET, FILM COATED ORAL at 17:51

## 2020-10-12 RX ADMIN — DIPHENHYDRAMINE HYDROCHLORIDE 25 MG: 25 TABLET ORAL at 20:54

## 2020-10-12 RX ADMIN — DOCUSATE SODIUM 100 MG: 100 CAPSULE ORAL at 04:52

## 2020-10-12 RX ADMIN — POLYETHYLENE GLYCOL 3350 1 PACKET: 17 POWDER, FOR SOLUTION ORAL at 17:53

## 2020-10-12 RX ADMIN — PREGABALIN 150 MG: 150 CAPSULE ORAL at 12:16

## 2020-10-12 RX ADMIN — HYDROMORPHONE HYDROCHLORIDE 1 MG: 2 TABLET ORAL at 17:51

## 2020-10-12 RX ADMIN — SULFAMETHOXAZOLE AND TRIMETHOPRIM 2 TABLET: 800; 160 TABLET ORAL at 17:52

## 2020-10-12 RX ADMIN — HYDROMORPHONE HYDROCHLORIDE 1 MG: 2 TABLET ORAL at 08:33

## 2020-10-12 RX ADMIN — TRAZODONE HYDROCHLORIDE 50 MG: 50 TABLET ORAL at 20:54

## 2020-10-12 RX ADMIN — NICOTINE 14 MG: 14 PATCH TRANSDERMAL at 04:52

## 2020-10-12 RX ADMIN — ACETAMINOPHEN 1000 MG: 500 TABLET ORAL at 17:52

## 2020-10-12 RX ADMIN — HYDROMORPHONE HYDROCHLORIDE 1 MG: 2 TABLET ORAL at 20:54

## 2020-10-12 RX ADMIN — PREGABALIN 150 MG: 150 CAPSULE ORAL at 17:51

## 2020-10-12 ASSESSMENT — ENCOUNTER SYMPTOMS
HEADACHES: 0
BLURRED VISION: 0
MYALGIAS: 1
CHILLS: 0
NAUSEA: 0
ABDOMINAL PAIN: 0
BACK PAIN: 1
SHORTNESS OF BREATH: 0
EYE DISCHARGE: 0
INSOMNIA: 1
VOMITING: 0
DIZZINESS: 0
PALPITATIONS: 0
FEVER: 0
COUGH: 0
SEIZURES: 0
NERVOUS/ANXIOUS: 0

## 2020-10-12 ASSESSMENT — PAIN DESCRIPTION - PAIN TYPE
TYPE: ACUTE PAIN
TYPE: ACUTE PAIN;SURGICAL PAIN
TYPE: ACUTE PAIN

## 2020-10-12 NOTE — PROGRESS NOTES
Isolation Precautions:    Patient is on contact  isolation for MRSA in the wound.    Patient educated on reason for isolation, how the infection may be transmitted, and how to help prevent transmission to others.     Patient educated that contact precautions involves staff and visitors wearing PPE, follow  Standard Precautions and perform meticulous hand hygiene in order to prevent transmission of infection.gown and gloves,with the added requirement of soap and water for hand hygiene.  Patient transport and mobilization on unit. Patient educated that they may leave their room, but prior to exiting, the patient needs to have on a fresh patient gown, ensure the potentially infectious area is covered, perform appropriate hand hygiene immediately prior to exiting the room.

## 2020-10-12 NOTE — PROGRESS NOTES
Hospital Medicine Daily Progress Note    Date of Service  10/12/2020    Chief Complaint  Wound pain, drainage    Hospital Course  60 y.o. male with a past medical history of previous MRSA cellulitis, intravenous drug use, homelessness, Hx right lower extremity necrotizing fasciitis s/p fasciotomy in January 2020. Admitted 9/14/2020 with multiple abscesses on upper and lower extremity.  He has a 4 mm ulcer with serosanguineous drainage on his left lateral forearm.   Vancomycin and Rocephin was started.  Orthopedic surgery were consulted, S/P irrigation and debridement of left antecubital fossa and superficial right thigh wound in the OR. Right hip wound culture grew beta-hemolytic group B strep. Transthoracic Echo performed to r/o endocarditis was performed and was negative for vegetations, EF 65 with mild tricuspid regurg. RV systolic pressure of 25mmHg.     Interval Problem Update  Pt seen and examined no acute events overnights. No nausea or vomiting   Pain well controlled.  Ortho following appreciate rec   Continue bactrim for total 4 week course, end date 10/28.    Consultants/Specialty  Orthopedic surgery  ID    Code Status  DNAR/DNI    Disposition  Anticipate d/c home/street pending surgical clearance, with outpatient wound clinic follow up. Med to beds provided to patient.    Review of Systems  Review of Systems   Constitutional: Positive for malaise/fatigue. Negative for chills and fever.   Eyes: Negative for blurred vision and discharge.   Respiratory: Negative for cough and shortness of breath.    Cardiovascular: Negative for chest pain, palpitations and leg swelling.   Gastrointestinal: Negative for abdominal pain, nausea and vomiting.   Genitourinary: Negative for dysuria and hematuria.   Musculoskeletal: Positive for back pain, joint pain and myalgias.   Skin: Negative for itching.   Neurological: Negative for dizziness, seizures and headaches.   Psychiatric/Behavioral: The patient has insomnia. The  patient is not nervous/anxious.         Chronic depression      Physical Exam  Temp:  [36.1 °C (97 °F)-37.3 °C (99.1 °F)] 36.1 °C (97 °F)  Pulse:  [67-72] 72  Resp:  [16-19] 17  BP: (100-106)/(67) 101/67  SpO2:  [97 %-100 %] 97 %    Physical Exam  Vitals signs reviewed.   Constitutional:       General: He is not in acute distress.     Appearance: He is not diaphoretic.   HENT:      Head: Normocephalic.      Mouth/Throat:      Mouth: Mucous membranes are moist.   Eyes:      General:         Right eye: No discharge.         Left eye: No discharge.      Extraocular Movements: Extraocular movements intact.   Neck:      Musculoskeletal: Normal range of motion. No neck rigidity.   Cardiovascular:      Rate and Rhythm: Normal rate.      Heart sounds: Murmur present.   Pulmonary:      Effort: Pulmonary effort is normal. No respiratory distress.      Breath sounds: No wheezing.   Abdominal:      General: There is no distension.      Palpations: Abdomen is soft.      Tenderness: There is no abdominal tenderness. There is no guarding.   Musculoskeletal: Normal range of motion.      Comments: Left UE good ROM, right thigh under ace wrap/dressing, no pain with light palpation, wound vac applied to right thigh; left thigh under ace wrap/dressing with dried blood seeped through   Skin:     General: Skin is dry.   Neurological:      General: No focal deficit present.      Mental Status: He is alert.   Psychiatric:         Mood and Affect: Mood normal.       Fluids    Intake/Output Summary (Last 24 hours) at 10/12/2020 1228  Last data filed at 10/12/2020 1100  Gross per 24 hour   Intake 240 ml   Output 1300 ml   Net -1060 ml     Laboratory  Recent Labs     10/10/20  0357 10/11/20  0411   WBC 7.5 7.0   RBC 4.69* 5.01   HEMOGLOBIN 13.4* 14.2   HEMATOCRIT 40.5* 43.6   MCV 86.4 87.0   MCH 28.6 28.3   MCHC 33.1* 32.6*   RDW 52.5* 51.5*   PLATELETCT 167 205   MPV 10.3 10.0     Recent Labs     10/10/20  0357 10/11/20  0411 10/11/20  1136    SODIUM 129* 133* 133*   POTASSIUM 4.1 4.4 4.5   CHLORIDE 97 99 99   CO2 24 25 24   GLUCOSE 106* 110* 122*   BUN 20 20 19   CREATININE 0.83 0.89 0.80   CALCIUM 8.8 9.3 8.9                   Imaging  IR-MIDLINE CATHETER INSERTION WO GUIDANCE > AGE 3   Final Result                  Ultrasound-guided midline placement performed by qualified nursing staff    as above.          EC-ECHOCARDIOGRAM COMPLETE W/O CONT   Final Result      DX-ELBOW-COMPLETE 3+ LEFT   Final Result      Soft tissue swelling without acute osseous abnormality.      DX-FEMUR-2+ RIGHT   Final Result      No acute osseous abnormality.         Assessment/Plan  Leg wound, right- (present on admission)  Assessment & Plan  Has h/o necrotizing fascitis.   - S/p I&D of right thigh wound and split thickness skin graft on 9/17  - underwent repeat split thickness skin graft 10/1  - pain control  - wound care, will need wound vac for at least 5 days from 10/2 per ortho team  - outpatient wound care clinic referral placed by Ortho PA  - abx as noted above    Pyogenic arthritis of left elbow (HCC)- (present on admission)  Assessment & Plan  - Ortho and ID following, appreciate recs  - s/p I&D left antecubital fossa on 9/15  - completed Vancomycin through 9/30  - continue with bactrim x4 weeks starting 10/1, end date 10/28  - culture positive for beta hemolytic group A strep and MRSA  - pain control  - wound care    Murmur- (present on admission)  Assessment & Plan  In the setting of his h/o IV drug abuse.   - blood cultures negative   - TTE negative    IV drug abuse (HCC)- (present on admission)  Assessment & Plan  Patient denies current use.  - barrier for acceptance to SNF; not a candidate for outpatient infusion  - avoid IV narcotics as possible; morphine prn for dressing changes okay    Homeless  Assessment & Plan  Social work and case management to assist in discharge planning     VTE prophylaxis: heparin

## 2020-10-12 NOTE — PROGRESS NOTES
Patient is alert and oriented thus far. Expresses wants and needs to staff. Dressing to peripheral IV in LUE changed this shift d/t previous dressing being soiled. Site flushes without difficulty post dressing change. Medicated with PRN Dilaudid per patient request previously in shift, good effect noted. Appropriate appetite noted. Patient repositions self in bed for comfort and skin integrity. Patient resting in bed, call light in reach, will continue to monitor.

## 2020-10-12 NOTE — CARE PLAN
Patient able to express complaint of pain to staff appropriately. PRN medications utilized for pain management with good effect. Ice/heat used as needed. Will continue to monitor.

## 2020-10-12 NOTE — CARE PLAN
Patient makes frequent changes in position independently. Encouraged to change positions at least Q2 hours for skin integrity. Up to bathroom by self. SCDs in place. Heparin therapy in place. Will continue to monitor.

## 2020-10-12 NOTE — DISCHARGE PLANNING
Anticipated Discharge Disposition: home with outpt wound care    Action: Spoke to Outpt Wound Care, pt has first appt scheduled for Wednesday 10/14 at 09:30. Anticiapte discharge tomorrow.    Barriers to Discharge: Pending med clearance.    Plan: Discharge to shelter tomorrow.

## 2020-10-12 NOTE — CARE PLAN
Problem: Discharge Barriers/Planning  Goal: Patient's continuum of care needs will be met  Outcome: PROGRESSING AS EXPECTED  Note: Collaborate with transitional Care team and interdisciplinary team to meet discharge   needs      Problem: Safety  Goal: Will remain free from falls  Outcome: PROGRESSING AS EXPECTED  Note: Hourly rounding.  Non-skid socks. Bed locked & in low position. Personal belongings and call light  within reach. .      Problem: Pain Management  Goal: Pain level will decrease to patient's comfort goal  Outcome: PROGRESSING AS EXPECTED  Note: Taught pt 0-10 pain scale and  non pharmacological method of pain mgt, encouraged to verbalize when in pain. Administered PRN pain med as needed.

## 2020-10-13 ENCOUNTER — PATIENT OUTREACH (OUTPATIENT)
Dept: HEALTH INFORMATION MANAGEMENT | Facility: OTHER | Age: 60
End: 2020-10-13

## 2020-10-13 VITALS
BODY MASS INDEX: 30.34 KG/M2 | TEMPERATURE: 97.3 F | HEIGHT: 76 IN | WEIGHT: 249.12 LBS | SYSTOLIC BLOOD PRESSURE: 116 MMHG | RESPIRATION RATE: 17 BRPM | HEART RATE: 77 BPM | OXYGEN SATURATION: 97 % | DIASTOLIC BLOOD PRESSURE: 71 MMHG

## 2020-10-13 PROCEDURE — 700102 HCHG RX REV CODE 250 W/ 637 OVERRIDE(OP): Performed by: PHYSICIAN ASSISTANT

## 2020-10-13 PROCEDURE — 700102 HCHG RX REV CODE 250 W/ 637 OVERRIDE(OP): Performed by: STUDENT IN AN ORGANIZED HEALTH CARE EDUCATION/TRAINING PROGRAM

## 2020-10-13 PROCEDURE — 99239 HOSP IP/OBS DSCHRG MGMT >30: CPT | Performed by: STUDENT IN AN ORGANIZED HEALTH CARE EDUCATION/TRAINING PROGRAM

## 2020-10-13 PROCEDURE — 700101 HCHG RX REV CODE 250: Performed by: STUDENT IN AN ORGANIZED HEALTH CARE EDUCATION/TRAINING PROGRAM

## 2020-10-13 PROCEDURE — A9270 NON-COVERED ITEM OR SERVICE: HCPCS | Performed by: PHYSICIAN ASSISTANT

## 2020-10-13 PROCEDURE — A9270 NON-COVERED ITEM OR SERVICE: HCPCS | Performed by: STUDENT IN AN ORGANIZED HEALTH CARE EDUCATION/TRAINING PROGRAM

## 2020-10-13 RX ADMIN — PREGABALIN 150 MG: 150 CAPSULE ORAL at 05:26

## 2020-10-13 RX ADMIN — PREGABALIN 150 MG: 150 CAPSULE ORAL at 12:17

## 2020-10-13 RX ADMIN — NICOTINE 14 MG: 14 PATCH TRANSDERMAL at 05:27

## 2020-10-13 RX ADMIN — HYDROMORPHONE HYDROCHLORIDE 1 MG: 2 TABLET ORAL at 05:26

## 2020-10-13 RX ADMIN — HYDROMORPHONE HYDROCHLORIDE 1 MG: 2 TABLET ORAL at 08:46

## 2020-10-13 RX ADMIN — HYDROMORPHONE HYDROCHLORIDE 1 MG: 2 TABLET ORAL at 01:50

## 2020-10-13 RX ADMIN — POLYETHYLENE GLYCOL 3350 1 PACKET: 17 POWDER, FOR SOLUTION ORAL at 05:27

## 2020-10-13 RX ADMIN — HYDROMORPHONE HYDROCHLORIDE 1 MG: 2 TABLET ORAL at 12:17

## 2020-10-13 RX ADMIN — DOCUSATE SODIUM 100 MG: 100 CAPSULE ORAL at 05:26

## 2020-10-13 RX ADMIN — ACETAMINOPHEN 1000 MG: 500 TABLET ORAL at 08:46

## 2020-10-13 RX ADMIN — SULFAMETHOXAZOLE AND TRIMETHOPRIM 2 TABLET: 800; 160 TABLET ORAL at 05:26

## 2020-10-13 ASSESSMENT — PAIN DESCRIPTION - PAIN TYPE
TYPE: ACUTE PAIN;SURGICAL PAIN
TYPE: ACUTE PAIN;SURGICAL PAIN

## 2020-10-13 NOTE — DISCHARGE INSTRUCTIONS
Discharge Instructions    Discharged to home by car with friend. Discharged via walking, hospital escort: Refused.  Special equipment needed: Not Applicable    Be sure to schedule a follow-up appointment with your primary care doctor or any specialists as instructed.     Discharge Plan:   Influenza Vaccine Indication: Indicated: 9 to 64 years of age  Influenza Vaccine Given - only chart on this line when given: Influenza Vaccine Given (See MAR)    I understand that a diet low in cholesterol, fat, and sodium is recommended for good health. Unless I have been given specific instructions below for another diet, I accept this instruction as my diet prescription.       Special Instructions:  Skin Abscess    A skin abscess is an infected area on or under your skin that contains a collection of pus and other material. An abscess may also be called a furuncle, carbuncle, or boil. An abscess can occur in or on almost any part of your body.  Some abscesses break open (rupture) on their own. Most continue to get worse unless they are treated. The infection can spread deeper into the body and eventually into your blood, which can make you feel ill. Treatment usually involves draining the abscess.  What are the causes?  An abscess occurs when germs, like bacteria, pass through your skin and cause an infection. This may be caused by:  · A scrape or cut on your skin.  · A puncture wound through your skin, including a needle injection or insect bite.  · Blocked oil or sweat glands.  · Blocked and infected hair follicles.  · A cyst that forms beneath your skin (sebaceous cyst) and becomes infected.  What increases the risk?  This condition is more likely to develop in people who:  · Have a weak body defense system (immune system).  · Have diabetes.  · Have dry and irritated skin.  · Get frequent injections or use illegal IV drugs.  · Have a foreign body in a wound, such as a splinter.  · Have problems with their lymph system or  veins.  What are the signs or symptoms?  Symptoms of this condition include:  · A painful, firm bump under the skin.  · A bump with pus at the top. This may break through the skin and drain.  Other symptoms include:  · Redness surrounding the abscess site.  · Warmth.  · Swelling of the lymph nodes (glands) near the abscess.  · Tenderness.  · A sore on the skin.  How is this diagnosed?  This condition may be diagnosed based on:  · A physical exam.  · Your medical history.  · A sample of pus. This may be used to find out what is causing the infection.  · Blood tests.  · Imaging tests, such as an ultrasound, CT scan, or MRI.  How is this treated?  A small abscess that drains on its own may not need treatment. Treatment for larger abscesses may include:  · Moist heat or heat pack applied to the area several times a day.  · A procedure to drain the abscess (incision and drainage).  · Antibiotic medicines. For a severe abscess, you may first get antibiotics through an IV and then change to antibiotics by mouth.  Follow these instructions at home:  Medicines    · Take over-the-counter and prescription medicines only as told by your health care provider.  · If you were prescribed an antibiotic medicine, take it as told by your health care provider. Do not stop taking the antibiotic even if you start to feel better.  Abscess care    · If you have an abscess that has not drained, apply heat to the affected area. Use the heat source that your health care provider recommends, such as a moist heat pack or a heating pad.  ? Place a towel between your skin and the heat source.  ? Leave the heat on for 20-30 minutes.  ? Remove the heat if your skin turns bright red. This is especially important if you are unable to feel pain, heat, or cold. You may have a greater risk of getting burned.  · Follow instructions from your health care provider about how to take care of your abscess. Make sure you:  ? Cover the abscess with a bandage  (dressing).  ? Change your dressing or gauze as told by your health care provider.  ? Wash your hands with soap and water before you change the dressing or gauze. If soap and water are not available, use hand .  · Check your abscess every day for signs of a worsening infection. Check for:  ? More redness, swelling, or pain.  ? More fluid or blood.  ? Warmth.  ? More pus or a bad smell.  General instructions  · To avoid spreading the infection:  ? Do not share personal care items, towels, or hot tubs with others.  ? Avoid making skin contact with other people.  · Keep all follow-up visits as told by your health care provider. This is important.  Contact a health care provider if you have:  · More redness, swelling, or pain around your abscess.  · More fluid or blood coming from your abscess.  · Warm skin around your abscess.  · More pus or a bad smell coming from your abscess.  · A fever.  · Muscle aches.  · Chills or a general ill feeling.  Get help right away if you:  · Have severe pain.  · See red streaks on your skin spreading away from the abscess.  Summary  · A skin abscess is an infected area on or under your skin that contains a collection of pus and other material.  · A small abscess that drains on its own may not need treatment.  · Treatment for larger abscesses may include having a procedure to drain the abscess and taking an antibiotic.  This information is not intended to replace advice given to you by your health care provider. Make sure you discuss any questions you have with your health care provider.  Document Released: 09/27/2006 Document Revised: 04/09/2020 Document Reviewed: 01/31/2019  Elsevier Patient Education © 2020 [employer]        Skin Grafting, Adult  Skin grafting is a surgical procedure to cover an area of damaged or missing skin with a piece of healthy skin. The healthy skin (skin graft) may be taken from:  · Another part of your body (autograft).  · Another person's body  (allograft).  · An animal's body (xenograft).  You may need to have this procedure if you have lost a large area of skin from a burn, pressure sore, or surgery. Skin grafting can help your skin to heal. It can also help to prevent infection and large scars.  There are several types of skin grafts. The main types include:  · Split-thickness skin graft. This type uses the top skin layer (epidermis). It also uses a portion of the skin that contains blood vessels, nerves, hair follicles, and oil and sweat glands (partial-thickness dermis).  · Full-thickness skin graft. This type uses all layers of the skin and some supporting tissues under the skin.  · Composite skin graft. This type is used for grafts to parts of the body that need more reconstruction, such as the nose. The graft might include cartilage and fat in addition to skin.  · Meshed skin graft. This type uses a small piece of skin from a donor. A health care provider makes multiple incisions throughout the graft, which allows the skin to stretch to a large area.  The type of graft that you receive will depend on where your wound is and what skin is available to use for the graft.  Tell a health care provider about:  · Any allergies you have.  · All medicines you are taking, including vitamins, herbs, eye drops, creams, and over-the-counter medicines.  · Any problems you or family members have had with anesthetic medicines.  · Any blood disorders you have.  · Any surgeries you have had.  · Any medical conditions you have.  · Whether you are pregnant or may be pregnant.  · Any family history of raised and bumpy scars (keloid scars).  What are the risks?  Generally, this is a safe procedure. However, problems may occur, including:  · Infection.  · Loss of grafted skin.  · Bleeding.  · Blood (hematoma) or excess fluid (seroma)under the skin.  · Scarring.  · Allergic reactions to medicines.  · Damage to other structures or organs.  What happens before the  procedure?  Medicines  · Ask your health care provider about:  ? Changing or stopping your regular medicines. This is especially important if you are taking diabetes medicines or blood thinners.  ? Taking over-the-counter medicines, vitamins, herbs, and supplements.  ? Taking medicines such as aspirin and ibuprofen. These medicines can thin your blood. Do not take these medicines unless your health care provider tells you to take them.  · You may be given antibiotic medicine to help prevent infection.  General instructions  · Follow instructions from your health care provider about eating or drinking restrictions.  · Plan to have someone take you home from the hospital or clinic.  · Plan to have a responsible adult care for you for at least 24 hours after you leave the hospital or clinic. This is important.  · Take a shower on the morning of the procedure. You may have to use a certain cleanser if told by your health care provider.  · Ask your health care provider how your surgical site will be marked or identified.  · Keep all appointments with your health care provider. A small piece of your skin may need to be removed before the procedure and sent to a lab to grow. After it has grown, it will be used for the graft procedure.  What happens during the procedure?    · To reduce your risk of infection:  ? Your health care team will wash or sanitize their hands.  ? Hair may be removed from the surgical area.  ? Your skin will be washed with soap.  ? Your wound will be cleaned.  · An IV will be inserted into one of your veins to provide fluids and medicine.  · You will be given one or more of the following:  ? A medicine to help you relax (sedative).  ? A medicine to make you fall asleep (general anesthetic).  · Blood flow to the wound will be stopped.  · For a split-thickness graft:  ? A piece of skin will be cut out with a surgical tool. It will be used to make the graft.  ? The graft will be placed over the  wound.  ? Skin glue, stitches (sutures), or both will be used to hold the graft in place. A pressure wrap may also be used.  ? The area will be covered with clean bandages (dressings).  · For a full-thickness graft or a composite graft:  ? A section of skin, muscle, fat, and blood supply will be cut out with a surgical tool. It will be used to make the graft.  ? The graft will be placed over the wound.  ? Skin glue, absorbable stitches (sutures), or both will be used to hold the graft in place. A pressure wrap may also be used.  ? A split-thickness graft might be created to cover the area where the full-thickness or composite tissue was cut out (donor site).  The procedure may vary among health care providers and hospitals.  What happens after the procedure?  · Your blood pressure, heart rate, breathing rate, and blood oxygen level will be monitored until the medicines you were given have worn off.  · You may feel groggy from the medicines you were given during the procedure.  · You may feel some tenderness around the wound (and the donor site, if this applies). You may be given medicine to help the pain.  · A compression bandage or stocking may be placed around your wound and kept on for 5-10 days, or until the graft is stable and the wound has started to heal.  · You may be given antibiotic medicines.  · Do not drive for 24 hours if you were given a sedative.  · If the graft was placed near a joint, you may need physical therapy to prevent stiffness from scarring.  Summary  · Skin grafting is a surgical procedure to cover an area of damaged or missing skin with a piece of healthy skin.  · Before the procedure, follow instructions from your health care provider about eating or drinking restrictions.  · You may feel some tenderness around the wound (and the donor site, if this applies). You may be given medicine to help the pain.  This information is not intended to replace advice given to you by your health care  provider. Make sure you discuss any questions you have with your health care provider.  Document Released: 08/17/2006 Document Revised: 11/30/2018 Document Reviewed: 03/05/2018  [employer] Patient Education © 2020 [employer]            · Is patient discharged on Warfarin / Coumadin?   No     Depression / Suicide Risk    As you are discharged from this [facility] facility, it is important to learn how to keep safe from harming yourself.    Recognize the warning signs:  · Abrupt changes in personality, positive or negative- including increase in energy   · Giving away possessions  · Change in eating patterns- significant weight changes-  positive or negative  · Change in sleeping patterns- unable to sleep or sleeping all the time   · Unwillingness or inability to communicate  · Depression  · Unusual sadness, discouragement and loneliness  · Talk of wanting to die  · Neglect of personal appearance   · Rebelliousness- reckless behavior  · Withdrawal from people/activities they love  · Confusion- inability to concentrate     If you or a loved one observes any of these behaviors or has concerns about self-harm, here's what you can do:  · Talk about it- your feelings and reasons for harming yourself  · Remove any means that you might use to hurt yourself (examples: pills, rope, extension cords, firearm)  · Get professional help from the community (Mental Health, Substance Abuse, psychological counseling)  · Do not be alone:Call your Safe Contact- someone whom you trust who will be there for you.  · Call your local CRISIS HOTLINE 036-7951 or 081-398-6902  · Call your local Children's Mobile Crisis Response Team [place] (861) 072-1174 or www.[employer]  · Call the toll free National Suicide Prevention Hotlines   · National Suicide Prevention Lifeline 556-969-GAQH (9035)  · National Hope Line Network 800-SUICIDE (294-3346)      Sulfamethoxazole; Trimethoprim, SMX-TMP tablets  What is this  medicine?  SULFAMETHOXAZOLE; TRIMETHOPRIM or SMX-TMP (suhl fuh meth OK lakhwinder zohl; trye METH oh prim) is a combination of a sulfonamide antibiotic and a second antibiotic, trimethoprim. It is used to treat or prevent certain kinds of bacterial infections. It will not work for colds, flu, or other viral infections.  This medicine may be used for other purposes; ask your health care provider or pharmacist if you have questions.  COMMON BRAND NAME(S): Bacter-Aid DS, Bactrim, Bactrim DS, Septra, Septra DS  What should I tell my health care provider before I take this medicine?  They need to know if you have any of these conditions:  · anemia  · asthma  · being treated with anticonvulsants  · if you frequently drink alcohol containing drinks  · kidney disease  · liver disease  · low level of folic acid or glucose-6-phosphate dehydrogenase  · poor nutrition or malabsorption  · porphyria  · severe allergies  · thyroid disorder  · an unusual or allergic reaction to sulfamethoxazole, trimethoprim, sulfa drugs, other medicines, foods, dyes, or preservatives  · pregnant or trying to get pregnant  · breast-feeding  How should I use this medicine?  Take this medicine by mouth with a full glass of water. Follow the directions on the prescription label. Take your medicine at regular intervals. Do not take it more often than directed. Do not skip doses or stop your medicine early.  Talk to your pediatrician regarding the use of this medicine in children. Special care may be needed. This medicine has been used in children as young as 2 months of age.  Overdosage: If you think you have taken too much of this medicine contact a poison control center or emergency room at once.  NOTE: This medicine is only for you. Do not share this medicine with others.  What if I miss a dose?  If you miss a dose, take it as soon as you can. If it is almost time for your next dose, take only that dose. Do not take double or extra doses.  What may  interact with this medicine?  Do not take this medicine with any of the following medications:  · aminobenzoate potassium  · dofetilide  · metronidazole  This medicine may also interact with the following medications:  · ACE inhibitors like benazepril, enalapril, lisinopril, and ramipril  · birth control pills  · cyclosporine  · digoxin  · diuretics  · indomethacin  · medicines for diabetes  · methenamine  · methotrexate  · phenytoin  · potassium supplements  · pyrimethamine  · sulfinpyrazone  · tricyclic antidepressants  · warfarin  This list may not describe all possible interactions. Give your health care provider a list of all the medicines, herbs, non-prescription drugs, or dietary supplements you use. Also tell them if you smoke, drink alcohol, or use illegal drugs. Some items may interact with your medicine.  What should I watch for while using this medicine?  Tell your doctor or health care professional if your symptoms do not improve. Drink several glasses of water a day to reduce the risk of kidney problems.  Do not treat diarrhea with over the counter products. Contact your doctor if you have diarrhea that lasts more than 2 days or if it is severe and watery.  This medicine can make you more sensitive to the sun. Keep out of the sun. If you cannot avoid being in the sun, wear protective clothing and use a sunscreen. Do not use sun lamps or tanning beds/booths.  What side effects may I notice from receiving this medicine?  Side effects that you should report to your doctor or health care professional as soon as possible:  · allergic reactions like skin rash or hives, swelling of the face, lips, or tongue  · breathing problems  · fever or chills, sore throat  · irregular heartbeat, chest pain  · joint or muscle pain  · pain or difficulty passing urine  · red pinpoint spots on skin  · redness, blistering, peeling or loosening of the skin, including inside the mouth  · unusual bleeding or  bruising  · unusually weak or tired  · yellowing of the eyes or skin  Side effects that usually do not require medical attention (report to your doctor or health care professional if they continue or are bothersome):  · diarrhea  · dizziness  · headache  · loss of appetite  · nausea, vomiting  · nervousness  This list may not describe all possible side effects. Call your doctor for medical advice about side effects. You may report side effects to FDA at 7-675-IMX-8603.  Where should I keep my medicine?  Keep out of the reach of children.  Store at room temperature between 20 to 25 degrees C (68 to 77 degrees F). Protect from light. Throw away any unused medicine after the expiration date.  NOTE: This sheet is a summary. It may not cover all possible information. If you have questions about this medicine, talk to your doctor, pharmacist, or health care provider.  © 2020 [employer]vier/[employer] (2014-07-25 14:38:26)      Oxycodone tablets or capsules  What is this medicine?  OXYCODONE (ox i KOE done) is a pain reliever. It is used to treat moderate to severe pain.  This medicine may be used for other purposes; ask your health care provider or pharmacist if you have questions.  COMMON BRAND NAME(S): Dazidox, Endocodone, Oxaydo, OXECTA, OxyIR, Percolone, Roxicodone, Roxybond  What should I tell my health care provider before I take this medicine?  They need to know if you have any of these conditions:  · Jasper's disease  · brain tumor  · head injury  · heart disease  · history of drug or alcohol abuse problem  · if you often drink alcohol  · kidney disease  · liver disease  · lung or breathing disease, like asthma  · mental illness  · pancreatic disease  · seizures  · thyroid disease  · an unusual or allergic reaction to oxycodone, codeine, hydrocodone, morphine, other medicines, foods, dyes, or preservatives  · pregnant or trying to get pregnant  · breast-feeding  How should I use this medicine?  Take this medicine by  mouth with a glass of water. Follow the directions on the prescription label. You can take it with or without food. If it upsets your stomach, take it with food. Take your medicine at regular intervals. Do not take it more often than directed. Do not stop taking except on your doctor's advice.  Some brands of this medicine, like Oxecta, have special instructions. Ask your doctor or pharmacist if these directions are for you: Do not cut, crush or chew this medicine. Swallow only one tablet at a time. Do not wet, soak, or lick the tablet before you take it.  A special MedGuide will be given to you by the pharmacist with each prescription and refill. Be sure to read this information carefully each time.  Talk to your pediatrician regarding the use of this medicine in children. Special care may be needed.  Overdosage: If you think you have taken too much of this medicine contact a poison control center or emergency room at once.  NOTE: This medicine is only for you. Do not share this medicine with others.  What if I miss a dose?  If you miss a dose, take it as soon as you can. If it is almost time for your next dose, take only that dose. Do not take double or extra doses.  What may interact with this medicine?  This medicine may interact with the following medications:  · alcohol  · antihistamines for allergy, cough and cold  · antiviral medicines for HIV or AIDS  · atropine  · certain antibiotics like clarithromycin, erythromycin, linezolid, rifampin  · certain medicines for anxiety or sleep  · certain medicines for bladder problems like oxybutynin, tolterodine  · certain medicines for depression like amitriptyline, fluoxetine, sertraline  · certain medicines for fungal infections like ketoconazole, itraconazole, voriconazole  · certain medicines for migraine headache like almotriptan, eletriptan, frovatriptan, naratriptan, rizatriptan, sumatriptan, zolmitriptan  · certain medicines for nausea or vomiting like  dolasetron, ondansetron, palonosetron  · certain medicines for Parkinson's disease like benztropine, trihexyphenidyl  · certain medicines for seizures like phenobarbital, phenytoin, primidone  · certain medicines for stomach problems like dicyclomine, hyoscyamine  · certain medicines for travel sickness like scopolamine  · diuretics  · general anesthetics like halothane, isoflurane, methoxyflurane, propofol  · ipratropium  · local anesthetics like lidocaine, pramoxine, tetracaine  · MAOIs like Carbex, Eldepryl, Marplan, Nardil, and Parnate  · medicines that relax muscles for surgery  · methylene blue  · nilotinib  · other narcotic medicines for pain or cough  · phenothiazines like chlorpromazine, mesoridazine, prochlorperazine, thioridazine  This list may not describe all possible interactions. Give your health care provider a list of all the medicines, herbs, non-prescription drugs, or dietary supplements you use. Also tell them if you smoke, drink alcohol, or use illegal drugs. Some items may interact with your medicine.  What should I watch for while using this medicine?  Tell your doctor or health care professional if your pain does not go away, if it gets worse, or if you have new or a different type of pain. You may develop tolerance to the medicine. Tolerance means that you will need a higher dose of the medicine for pain relief. Tolerance is normal and is expected if you take this medicine for a long time.  Do not suddenly stop taking your medicine because you may develop a severe reaction. Your body becomes used to the medicine. This does NOT mean you are addicted. Addiction is a behavior related to getting and using a drug for a non-medical reason. If you have pain, you have a medical reason to take pain medicine. Your doctor will tell you how much medicine to take. If your doctor wants you to stop the medicine, the dose will be slowly lowered over time to avoid any side effects.  There are different types  of narcotic medicines (opiates). If you take more than one type at the same time or if you are taking another medicine that also causes drowsiness, you may have more side effects. Give your health care provider a list of all medicines you use. Your doctor will tell you how much medicine to take. Do not take more medicine than directed. Call emergency for help if you have problems breathing or unusual sleepiness.  You may get drowsy or dizzy. Do not drive, use machinery, or do anything that needs mental alertness until you know how the medicine affects you. Do not stand or sit up quickly, especially if you are an older patient. This reduces the risk of dizzy or fainting spells. Alcohol may interfere with the effect of this medicine. Avoid alcoholic drinks.  This medicine will cause constipation. Try to have a bowel movement at least every 2 to 3 days. If you do not have a bowel movement for 3 days, call your doctor or health care professional.  Your mouth may get dry. Chewing sugarless gum or sucking hard candy, and drinking plenty of water may help. Contact your doctor if the problem does not go away or is severe.  What side effects may I notice from receiving this medicine?  Side effects that you should report to your doctor or health care professional as soon as possible:  · allergic reactions like skin rash, itching or hives, swelling of the face, lips, or tongue  · breathing problems  · confusion  · signs and symptoms of low blood pressure like dizziness; feeling faint or lightheaded, falls; unusually weak or tired  · trouble passing urine or change in the amount of urine  · trouble swallowing  Side effects that usually do not require medical attention (report to your doctor or health care professional if they continue or are bothersome):  · constipation  · dry mouth  · nausea, vomiting  · tiredness  This list may not describe all possible side effects. Call your doctor for medical advice about side effects. You  may report side effects to FDA at 1-120-TQU-4385.  Where should I keep my medicine?  Keep out of the reach of children. This medicine can be abused. Keep your medicine in a safe place to protect it from theft. Do not share this medicine with anyone. Selling or giving away this medicine is dangerous and against the law.  Store at room temperature between 15 and 30 degrees C (59 and 86 degrees F). Protect from light. Keep container tightly closed.  This medicine may cause harm and death if it is taken by other adults, children, or pets. Return medicine that has not been used to an official disposal site. Contact the [employer] at 1-296.883.9186 or your [facility] government to find a site. If you cannot return the medicine, flush it down the toilet. Do not use the medicine after the expiration date.  NOTE: This sheet is a summary. It may not cover all possible information. If you have questions about this medicine, talk to your doctor, pharmacist, or health care provider.  © 2020 [employer]vier/Gold Standard (2018-04-24 16:13:10)        Gabapentin capsules or tablets  What is this medicine?  GABAPENTIN (GA ba pen tin) is used to control seizures in certain types of epilepsy. It is also used to treat certain types of nerve pain.  This medicine may be used for other purposes; ask your health care provider or pharmacist if you have questions.  COMMON BRAND NAME(S): Active-PAC with Gabapentin, Gabarone, Neurontin  What should I tell my health care provider before I take this medicine?  They need to know if you have any of these conditions:  · history of drug abuse or alcohol abuse problem  · kidney disease  · lung or breathing disease  · suicidal thoughts, plans, or attempt; a previous suicide attempt by you or a family member  · an unusual or allergic reaction to gabapentin, other medicines, foods, dyes, or preservatives  · pregnant or trying to get pregnant  · breast-feeding  How should I use this medicine?  Take this medicine by  mouth with a glass of water. Follow the directions on the prescription label. You can take it with or without food. If it upsets your stomach, take it with food. Take your medicine at regular intervals. Do not take it more often than directed. Do not stop taking except on your doctor's advice.  If you are directed to break the 600 or 800 mg tablets in half as part of your dose, the extra half tablet should be used for the next dose. If you have not used the extra half tablet within 28 days, it should be thrown away.  A special MedGuide will be given to you by the pharmacist with each prescription and refill. Be sure to read this information carefully each time.  Talk to your pediatrician regarding the use of this medicine in children. While this drug may be prescribed for children as young as 3 years for selected conditions, precautions do apply.  Overdosage: If you think you have taken too much of this medicine contact a poison control center or emergency room at once.  NOTE: This medicine is only for you. Do not share this medicine with others.  What if I miss a dose?  If you miss a dose, take it as soon as you can. If it is almost time for your next dose, take only that dose. Do not take double or extra doses.  What may interact with this medicine?  This medicine may interact with the following medications:  · alcohol  · antihistamines for allergy, cough, and cold  · certain medicines for anxiety or sleep  · certain medicines for depression like amitriptyline, fluoxetine, sertraline  · certain medicines for seizures like phenobarbital, primidone  · certain medicines for stomach problems  · general anesthetics like halothane, isoflurane, methoxyflurane, propofol  · local anesthetics like lidocaine, pramoxine, tetracaine  · medicines that relax muscles for surgery  · narcotic medicines for pain  · phenothiazines like chlorpromazine, mesoridazine, prochlorperazine, thioridazine  This list may not describe all  possible interactions. Give your health care provider a list of all the medicines, herbs, non-prescription drugs, or dietary supplements you use. Also tell them if you smoke, drink alcohol, or use illegal drugs. Some items may interact with your medicine.  What should I watch for while using this medicine?  Visit your doctor or health care provider for regular checks on your progress. You may want to keep a record at home of how you feel your condition is responding to treatment. You may want to share this information with your doctor or health care provider at each visit. You should contact your doctor or health care provider if your seizures get worse or if you have any new types of seizures. Do not stop taking this medicine or any of your seizure medicines unless instructed by your doctor or health care provider. Stopping your medicine suddenly can increase your seizures or their severity.  This medicine may cause serious skin reactions. They can happen weeks to months after starting the medicine. Contact your health care provider right away if you notice fevers or flu-like symptoms with a rash. The rash may be red or purple and then turn into blisters or peeling of the skin. Or, you might notice a red rash with swelling of the face, lips or lymph nodes in your neck or under your arms.  Wear a medical identification bracelet or chain if you are taking this medicine for seizures, and carry a card that lists all your medications.  You may get drowsy, dizzy, or have blurred vision. Do not drive, use machinery, or do anything that needs mental alertness until you know how this medicine affects you. To reduce dizzy or fainting spells, do not sit or stand up quickly, especially if you are an older patient. Alcohol can increase drowsiness and dizziness. Avoid alcoholic drinks.  Your mouth may get dry. Chewing sugarless gum or sucking hard candy, and drinking plenty of water will help.  The use of this medicine may  increase the chance of suicidal thoughts or actions. Pay special attention to how you are responding while on this medicine. Any worsening of mood, or thoughts of suicide or dying should be reported to your health care provider right away.  Women who become pregnant while using this medicine may enroll in the [place] Antiepileptic Drug Pregnancy Registry by calling 1-258.603.4542. This registry collects information about the safety of antiepileptic drug use during pregnancy.  What side effects may I notice from receiving this medicine?  Side effects that you should report to your doctor or health care professional as soon as possible:  · allergic reactions like skin rash, itching or hives, swelling of the face, lips, or tongue  · breathing problems  · rash, fever, and swollen lymph nodes  · redness, blistering, peeling or loosening of the skin, including inside the mouth  · suicidal thoughts, mood changes  Side effects that usually do not require medical attention (report to your doctor or health care professional if they continue or are bothersome):  · dizziness  · drowsiness  · headache  · nausea, vomiting  · swelling of ankles, feet, hands  · tiredness  This list may not describe all possible side effects. Call your doctor for medical advice about side effects. You may report side effects to FDA at 1-836-MAI-8090.  Where should I keep my medicine?  Keep out of reach of children.  This medicine may cause accidental overdose and death if it taken by other adults, children, or pets. Mix any unused medicine with a substance like cat litter or coffee grounds. Then throw the medicine away in a sealed container like a sealed bag or a coffee can with a lid. Do not use the medicine after the expiration date.  Store at room temperature between 15 and 30 degrees C (59 and 86 degrees F).  NOTE: This sheet is a summary. It may not cover all possible information. If you have questions about this medicine, talk to your  doctor, pharmacist, or health care provider.  © 2020 Elsevier/Gold Standard (2020-03-20 14:16:43)        Acetaminophen tablets or caplets  What is this medicine?  ACETAMINOPHEN (a set a DANIELITO shane fen) is a pain reliever. It is used to treat mild pain and fever.  This medicine may be used for other purposes; ask your health care provider or pharmacist if you have questions.  COMMON BRAND NAME(S): Aceta, Actamin, Anacin Aspirin Free, Genapap, Genebs, Mapap, Pain & Fever, Pain and Fever, PAIN RELIEF, PAIN RELIEF Extra Strength, Pain Reliever, Panadol, PHARBETOL, Q-Pap, Q-Pap Extra Strength, Tylenol, Tylenol CrushableTablet, Tylenol Extra Strength, XS No Aspirin, XS Pain Reliever  What should I tell my health care provider before I take this medicine?  They need to know if you have any of these conditions:  · if you often drink alcohol  · liver disease  · an unusual or allergic reaction to acetaminophen, other medicines, foods, dyes, or preservatives  · pregnant or trying to get pregnant  · breast-feeding  How should I use this medicine?  Take this medicine by mouth with a glass of water. Follow the directions on the package or prescription label. Take your medicine at regular intervals. Do not take your medicine more often than directed.  Talk to your pediatrician regarding the use of this medicine in children. While this drug may be prescribed for children as young as 6 years of age for selected conditions, precautions do apply.  Overdosage: If you think you have taken too much of this medicine contact a poison control center or emergency room at once.  NOTE: This medicine is only for you. Do not share this medicine with others.  What if I miss a dose?  If you miss a dose, take it as soon as you can. If it is almost time for your next dose, take only that dose. Do not take double or extra doses.  What may interact with this medicine?  · alcohol  · imatinib  · isoniazid  · other medicines with acetaminophen  This list  may not describe all possible interactions. Give your health care provider a list of all the medicines, herbs, non-prescription drugs, or dietary supplements you use. Also tell them if you smoke, drink alcohol, or use illegal drugs. Some items may interact with your medicine.  What should I watch for while using this medicine?  Tell your doctor or health care professional if the pain lasts more than 10 days (5 days for children), if it gets worse, or if there is a new or different kind of pain. Also, check with your doctor if a fever lasts for more than 3 days.  Do not take other medicines that contain acetaminophen with this medicine. Always read labels carefully. If you have questions, ask your doctor or pharmacist.  If you take too much acetaminophen get medical help right away. Too much acetaminophen can be very dangerous and cause liver damage. Even if you do not have symptoms, it is important to get help right away.  What side effects may I notice from receiving this medicine?  Side effects that you should report to your doctor or health care professional as soon as possible:  · allergic reactions like skin rash, itching or hives, swelling of the face, lips, or tongue  · breathing problems  · fever or sore throat  · redness, blistering, peeling or loosening of the skin, including inside the mouth  · trouble passing urine or change in the amount of urine  · unusual bleeding or bruising  · unusually weak or tired  · yellowing of the eyes or skin  Side effects that usually do not require medical attention (report to your doctor or health care professional if they continue or are bothersome):  · headache  · nausea, stomach upset  This list may not describe all possible side effects. Call your doctor for medical advice about side effects. You may report side effects to FDA at 4-132-FDA-9680.  Where should I keep my medicine?  Keep out of reach of children.  Store at room temperature between 20 and 25 degrees C (94  and 77 degrees F). Protect from moisture and heat. Throw away any unused medicine after the expiration date.  NOTE: This sheet is a summary. It may not cover all possible information. If you have questions about this medicine, talk to your doctor, pharmacist, or health care provider.  © 2020 Elsevier/Gold Standard (2014-08-11 12:54:16)

## 2020-10-13 NOTE — DISCHARGE PLANNING
Notified by Ortho PA that pt does not need Outpt Wound care per Dr. Martinez. Wound Care appt for tomorrow at 0930 canceled .

## 2020-10-13 NOTE — CARE PLAN
Problem: Safety  Goal: Will remain free from injury  Outcome: PROGRESSING AS EXPECTED  Note: Hourly rounding.  Non-skid socks. Bed locked & in low position. Personal belongings and call light  within reach. .      Problem: Knowledge Deficit  Goal: Knowledge of disease process/condition, treatment plan, diagnostic tests, and medications will improve  Outcome: PROGRESSING AS EXPECTED  Note: Information regarding disease process/condition explained,question answered.  Updated with plan of care, verbalized understanding.      Problem: Pain Management  Goal: Pain level will decrease to patient's comfort goal  Outcome: PROGRESSING AS EXPECTED  Note: Taught pt 0-10 pain scale and  non pharmacological method of pain mgt, encouraged to verbalize when in pain. Administered PRN pain med as needed.

## 2020-10-13 NOTE — DISCHARGE PLANNING
Anticipated Discharge Disposition: home    Action: Anticipate discharge today. Pt calling  friend to bring his clothes. Has Outpt wound care, first appt scheduled for 10/14 at 0930    Barriers to Discharge: Pending ortho clearance    Plan: Home, street or shelter. (pt provided with shelter information.)

## 2020-10-13 NOTE — PROGRESS NOTES
Pt being dc'd to home.Meds to beds prescriptions given to pt; oxycodone, gabapentin, tylenol, bactrim. Dc instructions discussed with pt. Flu shot  Given . All questions answered.  Patient  agreeable to dc plan. Toky Bedside RN to confirm that patient has all belongings at dc, meds, bus passes and that all home care needs have been arranged.

## 2020-10-14 ENCOUNTER — APPOINTMENT (OUTPATIENT)
Dept: WOUND CARE | Facility: MEDICAL CENTER | Age: 60
End: 2020-10-14
Attending: INTERNAL MEDICINE
Payer: COMMERCIAL

## 2020-11-10 NOTE — DOCUMENTATION QUERY
[facility]                                                                       Query Response Note      PATIENT:               JANNA ROJAS  ACCT #:                  [id number]  MRN:                     [id number]  :                      1960  ADMIT DATE:       2020 7:22 PM  DISCH DATE:        10/13/2020 2:15 PM  RESPONDING  PROVIDER #:        [id number]           QUERY TEXT:    Debridement is documented in the Medical Record. Please specify the type.      NOTE:  If an appropriate response is not listed below, please respond with a new note.        The patient's Clinical Indicators include:  Patient is a 59 y/o male IV drug user s/p debridement of right thigh wound on 9/15. On  patient had repeat debridement and a split-thickness skin graft.  Documentation indicates debridement included muscle but does not indicate the type of debridement.     OPERATIONS PERFORMED:    1.  Irrigation and debridement of superficial skin, fascia, and muscle, right thigh.      Irrigation and debridement of superficial skin, fascia, and muscle of the right thigh.  During the procedure, there was a 12x4 cm healthy-appearing   wound.  Using a combination of pickups, scissors, curettes and irrigation, we irrigated the superficial skin, the fascia and the muscle of the right thigh.  Options provided:   -- Non-excisional debridement   -- Excisional debridement   -- Unable to determine      Query created by: Jackelyn Gonzales on 10/21/2020 5:48 AM    RESPONSE TEXT:    Excisional debridement       QUERY TEXT:    Debridement is documented in the Medical Record. Please specify the type.      NOTE:  If an appropriate response is not listed below, please respond with a new note.        The patient's Clinical Indicators include:  Patient is a 59 y/o male IV drug user admitted with left antecubital fossa and right thigh abscesses.  Patient underwent debridement  on 9/15 and received IV antibiotics.  Clarification regarding the type of debridement is needed.    Op report excerpt:  We now debrided both sharp and dull through skin, subcutaneous tissue, muscle, fascia, and to the bone.  I then copiously irrigated with a liter of dilute saline and then a liter of normal saline and applied a wound VAC in standard fashion.  Options provided:   -- Non-excisional debridement   -- Excisional debridement, Please specify the following details- instrument used, appearance/size of wound   -- Unable to determine      Query created by: Jackelyn Gonzales on 10/21/2020 5:48 AM    RESPONSE TEXT:    excisional debridement- knife, irrigation, scissors,          Electronically signed by:  BEBE CAVANAUGH MD 11/10/2020 10:23 AM

## 2021-03-18 ENCOUNTER — APPOINTMENT (OUTPATIENT)
Dept: RADIOLOGY | Facility: MEDICAL CENTER | Age: 61
End: 2021-03-18
Attending: SURGERY
Payer: COMMERCIAL

## 2021-03-18 DIAGNOSIS — K40.30 INGUINAL HERNIA WITH INCARCERATION: ICD-10-CM

## 2021-03-18 PROCEDURE — 74177 CT ABD & PELVIS W/CONTRAST: CPT

## 2021-03-18 PROCEDURE — 700117 HCHG RX CONTRAST REV CODE 255: Performed by: SURGERY

## 2021-03-18 RX ADMIN — IOHEXOL 100 ML: 350 INJECTION, SOLUTION INTRAVENOUS at 12:15

## 2021-03-18 RX ADMIN — IOHEXOL 25 ML: 240 INJECTION, SOLUTION INTRATHECAL; INTRAVASCULAR; INTRAVENOUS; ORAL at 12:15

## 2021-04-02 ENCOUNTER — PRE-ADMISSION TESTING (OUTPATIENT)
Dept: ADMISSIONS | Facility: MEDICAL CENTER | Age: 61
End: 2021-04-02
Attending: SURGERY
Payer: COMMERCIAL

## 2021-04-02 DIAGNOSIS — Z01.812 PRE-OPERATIVE LABORATORY EXAMINATION: ICD-10-CM

## 2021-04-02 LAB
SARS-COV-2 RNA RESP QL NAA+PROBE: NOTDETECTED
SPECIMEN SOURCE: NORMAL

## 2021-04-02 PROCEDURE — U0003 INFECTIOUS AGENT DETECTION BY NUCLEIC ACID (DNA OR RNA); SEVERE ACUTE RESPIRATORY SYNDROME CORONAVIRUS 2 (SARS-COV-2) (CORONAVIRUS DISEASE [COVID-19]), AMPLIFIED PROBE TECHNIQUE, MAKING USE OF HIGH THROUGHPUT TECHNOLOGIES AS DESCRIBED BY CMS-2020-01-R: HCPCS

## 2021-04-02 PROCEDURE — C9803 HOPD COVID-19 SPEC COLLECT: HCPCS

## 2021-04-02 PROCEDURE — U0005 INFEC AGEN DETEC AMPLI PROBE: HCPCS

## 2021-04-02 ASSESSMENT — FIBROSIS 4 INDEX: FIB4 SCORE: 0.84

## 2021-04-04 NOTE — OR NURSING
COVID-19 Pre-surgery screenin. Do you have an undiagnosed respiratory illness or symptoms such as coughing or sneezing? *No** (Yes/No)   a. Onset of Sx *No**  b. Acute vs. chronic respiratory illness * No **    2. Do you have an unexplained fever that’s greater than 100.4 degrees     * No ** (Yes/No)     3. Have you had direct exposure to a patient who tested positive for Covid-19?     * No ** (Yes/No)    4. Have you had any loss of your sense of taste or smell? Have you had N/V or sore throat? * No **    5. Patient has been informed of visitor policy and asked to wear a mask upon entering the hospital   *YES** (Yes/No)

## 2021-04-05 ENCOUNTER — HOSPITAL ENCOUNTER (OUTPATIENT)
Facility: MEDICAL CENTER | Age: 61
End: 2021-04-06
Attending: SURGERY | Admitting: SURGERY
Payer: COMMERCIAL

## 2021-04-05 ENCOUNTER — ANESTHESIA (OUTPATIENT)
Dept: SURGERY | Facility: MEDICAL CENTER | Age: 61
End: 2021-04-05
Payer: COMMERCIAL

## 2021-04-05 ENCOUNTER — ANESTHESIA EVENT (OUTPATIENT)
Dept: SURGERY | Facility: MEDICAL CENTER | Age: 61
End: 2021-04-05
Payer: COMMERCIAL

## 2021-04-05 DIAGNOSIS — K40.91 UNILATERAL RECURRENT INGUINAL HERNIA WITHOUT OBSTRUCTION OR GANGRENE: ICD-10-CM

## 2021-04-05 LAB — PATHOLOGY CONSULT NOTE: NORMAL

## 2021-04-05 PROCEDURE — 160036 HCHG PACU - EA ADDL 30 MINS PHASE I: Performed by: SURGERY

## 2021-04-05 PROCEDURE — 700105 HCHG RX REV CODE 258: Performed by: SURGERY

## 2021-04-05 PROCEDURE — 700111 HCHG RX REV CODE 636 W/ 250 OVERRIDE (IP): Performed by: ANESTHESIOLOGY

## 2021-04-05 PROCEDURE — 700101 HCHG RX REV CODE 250: Performed by: SURGERY

## 2021-04-05 PROCEDURE — 501838 HCHG SUTURE GENERAL: Performed by: SURGERY

## 2021-04-05 PROCEDURE — 88302 TISSUE EXAM BY PATHOLOGIST: CPT

## 2021-04-05 PROCEDURE — G0378 HOSPITAL OBSERVATION PER HR: HCPCS

## 2021-04-05 PROCEDURE — 160039 HCHG SURGERY MINUTES - EA ADDL 1 MIN LEVEL 3: Performed by: SURGERY

## 2021-04-05 PROCEDURE — 160035 HCHG PACU - 1ST 60 MINS PHASE I: Performed by: SURGERY

## 2021-04-05 PROCEDURE — 700101 HCHG RX REV CODE 250: Performed by: ANESTHESIOLOGY

## 2021-04-05 PROCEDURE — C1781 MESH (IMPLANTABLE): HCPCS | Performed by: SURGERY

## 2021-04-05 PROCEDURE — 502704 HCHG DEVICE, LIGASURE IMPACT: Performed by: SURGERY

## 2021-04-05 PROCEDURE — 88300 SURGICAL PATH GROSS: CPT

## 2021-04-05 PROCEDURE — A9270 NON-COVERED ITEM OR SERVICE: HCPCS | Performed by: SURGERY

## 2021-04-05 PROCEDURE — 700111 HCHG RX REV CODE 636 W/ 250 OVERRIDE (IP): Performed by: SURGERY

## 2021-04-05 PROCEDURE — 700102 HCHG RX REV CODE 250 W/ 637 OVERRIDE(OP): Performed by: ANESTHESIOLOGY

## 2021-04-05 PROCEDURE — 700102 HCHG RX REV CODE 250 W/ 637 OVERRIDE(OP): Performed by: SURGERY

## 2021-04-05 PROCEDURE — A9270 NON-COVERED ITEM OR SERVICE: HCPCS | Performed by: ANESTHESIOLOGY

## 2021-04-05 PROCEDURE — 160028 HCHG SURGERY MINUTES - 1ST 30 MINS LEVEL 3: Performed by: SURGERY

## 2021-04-05 PROCEDURE — 160048 HCHG OR STATISTICAL LEVEL 1-5: Performed by: SURGERY

## 2021-04-05 PROCEDURE — 160002 HCHG RECOVERY MINUTES (STAT): Performed by: SURGERY

## 2021-04-05 PROCEDURE — 160009 HCHG ANES TIME/MIN: Performed by: SURGERY

## 2021-04-05 PROCEDURE — 96372 THER/PROPH/DIAG INJ SC/IM: CPT | Mod: XU

## 2021-04-05 PROCEDURE — 500380 HCHG DRAIN, PENROSE 1/4X12: Performed by: SURGERY

## 2021-04-05 DEVICE — MESH FLAT SHEET 3 X 6 - (3EA/CA): Type: IMPLANTABLE DEVICE | Site: GROIN | Status: FUNCTIONAL

## 2021-04-05 RX ORDER — ACETAMINOPHEN 500 MG
1000 TABLET ORAL EVERY 6 HOURS PRN
Status: DISCONTINUED | OUTPATIENT
Start: 2021-04-10 | End: 2021-04-06 | Stop reason: HOSPADM

## 2021-04-05 RX ORDER — DIPHENHYDRAMINE HYDROCHLORIDE 50 MG/ML
12.5 INJECTION INTRAMUSCULAR; INTRAVENOUS
Status: COMPLETED | OUTPATIENT
Start: 2021-04-05 | End: 2021-04-05

## 2021-04-05 RX ORDER — MEPERIDINE HYDROCHLORIDE 25 MG/ML
12.5 INJECTION INTRAMUSCULAR; INTRAVENOUS; SUBCUTANEOUS
Status: DISCONTINUED | OUTPATIENT
Start: 2021-04-05 | End: 2021-04-05 | Stop reason: HOSPADM

## 2021-04-05 RX ORDER — HYDROMORPHONE HYDROCHLORIDE 1 MG/ML
0.2 INJECTION, SOLUTION INTRAMUSCULAR; INTRAVENOUS; SUBCUTANEOUS
Status: DISCONTINUED | OUTPATIENT
Start: 2021-04-05 | End: 2021-04-05 | Stop reason: HOSPADM

## 2021-04-05 RX ORDER — HYDRALAZINE HYDROCHLORIDE 20 MG/ML
5 INJECTION INTRAMUSCULAR; INTRAVENOUS
Status: DISCONTINUED | OUTPATIENT
Start: 2021-04-05 | End: 2021-04-05 | Stop reason: HOSPADM

## 2021-04-05 RX ORDER — DIPHENHYDRAMINE HYDROCHLORIDE 50 MG/ML
25 INJECTION INTRAMUSCULAR; INTRAVENOUS EVERY 6 HOURS PRN
Status: DISCONTINUED | OUTPATIENT
Start: 2021-04-05 | End: 2021-04-06 | Stop reason: HOSPADM

## 2021-04-05 RX ORDER — HALOPERIDOL 5 MG/ML
1 INJECTION INTRAMUSCULAR EVERY 6 HOURS PRN
Status: DISCONTINUED | OUTPATIENT
Start: 2021-04-05 | End: 2021-04-06 | Stop reason: HOSPADM

## 2021-04-05 RX ORDER — MIDAZOLAM HYDROCHLORIDE 1 MG/ML
INJECTION INTRAMUSCULAR; INTRAVENOUS PRN
Status: DISCONTINUED | OUTPATIENT
Start: 2021-04-05 | End: 2021-04-05 | Stop reason: SURG

## 2021-04-05 RX ORDER — OXYCODONE HYDROCHLORIDE 10 MG/1
10 TABLET ORAL
Status: COMPLETED | OUTPATIENT
Start: 2021-04-05 | End: 2021-04-06

## 2021-04-05 RX ORDER — OXYCODONE HCL 5 MG/5 ML
5 SOLUTION, ORAL ORAL
Status: COMPLETED | OUTPATIENT
Start: 2021-04-05 | End: 2021-04-05

## 2021-04-05 RX ORDER — HYDROMORPHONE HYDROCHLORIDE 1 MG/ML
0.5 INJECTION, SOLUTION INTRAMUSCULAR; INTRAVENOUS; SUBCUTANEOUS
Status: COMPLETED | OUTPATIENT
Start: 2021-04-05 | End: 2021-04-06

## 2021-04-05 RX ORDER — IBUPROFEN 200 MG
800 TABLET ORAL 3 TIMES DAILY PRN
Status: DISCONTINUED | OUTPATIENT
Start: 2021-04-10 | End: 2021-04-06 | Stop reason: HOSPADM

## 2021-04-05 RX ORDER — LABETALOL HYDROCHLORIDE 5 MG/ML
5 INJECTION, SOLUTION INTRAVENOUS
Status: DISCONTINUED | OUTPATIENT
Start: 2021-04-05 | End: 2021-04-05 | Stop reason: HOSPADM

## 2021-04-05 RX ORDER — ACETAMINOPHEN 500 MG
1000 TABLET ORAL EVERY 6 HOURS
Status: DISCONTINUED | OUTPATIENT
Start: 2021-04-05 | End: 2021-04-06 | Stop reason: HOSPADM

## 2021-04-05 RX ORDER — DEXTROSE MONOHYDRATE, SODIUM CHLORIDE, AND POTASSIUM CHLORIDE 50; 1.49; 4.5 G/1000ML; G/1000ML; G/1000ML
INJECTION, SOLUTION INTRAVENOUS CONTINUOUS
Status: DISCONTINUED | OUTPATIENT
Start: 2021-04-05 | End: 2021-04-06

## 2021-04-05 RX ORDER — BUPIVACAINE HYDROCHLORIDE AND EPINEPHRINE 5; 5 MG/ML; UG/ML
INJECTION, SOLUTION PERINEURAL
Status: DISCONTINUED | OUTPATIENT
Start: 2021-04-05 | End: 2021-04-05 | Stop reason: HOSPADM

## 2021-04-05 RX ORDER — ONDANSETRON 2 MG/ML
INJECTION INTRAMUSCULAR; INTRAVENOUS PRN
Status: DISCONTINUED | OUTPATIENT
Start: 2021-04-05 | End: 2021-04-05 | Stop reason: SURG

## 2021-04-05 RX ORDER — OXYCODONE HCL 5 MG/5 ML
10 SOLUTION, ORAL ORAL
Status: COMPLETED | OUTPATIENT
Start: 2021-04-05 | End: 2021-04-05

## 2021-04-05 RX ORDER — ONDANSETRON 2 MG/ML
4 INJECTION INTRAMUSCULAR; INTRAVENOUS
Status: DISCONTINUED | OUTPATIENT
Start: 2021-04-05 | End: 2021-04-05 | Stop reason: HOSPADM

## 2021-04-05 RX ORDER — SCOLOPAMINE TRANSDERMAL SYSTEM 1 MG/1
1 PATCH, EXTENDED RELEASE TRANSDERMAL
Status: DISCONTINUED | OUTPATIENT
Start: 2021-04-05 | End: 2021-04-06 | Stop reason: HOSPADM

## 2021-04-05 RX ORDER — DEXAMETHASONE SODIUM PHOSPHATE 4 MG/ML
INJECTION, SOLUTION INTRA-ARTICULAR; INTRALESIONAL; INTRAMUSCULAR; INTRAVENOUS; SOFT TISSUE PRN
Status: DISCONTINUED | OUTPATIENT
Start: 2021-04-05 | End: 2021-04-05 | Stop reason: SURG

## 2021-04-05 RX ORDER — ONDANSETRON 2 MG/ML
4 INJECTION INTRAMUSCULAR; INTRAVENOUS EVERY 4 HOURS PRN
Status: DISCONTINUED | OUTPATIENT
Start: 2021-04-05 | End: 2021-04-06 | Stop reason: HOSPADM

## 2021-04-05 RX ORDER — HEPARIN SODIUM 5000 [USP'U]/ML
5000 INJECTION, SOLUTION INTRAVENOUS; SUBCUTANEOUS EVERY 8 HOURS
Status: DISCONTINUED | OUTPATIENT
Start: 2021-04-05 | End: 2021-04-06 | Stop reason: HOSPADM

## 2021-04-05 RX ORDER — LIDOCAINE HYDROCHLORIDE 20 MG/ML
INJECTION, SOLUTION EPIDURAL; INFILTRATION; INTRACAUDAL; PERINEURAL PRN
Status: DISCONTINUED | OUTPATIENT
Start: 2021-04-05 | End: 2021-04-05 | Stop reason: SURG

## 2021-04-05 RX ORDER — OXYCODONE HYDROCHLORIDE 5 MG/1
5 TABLET ORAL
Status: COMPLETED | OUTPATIENT
Start: 2021-04-05 | End: 2021-04-06

## 2021-04-05 RX ORDER — SODIUM CHLORIDE, SODIUM LACTATE, POTASSIUM CHLORIDE, CALCIUM CHLORIDE 600; 310; 30; 20 MG/100ML; MG/100ML; MG/100ML; MG/100ML
INJECTION, SOLUTION INTRAVENOUS CONTINUOUS
Status: DISCONTINUED | OUTPATIENT
Start: 2021-04-05 | End: 2021-04-05

## 2021-04-05 RX ORDER — HYDROMORPHONE HYDROCHLORIDE 1 MG/ML
0.4 INJECTION, SOLUTION INTRAMUSCULAR; INTRAVENOUS; SUBCUTANEOUS
Status: DISCONTINUED | OUTPATIENT
Start: 2021-04-05 | End: 2021-04-05 | Stop reason: HOSPADM

## 2021-04-05 RX ORDER — HYDROMORPHONE HYDROCHLORIDE 1 MG/ML
0.1 INJECTION, SOLUTION INTRAMUSCULAR; INTRAVENOUS; SUBCUTANEOUS
Status: DISCONTINUED | OUTPATIENT
Start: 2021-04-05 | End: 2021-04-05 | Stop reason: HOSPADM

## 2021-04-05 RX ORDER — SODIUM CHLORIDE, SODIUM LACTATE, POTASSIUM CHLORIDE, CALCIUM CHLORIDE 600; 310; 30; 20 MG/100ML; MG/100ML; MG/100ML; MG/100ML
INJECTION, SOLUTION INTRAVENOUS CONTINUOUS
Status: DISCONTINUED | OUTPATIENT
Start: 2021-04-05 | End: 2021-04-05 | Stop reason: HOSPADM

## 2021-04-05 RX ORDER — ROCURONIUM BROMIDE 10 MG/ML
INJECTION, SOLUTION INTRAVENOUS PRN
Status: DISCONTINUED | OUTPATIENT
Start: 2021-04-05 | End: 2021-04-05 | Stop reason: SURG

## 2021-04-05 RX ORDER — IBUPROFEN 200 MG
800 TABLET ORAL 3 TIMES DAILY
Status: DISCONTINUED | OUTPATIENT
Start: 2021-04-05 | End: 2021-04-06 | Stop reason: HOSPADM

## 2021-04-05 RX ORDER — CEFAZOLIN SODIUM 1 G/3ML
INJECTION, POWDER, FOR SOLUTION INTRAMUSCULAR; INTRAVENOUS PRN
Status: DISCONTINUED | OUTPATIENT
Start: 2021-04-05 | End: 2021-04-05 | Stop reason: SURG

## 2021-04-05 RX ORDER — DEXAMETHASONE SODIUM PHOSPHATE 4 MG/ML
4 INJECTION, SOLUTION INTRA-ARTICULAR; INTRALESIONAL; INTRAMUSCULAR; INTRAVENOUS; SOFT TISSUE
Status: DISCONTINUED | OUTPATIENT
Start: 2021-04-05 | End: 2021-04-06 | Stop reason: HOSPADM

## 2021-04-05 RX ORDER — HALOPERIDOL 5 MG/ML
1 INJECTION INTRAMUSCULAR
Status: DISCONTINUED | OUTPATIENT
Start: 2021-04-05 | End: 2021-04-05 | Stop reason: HOSPADM

## 2021-04-05 RX ADMIN — ROCURONIUM BROMIDE 50 MG: 10 INJECTION, SOLUTION INTRAVENOUS at 12:37

## 2021-04-05 RX ADMIN — ROCURONIUM BROMIDE 20 MG: 10 INJECTION, SOLUTION INTRAVENOUS at 14:01

## 2021-04-05 RX ADMIN — HYDROMORPHONE HYDROCHLORIDE 0.4 MG: 1 INJECTION, SOLUTION INTRAMUSCULAR; INTRAVENOUS; SUBCUTANEOUS at 15:11

## 2021-04-05 RX ADMIN — DIPHENHYDRAMINE HYDROCHLORIDE 12.5 MG: 50 INJECTION, SOLUTION INTRAMUSCULAR; INTRAVENOUS at 14:58

## 2021-04-05 RX ADMIN — FENTANYL CITRATE 100 MCG: 50 INJECTION, SOLUTION INTRAMUSCULAR; INTRAVENOUS at 12:37

## 2021-04-05 RX ADMIN — CEFAZOLIN 3 G: 330 INJECTION, POWDER, FOR SOLUTION INTRAMUSCULAR; INTRAVENOUS at 12:38

## 2021-04-05 RX ADMIN — POVIDONE IODINE 15 ML: 100 SOLUTION TOPICAL at 10:47

## 2021-04-05 RX ADMIN — OXYCODONE HYDROCHLORIDE 10 MG: 10 TABLET ORAL at 16:55

## 2021-04-05 RX ADMIN — OXYCODONE HYDROCHLORIDE 10 MG: 5 SOLUTION ORAL at 14:59

## 2021-04-05 RX ADMIN — ACETAMINOPHEN 1000 MG: 500 TABLET, FILM COATED ORAL at 18:15

## 2021-04-05 RX ADMIN — FENTANYL CITRATE 50 MCG: 50 INJECTION, SOLUTION INTRAMUSCULAR; INTRAVENOUS at 14:01

## 2021-04-05 RX ADMIN — LIDOCAINE HYDROCHLORIDE 0.5 ML: 10 INJECTION, SOLUTION EPIDURAL; INFILTRATION; INTRACAUDAL at 11:18

## 2021-04-05 RX ADMIN — HALOPERIDOL LACTATE 1 MG: 5 INJECTION, SOLUTION INTRAMUSCULAR at 15:07

## 2021-04-05 RX ADMIN — DIPHENHYDRAMINE HYDROCHLORIDE 25 MG: 50 INJECTION INTRAMUSCULAR; INTRAVENOUS at 22:37

## 2021-04-05 RX ADMIN — FENTANYL CITRATE 50 MCG: 50 INJECTION, SOLUTION INTRAMUSCULAR; INTRAVENOUS at 15:07

## 2021-04-05 RX ADMIN — FENTANYL CITRATE 50 MCG: 50 INJECTION, SOLUTION INTRAMUSCULAR; INTRAVENOUS at 14:55

## 2021-04-05 RX ADMIN — HEPARIN SODIUM 5000 UNITS: 5000 INJECTION, SOLUTION INTRAVENOUS; SUBCUTANEOUS at 22:37

## 2021-04-05 RX ADMIN — HYDROMORPHONE HYDROCHLORIDE 0.4 MG: 1 INJECTION, SOLUTION INTRAMUSCULAR; INTRAVENOUS; SUBCUTANEOUS at 15:28

## 2021-04-05 RX ADMIN — SODIUM CHLORIDE, POTASSIUM CHLORIDE, SODIUM LACTATE AND CALCIUM CHLORIDE: 600; 310; 30; 20 INJECTION, SOLUTION INTRAVENOUS at 13:15

## 2021-04-05 RX ADMIN — DIPHENHYDRAMINE HYDROCHLORIDE 12.5 MG: 50 INJECTION, SOLUTION INTRAMUSCULAR; INTRAVENOUS at 16:02

## 2021-04-05 RX ADMIN — ONDANSETRON 4 MG: 2 INJECTION INTRAMUSCULAR; INTRAVENOUS at 15:16

## 2021-04-05 RX ADMIN — ACETAMINOPHEN 1000 MG: 500 TABLET, FILM COATED ORAL at 23:03

## 2021-04-05 RX ADMIN — PROPOFOL 150 MG: 10 INJECTION, EMULSION INTRAVENOUS at 12:37

## 2021-04-05 RX ADMIN — HYDROMORPHONE HYDROCHLORIDE 0.2 MG: 1 INJECTION, SOLUTION INTRAMUSCULAR; INTRAVENOUS; SUBCUTANEOUS at 15:52

## 2021-04-05 RX ADMIN — SODIUM CHLORIDE, POTASSIUM CHLORIDE, SODIUM LACTATE AND CALCIUM CHLORIDE: 600; 310; 30; 20 INJECTION, SOLUTION INTRAVENOUS at 11:17

## 2021-04-05 RX ADMIN — LIDOCAINE HYDROCHLORIDE 40 MG: 20 INJECTION, SOLUTION EPIDURAL; INFILTRATION; INTRACAUDAL at 12:37

## 2021-04-05 RX ADMIN — FENTANYL CITRATE 50 MCG: 50 INJECTION, SOLUTION INTRAMUSCULAR; INTRAVENOUS at 14:00

## 2021-04-05 RX ADMIN — OXYCODONE HYDROCHLORIDE 10 MG: 10 TABLET ORAL at 22:37

## 2021-04-05 RX ADMIN — FENTANYL CITRATE 50 MCG: 50 INJECTION, SOLUTION INTRAMUSCULAR; INTRAVENOUS at 13:01

## 2021-04-05 RX ADMIN — FENTANYL CITRATE 50 MCG: 50 INJECTION, SOLUTION INTRAMUSCULAR; INTRAVENOUS at 15:19

## 2021-04-05 RX ADMIN — POTASSIUM CHLORIDE, DEXTROSE MONOHYDRATE AND SODIUM CHLORIDE: 150; 5; 450 INJECTION, SOLUTION INTRAVENOUS at 18:16

## 2021-04-05 RX ADMIN — ONDANSETRON 4 MG: 2 INJECTION INTRAMUSCULAR; INTRAVENOUS at 14:31

## 2021-04-05 RX ADMIN — HEPARIN SODIUM 5000 UNITS: 5000 INJECTION, SOLUTION INTRAVENOUS; SUBCUTANEOUS at 16:55

## 2021-04-05 RX ADMIN — DEXAMETHASONE SODIUM PHOSPHATE 8 MG: 4 INJECTION, SOLUTION INTRA-ARTICULAR; INTRALESIONAL; INTRAMUSCULAR; INTRAVENOUS; SOFT TISSUE at 12:43

## 2021-04-05 RX ADMIN — MIDAZOLAM HYDROCHLORIDE 2 MG: 1 INJECTION, SOLUTION INTRAMUSCULAR; INTRAVENOUS at 12:30

## 2021-04-05 ASSESSMENT — PAIN DESCRIPTION - PAIN TYPE
TYPE: ACUTE PAIN;SURGICAL PAIN
TYPE: CHRONIC PAIN;ACUTE PAIN

## 2021-04-05 ASSESSMENT — LIFESTYLE VARIABLES
TOTAL SCORE: 0
HAVE PEOPLE ANNOYED YOU BY CRITICIZING YOUR DRINKING: NO
ON A TYPICAL DAY WHEN YOU DRINK ALCOHOL HOW MANY DRINKS DO YOU HAVE: 0
AVERAGE NUMBER OF DAYS PER WEEK YOU HAVE A DRINK CONTAINING ALCOHOL: 0
HAVE YOU EVER FELT YOU SHOULD CUT DOWN ON YOUR DRINKING: NO
EVER HAD A DRINK FIRST THING IN THE MORNING TO STEADY YOUR NERVES TO GET RID OF A HANGOVER: NO
CONSUMPTION TOTAL: NEGATIVE
HOW MANY TIMES IN THE PAST YEAR HAVE YOU HAD 5 OR MORE DRINKS IN A DAY: 0
ALCOHOL_USE: NO
TOTAL SCORE: 0
TOTAL SCORE: 0
EVER FELT BAD OR GUILTY ABOUT YOUR DRINKING: NO

## 2021-04-05 ASSESSMENT — FIBROSIS 4 INDEX
FIB4 SCORE: 0.84

## 2021-04-05 ASSESSMENT — PAIN SCALES - GENERAL: PAIN_LEVEL: 7

## 2021-04-05 NOTE — OP REPORT
DATE OF SERVICE:  04/05/2021     PREOPERATIVE DIAGNOSIS:  Large left inguinal hernia containing intestine.     POSTOPERATIVE DIAGNOSIS:  Large left inguinal hernia containing intestine.     PROCEDURES PERFORMED:  Left ilioinguinal nerve block, left ilioinguinal neurectomy,   left inguinal hernia repair with mesh.    MODIFIER 22: This operation required nearly double the operative time and two   surgeons due to the complexity and size of this hernia.  This was a much more   technically difficult and complicated repair than most inguinal hernias given the   large size and presence of intestine within the hernia.     SURGEON:  Keyon Mckeon MD     ASSISTANT:  Weston Krause MD     ANESTHESIOLOGIST:  Piero Lopez MD     ANESTHESIA:  General endotracheal.     ESTIMATED BLOOD LOSS:  20 mL     COMPLICATIONS:  None immediately apparent.     SPECIMENS:    1.  Left ilioinguinal nerve.  2.  Hernia sac.    INDICATION FOR PROCEDURE: This is a primary patient of Weston Krause MD,   whom was seen outpatient for a left inguinal hernia that was symptomatic and   contained intestine.  This case was scheduled with Dr. Krause and me given   my experience with repairing complicated inguinal hernias.      OPERATIVE FINDINGS:  The patient had an extraordinarily large, but reducible   left inguinal hernia that measured up to 17cm on a recent CT and contained a   significant amount of intestine.  The patient appeared to have indirect and direct   components to this very large, complicated hernia.     DESCRIPTION OF PROCEDURE:  The patient was taken back to the operating room   and placed in supine position.  General anesthesia was induced.  The patient   was intubated.  Bilateral SCD devices were placed.  Appropriate IV antibiotics   were given.  All bony prominences were carefully protected.  The lower   abdomen and bilateral groins and genitals were then widely prepped and draped   in a sterile fashion.  A timeout was  performed.  The patient, procedure and   laterality were all verified.    The indications for a surgical assistant in this surgery were indicated due to   complexity of the procedure. Their role included aiding in incision, retraction,   holding devices, and closure of the wound.      Ten mL of 0.5% Marcaine with epinephrine was injected 1 cm medial and 1 cm   inferior to the anterior superior iliac spine to create a left ilioinguinal   nerve block.     A 9 cm obliquely oriented incision was made along the left inguinal crease.    The subcutaneous tissues were divided down to the level of the external   oblique aponeurosis, which was then opened along its fibers.  The patient had   a sizable defect in this area approximately 5 cm in diameter.  The hernia and   cord structures were then carefully swept off the pubic tubercle and encircled   with a Penrose drain.  Careful meticulous dissection was then undertaken to   separate the cord structures from the hernia sac.  The hernia sac encircled   via testicle, which had risen up into the field.  We then circumferentially   resected the hernia sac, leaving a small cuff of the sac on the testicle.  The   hernia sac was then handed off as specimen.  The testicle was then reduced   back into the scrotum.  The direct space was then explored.  There was a golf   ball sized lipoma in this area that was extruding through this area.  This was   dissected back towards the peritoneal cavity and divided using a LigaSure   impact device and discarded.  The cord structures were then further explored.    It was thinned out and freed from surrounding cremasteric fibers as well as   fat.  The patient had a Valsalva maneuver applied and the reduced hernia   contents were then extruded out into the field.  The sac around these contents   was well applied and the decision was made to leave these in place.  These   hernia contents were then reduced back into the peritoneal location.  A    vertical relaxing incision was then created on the anterior fascia medially of   the rectus abdominis musculature. The lower edge of the abdominal wall was   then advanced down to Luan's ligament and secured there with multiple   interrupted 0 Ethibond sutures to close off the direct space and tighten up   the tissues around the spermatic cord.  A 3 inch x 6-inch Prolene mesh was   then cut to fit the patient's anatomy.  It was secured medially to the pubic   tubercle, inferiorly along the shelving edge of the inguinal ligament and   superiorly along the conjoined tendon.  Care was taken to ensure that the mesh   covered the anterior fascial defect as well.  This mesh was sewn in with   multiple 0 Ethibond sutures.  A slit was created laterally in the mesh to   accommodate the spermatic cord.  There was, at this time, we noticed the left   ilioinguinal nerve would be lying directly on top of the mesh.  This was then   resected between two 3-0 Vicryl sutures and cut sharply and then handed off as   specimen.  A ring was then recreated around the cord structures using 0   Ethibond suture within the mesh.  The legs of the mesh were then placed far   out laterally towards the anterior superior iliac spine.  The mesh was noted   to be lying flat without any undue tension or kinking.  Five grams of Feliciano   hemostatic powder was then sprayed all throughout the dissected surfaces.   External oblique aponeurosis was approximated over the repair with a running   3-0 Vicryl suture.  The deep tissues were approximated with 3-0 Vicryl   interrupted sutures.  The wound was then further approximated with interrupted   3-0 Vicryl deep dermal sutures.  The skin was then closed with staples.  The   area was cleaned and a sterile gauze and Tegaderm dressing was applied.     Overall, the patient tolerated the procedure well.  He was allowed to awaken   from anesthesia and was taken to the PACU in stable condition after being    extubated in the operating room.  All sharps and sponge counts were correct by   end of the case on two occasions.     WOUND CLASSIFICATION:  Clean.     ASA SCORE: 2.    ______________________________  MD KACIE Montiel/YOVANNY    DD:  04/05/2021 14:39  DT:  04/05/2021 15:36    Job#:  [id number]

## 2021-04-05 NOTE — ANESTHESIA PREPROCEDURE EVALUATION
Relevant Problems   PULMONARY   (+) History of MRSA infection      NEURO   (+) History of MRSA infection      CARDIAC   (+) Murmur         (+) Chronic hepatitis C virus infection (HCC)      Other   (+) Cannabis use disorder, mild, abuse   (+) IV drug abuse (HCC)   (+) Pyogenic arthritis of left elbow (HCC)   (+) Smoking       Physical Exam    Airway   Mallampati: II  TM distance: >3 FB  Neck ROM: full       Cardiovascular - normal exam  Rhythm: regular  Rate: normal  (-) murmur     Dental - normal exam  (+) upper dentures, lower dentures           Pulmonary - normal exam  Breath sounds clear to auscultation     Abdominal    Neurological - normal exam                 Anesthesia Plan    ASA 2       Plan - general       Airway plan will be ETT          Induction: intravenous    Postoperative Plan: Postoperative administration of opioids is intended.    Pertinent diagnostic labs and testing reviewed    Informed Consent:    Anesthetic plan and risks discussed with patient.    Use of blood products discussed with: patient whom consented to blood products.

## 2021-04-05 NOTE — ANESTHESIA TIME REPORT
Anesthesia Start and Stop Event Times     Date Time Event    4/5/2021 1211 Ready for Procedure     1230 Anesthesia Start     1449 Anesthesia Stop        Responsible Staff  04/05/21    Name Role Begin End    Piero Lopez M.D. Anesth 1230 1449        Preop Diagnosis (Free Text):  Pre-op Diagnosis     INCARCERATED LEFT INGUINAL HERNIA        Preop Diagnosis (Codes):    Post op Diagnosis  Incarcerated left inguinal hernia      Premium Reason  Non-Premium    Comments:

## 2021-04-05 NOTE — ANESTHESIA POSTPROCEDURE EVALUATION
Patient: Gonsalo Hunt    Procedure Summary     Date: 04/05/21 Room / Location: [place] [address] / SURGERY [facility]    Anesthesia Start: 1230 Anesthesia Stop: 1449    Procedure: REPAIR, HERNIA, INGUINAL-INCARCERATED WITH MESH (Left Groin) Diagnosis: (INCARCERATED LEFT INGUINAL HERNIA)    Surgeons: Weston Krause M.D. Responsible Provider: Piero Lopez M.D.    Anesthesia Type: general ASA Status: 2          Final Anesthesia Type: general  Last vitals  BP   Blood Pressure: 132/82    Temp   36.6 °C (97.8 °F)    Pulse   99   Resp   15    SpO2   98 %      Anesthesia Post Evaluation    Patient location during evaluation: PACU  Patient participation: complete - patient participated  Level of consciousness: awake and alert  Pain score: 7  Chronic pain / drug use   Airway patency: patent  Anesthetic complications: no  Cardiovascular status: hemodynamically stable  Respiratory status: acceptable  Hydration status: euvolemic    PONV: none          No complications documented.     Nurse Pain Score: 10 (NPRS)

## 2021-04-05 NOTE — OR NURSING
Report given to Christos KING via SBAR , reviewed orders and PMHX, no questions at this time.  Pt is alert and oriented, moves all ext, in NAD, resp even and nonlabored.  Family notified pt is moving to room. Awaiting transport.

## 2021-04-05 NOTE — ANESTHESIA PROCEDURE NOTES
Airway    Date/Time: 4/5/2021 12:38 PM  Performed by: Piero Lopez M.D.  Authorized by: Piero Lopez M.D.     Location:  OR  Urgency:  Elective  Difficult Airway: No    Indications for Airway Management:  Anesthesia      Spontaneous Ventilation: absent    Sedation Level:  Deep  Preoxygenated: Yes    Patient Position:  Sniffing  Mask Difficulty Assessment:  0 - not attempted  Final Airway Type:  Endotracheal airway  Final Endotracheal Airway:  ETT  Cuffed: Yes    Technique Used for Successful ETT Placement:  Direct laryngoscopy    Insertion Site:  Oral  Blade Type:  Christine  Laryngoscope Blade/Videolaryngoscope Blade Size:  4  ETT Size (mm):  7.5  Measured from:  Teeth  ETT to Teeth (cm):  25  Placement Verified by: auscultation and capnometry    Cormack-Lehane Classification:  Grade I - full view of glottis  Number of Attempts at Approach:  1

## 2021-04-06 VITALS
TEMPERATURE: 97.7 F | HEIGHT: 66 IN | DIASTOLIC BLOOD PRESSURE: 68 MMHG | WEIGHT: 279.98 LBS | RESPIRATION RATE: 18 BRPM | SYSTOLIC BLOOD PRESSURE: 102 MMHG | OXYGEN SATURATION: 96 % | BODY MASS INDEX: 45 KG/M2 | HEART RATE: 90 BPM

## 2021-04-06 PROBLEM — G89.18 POSTOPERATIVE PAIN: Status: ACTIVE | Noted: 2021-04-06

## 2021-04-06 PROBLEM — K40.90 INGUINAL HERNIA OF LEFT SIDE WITHOUT OBSTRUCTION OR GANGRENE: Status: ACTIVE | Noted: 2021-04-06

## 2021-04-06 PROCEDURE — A9270 NON-COVERED ITEM OR SERVICE: HCPCS | Performed by: NURSE PRACTITIONER

## 2021-04-06 PROCEDURE — A9270 NON-COVERED ITEM OR SERVICE: HCPCS | Performed by: SURGERY

## 2021-04-06 PROCEDURE — 700111 HCHG RX REV CODE 636 W/ 250 OVERRIDE (IP): Performed by: SURGERY

## 2021-04-06 PROCEDURE — 700102 HCHG RX REV CODE 250 W/ 637 OVERRIDE(OP): Performed by: NURSE PRACTITIONER

## 2021-04-06 PROCEDURE — 96372 THER/PROPH/DIAG INJ SC/IM: CPT | Mod: XU

## 2021-04-06 PROCEDURE — 96374 THER/PROPH/DIAG INJ IV PUSH: CPT

## 2021-04-06 PROCEDURE — 96376 TX/PRO/DX INJ SAME DRUG ADON: CPT

## 2021-04-06 PROCEDURE — 700102 HCHG RX REV CODE 250 W/ 637 OVERRIDE(OP): Performed by: SURGERY

## 2021-04-06 PROCEDURE — G0378 HOSPITAL OBSERVATION PER HR: HCPCS

## 2021-04-06 PROCEDURE — 99217 PR OBSERVATION CARE DISCHARGE: CPT | Performed by: INTERNAL MEDICINE

## 2021-04-06 RX ORDER — ONDANSETRON 4 MG/1
4 TABLET, ORALLY DISINTEGRATING ORAL EVERY 6 HOURS PRN
Qty: 5 TABLET | Refills: 0 | Status: SHIPPED | OUTPATIENT
Start: 2021-04-06 | End: 2021-12-28

## 2021-04-06 RX ORDER — AMOXICILLIN 250 MG
2 CAPSULE ORAL 2 TIMES DAILY
Qty: 60 TABLET | Refills: 0 | Status: SHIPPED | OUTPATIENT
Start: 2021-04-06 | End: 2021-12-28

## 2021-04-06 RX ORDER — POLYETHYLENE GLYCOL 3350 17 G/17G
1 POWDER, FOR SOLUTION ORAL DAILY
Status: DISCONTINUED | OUTPATIENT
Start: 2021-04-06 | End: 2021-04-06 | Stop reason: HOSPADM

## 2021-04-06 RX ORDER — AMOXICILLIN 250 MG
2 CAPSULE ORAL 2 TIMES DAILY
Status: DISCONTINUED | OUTPATIENT
Start: 2021-04-06 | End: 2021-04-06 | Stop reason: HOSPADM

## 2021-04-06 RX ORDER — OXYCODONE HYDROCHLORIDE 5 MG/1
5 TABLET ORAL EVERY 4 HOURS PRN
Qty: 15 TABLET | Refills: 0 | Status: SHIPPED | OUTPATIENT
Start: 2021-04-06 | End: 2021-04-11

## 2021-04-06 RX ADMIN — DOCUSATE SODIUM 50 MG AND SENNOSIDES 8.6 MG 2 TABLET: 8.6; 5 TABLET, FILM COATED ORAL at 09:34

## 2021-04-06 RX ADMIN — HEPARIN SODIUM 5000 UNITS: 5000 INJECTION, SOLUTION INTRAVENOUS; SUBCUTANEOUS at 05:12

## 2021-04-06 RX ADMIN — POLYETHYLENE GLYCOL 3350 1 PACKET: 17 POWDER, FOR SOLUTION ORAL at 09:34

## 2021-04-06 RX ADMIN — HYDROMORPHONE HYDROCHLORIDE 0.5 MG: 1 INJECTION, SOLUTION INTRAMUSCULAR; INTRAVENOUS; SUBCUTANEOUS at 01:43

## 2021-04-06 RX ADMIN — HYDROMORPHONE HYDROCHLORIDE 0.5 MG: 1 INJECTION, SOLUTION INTRAMUSCULAR; INTRAVENOUS; SUBCUTANEOUS at 05:18

## 2021-04-06 RX ADMIN — ACETAMINOPHEN 1000 MG: 500 TABLET, FILM COATED ORAL at 05:12

## 2021-04-06 RX ADMIN — OXYCODONE HYDROCHLORIDE 10 MG: 10 TABLET ORAL at 08:29

## 2021-04-06 ASSESSMENT — PAIN DESCRIPTION - PAIN TYPE: TYPE: ACUTE PAIN;SURGICAL PAIN

## 2021-04-06 NOTE — DISCHARGE SUMMARY
Discharge Summary    CHIEF COMPLAINT ON ADMISSION  No chief complaint on file.      Reason for Admission  INCARCERATED LEFT INGUINAL HERNIA     Admission Date  4/5/2021    CODE STATUS  Full Code    HPI & HOSPITAL COURSE  This is a 60 y.o. male here with left inguinal hernia containing intestine.  Please see the scanned history and physical from surgeon Dr. Weston Krause.  In short, the patient has a longstanding left inguinal hernia that was quite large admitted for elective left ilioinguinal neurectomy and left inguinal hernia repair with mesh.  He underwent the above procedure 4/5/2021 without complications.  Postoperatively his pain was controlled with oral oxycodone and acetaminophen.  He was tolerating a full liquid diet.  He was voiding.  He was ambulating around the room independently.  Patient was expressing strong desire for advancement of his diet and discharge.    He was counseled extensively on the need to take generous laxative support to avoid straining and complications with his incision.  He will be given oxycodone after reviewing his PDMP.  He was counseled not to exceed more than 4000 mg of acetaminophen daily and to use a written daily chart to track his medication administration.  He is to follow-up with his surgical group in 2 weeks.  He is not to lift greater than 15 pounds.  He is to avoid submerging in a bath until cleared from his provider.  He is aware he cannot drive a motor vehicle until off of oxycodone.  All of his instructions were reviewed with him and in written form.  All of his questions were answered.    Therefore, he is discharged in good and stable condition to home with close outpatient follow-up.    The patient recovered much more quickly than anticipated on admission.    Discharge Date  4/6/2021    FOLLOW UP ITEMS POST DISCHARGE  Dr. Weston Krause in 2 weeks for a wound recheck; [facility]    DISCHARGE DIAGNOSES  Active Problems:    Inguinal hernia of left  side without obstruction or gangrene POA: Yes    Postoperative pain POA: Yes  Resolved Problems:    * No resolved hospital problems. *      FOLLOW UP  Weston Krause M.D.  [address] [place] [address]  [place] [address] [phone]    In 2 weeks  For wound re-check      MEDICATIONS ON DISCHARGE     Medication List      Start taking these medications      Instructions   ondansetron 4 MG Tbdp  Commonly known as: ZOFRAN ODT   Take 1 tablet by mouth every 6 hours as needed (Nausea/vomiting).  Dose: 4 mg     oxyCODONE immediate-release 5 MG Tabs  Commonly known as: ROXICODONE   Take 1 tablet by mouth every four hours as needed for Severe Pain for up to 5 days.  Dose: 5 mg        Change how you take these medications      Instructions   senna-docusate 8.6-50 MG Tabs  What changed: when to take this  Commonly known as: PERICOLACE or SENOKOT S   Take 2 Tablets by mouth 2 times a day.  Dose: 2 tablet        Continue taking these medications      Instructions   acetaminophen 325 MG Tabs  Commonly known as: Tylenol   Take 2 Tabs by mouth 3 times a day.  Dose: 650 mg     polyethylene glycol/lytes 17 g Pack  Commonly known as: MIRALAX   Take 1 Packet by mouth every day.  Dose: 17 g        Stop taking these medications    diphenhydrAMINE 25 MG Tabs  Commonly known as: BENADRYL     docusate sodium 100 MG Caps            Allergies  Allergies   Allergen Reactions   • Spinach Vomiting and Nausea   • Codeine Rash     Rash  Historical tolerance to hydromorphone, oxycodone   • Pcn [Penicillins] Unspecified     Unknown reaction. Tolerated ceftriaxone on 10/9/19         DIET  Orders Placed This Encounter   Procedures   • Diet Order Diet: Low Fiber(GI Soft)     Encourage carbohydrate containing liquids in PACU (milk, juice, or Impact).     Standing Status:   Standing     Number of Occurrences:   1     Order Specific Question:   ERAS     Answer:   Yes     Order Specific Question:   Diet:     Answer:   Low Fiber(GI Soft) [2]        ACTIVITY  Light duty.  Weight bearing as tolerated    CONSULTATIONS  General surgery    PROCEDURES  Left ilioinguinal neurectomy, left inguinal hernia repair with mesh; 4/5/2021; Dr. Keyon Flannery    LABORATORY  Lab Results   Component Value Date    SODIUM 133 (L) 10/11/2020    POTASSIUM 4.5 10/11/2020    CHLORIDE 99 10/11/2020    CO2 24 10/11/2020    GLUCOSE 122 (H) 10/11/2020    BUN 19 10/11/2020    CREATININE 0.80 10/11/2020    CREATININE 1.0 07/08/2007        Lab Results   Component Value Date    WBC 7.0 10/11/2020    HEMOGLOBIN 14.2 10/11/2020    HEMATOCRIT 43.6 10/11/2020    PLATELETCT 205 10/11/2020        Total time of the discharge process exceeds 39 minutes.

## 2021-04-06 NOTE — PROGRESS NOTES
2 RN skin check:  Skin intact:  Behind bilat ears  bilat elbows  bilat knees  bilat heels  Back/sacrum    Old skin graft on LLE, old surgical site on RLE

## 2021-04-06 NOTE — PROGRESS NOTES
Patient cleared by surgery per NP. OK to discharge at this time. Patient given discharge instructions; patient verbalizes understanding. Patient signs controlled substance consent; verbalizes understanding. Patient given bus pass and instructions on how to set up follow up appointment. Patient taken downstairs via wheelchair with staff member.

## 2021-04-06 NOTE — PROGRESS NOTES
Patient seen and evaluated at bedside postoperatively patient doing well wound is clean dry and intact.  Patient has pain is well controlled.  Patient will start on full liquid diet per surgery recommendations patient will be observed over the course of the evening postoperatively.  If patient is cleared by surgery on 4/6/2021 patient will be discharged home.

## 2021-04-06 NOTE — DISCHARGE INSTRUCTIONS
D/C instructions:    1. DIET: Upon discharge from the hospital you may resume SOFT FOOD DIET. Depending on how you are feeling and whether you have nausea or not, you may wish to stay with a bland diet for the first few days. However, you can advance this as quickly as you feel ready.    2. ACTIVITIES: After discharge from the hospital, you may resume light-duty activities. There should be no heavy lifting (greater than 15 pounds), no twisting, bending, reptitive overhead activity or straining until after your follow-up visit. Otherwise, routine activities of daily living are acceptable.    3. DRIVING: No driving until you are no longer taking narcotic medication.    4. BATHING: You may shower 24 hours after surgery. Do not submerge in a bath until cleared from your provider.    5. BOWEL FUNCTION: Constipation is common after an operation, especially with pain medications. The combination of pain medication and decreased activity level can cause constipation in otherwise normal patients. If you feel this is occurring, take a laxative (Milk of Magnesia, Ex-Lax, Senokot, etc.) until the problem has resolved.    6. PAIN MEDICATION: You will be given a prescription for pain medication at discharge. Please take these as directed. It is important to remember not to take medications on an empty stomach as this may cause nausea. Do not take non-steroidal pain medications (Ibuprofen, naproxen, celebrex, aspirin etc.) until cleared. These medications may affect bleeding or slow bone healing.  Do not take more than 4000 mg of acetaminophen or Tylenol in a 24-hour period.    7.CALL IF YOU HAVE: (1) Fevers to more than 1010 F, (2) Unusual chest or leg pain, (3) Drainage or fluid from incision that may be foul smelling, increased tenderness or soreness at the wound or the wound edges are no longer together, redness or swelling at the incision site. Please do not hesitate to call with any other questions.     8. APPOINTMENT:  Contact our [facility] for a follow-up appointment in 2 weeks following your procedure if you don't have an appointment already.      ADDITIONALLY:  Review your discharge Medication Reconciliation form. You may be provided with new prescriptions or changes to previously prescribed medications.  Be sure to take all of your prescribed medications exactly as directed.  Further questions can be directed to your local pharmacist or primary care provider (PCP).    Follow-up with your PCP.  An appointment may have already been scheduled for you.  Please review your discharge paperwork and locate this information.  Please be sure to call your PCP to cancel if you are unable to make this appointment or require schedule changes.  It is critical to to follow-up with your PCP to review hospital records and ensure any further diagnostic work-up, prescription refills, or referrals.    RETURN TO THE ER: Return to the ER if you develop recurrent chest pain, shortness of breath, bloody sputum, fever of 101.5 or greater, intractable nausea or vomiting, blood in the vomit or urine, intractable pain, new onset inability to ambulate, focal motor weakness, acute vision disturbance, acute speech disturbance, intractable abdominal pain, diffuse watery diarrhea or any other worrisome symptoms.            Discharge Instructions    Discharged to home by car with relative. Discharged via wheelchair, hospital escort: Yes.  Special equipment needed: Not Applicable    Be sure to schedule a follow-up appointment with your primary care doctor or any specialists as instructed.     Discharge Plan:   Diet Plan: Discussed  Activity Level: Discussed  Confirmed Follow up Appointment: Patient to Call and Schedule Appointment  Confirmed Symptoms Management: Discussed  Medication Reconciliation Updated: Yes    I understand that a diet low in cholesterol, fat, and sodium is recommended for good health. Unless I have been given specific  instructions below for another diet, I accept this instruction as my diet prescription.   Other diet: High Fiber     Special Instructions: None    · Is patient discharged on Warfarin / Coumadin?   No     Depression / Suicide Risk    As you are discharged from this [facility] facility, it is important to learn how to keep safe from harming yourself.    Recognize the warning signs:  · Abrupt changes in personality, positive or negative- including increase in energy   · Giving away possessions  · Change in eating patterns- significant weight changes-  positive or negative  · Change in sleeping patterns- unable to sleep or sleeping all the time   · Unwillingness or inability to communicate  · Depression  · Unusual sadness, discouragement and loneliness  · Talk of wanting to die  · Neglect of personal appearance   · Rebelliousness- reckless behavior  · Withdrawal from people/activities they love  · Confusion- inability to concentrate     If you or a loved one observes any of these behaviors or has concerns about self-harm, here's what you can do:  · Talk about it- your feelings and reasons for harming yourself  · Remove any means that you might use to hurt yourself (examples: pills, rope, extension cords, firearm)  · Get professional help from the community (Mental Health, Substance Abuse, psychological counseling)  · Do not be alone:Call your Safe Contact- someone whom you trust who will be there for you.  · Call your local CRISIS HOTLINE 443-2847 or 166-947-1735  · Call your local Children's Mobile Crisis Response Team [place] (520) 971-8709 or www.[employer]  · Call the toll free National Suicide Prevention Hotlines   · National Suicide Prevention Lifeline 229-835-YXAM (2649)  · National Hope Line Network 800-SUICIDE (468-4957)

## 2021-04-06 NOTE — CARE PLAN
Problem: Safety  Goal: Will remain free from injury  Outcome: PROGRESSING AS EXPECTED  Note: Hourly rounding, call light within reach, bed in low locked position, room free of clutter, education on safety     Problem: Pain Management  Goal: Pain level will decrease to patient's comfort goal  Outcome: PROGRESSING AS EXPECTED  Note: Use of prn medications and non pharm modalities

## 2021-04-06 NOTE — DISCHARGE PLANNING
Notified by Queta KING that patient needed ride or assistance with ride. Spoke with patient at bedside and patient states his friend that brought him in worked all night and he sent him home and patient is being D/C'd. Patient asking about [employer] voucher, Bus pass # [id number] given to Queta KING for patient.

## 2021-06-25 ENCOUNTER — APPOINTMENT (OUTPATIENT)
Dept: RADIOLOGY | Facility: MEDICAL CENTER | Age: 61
End: 2021-06-25
Attending: SURGERY
Payer: COMMERCIAL

## 2021-07-07 ENCOUNTER — APPOINTMENT (OUTPATIENT)
Dept: RADIOLOGY | Facility: MEDICAL CENTER | Age: 61
End: 2021-07-07
Attending: EMERGENCY MEDICINE
Payer: COMMERCIAL

## 2021-07-07 ENCOUNTER — HOSPITAL ENCOUNTER (EMERGENCY)
Facility: MEDICAL CENTER | Age: 61
End: 2021-07-07
Attending: EMERGENCY MEDICINE
Payer: COMMERCIAL

## 2021-07-07 VITALS
DIASTOLIC BLOOD PRESSURE: 86 MMHG | BODY MASS INDEX: 31.66 KG/M2 | SYSTOLIC BLOOD PRESSURE: 133 MMHG | RESPIRATION RATE: 18 BRPM | OXYGEN SATURATION: 94 % | TEMPERATURE: 98.7 F | HEIGHT: 76 IN | WEIGHT: 260 LBS | HEART RATE: 95 BPM

## 2021-07-07 DIAGNOSIS — R09.02 HYPOXIA: ICD-10-CM

## 2021-07-07 DIAGNOSIS — T50.904A DRUG OVERDOSE, UNDETERMINED INTENT, INITIAL ENCOUNTER: ICD-10-CM

## 2021-07-07 DIAGNOSIS — R11.2 NON-INTRACTABLE VOMITING WITH NAUSEA, UNSPECIFIED VOMITING TYPE: ICD-10-CM

## 2021-07-07 LAB — EKG IMPRESSION: NORMAL

## 2021-07-07 PROCEDURE — 700111 HCHG RX REV CODE 636 W/ 250 OVERRIDE (IP): Performed by: EMERGENCY MEDICINE

## 2021-07-07 PROCEDURE — 96374 THER/PROPH/DIAG INJ IV PUSH: CPT

## 2021-07-07 PROCEDURE — 71045 X-RAY EXAM CHEST 1 VIEW: CPT

## 2021-07-07 PROCEDURE — 93005 ELECTROCARDIOGRAM TRACING: CPT | Performed by: EMERGENCY MEDICINE

## 2021-07-07 PROCEDURE — 96375 TX/PRO/DX INJ NEW DRUG ADDON: CPT

## 2021-07-07 PROCEDURE — 93005 ELECTROCARDIOGRAM TRACING: CPT

## 2021-07-07 PROCEDURE — 99284 EMERGENCY DEPT VISIT MOD MDM: CPT

## 2021-07-07 PROCEDURE — 700111 HCHG RX REV CODE 636 W/ 250 OVERRIDE (IP)

## 2021-07-07 RX ORDER — NALOXONE HYDROCHLORIDE 1 MG/ML
1 INJECTION INTRAMUSCULAR; INTRAVENOUS; SUBCUTANEOUS
Status: DISCONTINUED | OUTPATIENT
Start: 2021-07-07 | End: 2021-07-07 | Stop reason: HOSPADM

## 2021-07-07 RX ORDER — ONDANSETRON 2 MG/ML
INJECTION INTRAMUSCULAR; INTRAVENOUS
Status: COMPLETED
Start: 2021-07-07 | End: 2021-07-07

## 2021-07-07 RX ORDER — ONDANSETRON 2 MG/ML
4 INJECTION INTRAMUSCULAR; INTRAVENOUS ONCE
Status: COMPLETED | OUTPATIENT
Start: 2021-07-07 | End: 2021-07-07

## 2021-07-07 RX ADMIN — ONDANSETRON 4 MG: 2 INJECTION INTRAMUSCULAR; INTRAVENOUS at 13:15

## 2021-07-07 RX ADMIN — NALOXONE HYDROCHLORIDE 1 MG: 1 INJECTION PARENTERAL at 09:41

## 2021-07-07 NOTE — ED TRIAGE NOTES
Darin Fifty-Three  89 y.o. male  Chief Complaint   Patient presents with   • Drug Overdose     Pt found at [facility] in restroom for +LOC. Pt was administered Narcan 1mg via EMS w/ resolution. Pt is awake and orientated now. Pt has hx of heroine. BVM utilized in field w/ OPA. Pt now 15L oxy-mask.       Pt to rm 3 via EMS for above complaint.   Pt is alert and oriented, follows commands and responds  to questions. Resp are even. No behavioral indicators of pain. Pt encouraged to alert staff for any changes. This RN masked and in appropriate PPE during encounter.

## 2021-07-07 NOTE — ED PROVIDER NOTES
ED Provider Note    CHIEF COMPLAINT  Chief Complaint   Patient presents with   • Drug Overdose     Pt found at [facility] in restroom for +LOC. Pt was administered Narcan 1mg via EMS w/ resolution. Pt is awake and orientated now. Pt has hx of heroine. BVM utilized in field w/ OPA. Pt now 15L oxy-mask.       HPI  Gonsalo Hunt is a 61 y.o. male who presents after suspected heroin overdose.  He has had this previously according to the paramedics.  Patient found with paraphernalia, cooking spoon and syringe in the bathroom of the casino unconscious.  Patient revived with 1 mg of Narcan.  He remains somnolent, is able to answer questions however.  He denies attempt to kill himself.  Patient requiring supplemental oxygen to correct hypoxia at the time of my arrival to bedside.  No chest pain, no back pain.  He denies headache.  Patient denies suicidal ideation.  Patient has had a cough he states.  Patient states he does not recall whether he was vaccinated for Covid or not.    REVIEW OF SYSTEMS    Constitutional: No fever  Respiratory: Cough  Cardiac: No chest pain or syncope  Gastrointestinal: No abdominal pain  Musculoskeletal: No neck or back pain  Neurologic: Denies headache  Psychiatric: Denies suicidal ideation.       All other systems are negative.       PAST MEDICAL HISTORY  History reviewed. No pertinent past medical history.    FAMILY HISTORY  History reviewed. No pertinent family history.    SOCIAL HISTORY  Social History     Socioeconomic History   • Marital status: Single     Spouse name: Not on file   • Number of children: Not on file   • Years of education: Not on file   • Highest education level: Not on file   Occupational History   • Not on file   Tobacco Use   • Smoking status: Current Every Day Smoker     Types: Cigarettes   • Smokeless tobacco: Never Used   Vaping Use   • Vaping Use: Never used   Substance and Sexual Activity   • Alcohol use: Not Currently   • Drug use: Yes   • Sexual activity:  "Not on file   Other Topics Concern   • Not on file   Social History Narrative   • Not on file     Social Determinants of Health     Financial Resource Strain:    • Difficulty of Paying Living Expenses:    Food Insecurity:    • Worried About Running Out of Food in the Last Year:    • Ran Out of Food in the Last Year:    Transportation Needs:    • Lack of Transportation (Medical):    • Lack of Transportation (Non-Medical):    Physical Activity:    • Days of Exercise per Week:    • Minutes of Exercise per Session:    Stress:    • Feeling of Stress :    Social Connections:    • Frequency of Communication with Friends and Family:    • Frequency of Social Gatherings with Friends and Family:    • Attends Buddhism Services:    • Active Member of Clubs or Organizations:    • Attends Club or Organization Meetings:    • Marital Status:    Intimate Partner Violence:    • Fear of Current or Ex-Partner:    • Emotionally Abused:    • Physically Abused:    • Sexually Abused:        SURGICAL HISTORY  Past Surgical History:   Procedure Laterality Date   • OTHER      penectomy       CURRENT MEDICATIONS  Home Medications     Reviewed by Wai Sousa R.N. (Registered Nurse) on 07/07/21 at 0845  Med List Status: Partial   Medication Last Dose Status        Patient Aquiles Taking any Medications                       ALLERGIES  Allergies   Allergen Reactions   • Codeine    • Penicillins        PHYSICAL EXAM  VITAL SIGNS: /86   Pulse 95   Temp 37.1 °C (98.7 °F) (Temporal)   Resp 18   Ht 1.93 m (6' 4\")   Wt 118 kg (260 lb)   SpO2 94%   BMI 31.65 kg/m²   Constitutional:  Non-toxic appearance.   HENT: No facial swelling  Eyes: Anicteric, no conjunctivitis.     Cardiovascular: Normal heart rate, Normal rhythm  Pulmonary:  No wheezing, No rales.  Moderate air movement bilateral  Gastrointestinal: Soft, No tenderness, No distention  Skin: Warm, Dry, No cyanosis.  Multiple areas of scarring.  No cellulitis, no evidence of " abscess  Neurologic: Speech is clear, follows commands, facial expression is symmetrical.  Strength and sensation intact  Psychiatric: Affect normal,  Mood normal.  Patient is calm and cooperative  Musculoskeletal: No chest wall tenderness.  No chest wall retractions        RADIOLOGY/PROCEDURES  DX-CHEST-PORTABLE (1 VIEW)   Final Result      No acute cardiac or pulmonary abnormalities are identified.            COURSE & MEDICAL DECISION MAKING  Pertinent Labs & Imaging studies reviewed. (See chart for details)  Patient was treated a second time with 1 mg of IV Narcan after hypoxia was persistent and he became somnolent.  After multiple hours of observation, he is now much more alert, talkative.  He states he feels safe going home, and again denies suicidal ideation.  His pulse oximetry is currently 92% on room air.  He is ambulatory without assistance.  Patient advised to return if worse or for any concerns.  He is advised to avoid drug use.  He refused detox program referral.  After the dose of Narcan he had a brief episode of vomiting, this resolved with Zofran.  He is now able to drink water without difficulty.    FINAL IMPRESSION     1. Drug overdose, undetermined intent, initial encounter     2. Hypoxia      Resolved   3. Non-intractable vomiting with nausea, unspecified vomiting type                     Electronically signed by: Byron Hernandez M.D., 7/7/2021 3:31 PM

## 2021-07-07 NOTE — ED NOTES
Pt now on 6L oxymask w/ o2 sat of 95%. Pt asleep on gurney and easily aroused; respirations equal bilateral and not labored. Pt has no needs at this time. RN will continue to monitor.

## 2021-07-07 NOTE — ED NOTES
Pt asleep on gurney; respirations equal bilateral. Pt has no needs at this time. RN will continue to monitor.

## 2021-07-13 ENCOUNTER — HOSPITAL ENCOUNTER (OUTPATIENT)
Dept: RADIOLOGY | Facility: MEDICAL CENTER | Age: 61
End: 2021-07-13
Attending: SURGERY
Payer: COMMERCIAL

## 2021-12-27 ENCOUNTER — HOSPITAL ENCOUNTER (INPATIENT)
Facility: MEDICAL CENTER | Age: 61
LOS: 7 days | DRG: 572 | End: 2022-01-04
Attending: STUDENT IN AN ORGANIZED HEALTH CARE EDUCATION/TRAINING PROGRAM | Admitting: INTERNAL MEDICINE
Payer: COMMERCIAL

## 2021-12-27 DIAGNOSIS — L02.415 ABSCESS OF HIP, RIGHT: ICD-10-CM

## 2021-12-27 DIAGNOSIS — B18.2 CHRONIC HEPATITIS C WITHOUT HEPATIC COMA (HCC): ICD-10-CM

## 2021-12-27 DIAGNOSIS — I48.91 ATRIAL FIBRILLATION, UNSPECIFIED TYPE (HCC): ICD-10-CM

## 2021-12-27 DIAGNOSIS — L02.91 ABSCESS: ICD-10-CM

## 2021-12-27 PROCEDURE — 99291 CRITICAL CARE FIRST HOUR: CPT

## 2021-12-27 RX ORDER — SODIUM CHLORIDE 9 MG/ML
1000 INJECTION, SOLUTION INTRAVENOUS ONCE
Status: COMPLETED | OUTPATIENT
Start: 2021-12-28 | End: 2021-12-28

## 2021-12-27 ASSESSMENT — FIBROSIS 4 INDEX: FIB4 SCORE: 0.85

## 2021-12-27 NOTE — LETTER
"  FORM C-4:  EMPLOYEE’S CLAIM FOR COMPENSATION/ REPORT OF INITIAL TREATMENT  EMPLOYEE’S CLAIM - PROVIDE ALL INFORMATION REQUESTED   First Name Gonsalo Last Name Gilbert Birthdate 1960  Sex male Claim Number   Home Address [address]             [place] [address]                                   Age  61 y.o. Height  1.93 m (6' 4\") Weight  113 kg (249 lb 12.5 oz) [facility]  [id number]   Mailing Address [address]              Zip [address] Telephone  [phone] (home) [phone] (work) Primary Language Spoken   Insurer  *** Third Party   [employer] Employee's Occupation (Job Title) When Injury or Occupational Disease Occurred     Employer's Name [employer] Telephone [phone]    Employer Address [address] [address] Zip [address]   Date of Injury  12/21/2021       Hour of Injury  2:00 PM Date Employer Notified  12/28/2021 Last Day of Work after Injury or Occupational Disease  12/21/2021 Supervisor to Whom Injury Reported  [place]   Address or Location of Accident (if applicable) Work [1]   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 Be specific and answer in detail. Use additional sheet if necessary)  Dont really know was sick with a cold and my hip starting hurting about 10 days ago or so   If you believe that you have an occupational disease, when did you first have knowledge of the disability and it relationship to your employment? N/A Witnesses to the Accident  Recording   Nature of Injury or Occupational Disease  Workers' Compensation Part(s) of Body Injured or Affected  Hip (R), N/A, N/A    I CERTIFY THAT THE ABOVE IS TRUE AND CORRECT TO THE BEST OF MY KNOWLEDGE AND THAT I HAVE PROVIDED THIS INFORMATION IN ORDER TO OBTAIN THE BENEFITS OF [place] " [place]).  I HEREBY AUTHORIZE ANY PHYSICIAN, CHIROPRACTOR, SURGEON, PRACTITIONER, OR OTHER PERSON, ANY HOSPITAL, INCLUDING [facility] OR [facility], ANY MEDICAL SERVICE ORGANIZATION, ANY INSURANCE COMPANY, OR OTHER INSTITUTION OR ORGANIZATION TO RELEASE TO EACH OTHER, ANY MEDICAL OR OTHER INFORMATION, INCLUDING BENEFITS PAID OR PAYABLE, PERTINENT TO THIS INJURY OR DISEASE, EXCEPT INFORMATION RELATIVE TO DIAGNOSIS, TREATMENT AND/OR COUNSELING FOR AIDS, PSYCHOLOGICAL CONDITIONS, ALCOHOL OR CONTROLLED SUBSTANCES, FOR WHICH I MUST GIVE SPECIFIC AUTHORIZATION.  A PHOTOSTAT OF THIS AUTHORIZATION SHALL BE AS VALID AS THE ORIGINAL.  Date                                      Place                                                                             Employee’s Signature   THIS REPORT MUST BE COMPLETED AND MAILED WITHIN 3 WORKING DAYS OF TREATMENT   Place [facility], EMERGENCY DEPT                       Name of Facility [facility]   Date  12/27/2021 Diagnosis  No diagnosis found. Is there evidence the injured employee was under the influence of alcohol and/or another controlled substance at the time of accident?   Hour  1:31 AM Description of Injury or Disease       Treatment     Have you advised the patient to remain off work five days or more?             X-Ray Findings    If Yes   From Date    To Date      From information given by the employee, together with medical evidence, can you directly connect this injury or occupational disease as job incurred?   If No, is employee capable of: Full Duty    Modified Duty      Is additional medical care by a physician indicated?   If Modified Duty, Specify any Limitations / Restrictions       Do you know of any previous injury or disease contributing to this condition or occupational disease?      Date 12/28/2021 Print Doctor’s Name Kayla Barnes I certify the employer’s copy of this  "form was mailed on:   Address [address]  [place] [id number] [phone] INSURER’S USE ONLY   Provider’s Tax ID Number [id number] Telephone Dept: [phone]    Doctor’s Signature   Degree        Form C-4 (rev.10/07)                                                                         BRIEF DESCRIPTION OF RIGHTS AND BENEFITS  (Pursuant to [place] 616[place])    Notice of Injury or Occupational Disease (Incident Report Form C-1): If an injury or occupational disease (OD) arises out of and in the course of employment, you must provide written notice to your employer as soon as practicable, but no later than 7 days after the accident or OD. Your employer shall maintain a sufficient supply of the required forms.    Claim for Compensation (Form C-4): If medical treatment is sought, the form C-4 is available at the place of initial treatment. A completed \"Claim for Compensation\" (Form C-4) must be filed within 90 days after an accident or OD. The treating physician or chiropractor must, within 3 working days after treatment, complete and mail to the employer, the employer's insurer and third-party , the Claim for Compensation.    Medical Treatment: If you require medical treatment for your on-the-job injury or OD, you may be required to select a physician or chiropractor from a list provided by your workers’ compensation insurer, if it has contracted with an Organization for Managed Care (MCO) or Preferred Provider Organization (PPO) or providers of health care. If your employer has not entered into a contract with an MCO or PPO, you may select a physician or chiropractor from the Panel of Physicians and Chiropractors. Any medical costs related to your industrial injury or OD will be paid by your insurer.    Temporary Total Disability (TTD): If your doctor has certified that you are unable to work for a period of at least 5 consecutive days, or 5 cumulative days in a 20-day period, or places restrictions " on you that your employer does not accommodate, you may be entitled to TTD compensation.    Temporary Partial Disability (TPD): If the wage you receive upon reemployment is less than the compensation for TTD to which you are entitled, the insurer may be required to pay you TPD compensation to make up the difference. TPD can only be paid for a maximum of 24 months.    Permanent Partial Disability (PPD): When your medical condition is stable and there is an indication of a PPD as a result of your injury or OD, within 30 days, your insurer must arrange for an evaluation by a rating physician or chiropractor to determine the degree of your PPD. The amount of your PPD award depends on the date of injury, the results of the PPD evaluation, your age and wage.    Permanent Total Disability (PTD): If you are medically certified by a treating physician or chiropractor as permanently and totally disabled and have been granted a PTD status by your insurer, you are entitled to receive monthly benefits not to exceed 66 2/3% of your average monthly wage. The amount of your PTD payments is subject to reduction if you previously received a lump-sum PPD award.    Vocational Rehabilitation Services: You may be eligible for vocational rehabilitation services if you are unable to return to the job due to a permanent physical impairment or permanent restrictions as a result of your injury or occupational disease.    Transportation and Per [place] Reimbursement: You may be eligible for travel expenses and per [place] associated with medical treatment.    Reopening: You may be able to reopen your claim if your condition worsens after claim closure.     Appeal Process: If you disagree with a written determination issued by the insurer or the insurer does not respond to your request, you may appeal to the Department of Administration, , by following the instructions contained in your determination letter. You must appeal the  determination within 70 days from the date of the determination letter at [address], [address], [place], [place] [address], or [address], [place], [place] [address]. If you disagree with the  decision, you may appeal to the Department of Administration, . You must file your appeal within 30 days from the date of the  decision letter at [address], [address], [place], [place] [address], or [address], [place], [place] [address]. If you disagree with a decision of an , you may file a petition for judicial review with the District Court. You must do so within 30 days of the Appeal Officer’s decision. You may be represented by an  at your own expense or you may contact the [facility] for possible representation.    [place]  for Injured Workers (NAIW): If you disagree with a  decision, you may request that NAIW represent you without charge at an  Hearing. For information regarding denial of benefits, you may contact the [facility] at: [address], [address], [place] [address], [phone], or [address], [place] [address], [phone]    To File a Complaint with the Division: If you wish to file a complaint with the  of the Division of Industrial Relations (DIR),  please contact the Workers’ Compensation Section, [address], [address], [place], [place] [address], telephone (567) [phone], or [address], [place], [place] [address], telephone ([phone].    For assistance with Workers’ Compensation Issues: You may contact the [facility] Office for Consumer Health Assistance, [address], [address], [place], [place] [address], Toll Free 1-883.982.8113, Web site: http://[facility].[place].gov/Programs/LIS E-mail: lis@[facility].[place].gov  D-2 (rev. 10/20)               __________________________________________________________________                                    _________________            Employee Name / Signature                                                                                                                            Date

## 2021-12-27 NOTE — LETTER
"  FORM C-4:  EMPLOYEE’S CLAIM FOR COMPENSATION/ REPORT OF INITIAL TREATMENT  EMPLOYEE’S CLAIM - PROVIDE ALL INFORMATION REQUESTED   First Name Gonsalo Last Name Gilbert Birthdate 1960  Sex male Claim Number   Home Address [address]             Zip [address]                                   Age  61 y.o. Height  1.93 m (6' 4\") Weight  113 kg (249 lb 12.5 oz) [facility]  [id number]   Mailing Address [address]              Zip [address] Telephone  [phone] (home) [phone] (work) Primary Language Spoken   Insurer  *** Third Party   PAIGE GONZALEZ Employee's Occupation (Job Title) When Injury or Occupational Disease Occurred     Employer's Name [employer] Telephone [phone]    Employer Address [address]] Zip [address]   Date of Injury  12/21/2021       Hour of Injury  2:00 PM Date Employer Notified  12/28/2021 Last Day of Work after Injury or Occupational Disease  12/21/2021 Supervisor to Whom Injury Reported  [place]   Address or Location of Accident (if applicable) Work [1]   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 Be specific and answer in detail. Use additional sheet if necessary)  Dont really know was sick with a cold and my hip starting hurting about 10 days ago or so   If you believe that you have an occupational disease, when did you first have knowledge of the disability and it relationship to your employment? N/A Witnesses to the Accident  Recording   Nature of Injury or Occupational Disease  Workers' Compensation Part(s) of Body Injured or Affected  Hip (R), N/A, N/A    I CERTIFY THAT THE ABOVE IS TRUE AND CORRECT TO THE BEST OF MY KNOWLEDGE AND THAT I HAVE PROVIDED THIS INFORMATION IN ORDER TO OBTAIN THE BENEF[place] OF [place] " [place] CHAPTER 617 OF [place]).  I HEREBY AUTHORIZE ANY PHYSICIAN, CHIROPRACTOR, SURGEON, PRACTITIONER, OR OTHER PERSON, ANY HOSPITAL, INCLUDING [facility] OR [facility], ANY MEDICAL SERVICE ORGANIZATION, ANY INSURANCE COMPANY, OR OTHER INSTITUTION OR ORGANIZATION TO RELEASE TO EACH OTHER, ANY MEDICAL OR OTHER INFORMATION, INCLUDING BENEFITS PAID OR PAYABLE, PERTINENT TO THIS INJURY OR DISEASE, EXCEPT INFORMATION RELATIVE TO DIAGNOSIS, TREATMENT AND/OR COUNSELING FOR AIDS, PSYCHOLOGICAL CONDITIONS, ALCOHOL OR CONTROLLED SUBSTANCES, FOR WHICH I MUST GIVE SPECIFIC AUTHORIZATION.  A PHOTOSTAT OF THIS AUTHORIZATION SHALL BE AS VALID AS THE ORIGINAL.  Date                                      Place                                                                             Employee’s Signature   THIS REPORT MUST BE COMPLETED AND MAILED WITHIN 3 WORKING DAYS OF TREATMENT   Place [facility], EMERGENCY DEPT                       Name of Facility [facility]   Date  12/27/2021 Diagnosis  No diagnosis found. Is there evidence the injured employee was under the influence of alcohol and/or another controlled substance at the time of accident?   Hour  1:25 AM Description of Injury or Disease       Treatment     Have you advised the patient to remain off work five days or more?             X-Ray Findings    If Yes   From Date    To Date      From information given by the employee, together with medical evidence, can you directly connect this injury or occupational disease as job incurred?   If No, is employee capable of: Full Duty    Modified Duty      Is additional medical care by a physician indicated?   If Modified Duty, Specify any Limitations / Restrictions       Do you know of any previous injury or disease contributing to this condition or occupational disease?      Date 12/28/2021 Print Doctor’s Name Kayla Barnes I certify the employer’s copy of this  "form was mailed on:   Address [address]  [place] [id number] [phone] INSURER’S USE ONLY   Provider’s Tax ID Number [id number] Telephone Dept: [phone]    Doctor’s Signature   Degree        Form C-4 (rev.10/07)                                                                         BRIEF DESCRIPTION OF RIGHTS AND BENEFITS  (Pursuant to [place] 616C.050)    Notice of Injury or Occupational Disease (Incident Report Form C-1): If an injury or occupational disease (OD) arises out of and in the course of employment, you must provide written notice to your employer as soon as practicable, but no later than 7 days after the accident or OD. Your employer shall maintain a sufficient supply of the required forms.    Claim for Compensation (Form C-4): If medical treatment is sought, the form C-4 is available at the place of initial treatment. A completed \"Claim for Compensation\" (Form C-4) must be filed within 90 days after an accident or OD. The treating physician or chiropractor must, within 3 working days after treatment, complete and mail to the employer, the employer's insurer and third-party , the Claim for Compensation.    Medical Treatment: If you require medical treatment for your on-the-job injury or OD, you may be required to select a physician or chiropractor from a list provided by your workers’ compensation insurer, if it has contracted with an Organization for Managed Care (MCO) or Preferred Provider Organization (PPO) or providers of health care. If your employer has not entered into a contract with an MCO or PPO, you may select a physician or chiropractor from the Panel of Physicians and Chiropractors. Any medical costs related to your industrial injury or OD will be paid by your insurer.    Temporary Total Disability (TTD): If your doctor has certified that you are unable to work for a period of at least 5 consecutive days, or 5 cumulative days in a 20-day period, or places restrictions " on you that your employer does not accommodate, you may be entitled to TTD compensation.    Temporary Partial Disability (TPD): If the wage you receive upon reemployment is less than the compensation for TTD to which you are entitled, the insurer may be required to pay you TPD compensation to make up the difference. TPD can only be paid for a maximum of 24 months.    Permanent Partial Disability (PPD): When your medical condition is stable and there is an indication of a PPD as a result of your injury or OD, within 30 days, your insurer must arrange for an evaluation by a rating physician or chiropractor to determine the degree of your PPD. The amount of your PPD award depends on the date of injury, the results of the PPD evaluation, your age and wage.    Permanent Total Disability (PTD): If you are medically certified by a treating physician or chiropractor as permanently and totally disabled and have been granted a PTD status by your insurer, you are entitled to receive monthly benefits not to exceed 66 2/3% of your average monthly wage. The amount of your PTD payments is subject to reduction if you previously received a lump-sum PPD award.    Vocational Rehabilitation Services: You may be eligible for vocational rehabilitation services if you are unable to return to the job due to a permanent physical impairment or permanent restrictions as a result of your injury or occupational disease.    Transportation and Per [place] Reimbursement: You may be eligible for travel expenses and per kolton associated with medical treatment.    Reopening: You may be able to reopen your claim if your condition worsens after claim closure.     Appeal Process: If you disagree with a written determination issued by the insurer or the insurer does not respond to your request, you may appeal to the Department of Administration, , by following the instructions contained in your determination letter. You must appeal the  determination within 70 days from the date of the determination letter at [address], [address], [place], [place] [address], or [address], [address], [place], [place] [address]. If you disagree with the  decision, you may appeal to the Department of Administration, . You must file your appeal within 30 days from the date of the  decision letter at [address], [address], [place], [place] [address], or [address], [address], [place], [place] [address]. If you disagree with a decision of an , you may file a petition for judicial review with the District Court. You must do so within 30 days of the Appeal Officer’s decision. You may be represented by an  at your own expense or you may contact the [facility] for possible representation.    [place]  for Injured Workers (NAIW): If you disagree with a  decision, you may request that NAIW represent you without charge at an  Hearing. For information regarding denial of benefits, you may contact the [facility] at: [address], [address], [place] [address], [phone], or [address], [address], [place] [address], [phone]    To File a Complaint with the Division: If you wish to file a complaint with the  of the Division of Industrial Relations (DIR),  please contact the Workers’ Compensation Section, [address], [address], [place], [place] [address], telephone [phone], or [address], [address], [place], [place] [address], telephone [phone].    For assistance with Workers’ Compensation Issues: You may contact the [facility] Office for Consumer Health Assistance, [address], [address], [place], [place] [address], Toll Free 1-968.504.8211, Web site: http://[facility].[place].gov/Programs/LIS E-mail: lis@[facility].[place].gov  D-2 (rev. 10/20)               __________________________________________________________________                                    _________________            Employee Name / Signature                                                                                                                            Date

## 2021-12-27 NOTE — LETTER
"  FORM C-4:  EMPLOYEE’S CLAIM FOR COMPENSATION/ REPORT OF INITIAL TREATMENT  EMPLOYEE’S CLAIM - PROVIDE ALL INFORMATION REQUESTED   First Name Gonsalo Last Name Gilbert Birthdate 1960  Sex male Claim Number   Home Address [address] [place] [address]             Zip [address]                                   Age  61 y.o. Height  1.93 m (6' 4\") Weight  113 kg (249 lb 12.5 oz) [facility]  [id number]   Mailing Address [address] [place] [address]              Zip [address] Telephone  [phone] (home) [phone] (work) Primary Language Spoken   Insurer  *** Third Party   PAIGE GONZALEZ Employee's Occupation (Job Title) When Injury or Occupational Disease Occurred     Employer[employer] Name [employer] Telephone [phone]    Employer Address [address]] Zip [address]   Date of Injury  12/21/2021       Hour of Injury  2:00 PM Date Employer Notified  12/28/2021 Last Day of Work after Injury or Occupational Disease  12/21/2021 Supervisor to Whom Injury Reported  [place]   Address or Location of Accident (if applicable) Work [1]   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 Be specific and answer in detail. Use additional sheet if necessary)  Dont really know was sick with a cold and my hip starting hurting about 10 days ago or so   If you believe that you have an occupational disease, when did you first have knowledge of the disability and it relationship to your employment? N/A Witnesses to the Accident  Recording   Nature of Injury or Occupational Disease  Workers' Compensation Part(s) of Body Injured or Affected  Hip (R), N/A, N/A    I CERTIFY THAT THE ABOVE IS TRUE AND CORRECT TO THE BEST OF MY KNOWLEDGE AND THAT I HAVE PROVIDED THIS INFORMATION IN ORDER TO OBTAIN THE [place]EF[place], " [place] 617 [place]).  I HEREBY AUTHORIZE ANY PHYSICIAN, CHIROPRACTOR, SURGEON, PRACTITIONER, OR OTHER PERSON, ANY HOSPITAL, INCLUDING [facility] H[facility], ANY MEDICAL SERVICE ORGANIZATION, ANY INSURANCE COMPANY, OR OTHER INSTITUTION OR ORGANIZATION TO RELEASE TO EACH OTHER, ANY MEDICAL OR OTHER INFORMATION, INCLUDING BENEFITS PAID OR PAYABLE, PERTINENT TO THIS INJURY OR DISEASE, EXCEPT INFORMATION RELATIVE TO DIAGNOSIS, TREATMENT AND/OR COUNSELING FOR AIDS, PSYCHOLOGICAL CONDITIONS, ALCOHOL OR CONTROLLED SUBSTANCES, FOR WHICH I MUST GIVE SPECIFIC AUTHORIZATION.  A PHOTOSTAT OF THIS AUTHORIZATION SHALL BE AS VALID AS THE ORIGINAL.  Date                                      Place                                                                             Employee’s Signature   THIS REPORT MUST BE COMPLETED AND MAILED WITHIN 3 WORKING DAYS OF TREATMENT   Place [facility], EMERGENCY DEPT                       Name of Facility [facility]   Date  12/27/2021 Diagnosis  No diagnosis found. Is there evidence the injured employee was under the influence of alcohol and/or another controlled substance at the time of accident?   Hour  1:42 AM Description of Injury or Disease       Treatment     Have you advised the patient to remain off work five days or more?             X-Ray Findings    If Yes   From Date    To Date      From information given by the employee, together with medical evidence, can you directly connect this injury or occupational disease as job incurred?   If No, is employee capable of: Full Duty    Modified Duty      Is additional medical care by a physician indicated?   If Modified Duty, Specify any Limitations / Restrictions       Do you know of any previous injury or disease contributing to this condition or occupational disease?      Date 12/28/2021 Print Doctor’s Name Kayla Barnes I certify the employer’s copy of this  "form was mailed on:   Address [address]  [place] [id number] [phone] INSURER’S USE ONLY   Provider’s Tax ID Number [id number] Telephone Dept: [phone]    Doctor’s Signature   Degree        Form C-4 (rev.10/07)                                                                         BRIEF DESCRIPTION OF RIGHTS AND BENEFITS  ([place] to [place] 616C.050)    Notice of Injury or Occupational Disease (Incident Report Form C-1): If an injury or occupational disease (OD) arises out of and in the course of employment, you must provide written notice to your employer as soon as practicable, but no later than 7 days after the accident or OD. Your employer shall maintain a sufficient supply of the required forms.    Claim for Compensation (Form C-4): If medical treatment is sought, the form C-4 is available at the place of initial treatment. A completed \"Claim for Compensation\" (Form C-4) must be filed within 90 days after an accident or OD. The treating physician or chiropractor must, within 3 working days after treatment, complete and mail to the employer, the employer's insurer and third-party , the Claim for Compensation.    Medical Treatment: If you require medical treatment for your on-the-job injury or OD, you may be required to select a physician or chiropractor from a list provided by your workers’ compensation insurer, if it has contracted with an Organization for Managed Care (MCO) or Preferred Provider Organization (PPO) or providers of health care. If your employer has not entered into a contract with an MCO or PPO, you may select a physician or chiropractor from the Panel of Physicians and Chiropractors. Any medical costs related to your industrial injury or OD will be paid by your insurer.    Temporary Total Disability (TTD): If your doctor has certified that you are unable to work for a period of at least 5 consecutive days, or 5 cumulative days in a 20-day period, or places restrictions " on you that your employer does not accommodate, you may be entitled to TTD compensation.    Temporary Partial Disability (TPD): If the wage you receive upon reemployment is less than the compensation for TTD to which you are entitled, the insurer may be required to pay you TPD compensation to make up the difference. TPD can only be paid for a maximum of 24 months.    Permanent Partial Disability (PPD): When your medical condition is stable and there is an indication of a PPD as a result of your injury or OD, within 30 days, your insurer must arrange for an evaluation by a rating physician or chiropractor to determine the degree of your PPD. The amount of your PPD award depends on the date of injury, the results of the PPD evaluation, your age and wage.    Permanent Total Disability (PTD): If you are medically certified by a treating physician or chiropractor as permanently and totally disabled and have been granted a PTD status by your insurer, you are entitled to receive monthly benefits not to exceed 66 2/3% of your average monthly wage. The amount of your PTD payments is subject to reduction if you previously received a lump-sum PPD award.    Vocational Rehabilitation Services: You may be eligible for vocational rehabilitation services if you are unable to return to the job due to a permanent physical impairment or permanent restrictions as a result of your injury or occupational disease.    Transportation and Per Kol[place] Reimbursement: You may be eligible for travel expenses and per kolton associated with medical treatment.    Reopening: You may be able to reopen your claim if your condition worsens after claim closure.     Appeal Process: If you disagree with a written determination issued by the insurer or the insurer does not respond to your request, you may appeal to the Department of Administration, , by following the instructions contained in your determination letter. You must appeal the  determination within 70 days from the date of the determination letter at [address], [address], [place], [place] [address], or [address], [address], [place], [place] [address]. If you disagree with the  decision, you may appeal to the Department of Administration, . You must file your appeal within 30 days from the date of the  decision letter at [address], [address], [place], [place] [address], or [address], [address], [place], [place] [address]. If you disagree with a decision of an , you may file a petition for judicial review with the District Court. You must do so within 30 days of the Appeal Officer’s decision. You may be represented by an  at your own expense or you may contact the [facility] for possible representation.    [place]  for Inj[place] Workers ([place]IW): If you disagree with a  decision, you may request that NAIW represent you without charge at an  Hearing. For information regarding denial of benefits, you may contact the [facility] at: [address], [address], [place] [address], [phone], or [address], [place] [address], [phone]    To File a Complaint with the Division: If you wish to file a complaint with the  of the Division of Industrial Relations (DIR),  please contact the Workers’ Compensation Section, [address], [address], [place], [place] [address], telephone (529) [phone], or [address], [address], [place], [place] [address], telephone [phone].    For assistance with Workers’ Compensation Issues: You may contact the [facility] Office for Consumer Health Assistance, [address], [address], [place], [place] [address], Toll Free 1-162.581.8528, Web site: http://[facility].[place].gov/Programs/LIS E-mail: lis@[facility].[place].gov  D-2 (rev. 10/20)               __________________________________________________________________                                    _________________            Employee Name / Signature                                                                                                                            Date

## 2021-12-27 NOTE — LETTER
"  FORM C-4:  EMPLOYEE’S CLAIM FOR COMPENSATION/ REPORT OF INITIAL TREATMENT  EMPLOYEE’S CLAIM - PROVIDE ALL INFORMATION REQUESTED   First Name Gonsalo Last Name Gilbert Birthdate 1960  Sex male Claim Number   Home Address [address] [place]             [address] [address]                                   Age  61 y.o. Height  1.93 m (6' 4\") Weight  113 kg (249 lb 12.5 oz) [facility]     Mailing Address [address] [place]              Zip [address] Telephone  [phone] (home) [phone] (work) Primary Language Spoken   Insurer   Third Party   PAIGE GONZALEZ Employee's Occupation (Job Title) When Injury or Occupational Disease Occurred     Employer's Name [employer] Telephone [phone]    Employer Address [address]] Zip [address]   Date of Injury  12/21/2021       Hour of Injury  2:00 PM Date Employer Notified  12/28/2021 Last Day of Work after Injury or Occupational Disease  12/21/2021 Supervisor to Whom Injury Reported  [place]   Address or Location of Accident (if applicable) Work [1]   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 Be specific and answer in detail. Use additional sheet if necessary)  Dont really know was sick with a cold and my hip starting hurting about 10 days ago or so   If you believe that you have an occupational disease, when did you first have knowledge of the disability and it relationship to your employment? N/A Witnesses to the Accident  Recording   Nature of Injury or Occupational Disease  Workers' Compensation Part(s) of Body Injured or Affected  Hip (R), N/A, N/A    I CERTIFY THAT THE ABOVE IS TRUE AND CORRECT TO THE BEST OF MY KNOWLEDGE AND THAT I HAVE PROVIDED THIS INFORMATION IN ORDER TO OBTAIN THE B[place]EF[place]IVE [place] CHAPTER 617 OF [place]).  I HEREBY AUTHORIZE ANY PHYSICIAN, CHIROPRACTOR, SURGEON, PRACTITIONER, OR OTHER PERSON, ANY HOSPITAL, INCLUDING [facility] OR [facility]PITAL, ANY MEDICAL SERVICE ORGANIZATION, ANY INSURANCE COMPANY, OR OTHER INSTITUTION OR ORGANIZATION TO RELEASE TO EACH OTHER, ANY MEDICAL OR OTHER INFORMATION, INCLUDING BENEFITS PAID OR PAYABLE, PERTINENT TO THIS INJURY OR DISEASE, EXCEPT INFORMATION RELATIVE TO DIAGNOSIS, TREATMENT AND/OR COUNSELING FOR AIDS, PSYCHOLOGICAL CONDITIONS, ALCOHOL OR CONTROLLED SUBSTANCES, FOR WHICH I MUST GIVE SPECIFIC AUTHORIZATION.  A PHOTOSTAT OF THIS AUTHORIZATION SHALL BE AS VALID AS THE ORIGINAL.  Date                                      Place   [facility]                                     Employee’s Signature   THIS REPORT MUST BE COMPLETED AND MAILED WITHIN 3 WORKING DAYS OF TREATMENT   Place [facility], EMERGENCY DEPT                       Name of Facility [facility]   Date  12/27/2021 Diagnosis  (L02.415) Abscess of hip, right Is there evidence the injured employee was under the influence of alcohol and/or another controlled substance at the time of accident?   Hour  3:29 AM Description of Injury or Disease  Abscess of hip, right No   Treatment  Surgical drainage, antibiotics  Have you advised the patient to remain off work five days or more?         No   X-Ray Findings    Comments:CT positive If Yes   From Date    To Date      From information given by the employee, together with medical evidence, can you directly connect this injury or occupational disease as job incurred? No If No, is employee capable of: Full Duty  No Modified Duty  Yes   Is additional medical care by a physician indicated? Yes If Modified Duty, Specify any Limitations / Restrictions   TO be determined post admission   Do you know of any previous injury or disease contributing to this condition or occupational disease?  "Yes  Comments:prior abscesses    Date 12/28/2021 Print Doctor’s Name Kayla Barnes I certify the employer’s copy of this form was mailed on:   Address [address] [place] [address]  [place] [address] [phone] INSURER’S USE ONLY   Provider’s Tax ID Number [id number] Telephone Dept: [phone]    Doctor’s Signature e-KAYLA Monson M.D. Degree  MD      Form C-4 (rev.10/07)                                                                         BRIEF DESCRIPTION OF RIGHTS AND BENEFITS  (Pursuant to [place]RS 616C.050)    Notice of Injury or Occupational Disease (Incident Report Form C-1): If an injury or occupational disease (OD) arises out of and in the course of employment, you must provide written notice to your employer as soon as practicable, but no later than 7 days after the accident or OD. Your employer shall maintain a sufficient supply of the required forms.    Claim for Compensation (Form C-4): If medical treatment is sought, the form C-4 is available at the place of initial treatment. A completed \"Claim for Compensation\" (Form C-4) must be filed within 90 days after an accident or OD. The treating physician or chiropractor must, within 3 working days after treatment, complete and mail to the employer, the employer's insurer and third-party , the Claim for Compensation.    Medical Treatment: If you require medical treatment for your on-the-job injury or OD, you may be required to select a physician or chiropractor from a list provided by your workers’ compensation insurer, if it has contracted with an Organization for Managed Care (MCO) or Preferred Provider Organization (PPO) or providers of health care. If your employer has not entered into a contract with an MCO or PPO, you may select a physician or chiropractor from the Panel of Physicians and Chiropractors. Any medical costs related to your industrial injury or OD will be paid by your insurer.    Temporary Total Disability (TTD): If your " doctor has certified that you are unable to work for a period of at least 5 consecutive days, or 5 cumulative days in a 20-day period, or places restrictions on you that your employer does not accommodate, you may be entitled to TTD compensation.    Temporary Partial Disability (TPD): If the wage you receive upon reemployment is less than the compensation for TTD to which you are entitled, the insurer may be required to pay you TPD compensation to make up the difference. TPD can only be paid for a maximum of 24 months.    Permanent Partial Disability (PPD): When your medical condition is stable and there is an indication of a PPD as a result of your injury or OD, within 30 days, your insurer must arrange for an evaluation by a rating physician or chiropractor to determine the degree of your PPD. The amount of your PPD award depends on the date of injury, the results of the PPD evaluation, your age and wage.    Permanent Total Disability (PTD): If you are medically certified by a treating physician or chiropractor as permanently and totally disabled and have been granted a PTD status by your insurer, you are entitled to receive monthly benefits not to exceed 66 2/3% of your average monthly wage. The amount of your PTD payments is subject to reduction if you previously received a lump-sum PPD award.    Vocational Rehabilitation Services: You may be eligible for vocational rehabilitation services if you are unable to return to the job due to a permanent physical impairment or permanent restrictions as a result of your injury or occupational disease.    Transportation and Per Kol[place] Reimbursement: You may be eligible for travel expenses and per kolton associated with medical treatment.    Reopening: You may be able to reopen your claim if your condition worsens after claim closure.     Appeal Process: If you disagree with a written determination issued by the insurer or the insurer does not respond to your request, you may  appeal to the Department of Administration, , by following the instructions contained in your determination letter. You must appeal the determination within 70 days from the date of the determination letter at [address], [address], [place], [place] [address], or [address], [address], [place], [place] [address]. If you disagree with the  decision, you may appeal to the Department of Administration, . You must file your appeal within 30 days from the date of the  decision letter at [address], [address], [place], [place] [address], or [address], [address], [place], [place] [address]. If you disagree with a decision of an , you may file a petition for judicial review with the District Court. You must do so within 30 days of the Appeal Officer’s decision. You may be represented by an  at your own expense or you may contact the [facility] for possible representation.    [place]  for Injured Workers (NAIW): If you disagree with a  decision, you may request that NAIW represent you without charge at an  Hearing. For information regarding denial of benefits, you may contact the [facility] at: [address], [address], [place] [address], [phone], or [address], [address], [place] [address], [phone]    To File a Complaint with the Division: If you wish to file a complaint with the  of the Division of Industrial Relations (DIR),  please contact the Workers’ Compensation Section, [address], [address], [place], [place] [address], telephone [phone], or [address], [address], [place], [place] [address], telephone [phone].    For assistance with Workers’ Compensation Issues: You may contact the [facility] Office for Consumer Health Assistance, [address], [address], [place], [place]  [address], [facility] Free 1-732.143.6353, Web site: http://[facility].[place].gov/Programs/LIS E-mail: [email or web address].[email or web address].gov  D-2 (rev. 10/20)              __________________________________________________________________                                    _________________            Employee Name / Signature                                                                                                                            Date

## 2021-12-27 NOTE — LETTER
"  FORM C-4:  EMPLOYEE’S CLAIM FOR COMPENSATION/ REPORT OF INITIAL TREATMENT  EMPLOYEE’S CLAIM - PROVIDE ALL INFORMATION REQUESTED   First Name Gonsalo Last Name Gilbert Birthdate 1960  Sex male Claim Number   Home Address [address]             [place] [address]                                   Age  61 y.o. Height  1.93 m (6' 4\") Weight  113 kg (249 lb 12.5 oz) [facility]  [id number]   Mailing Address [address]              Zip [address] Telephone  [phone] (home) [phone] (work) Primary Language Spoken   Insurer  *** Third Party   PAIGE GONZALEZ Employee's Occupation (Job Title) When Injury or Occupational Disease Occurred     Employer's Name [employer] Telephone [phone]    Employer Address [address]] Zip [address]   Date of Injury  12/21/2021       Hour of Injury  2:00 PM Date Employer Notified  12/28/2021 Last Day of Work after Injury or Occupational Disease  12/21/2021 Supervisor to Whom Injury Reported  [place]   Address or Location of Accident (if applicable) Work [1]   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 Be specific and answer in detail. Use additional sheet if necessary)  Dont really know was sick with a cold and my hip starting hurting about 10 days ago or so   If you believe that you have an occupational disease, when did you first have knowledge of the disability and it relationship to your employment? N/A Witnesses to the Accident  Recording   Nature of Injury or Occupational Disease  Workers' Compensation Part(s) of Body Injured or Affected  Hip (R), N/A, N/A    I CERTIFY THAT THE ABOVE IS TRUE AND CORRECT TO THE BEST OF MY KNOWLEDGE AND THAT I HAVE PROVIDED THIS INFORMATION IN ORDER TO OBTAIN THE BENEF[place], " INCLUSIVE OR CHAPTER 617 OF [place]).  I HEREBY AUTHORIZE ANY PHYSICIAN, CHIROPRACTOR, SURGEON, PRACTITIONER, OR OTHER PERSON, ANY HOSPITAL, INCLUDING [facility] OR [facility] HOSPITAL, ANY MEDICAL SERVICE ORGANIZATION, ANY INSURANCE COMPANY, OR OTHER INSTITUTION OR ORGANIZATION TO RELEASE TO EACH OTHER, ANY MEDICAL OR OTHER INFORMATION, INCLUDING BENEFITS PAID OR PAYABLE, PERTINENT TO THIS INJURY OR DISEASE, EXCEPT INFORMATION RELATIVE TO DIAGNOSIS, TREATMENT AND/OR COUNSELING FOR AIDS, PSYCHOLOGICAL CONDITIONS, ALCOHOL OR CONTROLLED SUBSTANCES, FOR WHICH I MUST GIVE SPECIFIC AUTHORIZATION.  A PHOTOSTAT OF THIS AUTHORIZATION SHALL BE AS VALID AS THE ORIGINAL.  Date                                      Place                                                                             Employee’s Signature   THIS REPORT MUST BE COMPLETED AND MAILED WITHIN 3 WORKING DAYS OF TREATMENT   Place [facility], EMERGENCY DEPT                       Name of Facility [facility]   Date  12/27/2021 Diagnosis  (L02.415) Abscess of hip, right Is there evidence the injured employee was under the influence of alcohol and/or another controlled substance at the time of accident?   Hour  2:33 AM Description of Injury or Disease  Abscess of hip, right     Treatment     Have you advised the patient to remain off work five days or more?             X-Ray Findings    If Yes   From Date    To Date      From information given by the employee, together with medical evidence, can you directly connect this injury or occupational disease as job incurred?   If No, is employee capable of: Full Duty    Modified Duty      Is additional medical care by a physician indicated?   If Modified Duty, Specify any Limitations / Restrictions       Do you know of any previous injury or disease contributing to this condition or occupational disease?      Date 12/28/2021 Print Doctor’s Name Kayla Barnes  "certify the employer’s copy of this form was mailed on:   Address [address]  [place] [id number] [phone] INSURER’S USE ONLY   Provider’s Tax ID Number [id number] Telephone Dept: [phone]    Doctor’s Signature   Degree        Form C-4 (rev.10/07)                                                                         BRIEF DESCRIPTION OF RIGHTS AND BENEFITS  ([place] to [place] 616C.050)    Notice of Injury or Occupational Disease (Incident Report Form C-1): If an injury or occupational disease (OD) arises out of and in the course of employment, you must provide written notice to your employer as soon as practicable, but no later than 7 days after the accident or OD. Your employer shall maintain a sufficient supply of the required forms.    Claim for Compensation (Form C-4): If medical treatment is sought, the form C-4 is available at the place of initial treatment. A completed \"Claim for Compensation\" (Form C-4) must be filed within 90 days after an accident or OD. The treating physician or chiropractor must, within 3 working days after treatment, complete and mail to the employer, the employer's insurer and third-party , the Claim for Compensation.    Medical Treatment: If you require medical treatment for your on-the-job injury or OD, you may be required to select a physician or chiropractor from a list provided by your workers’ compensation insurer, if it has contracted with an Organization for Managed Care (MCO) or Preferred Provider Organization (PPO) or providers of health care. If your employer has not entered into a contract with an MCO or PPO, you may select a physician or chiropractor from the Panel of Physicians and Chiropractors. Any medical costs related to your industrial injury or OD will be paid by your insurer.    Temporary Total Disability (TTD): If your doctor has certified that you are unable to work for a period of at least 5 consecutive days, or 5 cumulative days in a " 20-day period, or places restrictions on you that your employer does not accommodate, you may be entitled to TTD compensation.    Temporary Partial Disability (TPD): If the wage you receive upon reemployment is less than the compensation for TTD to which you are entitled, the insurer may be required to pay you TPD compensation to make up the difference. TPD can only be paid for a maximum of 24 months.    Permanent Partial Disability (PPD): When your medical condition is stable and there is an indication of a PPD as a result of your injury or OD, within 30 days, your insurer must arrange for an evaluation by a rating physician or chiropractor to determine the degree of your PPD. The amount of your PPD award depends on the date of injury, the results of the PPD evaluation, your age and wage.    Permanent Total Disability (PTD): If you are medically certified by a treating physician or chiropractor as permanently and totally disabled and have been granted a PTD status by your insurer, you are entitled to receive monthly benefits not to exceed 66 2/3% of your average monthly wage. The amount of your PTD payments is subject to reduction if you previously received a lump-sum PPD award.    Vocational Rehabilitation Services: You may be eligible for vocational rehabilitation services if you are unable to return to the job due to a permanent physical impairment or permanent restrictions as a result of your injury or occupational disease.    Transportation and Per Kol[place] Reimbursement: You may be eligible for travel expenses and per kolton associated with medical treatment.    Reopening: You may be able to reopen your claim if your condition worsens after claim closure.     Appeal Process: If you disagree with a written determination issued by the insurer or the insurer does not respond to your request, you may appeal to the Department of Administration, , by following the instructions contained in your  determination letter. You must appeal the determination within 70 days from the date of the determination letter at [address], [address], [place], [place] [address], or [address], [place], [place] [address]. If you disagree with the  decision, you may appeal to the Department of Administration, . You must file your appeal within 30 days from the date of the  decision letter at [address], [address], [place], [place] [address], or [address], [address], [place], [place] [address]. If you disagree with a decision of an , you may file a petition for judicial review with the District Court. You must do so within 30 days of the Appeal Officer’s decision. You may be represented by an  at your own expense or you may contact the [facility] for possible representation.    [place]  for Injured Workers ([place]IW): If you disagree with a  decision, you may request that NAIW represent you without charge at an  Hearing. For information regarding denial of benefits, you may contact the [facility] at: [address], [address], [place] [address], [phone], or [address], [place] [address], [phone]    To File a Complaint with the Division: If you wish to file a complaint with the  of the Division of Industrial Relations (DIR),  please contact the Workers’ Compensation Section, [address], [address], [place], [place] [address], telephone (772) [phone], or [address], [address], [place], [place] [address], telephone [phone].    For assistance with Workers’ Compensation Issues: You may contact the [facility] Office for Consumer Health Assistance, [address], [address], [place], [place] [address], Toll Free 1-234.593.8590, Web site: http://[facility].[place].gov/Programs/LIS E-mail: lis@[facility].[place].gov  D-2 (rev.  10/20)              __________________________________________________________________                                    _________________            Employee Name / Signature                                                                                                                            Date

## 2021-12-27 NOTE — LETTER
"  FORM C-4:  EMPLOYEE’S CLAIM FOR COMPENSATION/ REPORT OF INITIAL TREATMENT  EMPLOYEE’S CLAIM - PROVIDE ALL INFORMATION REQUESTED   First Name Gonsalo Last Name Gilbert Birthdate 1960  Sex male Claim Number   Home Address [address]             Zip [address]                                   Age  61 y.o. Height  1.93 m (6' 4\") Weight  113 kg (249 lb 12.5 oz) [facility]  [id number]   Mailing Address [address]              Zip [address] Telephone  [phone] (home) [phone] (work) Primary Language Spoken   Insurer  *** Third Party   PAIGE GONZALEZ Employee's Occupation (Job Title) When Injury or Occupational Disease Occurred     Employer's Name [employer] Telephone [phone]    Employer Address [address]] Zip [address]   Date of Injury  12/21/2021       Hour of Injury  2:00 PM Date Employer Notified  12/28/2021 Last Day of Work after Injury or Occupational Disease  12/21/2021 Supervisor to Whom Injury Reported  [place]   Address or Location of Accident (if applicable) Work [1]   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 Be specific and answer in detail. Use additional sheet if necessary)  Dont really know was sick with a cold and my hip starting hurting about 10 days ago or so   If you believe that you have an occupational disease, when did you first have knowledge of the disability and it relationship to your employment? N/A Witnesses to the Accident  Recording   Nature of Injury or Occupational Disease  Workers' Compensation Part(s) of Body Injured or Affected  Hip (R), N/A, N/A    I CERTIFY THAT THE ABOVE IS TRUE AND CORRECT TO THE BEST OF MY KNOWLEDGE AND THAT I HAVE PROVIDED THIS INFORMATION IN ORDER TO OBTAIN THE BENEFITS OF [place] " [place]).  I HEREBY AUTHORIZE ANY PHYSICIAN, CHIROPRACTOR, SURGEON, PRACTITIONER, OR OTHER PERSON, ANY HOSPITAL, INCLUDING [facility], ANY MEDICAL SERVICE ORGANIZATION, ANY INSURANCE COMPANY, OR OTHER INSTITUTION OR ORGANIZATION TO RELEASE TO EACH OTHER, ANY MEDICAL OR OTHER INFORMATION, INCLUDING BENEFITS PAID OR PAYABLE, PERTINENT TO THIS INJURY OR DISEASE, EXCEPT INFORMATION RELATIVE TO DIAGNOSIS, TREATMENT AND/OR COUNSELING FOR AIDS, PSYCHOLOGICAL CONDITIONS, ALCOHOL OR CONTROLLED SUBSTANCES, FOR WHICH I MUST GIVE SPECIFIC AUTHORIZATION.  A PHOTOSTAT OF THIS AUTHORIZATION SHALL BE AS VALID AS THE ORIGINAL.  Date                                      Place                                                                             Employee’s Signature   THIS REPORT MUST BE COMPLETED AND MAILED WITHIN 3 WORKING DAYS OF TREATMENT   Place [facility], EMERGENCY DEPT                       Name of Facility [facility]   Date  12/27/2021 Diagnosis  (L02.415) Abscess of hip, right Is there evidence the injured employee was under the influence of alcohol and/or another controlled substance at the time of accident?   Hour  2:49 AM Description of Injury or Disease  Abscess of hip, right     Treatment     Have you advised the patient to remain off work five days or more?             X-Ray Findings    If Yes   From Date    To Date      From information given by the employee, together with medical evidence, can you directly connect this injury or occupational disease as job incurred?   If No, is employee capable of: Full Duty    Modified Duty      Is additional medical care by a physician indicated?   If Modified Duty, Specify any Limitations / Restrictions       Do you know of any previous injury or disease contributing to this condition or occupational disease?      Date 12/28/2021 Print Doctor’s Name Kayla Banres  "certify the employer’s copy of this form was mailed on:   Address [address]  [place] [id number] [phone] INSURER’S USE ONLY   Provider’s Tax ID Number [id number] Telephone Dept: [phone]    Doctor’s Signature   Degree        Form C-4 (rev.10/07)                                                                         BRIEF DESCRIPTION OF RIGHTS AND BENEFITS  (Pursuant to [place] 616[place].050)    Notice of Injury or Occupational Disease (Incident Report Form C-1): If an injury or occupational disease (OD) arises out of and in the course of employment, you must provide written notice to your employer as soon as practicable, but no later than 7 days after the accident or OD. Your employer shall maintain a sufficient supply of the required forms.    Claim for Compensation (Form C-4): If medical treatment is sought, the form C-4 is available at the place of initial treatment. A completed \"Claim for Compensation\" (Form C-4) must be filed within 90 days after an accident or OD. The treating physician or chiropractor must, within 3 working days after treatment, complete and mail to the employer, the employer's insurer and third-party , the Claim for Compensation.    Medical Treatment: If you require medical treatment for your on-the-job injury or OD, you may be required to select a physician or chiropractor from a list provided by your workers’ compensation insurer, if it has contracted with an Organization for Managed Care (MCO) or Preferred Provider Organization (PPO) or providers of health care. If your employer has not entered into a contract with an MCO or PPO, you may select a physician or chiropractor from the Panel of Physicians and Chiropractors. Any medical costs related to your industrial injury or OD will be paid by your insurer.    Temporary Total Disability (TTD): If your doctor has certified that you are unable to work for a period of at least 5 consecutive days, or 5 cumulative days in a " 20-day period, or places restrictions on you that your employer does not accommodate, you may be entitled to TTD compensation.    Temporary Partial Disability (TPD): If the wage you receive upon reemployment is less than the compensation for TTD to which you are entitled, the insurer may be required to pay you TPD compensation to make up the difference. TPD can only be paid for a maximum of 24 months.    Permanent Partial Disability (PPD): When your medical condition is stable and there is an indication of a PPD as a result of your injury or OD, within 30 days, your insurer must arrange for an evaluation by a rating physician or chiropractor to determine the degree of your PPD. The amount of your PPD award depends on the date of injury, the results of the PPD evaluation, your age and wage.    Permanent Total Disability (PTD): If you are medically certified by a treating physician or chiropractor as permanently and totally disabled and have been granted a PTD status by your insurer, you are entitled to receive monthly benefits not to exceed 66 2/3% of your average monthly wage. The amount of your PTD payments is subject to reduction if you previously received a lump-sum PPD award.    Vocational Rehabilitation Services: You may be eligible for vocational rehabilitation services if you are unable to return to the job due to a permanent physical impairment or permanent restrictions as a result of your injury or occupational disease.    Transportation and Per [place] Reimbursement: You may be eligible for travel expenses and per kol[place] associated with medical treatment.    Reopening: You may be able to reopen your claim if your condition worsens after claim closure.     Appeal Process: If you disagree with a written determination issued by the insurer or the insurer does not respond to your request, you may appeal to the Department of Administration, , by following the instructions contained in your  determination letter. You must appeal the determination within 70 days from the date of the determination letter at [address], [address], [place], [place] [address], or [address], [address], [place], [place] [address]. If you disagree with the  decision, you may appeal to the Department of Administration, . You must file your appeal within 30 days from the date of the  decision letter at [address], [address], [place], [place] [address], or [address], [address], [place], [place] [address]. If you disagree with a decision of an , you may file a petition for judicial review with the District Court. You must do so within 30 days of the Appeal Officer’s decision. You may be represented by an  at your own expense or you may contact the [facility] for possible representation.    [place]  for Injured Workers (NAIW): If you disagree with a  decision, you may request that NAIW represent you without charge at an  Hearing. For information regarding denial of benefits, you may contact the [facility] at: [address], [address], [place] [address], [phone], or [address], [place] [address], [phone]    To File a Complaint with the Division: If you wish to file a complaint with the  of the Division of Industrial Relations (DIR),  please contact the Workers’ Compensation Section, [address], [address], [place], [place] [address], telephone ([phone]) [phone], or [address], [place], [place] [address], telephone [phone].    For assistance with Workers’ Compensation Issues: You may contact the [facility] Office for Consumer Health Assistance, [address], [address], [place], [place] [address], Toll Free 1-239.116.9464, Web site: http://[facility].[place].gov/Programs/LIS E-mail: lis@[facility].[place].gov  D-2 (rev.  10/20)              __________________________________________________________________                                    _________________            Employee Name / Signature                                                                                                                            Date

## 2021-12-28 ENCOUNTER — APPOINTMENT (OUTPATIENT)
Dept: RADIOLOGY | Facility: MEDICAL CENTER | Age: 61
DRG: 572 | End: 2021-12-28
Attending: STUDENT IN AN ORGANIZED HEALTH CARE EDUCATION/TRAINING PROGRAM
Payer: COMMERCIAL

## 2021-12-28 ENCOUNTER — ANESTHESIA (OUTPATIENT)
Dept: SURGERY | Facility: MEDICAL CENTER | Age: 61
DRG: 572 | End: 2021-12-28
Payer: COMMERCIAL

## 2021-12-28 ENCOUNTER — ANESTHESIA EVENT (OUTPATIENT)
Dept: SURGERY | Facility: MEDICAL CENTER | Age: 61
DRG: 572 | End: 2021-12-28
Payer: COMMERCIAL

## 2021-12-28 ENCOUNTER — HOSPITAL ENCOUNTER (OUTPATIENT)
Dept: RADIOLOGY | Facility: MEDICAL CENTER | Age: 61
End: 2021-12-28
Attending: STUDENT IN AN ORGANIZED HEALTH CARE EDUCATION/TRAINING PROGRAM

## 2021-12-28 PROBLEM — Z72.0 TOBACCO USE: Status: ACTIVE | Noted: 2019-10-11

## 2021-12-28 PROBLEM — D72.829 LEUKOCYTOSIS: Status: ACTIVE | Noted: 2021-12-28

## 2021-12-28 PROBLEM — K59.00 CN (CONSTIPATION): Status: ACTIVE | Noted: 2021-12-28

## 2021-12-28 PROBLEM — L02.91 ABSCESS: Status: ACTIVE | Noted: 2021-12-28

## 2021-12-28 LAB
ALBUMIN SERPL BCP-MCNC: 4.2 G/DL (ref 3.2–4.9)
ALBUMIN/GLOB SERPL: 1 G/DL
ALP SERPL-CCNC: 157 U/L (ref 30–99)
ALT SERPL-CCNC: 22 U/L (ref 2–50)
ANION GAP SERPL CALC-SCNC: 14 MMOL/L (ref 7–16)
AST SERPL-CCNC: 24 U/L (ref 12–45)
BASOPHILS # BLD AUTO: 0.6 % (ref 0–1.8)
BASOPHILS # BLD: 0.08 K/UL (ref 0–0.12)
BILIRUB SERPL-MCNC: 0.5 MG/DL (ref 0.1–1.5)
BUN SERPL-MCNC: 18 MG/DL (ref 8–22)
CALCIUM SERPL-MCNC: 9.3 MG/DL (ref 8.5–10.5)
CHLORIDE SERPL-SCNC: 97 MMOL/L (ref 96–112)
CO2 SERPL-SCNC: 25 MMOL/L (ref 20–33)
CREAT SERPL-MCNC: 0.71 MG/DL (ref 0.5–1.4)
CRP SERPL HS-MCNC: 11.9 MG/DL (ref 0–0.75)
EKG IMPRESSION: NORMAL
EOSINOPHIL # BLD AUTO: 0.14 K/UL (ref 0–0.51)
EOSINOPHIL NFR BLD: 1 % (ref 0–6.9)
ERYTHROCYTE [DISTWIDTH] IN BLOOD BY AUTOMATED COUNT: 53.9 FL (ref 35.9–50)
ERYTHROCYTE [SEDIMENTATION RATE] IN BLOOD BY WESTERGREN METHOD: 10 MM/HOUR (ref 0–20)
FUNGUS SPEC FUNGUS STN: NORMAL
GLOBULIN SER CALC-MCNC: 4.1 G/DL (ref 1.9–3.5)
GLUCOSE SERPL-MCNC: 75 MG/DL (ref 65–99)
GRAM STN SPEC: NORMAL
HAV IGM SERPL QL IA: ABNORMAL
HBV CORE IGM SER QL: ABNORMAL
HBV SURFACE AG SER QL: ABNORMAL
HCT VFR BLD AUTO: 50.1 % (ref 42–52)
HCV AB SER QL: REACTIVE
HGB BLD-MCNC: 16.1 G/DL (ref 14–18)
HIV 1+2 AB+HIV1 P24 AG SERPL QL IA: NORMAL
IMM GRANULOCYTES # BLD AUTO: 0.07 K/UL (ref 0–0.11)
IMM GRANULOCYTES NFR BLD AUTO: 0.5 % (ref 0–0.9)
LACTATE BLD-SCNC: 2 MMOL/L (ref 0.5–2)
LIPASE SERPL-CCNC: 69 U/L (ref 11–82)
LYMPHOCYTES # BLD AUTO: 1.15 K/UL (ref 1–4.8)
LYMPHOCYTES NFR BLD: 8.3 % (ref 22–41)
MCH RBC QN AUTO: 26.6 PG (ref 27–33)
MCHC RBC AUTO-ENTMCNC: 32.1 G/DL (ref 33.7–35.3)
MCV RBC AUTO: 82.7 FL (ref 81.4–97.8)
MONOCYTES # BLD AUTO: 1.23 K/UL (ref 0–0.85)
MONOCYTES NFR BLD AUTO: 8.9 % (ref 0–13.4)
NEUTROPHILS # BLD AUTO: 11.14 K/UL (ref 1.82–7.42)
NEUTROPHILS NFR BLD: 80.7 % (ref 44–72)
NRBC # BLD AUTO: 0 K/UL
NRBC BLD-RTO: 0 /100 WBC
PLATELET # BLD AUTO: 277 K/UL (ref 164–446)
PMV BLD AUTO: 10.7 FL (ref 9–12.9)
POTASSIUM SERPL-SCNC: 3.9 MMOL/L (ref 3.6–5.5)
PROT SERPL-MCNC: 8.3 G/DL (ref 6–8.2)
RBC # BLD AUTO: 6.06 M/UL (ref 4.7–6.1)
SARS-COV+SARS-COV-2 AG RESP QL IA.RAPID: NOTDETECTED
SIGNIFICANT IND 70042: NORMAL
SIGNIFICANT IND 70042: NORMAL
SITE SITE: NORMAL
SITE SITE: NORMAL
SODIUM SERPL-SCNC: 136 MMOL/L (ref 135–145)
SOURCE SOURCE: NORMAL
SOURCE SOURCE: NORMAL
SPECIMEN SOURCE: NORMAL
WBC # BLD AUTO: 13.8 K/UL (ref 4.8–10.8)

## 2021-12-28 PROCEDURE — 700102 HCHG RX REV CODE 250 W/ 637 OVERRIDE(OP): Performed by: ANESTHESIOLOGY

## 2021-12-28 PROCEDURE — 87426 SARSCOV CORONAVIRUS AG IA: CPT

## 2021-12-28 PROCEDURE — 87077 CULTURE AEROBIC IDENTIFY: CPT

## 2021-12-28 PROCEDURE — 83605 ASSAY OF LACTIC ACID: CPT

## 2021-12-28 PROCEDURE — 700101 HCHG RX REV CODE 250: Performed by: ORTHOPAEDIC SURGERY

## 2021-12-28 PROCEDURE — 93005 ELECTROCARDIOGRAM TRACING: CPT | Performed by: ANESTHESIOLOGY

## 2021-12-28 PROCEDURE — 99223 1ST HOSP IP/OBS HIGH 75: CPT | Mod: 25 | Performed by: INTERNAL MEDICINE

## 2021-12-28 PROCEDURE — 99406 BEHAV CHNG SMOKING 3-10 MIN: CPT | Performed by: INTERNAL MEDICINE

## 2021-12-28 PROCEDURE — 87075 CULTR BACTERIA EXCEPT BLOOD: CPT

## 2021-12-28 PROCEDURE — A9270 NON-COVERED ITEM OR SERVICE: HCPCS | Performed by: STUDENT IN AN ORGANIZED HEALTH CARE EDUCATION/TRAINING PROGRAM

## 2021-12-28 PROCEDURE — 96366 THER/PROPH/DIAG IV INF ADDON: CPT

## 2021-12-28 PROCEDURE — 0JBL0ZZ EXCISION OF RIGHT UPPER LEG SUBCUTANEOUS TISSUE AND FASCIA, OPEN APPROACH: ICD-10-PCS | Performed by: ORTHOPAEDIC SURGERY

## 2021-12-28 PROCEDURE — 96375 TX/PRO/DX INJ NEW DRUG ADDON: CPT

## 2021-12-28 PROCEDURE — 80074 ACUTE HEPATITIS PANEL: CPT

## 2021-12-28 PROCEDURE — 160035 HCHG PACU - 1ST 60 MINS PHASE I: Performed by: ORTHOPAEDIC SURGERY

## 2021-12-28 PROCEDURE — 26990 DRAINAGE OF PELVIS LESION: CPT | Mod: RT | Performed by: ORTHOPAEDIC SURGERY

## 2021-12-28 PROCEDURE — 83690 ASSAY OF LIPASE: CPT

## 2021-12-28 PROCEDURE — 87522 HEPATITIS C REVRS TRNSCRPJ: CPT

## 2021-12-28 PROCEDURE — 700111 HCHG RX REV CODE 636 W/ 250 OVERRIDE (IP): Performed by: INTERNAL MEDICINE

## 2021-12-28 PROCEDURE — 87040 BLOOD CULTURE FOR BACTERIA: CPT | Mod: 91

## 2021-12-28 PROCEDURE — 87205 SMEAR GRAM STAIN: CPT | Mod: 91

## 2021-12-28 PROCEDURE — 87116 MYCOBACTERIA CULTURE: CPT

## 2021-12-28 PROCEDURE — 96376 TX/PRO/DX INJ SAME DRUG ADON: CPT

## 2021-12-28 PROCEDURE — 86140 C-REACTIVE PROTEIN: CPT

## 2021-12-28 PROCEDURE — 96367 TX/PROPH/DG ADDL SEQ IV INF: CPT

## 2021-12-28 PROCEDURE — 99255 IP/OBS CONSLTJ NEW/EST HI 80: CPT | Mod: GC | Performed by: INTERNAL MEDICINE

## 2021-12-28 PROCEDURE — 80053 COMPREHEN METABOLIC PANEL: CPT

## 2021-12-28 PROCEDURE — 700102 HCHG RX REV CODE 250 W/ 637 OVERRIDE(OP): Performed by: STUDENT IN AN ORGANIZED HEALTH CARE EDUCATION/TRAINING PROGRAM

## 2021-12-28 PROCEDURE — 160009 HCHG ANES TIME/MIN: Performed by: ORTHOPAEDIC SURGERY

## 2021-12-28 PROCEDURE — 85652 RBC SED RATE AUTOMATED: CPT

## 2021-12-28 PROCEDURE — 93010 ELECTROCARDIOGRAM REPORT: CPT | Performed by: INTERNAL MEDICINE

## 2021-12-28 PROCEDURE — 160036 HCHG PACU - EA ADDL 30 MINS PHASE I: Performed by: ORTHOPAEDIC SURGERY

## 2021-12-28 PROCEDURE — 501838 HCHG SUTURE GENERAL: Performed by: ORTHOPAEDIC SURGERY

## 2021-12-28 PROCEDURE — 160002 HCHG RECOVERY MINUTES (STAT): Performed by: ORTHOPAEDIC SURGERY

## 2021-12-28 PROCEDURE — 72170 X-RAY EXAM OF PELVIS: CPT

## 2021-12-28 PROCEDURE — 72193 CT PELVIS W/DYE: CPT

## 2021-12-28 PROCEDURE — 700111 HCHG RX REV CODE 636 W/ 250 OVERRIDE (IP): Performed by: ANESTHESIOLOGY

## 2021-12-28 PROCEDURE — 87389 HIV-1 AG W/HIV-1&-2 AB AG IA: CPT

## 2021-12-28 PROCEDURE — A9270 NON-COVERED ITEM OR SERVICE: HCPCS | Performed by: INTERNAL MEDICINE

## 2021-12-28 PROCEDURE — 87102 FUNGUS ISOLATION CULTURE: CPT

## 2021-12-28 PROCEDURE — 700117 HCHG RX CONTRAST REV CODE 255: Performed by: STUDENT IN AN ORGANIZED HEALTH CARE EDUCATION/TRAINING PROGRAM

## 2021-12-28 PROCEDURE — 87070 CULTURE OTHR SPECIMN AEROBIC: CPT

## 2021-12-28 PROCEDURE — 700101 HCHG RX REV CODE 250: Performed by: ANESTHESIOLOGY

## 2021-12-28 PROCEDURE — 36415 COLL VENOUS BLD VENIPUNCTURE: CPT

## 2021-12-28 PROCEDURE — 96365 THER/PROPH/DIAG IV INF INIT: CPT

## 2021-12-28 PROCEDURE — 700105 HCHG RX REV CODE 258: Performed by: ANESTHESIOLOGY

## 2021-12-28 PROCEDURE — 700111 HCHG RX REV CODE 636 W/ 250 OVERRIDE (IP): Performed by: STUDENT IN AN ORGANIZED HEALTH CARE EDUCATION/TRAINING PROGRAM

## 2021-12-28 PROCEDURE — 160027 HCHG SURGERY MINUTES - 1ST 30 MINS LEVEL 2: Performed by: ORTHOPAEDIC SURGERY

## 2021-12-28 PROCEDURE — 26990 DRAINAGE OF PELVIS LESION: CPT | Mod: 80ROC,RT | Performed by: STUDENT IN AN ORGANIZED HEALTH CARE EDUCATION/TRAINING PROGRAM

## 2021-12-28 PROCEDURE — 700102 HCHG RX REV CODE 250 W/ 637 OVERRIDE(OP): Performed by: INTERNAL MEDICINE

## 2021-12-28 PROCEDURE — 160048 HCHG OR STATISTICAL LEVEL 1-5: Performed by: ORTHOPAEDIC SURGERY

## 2021-12-28 PROCEDURE — 160038 HCHG SURGERY MINUTES - EA ADDL 1 MIN LEVEL 2: Performed by: ORTHOPAEDIC SURGERY

## 2021-12-28 PROCEDURE — 770020 HCHG ROOM/CARE - TELE (206)

## 2021-12-28 PROCEDURE — 700105 HCHG RX REV CODE 258: Performed by: STUDENT IN AN ORGANIZED HEALTH CARE EDUCATION/TRAINING PROGRAM

## 2021-12-28 PROCEDURE — 85025 COMPLETE CBC W/AUTO DIFF WBC: CPT

## 2021-12-28 PROCEDURE — A9270 NON-COVERED ITEM OR SERVICE: HCPCS | Performed by: ANESTHESIOLOGY

## 2021-12-28 RX ORDER — OXYCODONE HYDROCHLORIDE 5 MG/1
2.5 TABLET ORAL
Status: DISCONTINUED | OUTPATIENT
Start: 2021-12-28 | End: 2021-12-29

## 2021-12-28 RX ORDER — SODIUM CHLORIDE, SODIUM LACTATE, POTASSIUM CHLORIDE, CALCIUM CHLORIDE 600; 310; 30; 20 MG/100ML; MG/100ML; MG/100ML; MG/100ML
INJECTION, SOLUTION INTRAVENOUS CONTINUOUS
Status: DISCONTINUED | OUTPATIENT
Start: 2021-12-28 | End: 2021-12-30

## 2021-12-28 RX ORDER — MEPERIDINE HYDROCHLORIDE 25 MG/ML
12.5 INJECTION INTRAMUSCULAR; INTRAVENOUS; SUBCUTANEOUS
Status: DISCONTINUED | OUTPATIENT
Start: 2021-12-28 | End: 2021-12-28 | Stop reason: HOSPADM

## 2021-12-28 RX ORDER — SUCCINYLCHOLINE CHLORIDE 20 MG/ML
INJECTION INTRAMUSCULAR; INTRAVENOUS PRN
Status: DISCONTINUED | OUTPATIENT
Start: 2021-12-28 | End: 2021-12-28 | Stop reason: SURG

## 2021-12-28 RX ORDER — HYDROMORPHONE HYDROCHLORIDE 1 MG/ML
1 INJECTION, SOLUTION INTRAMUSCULAR; INTRAVENOUS; SUBCUTANEOUS ONCE
Status: COMPLETED | OUTPATIENT
Start: 2021-12-28 | End: 2021-12-28

## 2021-12-28 RX ORDER — BISACODYL 10 MG
10 SUPPOSITORY, RECTAL RECTAL
Status: DISCONTINUED | OUTPATIENT
Start: 2021-12-28 | End: 2022-01-04 | Stop reason: HOSPADM

## 2021-12-28 RX ORDER — ONDANSETRON 2 MG/ML
4 INJECTION INTRAMUSCULAR; INTRAVENOUS EVERY 4 HOURS PRN
Status: DISCONTINUED | OUTPATIENT
Start: 2021-12-28 | End: 2022-01-04 | Stop reason: HOSPADM

## 2021-12-28 RX ORDER — DIPHENHYDRAMINE HYDROCHLORIDE 50 MG/ML
12.5 INJECTION INTRAMUSCULAR; INTRAVENOUS
Status: DISCONTINUED | OUTPATIENT
Start: 2021-12-28 | End: 2021-12-28 | Stop reason: HOSPADM

## 2021-12-28 RX ORDER — LABETALOL HYDROCHLORIDE 5 MG/ML
5 INJECTION, SOLUTION INTRAVENOUS
Status: DISCONTINUED | OUTPATIENT
Start: 2021-12-28 | End: 2021-12-28 | Stop reason: HOSPADM

## 2021-12-28 RX ORDER — MAGNESIUM HYDROXIDE 1200 MG/15ML
LIQUID ORAL
Status: COMPLETED | OUTPATIENT
Start: 2021-12-28 | End: 2021-12-28

## 2021-12-28 RX ORDER — ROCURONIUM BROMIDE 10 MG/ML
INJECTION, SOLUTION INTRAVENOUS PRN
Status: DISCONTINUED | OUTPATIENT
Start: 2021-12-28 | End: 2021-12-28 | Stop reason: SURG

## 2021-12-28 RX ORDER — ACETAMINOPHEN 325 MG/1
650 TABLET ORAL EVERY 6 HOURS PRN
Status: DISCONTINUED | OUTPATIENT
Start: 2021-12-28 | End: 2022-01-04 | Stop reason: HOSPADM

## 2021-12-28 RX ORDER — MIDAZOLAM HYDROCHLORIDE 1 MG/ML
INJECTION INTRAMUSCULAR; INTRAVENOUS PRN
Status: DISCONTINUED | OUTPATIENT
Start: 2021-12-28 | End: 2021-12-28 | Stop reason: SURG

## 2021-12-28 RX ORDER — PROMETHAZINE HYDROCHLORIDE 12.5 MG/1
12.5-25 SUPPOSITORY RECTAL EVERY 4 HOURS PRN
Status: DISCONTINUED | OUTPATIENT
Start: 2021-12-28 | End: 2022-01-04 | Stop reason: HOSPADM

## 2021-12-28 RX ORDER — HYDROMORPHONE HYDROCHLORIDE 1 MG/ML
0.1 INJECTION, SOLUTION INTRAMUSCULAR; INTRAVENOUS; SUBCUTANEOUS
Status: DISCONTINUED | OUTPATIENT
Start: 2021-12-28 | End: 2021-12-28 | Stop reason: HOSPADM

## 2021-12-28 RX ORDER — SODIUM CHLORIDE, SODIUM LACTATE, POTASSIUM CHLORIDE, CALCIUM CHLORIDE 600; 310; 30; 20 MG/100ML; MG/100ML; MG/100ML; MG/100ML
INJECTION, SOLUTION INTRAVENOUS
Status: DISCONTINUED | OUTPATIENT
Start: 2021-12-28 | End: 2021-12-28 | Stop reason: SURG

## 2021-12-28 RX ORDER — HYDROMORPHONE HYDROCHLORIDE 1 MG/ML
0.2 INJECTION, SOLUTION INTRAMUSCULAR; INTRAVENOUS; SUBCUTANEOUS
Status: DISCONTINUED | OUTPATIENT
Start: 2021-12-28 | End: 2021-12-28 | Stop reason: HOSPADM

## 2021-12-28 RX ORDER — OXYCODONE HYDROCHLORIDE AND ACETAMINOPHEN 5; 325 MG/1; MG/1
1 TABLET ORAL ONCE
Status: COMPLETED | OUTPATIENT
Start: 2021-12-28 | End: 2021-12-28

## 2021-12-28 RX ORDER — OXYCODONE HYDROCHLORIDE AND ACETAMINOPHEN 5; 325 MG/1; MG/1
1 TABLET ORAL
Status: COMPLETED | OUTPATIENT
Start: 2021-12-28 | End: 2021-12-28

## 2021-12-28 RX ORDER — OXYCODONE HYDROCHLORIDE 5 MG/1
5 TABLET ORAL
Status: DISCONTINUED | OUTPATIENT
Start: 2021-12-28 | End: 2021-12-29

## 2021-12-28 RX ORDER — HALOPERIDOL 5 MG/ML
1 INJECTION INTRAMUSCULAR
Status: DISCONTINUED | OUTPATIENT
Start: 2021-12-28 | End: 2021-12-28 | Stop reason: HOSPADM

## 2021-12-28 RX ORDER — PROPOFOL 10 MG/ML
INJECTION, EMULSION INTRAVENOUS PRN
Status: DISCONTINUED | OUTPATIENT
Start: 2021-12-28 | End: 2021-12-28 | Stop reason: SURG

## 2021-12-28 RX ORDER — METOPROLOL TARTRATE 1 MG/ML
1 INJECTION, SOLUTION INTRAVENOUS
Status: DISCONTINUED | OUTPATIENT
Start: 2021-12-28 | End: 2021-12-28 | Stop reason: HOSPADM

## 2021-12-28 RX ORDER — HYDRALAZINE HYDROCHLORIDE 20 MG/ML
5 INJECTION INTRAMUSCULAR; INTRAVENOUS
Status: DISCONTINUED | OUTPATIENT
Start: 2021-12-28 | End: 2021-12-28 | Stop reason: HOSPADM

## 2021-12-28 RX ORDER — KETOROLAC TROMETHAMINE 30 MG/ML
INJECTION, SOLUTION INTRAMUSCULAR; INTRAVENOUS PRN
Status: DISCONTINUED | OUTPATIENT
Start: 2021-12-28 | End: 2021-12-28 | Stop reason: SURG

## 2021-12-28 RX ORDER — HYDROMORPHONE HYDROCHLORIDE 1 MG/ML
0.25 INJECTION, SOLUTION INTRAMUSCULAR; INTRAVENOUS; SUBCUTANEOUS
Status: DISCONTINUED | OUTPATIENT
Start: 2021-12-28 | End: 2021-12-29

## 2021-12-28 RX ORDER — PROMETHAZINE HYDROCHLORIDE 25 MG/1
12.5-25 TABLET ORAL EVERY 4 HOURS PRN
Status: DISCONTINUED | OUTPATIENT
Start: 2021-12-28 | End: 2022-01-04 | Stop reason: HOSPADM

## 2021-12-28 RX ORDER — LABETALOL HYDROCHLORIDE 5 MG/ML
10 INJECTION, SOLUTION INTRAVENOUS EVERY 4 HOURS PRN
Status: DISCONTINUED | OUTPATIENT
Start: 2021-12-28 | End: 2022-01-04 | Stop reason: HOSPADM

## 2021-12-28 RX ORDER — ONDANSETRON 4 MG/1
4 TABLET, ORALLY DISINTEGRATING ORAL EVERY 4 HOURS PRN
Status: DISCONTINUED | OUTPATIENT
Start: 2021-12-28 | End: 2022-01-04 | Stop reason: HOSPADM

## 2021-12-28 RX ORDER — HYDROMORPHONE HYDROCHLORIDE 1 MG/ML
0.5 INJECTION, SOLUTION INTRAMUSCULAR; INTRAVENOUS; SUBCUTANEOUS ONCE
Status: COMPLETED | OUTPATIENT
Start: 2021-12-28 | End: 2021-12-28

## 2021-12-28 RX ORDER — METOPROLOL TARTRATE 1 MG/ML
INJECTION, SOLUTION INTRAVENOUS PRN
Status: DISCONTINUED | OUTPATIENT
Start: 2021-12-28 | End: 2021-12-28 | Stop reason: SURG

## 2021-12-28 RX ORDER — METRONIDAZOLE 500 MG/1
500 TABLET ORAL EVERY 8 HOURS
Status: DISCONTINUED | OUTPATIENT
Start: 2021-12-28 | End: 2021-12-31

## 2021-12-28 RX ORDER — DEXAMETHASONE SODIUM PHOSPHATE 4 MG/ML
INJECTION, SOLUTION INTRA-ARTICULAR; INTRALESIONAL; INTRAMUSCULAR; INTRAVENOUS; SOFT TISSUE PRN
Status: DISCONTINUED | OUTPATIENT
Start: 2021-12-28 | End: 2021-12-28 | Stop reason: SURG

## 2021-12-28 RX ORDER — ONDANSETRON 2 MG/ML
INJECTION INTRAMUSCULAR; INTRAVENOUS PRN
Status: DISCONTINUED | OUTPATIENT
Start: 2021-12-28 | End: 2021-12-28 | Stop reason: SURG

## 2021-12-28 RX ORDER — NICOTINE 21 MG/24HR
14 PATCH, TRANSDERMAL 24 HOURS TRANSDERMAL
Status: DISCONTINUED | OUTPATIENT
Start: 2021-12-28 | End: 2022-01-04 | Stop reason: HOSPADM

## 2021-12-28 RX ORDER — HYDROMORPHONE HYDROCHLORIDE 1 MG/ML
0.4 INJECTION, SOLUTION INTRAMUSCULAR; INTRAVENOUS; SUBCUTANEOUS
Status: DISCONTINUED | OUTPATIENT
Start: 2021-12-28 | End: 2021-12-28 | Stop reason: HOSPADM

## 2021-12-28 RX ORDER — PROCHLORPERAZINE EDISYLATE 5 MG/ML
5-10 INJECTION INTRAMUSCULAR; INTRAVENOUS EVERY 4 HOURS PRN
Status: DISCONTINUED | OUTPATIENT
Start: 2021-12-28 | End: 2022-01-04 | Stop reason: HOSPADM

## 2021-12-28 RX ORDER — OXYCODONE HYDROCHLORIDE AND ACETAMINOPHEN 5; 325 MG/1; MG/1
2 TABLET ORAL
Status: COMPLETED | OUTPATIENT
Start: 2021-12-28 | End: 2021-12-28

## 2021-12-28 RX ORDER — AMOXICILLIN 250 MG
2 CAPSULE ORAL 2 TIMES DAILY
Status: DISCONTINUED | OUTPATIENT
Start: 2021-12-28 | End: 2022-01-04 | Stop reason: HOSPADM

## 2021-12-28 RX ORDER — POLYETHYLENE GLYCOL 3350 17 G/17G
1 POWDER, FOR SOLUTION ORAL
Status: DISCONTINUED | OUTPATIENT
Start: 2021-12-28 | End: 2022-01-04 | Stop reason: HOSPADM

## 2021-12-28 RX ADMIN — DEXAMETHASONE SODIUM PHOSPHATE 4 MG: 4 INJECTION, SOLUTION INTRA-ARTICULAR; INTRALESIONAL; INTRAMUSCULAR; INTRAVENOUS; SOFT TISSUE at 08:43

## 2021-12-28 RX ADMIN — HYDROMORPHONE HYDROCHLORIDE 1 MG: 1 INJECTION, SOLUTION INTRAMUSCULAR; INTRAVENOUS; SUBCUTANEOUS at 01:09

## 2021-12-28 RX ADMIN — FENTANYL CITRATE 50 MCG: 50 INJECTION INTRAMUSCULAR; INTRAVENOUS at 00:38

## 2021-12-28 RX ADMIN — SUCCINYLCHOLINE CHLORIDE 30 MG: 20 INJECTION, SOLUTION INTRAMUSCULAR; INTRAVENOUS; PARENTERAL at 08:56

## 2021-12-28 RX ADMIN — METOPROLOL TARTRATE 1 MG: 5 INJECTION, SOLUTION INTRAVENOUS at 09:11

## 2021-12-28 RX ADMIN — MIDAZOLAM HYDROCHLORIDE 2 MG: 1 INJECTION, SOLUTION INTRAMUSCULAR; INTRAVENOUS at 08:43

## 2021-12-28 RX ADMIN — VANCOMYCIN HYDROCHLORIDE 2750 MG: 5 INJECTION, POWDER, LYOPHILIZED, FOR SOLUTION INTRAVENOUS at 01:18

## 2021-12-28 RX ADMIN — HYDROMORPHONE HYDROCHLORIDE 0.25 MG: 1 INJECTION, SOLUTION INTRAMUSCULAR; INTRAVENOUS; SUBCUTANEOUS at 08:03

## 2021-12-28 RX ADMIN — ROCURONIUM BROMIDE 5 MG: 10 INJECTION, SOLUTION INTRAVENOUS at 08:56

## 2021-12-28 RX ADMIN — FENTANYL CITRATE 50 MCG: 50 INJECTION, SOLUTION INTRAMUSCULAR; INTRAVENOUS at 09:52

## 2021-12-28 RX ADMIN — HYDROMORPHONE HYDROCHLORIDE 0.25 MG: 1 INJECTION, SOLUTION INTRAMUSCULAR; INTRAVENOUS; SUBCUTANEOUS at 20:17

## 2021-12-28 RX ADMIN — FENTANYL CITRATE 50 MCG: 50 INJECTION, SOLUTION INTRAMUSCULAR; INTRAVENOUS at 10:46

## 2021-12-28 RX ADMIN — OXYCODONE HYDROCHLORIDE AND ACETAMINOPHEN 1 TABLET: 5; 325 TABLET ORAL at 02:34

## 2021-12-28 RX ADMIN — DOCUSATE SODIUM 50 MG AND SENNOSIDES 8.6 MG 2 TABLET: 8.6; 5 TABLET, FILM COATED ORAL at 18:36

## 2021-12-28 RX ADMIN — ONDANSETRON 4 MG: 2 INJECTION INTRAMUSCULAR; INTRAVENOUS at 09:14

## 2021-12-28 RX ADMIN — HYDROMORPHONE HYDROCHLORIDE 0.25 MG: 1 INJECTION, SOLUTION INTRAMUSCULAR; INTRAVENOUS; SUBCUTANEOUS at 04:45

## 2021-12-28 RX ADMIN — HYDROMORPHONE HYDROCHLORIDE 0.5 MG: 1 INJECTION, SOLUTION INTRAMUSCULAR; INTRAVENOUS; SUBCUTANEOUS at 02:34

## 2021-12-28 RX ADMIN — SODIUM CHLORIDE 1000 ML: 9 INJECTION, SOLUTION INTRAVENOUS at 00:38

## 2021-12-28 RX ADMIN — KETOROLAC TROMETHAMINE 30 MG: 30 INJECTION, SOLUTION INTRAMUSCULAR at 08:44

## 2021-12-28 RX ADMIN — DOCUSATE SODIUM 50 MG AND SENNOSIDES 8.6 MG 2 TABLET: 8.6; 5 TABLET, FILM COATED ORAL at 05:22

## 2021-12-28 RX ADMIN — METOPROLOL TARTRATE 1 MG: 5 INJECTION, SOLUTION INTRAVENOUS at 09:07

## 2021-12-28 RX ADMIN — HYDROMORPHONE HYDROCHLORIDE 0.25 MG: 1 INJECTION, SOLUTION INTRAMUSCULAR; INTRAVENOUS; SUBCUTANEOUS at 05:06

## 2021-12-28 RX ADMIN — CEFTRIAXONE SODIUM 2 G: 2 INJECTION, POWDER, FOR SOLUTION INTRAMUSCULAR; INTRAVENOUS at 00:37

## 2021-12-28 RX ADMIN — NICOTINE 14 MG: 14 PATCH TRANSDERMAL at 06:00

## 2021-12-28 RX ADMIN — METOPROLOL TARTRATE 1 MG: 5 INJECTION, SOLUTION INTRAVENOUS at 09:03

## 2021-12-28 RX ADMIN — SODIUM CHLORIDE, POTASSIUM CHLORIDE, SODIUM LACTATE AND CALCIUM CHLORIDE: 600; 310; 30; 20 INJECTION, SOLUTION INTRAVENOUS at 09:02

## 2021-12-28 RX ADMIN — IOHEXOL 100 ML: 350 INJECTION, SOLUTION INTRAVENOUS at 01:37

## 2021-12-28 RX ADMIN — OXYCODONE 5 MG: 5 TABLET ORAL at 18:40

## 2021-12-28 RX ADMIN — OXYCODONE HYDROCHLORIDE AND ACETAMINOPHEN 2 TABLET: 5; 325 TABLET ORAL at 09:53

## 2021-12-28 RX ADMIN — METRONIDAZOLE 500 MG: 500 TABLET ORAL at 16:26

## 2021-12-28 RX ADMIN — HALOPERIDOL LACTATE 1 MG: 5 INJECTION, SOLUTION INTRAMUSCULAR at 09:54

## 2021-12-28 RX ADMIN — PROPOFOL 100 MG: 10 INJECTION, EMULSION INTRAVENOUS at 08:56

## 2021-12-28 RX ADMIN — METOPROLOL TARTRATE 25 MG: 25 TABLET, FILM COATED ORAL at 11:04

## 2021-12-28 ASSESSMENT — COGNITIVE AND FUNCTIONAL STATUS - GENERAL
DAILY ACTIVITIY SCORE: 24
SUGGESTED CMS G CODE MODIFIER MOBILITY: CH
MOBILITY SCORE: 24
SUGGESTED CMS G CODE MODIFIER DAILY ACTIVITY: CH

## 2021-12-28 ASSESSMENT — LIFESTYLE VARIABLES
EVER HAD A DRINK FIRST THING IN THE MORNING TO STEADY YOUR NERVES TO GET RID OF A HANGOVER: NO
EVER FELT BAD OR GUILTY ABOUT YOUR DRINKING: NO
TOTAL SCORE: 0
TOTAL SCORE: 0
EVER HAD A DRINK FIRST THING IN THE MORNING TO STEADY YOUR NERVES TO GET RID OF A HANGOVER: NO
ALCOHOL_USE: NO
HAVE PEOPLE ANNOYED YOU BY CRITICIZING YOUR DRINKING: NO
TOTAL SCORE: 0
EVER FELT BAD OR GUILTY ABOUT YOUR DRINKING: NO
HAVE YOU EVER FELT YOU SHOULD CUT DOWN ON YOUR DRINKING: NO
HAVE PEOPLE ANNOYED YOU BY CRITICIZING YOUR DRINKING: NO
TOTAL SCORE: 0
TOTAL SCORE: 0
HOW MANY TIMES IN THE PAST YEAR HAVE YOU HAD 5 OR MORE DRINKS IN A DAY: 0
AVERAGE NUMBER OF DAYS PER WEEK YOU HAVE A DRINK CONTAINING ALCOHOL: 0
CONSUMPTION TOTAL: INCOMPLETE
TOTAL SCORE: 0
DOES PATIENT WANT TO STOP DRINKING: NO
HAVE YOU EVER FELT YOU SHOULD CUT DOWN ON YOUR DRINKING: NO
ON A TYPICAL DAY WHEN YOU DRINK ALCOHOL HOW MANY DRINKS DO YOU HAVE: 0
ALCOHOL_USE: NO
CONSUMPTION TOTAL: NEGATIVE

## 2021-12-28 ASSESSMENT — PAIN SCALES - GENERAL: PAIN_LEVEL: 4

## 2021-12-28 ASSESSMENT — ENCOUNTER SYMPTOMS
FEVER: 1
CHILLS: 0
COUGH: 0
ABDOMINAL PAIN: 0
BLURRED VISION: 0
MYALGIAS: 0
SHORTNESS OF BREATH: 0
NAUSEA: 1
NECK PAIN: 0
HEADACHES: 0
CONSTIPATION: 1
DIZZINESS: 0
BRUISES/BLEEDS EASILY: 0
VOMITING: 0
DEPRESSION: 0

## 2021-12-28 ASSESSMENT — PAIN DESCRIPTION - PAIN TYPE
TYPE: ACUTE PAIN
TYPE: ACUTE PAIN;SURGICAL PAIN
TYPE: ACUTE PAIN
TYPE: ACUTE PAIN
TYPE: ACUTE PAIN;SURGICAL PAIN

## 2021-12-28 NOTE — CONSULTS
"[facility] INFECTIOUS DISEASES INPATIENT CONSULT NOTE     Date of Service: 12/28/2021    Consult Requested By: Dina Quesada M.D.    Reason for Consultation: right hip abscess, antibiotic use    Chief Complaint: right hip pain    History of Present Illness:     Gonsalo Hunt is a 61 y.o. male with a PMH significant for MRSA cellulitis, RLE necrotizing fascitis (s/p fasciotomy Jan 2020), history of multiple abscesses, history of IVDU (last use per patient >1 yr ago), chronic hepatitis C (never treated), pre-diabetes (A1c 5.9% in 9/2021), atrial fibrillation (dx ~3 months ago, currently not on medication), and previous homelessness admitted 12/27/2021 for R hip abscess. Patient states he has \"gotten (his) life back together\" and has been working at [employer]; he thinks he may have bumped his R hip sometime while working. For the last 10 days, he has had increasing swelling in his R hip area with 10/10 pain described as a dull, throbbing sensation which is worse with walking the stairs at work. Accompanying symptoms include subjective fevers, chills, and nausea. These symptoms have worsened during the last 3 days. He presented to the hospital for these symptoms. In the ED, he was found to have tachycardia without fever. Labs were remarkable for leukocytosis with elevated ANC as well as elevated CRP. CT R hip showed a 7 cm abscess with possible myositis. Orthopedics was consulted, and he underwent I&D R hip abscess on 12/28/2021. Was previously started on ceftriaxone and given vancomycin intra-operatively. ID has been consulted for his R hip abscess.    Currently, patient states that he is feeling better. His right thigh pain is now 6/10 but is persistent.     Review of Systems:  All other systems reviewed and are negative expect as noted in HPI    Past Medical History:   Diagnosis Date   • Back pain    • Heart murmur    • Hepatitis C    • Hepatitis C, chronic (HCC)    • Heroin use     has not used any illicit " drugs in 10 years   • Necrotizing fasciitis of lower leg (HCC)        Past Surgical History:   Procedure Laterality Date   • INGUINAL HERNIA REPAIR Left 4/5/2021    Procedure: REPAIR, HERNIA, INGUINAL-INCARCERATED WITH MESH;  Surgeon: Weston Krause M.D.;  Location: [facility];  Service: General   • SPLIT THICKNESS SKIN GRAFT Right 10/1/2020    Procedure: APPLICATION, GRAFT, SKIN, [id number] FROM LEFT THIGH TO RIGHT THIGH, WOUND VAC APPLICATION;  Surgeon: Ayush Martinez M.D.;  Location: [facility];  Service: Orthopedics   • IRRIGATION & DEBRIDEMENT ORTHO Right 9/17/2020    Procedure: IRRIGATION AND DEBRIDEMENT, WOUND-THIGH SKIN GRAFT WITH ACELL;  Surgeon: Ayush Martinez M.D.;  Location: [facility];  Service: Orthopedics   • IRRIGATION & DEBRIDEMENT GENERAL Left 9/17/2020    Procedure: IRRIGATION AND DEBRIDEMENT WITH  ACELL PLACEMENT, LEFT ARM ABSCESS;  Surgeon: Ayush Martinez M.D.;  Location: [facility];  Service: Orthopedics   • IRRIGATION & DEBRIDEMENT ORTHO Left 9/15/2020    Procedure: IRRIGATION AND DEBRIDEMENT, WOUND LEFT ARM AND RIGHT THIGH. WOUND VAC PLACEMENT ON LEFT ARM;  Surgeon: Ayush Martinez M.D.;  Location: [facility];  Service: Orthopedics   • INCISION AND DRAINAGE GENERAL Right 1/20/2020    Procedure: INCISION AND DRAINAGE, WOUND VAC CHANGE;  Surgeon: Amari Blanc M.D.;  Location: [facility];  Service: Orthopedics   • IRRIGATION & DEBRIDEMENT ORTHO Right 1/18/2020    Procedure: IRRIGATION AND DEBRIDEMENT, WOUND- THIGH AND WOUND VAC CHANGE;  Surgeon: Amari Blanc M.D.;  Location: [facility];  Service: Orthopedics   • IRRIGATION & DEBRIDEMENT ORTHO Right 1/16/2020    Procedure: RIGHT THIGH ABSCESS INCISION AND DRAINAGE, WOUND VAC PLACEMENT;  Surgeon: Amari Blanc M.D.;  Location: [facility];  Service: Orthopedics   • OTHER      penectomy   • OTHER ABDOMINAL SURGERY      gall bladder   •  OTHER ORTHOPEDIC SURGERY      laminectomy 1990       Family History   Problem Relation Age of Onset   • No Known Problems Mother    • No Known Problems Father        Social History     Socioeconomic History   • Marital status: Single     Spouse name: Not on file   • Number of children: Not on file   • Years of education: Not on file   • Highest education level: Not on file   Occupational History   • Not on file   Tobacco Use   • Smoking status: Current Every Day Smoker     Packs/day: 0.50     Types: Cigarettes   • Smokeless tobacco: Never Used   Vaping Use   • Vaping Use: Never used   Substance and Sexual Activity   • Alcohol use: Not Currently     Comment: rarely   • Drug use: Yes     Types: Marijuana     Comment: previous IV meth   • Sexual activity: Not on file   Other Topics Concern   • Not on file   Social History Narrative    ** Merged History Encounter **          Social Determinants of Health     Financial Resource Strain:    • Difficulty of Paying Living Expenses: Not on file   Food Insecurity:    • Worried About Running Out of Food in the Last Year: Not on file   • Ran Out of Food in the Last Year: Not on file   Transportation Needs:    • Lack of Transportation (Medical): Not on file   • Lack of Transportation (Non-Medical): Not on file   Physical Activity:    • Days of Exercise per Week: Not on file   • Minutes of Exercise per Session: Not on file   Stress:    • Feeling of Stress : Not on file   Social Connections:    • Frequency of Communication with Friends and Family: Not on file   • Frequency of Social Gatherings with Friends and Family: Not on file   • Attends [employer] Services: Not on file   • Active Member of Clubs or Organizations: Not on file   • Attends Club or Organization Meetings: Not on file   • Marital Status: Not on file   Intimate Partner Violence:    • Fear of Current or Ex-Partner: Not on file   • Emotionally Abused: Not on file   • Physically Abused: Not on file   • Sexually Abused:  Not on file   Housing Stability:    • Unable to Pay for Housing in the Last Year: Not on file   • Number of Places Lived in the Last Year: Not on file   • Unstable Housing in the Last Year: Not on file       Allergies   Allergen Reactions   • Spinach Vomiting and Nausea   • Codeine Rash     Rash  Historical tolerance to hydromorphone, oxycodone   • Codeine    • Penicillins    • Pcn [Penicillins] Unspecified     Unknown reaction. Tolerated ceftriaxone on 10/9/19         Medications:    Current Facility-Administered Medications:   •  [MAR Hold] senna-docusate (PERICOLACE or SENOKOT S) 8.6-50 MG per tablet 2 Tablet, 2 Tablet, Oral, BID, 2 Tablet at 12/28/21 0522 **AND** [MAR Hold] polyethylene glycol/lytes (MIRALAX) PACKET 1 Packet, 1 Packet, Oral, QDAY PRN **AND** [MAR Hold] magnesium hydroxide (MILK OF MAGNESIA) suspension 30 mL, 30 mL, Oral, QDAY PRN **AND** [MAR Hold] bisacodyl (DULCOLAX) suppository 10 mg, 10 mg, Rectal, QDAY PRN, Milly Lanza D.O.  •  [MAR Hold] acetaminophen (Tylenol) tablet 650 mg, 650 mg, Oral, Q6HRS PRN, Milly Lanza D.O.  •  Notify provider if pain remains uncontrolled, , , CONTINUOUS **AND** Use the Numeric Rating Scale (NRS), Nelson-Baker Faces (WBF), or FLACC on regular floors and Critical-Care Pain Observation Tool (CPOT) on ICUs/Trauma to assess pain, , , CONTINUOUS **AND** Pulse Ox, , , CONTINUOUS **AND** [MAR Hold] Pharmacy Consult Request ...Pain Management Review 1 Each, 1 Each, Other, PHARMACY TO DOSE **AND** If patient difficult to arouse and/or has respiratory depression (respiratory rate of 10 or less), stop any opiates that are currently infusing and call a Rapid Response., , , CONTINUOUS, JOSE IrizarryO.  •  [MAR Hold] oxyCODONE immediate-release (ROXICODONE) tablet 2.5 mg, 2.5 mg, Oral, Q3HRS PRN **OR** [MAR Hold] oxyCODONE immediate-release (ROXICODONE) tablet 5 mg, 5 mg, Oral, Q3HRS PRN **OR** [MAR Hold] HYDROmorphone (Dilaudid) injection 0.25 mg, 0.25 mg,  Intravenous, Q3HRS PRN, JOSE IrizarryOYasmeen, 0.25 mg at 12/28/21 0803  •  [MAR Hold] labetalol (NORMODYNE/TRANDATE) injection 10 mg, 10 mg, Intravenous, Q4HRS PRN, JOANNA Irizarry.O.  •  [MAR Hold] ondansetron (ZOFRAN) syringe/vial injection 4 mg, 4 mg, Intravenous, Q4HRS PRN, JOSE IrizarryOYasmeen  •  [MAR Hold] ondansetron (ZOFRAN ODT) dispertab 4 mg, 4 mg, Oral, Q4HRS PRN, JOSE IrizarryOYasmeen  •  [MAR Hold] promethazine (PHENERGAN) tablet 12.5-25 mg, 12.5-25 mg, Oral, Q4HRS PRN, JOANNA Irizarry.O.  •  [MAR Hold] promethazine (PHENERGAN) suppository 12.5-25 mg, 12.5-25 mg, Rectal, Q4HRS PRN, JOANNA Irizarry.O.  •  [MAR Hold] prochlorperazine (COMPAZINE) injection 5-10 mg, 5-10 mg, Intravenous, Q4HRS PRN, JOSE IrizarryOYasmeen  •  [MAR Hold] nicotine (NICODERM) 14 MG/24HR 14 mg, 14 mg, Transdermal, Daily-0600, 14 mg at 12/28/21 0600 **AND** Nicotine Replacement Patient Education Materials, , , Once **AND** [MAR Hold] nicotine polacrilex (NICORETTE) 2 MG piece 2 mg, 2 mg, Oral, Q HOUR PRN, Milly Lanza D.O.  •  [MAR Hold] MD Alert...Vancomycin per Pharmacy, , Other, PHARMACY TO DOSE, Milly Lanza D.O.  •  [MAR Hold] cefTRIAXone (Rocephin) 2 g in  mL IVPB, 2 g, Intravenous, Q24HRS, Milly Lanza D.O.  •  lactated ringers infusion, , Intravenous, Continuous, Alex L Jose, D.O., Last Rate: 50 mL/hr at 12/28/21 0942, Restarted at 12/28/21 0942  •  fentaNYL (SUBLIMAZE) injection 25 mcg, 25 mcg, Intravenous, Q2 MIN PRN **OR** fentaNYL (SUBLIMAZE) injection 50 mcg, 50 mcg, Intravenous, Q2 MIN PRN, JOANNA Luo.O., 50 mcg at 12/28/21 0952  •  haloperidol lactate (HALDOL) injection 1 mg, 1 mg, Intravenous, Q15 MIN PRN, Alex Garcia D.O., 1 mg at 12/28/21 0954  •  diphenhydrAMINE (BENADRYL) injection 12.5 mg, 12.5 mg, Intravenous, Q15 MIN PRN, JOANNA Luo.O.  •  albuterol (PROVENTIL) 2.5mg/0.5ml nebulizer solution 2.5 mg, 2.5 mg, Inhalation, Q10 MIN PRN, Alex Garcia,  "D.O.  •  HYDROmorphone (Dilaudid) injection 0.1 mg, 0.1 mg, Intravenous, Q5 MIN PRN **OR** HYDROmorphone (Dilaudid) injection 0.2 mg, 0.2 mg, Intravenous, Q5 MIN PRN **OR** HYDROmorphone (Dilaudid) injection 0.4 mg, 0.4 mg, Intravenous, Q5 MIN PRN, Alex Garcia D.O.  •  meperidine (DEMEROL) injection 12.5 mg, 12.5 mg, Intravenous, Q5 MIN PRN, Alex Garcia D.O.  •  ePHEDrine injection 5 mg, 5 mg, Intravenous, Q5 MIN PRN, Alex Garcia D.O.  •  Metoprolol Tartrate (LOPRESSOR) injection 1 mg, 1 mg, Intravenous, Q5 MIN PRN, Alex Garcia D.O.  •  labetalol (NORMODYNE/TRANDATE) injection 5 mg, 5 mg, Intravenous, Q HOUR PRN, JOANNA Lou.O.  •  hydrALAZINE (APRESOLINE) injection 5 mg, 5 mg, Intravenous, Q5 MIN PRN, JOANNA Luo.O.  •  metoprolol tartrate (LOPRESSOR) tablet 25 mg, 25 mg, Oral, TWICE DAILY, Dina Quesada M.D., 25 mg at 21 1104    Physical Exam:   Vital Signs: BP (!) 97/75   Pulse 91   Temp 36.2 °C (97.2 °F) (Temporal)   Resp (!) 32   Ht 1.93 m (6' 4\")   Wt 113 kg (249 lb 12.5 oz)   SpO2 91%   BMI 30.40 kg/m²   Temp  Av.4 °C (97.5 °F)  Min: 36.2 °C (97.2 °F)  Max: 36.6 °C (97.9 °F)  Vital signs reviewed    Constitutional: Patient is oriented to person, place, and time. Appears well-developed and well-nourished. No distress, laying in bed, eating.  Head: Atraumatic, normocephalic  Eyes: Conjunctivae normal, EOM intact. Pupils are equal, round, and reactive to light.   Mouth/Throat: Lips without lesions, poor dentition, oropharynx is clear and moist.  Neck: Neck supple. No masses/lymphadenopathy  Cardiovascular: Normal rate, regular rhythm, normal S1S2 and intact distal pulses. No murmur, gallop, or friction rub. No pedal edema.  Pulmonary/Chest: No respiratory distress. Unlabored respiratory effort, lungs clear to auscultation. No wheezes or rales.   Abdominal: Soft, non tender, protuberant. Hypoactive bowel sounds. No masses or hepatosplenomegaly.   Musculoskeletal: " Wound dressing on lateral aspect of R thigh with TTP and swelling. No joint tenderness, swelling, erythema, or restriction of motion noted on LLE.  Neurological: Alert and oriented to person, place, and time. No gross cranial nerve deficit. No focal neural deficit noted  Skin: Skin is warm and dry. No rashes or embolic phenomena noted on exposed skin.  Psychiatric: Normal mood and affect. Behavior is normal.     LABS:  Recent Labs     12/28/21  0023   WBC 13.8*      Recent Labs     12/28/21 0023   HEMOGLOBIN 16.1   HEMATOCRIT 50.1   MCV 82.7   MCH 26.6*   PLATELETCT 277       Recent Labs     12/28/21  0023   SODIUM 136   POTASSIUM 3.9   CHLORIDE 97   CO2 25   CREATININE 0.71        Recent Labs     12/28/21 0023   ALBUMIN 4.2        MICRO:  Blood Culture   Date Value Ref Range Status   06/16/2010 No growth after 5 days of incubation.  Final     Blood Culture Hold   Date Value Ref Range Status   01/16/2020 Collected  Final        Latest pertinent labs were reviewed    IMAGING STUDIES:  CT-PELVIS WITH   Final Result      New 7 cm rim-enhancing fluid collection overlying the right iliotibial band is compatible with abscess favored over hematoma. Aspiration could clarify      Myositis of the superficial gluteus medius muscle is possible. No CT evidence of bony destruction or septic arthropathy      Large left hydrocele post inguinal hernia repair      Direct fatty right inguinal hernia similar to comparison      DX-PELVIS-1 OR 2 VIEWS   Final Result      Large volume rectal vault stool      Mild right hip osteoarthritis          Per my read: right hip abscess near IT band, left inguinal hydrocele    Hospital Course/Assessment:   Gonsalo Hunt is a 61 y.o. male with a PMH significant for MRSA cellulitis, RLE necrotizing fascitis (s/p fasciotomy Jan 2020), history of multiple abscesses, history of IVDU (last use per patient >1 yr ago), chronic hepatitis C (never treated), pre-diabetes (A1c 5.9% in 9/2021), atrial  fibrillation (dx ~3 months ago, currently not on medication), and previous homelessness admitted 12/27/2021 for R hip abscess, s/p I&D 12/28/21.    Left elbow wound from 9/2020 showed growth of MRSA and beta-hemolytic group A strep (MRSA sensitive to vanc + dapto). Would could possibly be polymicrobial.    Pertinent Diagnoses:  #Right hip abscess  #S/p I&D R hip  #History of IVDU (stopped ~1 year ago)  #History of untreated chronic hepatitis C  #History of homelessness  #Leukocytosis    Patient states he has never been treated for hepatitis C. He has received blood transfusions in the past.  Plan:   -Continue ceftriaxone + vancomycin  -Start PO metronidazole  -Repeat Hep panel  -Repeat HIV panel  -Follow up on hip abscess cultures and blood culture  -Discussion with patient on 12/29 regarding exact nature of his penicillin allergy  -Refer to ID for outpatient treatment of hep C    Plan was discussed with the primary team. Thank you for this consult.    Please feel free to call with questions.    This presentation represents a severe exacerbation of the patient's chornic illness This illness poses a threat to the patient's life or bodily function Management of this patient involves drugs requiring intensive monitoring for toxicity    Ranjana Garza M.D.    Please note that this dictation was created using voice recognition software. I have worked with technical experts from [employer] to optimize the interface.  I have made every reasonable attempt to correct obvious errors, but there may be errors of grammar and possibly content that I did not discover before finalizing the note.

## 2021-12-28 NOTE — ED PROVIDER NOTES
ED Provider Note    CHIEF COMPLAINT  Chief Complaint   Patient presents with   • Hip Pain     Pt complains of pain and swelling to Rt hip x1 week. States that he's previously had dislocations to same hip. Work comp injury, pain when walking.       HPI  Gonsalo Hunt is a 61 y.o. male with past medical history of necrotizing fasciitis of his right lower leg, IV drug use, hep C, MRSA cellulitis presenting with swelling to his right hip.  Patient states he began developing pain in the area approximately 10 days ago but then has had increased swelling, subjective fevers, nausea over the last 3 to 4 days.  He denies trauma or falls.  Has had increased pain when walking.  Reports previous dislocations to that hip.  History of prior surgeries and wound vacs.  Denies abdominal pain, chest pain, shortness of breath.    REVIEW OF SYSTEMS  See HPI for further details. All other systems are negative.     PAST MEDICAL HISTORY   has a past medical history of Back pain, Heart murmur, Hepatitis C, Hepatitis C, chronic (HCC), Heroin use, and Necrotizing fasciitis of lower leg (HCC).    SOCIAL HISTORY  Social History     Tobacco Use   • Smoking status: Current Every Day Smoker     Packs/day: 0.50     Types: Cigarettes   • Smokeless tobacco: Never Used   Vaping Use   • Vaping Use: Never used   Substance and Sexual Activity   • Alcohol use: Not Currently     Comment: rarely   • Drug use: Yes     Types: Marijuana     Comment: previous IV meth   • Sexual activity: Not on file       SURGICAL HISTORY   has a past surgical history that includes other orthopedic surgery; other abdominal surgery; irrigation & debridement ortho (Right, 1/16/2020); irrigation & debridement ortho (Right, 1/18/2020); incision and drainage general (Right, 1/20/2020); irrigation & debridement ortho (Left, 9/15/2020); irrigation & debridement ortho (Right, 9/17/2020); irrigation & debridement general (Left, 9/17/2020); split thickness skin graft (Right,  "10/1/2020); inguinal hernia repair (Left, 4/5/2021); and other.    CURRENT MEDICATIONS  Home Medications    **Home medications have not yet been reviewed for this encounter**         ALLERGIES  Allergies   Allergen Reactions   • Spinach Vomiting and Nausea   • Codeine Rash     Rash  Historical tolerance to hydromorphone, oxycodone   • Codeine    • Penicillins    • Pcn [Penicillins] Unspecified     Unknown reaction. Tolerated ceftriaxone on 10/9/19         PHYSICAL EXAM  VITAL SIGNS: /76   Pulse 61   Temp 36.4 °C (97.5 °F) (Temporal)   Resp 16   Ht 1.93 m (6' 4\")   Wt 113 kg (249 lb 12.5 oz)   SpO2 96%   BMI 30.40 kg/m²     Pulse ox interpretation: I interpret this pulse ox as normal.  Constitutional: Alert in no apparent distress.  HENT: No signs of trauma, Bilateral external ears normal, Nose normal.   Eyes: Pupils are equal and reactive, Conjunctiva normal, Non-icteric.   Neck: Normal range of motion, No tenderness, Supple, No stridor.   Cardiovascular: Regular rate and rhythm, no murmurs.   Thorax & Lungs: Normal breath sounds, No respiratory distress, No wheezing, No chest tenderness.   Abdomen: Soft, No tenderness, No masses, No pulsatile masses. No peritoneal signs.  Skin: Warm, Dry, No erythema, No rash.   Extremities: Intact distal pulses, No edema, No tenderness, No cyanosis.  Musculoskeletal: Right hip with overlying induration, erythema, warmth, swelling, pain with passive range of motion of right hip.  Well-healed incision all across right lateral thigh  Neurologic: Alert , Normal motor function, Normal sensory function, No focal deficits noted.   Psychiatric: Affect normal, Judgment normal, Mood normal.       DIAGNOSTIC STUDIES / PROCEDURES      LABS  Results for orders placed or performed during the hospital encounter of 12/27/21   CBC WITH DIFFERENTIAL   Result Value Ref Range    WBC 13.8 (H) 4.8 - 10.8 K/uL    RBC 6.06 4.70 - 6.10 M/uL    Hemoglobin 16.1 14.0 - 18.0 g/dL    Hematocrit " 50.1 42.0 - 52.0 %    MCV 82.7 81.4 - 97.8 fL    MCH 26.6 (L) 27.0 - 33.0 pg    MCHC 32.1 (L) 33.7 - 35.3 g/dL    RDW 53.9 (H) 35.9 - 50.0 fL    Platelet Count 277 164 - 446 K/uL    MPV 10.7 9.0 - 12.9 fL    Neutrophils-Polys 80.70 (H) 44.00 - 72.00 %    Lymphocytes 8.30 (L) 22.00 - 41.00 %    Monocytes 8.90 0.00 - 13.40 %    Eosinophils 1.00 0.00 - 6.90 %    Basophils 0.60 0.00 - 1.80 %    Immature Granulocytes 0.50 0.00 - 0.90 %    Nucleated RBC 0.00 /100 WBC    Neutrophils (Absolute) 11.14 (H) 1.82 - 7.42 K/uL    Lymphs (Absolute) 1.15 1.00 - 4.80 K/uL    Monos (Absolute) 1.23 (H) 0.00 - 0.85 K/uL    Eos (Absolute) 0.14 0.00 - 0.51 K/uL    Baso (Absolute) 0.08 0.00 - 0.12 K/uL    Immature Granulocytes (abs) 0.07 0.00 - 0.11 K/uL    NRBC (Absolute) 0.00 K/uL   COMP METABOLIC PANEL   Result Value Ref Range    Sodium 136 135 - 145 mmol/L    Potassium 3.9 3.6 - 5.5 mmol/L    Chloride 97 96 - 112 mmol/L    Co2 25 20 - 33 mmol/L    Anion Gap 14.0 7.0 - 16.0    Glucose 75 65 - 99 mg/dL    Bun 18 8 - 22 mg/dL    Creatinine 0.71 0.50 - 1.40 mg/dL    Calcium 9.3 8.5 - 10.5 mg/dL    AST(SGOT) 24 12 - 45 U/L    ALT(SGPT) 22 2 - 50 U/L    Alkaline Phosphatase 157 (H) 30 - 99 U/L    Total Bilirubin 0.5 0.1 - 1.5 mg/dL    Albumin 4.2 3.2 - 4.9 g/dL    Total Protein 8.3 (H) 6.0 - 8.2 g/dL    Globulin 4.1 (H) 1.9 - 3.5 g/dL    A-G Ratio 1.0 g/dL   LIPASE   Result Value Ref Range    Lipase 69 11 - 82 U/L   LACTIC ACID   Result Value Ref Range    Lactic Acid 2.0 0.5 - 2.0 mmol/L   CRP QUANTITIVE (NON-CARDIAC)   Result Value Ref Range    Stat C-Reactive Protein 11.90 (H) 0.00 - 0.75 mg/dL   Sed Rate   Result Value Ref Range    Sed Rate Westergren 10 0 - 20 mm/hour   ESTIMATED GFR   Result Value Ref Range    GFR If African American >60 >60 mL/min/1.73 m 2    GFR If Non African American >60 >60 mL/min/1.73 m 2         RADIOLOGY  CT-PELVIS WITH   Final Result      New 7 cm rim-enhancing fluid collection overlying the right iliotibial  band is compatible with abscess favored over hematoma. Aspiration could clarify      Myositis of the superficial gluteus medius muscle is possible. No CT evidence of bony destruction or septic arthropathy      Large left hydrocele post inguinal hernia repair      Direct fatty right inguinal hernia similar to comparison      DX-PELVIS-1 OR 2 VIEWS   Final Result      Large volume rectal vault stool      Mild right hip osteoarthritis            COURSE & MEDICAL DECISION MAKING  Pertinent Labs & Imaging studies reviewed. (See chart for details)  2:17 AM  Dr. Pool falk accepts for admission.     2:40 AM  Spoke with Dr. Kaur from orthopedics.  He recommends keeping the patient n.p.o., will add on for surgery sometime later today.    2:51 AM  Asked patient about work injury he states the only work injury he has had was a back injury back in the 90s.      DDX: Abscess, septic joint, necrotizing fasciitis, dislocation, fracture, osteomyelitis      61-year-old male with complex history of right leg infections in the past presenting with new onset swelling, redness, and mass of the right hip.  On exam this appears to be abscess versus septic bursitis.  Patient also has some pain in the joint with range of motion, cannot rule out septic hip.  Leukocytosis present on labs as well as markedly elevated CRP however ESR was normal.  X-ray showed no obvious fractures or hardware, although did show some mild right hip arthritis.  CT showed a large fluid collection concerning for abscess with possible underlying myositis although did not show any evidence of bony destruction or septic arthropathy.  Patient had normal vital signs and was afebrile here, but given his history he was covered with antibiotics including vancomycin, ceftriaxone.  His lactic was normal.  Plan is for admission to hospitalist with orthopedics consult for drainage and further evaluation of the joint space and underlying debridement if  needed.      FINAL IMPRESSION  1. Abscess of hip, right           Electronically signed by: Kayla Barnes M.D., 12/27/2021 11:49 PM

## 2021-12-28 NOTE — ASSESSMENT & PLAN NOTE
Right upper thigh   Orthopedic surgery consulted   S/p surgical I&D on 12/28  ID following, recommendations appreciated  Continue Abx as per ID recommendations    1/2: As per IDs note, patient to continue ceftriaxone and Flagyl until 1/11/2022.  We will reach out to ID to see if there are any oral choices we can give, otherwise we will try to set up patient for outpatient infusion.    1/3: I spoke to ID and they recommended that we can switch to cefdinir and Flagyl until 1/11/2022.  I spoke to orthopedic surgery, they would like for the drain to have an output of 25 cc or less before removal.  Patient currently having an output of 30 cc. They recommend one more day of observation with possible discontinuing drain tomorrow.

## 2021-12-28 NOTE — OP REPORT
DATE OF OPERATION: 12/28/2021     PREOPERATIVE DIAGNOSIS: Right hip abscess    POSTOPERATIVE DIAGNOSIS: Same    PROCEDURE PERFORMED: Incision and drainage of right hip abscess    SURGEON: Weston Kaur M.D.     ASSISTANT: Silvio Hines MD    ANESTHESIA: General    SPECIMEN: None    ESTIMATED BLOOD LOSS: 10 mL    IMPLANTS: None      INDICATIONS: The patient is a 61 y.o. male who presented with a right hip abscess that is been developing over the last week.  CT scan showed a large abscess.  I recommended debridement and drainage of this and placement of a deep drain.  I discussed the risks and benefits of the procedure which include but are not limited to risks of infection, wound healing complication, neurovascular injury, pain, and the medical risks of anesthesia including MI, stroke, and death.  Alternatives to surgery were also discussed, including non-operative management, which I did not recommend.  The patient was in agreement with the plan to proceed, and the informed consent was signed and documented.  I met with the patient pre-operatively and marked the operative extremity with their agreement.  We proceeded to the operating room.     DESCRIPTION OF PROCEDURE:  Patient was seen in the preoperative holding area on the day of surgery. The operative site was marked with my initials.  he was taken to the operating room and placed supine on the operative table.  Anesthesia was induced.  The operative extremity was prepped and draped in the normal sterile fashion.  Operative pause was conducted and the correct patient, site, side, procedure, and surgeon's initials on extremity were identified.  The incision was made centered over the area of fluctuance and abscess.  This abscess cavity was under pressure and purulence explicitly decompressed from this location.  This was sent for culture.  The wound was debrided excising any nonviable tissue from within the wound.  This was then irrigated copiously with  normal saline.  A large bore deep drain was then placed.  The wound was closed with 2-0 PDS and 2-0 nylon suture.  The drain was sutured in place.  Sterile dressings were applied.  Patient tolerated the procedure well and awoke in the operating room and taken to PACU in stable edition    POSTOPERATIVE PLAN: As tolerated weightbearing to the right lower extremity.  Continue drain until drainage has slowed or stopped.  Follow cultures.  Antibiotics.  Sutures out in 2 weeks.      ____________________________________   Weston Kaur M.D.   DD: 12/28/2021  9:14 AM

## 2021-12-28 NOTE — ED NOTES
Pt was asleep down the crawley and did not hear his name when called. Pt was informed that he would have to wait for another room.

## 2021-12-28 NOTE — PROGRESS NOTES
Pt arrived to unit on zoll with PACU RN. Pt placed on tele monitor, monitor room notified. Pt  Oriented to unit and given call light. Pt states no further needs at this time.

## 2021-12-28 NOTE — ED NOTES
Pt resting on stretcher awaiting MD assessment. Blankets provided and lights dimmed for pt comfort.

## 2021-12-28 NOTE — ED NOTES
Patient resting in bed. No acute distress.    Fall and safety precautions maintained.     Hourly rounding in progress.

## 2021-12-28 NOTE — PROGRESS NOTES
Right hip abscess on CT, previous necrotizing infection to the RLE last year    PLAN  I&D right hip  Cultures  abx

## 2021-12-28 NOTE — ANESTHESIA PREPROCEDURE EVALUATION
Case: [id number] Date/Time: 12/28/21 0926    Procedure: INCISION AND DRAINAGE, ABSCESS, SKIN (Right Thigh)    Pre-op diagnosis: abscess right thigh    Location: [facility] OR 16 / SURGERY [facility]    Surgeons: Weston Kaur M.D.          Relevant Problems   PULMONARY   (positive) History of MRSA infection      NEURO   (positive) History of MRSA infection      CARDIAC   (positive) Murmur         (positive) Chronic hepatitis C virus infection (HCC)      Other   (positive) Pyogenic arthritis of left elbow (HCC)       Physical Exam    Airway   Mallampati: II  TM distance: >3 FB  Neck ROM: full       Cardiovascular - normal exam  Rhythm: regular  Rate: normal  (-) murmur     Dental - normal exam           Pulmonary - normal exam  Breath sounds clear to auscultation     Abdominal    Neurological - normal exam                 Anesthesia Plan    ASA 3   ASA physical status 3 criteria: alcohol and/or substance dependence or abuse    Plan - general       Airway plan will be ETT          Induction: intravenous    Postoperative Plan: Postoperative administration of opioids is intended.    Pertinent diagnostic labs and testing reviewed    Informed Consent:    Anesthetic plan and risks discussed with patient.    Use of blood products discussed with: patient whom consented to blood products.

## 2021-12-28 NOTE — ANESTHESIA TIME REPORT
Anesthesia Start and Stop Event Times     Date Time Event    12/28/2021 0722 Ready for Procedure     0843 Anesthesia Start     0929 Anesthesia Stop        Responsible Staff  12/28/21    Name Role Begin End    Alex Garcia D.O. Anesth 0843 0929        Preop Diagnosis (Free Text):  Pre-op Diagnosis     abscess right thigh        Preop Diagnosis (Codes):    Premium Reason  Non-Premium    Comments:

## 2021-12-28 NOTE — ANESTHESIA POSTPROCEDURE EVALUATION
Patient: Gonsalo Hunt    Procedure Summary     Date: 12/28/21 Room / Location: [place] [id number] / SURGERY [facility]    Anesthesia Start: 0843 Anesthesia Stop: 0929    Procedure: INCISION AND DRAINAGE, ABSCESS, SKIN (Right Thigh) Diagnosis: (abscess right thigh)    Surgeons: Weston Kaur M.D. Responsible Provider: Alex Garcia D.O.    Anesthesia Type: general ASA Status: 3          Final Anesthesia Type: general  Last vitals  BP   Blood Pressure: 129/89    Temp   36.6 °C (97.9 °F)    Pulse   96   Resp   15    SpO2   95 %      Anesthesia Post Evaluation    Patient location during evaluation: PACU  Patient participation: complete - patient participated  Level of consciousness: awake and alert  Pain score: 4    Airway patency: patent  Anesthetic complications: no  Cardiovascular status: hemodynamically stable and tachycardic  Respiratory status: acceptable  Hydration status: euvolemic    PONV: none          No complications documented.     Nurse Pain Score: 8 (NPRS)

## 2021-12-28 NOTE — OR NURSING
0930: Pt arrived from OR post I and D of R hip under anesthesia. Pt is asleep. Sight dressing is mostly CDI with ELBERT drain present and to bulb suction. Cardiac rhythm appears to be Afib 110. EKG per Dr. Gacria's verbal order.     0939: EKG tech to bedside. Pt awakens but is drowsy; denies nausea or pain. EKG shows A-fib.     1010: Pt tolerating orals. Pt declines to have RN updated anyone.     1029: Spoke with Dr. Quesada and received orders to admit to telemetry; transfer order placed and bed control updated.     1040: Dr. Quesada to bedside; discussed POC.     1200: Belongings x 5 bags returned to pt. Pt tolerating boxed lunch.     1444: Report to FERNANDO Aguirre. Dressing is still CDI.     1500: Pt to [id number] via kamlesh with RN. Pt on 2L; tank is full; belongings with pt.

## 2021-12-28 NOTE — ED TRIAGE NOTES
Chief Complaint   Patient presents with   • Hip Pain     Pt complains of pain and swelling to Rt hip x1 week. States that he's previously had dislocations to same hip. Work comp injury, pain when walking.     Pt educated upon triage process and told to inform  staff of any changes in condition so that Pt may be reassessed. No further questions at this time. Pt sitting out in lobby.

## 2021-12-28 NOTE — H&P
Hospital Medicine History & Physical Note    Date of Service  12/28/2021    Primary Care Physician  Pcp Pt States None    Consultants  orthopedics    Specialist Names: Dr. Kaur    Code Status  Full Code    Chief Complaint  Chief Complaint   Patient presents with   • Hip Pain     Pt complains of pain and swelling to Rt hip x1 week. States that he's previously had dislocations to same hip. Work comp injury, pain when walking.       History of Presenting Illness  Gonsalo Hunt is a 61 y.o. male with PMH necrotizing fascitis,, IV drug use, hep C who presented 12/27/2021 with right hip swelling. Patients reports right hip pain starting 10 days ago, but worsening over the last 3 days associated with swelling, fevers and nausea. Pain worsens with walking, denies trauma. States pain is constant at this point only minimally improved with pain meds given in the ER. Patient reports he is no longer homeless and stopped doing IV drugs >1 year ago, still smokes marijuana, and smokes 1 pack of cigarettes q5 days.    In the ER vitals stable, labs significant for wbc 13.8, CRP 11.90, and normal lactic acid. XR of pelvis showed large volume rectal vault stool and mild right hip osteoarthritis. CT pelvis showed 7 cm rim-enhancing fluid collection over right iliotibial band compatible with abscess and possible myositis of the superficial gluteus medius muscle. Orthopedic surgery consulted by the ER.     I discussed the plan of care with patient.    Review of Systems  Review of Systems   Constitutional: Positive for fever and malaise/fatigue. Negative for chills.   HENT: Negative for congestion.    Eyes: Negative for blurred vision.   Respiratory: Negative for shortness of breath.    Cardiovascular: Negative for chest pain.   Gastrointestinal: Positive for constipation and nausea. Negative for abdominal pain and vomiting.   Genitourinary: Negative for dysuria.   Musculoskeletal: Negative for myalgias.        Right hip  swelling/pain   Skin: Negative for rash.   Neurological: Negative for dizziness and headaches.   Endo/Heme/Allergies: Does not bruise/bleed easily.   Psychiatric/Behavioral: Negative for depression.   All other systems reviewed and are negative.      Past Medical History   has a past medical history of Back pain, Heart murmur, Hepatitis C, Hepatitis C, chronic (HCC), Heroin use, and Necrotizing fasciitis of lower leg (HCC).    Surgical History   has a past surgical history that includes other orthopedic surgery; other abdominal surgery; irrigation & debridement ortho (Right, 1/16/2020); irrigation & debridement ortho (Right, 1/18/2020); incision and drainage general (Right, 1/20/2020); irrigation & debridement ortho (Left, 9/15/2020); irrigation & debridement ortho (Right, 9/17/2020); irrigation & debridement general (Left, 9/17/2020); split thickness skin graft (Right, 10/1/2020); inguinal hernia repair (Left, 4/5/2021); and other.     Family History  family history includes No Known Problems in his father and mother.   Family history reviewed with patient. There is no family history that is pertinent to the chief complaint.     Social History   reports that he has been smoking cigarettes. He has been smoking about 0.50 packs per day. He has never used smokeless tobacco. He reports previous alcohol use. He reports current drug use. Drug: Marijuana.    Allergies  Allergies   Allergen Reactions   • Spinach Vomiting and Nausea   • Codeine Rash     Rash  Historical tolerance to hydromorphone, oxycodone   • Codeine    • Penicillins    • Pcn [Penicillins] Unspecified     Unknown reaction. Tolerated ceftriaxone on 10/9/19         Medications  Prior to Admission Medications   Prescriptions Last Dose Informant Patient Reported? Taking?   acetaminophen (TYLENOL) 325 MG Tab  Patient No No   Sig: Take 2 Tabs by mouth 3 times a day.   Patient not taking: Reported on 4/5/2021   ondansetron (ZOFRAN ODT) 4 MG TABLET DISPERSIBLE    No No   Sig: Take 1 tablet by mouth every 6 hours as needed (Nausea/vomiting).   polyethylene glycol/lytes (MIRALAX) 17 g Pack  Patient No No   Sig: Take 1 Packet by mouth every day.   Patient not taking: Reported on 4/5/2021   senna-docusate (PERICOLACE OR SENOKOT S) 8.6-50 MG Tab   No No   Sig: Take 2 Tablets by mouth 2 times a day.      Facility-Administered Medications: None       Physical Exam  Temp:  [36.3 °C (97.3 °F)-36.4 °C (97.5 °F)] 36.4 °C (97.5 °F)  Pulse:  [61-70] 61  Resp:  [16] 16  BP: (114-116)/(76-81) 116/76  SpO2:  [96 %-98 %] 96 %  Blood Pressure: 116/76   Temperature: 36.4 °C (97.5 °F)   Pulse: 61   Respiration: 16   Pulse Oximetry: 96 %       Physical Exam  Vitals and nursing note reviewed.   Constitutional:       General: He is not in acute distress.     Appearance: He is obese.   HENT:      Head: Normocephalic and atraumatic.      Right Ear: External ear normal.      Left Ear: External ear normal.      Nose: Nose normal.      Mouth/Throat:      Mouth: Mucous membranes are dry.      Pharynx: Oropharynx is clear.   Eyes:      Conjunctiva/sclera: Conjunctivae normal.      Pupils: Pupils are equal, round, and reactive to light.   Cardiovascular:      Rate and Rhythm: Normal rate and regular rhythm.      Pulses: Normal pulses.   Pulmonary:      Effort: Pulmonary effort is normal. No respiratory distress.      Breath sounds: Normal breath sounds. No wheezing.   Abdominal:      General: Abdomen is flat. There is no distension.      Palpations: Abdomen is soft.      Tenderness: There is no abdominal tenderness. There is no guarding.   Musculoskeletal:         General: Swelling (large area hard to palpation on lateral right thigh) and tenderness present. Normal range of motion.      Cervical back: Normal range of motion and neck supple.      Right lower leg: No edema.      Left lower leg: No edema.   Skin:     General: Skin is warm and dry.      Findings: No rash.      Comments: surgical scar vertical  right thigh  Multiple scabs on LEs   Neurological:      General: No focal deficit present.      Mental Status: He is alert and oriented to person, place, and time.      Cranial Nerves: No cranial nerve deficit.   Psychiatric:         Mood and Affect: Mood normal.         Behavior: Behavior normal.         Laboratory:  Recent Labs     12/28/21  0023   WBC 13.8*   RBC 6.06   HEMOGLOBIN 16.1   HEMATOCRIT 50.1   MCV 82.7   MCH 26.6*   MCHC 32.1*   RDW 53.9*   PLATELETCT 277   MPV 10.7     Recent Labs     12/28/21  0023   SODIUM 136   POTASSIUM 3.9   CHLORIDE 97   CO2 25   GLUCOSE 75   BUN 18   CREATININE 0.71   CALCIUM 9.3     Recent Labs     12/28/21  0023   ALTSGPT 22   ASTSGOT 24   ALKPHOSPHAT 157*   TBILIRUBIN 0.5   LIPASE 69   GLUCOSE 75         No results for input(s): NTPROBNP in the last 72 hours.      No results for input(s): TROPONINT in the last 72 hours.    Imaging:  CT-PELVIS WITH   Final Result      New 7 cm rim-enhancing fluid collection overlying the right iliotibial band is compatible with abscess favored over hematoma. Aspiration could clarify      Myositis of the superficial gluteus medius muscle is possible. No CT evidence of bony destruction or septic arthropathy      Large left hydrocele post inguinal hernia repair      Direct fatty right inguinal hernia similar to comparison      DX-PELVIS-1 OR 2 VIEWS   Final Result      Large volume rectal vault stool      Mild right hip osteoarthritis          no X-Ray or EKG requiring interpretation    Assessment/Plan:  I anticipate this patient will require at least two midnights for appropriate medical management, necessitating inpatient admission.    * Abscess- (present on admission)  Assessment & Plan  Right upper thigh   Orthopedic surgery consulted   -keep NPO for surgery in am   IV abx   Pain control     Leukocytosis- (present on admission)  Assessment & Plan  Mild WBC 13 on admission   2/2 iliotibial abscess   IV abx   Cultures pending     CN  (constipation)- (present on admission)  Assessment & Plan  Bowel regimen ordered    Tobacco use- (present on admission)  Assessment & Plan  Smokes 1 pack of cigs in 5 days   Thinking about quitting, weaning himself off   Cessation counseling performed 5 minutes     Chronic hepatitis C virus infection (HCC)- (present on admission)  Assessment & Plan  Hx of   AST/ALT wnl    Cannabis use disorder, mild, abuse- (present on admission)  Assessment & Plan  Ongoing, cessation counseling      VTE prophylaxis: SCDs/TEDs and pharmacologic prophylaxis contraindicated due to surgery in am

## 2021-12-28 NOTE — PROGRESS NOTES
Hospital Medicine Daily Progress Note    Date of Service  12/28/2021    Chief Complaint  Gonsalo Hunt is a 61 y.o. male admitted 12/27/2021 with hip pain.    Hospital Course  This is a 61 y.o. male with PMH necrotizing fascitis,, IV drug use, hep C who presented 12/27/2021 with right hip swelling. Patients reports right hip pain starting 10 days ago, but worsening over the last 3 days associated with swelling, fevers and nausea. Pain worsens with walking.  Patient reports he is no longer homeless and stopped doing IV drugs >1 year ago, still smokes marijuana, and smokes 1 pack of cigarettes q5 days  In the ER XR of pelvis showed large volume rectal vault stool and mild right hip osteoarthritis. CT pelvis showed 7 cm rim-enhancing fluid collection over right iliotibial band compatible with abscess and possible myositis of the superficial gluteus medius muscle. Orthopedic surgery consulted and patient admitted for further treatment     Interval Problem Update  Patient seen and examined, I&D today with ortho for his right hip abscess. Infection disease also consulted appreciate rec.  Patient with A.fibb after surgery. Patient states he was diagnosed with it a [facility] and was given a prescription he never filled. Doesn't remember what medication it was.    I have personally seen and examined the patient at bedside. I discussed the plan of care with patient and bedside RN.    Consultants/Specialty  Ortho: Dr. Kaur   Infection disease: Dr. Galvan     Code Status  Full Code    Disposition  Patient is not medically cleared for discharge.   Anticipate discharge to TBD .  I have placed the appropriate orders for post-discharge needs.    Review of Systems  Review of Systems   Constitutional: Positive for fever and malaise/fatigue. Negative for chills.   HENT: Negative for congestion.    Eyes: Negative for blurred vision.   Respiratory: Negative for cough and shortness of breath.    Cardiovascular:  Negative for chest pain.   Gastrointestinal: Positive for constipation and nausea. Negative for abdominal pain and vomiting.   Genitourinary: Negative for dysuria.   Musculoskeletal: Negative for myalgias and neck pain.        Right hip swelling/pain   Skin: Negative for rash.   Neurological: Negative for dizziness and headaches.   Endo/Heme/Allergies: Does not bruise/bleed easily.   Psychiatric/Behavioral: Negative for depression.   All other systems reviewed and are negative.       Physical Exam  Temp:  [36.2 °C (97.2 °F)-36.6 °C (97.9 °F)] 36.2 °C (97.2 °F)  Pulse:  [] 91  Resp:  [10-37] 32  BP: ()/(64-89) 97/75  SpO2:  [90 %-99 %] 91 %    Physical Exam  Vitals and nursing note reviewed.   Constitutional:       General: He is not in acute distress.     Appearance: He is obese.   HENT:      Head: Normocephalic and atraumatic.      Right Ear: External ear normal.      Left Ear: External ear normal.      Nose: Nose normal.      Mouth/Throat:      Mouth: Mucous membranes are dry.      Pharynx: Oropharynx is clear.   Eyes:      General: No scleral icterus.     Conjunctiva/sclera: Conjunctivae normal.      Pupils: Pupils are equal, round, and reactive to light.   Cardiovascular:      Rate and Rhythm: Normal rate and regular rhythm.      Pulses: Normal pulses.   Pulmonary:      Effort: Pulmonary effort is normal. No respiratory distress.      Breath sounds: Normal breath sounds. No wheezing.   Abdominal:      General: Abdomen is flat. There is no distension.      Palpations: Abdomen is soft.      Tenderness: There is no abdominal tenderness. There is no guarding or rebound.   Musculoskeletal:         General: Swelling (large area hard to palpation on lateral right thigh) and tenderness present. Normal range of motion.      Cervical back: Normal range of motion and neck supple.      Right lower leg: No edema.      Left lower leg: No edema.   Skin:     General: Skin is warm and dry.      Findings: No rash.       Comments: surgical scar vertical right thigh  Multiple scabs on LEs   Neurological:      General: No focal deficit present.      Mental Status: He is alert and oriented to person, place, and time.      Cranial Nerves: No cranial nerve deficit.   Psychiatric:         Mood and Affect: Mood normal.         Behavior: Behavior normal.         Fluids    Intake/Output Summary (Last 24 hours) at 12/28/2021 1143  Last data filed at 12/28/2021 0929  Gross per 24 hour   Intake 800 ml   Output 40 ml   Net 760 ml       Laboratory  Recent Labs     12/28/21  0023   WBC 13.8*   RBC 6.06   HEMOGLOBIN 16.1   HEMATOCRIT 50.1   MCV 82.7   MCH 26.6*   MCHC 32.1*   RDW 53.9*   PLATELETCT 277   MPV 10.7     Recent Labs     12/28/21  0023   SODIUM 136   POTASSIUM 3.9   CHLORIDE 97   CO2 25   GLUCOSE 75   BUN 18   CREATININE 0.71   CALCIUM 9.3                   Imaging  CT-PELVIS WITH   Final Result      New 7 cm rim-enhancing fluid collection overlying the right iliotibial band is compatible with abscess favored over hematoma. Aspiration could clarify      Myositis of the superficial gluteus medius muscle is possible. No CT evidence of bony destruction or septic arthropathy      Large left hydrocele post inguinal hernia repair      Direct fatty right inguinal hernia similar to comparison      DX-PELVIS-1 OR 2 VIEWS   Final Result      Large volume rectal vault stool      Mild right hip osteoarthritis           Assessment/Plan  * Abscess- (present on admission)  Assessment & Plan  Right upper thigh   Orthopedic surgery consulted    Surgical I&D today   IV abx   I have also consulted Infection disease  Appreciate rec.     Atrial fibrillation (HCC)  Assessment & Plan  As per patient he was diagnosed with it at [facility] and a prescription was given to him but never filled.  Will monitor on tele after surgery   Started metoprolol.  Will need to discuss anticoagulation option once cleared fro surgery to start anticoagulation     CN  (constipation)- (present on admission)  Assessment & Plan  Bowel regimen ordered    Leukocytosis- (present on admission)  Assessment & Plan  Mild WBC 13 on admission   2/2 iliotibial abscess   IV abx   Cultures pending     Tobacco use- (present on admission)  Assessment & Plan  Smokes 1 pack of cigs in 5 days   Thinking about quitting, weaning himself off   Cessation counseling performed 5 minutes     Chronic hepatitis C virus infection (HCC)- (present on admission)  Assessment & Plan  Hx of   AST/ALT wnl    Cannabis use disorder, mild, abuse- (present on admission)  Assessment & Plan  Ongoing, cessation counseling       VTE prophylaxis: SCDs/TEDs    I have performed a physical exam and reviewed and updated ROS and Plan today (12/28/2021). In review of yesterday's note (12/27/2021), there are no changes except as documented above.

## 2021-12-28 NOTE — ANESTHESIA PROCEDURE NOTES
Airway    Date/Time: 12/28/2021 8:59 AM  Performed by: Alex Garcia D.O.  Authorized by: Alex Garcia D.O.     Location:  OR  Urgency:  Elective  Indications for Airway Management:  Anesthesia      Spontaneous Ventilation: absent    Sedation Level:  Deep  Preoxygenated: Yes    Patient Position:  Sniffing  Mask Difficulty Assessment:  1 - vent by mask  Final Airway Type:  Endotracheal airway  Final Endotracheal Airway:  ETT  Cuffed: Yes    Technique Used for Successful ETT Placement:  Direct laryngoscopy    Insertion Site:  Oral  Blade Type:  Christine  Laryngoscope Blade/Videolaryngoscope Blade Size:  4  ETT Size (mm):  7.5  Measured from:  Lips  ETT to Lips (cm):  22  Placement Verified by: auscultation and capnometry    Cormack-Lehane Classification:  Grade I - full view of glottis  Number of Attempts at Approach:  1

## 2021-12-28 NOTE — PROGRESS NOTES
"Pharmacy Vancomycin Kinetics Note for 12/28/2021     61 y.o. male on Vancomycin day # 1     Vancomycin Indication (AUC Dosing): Skin/skin structure infection    Provider specified end date: 01/02/22    Active Antibiotics (From admission, onward)    Ordered     Ordering Provider       Tue Dec 28, 2021  3:15 AM    12/28/21 0315  cefTRIAXone (Rocephin) 2 g in  mL IVPB  EVERY 24 HOURS         Milly Lanza D.O.    12/28/21 0315  MD Alert...Vancomycin per Pharmacy  PHARMACY TO DOSE        Question:  Indication(s) for vancomycin?  Answer:  Skin and soft tissue infection    Milly Lanza D.O.       Mon Dec 27, 2021 11:56 PM    12/27/21 2356  vancomycin (VANCOCIN) 2,750 mg in  mL IVPB  (vancomycin (VANCOCIN) IV (LD + Maintenance))  ONCE         Kayla Barnes M.D.          Dosing Weight: 113 kg (249 lb 1.9 oz)      Admission History: Admitted on 12/27/2021 for Abscess [L02.91]  Pertinent history: Patient with hip pains. CT concerning for hip abscess, possible septic joint. Empiric abx initiated.    Allergies:     Spinach, Codeine, Codeine, Penicillins, and Pcn [penicillins]     Pertinent cultures to date:     Results     Procedure Component Value Units Date/Time    MRSA By PCR (Amp) [[id number]]     Order Status: Sent Specimen: Respirate from Nares     BLOOD CULTURE [[id number]] Collected: 12/28/21 0106    Order Status: Completed Specimen: Blood from Peripheral Updated: 12/28/21 0219    Narrative:      Collected By:  Per Hospital Policy: Only change Specimen Src: to \"Line\" if  specified by physician order.    BLOOD CULTURE [[id number]] Collected: 12/28/21 0005    Order Status: Completed Specimen: Blood from Peripheral Updated: 12/28/21 0218    Narrative:      Collected By:  Per Hospital Policy: Only change Specimen Src: to \"Line\" if  specified by physician order.          Labs:     Estimated Creatinine Clearance: 152.3 mL/min (by C-G formula based on SCr of 0.71 mg/dL).  Recent Labs     12/28/21  0023 " "  WBC 13.8*   NEUTSPOLYS 80.70*     Recent Labs     21  0023   BUN 18   CREATININE 0.71   ALBUMIN 4.2     No intake or output data in the 24 hours ending 21 0341   /76   Pulse 61   Temp 36.4 °C (97.5 °F) (Temporal)   Resp 16   Ht 1.93 m (6' 4\")   Wt 113 kg (249 lb 12.5 oz)   SpO2 96%  Temp (24hrs), Av.3 °C (97.4 °F), Min:36.3 °C (97.3 °F), Max:36.4 °C (97.5 °F)      List concerns for Vancomycin clearance:     Obesity;Receipt of contrast dye    Pharmacokinetics:     AUC kinetics:   Ke (hr ^-1): 0.1308 hr^-1  Half life: 5.3 hr  Clearance: 6.026  Estimated TDD: 3013  Estimated Dose: 916  Estimated interval: 7.3    A/P:     -  Vancomycin dose: 1500 mg IV q12hr    -  Next vancomycin level(s):    -TBD     -  Predicted vancomycin AUC from initial AUC test calculator: 498 mg·hr/L    -  Comments: Empiric abx for possible septic hip. Peak and trough to be determined closer to steady state. Pharmacy will adjust dosing based on levels and cultures.     Sudhir Rabago, PharmD  "

## 2021-12-28 NOTE — ASSESSMENT & PLAN NOTE
As per patient he was diagnosed with it at [facility] and a prescription was given to him but never filled.  Will monitor on tele after surgery   Started metoprolol.  Will need to discuss anticoagulation option once cleared from surgery to start anticoagulation     1/2: I spoke to orthopedic surgery, they recommend starting anticoagulation with Xarelto tomorrow.    1/3: We have started Xarelto

## 2021-12-28 NOTE — ASSESSMENT & PLAN NOTE
Mild WBC 13 on admission   2/2 iliotibial abscess   IV abx   Cultures pending     1/1: improving. ID following the patient, we appreciate further recommendations.    1/3: White blood cell count within normal limits.  The patient currently on cefdinir and Flagyl  Until 1/11/22.

## 2021-12-29 LAB
ANION GAP SERPL CALC-SCNC: 8 MMOL/L (ref 7–16)
BASOPHILS # BLD AUTO: 0.2 % (ref 0–1.8)
BASOPHILS # BLD: 0.02 K/UL (ref 0–0.12)
BUN SERPL-MCNC: 19 MG/DL (ref 8–22)
CALCIUM SERPL-MCNC: 8.6 MG/DL (ref 8.5–10.5)
CHLORIDE SERPL-SCNC: 102 MMOL/L (ref 96–112)
CO2 SERPL-SCNC: 22 MMOL/L (ref 20–33)
CREAT SERPL-MCNC: 0.6 MG/DL (ref 0.5–1.4)
EOSINOPHIL # BLD AUTO: 0.01 K/UL (ref 0–0.51)
EOSINOPHIL NFR BLD: 0.1 % (ref 0–6.9)
ERYTHROCYTE [DISTWIDTH] IN BLOOD BY AUTOMATED COUNT: 53 FL (ref 35.9–50)
GLUCOSE SERPL-MCNC: 276 MG/DL (ref 65–99)
HCT VFR BLD AUTO: 38.4 % (ref 42–52)
HGB BLD-MCNC: 12.7 G/DL (ref 14–18)
IMM GRANULOCYTES # BLD AUTO: 0.05 K/UL (ref 0–0.11)
IMM GRANULOCYTES NFR BLD AUTO: 0.5 % (ref 0–0.9)
LYMPHOCYTES # BLD AUTO: 0.66 K/UL (ref 1–4.8)
LYMPHOCYTES NFR BLD: 7 % (ref 22–41)
MCH RBC QN AUTO: 27.4 PG (ref 27–33)
MCHC RBC AUTO-ENTMCNC: 33.1 G/DL (ref 33.7–35.3)
MCV RBC AUTO: 82.8 FL (ref 81.4–97.8)
MONOCYTES # BLD AUTO: 0.54 K/UL (ref 0–0.85)
MONOCYTES NFR BLD AUTO: 5.7 % (ref 0–13.4)
NEUTROPHILS # BLD AUTO: 8.12 K/UL (ref 1.82–7.42)
NEUTROPHILS NFR BLD: 86.5 % (ref 44–72)
NRBC # BLD AUTO: 0 K/UL
NRBC BLD-RTO: 0 /100 WBC
PLATELET # BLD AUTO: 197 K/UL (ref 164–446)
PMV BLD AUTO: 10.7 FL (ref 9–12.9)
POTASSIUM SERPL-SCNC: 4.9 MMOL/L (ref 3.6–5.5)
RBC # BLD AUTO: 4.64 M/UL (ref 4.7–6.1)
SODIUM SERPL-SCNC: 132 MMOL/L (ref 135–145)
WBC # BLD AUTO: 9.4 K/UL (ref 4.8–10.8)

## 2021-12-29 PROCEDURE — 700102 HCHG RX REV CODE 250 W/ 637 OVERRIDE(OP): Performed by: STUDENT IN AN ORGANIZED HEALTH CARE EDUCATION/TRAINING PROGRAM

## 2021-12-29 PROCEDURE — A9270 NON-COVERED ITEM OR SERVICE: HCPCS | Performed by: STUDENT IN AN ORGANIZED HEALTH CARE EDUCATION/TRAINING PROGRAM

## 2021-12-29 PROCEDURE — A9270 NON-COVERED ITEM OR SERVICE: HCPCS | Performed by: INTERNAL MEDICINE

## 2021-12-29 PROCEDURE — 700105 HCHG RX REV CODE 258: Performed by: STUDENT IN AN ORGANIZED HEALTH CARE EDUCATION/TRAINING PROGRAM

## 2021-12-29 PROCEDURE — 99232 SBSQ HOSP IP/OBS MODERATE 35: CPT | Performed by: STUDENT IN AN ORGANIZED HEALTH CARE EDUCATION/TRAINING PROGRAM

## 2021-12-29 PROCEDURE — 700102 HCHG RX REV CODE 250 W/ 637 OVERRIDE(OP): Performed by: INTERNAL MEDICINE

## 2021-12-29 PROCEDURE — 700111 HCHG RX REV CODE 636 W/ 250 OVERRIDE (IP): Performed by: STUDENT IN AN ORGANIZED HEALTH CARE EDUCATION/TRAINING PROGRAM

## 2021-12-29 PROCEDURE — 85025 COMPLETE CBC W/AUTO DIFF WBC: CPT

## 2021-12-29 PROCEDURE — 700111 HCHG RX REV CODE 636 W/ 250 OVERRIDE (IP): Performed by: INTERNAL MEDICINE

## 2021-12-29 PROCEDURE — 94760 N-INVAS EAR/PLS OXIMETRY 1: CPT

## 2021-12-29 PROCEDURE — 700105 HCHG RX REV CODE 258: Performed by: ANESTHESIOLOGY

## 2021-12-29 PROCEDURE — 36415 COLL VENOUS BLD VENIPUNCTURE: CPT

## 2021-12-29 PROCEDURE — 80048 BASIC METABOLIC PNL TOTAL CA: CPT

## 2021-12-29 PROCEDURE — 770020 HCHG ROOM/CARE - TELE (206)

## 2021-12-29 PROCEDURE — 700105 HCHG RX REV CODE 258: Performed by: INTERNAL MEDICINE

## 2021-12-29 RX ORDER — HYDROMORPHONE HYDROCHLORIDE 1 MG/ML
1 INJECTION, SOLUTION INTRAMUSCULAR; INTRAVENOUS; SUBCUTANEOUS
Status: DISCONTINUED | OUTPATIENT
Start: 2021-12-29 | End: 2022-01-03

## 2021-12-29 RX ORDER — OXYCODONE HYDROCHLORIDE 5 MG/1
5 TABLET ORAL
Status: DISCONTINUED | OUTPATIENT
Start: 2021-12-29 | End: 2022-01-03

## 2021-12-29 RX ORDER — OXYCODONE HYDROCHLORIDE 5 MG/1
2.5 TABLET ORAL
Status: DISCONTINUED | OUTPATIENT
Start: 2021-12-29 | End: 2022-01-03

## 2021-12-29 RX ADMIN — DOCUSATE SODIUM 50 MG AND SENNOSIDES 8.6 MG 2 TABLET: 8.6; 5 TABLET, FILM COATED ORAL at 17:11

## 2021-12-29 RX ADMIN — METOPROLOL TARTRATE 25 MG: 25 TABLET, FILM COATED ORAL at 17:11

## 2021-12-29 RX ADMIN — DOCUSATE SODIUM 50 MG AND SENNOSIDES 8.6 MG 2 TABLET: 8.6; 5 TABLET, FILM COATED ORAL at 05:58

## 2021-12-29 RX ADMIN — CEFTRIAXONE SODIUM 2 G: 2 INJECTION, POWDER, FOR SOLUTION INTRAMUSCULAR; INTRAVENOUS at 05:57

## 2021-12-29 RX ADMIN — METRONIDAZOLE 500 MG: 500 TABLET ORAL at 20:51

## 2021-12-29 RX ADMIN — HYDROMORPHONE HYDROCHLORIDE 0.25 MG: 1 INJECTION, SOLUTION INTRAMUSCULAR; INTRAVENOUS; SUBCUTANEOUS at 02:30

## 2021-12-29 RX ADMIN — METRONIDAZOLE 500 MG: 500 TABLET ORAL at 00:46

## 2021-12-29 RX ADMIN — SODIUM CHLORIDE, POTASSIUM CHLORIDE, SODIUM LACTATE AND CALCIUM CHLORIDE: 600; 310; 30; 20 INJECTION, SOLUTION INTRAVENOUS at 20:55

## 2021-12-29 RX ADMIN — HYDROMORPHONE HYDROCHLORIDE 1 MG: 1 INJECTION, SOLUTION INTRAMUSCULAR; INTRAVENOUS; SUBCUTANEOUS at 21:51

## 2021-12-29 RX ADMIN — NICOTINE 14 MG: 14 PATCH TRANSDERMAL at 05:59

## 2021-12-29 RX ADMIN — VANCOMYCIN HYDROCHLORIDE 1500 MG: 5 INJECTION, POWDER, LYOPHILIZED, FOR SOLUTION INTRAVENOUS at 13:55

## 2021-12-29 RX ADMIN — METRONIDAZOLE 500 MG: 500 TABLET ORAL at 09:20

## 2021-12-29 RX ADMIN — OXYCODONE 5 MG: 5 TABLET ORAL at 05:58

## 2021-12-29 RX ADMIN — OXYCODONE 5 MG: 5 TABLET ORAL at 17:11

## 2021-12-29 RX ADMIN — OXYCODONE 5 MG: 5 TABLET ORAL at 00:49

## 2021-12-29 RX ADMIN — HYDROMORPHONE HYDROCHLORIDE 1 MG: 1 INJECTION, SOLUTION INTRAMUSCULAR; INTRAVENOUS; SUBCUTANEOUS at 11:38

## 2021-12-29 RX ADMIN — OXYCODONE 5 MG: 5 TABLET ORAL at 20:51

## 2021-12-29 RX ADMIN — OXYCODONE 5 MG: 5 TABLET ORAL at 23:56

## 2021-12-29 RX ADMIN — METRONIDAZOLE 500 MG: 500 TABLET ORAL at 15:02

## 2021-12-29 RX ADMIN — OXYCODONE 5 MG: 5 TABLET ORAL at 09:20

## 2021-12-29 ASSESSMENT — ENCOUNTER SYMPTOMS
ABDOMINAL PAIN: 0
NAUSEA: 0
VOMITING: 0
MYALGIAS: 1
SHORTNESS OF BREATH: 0
FEVER: 1
FOCAL WEAKNESS: 0
EYES NEGATIVE: 1
COUGH: 0
CHILLS: 0

## 2021-12-29 ASSESSMENT — PAIN DESCRIPTION - PAIN TYPE
TYPE: ACUTE PAIN
TYPE: ACUTE PAIN;SURGICAL PAIN
TYPE: ACUTE PAIN
TYPE: ACUTE PAIN;SURGICAL PAIN
TYPE: ACUTE PAIN
TYPE: ACUTE PAIN
TYPE: ACUTE PAIN;SURGICAL PAIN

## 2021-12-29 ASSESSMENT — PATIENT HEALTH QUESTIONNAIRE - PHQ9
2. FEELING DOWN, DEPRESSED, IRRITABLE, OR HOPELESS: NOT AT ALL
SUM OF ALL RESPONSES TO PHQ9 QUESTIONS 1 AND 2: 0

## 2021-12-29 NOTE — PROGRESS NOTES
Drain in place with cloudy serosanguineous output.  His only complaints are pain.  Dressings are otherwise clean dry and intact.    Plan:  Continue drain  Antibiotics  Weightbearing and range of motion as tolerated  Sutures out in 2 to 3 weeks depending on wound healing

## 2021-12-29 NOTE — PROGRESS NOTES
Bedside report received and patient care assumed. Pt is resting in bed, A&Ox4, with  complaints of 7/10 right hip / leg pain, and is on RA. Tele box on. All fall precautions are in place, belongings at bedside table.  Pt was updated on POC, no questions or concerns. Pt educated on use of call light for assistance.

## 2021-12-29 NOTE — PROGRESS NOTES
4 Eyes Skin Assessment Completed by FERNANDO Morris and FERNANDO Rai.    Head WDL  Ears WDL  Nose WDL  Mouth WDL  Neck WDL  Breast/Chest WDL  Shoulder Blades WDL  Spine WDL  (R) Arm/Elbow/Hand WDL  (L) Arm/Elbow/Hand WDL  Abdomen WDL  Groin WDL  Scrotum/Coccyx/Buttocks WDL  (R) Leg Incision Surgical. Scar   (L) Leg WDL  (R) Heel/Foot/Toe WDL  (L) Heel/Foot/Toe WDL          Devices In Places Tele Box and Pulse Ox      Interventions In Place Pressure Redistribution Mattress    Possible Skin Injury No    Pictures Uploaded Into Epic No, needs to be completed  Wound Consult Placed N/A  RN Wound Prevention Protocol Ordered No      Surgical incision on right hip with ELBERT drain in place to suction.

## 2021-12-29 NOTE — DIETARY
"Nutrition services: Day 1 of admit.  Gonsalo Hunt is a 61 y.o. male with admitting DX of Abscess.    Assessment:  Height: 193 cm (6' 4\")  Weight: 113 kg (249 lb 12.5 oz)  Body mass index is 30.4 kg/m²., BMI classification: Obesity class I    Evaluation:   1. Pt presents w/ abscess, chronic hep C infection, constipation. PMHx includes IV drug use (stopped >1 yr ago per H&P).  2. Consult received for unintentional wt loss without poor PO intake over past 6 months. Per chart review, pt with 11% wt loss from 04/2021 to present (8 months - notable but not significant). Current wt is elevated/consistent with variable weights from ~3 months ago.  3. Visited pt at bedside; pt denies poor PO intake. No observed signs of fat or muscle wasting.  4. Pt currently on regular diet: PO intake % x 2 meals + 1 snack.  5. No staged wounds POA. Pt day 1 s/p I&D of right hip abscess.    Malnutrition Risk: Criteria not met    Recommendations/Plan:  1. Continue current nutrition POC.   2. Document intake of all PO as % taken in ADL's to provide interdisciplinary communication across all shifts.   3. Monitor weight.  4. Nutrition rep will continue to see patient for ongoing meal and snack preferences.     RD available PRN.    "

## 2021-12-29 NOTE — CARE PLAN
The patient is Watcher - Medium risk of patient condition declining or worsening    Shift Goals  Clinical Goals: Monitor ELBERT drain and site, maintain hemodynamically stable, obtain IV access and start IVF, manage pain  Patient Goals: Comfort, food and snacks, dilaudid for my pain  Family Goals: MALIHA. No family present.    Progress made toward(s) clinical / shift goals:    Problem: Pain - Standard  Goal: Alleviation of pain or a reduction in pain to the patient’s comfort goal  Outcome: Progressing     Problem: Hemodynamics  Goal: Patient's hemodynamics, fluid balance and neurologic status will be stable or improve  Outcome: Progressing     Problem: Fluid Volume  Goal: Fluid volume balance will be maintained  Outcome: Progressing     Problem: Mobility  Goal: Patient's capacity to carry out activities will improve  Outcome: Met     Problem: Self Care  Goal: Patient will have the ability to perform ADLs independently or with assistance (bathe, groom, dress, toilet and feed)  Outcome: Met     Problem: Infection - Standard  Goal: Patient will remain free from infection  Outcome: Progressing     Problem: Wound/ / Incision Healing  Goal: Patient's wound/surgical incision will decrease in size and heals properly  Outcome: Progressing       Patient is not progressing towards the following goals: NA

## 2021-12-29 NOTE — DISCHARGE PLANNING
Anticipated Discharge Disposition: Home with close outpatient follow-up     Action: pt pending medical clearance, pt currently on 0 liters O2, 6 clicks are 24/24. Orientation: A&Ox4.      Barriers to Discharge: medical clearance, insurance     Plan: f/u with pt and medical team to discuss dc needs and barriers.

## 2021-12-29 NOTE — PROGRESS NOTES
[facility] Medicine Daily Progress Note    Date of Service  12/29/2021    Chief Complaint  Gonsalo Hunt is a 61 y.o. male admitted 12/27/2021 with right hip pain and swelling    Hospital Course  A 61-year-old man with h/o necrotizing fascitis, IV drug use, hep C presented with right hip pain and swelling. Patients reports right hip pain starting 10 days ago, but worsening over the last 3 days associated with swelling, fevers and nausea. Patient reports he is no longer homeless and stopped doing IV drugs >1 year ago, still smokes marijuana, and smokes 1 pack of cigarettes q5 days.  In the ER XR of pelvis showed large volume rectal vault stool and mild right hip osteoarthritis. CT pelvis showed 7 cm rim-enhancing fluid collection over right iliotibial band compatible with abscess and possible myositis of the superficial gluteus medius muscle. Orthopedic surgery consulted.   Patient underwent I&D of right hip abscess on 12/28. Developed A.fib after surgery.      Interval Problem Update  Patient reports right hip pain 9/10. Patient was verbally abusive towards staff. Afebrile, hemodynamically stable.  Na 132.  Blood cultures form 12/28 negative to date.  Increased dose of dilaudid to 1 mg q3h prn    I have personally seen and examined the patient at bedside. I discussed the plan of care with patient, bedside RN, charge RN,  and pharmacy.    Consultants/Specialty  infectious disease and orthopedics    Code Status  Full Code    Disposition  Patient is not medically cleared for discharge.   Anticipate discharge to TBD.  I have placed the appropriate orders for post-discharge needs.    Review of Systems  Review of Systems   Constitutional: Positive for fever and malaise/fatigue. Negative for chills.   HENT: Negative for hearing loss.    Eyes: Negative.    Respiratory: Negative for cough and shortness of breath.    Cardiovascular: Positive for leg swelling. Negative for chest pain.   Gastrointestinal:  Negative for abdominal pain, nausea and vomiting.   Genitourinary: Negative for dysuria.   Musculoskeletal: Positive for myalgias (right hip pain).   Skin: Negative for rash.   Neurological: Negative for focal weakness.        Physical Exam  Temp:  [35.9 °C (96.6 °F)-36.6 °C (97.9 °F)] 36.2 °C (97.2 °F)  Pulse:  [71-96] 85  Resp:  [18-20] 18  BP: ()/(52-73) 97/66  SpO2:  [92 %-98 %] 95 %    Physical Exam  Vitals and nursing note reviewed.   Constitutional:       Appearance: He is obese. He is ill-appearing.   HENT:      Head: Normocephalic.      Nose: Nose normal.      Mouth/Throat:      Mouth: Mucous membranes are moist.      Pharynx: Oropharynx is clear.   Eyes:      Pupils: Pupils are equal, round, and reactive to light.   Cardiovascular:      Rate and Rhythm: Normal rate and regular rhythm.   Pulmonary:      Effort: Pulmonary effort is normal. No respiratory distress.      Breath sounds: No wheezing or rales.   Abdominal:      General: Abdomen is flat. Bowel sounds are normal. There is no distension.   Musculoskeletal:         General: Swelling and tenderness (right hip) present. Normal range of motion.      Cervical back: Normal range of motion.   Skin:     General: Skin is warm.      Comments: Dressing on right hip clean. Drain in place on right hip with sanguinous fluid    Neurological:      General: No focal deficit present.      Mental Status: He is alert and oriented to person, place, and time.         Fluids    Intake/Output Summary (Last 24 hours) at 12/29/2021 1336  Last data filed at 12/29/2021 0800  Gross per 24 hour   Intake 940 ml   Output 75 ml   Net 865 ml       Laboratory  Recent Labs     12/28/21  0023 12/29/21  0409   WBC 13.8* 9.4   RBC 6.06 4.64*   HEMOGLOBIN 16.1 12.7*   HEMATOCRIT 50.1 38.4*   MCV 82.7 82.8   MCH 26.6* 27.4   MCHC 32.1* 33.1*   RDW 53.9* 53.0*   PLATELETCT 277 197   MPV 10.7 10.7     Recent Labs     12/28/21  0023 12/29/21  0409   SODIUM 136 132*   POTASSIUM 3.9 4.9    CHLORIDE 97 102   CO2 25 22   GLUCOSE 75 276*   BUN 18 19   CREATININE 0.71 0.60   CALCIUM 9.3 8.6                   Imaging  CT-PELVIS WITH   Final Result      New 7 cm rim-enhancing fluid collection overlying the right iliotibial band is compatible with abscess favored over hematoma. Aspiration could clarify      Myositis of the superficial gluteus medius muscle is possible. No CT evidence of bony destruction or septic arthropathy      Large left hydrocele post inguinal hernia repair      Direct fatty right inguinal hernia similar to comparison      DX-PELVIS-1 OR 2 VIEWS   Final Result      Large volume rectal vault stool      Mild right hip osteoarthritis           Assessment/Plan  * Abscess- (present on admission)  Assessment & Plan  Right upper thigh   Orthopedic surgery consulted   S/p surgical I&D on 12/28  ID following, recommendations appreciated  Continue Abx    CN (constipation)- (present on admission)  Assessment & Plan  Bowel regimen ordered    Leukocytosis- (present on admission)  Assessment & Plan  Mild WBC 13 on admission   2/2 iliotibial abscess   IV abx   Cultures pending     Atrial fibrillation (HCC)  Assessment & Plan  As per patient he was diagnosed with it at [facility] and a prescription was given to him but never filled.  Will monitor on tele after surgery   Started metoprolol.  Will need to discuss anticoagulation option once cleared from surgery to start anticoagulation     Tobacco use- (present on admission)  Assessment & Plan  Smokes 1 pack of cigs in 5 days   Thinking about quitting, weaning himself off   Cessation counseling performed 5 minutes     Chronic hepatitis C virus infection (HCC)- (present on admission)  Assessment & Plan  Hx of   AST/ALT wnl    Cannabis use disorder, mild, abuse- (present on admission)  Assessment & Plan  Ongoing, cessation counseling       VTE prophylaxis: SCDs/TEDs    I have performed a physical exam and reviewed and updated ROS and Plan today  (12/29/2021). In review of yesterday's note (12/28/2021), there are no changes except as documented above.

## 2021-12-29 NOTE — HOSPITAL COURSE
A 61-year-old man with h/o necrotizing fascitis, IV drug use, hep C presented with right hip pain and swelling. Patients reports right hip pain starting 10 days ago, but worsening over the last 3 days associated with swelling, fevers and nausea. Patient reports he is no longer homeless and stopped doing IV drugs >1 year ago, still smokes marijuana, and smokes 1 pack of cigarettes q5 days.  In the ER XR of pelvis showed large volume rectal vault stool and mild right hip osteoarthritis. CT pelvis showed 7 cm rim-enhancing fluid collection over right iliotibial band compatible with abscess and possible myositis of the superficial gluteus medius muscle. Orthopedic surgery consulted.   Patient underwent I&D of right hip abscess on 12/28. Developed A.fib after surgery.

## 2021-12-30 LAB
ALBUMIN SERPL BCP-MCNC: 3.2 G/DL (ref 3.2–4.9)
ALBUMIN/GLOB SERPL: 0.9 G/DL
ALP SERPL-CCNC: 139 U/L (ref 30–99)
ALT SERPL-CCNC: 26 U/L (ref 2–50)
ANION GAP SERPL CALC-SCNC: 10 MMOL/L (ref 7–16)
AST SERPL-CCNC: 26 U/L (ref 12–45)
BACTERIA WND AEROBE CULT: ABNORMAL
BACTERIA WND AEROBE CULT: ABNORMAL
BASOPHILS # BLD AUTO: 0.9 % (ref 0–1.8)
BASOPHILS # BLD: 0.07 K/UL (ref 0–0.12)
BILIRUB SERPL-MCNC: 0.2 MG/DL (ref 0.1–1.5)
BUN SERPL-MCNC: 20 MG/DL (ref 8–22)
CALCIUM SERPL-MCNC: 8.5 MG/DL (ref 8.5–10.5)
CHLORIDE SERPL-SCNC: 102 MMOL/L (ref 96–112)
CO2 SERPL-SCNC: 24 MMOL/L (ref 20–33)
CREAT SERPL-MCNC: 0.59 MG/DL (ref 0.5–1.4)
EOSINOPHIL # BLD AUTO: 0.17 K/UL (ref 0–0.51)
EOSINOPHIL NFR BLD: 2.2 % (ref 0–6.9)
ERYTHROCYTE [DISTWIDTH] IN BLOOD BY AUTOMATED COUNT: 53.4 FL (ref 35.9–50)
GLOBULIN SER CALC-MCNC: 3.4 G/DL (ref 1.9–3.5)
GLUCOSE SERPL-MCNC: 105 MG/DL (ref 65–99)
GRAM STN SPEC: ABNORMAL
HCT VFR BLD AUTO: 40.7 % (ref 42–52)
HGB BLD-MCNC: 12.8 G/DL (ref 14–18)
IMM GRANULOCYTES # BLD AUTO: 0.05 K/UL (ref 0–0.11)
IMM GRANULOCYTES NFR BLD AUTO: 0.6 % (ref 0–0.9)
LYMPHOCYTES # BLD AUTO: 1.57 K/UL (ref 1–4.8)
LYMPHOCYTES NFR BLD: 20.3 % (ref 22–41)
MCH RBC QN AUTO: 26.4 PG (ref 27–33)
MCHC RBC AUTO-ENTMCNC: 31.4 G/DL (ref 33.7–35.3)
MCV RBC AUTO: 84.1 FL (ref 81.4–97.8)
MONOCYTES # BLD AUTO: 0.51 K/UL (ref 0–0.85)
MONOCYTES NFR BLD AUTO: 6.6 % (ref 0–13.4)
NEUTROPHILS # BLD AUTO: 5.36 K/UL (ref 1.82–7.42)
NEUTROPHILS NFR BLD: 69.4 % (ref 44–72)
NRBC # BLD AUTO: 0 K/UL
NRBC BLD-RTO: 0 /100 WBC
PLATELET # BLD AUTO: 222 K/UL (ref 164–446)
PMV BLD AUTO: 11 FL (ref 9–12.9)
POTASSIUM SERPL-SCNC: 4.2 MMOL/L (ref 3.6–5.5)
PROT SERPL-MCNC: 6.6 G/DL (ref 6–8.2)
RBC # BLD AUTO: 4.84 M/UL (ref 4.7–6.1)
SIGNIFICANT IND 70042: ABNORMAL
SITE SITE: ABNORMAL
SODIUM SERPL-SCNC: 136 MMOL/L (ref 135–145)
SOURCE SOURCE: ABNORMAL
VANCOMYCIN PEAK SERPL-MCNC: 26 UG/ML (ref 20–40)
WBC # BLD AUTO: 7.7 K/UL (ref 4.8–10.8)

## 2021-12-30 PROCEDURE — A9270 NON-COVERED ITEM OR SERVICE: HCPCS | Performed by: INTERNAL MEDICINE

## 2021-12-30 PROCEDURE — 85025 COMPLETE CBC W/AUTO DIFF WBC: CPT

## 2021-12-30 PROCEDURE — 700111 HCHG RX REV CODE 636 W/ 250 OVERRIDE (IP): Performed by: INTERNAL MEDICINE

## 2021-12-30 PROCEDURE — 700111 HCHG RX REV CODE 636 W/ 250 OVERRIDE (IP): Performed by: STUDENT IN AN ORGANIZED HEALTH CARE EDUCATION/TRAINING PROGRAM

## 2021-12-30 PROCEDURE — 700105 HCHG RX REV CODE 258: Performed by: INTERNAL MEDICINE

## 2021-12-30 PROCEDURE — 80053 COMPREHEN METABOLIC PANEL: CPT

## 2021-12-30 PROCEDURE — 80202 ASSAY OF VANCOMYCIN: CPT

## 2021-12-30 PROCEDURE — A9270 NON-COVERED ITEM OR SERVICE: HCPCS | Performed by: STUDENT IN AN ORGANIZED HEALTH CARE EDUCATION/TRAINING PROGRAM

## 2021-12-30 PROCEDURE — 94760 N-INVAS EAR/PLS OXIMETRY 1: CPT

## 2021-12-30 PROCEDURE — 99233 SBSQ HOSP IP/OBS HIGH 50: CPT | Performed by: STUDENT IN AN ORGANIZED HEALTH CARE EDUCATION/TRAINING PROGRAM

## 2021-12-30 PROCEDURE — 700102 HCHG RX REV CODE 250 W/ 637 OVERRIDE(OP): Performed by: STUDENT IN AN ORGANIZED HEALTH CARE EDUCATION/TRAINING PROGRAM

## 2021-12-30 PROCEDURE — 700102 HCHG RX REV CODE 250 W/ 637 OVERRIDE(OP): Performed by: INTERNAL MEDICINE

## 2021-12-30 PROCEDURE — 770020 HCHG ROOM/CARE - TELE (206)

## 2021-12-30 PROCEDURE — 700105 HCHG RX REV CODE 258: Performed by: STUDENT IN AN ORGANIZED HEALTH CARE EDUCATION/TRAINING PROGRAM

## 2021-12-30 PROCEDURE — 36415 COLL VENOUS BLD VENIPUNCTURE: CPT

## 2021-12-30 RX ADMIN — CEFTRIAXONE SODIUM 2 G: 2 INJECTION, POWDER, FOR SOLUTION INTRAMUSCULAR; INTRAVENOUS at 06:02

## 2021-12-30 RX ADMIN — HYDROMORPHONE HYDROCHLORIDE 1 MG: 1 INJECTION, SOLUTION INTRAMUSCULAR; INTRAVENOUS; SUBCUTANEOUS at 19:24

## 2021-12-30 RX ADMIN — HYDROMORPHONE HYDROCHLORIDE 1 MG: 1 INJECTION, SOLUTION INTRAMUSCULAR; INTRAVENOUS; SUBCUTANEOUS at 23:21

## 2021-12-30 RX ADMIN — DOCUSATE SODIUM 50 MG AND SENNOSIDES 8.6 MG 2 TABLET: 8.6; 5 TABLET, FILM COATED ORAL at 17:47

## 2021-12-30 RX ADMIN — DOCUSATE SODIUM 50 MG AND SENNOSIDES 8.6 MG 2 TABLET: 8.6; 5 TABLET, FILM COATED ORAL at 06:02

## 2021-12-30 RX ADMIN — METOPROLOL TARTRATE 25 MG: 25 TABLET, FILM COATED ORAL at 17:48

## 2021-12-30 RX ADMIN — HYDROMORPHONE HYDROCHLORIDE 1 MG: 1 INJECTION, SOLUTION INTRAMUSCULAR; INTRAVENOUS; SUBCUTANEOUS at 01:02

## 2021-12-30 RX ADMIN — OXYCODONE 5 MG: 5 TABLET ORAL at 17:47

## 2021-12-30 RX ADMIN — VANCOMYCIN HYDROCHLORIDE 1500 MG: 5 INJECTION, POWDER, LYOPHILIZED, FOR SOLUTION INTRAVENOUS at 14:22

## 2021-12-30 RX ADMIN — VANCOMYCIN HYDROCHLORIDE 1500 MG: 5 INJECTION, POWDER, LYOPHILIZED, FOR SOLUTION INTRAVENOUS at 01:02

## 2021-12-30 RX ADMIN — METOPROLOL TARTRATE 25 MG: 25 TABLET, FILM COATED ORAL at 06:03

## 2021-12-30 RX ADMIN — METRONIDAZOLE 500 MG: 500 TABLET ORAL at 22:12

## 2021-12-30 RX ADMIN — METRONIDAZOLE 500 MG: 500 TABLET ORAL at 06:02

## 2021-12-30 RX ADMIN — OXYCODONE 5 MG: 5 TABLET ORAL at 06:03

## 2021-12-30 RX ADMIN — OXYCODONE 5 MG: 5 TABLET ORAL at 22:12

## 2021-12-30 RX ADMIN — NICOTINE 14 MG: 14 PATCH TRANSDERMAL at 06:03

## 2021-12-30 ASSESSMENT — PAIN DESCRIPTION - PAIN TYPE
TYPE: ACUTE PAIN
TYPE: ACUTE PAIN;SURGICAL PAIN
TYPE: ACUTE PAIN
TYPE: ACUTE PAIN;SURGICAL PAIN

## 2021-12-30 ASSESSMENT — ENCOUNTER SYMPTOMS
SHORTNESS OF BREATH: 0
ABDOMINAL PAIN: 0
FOCAL WEAKNESS: 0
FEVER: 1
NAUSEA: 0
CHILLS: 0
MYALGIAS: 1
EYES NEGATIVE: 1
VOMITING: 0
COUGH: 0

## 2021-12-30 NOTE — CARE PLAN
The patient is Watcher - Medium risk of patient condition declining or worsening    Shift Goals  Clinical Goals: Monitor ELBERT drain output, diagnostic test, control pain   Patient Goals: Pain management  Family Goals: MALIHA. No family present.     Progress made toward(s) clinical / shift goals:      Problem: Pain - Standard  Goal: Alleviation of pain or a reduction in pain to the patient’s comfort goal  Outcome: Progressing     Problem: Fluid Volume  Goal: Fluid volume balance will be maintained  Outcome: Progressing     Problem: Infection - Standard  Goal: Patient will remain free from infection  Outcome: Progressing     Problem: Wound/ / Incision Healing  Goal: Patient's wound/surgical incision will decrease in size and heals properly  Outcome: Progressing       Patient is not progressing towards the following goals: NA

## 2021-12-30 NOTE — CARE PLAN
The patient is Stable - Low risk of patient condition declining or worsening    Shift Goals  Clinical Goals: Monitor ELBERT drain output, diagnostic test, control pain   Patient Goals: Pain management  Family Goals: MALIHA. No family present.     Progress made toward(s) clinical / shift goals:    Problem: Pain - Standard  Goal: Alleviation of pain or a reduction in pain to the patient’s comfort goal  Note: Medicated per MAR. Educated on pain scale. implemented non pharmacological methods: distraction, reposition, rest.      Problem: Knowledge Deficit - Standard  Goal: Patient and family/care givers will demonstrate understanding of plan of care, disease process/condition, diagnostic tests and medications  Note: Pt educated about disease process. reason why medications are taken, and informed about treatment plan.        Patient is not progressing towards the following goals:

## 2021-12-30 NOTE — PROGRESS NOTES
Hospital Medicine Daily Progress Note    Date of Service  12/30/2021    Chief Complaint  Gonsalo Hunt is a 61 y.o. male admitted 12/27/2021 with right hip pain and swelling    Hospital Course  A 61-year-old man with h/o necrotizing fascitis, IV drug use, hep C presented with right hip pain and swelling. Patients reports right hip pain starting 10 days ago, but worsening over the last 3 days associated with swelling, fevers and nausea. Patient reports he is no longer homeless and stopped doing IV drugs >1 year ago, still smokes marijuana, and smokes 1 pack of cigarettes q5 days.  In the ER XR of pelvis showed large volume rectal vault stool and mild right hip osteoarthritis. CT pelvis showed 7 cm rim-enhancing fluid collection over right iliotibial band compatible with abscess and possible myositis of the superficial gluteus medius muscle. Orthopedic surgery consulted.   Patient underwent I&D of right hip abscess on 12/28. Developed A.fib after surgery.      Interval Problem Update  Patient reports right hip pain 9/10. Patient was verbally abusive towards staff. Afebrile, hemodynamically stable.  Na 132.  Blood cultures form 12/28 negative to date.  Increased dose of dilaudid to 1 mg q3h prn    12/30/2021:  Patient continues to have mild pain continues on IV antimicrobials at present continues to have drainage from abscess site.  Follow-up with orthopedic surgery regarding further recommendations anticipated discharge in 1 to 2 days.    I have personally seen and examined the patient at bedside. I discussed the plan of care with patient, bedside RN, charge RN,  and pharmacy.    Consultants/Specialty  infectious disease and orthopedics    Code Status  Full Code    Disposition  Patient is not medically cleared for discharge.   Anticipate discharge to TBD.  I have placed the appropriate orders for post-discharge needs.    Review of Systems  Review of Systems   Constitutional: Positive for fever and  malaise/fatigue. Negative for chills.   HENT: Negative for hearing loss.    Eyes: Negative.    Respiratory: Negative for cough and shortness of breath.    Cardiovascular: Positive for leg swelling. Negative for chest pain.   Gastrointestinal: Negative for abdominal pain, nausea and vomiting.   Genitourinary: Negative for dysuria.   Musculoskeletal: Positive for myalgias (right hip pain).   Skin: Negative for rash.   Neurological: Negative for focal weakness.        Physical Exam  Temp:  [36.4 °C (97.5 °F)-36.8 °C (98.2 °F)] 36.8 °C (98.2 °F)  Pulse:  [] 101  Resp:  [16-18] 16  BP: (105-143)/(78-95) 143/95  SpO2:  [92 %-94 %] 92 %    Physical Exam  Vitals and nursing note reviewed.   Constitutional:       Appearance: He is obese. He is ill-appearing.   HENT:      Head: Normocephalic.      Nose: Nose normal.      Mouth/Throat:      Mouth: Mucous membranes are moist.      Pharynx: Oropharynx is clear.   Eyes:      Pupils: Pupils are equal, round, and reactive to light.   Cardiovascular:      Rate and Rhythm: Normal rate and regular rhythm.   Pulmonary:      Effort: Pulmonary effort is normal. No respiratory distress.      Breath sounds: No wheezing or rales.   Abdominal:      General: Abdomen is flat. Bowel sounds are normal. There is no distension.   Musculoskeletal:         General: Swelling and tenderness (right hip) present. Normal range of motion.      Cervical back: Normal range of motion.   Skin:     General: Skin is warm.      Comments: Dressing on right hip clean. Drain in place on right hip with sanguinous fluid    Neurological:      General: No focal deficit present.      Mental Status: He is alert and oriented to person, place, and time.         Fluids    Intake/Output Summary (Last 24 hours) at 12/30/2021 1557  Last data filed at 12/30/2021 1514  Gross per 24 hour   Intake 1080 ml   Output 60 ml   Net 1020 ml       Laboratory  Recent Labs     12/28/21  0023 12/29/21  0409 12/30/21  0349   WBC 13.8*  9.4 7.7   RBC 6.06 4.64* 4.84   HEMOGLOBIN 16.1 12.7* 12.8*   HEMATOCRIT 50.1 38.4* 40.7*   MCV 82.7 82.8 84.1   MCH 26.6* 27.4 26.4*   MCHC 32.1* 33.1* 31.4*   RDW 53.9* 53.0* 53.4*   PLATELETCT 277 197 222   MPV 10.7 10.7 11.0     Recent Labs     12/28/21  0023 12/29/21  0409 12/30/21  0349   SODIUM 136 132* 136   POTASSIUM 3.9 4.9 4.2   CHLORIDE 97 102 102   CO2 25 22 24   GLUCOSE 75 276* 105*   BUN 18 19 20   CREATININE 0.71 0.60 0.59   CALCIUM 9.3 8.6 8.5                   Imaging  CT-PELVIS WITH   Final Result      New 7 cm rim-enhancing fluid collection overlying the right iliotibial band is compatible with abscess favored over hematoma. Aspiration could clarify      Myositis of the superficial gluteus medius muscle is possible. No CT evidence of bony destruction or septic arthropathy      Large left hydrocele post inguinal hernia repair      Direct fatty right inguinal hernia similar to comparison      DX-PELVIS-1 OR 2 VIEWS   Final Result      Large volume rectal vault stool      Mild right hip osteoarthritis           Assessment/Plan  * Abscess- (present on admission)  Assessment & Plan  Right upper thigh   Orthopedic surgery consulted   S/p surgical I&D on 12/28  ID following, recommendations appreciated  Continue Abx    CN (constipation)- (present on admission)  Assessment & Plan  Bowel regimen ordered    Leukocytosis- (present on admission)  Assessment & Plan  Mild WBC 13 on admission   2/2 iliotibial abscess   IV abx   Cultures pending     Atrial fibrillation (HCC)  Assessment & Plan  As per patient he was diagnosed with it at [facility] and a prescription was given to him but never filled.  Will monitor on tele after surgery   Started metoprolol.  Will need to discuss anticoagulation option once cleared from surgery to start anticoagulation     Tobacco use- (present on admission)  Assessment & Plan  Smokes 1 pack of cigs in 5 days   Thinking about quitting, weaning himself off   Cessation  counseling performed 5 minutes     Chronic hepatitis C virus infection (HCC)- (present on admission)  Assessment & Plan  Hx of   AST/ALT wnl    Cannabis use disorder, mild, abuse- (present on admission)  Assessment & Plan  Ongoing, cessation counseling       VTE prophylaxis: SCDs/TEDs    I have performed a physical exam and reviewed and updated ROS and Plan today (12/30/2021). In review of yesterday's note (12/29/2021), there are no changes except as documented above.

## 2021-12-30 NOTE — PROGRESS NOTES
Bedside report received and patient care assumed. Pt is resting in bed, A&Ox4, with  complaints of 3/10 right leg pain, and is on RA. Pt refusing tele monitoring - MD aware. All fall precautions are in place, belongings at bedside table.  Pt was updated on POC, no questions or concerns. Pt educated on use of call light for assistance.

## 2021-12-31 PROBLEM — E66.9 OBESITY (BMI 30-39.9): Status: ACTIVE | Noted: 2021-12-31

## 2021-12-31 LAB
BACTERIA SPEC ANAEROBE CULT: NORMAL
HCV RNA SERPL NAA+PROBE-ACNC: ABNORMAL IU/ML
HCV RNA SERPL NAA+PROBE-LOG IU: 6.3 LOG IU/ML
HCV RNA SERPL QL NAA+PROBE: DETECTED
SIGNIFICANT IND 70042: NORMAL
SITE SITE: NORMAL
SOURCE SOURCE: NORMAL
VANCOMYCIN TROUGH SERPL-MCNC: 10.7 UG/ML (ref 10–20)

## 2021-12-31 PROCEDURE — 700105 HCHG RX REV CODE 258: Performed by: INTERNAL MEDICINE

## 2021-12-31 PROCEDURE — 99233 SBSQ HOSP IP/OBS HIGH 50: CPT | Performed by: INTERNAL MEDICINE

## 2021-12-31 PROCEDURE — 700102 HCHG RX REV CODE 250 W/ 637 OVERRIDE(OP): Performed by: INTERNAL MEDICINE

## 2021-12-31 PROCEDURE — A9270 NON-COVERED ITEM OR SERVICE: HCPCS | Performed by: INTERNAL MEDICINE

## 2021-12-31 PROCEDURE — 36415 COLL VENOUS BLD VENIPUNCTURE: CPT

## 2021-12-31 PROCEDURE — 700102 HCHG RX REV CODE 250 W/ 637 OVERRIDE(OP): Performed by: STUDENT IN AN ORGANIZED HEALTH CARE EDUCATION/TRAINING PROGRAM

## 2021-12-31 PROCEDURE — 700111 HCHG RX REV CODE 636 W/ 250 OVERRIDE (IP): Performed by: STUDENT IN AN ORGANIZED HEALTH CARE EDUCATION/TRAINING PROGRAM

## 2021-12-31 PROCEDURE — 700111 HCHG RX REV CODE 636 W/ 250 OVERRIDE (IP): Performed by: INTERNAL MEDICINE

## 2021-12-31 PROCEDURE — 80202 ASSAY OF VANCOMYCIN: CPT

## 2021-12-31 PROCEDURE — 700105 HCHG RX REV CODE 258: Performed by: STUDENT IN AN ORGANIZED HEALTH CARE EDUCATION/TRAINING PROGRAM

## 2021-12-31 PROCEDURE — A9270 NON-COVERED ITEM OR SERVICE: HCPCS | Performed by: STUDENT IN AN ORGANIZED HEALTH CARE EDUCATION/TRAINING PROGRAM

## 2021-12-31 PROCEDURE — 770020 HCHG ROOM/CARE - TELE (206)

## 2021-12-31 RX ORDER — METRONIDAZOLE 500 MG/1
500 TABLET ORAL EVERY 8 HOURS
Status: DISCONTINUED | OUTPATIENT
Start: 2021-12-31 | End: 2022-01-04 | Stop reason: HOSPADM

## 2021-12-31 RX ADMIN — OXYCODONE 5 MG: 5 TABLET ORAL at 11:18

## 2021-12-31 RX ADMIN — OXYCODONE 5 MG: 5 TABLET ORAL at 05:16

## 2021-12-31 RX ADMIN — OXYCODONE 5 MG: 5 TABLET ORAL at 01:34

## 2021-12-31 RX ADMIN — OXYCODONE 5 MG: 5 TABLET ORAL at 19:33

## 2021-12-31 RX ADMIN — VANCOMYCIN HYDROCHLORIDE 1500 MG: 5 INJECTION, POWDER, LYOPHILIZED, FOR SOLUTION INTRAVENOUS at 12:58

## 2021-12-31 RX ADMIN — HYDROMORPHONE HYDROCHLORIDE 1 MG: 1 INJECTION, SOLUTION INTRAMUSCULAR; INTRAVENOUS; SUBCUTANEOUS at 03:11

## 2021-12-31 RX ADMIN — OXYCODONE 5 MG: 5 TABLET ORAL at 08:10

## 2021-12-31 RX ADMIN — NICOTINE 14 MG: 14 PATCH TRANSDERMAL at 05:17

## 2021-12-31 RX ADMIN — VANCOMYCIN HYDROCHLORIDE 1500 MG: 5 INJECTION, POWDER, LYOPHILIZED, FOR SOLUTION INTRAVENOUS at 01:34

## 2021-12-31 RX ADMIN — METRONIDAZOLE 500 MG: 500 TABLET ORAL at 21:03

## 2021-12-31 RX ADMIN — HYDROMORPHONE HYDROCHLORIDE 1 MG: 1 INJECTION, SOLUTION INTRAMUSCULAR; INTRAVENOUS; SUBCUTANEOUS at 21:02

## 2021-12-31 RX ADMIN — HYDROMORPHONE HYDROCHLORIDE 1 MG: 1 INJECTION, SOLUTION INTRAMUSCULAR; INTRAVENOUS; SUBCUTANEOUS at 15:28

## 2021-12-31 RX ADMIN — OXYCODONE 5 MG: 5 TABLET ORAL at 14:14

## 2021-12-31 RX ADMIN — METOPROLOL TARTRATE 25 MG: 25 TABLET, FILM COATED ORAL at 05:17

## 2021-12-31 RX ADMIN — DOCUSATE SODIUM 50 MG AND SENNOSIDES 8.6 MG 2 TABLET: 8.6; 5 TABLET, FILM COATED ORAL at 05:17

## 2021-12-31 RX ADMIN — OXYCODONE 5 MG: 5 TABLET ORAL at 23:14

## 2021-12-31 RX ADMIN — METRONIDAZOLE 500 MG: 500 TABLET ORAL at 12:58

## 2021-12-31 RX ADMIN — METOPROLOL TARTRATE 25 MG: 25 TABLET, FILM COATED ORAL at 16:52

## 2021-12-31 RX ADMIN — METRONIDAZOLE 500 MG: 500 TABLET ORAL at 05:18

## 2021-12-31 RX ADMIN — CEFTRIAXONE SODIUM 2 G: 2 INJECTION, POWDER, FOR SOLUTION INTRAMUSCULAR; INTRAVENOUS at 05:16

## 2021-12-31 RX ADMIN — HYDROMORPHONE HYDROCHLORIDE 1 MG: 1 INJECTION, SOLUTION INTRAMUSCULAR; INTRAVENOUS; SUBCUTANEOUS at 09:15

## 2021-12-31 RX ADMIN — DOCUSATE SODIUM 50 MG AND SENNOSIDES 8.6 MG 2 TABLET: 8.6; 5 TABLET, FILM COATED ORAL at 16:52

## 2021-12-31 ASSESSMENT — ENCOUNTER SYMPTOMS
EYES NEGATIVE: 1
SHORTNESS OF BREATH: 0
NAUSEA: 0
FEVER: 1
CHILLS: 0
MYALGIAS: 1
FEVER: 0
VOMITING: 0
FOCAL WEAKNESS: 0
ABDOMINAL PAIN: 0
COUGH: 0

## 2021-12-31 ASSESSMENT — PAIN DESCRIPTION - PAIN TYPE
TYPE: ACUTE PAIN

## 2021-12-31 ASSESSMENT — FIBROSIS 4 INDEX: FIB4 SCORE: 1.38

## 2021-12-31 NOTE — PROGRESS NOTES
"   Orthopaedic PA Progress Note    Interval changes:Irritable. States Oxycodone doesn't work but Dilaudid does. Difficult IV insertion ongoing by RN team    ROS - Patient denies any new issues. No chest pain, dyspnea, or fever.  Pain well controlled.    /95   Pulse (!) 101   Temp 36.8 °C (98.2 °F) (Temporal)   Resp 16   Ht 1.93 m (6' 4\")   Wt 113 kg (249 lb 12.5 oz)   SpO2 92%     Patient seen and examined  No acute distress  Breathing non labored  RRR  Surgical dressing is clean, dry, and intact. Patient clearly fires tibialis anterior, EHL, and gastrocnemius/soleus. Sensation is intact to light touch throughout superficial peroneal, deep peroneal, tibial, saphenous, and sural nerve distributions. Strong and palpable 2+ dorsalis pedis and posterior tibial pulses with capillary refill less than 2 seconds. No lower leg tenderness or discomfort.    Recent Labs     12/28/21  0023 12/29/21  0409 12/30/21  0349   WBC 13.8* 9.4 7.7   RBC 6.06 4.64* 4.84   HEMOGLOBIN 16.1 12.7* 12.8*   HEMATOCRIT 50.1 38.4* 40.7*   MCV 82.7 82.8 84.1   MCH 26.6* 27.4 26.4*   MCHC 32.1* 33.1* 31.4*   RDW 53.9* 53.0* 53.4*   PLATELETCT 277 197 222   MPV 10.7 10.7 11.0       Active Hospital Problems    Diagnosis    • Atrial fibrillation (HCC) [I48.91]      Priority: Medium   • Chronic hepatitis C virus infection (HCC) [B18.2]      Priority: Low   • Tobacco use [Z72.0]      Priority: Low   • Cannabis use disorder, mild, abuse [F12.10]      Priority: Low   • Leukocytosis [D72.829]    • Abscess [L02.91]    • CN (constipation) [K59.00]        Assessment/Plan:  POD#2 S/P I+D R hip abscess  Wt bearing status - as tolerated  PT/OT-initiated  Wound care:dressing left in place  Drains - HV 60 mL/24 left in  Lundberg-no  Sutures/Staples out- 10-14 days post operatively  Antibiotics: per Med  DVT Prophylaxis- TEDS/SCDs/Foot pumps.   Lovenox: Start 40 mg, Duration-until ambulatory > 150'  Future Procedures - none planned  Case Coordination for " Discharge Planning - Disposition pending Abx plan

## 2021-12-31 NOTE — PROGRESS NOTES
Received report from NOC shift RN. Patient is A&Ox4, complains of 7/10 right hip pain, VSS, no signs of distress. All questions and concerns answered, bed in lowest and locked position, call light in reach, will continue to monitor.

## 2021-12-31 NOTE — PROGRESS NOTES
Assumed care of patient at 1920, received bedside report from day shift RN. Bed is locked and in lowest position with call light within reach. Treaded socks in place. Patient updated on plan of care. Pt medicated for pain 8/10. White board updated. Pt A&Ox4. Patient's breathing pattern is unlabored. Pt refusing tele monitor.

## 2021-12-31 NOTE — PROGRESS NOTES
Infectious Disease Progress Note    Author: Fatimah Schafer M.D. Date & Time of service: 2021  2:19 PM    Chief Complaint:  Right hip abscess    Interval History:   61 y.o. male h/o RLE necrotizing fascitis (s/p fasciotomy 2020), history of multiple abscesses, history of IVDU (last use per patient >1 yr ago), chronic hepatitis C (never treated), pre-diabetes (A1c 5.9% in 2021),  and previous homelessness admitted 2021 for R hip abscess. CT R hip showed a 7 cm abscess with possible myositis. s/p I&D R hip abscess on 2021.   AF WBC 7.7 no acute events tolerating abx Pain controlled    Labs Reviewed and Medications Reviewed.    Review of Systems:  Review of Systems   Constitutional: Negative for fever.   Musculoskeletal: Positive for joint pain.        Right hip   All other systems reviewed and are negative.      Hemodynamics:  Temp (24hrs), Av.4 °C (97.5 °F), Min:35.9 °C (96.6 °F), Max:36.9 °C (98.4 °F)  Temperature: 36.2 °C (97.2 °F)  Pulse  Av  Min: 60  Max: 130   Blood Pressure: 125/89       Physical Exam:  Physical Exam  Vitals and nursing note reviewed.   Constitutional:       General: He is not in acute distress.     Appearance: He is not toxic-appearing or diaphoretic.   Eyes:      General:         Right eye: No discharge.   Cardiovascular:      Rate and Rhythm: Tachycardia present. Rhythm irregular.   Abdominal:      General: There is no distension.      Palpations: Abdomen is soft.   Musculoskeletal:      Comments: Drain     Skin:     Coloration: Skin is not jaundiced.   Neurological:      Comments: somnolent         Meds:    Current Facility-Administered Medications:   •  metroNIDAZOLE  •  vancomycin  •  oxyCODONE immediate-release **OR** oxyCODONE immediate-release **OR** HYDROmorphone  •  senna-docusate **AND** polyethylene glycol/lytes **AND** magnesium hydroxide **AND** bisacodyl  •  acetaminophen  •  Notify provider if pain remains uncontrolled **AND** Use the  Numeric Rating Scale (NRS), Nelson-Baker Faces (WBF), or FLACC on regular floors and Critical-Care Pain Observation Tool (CPOT) on ICUs/Trauma to assess pain **AND** Pulse Ox **AND** Pharmacy Consult Request **AND** If patient difficult to arouse and/or has respiratory depression (respiratory rate of 10 or less), stop any opiates that are currently infusing and call a Rapid Response.  •  labetalol  •  ondansetron  •  ondansetron  •  promethazine  •  promethazine  •  prochlorperazine  •  nicotine **AND** Nicotine Replacement Patient Education Materials **AND** nicotine polacrilex  •  cefTRIAXone (ROCEPHIN) IV  •  metoprolol tartrate    Labs:  Recent Labs     12/29/21 0409 12/30/21  0349   WBC 9.4 7.7   RBC 4.64* 4.84   HEMOGLOBIN 12.7* 12.8*   HEMATOCRIT 38.4* 40.7*   MCV 82.8 84.1   MCH 27.4 26.4*   RDW 53.0* 53.4*   PLATELETCT 197 222   MPV 10.7 11.0   NEUTSPOLYS 86.50* 69.40   LYMPHOCYTES 7.00* 20.30*   MONOCYTES 5.70 6.60   EOSINOPHILS 0.10 2.20   BASOPHILS 0.20 0.90     Recent Labs     12/29/21 0409 12/30/21  0349   SODIUM 132* 136   POTASSIUM 4.9 4.2   CHLORIDE 102 102   CO2 22 24   GLUCOSE 276* 105*   BUN 19 20     Recent Labs     12/29/21 0409 12/30/21  0349   ALBUMIN  --  3.2   TBILIRUBIN  --  0.2   ALKPHOSPHAT  --  139*   TOTPROTEIN  --  6.6   ALTSGPT  --  26   ASTSGOT  --  26   CREATININE 0.60 0.59       Imaging:  CT-PELVIS WITH    Result Date: 12/28/2021 12/28/2021 1:22 AM HISTORY/REASON FOR EXAM:  right hip swelling and mass, concern for abscess. TECHNIQUE/EXAM DESCRIPTION AND NUMBER OF VIEWS: CT scan of the pelvis with contrast. Contrast-enhanced helical scanning of the pelvis was obtained from the iliac crests through the pubic symphysis following the bolus administration of 100 mL of Omnipaque 350 nonionic contrast without complication. Low dose optimization technique was utilized for this CT exam including automated exposure control and adjustment of the mA and/or kV according to patient size.  COMPARISON:  3/18/2021 CT. FINDINGS: Rim-enhancing fluid collection overlies the right iliotibial band measuring 71 x 36 x 71 mm. There is overlying skin thickening and subcutaneous fat stranding. This is slightly incomplete lease included in the field-of-view. Some enhancement appears to extend into the gluteus medius muscle superficially. There is regional skin thickening and adjacent fat stranding. Large left hydrocele is incompletely seen. The prior left inguinal hernia containing bowel has resolved. No other abnormal fluid collection is seen. Right fatty direct inguinal hernia is again noted. Rectal vault stool volume has diminished Bony structures show no periosteal reaction or bony destruction. No fracture. Right hip joint space narrowing and mild acetabular spurring is seen. Deformity of the right ilium is compatible with prior bone harvest. This is posterior and of no acute significance. Leads at the lumbosacral junction with generator posteriorly are again noted     New 7 cm rim-enhancing fluid collection overlying the right iliotibial band is compatible with abscess favored over hematoma. Aspiration could clarify Myositis of the superficial gluteus medius muscle is possible. No CT evidence of bony destruction or septic arthropathy Large left hydrocele post inguinal hernia repair Direct fatty right inguinal hernia similar to comparison    DX-PELVIS-1 OR 2 VIEWS    Result Date: 12/28/2021 12/28/2021 12:22 AM HISTORY/REASON FOR EXAM:  Atraumatic Pelvic/Hip Pain. TECHNIQUE/EXAM DESCRIPTION AND NUMBER OF VIEWS:  1 view(s) of the pelvis. COMPARISON:  9/14/2020 FINDINGS: Electrodes, generator again overly the lumbosacral junction, incompletely visualized There is large volume rectal vault stool, 9.7 cm transverse dimension. This obscures the sacrum distally No acute fracture or subluxation is seen. Joint spaces are nearly preserved. There is mild right hip joint space narrowing and acetabular spurring No bony  destruction     Large volume rectal vault stool Mild right hip osteoarthritis      Micro:  Results     Procedure Component Value Units Date/Time    AFB Culture [[id number]] Collected: 12/28/21 0922    Order Status: Completed Updated: 12/30/21 1141     AFB Smear Results SEE NOTE     Comment: Negative for Acid Fast Bacteria.  Less than 5 mL of specimen received.  A minimum of 5 mL of sputum or fluid is recommended  for recovery of acid fast bacilli (AFB).  Volumes less than 5 mL are suboptimal and may  compromise recovery of AFB from culture.  Performed by [employer],  [address] [facility],[place] [address] [phone]  [email or web address], Connie Rocha MD - Lab. Director          Preliminary Report SEE NOTE     Comment: Specimen received and in progress.  Positive culture results are called as soon as detected.  Final report to follow in seven to eight weeks  Performed by [employer],  [address] [facility],[place] [id number] [phone]  [email or web address], Connie Rocha MD - Lab. Director         CULTURE WOUND W/ GRAM STAIN [[id number]]  (Abnormal) Collected: 12/28/21 0922    Order Status: Completed Specimen: Wound Updated: 12/30/21 1103     Significant Indicator POS     Source WND     Site Right Hip Abscess     Culture Result -     Gram Stain Result Many WBCs.  Many Gram positive cocci.       Culture Result Streptococcus intermedius  Heavy growth      Narrative:      Surgery Specimen    Anaerobic Culture [[id number]] Collected: 12/28/21 0922    Order Status: Completed Specimen: Wound Updated: 12/30/21 1103     Significant Indicator NEG     Source WND     Site Right Hip Abscess     Culture Result Culture in progress.    Narrative:      Surgery Specimen    Fungal Culture [[id number]] Collected: 12/28/21 0922    Order Status: Completed Specimen: Wound Updated: 12/30/21 1103     Significant Indicator NEG     Source WND     Site Right Hip Abscess     Culture Result Culture in progress.     Fungal Smear Results No fungal  "elements seen.    Narrative:      Surgery Specimen    BLOOD CULTURE [[id number]] Collected: 12/28/21 0106    Order Status: Completed Specimen: Blood from Peripheral Updated: 12/29/21 1008     Significant Indicator NEG     Source BLD     Site PERIPHERAL     Culture Result No Growth  Note: Blood cultures are incubated for 5 days and  are monitored continuously.Positive blood cultures  are called to the RN and reported as soon as  they are identified.      Narrative:      Collected By:  Per Hospital Policy: Only change Specimen Src: to \"Line\" if  specified by physician order.  Right Forearm/Arm    BLOOD CULTURE [[id number]] Collected: 12/28/21 0005    Order Status: Completed Specimen: Blood from Peripheral Updated: 12/29/21 1008     Significant Indicator NEG     Source BLD     Site PERIPHERAL     Culture Result No Growth  Note: Blood cultures are incubated for 5 days and  are monitored continuously.Positive blood cultures  are called to the RN and reported as soon as  they are identified.      Narrative:      Collected By:  Per Hospital Policy: Only change Specimen Src: to \"Line\" if  specified by physician order.  No site indicated    Fungal Smear [[id number]] Collected: 12/28/21 0922    Order Status: Completed Specimen: Wound Updated: 12/28/21 1552     Significant Indicator NEG     Source WND     Site Right Hip Abscess     Fungal Smear Results No fungal elements seen.    Narrative:      Surgery Specimen    GRAM STAIN [[id number]] Collected: 12/28/21 0922    Order Status: Completed Specimen: Wound Updated: 12/28/21 1552     Significant Indicator .     Source WND     Site Right Hip Abscess     Gram Stain Result Many WBCs.  Many Gram positive cocci.      Narrative:      Surgery Specimen    MRSA By PCR (Amp) [[id number]]     Order Status: Sent Specimen: Respirate from Nares           Assessment:  Active Hospital Problems    Diagnosis    • *Abscess [L02.91]    • Leukocytosis [D72.829]    • CN (constipation) [K59.00]    • Atrial " fibrillation (HCC) [I48.91]    • Chronic hepatitis C virus infection (HCC) [B18.2]    • Tobacco use [Z72.0]    • Cannabis use disorder, mild, abuse [F12.10]        Plan:  S/p I and D 12/28  Continue ceftriaxone and Flagyl for anaerobic coverage  Anticipate at least 2 weeks treatment post-op  FU cultures-strep intermedius so far  Neg HIV screen  Hepatitis C antibody -FU outpatient for hepatitis C treatment- refer to [facility]     Discussed with Pharm/Nikky Margi

## 2021-12-31 NOTE — CARE PLAN
The patient is Stable - Low risk of patient condition declining or worsening    Shift Goals  Clinical Goals: pain management  Patient Goals: Pain management  Family Goals: MALIHA. No family present.       Problem: Pain - Standard  Goal: Alleviation of pain or a reduction in pain to the patient’s comfort goal  Outcome: Progressing     Problem: Knowledge Deficit - Standard  Goal: Patient and family/care givers will demonstrate understanding of plan of care, disease process/condition, diagnostic tests and medications  Outcome: Progressing

## 2021-12-31 NOTE — CARE PLAN
The patient is Stable - Low risk of patient condition declining or worsening    Shift Goals  Clinical Goals: Monitor ELBERT drain output, diagnostic test, control pain   Patient Goals: Pain management  Family Goals: MALIHA. No family present.     Progress made toward(s) clinical / shift goals:        Problem: Knowledge Deficit - Standard  Goal: Patient and family/care givers will demonstrate understanding of plan of care, disease process/condition, diagnostic tests and medications  Outcome: Progressing     Problem: Infection - Standard  Goal: Patient will remain free from infection  Outcome: Progressing       Patient is not progressing towards the following goals: n/a

## 2022-01-01 PROBLEM — R73.9 HYPERGLYCEMIA: Status: ACTIVE | Noted: 2022-01-01

## 2022-01-01 LAB
ALBUMIN SERPL BCP-MCNC: 3 G/DL (ref 3.2–4.9)
ALBUMIN/GLOB SERPL: 0.9 G/DL
ALP SERPL-CCNC: 135 U/L (ref 30–99)
ALT SERPL-CCNC: 22 U/L (ref 2–50)
ANION GAP SERPL CALC-SCNC: 11 MMOL/L (ref 7–16)
AST SERPL-CCNC: 21 U/L (ref 12–45)
BASOPHILS # BLD AUTO: 0.8 % (ref 0–1.8)
BASOPHILS # BLD: 0.05 K/UL (ref 0–0.12)
BILIRUB SERPL-MCNC: 0.2 MG/DL (ref 0.1–1.5)
BUN SERPL-MCNC: 21 MG/DL (ref 8–22)
CALCIUM SERPL-MCNC: 8.4 MG/DL (ref 8.5–10.5)
CHLORIDE SERPL-SCNC: 102 MMOL/L (ref 96–112)
CO2 SERPL-SCNC: 23 MMOL/L (ref 20–33)
CREAT SERPL-MCNC: 0.77 MG/DL (ref 0.5–1.4)
EOSINOPHIL # BLD AUTO: 0.23 K/UL (ref 0–0.51)
EOSINOPHIL NFR BLD: 3.5 % (ref 0–6.9)
ERYTHROCYTE [DISTWIDTH] IN BLOOD BY AUTOMATED COUNT: 53.4 FL (ref 35.9–50)
EST. AVERAGE GLUCOSE BLD GHB EST-MCNC: 120 MG/DL
GLOBULIN SER CALC-MCNC: 3.2 G/DL (ref 1.9–3.5)
GLUCOSE SERPL-MCNC: 174 MG/DL (ref 65–99)
HBA1C MFR BLD: 5.8 % (ref 4–5.6)
HCT VFR BLD AUTO: 42.4 % (ref 42–52)
HGB BLD-MCNC: 13.5 G/DL (ref 14–18)
IMM GRANULOCYTES # BLD AUTO: 0.09 K/UL (ref 0–0.11)
IMM GRANULOCYTES NFR BLD AUTO: 1.4 % (ref 0–0.9)
LYMPHOCYTES # BLD AUTO: 1.31 K/UL (ref 1–4.8)
LYMPHOCYTES NFR BLD: 19.9 % (ref 22–41)
MAGNESIUM SERPL-MCNC: 1.9 MG/DL (ref 1.5–2.5)
MCH RBC QN AUTO: 26.4 PG (ref 27–33)
MCHC RBC AUTO-ENTMCNC: 31.8 G/DL (ref 33.7–35.3)
MCV RBC AUTO: 83 FL (ref 81.4–97.8)
MONOCYTES # BLD AUTO: 0.53 K/UL (ref 0–0.85)
MONOCYTES NFR BLD AUTO: 8.1 % (ref 0–13.4)
NEUTROPHILS # BLD AUTO: 4.37 K/UL (ref 1.82–7.42)
NEUTROPHILS NFR BLD: 66.3 % (ref 44–72)
NRBC # BLD AUTO: 0 K/UL
NRBC BLD-RTO: 0 /100 WBC
PLATELET # BLD AUTO: 244 K/UL (ref 164–446)
PMV BLD AUTO: 10.3 FL (ref 9–12.9)
POTASSIUM SERPL-SCNC: 4.3 MMOL/L (ref 3.6–5.5)
PROT SERPL-MCNC: 6.2 G/DL (ref 6–8.2)
RBC # BLD AUTO: 5.11 M/UL (ref 4.7–6.1)
SODIUM SERPL-SCNC: 136 MMOL/L (ref 135–145)
WBC # BLD AUTO: 6.6 K/UL (ref 4.8–10.8)

## 2022-01-01 PROCEDURE — 700102 HCHG RX REV CODE 250 W/ 637 OVERRIDE(OP): Performed by: INTERNAL MEDICINE

## 2022-01-01 PROCEDURE — 700111 HCHG RX REV CODE 636 W/ 250 OVERRIDE (IP): Performed by: INTERNAL MEDICINE

## 2022-01-01 PROCEDURE — 85025 COMPLETE CBC W/AUTO DIFF WBC: CPT

## 2022-01-01 PROCEDURE — 99024 POSTOP FOLLOW-UP VISIT: CPT | Performed by: SURGERY

## 2022-01-01 PROCEDURE — 700111 HCHG RX REV CODE 636 W/ 250 OVERRIDE (IP): Performed by: STUDENT IN AN ORGANIZED HEALTH CARE EDUCATION/TRAINING PROGRAM

## 2022-01-01 PROCEDURE — A9270 NON-COVERED ITEM OR SERVICE: HCPCS | Performed by: INTERNAL MEDICINE

## 2022-01-01 PROCEDURE — 97161 PT EVAL LOW COMPLEX 20 MIN: CPT

## 2022-01-01 PROCEDURE — 700105 HCHG RX REV CODE 258: Performed by: INTERNAL MEDICINE

## 2022-01-01 PROCEDURE — A9270 NON-COVERED ITEM OR SERVICE: HCPCS | Performed by: STUDENT IN AN ORGANIZED HEALTH CARE EDUCATION/TRAINING PROGRAM

## 2022-01-01 PROCEDURE — 99232 SBSQ HOSP IP/OBS MODERATE 35: CPT | Performed by: INTERNAL MEDICINE

## 2022-01-01 PROCEDURE — 770020 HCHG ROOM/CARE - TELE (206)

## 2022-01-01 PROCEDURE — 36415 COLL VENOUS BLD VENIPUNCTURE: CPT

## 2022-01-01 PROCEDURE — 83735 ASSAY OF MAGNESIUM: CPT

## 2022-01-01 PROCEDURE — 83036 HEMOGLOBIN GLYCOSYLATED A1C: CPT

## 2022-01-01 PROCEDURE — 700102 HCHG RX REV CODE 250 W/ 637 OVERRIDE(OP): Performed by: STUDENT IN AN ORGANIZED HEALTH CARE EDUCATION/TRAINING PROGRAM

## 2022-01-01 PROCEDURE — 99233 SBSQ HOSP IP/OBS HIGH 50: CPT | Performed by: INTERNAL MEDICINE

## 2022-01-01 PROCEDURE — 80053 COMPREHEN METABOLIC PANEL: CPT

## 2022-01-01 RX ORDER — MAGNESIUM SULFATE 1 G/100ML
1 INJECTION INTRAVENOUS ONCE
Status: COMPLETED | OUTPATIENT
Start: 2022-01-01 | End: 2022-01-01

## 2022-01-01 RX ADMIN — OXYCODONE 5 MG: 5 TABLET ORAL at 03:17

## 2022-01-01 RX ADMIN — HYDROMORPHONE HYDROCHLORIDE 1 MG: 1 INJECTION, SOLUTION INTRAMUSCULAR; INTRAVENOUS; SUBCUTANEOUS at 01:21

## 2022-01-01 RX ADMIN — OXYCODONE 5 MG: 5 TABLET ORAL at 21:07

## 2022-01-01 RX ADMIN — HYDROMORPHONE HYDROCHLORIDE 1 MG: 1 INJECTION, SOLUTION INTRAMUSCULAR; INTRAVENOUS; SUBCUTANEOUS at 19:33

## 2022-01-01 RX ADMIN — METRONIDAZOLE 500 MG: 500 TABLET ORAL at 13:54

## 2022-01-01 RX ADMIN — DOCUSATE SODIUM 50 MG AND SENNOSIDES 8.6 MG 2 TABLET: 8.6; 5 TABLET, FILM COATED ORAL at 05:39

## 2022-01-01 RX ADMIN — CEFTRIAXONE SODIUM 2 G: 2 INJECTION, POWDER, FOR SOLUTION INTRAMUSCULAR; INTRAVENOUS at 05:39

## 2022-01-01 RX ADMIN — HYDROMORPHONE HYDROCHLORIDE 1 MG: 1 INJECTION, SOLUTION INTRAMUSCULAR; INTRAVENOUS; SUBCUTANEOUS at 15:22

## 2022-01-01 RX ADMIN — OXYCODONE 5 MG: 5 TABLET ORAL at 17:37

## 2022-01-01 RX ADMIN — HYDROMORPHONE HYDROCHLORIDE 1 MG: 1 INJECTION, SOLUTION INTRAMUSCULAR; INTRAVENOUS; SUBCUTANEOUS at 07:40

## 2022-01-01 RX ADMIN — MAGNESIUM SULFATE HEPTAHYDRATE 1 G: 1 INJECTION, SOLUTION INTRAVENOUS at 07:40

## 2022-01-01 RX ADMIN — METOPROLOL TARTRATE 25 MG: 25 TABLET, FILM COATED ORAL at 17:37

## 2022-01-01 RX ADMIN — DOCUSATE SODIUM 50 MG AND SENNOSIDES 8.6 MG 2 TABLET: 8.6; 5 TABLET, FILM COATED ORAL at 17:37

## 2022-01-01 RX ADMIN — HYDROMORPHONE HYDROCHLORIDE 1 MG: 1 INJECTION, SOLUTION INTRAMUSCULAR; INTRAVENOUS; SUBCUTANEOUS at 22:49

## 2022-01-01 RX ADMIN — OXYCODONE 5 MG: 5 TABLET ORAL at 06:41

## 2022-01-01 RX ADMIN — NICOTINE 14 MG: 14 PATCH TRANSDERMAL at 05:39

## 2022-01-01 RX ADMIN — HYDROMORPHONE HYDROCHLORIDE 1 MG: 1 INJECTION, SOLUTION INTRAMUSCULAR; INTRAVENOUS; SUBCUTANEOUS at 04:32

## 2022-01-01 RX ADMIN — OXYCODONE 5 MG: 5 TABLET ORAL at 13:54

## 2022-01-01 RX ADMIN — METOPROLOL TARTRATE 25 MG: 25 TABLET, FILM COATED ORAL at 05:39

## 2022-01-01 RX ADMIN — METRONIDAZOLE 500 MG: 500 TABLET ORAL at 05:39

## 2022-01-01 RX ADMIN — METRONIDAZOLE 500 MG: 500 TABLET ORAL at 21:08

## 2022-01-01 ASSESSMENT — COGNITIVE AND FUNCTIONAL STATUS - GENERAL
SUGGESTED CMS G CODE MODIFIER MOBILITY: CI
CLIMB 3 TO 5 STEPS WITH RAILING: A LITTLE
MOBILITY SCORE: 23

## 2022-01-01 ASSESSMENT — ENCOUNTER SYMPTOMS
FEVER: 0
EYES NEGATIVE: 1
FOCAL WEAKNESS: 0
NAUSEA: 0
COUGH: 0
MYALGIAS: 1
VOMITING: 0
ABDOMINAL PAIN: 0
SHORTNESS OF BREATH: 0
CHILLS: 0

## 2022-01-01 ASSESSMENT — PAIN DESCRIPTION - PAIN TYPE
TYPE: ACUTE PAIN

## 2022-01-01 ASSESSMENT — GAIT ASSESSMENTS
DISTANCE (FEET): 400
DEVIATION: ANTALGIC
GAIT LEVEL OF ASSIST: INDEPENDENT

## 2022-01-01 NOTE — PROGRESS NOTES
Infectious Disease Progress Note    Author: Fatimah Schafer M.D. Date & Time of service: 2022  12:11 PM    Chief Complaint:  Right hip abscess    Interval History:   61 y.o. male h/o RLE necrotizing fascitis (s/p fasciotomy 2020), history of multiple abscesses, history of IVDU (last use per patient >1 yr ago), chronic hepatitis C (never treated), pre-diabetes (A1c 5.9% in 2021),  and previous homelessness admitted 2021 for R hip abscess. CT R hip showed a 7 cm abscess with possible myositis. s/p I&D R hip abscess on 2021.   AF WBC 7.7 no acute events tolerating abx Pain controlled   AF WBC 6.6 no new complaints Tolerating antibiotics    Labs Reviewed and Medications Reviewed.    Review of Systems:  Review of Systems   Constitutional: Negative for fever.   Gastrointestinal: Negative for nausea and vomiting.   Musculoskeletal: Positive for joint pain.        Right hip   All other systems reviewed and are negative.      Hemodynamics:  Temp (24hrs), Av.1 °C (97 °F), Min:36 °C (96.8 °F), Max:36.3 °C (97.3 °F)  Temperature: 36.3 °C (97.3 °F)  Pulse  Av.8  Min: 60  Max: 130   Blood Pressure: 129/81       Physical Exam:  Physical Exam  Vitals and nursing note reviewed.   Constitutional:       General: He is not in acute distress.     Appearance: He is not toxic-appearing or diaphoretic.   Eyes:      General:         Right eye: No discharge.         Left eye: No discharge.   Cardiovascular:      Rate and Rhythm: Tachycardia present. Rhythm irregular.   Pulmonary:      Effort: Pulmonary effort is normal. No respiratory distress.   Abdominal:      General: There is no distension.      Palpations: Abdomen is soft.   Musculoskeletal:      Comments: Drain     Skin:     Coloration: Skin is not jaundiced.   Neurological:      Comments: somnolent         Meds:    Current Facility-Administered Medications:   •  enoxaparin (LOVENOX) injection  •  metroNIDAZOLE  •  oxyCODONE immediate-release  **OR** oxyCODONE immediate-release **OR** HYDROmorphone  •  senna-docusate **AND** polyethylene glycol/lytes **AND** magnesium hydroxide **AND** bisacodyl  •  acetaminophen  •  Notify provider if pain remains uncontrolled **AND** Use the Numeric Rating Scale (NRS), Nelson-Baker Faces (WBF), or FLACC on regular floors and Critical-Care Pain Observation Tool (CPOT) on ICUs/Trauma to assess pain **AND** Pulse Ox **AND** Pharmacy Consult Request **AND** If patient difficult to arouse and/or has respiratory depression (respiratory rate of 10 or less), stop any opiates that are currently infusing and call a Rapid Response.  •  labetalol  •  ondansetron  •  ondansetron  •  promethazine  •  promethazine  •  prochlorperazine  •  nicotine **AND** Nicotine Replacement Patient Education Materials **AND** nicotine polacrilex  •  cefTRIAXone (ROCEPHIN) IV  •  metoprolol tartrate    Labs:  Recent Labs     12/30/21 0349 01/01/22 0348   WBC 7.7 6.6   RBC 4.84 5.11   HEMOGLOBIN 12.8* 13.5*   HEMATOCRIT 40.7* 42.4   MCV 84.1 83.0   MCH 26.4* 26.4*   RDW 53.4* 53.4*   PLATELETCT 222 244   MPV 11.0 10.3   NEUTSPOLYS 69.40 66.30   LYMPHOCYTES 20.30* 19.90*   MONOCYTES 6.60 8.10   EOSINOPHILS 2.20 3.50   BASOPHILS 0.90 0.80     Recent Labs     12/30/21 0349 01/01/22  0348   SODIUM 136 136   POTASSIUM 4.2 4.3   CHLORIDE 102 102   CO2 24 23   GLUCOSE 105* 174*   BUN 20 21     Recent Labs     12/30/21 0349 01/01/22 0348   ALBUMIN 3.2 3.0*   TBILIRUBIN 0.2 0.2   ALKPHOSPHAT 139* 135*   TOTPROTEIN 6.6 6.2   ALTSGPT 26 22   ASTSGOT 26 21   CREATININE 0.59 0.77       Imaging:  CT-PELVIS WITH    Result Date: 12/28/2021 12/28/2021 1:22 AM HISTORY/REASON FOR EXAM:  right hip swelling and mass, concern for abscess. TECHNIQUE/EXAM DESCRIPTION AND NUMBER OF VIEWS: CT scan of the pelvis with contrast. Contrast-enhanced helical scanning of the pelvis was obtained from the iliac crests through the pubic symphysis following the bolus administration of  100 mL of Omnipaque 350 nonionic contrast without complication. Low dose optimization technique was utilized for this CT exam including automated exposure control and adjustment of the mA and/or kV according to patient size. COMPARISON:  3/18/2021 CT. FINDINGS: Rim-enhancing fluid collection overlies the right iliotibial band measuring 71 x 36 x 71 mm. There is overlying skin thickening and subcutaneous fat stranding. This is slightly incomplete lease included in the field-of-view. Some enhancement appears to extend into the gluteus medius muscle superficially. There is regional skin thickening and adjacent fat stranding. Large left hydrocele is incompletely seen. The prior left inguinal hernia containing bowel has resolved. No other abnormal fluid collection is seen. Right fatty direct inguinal hernia is again noted. Rectal vault stool volume has diminished Bony structures show no periosteal reaction or bony destruction. No fracture. Right hip joint space narrowing and mild acetabular spurring is seen. Deformity of the right ilium is compatible with prior bone harvest. This is posterior and of no acute significance. Leads at the lumbosacral junction with generator posteriorly are again noted     New 7 cm rim-enhancing fluid collection overlying the right iliotibial band is compatible with abscess favored over hematoma. Aspiration could clarify Myositis of the superficial gluteus medius muscle is possible. No CT evidence of bony destruction or septic arthropathy Large left hydrocele post inguinal hernia repair Direct fatty right inguinal hernia similar to comparison    DX-PELVIS-1 OR 2 VIEWS    Result Date: 12/28/2021 12/28/2021 12:22 AM HISTORY/REASON FOR EXAM:  Atraumatic Pelvic/Hip Pain. TECHNIQUE/EXAM DESCRIPTION AND NUMBER OF VIEWS:  1 view(s) of the pelvis. COMPARISON:  9/14/2020 FINDINGS: Electrodes, generator again overly the lumbosacral junction, incompletely visualized There is large volume rectal vault  stool, 9.7 cm transverse dimension. This obscures the sacrum distally No acute fracture or subluxation is seen. Joint spaces are nearly preserved. There is mild right hip joint space narrowing and acetabular spurring No bony destruction     Large volume rectal vault stool Mild right hip osteoarthritis      Micro:  Results     Procedure Component Value Units Date/Time    CULTURE WOUND W/ GRAM STAIN [[id number]]  (Abnormal) Collected: 12/28/21 0922    Order Status: Completed Specimen: Wound Updated: 12/31/21 1552     Significant Indicator POS     Source WND     Site Right Hip Abscess     Culture Result -     Gram Stain Result Many WBCs.  Many Gram positive cocci.       Culture Result Streptococcus intermedius  Heavy growth      Narrative:      Surgery Specimen    Anaerobic Culture [[id number]] Collected: 12/28/21 0922    Order Status: Completed Specimen: Wound Updated: 12/31/21 1552     Significant Indicator NEG     Source WND     Site Right Hip Abscess     Culture Result No Anaerobes isolated.    Narrative:      Surgery Specimen    Fungal Culture [[id number]] Collected: 12/28/21 0922    Order Status: Completed Specimen: Wound Updated: 12/31/21 1552     Significant Indicator NEG     Source WND     Site Right Hip Abscess     Culture Result Culture in progress.     Fungal Smear Results No fungal elements seen.    Narrative:      Surgery Specimen    AFB Culture [[id number]] Collected: 12/28/21 0922    Order Status: Completed Updated: 12/30/21 1141     AFB Smear Results SEE NOTE     Comment: Negative for Acid Fast Bacteria.  Less than 5 mL of specimen received.  A minimum of 5 mL of sputum or fluid is recommended  for recovery of acid fast bacilli (AFB).  Volumes less than 5 mL are suboptimal and may  compromise recovery of AFB from culture.  Performed by [facility],  [address] [place] [address] [phone]  [email or web address], Connie Rocha MD - Lab. Director          Preliminary Report SEE NOTE     Comment:  "Specimen received and in progress.  Positive culture results are called as soon as detected.  Final report to follow in seven to eight weeks  Performed by [facility],  [address] [place] [address] [phone]  [email or web address], Connie Rocha MD - Lab. Director         BLOOD CULTURE [[id number]] Collected: 12/28/21 0106    Order Status: Completed Specimen: Blood from Peripheral Updated: 12/29/21 1008     Significant Indicator NEG     Source BLD     Site PERIPHERAL     Culture Result No Growth  Note: Blood cultures are incubated for 5 days and  are monitored continuously.Positive blood cultures  are called to the RN and reported as soon as  they are identified.      Narrative:      Collected By:  Per Hospital Policy: Only change Specimen Src: to \"Line\" if  specified by physician order.  Right Forearm/Arm    BLOOD CULTURE [[id number]] Collected: 12/28/21 0005    Order Status: Completed Specimen: Blood from Peripheral Updated: 12/29/21 1008     Significant Indicator NEG     Source BLD     Site PERIPHERAL     Culture Result No Growth  Note: Blood cultures are incubated for 5 days and  are monitored continuously.Positive blood cultures  are called to the RN and reported as soon as  they are identified.      Narrative:      Collected By:  Per Hospital Policy: Only change Specimen Src: to \"Line\" if  specified by physician order.  No site indicated    Fungal Smear [[id number]] Collected: 12/28/21 0922    Order Status: Completed Specimen: Wound Updated: 12/28/21 1552     Significant Indicator NEG     Source WND     Site Right Hip Abscess     Fungal Smear Results No fungal elements seen.    Narrative:      Surgery Specimen    GRAM STAIN [[id number]] Collected: 12/28/21 0922    Order Status: Completed Specimen: Wound Updated: 12/28/21 1552     Significant Indicator .     Source WND     Site Right Hip Abscess     Gram Stain Result Many WBCs.  Many Gram positive cocci.      Narrative:      Surgery Specimen    MRSA By PCR " (Amp) [[id number]]     Order Status: Canceled Specimen: Respirate from Nares           Assessment:  Active Hospital Problems    Diagnosis    • *Abscess [L02.91]    • Leukocytosis [D72.829]    • CN (constipation) [K59.00]    • Atrial fibrillation (HCC) [I48.91]    • Chronic hepatitis C virus infection (HCC) [B18.2]    • Tobacco use [Z72.0]    • Cannabis use disorder, mild, abuse [F12.10]        Plan:  S/p I and D 12/28  Continue ceftriaxone and Flagyl for anaerobic coverage  Anticipate at least 2 weeks treatment post-op  FU cultures-strep intermedius   Neg HIV screen  Hepatitis C antibody -FU outpatient for hepatitis C treatment- refer to [facility]   Stop date antibiotics 1/11/2022-follow with oral cefdinir if needed  Will sign off-please reconsult if needed

## 2022-01-01 NOTE — PROGRESS NOTES
"   Orthopaedic Progress Note    Interval changes:  Patient doing well   ELBERT with 40cc over the last 24 hours  Cultures positive for strep intermedius-ID team following for recs    ROS - Patient denies any new issues.  Pain well controlled.    /83   Pulse 95   Temp 36.2 °C (97.2 °F) (Temporal)   Resp 16   Ht 1.93 m (6' 4\")   Wt 113 kg (249 lb 12.5 oz)   SpO2 92%       Patient seen and examined  No acute distress  Breathing non labored  RRR  Right thigh dressing CDI, ELBERT in place with serosanguinous exudate, DNVI, moves all toes, cap refill <2 sec.      Recent Labs     12/29/21  0409 12/30/21  0349   WBC 9.4 7.7   RBC 4.64* 4.84   HEMOGLOBIN 12.7* 12.8*   HEMATOCRIT 38.4* 40.7*   MCV 82.8 84.1   MCH 27.4 26.4*   MCHC 33.1* 31.4*   RDW 53.0* 53.4*   PLATELETCT 197 222   MPV 10.7 11.0       Active Hospital Problems    Diagnosis    • Atrial fibrillation (HCC) [I48.91]      Priority: Medium   • Chronic hepatitis C virus infection (HCC) [B18.2]      Priority: Low   • Tobacco use [Z72.0]      Priority: Low   • Cannabis use disorder, mild, abuse [F12.10]      Priority: Low   • Obesity (BMI 30-39.9) [E66.9]    • Leukocytosis [D72.829]    • Abscess [L02.91]    • CN (constipation) [K59.00]        Assessment/Plan:  Patient doing well   ELBERT with 40cc over the last 24 hours  Cultures positive for strep intermedius-ID team following for recs  POD#3 S/P Incision and drainage of right hip abscess  Wt bearing status - WBAT  Wound care/Drains - dressing CDI  Future Procedures - none planned   Sutures/Staples out- 14 days post operatively  PT/OT-initiated  Antibiotics: rocephin 2g IV q24  DVT Prophylaxis- TEDS/SCDs/Foot pumps  L[place]berg-none  Case Coordination for Discharge Planning - Disposition pending abx needs    "

## 2022-01-01 NOTE — PROGRESS NOTES
Hospital Medicine Daily Progress Note    Date of Service  12/31/2021    Chief Complaint  Gonsalo Hunt is a 61 y.o. male admitted 12/27/2021 with right hip pain and swelling    Hospital Course  A 61-year-old man with h/o necrotizing fascitis, IV drug use, hep C presented with right hip pain and swelling. Patients reports right hip pain starting 10 days ago, but worsening over the last 3 days associated with swelling, fevers and nausea. Patient reports he is no longer homeless and stopped doing IV drugs >1 year ago, still smokes marijuana, and smokes 1 pack of cigarettes q5 days.  In the ER XR of pelvis showed large volume rectal vault stool and mild right hip osteoarthritis. CT pelvis showed 7 cm rim-enhancing fluid collection over right iliotibial band compatible with abscess and possible myositis of the superficial gluteus medius muscle. Orthopedic surgery consulted.   Patient underwent I&D of right hip abscess on 12/28. Developed A.fib after surgery.      Interval Problem Update  Patient reports right hip pain 9/10. Patient was verbally abusive towards staff. Afebrile, hemodynamically stable.  Na 132.  Blood cultures form 12/28 negative to date.  Increased dose of dilaudid to 1 mg q3h prn    12/30/2021:  Patient continues to have mild pain continues on IV antimicrobials at present continues to have drainage from abscess site.  Follow-up with orthopedic surgery regarding further recommendations anticipated discharge in 1 to 2 days.    PATIENT SEEN BY PREVIOUS HOSPITALIST UNTIL 12/30 12/31: Patient seen at bedside this morning.  Patient lying in bed.  The patient mentions that he is still having some pain.  ID following the patient, we appreciate further recommendations.  As per nursing no other overnight events reported.    I have personally seen and examined the patient at bedside. I discussed the plan of care with patient, bedside RN, charge RN,  and  pharmacy.    Consultants/Specialty  infectious disease and orthopedics    Code Status  Full Code    Disposition  Patient is not medically cleared for discharge.   Anticipate discharge pending PT/OT evaluation  I have placed the appropriate orders for post-discharge needs.    Review of Systems  Review of Systems   Constitutional: Positive for fever and malaise/fatigue. Negative for chills.   HENT: Negative for hearing loss.    Eyes: Negative.    Respiratory: Negative for cough and shortness of breath.    Cardiovascular: Positive for leg swelling. Negative for chest pain.   Gastrointestinal: Negative for abdominal pain, nausea and vomiting.   Genitourinary: Negative for dysuria.   Musculoskeletal: Positive for myalgias (right hip pain).   Skin: Negative for rash.   Neurological: Negative for focal weakness.        Physical Exam  Temp:  [35.9 °C (96.6 °F)-36.9 °C (98.4 °F)] 36.2 °C (97.2 °F)  Pulse:  [] 95  Resp:  [16-19] 16  BP: (115-145)/(79-99) 119/83  SpO2:  [92 %-96 %] 92 %    Physical Exam  Vitals and nursing note reviewed.   Constitutional:       Appearance: He is obese. He is ill-appearing.   HENT:      Head: Normocephalic.      Nose: Nose normal.      Mouth/Throat:      Mouth: Mucous membranes are moist.      Pharynx: Oropharynx is clear.   Eyes:      Pupils: Pupils are equal, round, and reactive to light.   Cardiovascular:      Rate and Rhythm: Normal rate and regular rhythm.   Pulmonary:      Effort: Pulmonary effort is normal. No respiratory distress.      Breath sounds: No wheezing or rales.   Abdominal:      General: Abdomen is flat. Bowel sounds are normal. There is no distension.   Musculoskeletal:         General: Swelling and tenderness (right hip) present. Normal range of motion.      Cervical back: Normal range of motion.   Skin:     General: Skin is warm.      Comments: Dressing on right hip clean. Drain in place on right hip with sanguinous fluid    Neurological:      General: No focal  deficit present.      Mental Status: He is alert and oriented to person, place, and time.         Fluids    Intake/Output Summary (Last 24 hours) at 12/31/2021 1609  Last data filed at 12/31/2021 1531  Gross per 24 hour   Intake 480 ml   Output 560 ml   Net -80 ml       Laboratory  Recent Labs     12/29/21  0409 12/30/21  0349   WBC 9.4 7.7   RBC 4.64* 4.84   HEMOGLOBIN 12.7* 12.8*   HEMATOCRIT 38.4* 40.7*   MCV 82.8 84.1   MCH 27.4 26.4*   MCHC 33.1* 31.4*   RDW 53.0* 53.4*   PLATELETCT 197 222   MPV 10.7 11.0     Recent Labs     12/29/21  0409 12/30/21  0349   SODIUM 132* 136   POTASSIUM 4.9 4.2   CHLORIDE 102 102   CO2 22 24   GLUCOSE 276* 105*   BUN 19 20   CREATININE 0.60 0.59   CALCIUM 8.6 8.5                   Imaging  CT-PELVIS WITH   Final Result      New 7 cm rim-enhancing fluid collection overlying the right iliotibial band is compatible with abscess favored over hematoma. Aspiration could clarify      Myositis of the superficial gluteus medius muscle is possible. No CT evidence of bony destruction or septic arthropathy      Large left hydrocele post inguinal hernia repair      Direct fatty right inguinal hernia similar to comparison      DX-PELVIS-1 OR 2 VIEWS   Final Result      Large volume rectal vault stool      Mild right hip osteoarthritis           Assessment/Plan  * Abscess- (present on admission)  Assessment & Plan  Right upper thigh   Orthopedic surgery consulted   S/p surgical I&D on 12/28  ID following, recommendations appreciated  Continue Abx as per ID recommendations    Obesity (BMI 30-39.9)  Assessment & Plan  Counseled on lifestyle modifications.  The patient will require close follow-up with PCP as an outpatient.    CN (constipation)- (present on admission)  Assessment & Plan  Bowel regimen ordered    Leukocytosis- (present on admission)  Assessment & Plan  Mild WBC 13 on admission   2/2 iliotibial abscess   IV abx   Cultures pending     12/31: Seems to be improving. ID following the  patient, we appreciate further recommendations    Atrial fibrillation (HCC)  Assessment & Plan  As per patient he was diagnosed with it at [facility] and a prescription was given to him but never filled.  Will monitor on tele after surgery   Started metoprolol.  Will need to discuss anticoagulation option once cleared from surgery to start anticoagulation     Tobacco use- (present on admission)  Assessment & Plan  Counseled on lifestyle modifications  Patient will require close follow up as outpatient.    Chronic hepatitis C virus infection (HCC)- (present on admission)  Assessment & Plan  Hx of   AST/ALT wnl    12/31: ID consulted, Patient will require follow up as outpatient.    Cannabis use disorder, mild, abuse- (present on admission)  Assessment & Plan  Ongoing, cessation counseling       VTE prophylaxis: SCDs/TEDs    I have performed a physical exam and reviewed and updated ROS and Plan today (12/31/2021). In review of yesterday's note (12/30/2021), there are no changes except as documented above.

## 2022-01-01 NOTE — THERAPY
Physical Therapy   Initial Evaluation     Patient Name: Gonsalo Hunt  Age:  61 y.o., Sex:  male  Medical Record #: [id number]  Today's Date: 1/1/2022     Precautions  Precautions: Weight Bearing As Tolerated Right Lower Extremity    Assessment  Patient is 61 y.o. male with a diagnosis of abscess in R hip, s/p I&D 12/28/21. Pt with history of necrotizing fasciitis, hep C, obesity. 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 He rents a room in a house. Today he demonstrated satisfactory performance of lower body dressing, bed mobility, transfers, gait, and balance without concern. He has no further need for skilled PT in the acute setting at this time. Anticipate pt to be able to D/C home without further PT needs.    Plan    Recommend Physical Therapy for Evaluation only     DC Equipment Recommendations: (P) None  Discharge Recommendations: (P) Anticipate that the patient will have no further physical therapy needs after discharge from the hospital       Subjective    Pt stating he feels he is moving well.     Objective       01/01/22 0900   Prior Living Situation   Prior Services Home-Independent   Housing / Facility [address]   Steps Into Home 0   Steps In Home 0   Bathroom Set up Walk In Shower;Bathtub / Shower Combination   Equipment Owned None   Lives with - Patient's Self Care Capacity Alone and Able to Care For Self   Comments rents a room; works in a warehouse and very activie with standing, walking, stairs   Prior Level of Functional Mobility   Bed Mobility Independent   Transfer Status Independent   Ambulation Independent   Distance Ambulation (Feet)   (community)   Assistive Devices Used None   Stairs Independent   History of Falls   History of Falls   (unknown)   Cognition    Cognition / Consciousness WDL   Strength Lower Body   Lower Body Strength  WDL   Balance Assessment   Sitting Balance (Static)  Good   Sitting Balance (Dynamic) Good   Standing Balance (Static) Good   Standing Balance (Dynamic) Good   Weight Shift Sitting Good   Weight Shift Standing Good   Comments no AD   Gait Analysis   Gait Level Of Assist Independent   Assistive Device None   Distance (Feet) 400   # of Times Distance was Traveled 1   Deviation Antalgic   # of Stairs Climbed   (pt declined stairs)   Weight Bearing Status WBAT RLE   Comments tolerated well   Bed Mobility    Supine to Sit Independent   Scooting Independent   Functional Mobility   Sit to Stand Independent   Transfer Method Stand Step   Mobility supine->sit EOB->stand->amb->chair->amb   How much difficulty does the patient currently have...   Turning over in bed (including adjusting bedclothes, sheets and blankets)? 4   Sitting down on and standing up from a chair with arms (e.g., wheelchair, bedside commode, etc.) 4   Moving from lying on back to sitting on the side of the bed? 4   How much help from another person does the patient currently need...   Moving to and from a bed to a chair (including a wheelchair)? 4   Need to walk in a hospital room? 4   Climbing 3-5 steps with a railing? 3   6 clicks Mobility Score 23   Activity Tolerance   Sitting in Chair 2 minutes   Sitting Edge of Bed 2 minutes approx   Standing 8 minutes approx   Comments tolerated well   Education Group   Education Provided Role of Physical Therapist   Role of Physical Therapist Patient Response Patient;Acceptance;Explanation;Verbal Demonstration   Problem List    Problems Pain;Decreased Activity Tolerance   Anticipated Discharge Equipment and Recommendations   DC Equipment Recommendations None   Discharge Recommendations Anticipate that the patient will have no further physical therapy needs after discharge from the hospital

## 2022-01-01 NOTE — PROGRESS NOTES
Hospital Medicine Daily Progress Note    Date of Service  1/1/2022    Chief Complaint  Gonsalo Hunt is a 61 y.o. male admitted 12/27/2021 with right hip pain and swelling    Hospital Course  A 61-year-old man with h/o necrotizing fascitis, IV drug use, hep C presented with right hip pain and swelling. Patients reports right hip pain starting 10 days ago, but worsening over the last 3 days associated with swelling, fevers and nausea. Patient reports he is no longer homeless and stopped doing IV drugs >1 year ago, still smokes marijuana, and smokes 1 pack of cigarettes q5 days.  In the ER XR of pelvis showed large volume rectal vault stool and mild right hip osteoarthritis. CT pelvis showed 7 cm rim-enhancing fluid collection over right iliotibial band compatible with abscess and possible myositis of the superficial gluteus medius muscle. Orthopedic surgery consulted.   Patient underwent I&D of right hip abscess on 12/28. Developed A.fib after surgery.      Interval Problem Update  Patient reports right hip pain 9/10. Patient was verbally abusive towards staff. Afebrile, hemodynamically stable.  Na 132.  Blood cultures form 12/28 negative to date.  Increased dose of dilaudid to 1 mg q3h prn    12/30/2021:  Patient continues to have mild pain continues on IV antimicrobials at present continues to have drainage from abscess site.  Follow-up with orthopedic surgery regarding further recommendations anticipated discharge in 1 to 2 days.    PATIENT SEEN BY PREVIOUS HOSPITALIST UNTIL 12/30 12/31: Patient seen at bedside this morning.  Patient lying in bed.  The patient mentions that he is still having some pain.  ID following the patient, we appreciate further recommendations.  As per nursing no other overnight events reported.    1/1: Patient seen at bedside this morning.  Patient laying in bed comfortably.  Currently not complaining of overt pain.  We appreciate further recommendations by ID.  Culture with Gram  stain growing streptococcus intermedius.  We are pending sensitivities.  Patient currently on ceftriaxone and Flagyl.  As per nursing no other overnight events reported.  --I spoke to orthopedic surgery, they recommend starting Lovenox today and Xarelto possibly on Monday.    I have personally seen and examined the patient at bedside. I discussed the plan of care with patient, bedside RN, charge RN,  and pharmacy.    Consultants/Specialty  infectious disease and orthopedics    Code Status  Full Code    Disposition  Patient is not medically cleared for discharge.   Anticipate discharge pending PT/OT evaluation  I have placed the appropriate orders for post-discharge needs.    Review of Systems  Review of Systems   Constitutional: Positive for malaise/fatigue. Negative for chills and fever.   HENT: Negative for hearing loss.    Eyes: Negative.    Respiratory: Negative for cough and shortness of breath.    Cardiovascular: Positive for leg swelling. Negative for chest pain.   Gastrointestinal: Negative for abdominal pain, nausea and vomiting.   Genitourinary: Negative for dysuria.   Musculoskeletal: Positive for joint pain and myalgias (right hip pain).   Skin: Negative for rash.   Neurological: Negative for focal weakness.        Physical Exam  Temp:  [36 °C (96.8 °F)-36.2 °C (97.2 °F)] 36 °C (96.8 °F)  Pulse:  [] 95  Resp:  [16-18] 18  BP: (117-141)/(77-95) 119/85  SpO2:  [92 %-96 %] 93 %    Physical Exam  Vitals and nursing note reviewed.   Constitutional:       Appearance: He is obese. He is ill-appearing.   HENT:      Head: Normocephalic.      Nose: Nose normal.      Mouth/Throat:      Mouth: Mucous membranes are moist.      Pharynx: Oropharynx is clear.   Eyes:      Pupils: Pupils are equal, round, and reactive to light.   Cardiovascular:      Rate and Rhythm: Normal rate and regular rhythm.   Pulmonary:      Effort: Pulmonary effort is normal. No respiratory distress.      Breath sounds: No  wheezing or rales.   Abdominal:      General: Abdomen is flat. Bowel sounds are normal. There is no distension.   Musculoskeletal:         General: Swelling and tenderness (right hip) present. Normal range of motion.      Cervical back: Normal range of motion.   Skin:     General: Skin is warm.      Comments: Dressing on right hip clean. Drain in place on right hip with sanguinous fluid    Neurological:      General: No focal deficit present.      Mental Status: He is alert and oriented to person, place, and time.         Fluids    Intake/Output Summary (Last 24 hours) at 1/1/2022 1029  Last data filed at 1/1/2022 0800  Gross per 24 hour   Intake 480 ml   Output 555 ml   Net -75 ml       Laboratory  Recent Labs     12/30/21  0349 01/01/22  0348   WBC 7.7 6.6   RBC 4.84 5.11   HEMOGLOBIN 12.8* 13.5*   HEMATOCRIT 40.7* 42.4   MCV 84.1 83.0   MCH 26.4* 26.4*   MCHC 31.4* 31.8*   RDW 53.4* 53.4*   PLATELETCT 222 244   MPV 11.0 10.3     Recent Labs     12/30/21  0349 01/01/22  0348   SODIUM 136 136   POTASSIUM 4.2 4.3   CHLORIDE 102 102   CO2 24 23   GLUCOSE 105* 174*   BUN 20 21   CREATININE 0.59 0.77   CALCIUM 8.5 8.4*                   Imaging  CT-PELVIS WITH   Final Result      New 7 cm rim-enhancing fluid collection overlying the right iliotibial band is compatible with abscess favored over hematoma. Aspiration could clarify      Myositis of the superficial gluteus medius muscle is possible. No CT evidence of bony destruction or septic arthropathy      Large left hydrocele post inguinal hernia repair      Direct fatty right inguinal hernia similar to comparison      DX-PELVIS-1 OR 2 VIEWS   Final Result      Large volume rectal vault stool      Mild right hip osteoarthritis           Assessment/Plan  * Abscess- (present on admission)  Assessment & Plan  Right upper thigh   Orthopedic surgery consulted   S/p surgical I&D on 12/28  ID following, recommendations appreciated  Continue Abx as per ID  recommendations    Hyperglycemia  Assessment & Plan  Patient with an A1c of 5.9 on 9/28/2021  We will order new A1c.  No need to start insulin treatment at this time.  Monitor    Obesity (BMI 30-39.9)  Assessment & Plan  Counseled on lifestyle modifications.  The patient will require close follow-up with PCP as an outpatient.    CN (constipation)- (present on admission)  Assessment & Plan  Bowel regimen ordered    Leukocytosis- (present on admission)  Assessment & Plan  Mild WBC 13 on admission   2/2 iliotibial abscess   IV abx   Cultures pending     1/1: improving. ID following the patient, we appreciate further recommendations.    Atrial fibrillation (HCC)  Assessment & Plan  As per patient he was diagnosed with it at [facility] and a prescription was given to him but never filled.  Will monitor on tele after surgery   Started metoprolol.  Will need to discuss anticoagulation option once cleared from surgery to start anticoagulation     Tobacco use- (present on admission)  Assessment & Plan  Counseled on lifestyle modifications  Patient will require close follow up as outpatient.    Chronic hepatitis C virus infection (HCC)- (present on admission)  Assessment & Plan  Hx of   AST/ALT wnl    12/31: ID consulted, Patient will require follow up as outpatient.    Cannabis use disorder, mild, abuse- (present on admission)  Assessment & Plan  Ongoing, cessation counseling       VTE prophylaxis: enoxaparin ppx    I have performed a physical exam and reviewed and updated ROS and Plan today (1/1/2022). In review of yesterday's note (12/31/2021), there are no changes except as documented above.

## 2022-01-01 NOTE — THERAPY
Missed Therapy     Patient Name: Gonsalo Hunt  Age:  61 y.o., Sex:  male  Medical Record #: [id number]  Today's Date: 1/1/2022    Discussed missed therapy with Jonathan       01/01/22 1207   Interdisciplinary Plan of Care Collaboration   Collaboration Comments Spoke with Jonathan who reports pt is up self & able to complete basic ADL's & functional mobility. Pt is limited by pain.  Acute OT eval not indicated at this time.

## 2022-01-01 NOTE — ASSESSMENT & PLAN NOTE
Counseled on lifestyle modifications.  The patient will require close follow-up with PCP as an outpatient.

## 2022-01-01 NOTE — CARE PLAN
The patient is Stable - Low risk of patient condition declining or worsening    Shift Goals  Clinical Goals: pain management  Patient Goals: Pain management  Family Goals: MALIHA. No family present.           Problem: Pain - Standard  Goal: Alleviation of pain or a reduction in pain to the patient’s comfort goal  Outcome: Not Met       Problem: Knowledge Deficit - Standard  Goal: Patient and family/care givers will demonstrate understanding of plan of care, disease process/condition, diagnostic tests and medications  Outcome: Progressing

## 2022-01-01 NOTE — PROGRESS NOTES
Received report from NOC shift RN. Patient is resting in bed, VSS, no signs of distress. Bed in lowest and locked position, call light in reach, will continue to monitor.

## 2022-01-01 NOTE — CARE PLAN
The patient is Stable - Low risk of patient condition declining or worsening    Shift Goals  Clinical Goals: pain management  Patient Goals: Pain management  Family Goals: MALIHA. No family present.     Progress made toward(s) clinical / shift goals:        Problem: Pain - Standard  Goal: Alleviation of pain or a reduction in pain to the patient’s comfort goal  Outcome: Progressing     Problem: Knowledge Deficit - Standard  Goal: Patient and family/care givers will demonstrate understanding of plan of care, disease process/condition, diagnostic tests and medications  Outcome: Progressing       Patient is not progressing towards the following goals: n/a

## 2022-01-01 NOTE — PROGRESS NOTES
Assumed care of patient at 1915, received bedside report from day shift RN. Bed is locked and in lowest position with call light within reach. Treaded socks in place. Patient updated on plan of care. White board updated. Pt A&Ox4. Patient's breathing pattern is unlabored. Pt refuses tele monitor.

## 2022-01-02 LAB
ANION GAP SERPL CALC-SCNC: 9 MMOL/L (ref 7–16)
BACTERIA BLD CULT: NORMAL
BACTERIA BLD CULT: NORMAL
BASOPHILS # BLD AUTO: 0.9 % (ref 0–1.8)
BASOPHILS # BLD: 0.07 K/UL (ref 0–0.12)
BUN SERPL-MCNC: 20 MG/DL (ref 8–22)
CALCIUM SERPL-MCNC: 8.5 MG/DL (ref 8.5–10.5)
CHLORIDE SERPL-SCNC: 102 MMOL/L (ref 96–112)
CO2 SERPL-SCNC: 23 MMOL/L (ref 20–33)
CREAT SERPL-MCNC: 0.7 MG/DL (ref 0.5–1.4)
EOSINOPHIL # BLD AUTO: 0.28 K/UL (ref 0–0.51)
EOSINOPHIL NFR BLD: 3.6 % (ref 0–6.9)
ERYTHROCYTE [DISTWIDTH] IN BLOOD BY AUTOMATED COUNT: 52.1 FL (ref 35.9–50)
GLUCOSE SERPL-MCNC: 104 MG/DL (ref 65–99)
HCT VFR BLD AUTO: 42.1 % (ref 42–52)
HGB BLD-MCNC: 14.1 G/DL (ref 14–18)
IMM GRANULOCYTES # BLD AUTO: 0.15 K/UL (ref 0–0.11)
IMM GRANULOCYTES NFR BLD AUTO: 1.9 % (ref 0–0.9)
LYMPHOCYTES # BLD AUTO: 1.53 K/UL (ref 1–4.8)
LYMPHOCYTES NFR BLD: 19.7 % (ref 22–41)
MCH RBC QN AUTO: 27.6 PG (ref 27–33)
MCHC RBC AUTO-ENTMCNC: 33.5 G/DL (ref 33.7–35.3)
MCV RBC AUTO: 82.5 FL (ref 81.4–97.8)
MONOCYTES # BLD AUTO: 0.52 K/UL (ref 0–0.85)
MONOCYTES NFR BLD AUTO: 6.7 % (ref 0–13.4)
NEUTROPHILS # BLD AUTO: 5.21 K/UL (ref 1.82–7.42)
NEUTROPHILS NFR BLD: 67.2 % (ref 44–72)
NRBC # BLD AUTO: 0 K/UL
NRBC BLD-RTO: 0 /100 WBC
PLATELET # BLD AUTO: 261 K/UL (ref 164–446)
PMV BLD AUTO: 10.1 FL (ref 9–12.9)
POTASSIUM SERPL-SCNC: 4.6 MMOL/L (ref 3.6–5.5)
RBC # BLD AUTO: 5.1 M/UL (ref 4.7–6.1)
SIGNIFICANT IND 70042: NORMAL
SIGNIFICANT IND 70042: NORMAL
SITE SITE: NORMAL
SITE SITE: NORMAL
SODIUM SERPL-SCNC: 134 MMOL/L (ref 135–145)
SOURCE SOURCE: NORMAL
SOURCE SOURCE: NORMAL
WBC # BLD AUTO: 7.8 K/UL (ref 4.8–10.8)

## 2022-01-02 PROCEDURE — 700102 HCHG RX REV CODE 250 W/ 637 OVERRIDE(OP): Performed by: INTERNAL MEDICINE

## 2022-01-02 PROCEDURE — 700105 HCHG RX REV CODE 258: Performed by: INTERNAL MEDICINE

## 2022-01-02 PROCEDURE — A9270 NON-COVERED ITEM OR SERVICE: HCPCS | Performed by: INTERNAL MEDICINE

## 2022-01-02 PROCEDURE — 36415 COLL VENOUS BLD VENIPUNCTURE: CPT

## 2022-01-02 PROCEDURE — A9270 NON-COVERED ITEM OR SERVICE: HCPCS | Performed by: STUDENT IN AN ORGANIZED HEALTH CARE EDUCATION/TRAINING PROGRAM

## 2022-01-02 PROCEDURE — 700102 HCHG RX REV CODE 250 W/ 637 OVERRIDE(OP): Performed by: STUDENT IN AN ORGANIZED HEALTH CARE EDUCATION/TRAINING PROGRAM

## 2022-01-02 PROCEDURE — 700111 HCHG RX REV CODE 636 W/ 250 OVERRIDE (IP): Performed by: INTERNAL MEDICINE

## 2022-01-02 PROCEDURE — 85025 COMPLETE CBC W/AUTO DIFF WBC: CPT

## 2022-01-02 PROCEDURE — 99233 SBSQ HOSP IP/OBS HIGH 50: CPT | Performed by: INTERNAL MEDICINE

## 2022-01-02 PROCEDURE — 80048 BASIC METABOLIC PNL TOTAL CA: CPT

## 2022-01-02 PROCEDURE — 700111 HCHG RX REV CODE 636 W/ 250 OVERRIDE (IP): Performed by: STUDENT IN AN ORGANIZED HEALTH CARE EDUCATION/TRAINING PROGRAM

## 2022-01-02 PROCEDURE — 770020 HCHG ROOM/CARE - TELE (206)

## 2022-01-02 RX ADMIN — HYDROMORPHONE HYDROCHLORIDE 1 MG: 1 INJECTION, SOLUTION INTRAMUSCULAR; INTRAVENOUS; SUBCUTANEOUS at 03:06

## 2022-01-02 RX ADMIN — ENOXAPARIN SODIUM 40 MG: 40 INJECTION SUBCUTANEOUS at 06:30

## 2022-01-02 RX ADMIN — DOCUSATE SODIUM 50 MG AND SENNOSIDES 8.6 MG 2 TABLET: 8.6; 5 TABLET, FILM COATED ORAL at 17:05

## 2022-01-02 RX ADMIN — DOCUSATE SODIUM 50 MG AND SENNOSIDES 8.6 MG 2 TABLET: 8.6; 5 TABLET, FILM COATED ORAL at 06:37

## 2022-01-02 RX ADMIN — OXYCODONE 5 MG: 5 TABLET ORAL at 18:14

## 2022-01-02 RX ADMIN — OXYCODONE 5 MG: 5 TABLET ORAL at 06:31

## 2022-01-02 RX ADMIN — METRONIDAZOLE 500 MG: 500 TABLET ORAL at 06:31

## 2022-01-02 RX ADMIN — HYDROMORPHONE HYDROCHLORIDE 1 MG: 1 INJECTION, SOLUTION INTRAMUSCULAR; INTRAVENOUS; SUBCUTANEOUS at 07:44

## 2022-01-02 RX ADMIN — HYDROMORPHONE HYDROCHLORIDE 1 MG: 1 INJECTION, SOLUTION INTRAMUSCULAR; INTRAVENOUS; SUBCUTANEOUS at 22:39

## 2022-01-02 RX ADMIN — OXYCODONE 5 MG: 5 TABLET ORAL at 00:39

## 2022-01-02 RX ADMIN — CEFTRIAXONE SODIUM 2 G: 2 INJECTION, POWDER, FOR SOLUTION INTRAMUSCULAR; INTRAVENOUS at 06:53

## 2022-01-02 RX ADMIN — HYDROMORPHONE HYDROCHLORIDE 1 MG: 1 INJECTION, SOLUTION INTRAMUSCULAR; INTRAVENOUS; SUBCUTANEOUS at 14:23

## 2022-01-02 RX ADMIN — METRONIDAZOLE 500 MG: 500 TABLET ORAL at 21:31

## 2022-01-02 RX ADMIN — METOPROLOL TARTRATE 25 MG: 25 TABLET, FILM COATED ORAL at 17:05

## 2022-01-02 RX ADMIN — OXYCODONE 5 MG: 5 TABLET ORAL at 21:31

## 2022-01-02 RX ADMIN — METRONIDAZOLE 500 MG: 500 TABLET ORAL at 14:23

## 2022-01-02 RX ADMIN — OXYCODONE 5 MG: 5 TABLET ORAL at 13:06

## 2022-01-02 RX ADMIN — METOPROLOL TARTRATE 25 MG: 25 TABLET, FILM COATED ORAL at 06:31

## 2022-01-02 RX ADMIN — NICOTINE 14 MG: 14 PATCH TRANSDERMAL at 06:31

## 2022-01-02 ASSESSMENT — PAIN DESCRIPTION - PAIN TYPE
TYPE: ACUTE PAIN

## 2022-01-02 ASSESSMENT — ENCOUNTER SYMPTOMS
EYES NEGATIVE: 1
NAUSEA: 0
CHILLS: 0
FEVER: 0
COUGH: 0
SHORTNESS OF BREATH: 0
MYALGIAS: 1
FOCAL WEAKNESS: 0
VOMITING: 0
ABDOMINAL PAIN: 0

## 2022-01-02 NOTE — PROGRESS NOTES
Hospital Medicine Daily Progress Note    Date of Service  1/2/2022    Chief Complaint  Gonsalo Hunt is a 61 y.o. male admitted 12/27/2021 with right hip pain and swelling    Hospital Course  A 61-year-old man with h/o necrotizing fascitis, IV drug use, hep C presented with right hip pain and swelling. Patients reports right hip pain starting 10 days ago, but worsening over the last 3 days associated with swelling, fevers and nausea. Patient reports he is no longer homeless and stopped doing IV drugs >1 year ago, still smokes marijuana, and smokes 1 pack of cigarettes q5 days.  In the ER XR of pelvis showed large volume rectal vault stool and mild right hip osteoarthritis. CT pelvis showed 7 cm rim-enhancing fluid collection over right iliotibial band compatible with abscess and possible myositis of the superficial gluteus medius muscle. Orthopedic surgery consulted.   Patient underwent I&D of right hip abscess on 12/28. Developed A.fib after surgery.      Interval Problem Update  Patient reports right hip pain 9/10. Patient was verbally abusive towards staff. Afebrile, hemodynamically stable.  Na 132.  Blood cultures form 12/28 negative to date.  Increased dose of dilaudid to 1 mg q3h prn    12/30/2021:  Patient continues to have mild pain continues on IV antimicrobials at present continues to have drainage from abscess site.  Follow-up with orthopedic surgery regarding further recommendations anticipated discharge in 1 to 2 days.    PATIENT SEEN BY PREVIOUS HOSPITALIST UNTIL 12/30 12/31: Patient seen at bedside this morning.  Patient lying in bed.  The patient mentions that he is still having some pain.  ID following the patient, we appreciate further recommendations.  As per nursing no other overnight events reported.    1/1: Patient seen at bedside this morning.  Patient laying in bed comfortably.  Currently not complaining of overt pain.  We appreciate further recommendations by ID.  Culture with Gram  stain growing streptococcus intermedius.  We are pending sensitivities.  Patient currently on ceftriaxone and Flagyl.  As per nursing no other overnight events reported.  --I spoke to orthopedic surgery, they recommend starting Lovenox today and Xarelto possibly on Monday.    1/2: Patient seen at bedside this morning.  Patient lying in bed comfortably.  As per IDs note, it seems that the patient will require ceftriaxone and Flagyl until 1/11/2022.  We will reach out to ID to see if there are any oral choices we could use.  As per orthopedic surgery patient can be started on Xarelto for anticoagulation tomorrow.  As per nursing no other overnight events reported.    I have personally seen and examined the patient at bedside. I discussed the plan of care with patient, bedside RN, charge RN,  and pharmacy.    Consultants/Specialty  infectious disease and orthopedics    Code Status  Full Code    Disposition  Patient is not medically cleared for discharge.   Anticipate discharge pending PT/OT evaluation  I have placed the appropriate orders for post-discharge needs.    Review of Systems  Review of Systems   Constitutional: Positive for malaise/fatigue. Negative for chills and fever.   HENT: Negative for hearing loss.    Eyes: Negative.    Respiratory: Negative for cough and shortness of breath.    Cardiovascular: Positive for leg swelling. Negative for chest pain.   Gastrointestinal: Negative for abdominal pain, nausea and vomiting.   Genitourinary: Negative for dysuria.   Musculoskeletal: Positive for joint pain and myalgias (right hip pain).   Skin: Negative for rash.   Neurological: Negative for focal weakness.        Physical Exam  Temp:  [36 °C (96.8 °F)-36.7 °C (98.1 °F)] 36.6 °C (97.9 °F)  Pulse:  [82-99] 97  Resp:  [16-18] 16  BP: (120-130)/(78-99) 121/86  SpO2:  [94 %-98 %] 94 %    Physical Exam  Vitals and nursing note reviewed.   Constitutional:       Appearance: He is obese. He is ill-appearing.    HENT:      Head: Normocephalic.      Nose: Nose normal.      Mouth/Throat:      Mouth: Mucous membranes are moist.      Pharynx: Oropharynx is clear.   Eyes:      Pupils: Pupils are equal, round, and reactive to light.   Cardiovascular:      Rate and Rhythm: Normal rate and regular rhythm.   Pulmonary:      Effort: Pulmonary effort is normal. No respiratory distress.      Breath sounds: No wheezing or rales.   Abdominal:      General: Abdomen is flat. Bowel sounds are normal. There is no distension.   Musculoskeletal:         General: Swelling and tenderness (right hip) present. Normal range of motion.      Cervical back: Normal range of motion.   Skin:     General: Skin is warm.      Comments: Dressing on right hip clean. Drain in place on right hip with sanguinous fluid    Neurological:      General: No focal deficit present.      Mental Status: He is alert and oriented to person, place, and time.         Fluids    Intake/Output Summary (Last 24 hours) at 1/2/2022 1218  Last data filed at 1/2/2022 1000  Gross per 24 hour   Intake 840 ml   Output 35 ml   Net 805 ml       Laboratory  Recent Labs     01/01/22  0348 01/02/22  0355   WBC 6.6 7.8   RBC 5.11 5.10   HEMOGLOBIN 13.5* 14.1   HEMATOCRIT 42.4 42.1   MCV 83.0 82.5   MCH 26.4* 27.6   MCHC 31.8* 33.5*   RDW 53.4* 52.1*   PLATELETCT 244 261   MPV 10.3 10.1     Recent Labs     01/01/22  0348 01/02/22  0355   SODIUM 136 134*   POTASSIUM 4.3 4.6   CHLORIDE 102 102   CO2 23 23   GLUCOSE 174* 104*   BUN 21 20   CREATININE 0.77 0.70   CALCIUM 8.4* 8.5                   Imaging  CT-PELVIS WITH   Final Result      New 7 cm rim-enhancing fluid collection overlying the right iliotibial band is compatible with abscess favored over hematoma. Aspiration could clarify      Myositis of the superficial gluteus medius muscle is possible. No CT evidence of bony destruction or septic arthropathy      Large left hydrocele post inguinal hernia repair      Direct fatty right  inguinal hernia similar to comparison      DX-PELVIS-1 OR 2 VIEWS   Final Result      Large volume rectal vault stool      Mild right hip osteoarthritis           Assessment/Plan  * Abscess- (present on admission)  Assessment & Plan  Right upper thigh   Orthopedic surgery consulted   S/p surgical I&D on 12/28  ID following, recommendations appreciated  Continue Abx as per ID recommendations    1/2: As per IDs note, patient to continue ceftriaxone and Flagyl until 1/11/2022.  We will reach out to ID to see if there are any oral choices we can give, otherwise we will try to set up patient for outpatient infusion.    Hyperglycemia  Assessment & Plan  Patient with an A1c of 5.9 on 9/28/2021  We will order new A1c.  No need to start insulin treatment at this time.  Monitor    1/2: A1c is 5.8.  Patient counseled on lifestyle modifications.    Obesity (BMI 30-39.9)  Assessment & Plan  Counseled on lifestyle modifications.  The patient will require close follow-up with PCP as an outpatient.    CN (constipation)- (present on admission)  Assessment & Plan  Bowel regimen ordered    Leukocytosis- (present on admission)  Assessment & Plan  Mild WBC 13 on admission   2/2 iliotibial abscess   IV abx   Cultures pending     1/1: improving. ID following the patient, we appreciate further recommendations.    Atrial fibrillation (HCC)  Assessment & Plan  As per patient he was diagnosed with it at [facility] and a prescription was given to him but never filled.  Will monitor on tele after surgery   Started metoprolol.  Will need to discuss anticoagulation option once cleared from surgery to start anticoagulation     1/2: I spoke to orthopedic surgery, they recommend starting anticoagulation with Xarelto tomorrow.    Tobacco use- (present on admission)  Assessment & Plan  Counseled on lifestyle modifications  Patient will require close follow up as outpatient.    Chronic hepatitis C virus infection (HCC)- (present on  admission)  Assessment & Plan  Hx of   AST/ALT wnl    12/31: ID consulted, Patient will require follow up as outpatient.    Cannabis use disorder, mild, abuse- (present on admission)  Assessment & Plan  Ongoing, cessation counseling       VTE prophylaxis: enoxaparin ppx    I have performed a physical exam and reviewed and updated ROS and Plan today (1/2/2022). In review of yesterday's note (1/1/2022), there are no changes except as documented above.

## 2022-01-02 NOTE — CARE PLAN
The patient is Stable - Low risk of patient condition declining or worsening    Shift Goals  Clinical Goals: Monitor ELBERT drain output  Patient Goals: Pain mgmt  Family Goals: MALIHA    Progress made toward(s) clinical / shift goals:    Problem: Pain - Standard  Goal: Alleviation of pain or a reduction in pain to the patient’s comfort goal  Outcome: Progressing   Pt reports being able to go longer intervals in between pain medications today. Pt states pain tends to be more severe at night.     Problem: Knowledge Deficit - Standard  Goal: Patient and family/care givers will demonstrate understanding of plan of care, disease process/condition, diagnostic tests and medications  Outcome: Progressing  Pt A&Ox4, responsive to teaching.    Patient is not progressing towards the following goals: NA

## 2022-01-02 NOTE — PROGRESS NOTES
Assumed care of patient at 1900, received bedside report from day shift RN. Bed is locked and in lowest position with call light within reach. Treaded socks in place. Patient updated on plan of care. White board updated. Pt A&Ox4. Patient's breathing pattern is unlabored. Pt refusing telemetry monitoring.

## 2022-01-02 NOTE — PROGRESS NOTES
"   Orthopaedic Progress Note    Interval changes:  Patient doing well   ELBERT with 35cc over the last 24 hours  ID team following for recs    ROS - Patient denies any new issues.  Pain well controlled.    /88   Pulse 86   Temp 36.2 °C (97.2 °F) (Temporal)   Resp 17   Ht 1.93 m (6' 4\")   Wt 114 kg (251 lb 8.7 oz)   SpO2 96%       Patient seen and examined  No acute distress  Breathing non labored  RRR  Right thigh dressing CDI, ELBERT in place with serosanguinous exudate, DNVI, moves all toes, cap refill <2 sec.      Recent Labs     12/30/21  0349 01/01/22  0348   WBC 7.7 6.6   RBC 4.84 5.11   HEMOGLOBIN 12.8* 13.5*   HEMATOCRIT 40.7* 42.4   MCV 84.1 83.0   MCH 26.4* 26.4*   MCHC 31.4* 31.8*   RDW 53.4* 53.4*   PLATELETCT 222 244   MPV 11.0 10.3       Active Hospital Problems    Diagnosis    • Atrial fibrillation (HCC) [I48.91]      Priority: Medium   • Chronic hepatitis C virus infection (HCC) [B18.2]      Priority: Low   • Tobacco use [Z72.0]      Priority: Low   • Cannabis use disorder, mild, abuse [F12.10]      Priority: Low   • Hyperglycemia [R73.9]    • Obesity (BMI 30-39.9) [E66.9]    • Leukocytosis [D72.829]    • Abscess [L02.91]    • CN (constipation) [K59.00]        Assessment/Plan:  Patient doing well   ELBERT with 35cc over the last 24 hours  Cultures positive for strep intermedius-ID team following for recs  POD#4 S/P Incision and drainage of right hip abscess  Wt bearing status - WBAT  Wound care/Drains - dressing CDI  Future Procedures - none planned   Sutures/Staples out- 14 days post operatively  PT/OT-initiated  Antibiotics: rocephin 2g IV q24  DVT Prophylaxis- TEDS/SCDs/Foot pumps  Lundberg-none  Case Coordination for Discharge Planning - Disposition pending abx needs    "

## 2022-01-02 NOTE — PROGRESS NOTES
Report received from night shift RN, assumed care of pt. Pt A&Ox4. Plan of care discussed with pt, labs and chart reviewed. All needs met at this time. Pt on RA, refusing tele monitor. Call light within reach, bed locked and in lowest position. All fall precautions and hourly rounding in place. C/o 8/10 pain in R hip. Will continue to monitor.

## 2022-01-03 ENCOUNTER — NON-PROVIDER VISIT (OUTPATIENT)
Dept: OCCUPATIONAL MEDICINE | Facility: CLINIC | Age: 62
End: 2022-01-03

## 2022-01-03 ENCOUNTER — PATIENT OUTREACH (OUTPATIENT)
Dept: HEALTH INFORMATION MANAGEMENT | Facility: OTHER | Age: 62
End: 2022-01-03

## 2022-01-03 DIAGNOSIS — Z02.83 ENCOUNTER FOR DRUG SCREENING: ICD-10-CM

## 2022-01-03 PROCEDURE — 700102 HCHG RX REV CODE 250 W/ 637 OVERRIDE(OP): Performed by: INTERNAL MEDICINE

## 2022-01-03 PROCEDURE — A9270 NON-COVERED ITEM OR SERVICE: HCPCS | Performed by: INTERNAL MEDICINE

## 2022-01-03 PROCEDURE — 8911 PR MRO FEE: Performed by: PREVENTIVE MEDICINE

## 2022-01-03 PROCEDURE — 700111 HCHG RX REV CODE 636 W/ 250 OVERRIDE (IP): Performed by: STUDENT IN AN ORGANIZED HEALTH CARE EDUCATION/TRAINING PROGRAM

## 2022-01-03 PROCEDURE — A9270 NON-COVERED ITEM OR SERVICE: HCPCS | Performed by: STUDENT IN AN ORGANIZED HEALTH CARE EDUCATION/TRAINING PROGRAM

## 2022-01-03 PROCEDURE — 700105 HCHG RX REV CODE 258: Performed by: INTERNAL MEDICINE

## 2022-01-03 PROCEDURE — 99233 SBSQ HOSP IP/OBS HIGH 50: CPT | Performed by: INTERNAL MEDICINE

## 2022-01-03 PROCEDURE — 700111 HCHG RX REV CODE 636 W/ 250 OVERRIDE (IP): Performed by: INTERNAL MEDICINE

## 2022-01-03 PROCEDURE — 770020 HCHG ROOM/CARE - TELE (206)

## 2022-01-03 PROCEDURE — 700102 HCHG RX REV CODE 250 W/ 637 OVERRIDE(OP): Performed by: STUDENT IN AN ORGANIZED HEALTH CARE EDUCATION/TRAINING PROGRAM

## 2022-01-03 RX ORDER — OXYCODONE HYDROCHLORIDE 5 MG/1
5 TABLET ORAL EVERY 6 HOURS PRN
Status: DISCONTINUED | OUTPATIENT
Start: 2022-01-03 | End: 2022-01-04 | Stop reason: HOSPADM

## 2022-01-03 RX ORDER — OXYCODONE HYDROCHLORIDE 5 MG/1
2.5 TABLET ORAL EVERY 6 HOURS PRN
Status: DISCONTINUED | OUTPATIENT
Start: 2022-01-03 | End: 2022-01-04 | Stop reason: HOSPADM

## 2022-01-03 RX ORDER — CEFDINIR 300 MG/1
300 CAPSULE ORAL EVERY 12 HOURS
Status: DISCONTINUED | OUTPATIENT
Start: 2022-01-04 | End: 2022-01-04 | Stop reason: HOSPADM

## 2022-01-03 RX ORDER — HYDROMORPHONE HYDROCHLORIDE 1 MG/ML
1 INJECTION, SOLUTION INTRAMUSCULAR; INTRAVENOUS; SUBCUTANEOUS EVERY 6 HOURS PRN
Status: DISCONTINUED | OUTPATIENT
Start: 2022-01-03 | End: 2022-01-04 | Stop reason: HOSPADM

## 2022-01-03 RX ADMIN — DOCUSATE SODIUM 50 MG AND SENNOSIDES 8.6 MG 2 TABLET: 8.6; 5 TABLET, FILM COATED ORAL at 17:20

## 2022-01-03 RX ADMIN — HYDROMORPHONE HYDROCHLORIDE 1 MG: 1 INJECTION, SOLUTION INTRAMUSCULAR; INTRAVENOUS; SUBCUTANEOUS at 07:55

## 2022-01-03 RX ADMIN — METRONIDAZOLE 500 MG: 500 TABLET ORAL at 06:24

## 2022-01-03 RX ADMIN — METOPROLOL TARTRATE 25 MG: 25 TABLET, FILM COATED ORAL at 17:20

## 2022-01-03 RX ADMIN — CEFTRIAXONE SODIUM 2 G: 2 INJECTION, POWDER, FOR SOLUTION INTRAMUSCULAR; INTRAVENOUS at 06:23

## 2022-01-03 RX ADMIN — NICOTINE 14 MG: 14 PATCH TRANSDERMAL at 06:24

## 2022-01-03 RX ADMIN — HYDROMORPHONE HYDROCHLORIDE 1 MG: 1 INJECTION, SOLUTION INTRAMUSCULAR; INTRAVENOUS; SUBCUTANEOUS at 21:34

## 2022-01-03 RX ADMIN — METOPROLOL TARTRATE 25 MG: 25 TABLET, FILM COATED ORAL at 06:24

## 2022-01-03 RX ADMIN — OXYCODONE HYDROCHLORIDE 5 MG: 5 TABLET ORAL at 14:00

## 2022-01-03 RX ADMIN — OXYCODONE 5 MG: 5 TABLET ORAL at 01:05

## 2022-01-03 RX ADMIN — OXYCODONE 5 MG: 5 TABLET ORAL at 06:37

## 2022-01-03 RX ADMIN — OXYCODONE 5 MG: 5 TABLET ORAL at 12:21

## 2022-01-03 RX ADMIN — HYDROMORPHONE HYDROCHLORIDE 1 MG: 1 INJECTION, SOLUTION INTRAMUSCULAR; INTRAVENOUS; SUBCUTANEOUS at 03:08

## 2022-01-03 RX ADMIN — DOCUSATE SODIUM 50 MG AND SENNOSIDES 8.6 MG 2 TABLET: 8.6; 5 TABLET, FILM COATED ORAL at 06:23

## 2022-01-03 RX ADMIN — METRONIDAZOLE 500 MG: 500 TABLET ORAL at 20:25

## 2022-01-03 RX ADMIN — OXYCODONE HYDROCHLORIDE 5 MG: 5 TABLET ORAL at 20:25

## 2022-01-03 RX ADMIN — RIVAROXABAN 20 MG: 20 TABLET, FILM COATED ORAL at 17:20

## 2022-01-03 RX ADMIN — HYDROMORPHONE HYDROCHLORIDE 1 MG: 1 INJECTION, SOLUTION INTRAMUSCULAR; INTRAVENOUS; SUBCUTANEOUS at 15:15

## 2022-01-03 RX ADMIN — METRONIDAZOLE 500 MG: 500 TABLET ORAL at 14:00

## 2022-01-03 RX ADMIN — ENOXAPARIN SODIUM 40 MG: 40 INJECTION SUBCUTANEOUS at 06:23

## 2022-01-03 ASSESSMENT — ENCOUNTER SYMPTOMS
CHILLS: 0
COUGH: 0
FOCAL WEAKNESS: 0
SHORTNESS OF BREATH: 0
EYES NEGATIVE: 1
FEVER: 0
ABDOMINAL PAIN: 0
NAUSEA: 0
MYALGIAS: 1
VOMITING: 0

## 2022-01-03 ASSESSMENT — PAIN DESCRIPTION - PAIN TYPE
TYPE: ACUTE PAIN

## 2022-01-03 NOTE — CARE PLAN
The patient is Watcher - Medium risk of patient condition declining or worsening    Shift Goals  Clinical Goals: monitor juan drain output, manage pain  Patient Goals: pain management  Family Goals: MALIHA    Progress made toward(s) clinical / shift goals:    Problem: Pain - Standard  Goal: Alleviation of pain or a reduction in pain to the patient’s comfort goal  Outcome: Progressing     Problem: Knowledge Deficit - Standard  Goal: Patient and family/care givers will demonstrate understanding of plan of care, disease process/condition, diagnostic tests and medications  Outcome: Progressing       Patient is not progressing towards the following goals:

## 2022-01-03 NOTE — DISCHARGE PLANNING
Anticipated Discharge Disposition: Home with close outpatient follow-up     Action: Anticipate the patient will discharge home. Orientation: A&Ox4 pt currently on 0 liters O2, 6 clicks are 24/23.    Barriers to Discharge: Medical Clearance      Plan: f/u with pt and medical team to discuss dc needs and barriers.

## 2022-01-03 NOTE — PROGRESS NOTES
"   Orthopaedic Progress Note    Interval changes:  Patient doing well   ELBERT with 35cc over the last 24 hours  ID team following for recs    ROS - Patient denies any new issues.  Pain well controlled.    /85   Pulse (!) 109   Temp 36.5 °C (97.7 °F) (Temporal)   Resp (!) 21   Ht 1.93 m (6' 4\")   Wt 114 kg (251 lb 8.7 oz)   SpO2 95%       Patient seen and examined  No acute distress  Breathing non labored  RRR  Right thigh dressing CDI, ELBERT in place with serosanguinous exudate, DNVI, moves all toes, cap refill <2 sec.      Recent Labs     01/01/22  0348 01/02/22  0355   WBC 6.6 7.8   RBC 5.11 5.10   HEMOGLOBIN 13.5* 14.1   HEMATOCRIT 42.4 42.1   MCV 83.0 82.5   MCH 26.4* 27.6   MCHC 31.8* 33.5*   RDW 53.4* 52.1*   PLATELETCT 244 261   MPV 10.3 10.1       Active Hospital Problems    Diagnosis    • Atrial fibrillation (HCC) [I48.91]      Priority: Medium   • Chronic hepatitis C virus infection (HCC) [B18.2]      Priority: Low   • Tobacco use [Z72.0]      Priority: Low   • Cannabis use disorder, mild, abuse [F12.10]      Priority: Low   • Hyperglycemia [R73.9]    • Obesity (BMI 30-39.9) [E66.9]    • Leukocytosis [D72.829]    • Abscess [L02.91]    • CN (constipation) [K59.00]        Assessment/Plan:  Patient doing well   ELBERT with 35cc over the last 24 hours  Cultures positive for strep intermedius-ID team following for recs  POD#5 S/P Incision and drainage of right hip abscess  Wt bearing status - WBAT  Wound care/Drains - dressing CDI  Future Procedures - none planned   Sutures/Staples out- 14 days post operatively  PT/OT-initiated  Antibiotics: rocephin 2g IV q24  DVT Prophylaxis- TEDS/SCDs/Foot pumps  [place]berg-none  Case Coordination for Discharge Planning - Disposition pending abx needs    "

## 2022-01-03 NOTE — CARE PLAN
The patient is Stable - Low risk of patient condition declining or worsening    Shift Goals  Clinical Goals: Monitor ELBERT drain output, pain management  Patient Goals: Pain management  Family Goals: MALIHA    Progress made toward(s) clinical / shift goals:    Problem: Knowledge Deficit - Standard  Goal: Patient and family/care givers will demonstrate understanding of plan of care, disease process/condition, diagnostic tests and medications  Outcome: Progressing   Pt verbalizes understanding of plan of care. A&Ox4.   Problem: Hemodynamics  Goal: Patient's hemodynamics, fluid balance and neurologic status will be stable or improve  Outcome: Progressing  VSS.    Patient is not progressing towards the following goals:  Problem: Pain - Standard  Goal: Alleviation of pain or a reduction in pain to the patient’s comfort goal  Outcome: Not Progressing  Pt consistently reports pain of 7 or 8/10. Relies on IV hydromorphone for best relief. Pt educated on alternative pain management strategies but is not interested. Pt educated that he will not be prescribed hydromorphone for use at home upon discharge; pt verbalizes understanding.

## 2022-01-03 NOTE — PROGRESS NOTES
Received bedside report from RN, pt care assumed, VSS, pt assessment complete. Pt AAOx4. No signs of acute distress noted at this time. Pt denies any additional needs at this time. Bed locked/in lowest position, pt educated on fall risk and verbalized understanding, call light within reach, hourly rounding initiated.

## 2022-01-03 NOTE — PROGRESS NOTES
Hospital Medicine Daily Progress Note    Date of Service  1/3/2022    Chief Complaint  Gonsalo Hunt is a 61 y.o. male admitted 12/27/2021 with right hip pain and swelling    Hospital Course  A 61-year-old man with h/o necrotizing fascitis, IV drug use, hep C presented with right hip pain and swelling. Patients reports right hip pain starting 10 days ago, but worsening over the last 3 days associated with swelling, fevers and nausea. Patient reports he is no longer homeless and stopped doing IV drugs >1 year ago, still smokes marijuana, and smokes 1 pack of cigarettes q5 days.  In the ER XR of pelvis showed large volume rectal vault stool and mild right hip osteoarthritis. CT pelvis showed 7 cm rim-enhancing fluid collection over right iliotibial band compatible with abscess and possible myositis of the superficial gluteus medius muscle. Orthopedic surgery consulted.   Patient underwent I&D of right hip abscess on 12/28. Developed A.fib after surgery.      Interval Problem Update  Patient reports right hip pain 9/10. Patient was verbally abusive towards staff. Afebrile, hemodynamically stable.  Na 132.  Blood cultures form 12/28 negative to date.  Increased dose of dilaudid to 1 mg q3h prn    12/30/2021:  Patient continues to have mild pain continues on IV antimicrobials at present continues to have drainage from abscess site.  Follow-up with orthopedic surgery regarding further recommendations anticipated discharge in 1 to 2 days.    PATIENT SEEN BY PREVIOUS HOSPITALIST UNTIL 12/30 12/31: Patient seen at bedside this morning.  Patient lying in bed.  The patient mentions that he is still having some pain.  ID following the patient, we appreciate further recommendations.  As per nursing no other overnight events reported.    1/1: Patient seen at bedside this morning.  Patient laying in bed comfortably.  Currently not complaining of overt pain.  We appreciate further recommendations by ID.  Culture with Gram  stain growing streptococcus intermedius.  We are pending sensitivities.  Patient currently on ceftriaxone and Flagyl.  As per nursing no other overnight events reported.  --I spoke to orthopedic surgery, they recommend starting Lovenox today and Xarelto possibly on Monday.    1/2: Patient seen at bedside this morning.  Patient lying in bed comfortably.  As per IDs note, it seems that the patient will require ceftriaxone and Flagyl until 1/11/2022.  We will reach out to ID to see if there are any oral choices we could use.  As per orthopedic surgery patient can be started on Xarelto for anticoagulation tomorrow.  As per nursing no other overnight events reported.    1/3: Patient seen at bedside this morning.  Patient mentions his pain is somewhat better today.  I have talked to ID and they have recommended to switch to cefdinir and Flagyl until 1/11/22.  I also reached out to orthopedic surgery, they will come back for the drain to have 25 cc or less output before discontinuing it.  They recommend keeping the patient 1 more day and potentially removing drain tomorrow if 20 cc or less.  The patient could potentially be discharged tomorrow to complete antibiotic treatment as an outpatient.  We have also started the patient on Xarelto as per orthopedics recommendation.  As per nursing no other overnight events reported.    I have personally seen and examined the patient at bedside. I discussed the plan of care with patient, bedside RN, charge RN,  and pharmacy.    Consultants/Specialty  infectious disease and orthopedics    Code Status  Full Code    Disposition  Patient is not medically cleared for discharge.   Anticipate discharge pending PT/OT evaluation  I have placed the appropriate orders for post-discharge needs.    Review of Systems  Review of Systems   Constitutional: Positive for malaise/fatigue. Negative for chills and fever.   HENT: Negative for hearing loss.    Eyes: Negative.    Respiratory:  Negative for cough and shortness of breath.    Cardiovascular: Positive for leg swelling. Negative for chest pain.   Gastrointestinal: Negative for abdominal pain, nausea and vomiting.   Genitourinary: Negative for dysuria.   Musculoskeletal: Positive for joint pain and myalgias (right hip pain).   Skin: Negative for rash.   Neurological: Negative for focal weakness.        Physical Exam  Temp:  [36.3 °C (97.3 °F)-37.2 °C (98.9 °F)] 36.6 °C (97.9 °F)  Pulse:  [] 85  Resp:  [17-21] 20  BP: (115-138)/(83-88) 118/84  SpO2:  [92 %-99 %] 92 %    Physical Exam  Vitals and nursing note reviewed.   Constitutional:       Appearance: He is obese. He is ill-appearing.   HENT:      Head: Normocephalic.      Nose: Nose normal.      Mouth/Throat:      Mouth: Mucous membranes are moist.      Pharynx: Oropharynx is clear.   Eyes:      Pupils: Pupils are equal, round, and reactive to light.   Cardiovascular:      Rate and Rhythm: Normal rate and regular rhythm.   Pulmonary:      Effort: Pulmonary effort is normal. No respiratory distress.      Breath sounds: No wheezing or rales.   Abdominal:      General: Abdomen is flat. Bowel sounds are normal. There is no distension.   Musculoskeletal:         General: Swelling and tenderness (right hip) present. Normal range of motion.      Cervical back: Normal range of motion.   Skin:     General: Skin is warm.      Comments: Dressing on right hip clean. Drain in place on right hip with sanguinous fluid    Neurological:      General: No focal deficit present.      Mental Status: He is alert and oriented to person, place, and time.         Fluids    Intake/Output Summary (Last 24 hours) at 1/3/2022 1338  Last data filed at 1/3/2022 1000  Gross per 24 hour   Intake 240 ml   Output 30 ml   Net 210 ml       Laboratory  Recent Labs     01/01/22  0348 01/02/22  0355   WBC 6.6 7.8   RBC 5.11 5.10   HEMOGLOBIN 13.5* 14.1   HEMATOCRIT 42.4 42.1   MCV 83.0 82.5   MCH 26.4* 27.6   MCHC 31.8* 33.5*    RDW 53.4* 52.1*   PLATELETCT 244 261   MPV 10.3 10.1     Recent Labs     01/01/22  0348 01/02/22  0355   SODIUM 136 134*   POTASSIUM 4.3 4.6   CHLORIDE 102 102   CO2 23 23   GLUCOSE 174* 104*   BUN 21 20   CREATININE 0.77 0.70   CALCIUM 8.4* 8.5                   Imaging  CT-PELVIS WITH   Final Result      New 7 cm rim-enhancing fluid collection overlying the right iliotibial band is compatible with abscess favored over hematoma. Aspiration could clarify      Myositis of the superficial gluteus medius muscle is possible. No CT evidence of bony destruction or septic arthropathy      Large left hydrocele post inguinal hernia repair      Direct fatty right inguinal hernia similar to comparison      DX-PELVIS-1 OR 2 VIEWS   Final Result      Large volume rectal vault stool      Mild right hip osteoarthritis           Assessment/Plan  * Abscess- (present on admission)  Assessment & Plan  Right upper thigh   Orthopedic surgery consulted   S/p surgical I&D on 12/28  ID following, recommendations appreciated  Continue Abx as per ID recommendations    1/2: As per IDs note, patient to continue ceftriaxone and Flagyl until 1/11/2022.  We will reach out to ID to see if there are any oral choices we can give, otherwise we will try to set up patient for outpatient infusion.    1/3: I spoke to ID and they recommended that we can switch to cefdinir and Flagyl until 1/11/2022.  I spoke to orthopedic surgery, they would like for the drain to have an output of 25 cc or less before removal.  Patient currently having an output of 30 cc. They recommend one more day of observation with possible discontinuing drain tomorrow.    Hyperglycemia  Assessment & Plan  Patient with an A1c of 5.9 on 9/28/2021  We will order new A1c.  No need to start insulin treatment at this time.  Monitor    1/2: A1c is 5.8.  Patient counseled on lifestyle modifications.    Obesity (BMI 30-39.9)  Assessment & Plan  Counseled on lifestyle modifications.  The  patient will require close follow-up with PCP as an outpatient.    CN (constipation)- (present on admission)  Assessment & Plan  Bowel regimen ordered    Leukocytosis- (present on admission)  Assessment & Plan  Mild WBC 13 on admission   2/2 iliotibial abscess   IV abx   Cultures pending     1/1: improving. ID following the patient, we appreciate further recommendations.    1/3: White blood cell count within normal limits.  The patient currently on cefdinir and Flagyl  Until 1/11/22.    Atrial fibrillation (HCC)  Assessment & Plan  As per patient he was diagnosed with it at [facility] and a prescription was given to him but never filled.  Will monitor on tele after surgery   Started metoprolol.  Will need to discuss anticoagulation option once cleared from surgery to start anticoagulation     1/2: I spoke to orthopedic surgery, they recommend starting anticoagulation with Xarelto tomorrow.    1/3: We have started Xarelto    Tobacco use- (present on admission)  Assessment & Plan  Counseled on lifestyle modifications  Patient will require close follow up as outpatient.    Chronic hepatitis C virus infection (HCC)- (present on admission)  Assessment & Plan  Hx of   AST/ALT wnl    12/31: ID consulted, Patient will require follow up as outpatient.    Cannabis use disorder, mild, abuse- (present on admission)  Assessment & Plan  Ongoing, cessation counseling       VTE prophylaxis: enoxaparin ppx    I have performed a physical exam and reviewed and updated ROS and Plan today (1/3/2022). In review of yesterday's note (1/2/2022), there are no changes except as documented above.

## 2022-01-03 NOTE — PROGRESS NOTES
Report received from night shift RN, assumed care of pt. Pt A&Ox4. Plan of care discussed with pt, labs and chart reviewed. All needs met at this time. Pt on RA, refusing tele monitor. Call light within reach, bed locked and in lowest position. All fall precautions and hourly rounding in place. Will continue to monitor.

## 2022-01-03 NOTE — DOCUMENTATION QUERY
[facility]                                                                       Query Response Note      PATIENT:               JANNA ROJAS  ACCT #:                  [id number]  MRN:                     [id number]  :                      1960  ADMIT DATE:       2021 6:59 PM  DISCH DATE:          RESPONDING  PROVIDER #:        [id number]           QUERY TEXT:    On , the Operative note includes the following documentation: The wound was debrided excising any nonviable tissue from within the wound. For coding purposes, please clarify the deepest level of tissue that was debrided/excised.     NOTE:  If an appropriate response is not listed below, please respond with a new note.        The patient's Clinical Indicators include:  NOTED    21- Incision and drainage of right hip abscess  The wound was debrided excising any nonviable tissue from with the wound. This was then irrigated...A large bore deep drain was then placed.  CT- Pelvis: Impression -7 cm rim-enhancing fluid collection overlying the right iliotibial band...Myositis of the superficial gluteus Medius muscle is possible.   History necrotizing infection    Thank you,  Arianna Noble RN, CCS  Clinical Documentation Integrity   Connect via [employer]  Options provided:   -- Debrided/Excised down to including skin   -- Debrided/Excised down to including subcutaneous and/or fascia   -- Debrided/Excised down to including  muscle   -- Debrided/Excised down to including bone   -- Unable to determine      Query created by: Arianna Noble on 2021 2:07 PM    RESPONSE TEXT:    Debrided/Excised down to including subcutaneous and/or fascia          Electronically signed by:  MARIANO KELLEY MD 1/3/2022 7:08 AM

## 2022-01-03 NOTE — DOCUMENTATION QUERY
[facility] hospital                                                                       Query Response Note      PATIENT:               JANNA ROJAS  ACCT #:                  [id number]  MRN:                     [id number]  :                      1960  ADMIT DATE:       2021 6:59 PM  DISCH DATE:          RESPONDING  PROVIDER #:        [id number]           QUERY TEXT:    Atrial fibrillation is documented in the Medical Record.  Please clarify the type of atrial fibrillation for this admission.    NOTE:  If an appropriate response is not listed below, please respond with a new note.      The patient's Clinical Indicators include:  NOTED:  Atrial Fibrillation   - ID Consult: atrial fibrillation (dx~3 months ago, currently not on medication)   PN: Developed A.Fib after surgery  Telemetry   Metoprolol/Anticoagulation  Smoking   History IVDU     Thank you,   Arianna Noble RN, CCS  Clinical Documentation Integrity   Connect via [employer]  Options provided:   -- Paroxysmal atrial fibrillation (self-terminating or intermittent spontaneously or with intervention within 7 days of onset)   -- Chronic atrial fibrillation, unspecified   -- Postoperative Atrial fibrillation due to surgery   -- Unable to determine      Query created by: Arianna Noble on 1/3/2022 10:14 AM    RESPONSE TEXT:    Unable to determine          Electronically signed by:  MARIANO MOYER MD 1/3/2022 10:16 AM

## 2022-01-04 ENCOUNTER — PHARMACY VISIT (OUTPATIENT)
Dept: PHARMACY | Facility: MEDICAL CENTER | Age: 62
End: 2022-01-04
Payer: MEDICARE

## 2022-01-04 VITALS
DIASTOLIC BLOOD PRESSURE: 83 MMHG | SYSTOLIC BLOOD PRESSURE: 118 MMHG | BODY MASS INDEX: 30.63 KG/M2 | TEMPERATURE: 97.6 F | WEIGHT: 251.54 LBS | OXYGEN SATURATION: 95 % | RESPIRATION RATE: 18 BRPM | HEIGHT: 76 IN | HEART RATE: 96 BPM

## 2022-01-04 PROCEDURE — 99239 HOSP IP/OBS DSCHRG MGMT >30: CPT | Performed by: INTERNAL MEDICINE

## 2022-01-04 PROCEDURE — 700102 HCHG RX REV CODE 250 W/ 637 OVERRIDE(OP): Performed by: INTERNAL MEDICINE

## 2022-01-04 PROCEDURE — 700111 HCHG RX REV CODE 636 W/ 250 OVERRIDE (IP): Performed by: INTERNAL MEDICINE

## 2022-01-04 PROCEDURE — A9270 NON-COVERED ITEM OR SERVICE: HCPCS | Performed by: INTERNAL MEDICINE

## 2022-01-04 PROCEDURE — RXMED WILLOW AMBULATORY MEDICATION CHARGE: Performed by: INTERNAL MEDICINE

## 2022-01-04 RX ORDER — CEFDINIR 300 MG/1
300 CAPSULE ORAL EVERY 12 HOURS
Qty: 14 CAPSULE | Refills: 0 | Status: SHIPPED | OUTPATIENT
Start: 2022-01-04 | End: 2022-01-11

## 2022-01-04 RX ORDER — OXYCODONE HYDROCHLORIDE 5 MG/1
5 TABLET ORAL EVERY 6 HOURS PRN
Qty: 10 TABLET | Refills: 0 | Status: SHIPPED | OUTPATIENT
Start: 2022-01-04 | End: 2022-01-07

## 2022-01-04 RX ORDER — OXYCODONE HYDROCHLORIDE 5 MG/1
5 TABLET ORAL EVERY 6 HOURS PRN
Qty: 10 TABLET | Refills: 0 | Status: SHIPPED | OUTPATIENT
Start: 2022-01-04 | End: 2022-01-04 | Stop reason: SDUPTHER

## 2022-01-04 RX ORDER — METRONIDAZOLE 500 MG/1
500 TABLET ORAL EVERY 8 HOURS
Qty: 21 TABLET | Refills: 0 | Status: SHIPPED | OUTPATIENT
Start: 2022-01-04 | End: 2022-01-11

## 2022-01-04 RX ORDER — CEFDINIR 300 MG/1
300 CAPSULE ORAL EVERY 12 HOURS
Qty: 14 CAPSULE | Refills: 0 | Status: SHIPPED | OUTPATIENT
Start: 2022-01-04 | End: 2022-01-04 | Stop reason: SDUPTHER

## 2022-01-04 RX ORDER — METRONIDAZOLE 500 MG/1
500 TABLET ORAL EVERY 8 HOURS
Qty: 21 TABLET | Refills: 0 | Status: SHIPPED | OUTPATIENT
Start: 2022-01-04 | End: 2022-01-04 | Stop reason: SDUPTHER

## 2022-01-04 RX ADMIN — HYDROMORPHONE HYDROCHLORIDE 1 MG: 1 INJECTION, SOLUTION INTRAMUSCULAR; INTRAVENOUS; SUBCUTANEOUS at 10:48

## 2022-01-04 RX ADMIN — DOCUSATE SODIUM 50 MG AND SENNOSIDES 8.6 MG 2 TABLET: 8.6; 5 TABLET, FILM COATED ORAL at 04:22

## 2022-01-04 RX ADMIN — OXYCODONE HYDROCHLORIDE 5 MG: 5 TABLET ORAL at 09:25

## 2022-01-04 RX ADMIN — OXYCODONE HYDROCHLORIDE 5 MG: 5 TABLET ORAL at 03:18

## 2022-01-04 RX ADMIN — HYDROMORPHONE HYDROCHLORIDE 1 MG: 1 INJECTION, SOLUTION INTRAMUSCULAR; INTRAVENOUS; SUBCUTANEOUS at 04:21

## 2022-01-04 RX ADMIN — METOPROLOL TARTRATE 25 MG: 25 TABLET, FILM COATED ORAL at 04:21

## 2022-01-04 RX ADMIN — METRONIDAZOLE 500 MG: 500 TABLET ORAL at 04:21

## 2022-01-04 RX ADMIN — NICOTINE 14 MG: 14 PATCH TRANSDERMAL at 04:22

## 2022-01-04 RX ADMIN — CEFDINIR 300 MG: 300 CAPSULE ORAL at 06:07

## 2022-01-04 ASSESSMENT — PAIN DESCRIPTION - PAIN TYPE
TYPE: ACUTE PAIN;CHRONIC PAIN
TYPE: ACUTE PAIN;SURGICAL PAIN
TYPE: SURGICAL PAIN
TYPE: SURGICAL PAIN;ACUTE PAIN

## 2022-01-04 NOTE — DISCHARGE INSTRUCTIONS
Incision and Drainage, Care After  This sheet gives you information about how to care for yourself after your procedure. Your health care provider may also give you more specific instructions. If you have problems or questions, contact your health care provider.  What can I expect after the procedure?  After the procedure, it is common to have:  · Pain or discomfort around the incision site.  · Blood, fluid, or pus (drainage) from the incision.  · Redness and firm skin around the incision site.  Follow these instructions at home:  Medicines  · Take over-the-counter and prescription medicines only as told by your health care provider.  · If you were prescribed an antibiotic medicine, use or take it as told by your health care provider. Do not stop using the antibiotic even if you start to feel better.  Wound care  Follow instructions from your health care provider about how to take care of your wound. Make sure you:  · Wash your hands with soap and water before and after you change your bandage (dressing). If soap and water are not available, use hand .  · Change your dressing and packing as told by your health care provider.  ? If your dressing is dry or stuck when you try to remove it, moisten or wet the dressing with saline or water so that it can be removed without harming your skin or tissues.  ? If your wound is packed, leave it in place until your health care provider tells you to remove it. To remove the packing, moisten or wet the packing with saline or water so that it can be removed without harming your skin or tissues.  · Leave stitches (sutures), skin glue, or adhesive strips in place. These skin closures may need to stay in place for 2 weeks or longer. If adhesive strip edges start to loosen and curl up, you may trim the loose edges. Do not remove adhesive strips completely unless your health care provider tells you to do that.  Check your wound every day for signs of infection. Check  for:  · More redness, swelling, or pain.  · More fluid or blood.  · Warmth.  · Pus or a bad smell.  If you were sent home with a drain tube in place, follow instructions from your health care provider about:  · How to empty it.  · How to care for it at home.    General instructions  · Rest the affected area.  · Do not take baths, swim, or use a hot tub until your health care provider approves. Ask your health care provider if you may take showers. You may only be allowed to take sponge baths.  · Return to your normal activities as told by your health care provider. Ask your health care provider what activities are safe for you. Your health care provider may put you on activity or lifting restrictions.  · The incision will continue to drain. It is normal to have some clear or slightly bloody drainage. The amount of drainage should lessen each day.  · Do not apply any creams, ointments, or liquids unless you have been told to by your health care provider.  · Keep all follow-up visits as told by your health care provider. This is important.  Contact a health care provider if:  · Your cyst or abscess returns.  · You have a fever or chills.  · You have more redness, swelling, or pain around your incision.  · You have more fluid or blood coming from your incision.  · Your incision feels warm to the touch.  · You have pus or a bad smell coming from your incision.  · You have red streaks above or below the incision site.  Get help right away if:  · You have severe pain or bleeding.  · You cannot eat or drink without vomiting.  · You have decreased urine output.  · You become short of breath.  · You have chest pain.  · You cough up blood.  · The affected area becomes numb or starts to tingle.  These symptoms may represent a serious problem that is an emergency. Do not wait to see if the symptoms will go away. Get medical help right away. Call your local emergency services (911 in the [place] Do not drive yourself to the  hospital.  Summary  · After this procedure, it is common to have fluid, blood, or pus coming from the surgery site.  · Follow all home care instructions. You will be told how to take care of your incision, how to check for infection, and how to take medicines.  · If you were prescribed an antibiotic medicine, take it as told by your health care provider. Do not stop taking the antibiotic even if you start to feel better.  · Contact a health care provider if you have increased redness, swelling, or pain around your incision. Get help right away if you have chest pain, you vomit, you cough up blood, or you have shortness of breath.  · Keep all follow-up visits as told by your health care provider. This is important.  This information is not intended to replace advice given to you by your health care provider. Make sure you discuss any questions you have with your health care provider.  Document Released: 03/11/2013 Document Revised: 11/18/2019 Document Reviewed: 11/18/2019  [employer] Patient Education © 2020 [employer]    Abscess  Care After  An abscess (also called a boil or furuncle) is an infected area that contains a collection of pus. Signs and symptoms of an abscess include pain, tenderness, redness, or hardness, or you may feel a moveable soft area under your skin. An abscess can occur anywhere in the body. The infection may spread to surrounding tissues causing cellulitis. A cut (incision) by the surgeon was made over your abscess and the pus was drained out. Gauze may have been packed into the space to provide a drain that will allow the cavity to heal from the inside outwards. The boil may be painful for 5 to 7 days. Most people with a boil do not have high fevers. Your abscess, if seen early, may not have localized, and may not have been lanced. If not, another appointment may be required for this if it does not get better on its own or with medications.  HOME CARE INSTRUCTIONS   · Only take  over-the-counter or prescription medicines for pain, discomfort, or fever as directed by your caregiver.  · When you bathe, soak and then remove gauze or iodoform packs at least daily or as directed by your caregiver. You may then wash the wound gently with mild soapy water. Repack with gauze or do as your caregiver directs.  SEEK IMMEDIATE MEDICAL CARE IF:   · You develop increased pain, swelling, redness, drainage, or bleeding in the wound site.  · You develop signs of generalized infection including muscle aches, chills, fever, or a general ill feeling.  · An oral temperature above 102° F (38.9° C) develops, not controlled by medication.  See your caregiver for a recheck if you develop any of the symptoms described above. If medications (antibiotics) were prescribed, take them as directed.  Document Released: 07/06/2006 Document Revised: 03/11/2013 Document Reviewed: 03/02/2009  [employer]® Patient Information ©2014 [employer].  Discharge Instructions    Discharged to home by car with friend. Discharged via wheelchair, hospital escort: Yes.  Special equipment needed: Not Applicable    Be sure to schedule a follow-up appointment with your primary care doctor or any specialists as instructed.     Discharge Plan:   Influenza Vaccine Indication: Not indicated: Previously immunized this influenza season and > 8 years of age    I understand that a diet low in cholesterol, fat, and sodium is recommended for good health. Unless I have been given specific instructions below for another diet, I accept this instruction as my diet prescription.   Other diet: Regular    Special Instructions: None    · Is patient discharged on Warfarin / Coumadin?   No       Depression / Suicide Risk    As you are discharged from this [facility] facility, it is important to learn how to keep safe from harming yourself.    Recognize the warning signs:  · Abrupt changes in personality, positive or negative- including increase in energy    · Giving away possessions  · Change in eating patterns- significant weight changes-  positive or negative  · Change in sleeping patterns- unable to sleep or sleeping all the time   · Unwillingness or inability to communicate  · Depression  · Unusual sadness, discouragement and loneliness  · Talk of wanting to die  · Neglect of personal appearance   · Rebelliousness- reckless behavior  · Withdrawal from people/activities they love  · Confusion- inability to concentrate     If you or a loved one observes any of these behaviors or has concerns about self-harm, here's what you can do:  · Talk about it- your feelings and reasons for harming yourself  · Remove any means that you might use to hurt yourself (examples: pills, rope, extension cords, firearm)  · Get professional help from the community (Mental Health, Substance Abuse, psychological counseling)  · Do not be alone:Call your Safe Contact- someone whom you trust who will be there for you.  · Call your local CRISIS HOTLINE 629-1250 or 056-934-2522  · Call your local Children's Mobile Crisis Response Team [place] (140) 131-2035 or wwwWazoku  · Call the toll free National Suicide Prevention Hotlines   · National Suicide Prevention Lifeline 572-583-STIT (8382)  · National Hope Line Network 800-SUICIDE (161-5928)      Pain Medicine Instructions  You may need pain medicine after an injury or illness. Two common types of pain medicine are:  · Opioid pain medicine. These may be called opioids.  · Non-opioid pain medicine. This includes NSAIDs.  It is important to follow your doctor's instructions when you are taking pain medicine. Doing this can keep yourself and others safe.  How can pain medicine affect me?  Pain medicine may not make all of your pain go away. It should make you comfortable enough to:  · Move.  · Breathe.  · Do normal activities.  Opioids can cause side effects, such as:  · Trouble pooping (constipation).  · Feeling sick to your  stomach (nausea).  · Throwing up (vomiting).  · Feeling very sleepy.  · Confusion.  · Taking the medicine for nonmedical reasons even though taking it hurts your health and well-being (opioid use disorder).  · Trouble breathing (respiratory depression).  Taking opioids for longer than 3 days raises your risk of these side effects.  Taking opioids for a long time can affect how well you can do daily tasks. Taking them for a long time also puts you at risk for:  · Car crashes.  · Depression.  · Suicide.  · Heart attack.  · Taking too much of the medicine (overdose). This can lead to death.  What should I do to stay safe while taking pain medicine?  Take your medicine as told  · Take pain medicine exactly as told by your doctor. Take it only when you need it.  · Write down the times when you take your pain medicine. Look at the times before you take your next dose.  · Take other over-the-counter or prescription medicines only as told by your doctor.  ? If your pain medicine has acetaminophen in it, do not take any other acetaminophen while you are taking this medicine. Too much can damage the liver.  · Get pain medicine prescriptions from only one doctor.  Avoid certain activities  While you are taking prescription pain medicine, and for 8 hours after your last dose:  · Do not drive.  · Do not use machinery.  · Do not use power tools.  · Do not sign legal documents.  · Do not drink alcohol.  · Do not take sleeping pills.  · Do not take care of children by yourself.  · Do not do any activities that involve climbing or being in high places.  · Do not go into any body of water unless there is an adult nearby who can watch you and help you if needed. This includes:  ? Lakes.  ? Rivers.  ? Oceans.  ? Spas.  ? Swimming pools.    Keep others safe  · Store your medicine as told by your doctor. Keep it where children and pets cannot reach it.  · Do not share your pain medicine with anyone.  · Do not save any leftover pills. If  you have leftover pills, you can:  ? Bring them to a take-back program.  ? Bring them to a pharmacy that has a drug disposal container.  ? Throw them in the trash. Check the medicine label or package insert to see if it is safe to throw it out. If it is safe, take the medicine out of the container. Mix it with something that makes it unusable, such as pet waste. Then put the medicine in the trash.  General instructions  · Talk with your doctor about other ways to manage your pain.  · If you have trouble pooping:  ? Drink enough fluid to keep your pee (urine) pale yellow.  ? Use a poop (stool) softener as told by your doctor.  ? Eat more fruits and vegetables.  · Keep all follow-up visits as told by your doctor. This is important.  Contact a doctor if:  · Your medicine is not helping with your pain.  · You have a rash.  · You feel depressed.  Get help right away if:  Seek medical care right away if you are taking pain medicines and you (or people close to you) notice any of the following:  · Trouble breathing.  · Breathing that is shorter than normal.  · Breathing that is more shallow than normal.  · Confusion.  · Sleepiness.  · Trouble staying awake.  · Feeling sick to your stomach.  · Throwing up.  · Your skin or lips turning pale or bluish in color.  · Tongue swelling.  If you ever feel like you may hurt yourself or others, or have thoughts about taking your own life, get help right away. Go to your nearest emergency department or call:  · Your local emergency services (911 in the [place]  · A suicide crisis helpline, such as the National Suicide Prevention Lifeline at 1-534.526.8383. This is open 24 hours a day.  Summary  · Take your pain medicine exactly as told by your doctor.  · Pain medicine can help lower your pain. It may also cause side effects.  · Talk with your doctor about other ways to manage your pain.  · Follow your doctor's instructions about how to take your pain medicine and keep others safe. Ask  what activities you should avoid while taking pain medicine.  This information is not intended to replace advice given to you by your health care provider. Make sure you discuss any questions you have with your health care provider.  Document Released: 06/05/2009 Document Revised: 11/30/2018 Document Reviewed: 07/30/2018  [employer] Patient Education © 2020 [employer]

## 2022-01-04 NOTE — PROGRESS NOTES
"   Orthopaedic Progress Note    Interval changes:  Patient doing well   ELBERT with 30cc over the last 24 hours will dc tomorrow if under 30cc  ID team following for recs    ROS - Patient denies any new issues.  Pain well controlled.    /86   Pulse 86   Temp 36.6 °C (97.9 °F) (Temporal)   Resp (!) 21   Ht 1.93 m (6' 4\")   Wt 114 kg (251 lb 8.7 oz)   SpO2 94%       Patient seen and examined  No acute distress  Breathing non labored  RRR  Right thigh dressing CDI, ELBERT in place with serosanguinous exudate, DNVI, moves all toes, cap refill <2 sec.      Recent Labs     01/01/22  0348 01/02/22  0355   WBC 6.6 7.8   RBC 5.11 5.10   HEMOGLOBIN 13.5* 14.1   HEMATOCRIT 42.4 42.1   MCV 83.0 82.5   MCH 26.4* 27.6   MCHC 31.8* 33.5*   RDW 53.4* 52.1*   PLATELETCT 244 261   MPV 10.3 10.1       Active Hospital Problems    Diagnosis    • Atrial fibrillation (HCC) [I48.91]      Priority: Medium   • Chronic hepatitis C virus infection (HCC) [B18.2]      Priority: Low   • Tobacco use [Z72.0]      Priority: Low   • Cannabis use disorder, mild, abuse [F12.10]      Priority: Low   • Hyperglycemia [R73.9]    • Obesity (BMI 30-39.9) [E66.9]    • Leukocytosis [D72.829]    • Abscess [L02.91]    • CN (constipation) [K59.00]        Assessment/Plan:  Patient doing well   ELBERT with 30cc over the last 24 hours- will dc drain tomorrow if under 30cc  Cultures positive for strep intermedius-ID team following for recs  POD#6 S/P Incision and drainage of right hip abscess  Wt bearing status - WBAT  Wound care/Drains - dressing CDI  Future Procedures - none planned   Sutures/Staples out- 14 days post operatively  PT/OT-initiated  Antibiotics: rocephin 2g IV q24  DVT Prophylaxis- TEDS/SCDs/Foot pumps  L[place]berg-none  Case Coordination for Discharge Planning - Disposition pending abx needs    "

## 2022-01-04 NOTE — CARE PLAN
The patient is Stable - Low risk of patient condition declining or worsening    Shift Goals  Clinical Goals: Monitor ELBRET drain output, pain management  Patient Goals: Pain management  Family Goals: MALIHA    Progress made toward(s) clinical / shift goals:    Problem: Pain - Standard  Goal: Alleviation of pain or a reduction in pain to the patient’s comfort goal  Outcome: Progressing     Problem: Hemodynamics  Goal: Patient's hemodynamics, fluid balance and neurologic status will be stable or improve  Outcome: Progressing       Patient is not progressing towards the following goals:

## 2022-01-04 NOTE — DISCHARGE PLANNING
Anticipated Discharge Disposition: Home with close outpatient follow-up     Action: pt has active discharge order, pt currently on 0 liters O2, 6 clicks are 24/23. Orientation: A&Ox4.      Barriers to Discharge: none noted     Plan: f/u with pt and medical team to discuss dc needs and barriers.

## 2022-01-04 NOTE — PROGRESS NOTES
Assumed care of pt. Bedside report received from Marley KING. Pt was updated on plan of care. Call light, phone and personal belongings in reach. Bed alarm on and working properly, bed in lowest position, and locked.

## 2022-01-04 NOTE — PROGRESS NOTES
Arrive to dc lounge via wheelchair. A/O. DC instructions reviewed w/ pt, verbalized understanding. Waiting for meds to bed.

## 2022-01-04 NOTE — DISCHARGE SUMMARY
Discharge Summary    CHIEF COMPLAINT ON ADMISSION  Chief Complaint   Patient presents with   • Hip Pain     Pt complains of pain and swelling to Rt hip x1 week. States that he's previously had dislocations to same hip. Work comp injury, pain when walking.       Reason for Admission  Hip Pain     Admission Date  12/27/2021    CODE STATUS  Full Code    HPI & HOSPITAL COURSE     A 61-year-old man with h/o necrotizing fascitis, IV drug use, hep C presented with right hip pain and swelling. Patients reports right hip pain starting 10 days ago, but worsening over the last 3 days associated with swelling, fevers and nausea. Patient reports he is no longer homeless and stopped doing IV drugs >1 year ago, still smokes marijuana, and smokes 1 pack of cigarettes q5 days.  In the ER XR of pelvis showed large volume rectal vault stool and mild right hip osteoarthritis. CT pelvis showed 7 cm rim-enhancing fluid collection over right iliotibial band compatible with abscess and possible myositis of the superficial gluteus medius muscle. Orthopedic surgery consulted.   Patient underwent I&D of right hip abscess on 12/28.  Initially drain was placed and it was removed by orthopedic today.  According to them the patient can be discharged home and follow-up with them as an outpatient.  Also discussed with infectious disease and recommended to continue antibiotic until January 11 with cefdinir and Flagyl.  Wound culture grew Streptococcus intermedius  In addition the patient Developed A.fib after surgery.  He was started on metoprolol and Xarelto as anticoagulation.  I saw and examined the patient today.  Please call [phone] to schedule PCP appointment for patient.    Required specialty appointments include:           Therefore, he is discharged in fair and stable condition to home with close outpatient follow-up.    The patient met 2-midnight criteria for an inpatient stay at the time of discharge.    Discharge  Date  01/04/22      FOLLOW UP ITEMS POST DISCHARGE      DISCHARGE DIAGNOSES  Principal Problem:    Abscess POA: Yes  Active Problems:    Cannabis use disorder, mild, abuse (Chronic) POA: Yes    Chronic hepatitis C virus infection (HCC) POA: Yes    Tobacco use POA: Yes    Atrial fibrillation (HCC) POA: Unknown    Leukocytosis POA: Yes    CN (constipation) POA: Yes    Obesity (BMI 30-39.9) POA: Unknown    Hyperglycemia POA: Unknown  Resolved Problems:    * No resolved hospital problems. *      FOLLOW UP  No future appointments.  Weston Kaur M.D.  [address] [facility] [id number] [phone]      Please call your primary care provider to schedule a hospital follow up. Thank you.    [facility] ([facility]) - Primary Care  [address] [place] [address]  [phone]    Please call [facility] to establish with a Primary Care Provider. This office offers sliding fee scales based on income. Thank you.    Weston Kaur M.D.  [address] [facility] [id number] [phone]    In 1 week        MEDICATIONS ON DISCHARGE     Medication List      START taking these medications      Instructions   cefdinir 300 MG Caps  Commonly known as: OMNICEF   Take 1 Capsule by mouth every 12 hours for 7 days.  Dose: 300 mg     metoprolol tartrate 25 MG Tabs  Commonly known as: LOPRESSOR   Take 1 Tablet by mouth 2 times a day.  Dose: 25 mg     metroNIDAZOLE 500 MG Tabs  Commonly known as: FLAGYL   Take 1 Tablet by mouth every 8 hours for 7 days.  Dose: 500 mg     oxyCODONE immediate-release 5 MG Tabs  Commonly known as: ROXICODONE   Take 1 Tablet by mouth every 6 hours as needed for up to 3 days.  Dose: 5 mg     rivaroxaban 20 MG Tabs tablet  Commonly known as: XARELTO   Take 1 Tablet by mouth with dinner.  Dose: 20 mg            Allergies  Allergies   Allergen Reactions   • Spinach Vomiting and Nausea   • Codeine Rash     Rash  Historical tolerance to hydromorphone, oxycodone   •  Codeine    • Penicillins    • Pcn [Penicillins] Unspecified     Unknown reaction. Tolerated ceftriaxone on 10/9/19         DIET  Orders Placed This Encounter   Procedures   • Diet Order Diet: Regular     Standing Status:   Standing     Number of Occurrences:   1     Order Specific Question:   Diet:     Answer:   Regular [1]       ACTIVITY  As tolerated.  Weight bearing as tolerated    CONSULTATIONS      PROCEDURES      LABORATORY  Lab Results   Component Value Date    SODIUM 134 (L) 01/02/2022    POTASSIUM 4.6 01/02/2022    CHLORIDE 102 01/02/2022    CO2 23 01/02/2022    GLUCOSE 104 (H) 01/02/2022    BUN 20 01/02/2022    CREATININE 0.70 01/02/2022    CREATININE 1.0 07/08/2007        Lab Results   Component Value Date    WBC 7.8 01/02/2022    HEMOGLOBIN 14.1 01/02/2022    HEMATOCRIT 42.1 01/02/2022    PLATELETCT 261 01/02/2022        Total time of the discharge process exceeds 39 minutes.

## 2022-01-04 NOTE — CARE PLAN
Problem: Pain - Standard  Goal: Alleviation of pain or a reduction in pain to the patient’s comfort goal  Note: Prn medication given as scheduled      Problem: Knowledge Deficit - Standard  Goal: Patient and family/care givers will demonstrate understanding of plan of care, disease process/condition, diagnostic tests and medications  Note: Monitor juan drain output, possible drain removal 1/4.      Problem: Knowledge Deficit - Standard  Goal: Patient and family/care givers will demonstrate understanding of plan of care, disease process/condition, diagnostic tests and medications  Note: Monitor juan drain output, possible drain removal 1/4.    The patient is Stable - Low risk of patient condition declining or worsening    Shift Goals  Clinical Goals: Monitor JUAN drain output, pain management  Patient Goals: Pain management  Family Goals: MALIHA    Progress made toward(s) clinical / shift goals:  monitor output     Patient is not progressing towards the following goals:

## 2022-01-27 LAB
FUNGUS SPEC CULT: NORMAL
FUNGUS SPEC FUNGUS STN: NORMAL
SIGNIFICANT IND 70042: NORMAL
SITE SITE: NORMAL
SOURCE SOURCE: NORMAL

## 2022-02-22 LAB
FINAL REPORT Q0603: NORMAL
PRELIMINARY RPT Q0601: NORMAL
RHODAMINE-AURAMINE STN SPEC: NORMAL

## 2023-05-11 ENCOUNTER — HOSPITAL ENCOUNTER (OUTPATIENT)
Facility: MEDICAL CENTER | Age: 63
End: 2023-05-11
Attending: UROLOGY
Payer: COMMERCIAL

## 2023-05-11 LAB — PSA SERPL-MCNC: 1.94 NG/ML (ref 0–4)

## 2023-05-11 PROCEDURE — 84153 ASSAY OF PSA TOTAL: CPT

## 2023-06-22 NOTE — ASSESSMENT & PLAN NOTE
- Patient was antibody positive with viral load 6.2 million.   - Transaminases normal, ALP slightly high.    Plan:  - Outpatient follow-up,   - Abstention from further drug use  - Establish care with PCP  - Regular follow up  - Labs per PCP   Madeleine Lynch  Cardiovascular Disease  [address] [place] [address], [address]  [place] [address]  Phone: [phone]  Fax: [phone]  Established Patient  Follow Up Time: 2 weeks

## 2024-01-01 NOTE — ASSESSMENT & PLAN NOTE
Problem: Infant Inpatient Plan of Care  Goal: Plan of Care Review  Outcome: Progressing  Goal: Patient-Specific Goal (Individualized)  Outcome: Progressing  Goal: Absence of Hospital-Acquired Illness or Injury  Outcome: Progressing  Goal: Optimal Comfort and Wellbeing  Outcome: Progressing  Goal: Readiness for Transition of Care  Outcome: Progressing     Problem:   Goal: Glucose Stability  Outcome: Progressing     Problem:   Goal: Demonstration of Attachment Behaviors  Outcome: Progressing     Problem:   Goal: Absence of Infection Signs and Symptoms  Outcome: Progressing     Problem:   Goal: Effective Oral Intake  Outcome: Progressing     Problem: [place]  Goal: Optimal Level of Comfort and Activity  Outcome: Progressing     Problem: [place]  Goal: Effective Oxygenation and Ventilation  Outcome: Progressing     Problem:   Goal: Skin Health and Integrity  Outcome: Progressing     Problem: [place]  Goal: Temperature Stability  Outcome: Progressing     Problem: RDS (Respiratory Distress Syndrome)  Goal: Effective Oxygenation  Outcome: Progressing     Problem:  Infant  Goal: Effective Family/Caregiver Coping  Outcome: Progressing  Goal: Optimal Circumcision Site Healing  Outcome: Progressing  Goal: Optimal Fluid and Electrolyte Balance  Outcome: Progressing  Goal: Blood Glucose Stability  Outcome: Progressing  Goal: Absence of Infection Signs and Symptoms  Outcome: Progressing  Goal: Neurobehavioral Stability  Outcome: Progressing  Goal: Optimal Growth and Development Pattern  Outcome: Progressing  Goal: Optimal Level of Comfort and Activity  Outcome: Progressing  Goal: Effective Oxygenation and Ventilation  Outcome: Progressing  Goal: Skin Health and Integrity  Outcome: Progressing  Goal: Temperature Stability  Outcome: Progressing     Problem: Enteral Nutrition  Goal: Absence of Aspiration Signs and Symptoms  Outcome: Progressing  Goal: Safe, Effective Therapy  Patient with an A1c of 5.9 on 9/28/2021  We will order new A1c.  No need to start insulin treatment at this time.  Monitor    1/2: A1c is 5.8.  Patient counseled on lifestyle modifications.   Delivery  Outcome: Progressing  Goal: Feeding Tolerance  Outcome: Progressing     Problem: Parenteral Nutrition  Goal: Effective Intravenous Nutrition Therapy Delivery  Outcome: Progressing     Problem: Mechanical Ventilation Invasive  Goal: Effective Communication  Outcome: Progressing  Goal: Optimal Device Function  Outcome: Progressing  Goal: Absence of Device-Related Skin or Tissue Injury  Outcome: Progressing  Goal: Absence of Ventilator-Induced Lung Injury  Outcome: Progressing     Problem: Infection Progression (Sepsis)  Goal: Absence of Infection Signs and Symptoms  Outcome: Progressing

## 2024-02-01 ENCOUNTER — APPOINTMENT (OUTPATIENT)
Dept: RADIOLOGY | Facility: MEDICAL CENTER | Age: 64
End: 2024-02-01
Attending: NURSE PRACTITIONER
Payer: COMMERCIAL

## 2024-02-28 ENCOUNTER — HOSPITAL ENCOUNTER (OUTPATIENT)
Dept: RADIOLOGY | Facility: MEDICAL CENTER | Age: 64
End: 2024-02-28
Attending: NURSE PRACTITIONER
Payer: OTHER MISCELLANEOUS

## 2024-02-28 DIAGNOSIS — M43.17 SPONDYLOLISTHESIS OF LUMBOSACRAL REGION: ICD-10-CM

## 2024-02-28 DIAGNOSIS — S39.012A STRAIN OF LUMBAR REGION, INITIAL ENCOUNTER: ICD-10-CM

## 2024-02-28 PROCEDURE — 72148 MRI LUMBAR SPINE W/O DYE: CPT

## 2024-04-23 ENCOUNTER — APPOINTMENT (OUTPATIENT)
Dept: ADMISSIONS | Facility: MEDICAL CENTER | Age: 64
End: 2024-04-23
Attending: ANESTHESIOLOGY
Payer: COMMERCIAL

## 2024-04-24 ENCOUNTER — PRE-ADMISSION TESTING (OUTPATIENT)
Dept: ADMISSIONS | Facility: MEDICAL CENTER | Age: 64
End: 2024-04-24
Attending: ANESTHESIOLOGY
Payer: COMMERCIAL

## 2024-04-24 RX ORDER — IBUPROFEN 400 MG/1
400 TABLET ORAL PRN
COMMUNITY

## 2024-04-24 RX ORDER — M-VIT,TX,IRON,MINS/CALC/FOLIC 27MG-0.4MG
1 TABLET ORAL DAILY
COMMUNITY

## 2024-04-29 ENCOUNTER — APPOINTMENT (OUTPATIENT)
Dept: RADIOLOGY | Facility: MEDICAL CENTER | Age: 64
End: 2024-04-29
Attending: ANESTHESIOLOGY
Payer: COMMERCIAL

## 2024-04-29 ENCOUNTER — HOSPITAL ENCOUNTER (OUTPATIENT)
Facility: MEDICAL CENTER | Age: 64
End: 2024-04-29
Attending: ANESTHESIOLOGY | Admitting: ANESTHESIOLOGY
Payer: COMMERCIAL

## 2024-04-29 VITALS
RESPIRATION RATE: 15 BRPM | TEMPERATURE: 97.2 F | HEIGHT: 76 IN | SYSTOLIC BLOOD PRESSURE: 106 MMHG | HEART RATE: 70 BPM | WEIGHT: 263.67 LBS | OXYGEN SATURATION: 97 % | DIASTOLIC BLOOD PRESSURE: 82 MMHG | BODY MASS INDEX: 32.11 KG/M2

## 2024-04-29 DIAGNOSIS — M54.16 LUMBAR RADICULOPATHY: ICD-10-CM

## 2024-04-29 LAB
ERYTHROCYTE [DISTWIDTH] IN BLOOD BY AUTOMATED COUNT: 47 FL (ref 35.9–50)
HCT VFR BLD AUTO: 57.6 % (ref 42–52)
HGB BLD-MCNC: 20.5 G/DL (ref 14–18)
INR PPP: 1.12 (ref 0.87–1.13)
MCH RBC QN AUTO: 31.8 PG (ref 27–33)
MCHC RBC AUTO-ENTMCNC: 35.6 G/DL (ref 32.3–36.5)
MCV RBC AUTO: 89.4 FL (ref 81.4–97.8)
PLATELET # BLD AUTO: 187 K/UL (ref 164–446)
PMV BLD AUTO: 10.1 FL (ref 9–12.9)
PROTHROMBIN TIME: 14.5 SEC (ref 12–14.6)
RBC # BLD AUTO: 6.44 M/UL (ref 4.7–6.1)
WBC # BLD AUTO: 8.7 K/UL (ref 4.8–10.8)

## 2024-04-29 PROCEDURE — 160002 HCHG RECOVERY MINUTES (STAT)

## 2024-04-29 PROCEDURE — 160046 HCHG PACU - 1ST 60 MINS PHASE II

## 2024-04-29 PROCEDURE — 85027 COMPLETE CBC AUTOMATED: CPT

## 2024-04-29 PROCEDURE — 36415 COLL VENOUS BLD VENIPUNCTURE: CPT

## 2024-04-29 PROCEDURE — 62284 INJECTION FOR MYELOGRAM: CPT

## 2024-04-29 PROCEDURE — 160047 HCHG PACU  - EA ADDL 30 MINS PHASE II

## 2024-04-29 PROCEDURE — 700117 HCHG RX CONTRAST REV CODE 255: Performed by: ANESTHESIOLOGY

## 2024-04-29 PROCEDURE — 72132 CT LUMBAR SPINE W/DYE: CPT

## 2024-04-29 PROCEDURE — 85610 PROTHROMBIN TIME: CPT

## 2024-04-29 RX ADMIN — IOHEXOL 10 ML: 180 INJECTION INTRAVENOUS at 14:40

## 2024-04-29 ASSESSMENT — PAIN DESCRIPTION - PAIN TYPE
TYPE: CHRONIC PAIN

## 2024-04-29 NOTE — DISCHARGE INSTRUCTIONS
Myelogram  A myelogram is an imaging test. This test checks for problems in the spinal cord and the places where nerves attach to the spinal cord (nerve roots).  A dye (contrast material) is put into your spine before the X-ray. This provides a clearer image for your doctor to see.  You may need this test if you have a spinal cord problem that cannot be diagnosed with other imaging tests. You may also have this test to check your spine after surgery.  Tell a doctor about:  Any allergies you have, especially to iodine.  All medicines you are taking, including vitamins, herbs, eye drops, creams, and over-the-counter medicines.  Any problems you or family members have had with anesthetic medicines or dye.  Any blood disorders you have.  Any surgeries you have had.  Any medical conditions you have or have had, including asthma.  Whether you are pregnant or may be pregnant.  What are the risks?  Generally, this is a safe procedure. However, problems may occur, including:  Infection.  Bleeding.  Allergic reaction to medicines or dyes.  Damage to your spinal cord or nerves.  Leaking of spinal fluid. This can cause a headache.  Damage to kidneys.  Seizures. This is rare.  What happens before the procedure?  Follow instructions from your doctor about what you cannot eat or drink. You may be asked to drink more fluids.  Ask your doctor about changing or stopping your normal medicines. This is important if you take diabetes medicines or blood thinners.  Plan to have someone take you home from the hospital or clinic.  If you will be going home right after the procedure, plan to have someone with you for 24 hours.  What happens during the procedure?  You will lie face down on a table.  Your doctor will find the best injection site on your spine. This is most often in the lower back.  This area will be washed with soap.  You will be given a medicine to numb the area (local anesthetic).  Your doctor will place a long needle into  the space around your spinal cord.  A sample of spinal fluid may be taken. This may be sent to the lab for testing.  The dye will be injected into the space around your spinal cord.  The exam table may be tilted. This helps the dye flow up or down your spine.  The X-ray will take images of your spinal cord.  A bandage (dressing) may be placed over the area where the dye was injected.  The procedure may vary among doctors and hospitals.  What can I expect after the procedure?  You may be monitored until you leave the hospital or clinic. This includes checking your blood pressure, heart rate, breathing rate, and blood oxygen level.  You may feel sore at the injection site. You may have a mild headache.  You will be told to lie flat with your head raised (elevated). This lowers the risk of a headache.  It is up to you to get the results of your procedure. Ask your doctor, or the department that is doing the procedure, when your results will be ready.  Follow these instructions at home:  Rest as told by your doctor. Lie flat with your head slightly elevated.  Do not bend, lift, or do hard work for 24-48 hours, or as told by your doctor.  Take over-the-counter and prescription medicines only as told by your doctor.  Take care of your bandage as told by your doctor.  Drink enough fluid to keep your pee (urine) pale yellow.  Bathe or shower as told by your doctor.  Contact a doctor if:  You have a fever.  You have a headache that lasts longer than 24 hours.  You feel sick to your stomach (nauseous).  You vomit.  Your neck is stiff.  Your legs feel numb.  You cannot pee.  You cannot poop (no bowel movement).  You have a rash.  You are itchy or sneezing.  Get help right away if:  You have new symptoms or your symptoms get worse.  You have a seizure.  You have trouble breathing.  Summary  A myelogram is an imaging test that checks for problems in the spinal cord and the places where nerves attach to the spinal cord (nerve  roots).  Before the procedure, follow instructions from your doctor. You will be told what not to eat or drink, or what medicines to change or stop.  After the procedure, you will be told to lie flat with your head raised (elevated). This will lower your risk of a headache.  Do not bend, lift, or do any hard work for 24-48 hours, or as told by your doctor.  Contact a doctor if you have a stiff neck or numb legs. Get help right away if your symptoms get worse, or you have a seizure or trouble breathing.  This information is not intended to replace advice given to you by your health care provider. Make sure you discuss any questions you have with your health care provider.  Document Revised: 04/11/2023 Document Reviewed: 02/27/2020  Elsevier Patient Education © 2023 [employer]

## 2024-04-29 NOTE — OR NURSING
1452 - Pt to PPU 4.  Attached to monitoring, VSS, breathing is calm and unlabored. Room air, states has some small amount back / leg pain but reports this is chronic in nature. Bandaid to back is clean / dry / intact.      1500 - Pt had snack and juice, VSS, room air, updated friend over the phone.     1705 - Pt stable to discharge. Bandaid remains clean / dry / intact. Instructions given, patient and friend verbalize understanding.  IV And armbands removed. Taken via wheelchair to car.  Pt has all belongings with them.

## 2025-03-22 NOTE — CARE PLAN
Problem: Safety  Goal: Will remain free from falls  Outcome: PROGRESSING AS EXPECTED  Note: Fall precautions in place: bed locked and in the lowest position, call light and belongings within reach, treaded socks on pt, pt educated on use of call light for needs.      Problem: Pain Management  Goal: Pain level will decrease to patient's comfort goal  Outcome: PROGRESSING SLOWER THAN EXPECTED  Note: Pt upset at titrating down of pain medications today, stating the MD did not make him aware this was happening. Pt continues to c/o pain in right leg 8/10, only has 1mg Dilaudid PRN BID now. Made pt aware he also has PRN Oxy if needed, pt stated Oxy does not help with pain and declined administration.       [facility]  ORTHOPEDIC UPPER EXTREMITY POST OP NOTE   Admission Date: 3/20/2025  Today's Date: 3/22/2025    Hospital Day: Hospital Day: 3  Post Operative Day: 2 Days Post-Op   Attending: Camilo Lawrence MD     Surgeons and Role:     * Camilo Lawrence MD - Primary   Right shoulder   Revision shoulder arthroplasty   Soft tissue biopsy     Subjective    SUBJECTIVE     Noam Easley is a 69 year old male admitted with right shoulder prosthetic joint infection status post RIGHT revision shoulder arthroplasty on 3/20/2025.    POD2.    Up in bed, doing well. States he has finally caught up to the pain and it is now controlled. Reports shoulder pain is mild, has more chronic pain in the head/neck. He has some numbness in C8 distribution from prior to surgery from previous injuries. No changes in numbness in right arm since surgery. Drain fell/was accidentally pulled out yesterday. He has been working with therapy. Denies fever/chills, chest pain or shortness of breath, lightheadedness/dizziness, nausea/vomiting.     ADD 1315: Notified by RN new swelling around surgical site. Patient has been active and has been icing. Patient denies fever/chills. No change in sensation or motor symptoms.     Objective    PHYSICAL EXAMINATION     Vital Signs Last Value 24 Hour Range   Temperature 98.6 °F (37 °C) Temp  Min: 98 °F (36.7 °C)  Max: 98.8 °F (37.1 °C)   Pulse 70 Pulse  Min: 65  Max: 75   Respiratory 16 Resp  Min: 16  Max: 18   Blood Pressure (!) 145/82 BP  Min: 124/76  Max: 145/82   Pulse Oximetry 100 % SpO2  Min: 95 %  Max: 100 %     General:  Awake, alert, no acute distress.    Musculoskeletal:    Right Upper Extremity: Sling in place. Dressing intact, clean, and dry over the shoulder with scant old, bloody drainage at the edge of the foam tape distally and proximally. No active drainage, no erythema. Moderate swelling about the shoulder. No palpable fluctuance. Gauze/tegaderm over drain site clean, dry,  intact.  +TU/OK/2-3x, +SILT IF/FDWS/SF,  2+ Radial pulse. Capillary refill < 2 seconds.    ADD 1315:   Dressing intact, clean, dry with mild amount old, bloody drainage at ends of foam tape distally and proximally. Dressings removed. No active drainage, no erythema. Moderate amount of swelling about the shoulder. Palpable somewhat firm area medial distal incision, near the axilla, likely consistent with hematoma formation.   Gauze/tegaderm over drain site clean, dry, intatct  +TU/OK/2-3x, +SILT IF/FDWS/SF, 2+ Radial pulse. Capillary refill <2 seconds.       Document Information    Photographic Image: Photo/Graph/Diagram   Right shoulder   03/22/2025 13:02   Attached To:   Hospital Encounter on 3/20/25   Source Information    Chanda Gee, LYN  [facility]   Document History        Output   Last Value   Drain [REMOVED] Drain 03/20/25 Right Accordion (e.g. Hemovac, etc)-Output (ml): 25 ml (emptied early doing to drain not staying compressed, JENNYFER Zamora notified.) (03/21/25 1351)            TEST RESULTS  Labs Since Admission  Micro Summary  Imaging      Labs:   Recent Labs   Lab 03/22/25  0611 03/21/25  0524 03/19/25  1006   WBC 6.8 9.8 7.6   HGB 10.5* 10.7* 12.4*   HCT 32.7* 33.6* 38.5*   * 621* 607*       Recent Labs   Lab 03/22/25  0611 03/21/25  0524   SODIUM 138 136   POTASSIUM 4.4 4.3   CHLORIDE 103 102   CO2 29 28   CALCIUM 9.0 8.9   GLUCOSE 121* 107*   BUN 19 22*   CREATININE 1.06 1.27*   MG  --  2.2        Recent Labs   Lab 03/19/25  1441   PT 11.2   INR 1.1   PTT 29       Radiology: Imaging studies have been reviewed and pertinent findings discussed in the Assessment and Plan.  Results for orders placed or performed during the hospital encounter of 03/20/25 (from the past 48 hour(s))   XR SHOULDER 1 VIEW RIGHT    Impression    IMPRESSION:  Right shoulder arthroplasty instrumentation appears in good alignment.      Electronically Signed by: Refugio Underwood MD  Signed on: 3/20/2025 4:55  PM  Created on Workstation ID: [id number]  Signed on Workstation ID: [id number]         ASSESSMENT AND PLAN   Status post reverse total shoulder replacement, right [Z96.611]  Prosthetic joint infection (CMD) [T84.50XA]   POD#2 s/p RIGHT shoulder I&D, revision shoulder arthroplasty with liner and glenosphere exchange, soft tissue biopsy  Acute blood loss anemia. Hgb 10.5 today. Stable, continue to monitor. Transfuse for Hgb < 7.0 or symptomatic.     Continue pain control as needed with scheduled tylenol, PTA gabapentin, PTA flexeril, and OxyIR prn  ADD: Surgical dressing removed. Xeroform, 4x4, ABD, medipore tape placed. Reinforce if saturated.   PT/OT eval and treat  Infectious Disease consulted for antibiotic recommendations  Placed on broad spectrum IV antibiotics for now (Vanco and cefepime)  Await surgical cultures and antibiotic plan    Appreciate Hospitalist following for medical comanagement     Weightbearing Status:  NWB right upper extremity. Sling to remain in place until outpatient follow up.  Remove sling 3-4x daily to work on elbow, wrist, and hand ROM     VTE Prophylaxis:   ANA/SCDs inpatient. Aspirin 325mg daily x 2 weeks    DISCHARGE PLANNING   Anticipated discharge destination: home  Anticipated discharge date: TBD pending surgical culture and antibiotic plan. Discussed with patient that he will likely be in the hospital through the weekend.     Patient is requiring an additional midnight of hospital level of care and is not appropriate for discharge to an outpatient care setting due to the need for IV antibiotics while awaiting surgical cultures and development of an outpatient antibiotic plan.        Chanda Gee PA-C  [facility]  Supervising Physician for this patient encounter: Zana Elias MD      Please page me for urgent needs.    Date: 3/22/2025 Time: 9:06 AM

## 2025-03-30 NOTE — PROGRESS NOTES
"Pharmacy Kinetics 60 y.o. male on vancomycin day # 4 2020    Currently on Vancomycin 1500 mg iv q12hr (0400 & 1600)    Indication for Treatment: SSTI    Proposed duration of treatment: Likely 2 weeks of IV antibiotics and then followed by 4 weeks PO therapy per ID    Pertinent history per medical record: Admitted on 2020 for multiple abscesses on his upper and lower extremity.  He has a 4 mm ulcer with serosanguineous drainage on his left lateral forearm. S/P irrigation and debridement of left antecubital fossa and superficial right thigh wound  on  with a wound vac on both the left elbow and right thigh. He has a history of IV drug abuse, MSSA cellulitis, right lower extremity necrotizing fasciitis s/p fasciotomy in 2020. ID is consulting on the case.     Other antibiotics: N/A    Allergies: Codeine, Spinach, and Pcn [penicillins]     List concerns for renal function: BUN/SCr ratio > 20:1 (~30) & Obesity (BMI 31.8)    Pertinent cultures to date:   9/15 Left elbow wound w/ GAS and MRSA   BC x2 NGTD   Right hip wound w/ GAS    Recent Labs     20  0957 20  0659   WBC 6.3 5.7   NEUTSPOLYS 68.90  --      Recent Labs     20  0957 20  0659   BUN 23* 19   CREATININE 0.74 0.63     Recent Labs     20  1601   VANCOTROUGH 11.1       Intake/Output Summary (Last 24 hours) at 2020 0810  Last data filed at 2020 2100  Gross per 24 hour   Intake 240 ml   Output 300 ml   Net -60 ml      /77   Pulse 64   Temp 36.4 °C (97.6 °F) (Temporal)   Resp 18   Ht 1.93 m (6' 4\")   Wt 118.5 kg (261 lb 3.9 oz)   SpO2 94%  Temp (24hrs), Av.6 °C (97.9 °F), Min:36.4 °C (97.6 °F), Max:36.9 °C (98.4 °F)      A/P   1. Vancomycin dose change: continue Vancomycin 1500 mg Q 12 hr (13 mg/kg)  2.  Next vancomycin level:  at 0330  3. Goal trough: 10-15 mcg/ml  4. Patient has a peripheral line  5. Comments: Renal function is stable and has improved since admission. " Pharmacy will continue to follow and make appropriate adjustments based on renal indices, trough levels, and other patient factors influencing the dosing of vancomycin.    Mehnaz Ventura, PharmD  T: [phone]     No - the patient is unable to be screened due to medical condition

## (undated) DEVICE — GLOVE, BIOGEL ECLIPSE, SZ 7.0, PF LTX (50/BX)

## (undated) DEVICE — MASK ANESTHESIA ADULT  - (100/CA)

## (undated) DEVICE — DRAPE IOBAN II INCISE 23X17 - (10EA/BX 4BX/CA)

## (undated) DEVICE — GLOVE BIOGEL INDICATOR SZ 7SURGICAL PF LTX - (50/BX 4BX/CA)

## (undated) DEVICE — ELECTRODE 850 FOAM ADHESIVE - HYDROGEL RADIOTRNSPRNT (50/PK)

## (undated) DEVICE — SUTURE 4-0 CHROMIC GUTSH 27 (36PK/BX)"

## (undated) DEVICE — GLOVE BIOGEL PI ORTHO SZ 8.5 PF LF (40/BX)

## (undated) DEVICE — BLADE SURGICAL #15 - (50/BX 3BX/CA)

## (undated) DEVICE — GLOVE BIOGEL ECLIPSE  PF LATEX SIZE 6.5 (50PR/BX)

## (undated) DEVICE — SET EXTENSION WITH 2 PORTS (48EA/CA) ***PART #2C8610 IS A SUBSTITUTE*****

## (undated) DEVICE — DERMACARRIER 8 (NEW MESHGFT) - (10/BX)

## (undated) DEVICE — TUBING CLEARLINK DUO-VENT - C-FLO (48EA/CA)

## (undated) DEVICE — LIGASURE TISSUE FUSION  - SINGLE USE (6/CA)

## (undated) DEVICE — DRESSING, WOUND VAC MED.

## (undated) DEVICE — CANISTER SUCTION 3000ML MECHANICAL FILTER AUTO SHUTOFF MEDI-VAC NONSTERILE LF DISP  (40EA/CA)

## (undated) DEVICE — SUCTION INSTRUMENT YANKAUER BULBOUS TIP W/O VENT (50EA/CA)

## (undated) DEVICE — DRESSING TRANSPARENT FILM TEGADERM 4 X 4.75" (50EA/BX)"

## (undated) DEVICE — WRAP COBAN SELF-ADHERENT 6 IN X  5YDS STERILE TAN (12/CA)

## (undated) DEVICE — SENSOR SPO2 NEO LNCS ADHESIVE (20/BX) SEE USER NOTES

## (undated) DEVICE — SUTURE GENERAL

## (undated) DEVICE — DRESSING PETROLEUM GAUZE 5 X 9" (50EA/BX 4BX/CA)"

## (undated) DEVICE — CORDS BIPOLAR COAGULATION - 12FT STERILE DISP. (10EA/BX)

## (undated) DEVICE — GUARD SPLASH FOR PULSEVAC (12EA/PK)

## (undated) DEVICE — DRAPE LAPAROTOMY T SHEET - (12EA/CA)

## (undated) DEVICE — PROTECTOR ULNA NERVE - (36PR/CA)

## (undated) DEVICE — GOWN WARMING STANDARD FLEX - (30/CA)

## (undated) DEVICE — GLOVE SZ 7 BIOGEL PI MICRO - PF LF (50PR/BX 4BX/CA)

## (undated) DEVICE — BANDAGE ELASTIC 4 HONEYCOMB - 4"X5YD LF (20/CA)"

## (undated) DEVICE — GOWN SURGEONS X-LARGE - DISP. (30/CA)

## (undated) DEVICE — PACK MINOR BASIN - (2EA/CA)

## (undated) DEVICE — BANDAGE ELASTIC STERILE VELCRO 6 X 5 YDS (25EA/CA)

## (undated) DEVICE — BOVIE BLADE COATED - (50/PK)

## (undated) DEVICE — GLOVE BIOGEL SZ 6 PF LATEX - (50EA/BX 4BX/CA)

## (undated) DEVICE — STAPLER SKIN DISP - (6/BX 10BX/CA) VISISTAT

## (undated) DEVICE — SUTURE 3-0 ETHILON PS-1 (36PK/BX)

## (undated) DEVICE — CHLORAPREP 26 ML APPLICATOR - ORANGE TINT(25/CA)

## (undated) DEVICE — RESERVOIR SUCTION 100 CC - SILICONE (20EA/CA)

## (undated) DEVICE — CONTAINER SPECIMEN BAG OR - STERILE 4 OZ W/LID (100EA/CA)

## (undated) DEVICE — DRESSING TEGADERM 8 X 12 - (10/BX 8BX/CA)

## (undated) DEVICE — SUTURE 3-0 ETHILON FSLX 30 (36PK/BX)"

## (undated) DEVICE — GLOVE BIOGEL PI INDICATOR SZ 7.5 SURGICAL PF LF -(50/BX 4BX/CA)

## (undated) DEVICE — MASK, LARYNGEAL AIRWAY #4

## (undated) DEVICE — DRESSING 3X8 ADAPTIC GAUZE - NON-ADHERING (36/PK 6PK/BX)

## (undated) DEVICE — PAD LAP STERILE 18 X 18 - (5/PK 40PK/CA)

## (undated) DEVICE — SODIUM CHL IRRIGATION 0.9% 1000ML (12EA/CA)

## (undated) DEVICE — KIT ANESTHESIA W/CIRCUIT & 3/LT BAG W/FILTER (20EA/CA)

## (undated) DEVICE — GLOVE BIOGEL SZ 6.5 SURGICAL PF LTX (50PR/BX 4BX/CA)

## (undated) DEVICE — GLOVE BIOGEL SZ 7.5 SURGICAL PF LTX - (50PR/BX 4BX/CA)

## (undated) DEVICE — SYRINGE 10 ML CONTROL LL (25EA/BX 4BX/CA)

## (undated) DEVICE — LACTATED RINGERS INJ 1000 ML - (14EA/CA 60CA/PF)

## (undated) DEVICE — SUTURE 2-0 VICRYL PLUS CTX - 8 X 18 INCH (12/BX)

## (undated) DEVICE — PACK LOWER EXTREMITY - (2/CA)

## (undated) DEVICE — DRAPE SURGICAL U 77X120 - (10/CA)

## (undated) DEVICE — PADDING CAST 4 IN STERILE - 4 X 4 YDS (24/CA)

## (undated) DEVICE — DRAIN PENROSE STERILE 1/4 X - 18 IN  (25EA/BX)

## (undated) DEVICE — DETERGENT RENUZYME PLUS 10 OZ PACKET (50/BX)

## (undated) DEVICE — ELECTRODE DUAL RETURN W/ CORD - (50/PK)

## (undated) DEVICE — BLADE DERMATOME NEW AIR (10/BX)

## (undated) DEVICE — TRAY SKIN SCRUB PVP WET (20EA/CA) PART #DYND70356 DISCONTINUED

## (undated) DEVICE — SUTURE 0 VICRYL PLUS CT-1 - 36 INCH (36/BX)

## (undated) DEVICE — GLOVE BIOGEL PI INDICATOR SZ 7.0 SURGICAL PF LF - (50/BX 4BX/CA)

## (undated) DEVICE — SLEEVE, VASO, THIGH, MED

## (undated) DEVICE — DRAPE LARGE 3 QUARTER - (20/CA)

## (undated) DEVICE — BLADE SURGICAL #10 - (50/BX)

## (undated) DEVICE — GLOVE BIOGEL PI INDICATOR SZ 6.5 SURGICAL PF LF - (50/BX 4BX/CA)

## (undated) DEVICE — HEAD HOLDER JUNIOR/ADULT

## (undated) DEVICE — SUTURE 3-0 CHROMIC GUT SH 27 (36PK/BX)"

## (undated) DEVICE — SUTURE 4-0 PROLENE PS-2 18 (36PK/BX)"

## (undated) DEVICE — SODIUM CHL. IRRIGATION 0.9% 3000ML (4EA/CA 65CA/PF)

## (undated) DEVICE — DRAPE U ORTHOPEDIC - (10/BX)

## (undated) DEVICE — DRESSING KIT V.A.C. SENSA T.R.A.C. SMALL (10EA/CA)

## (undated) DEVICE — SET LEADWIRE 5 LEAD BEDSIDE DISPOSABLE ECG (1SET OF 5/EA)

## (undated) DEVICE — BANDAGE ELASTIC 6 HONEYCOMB - 6X5YD LF (20/CA)"

## (undated) DEVICE — BLADE SURGICAL CLIPPER - (50EA/CA)

## (undated) DEVICE — GLOVE BIOGEL PI ORTHO SZ 6 1/2 SURGICAL PF LF (40PR/BX)

## (undated) DEVICE — GLOVE BIOGEL INDICATOR SZ 7.5 SURGICAL PF LTX - (50PR/BX 4BX/CA)

## (undated) DEVICE — GLOVE BIOGEL ECLIPSE PF LATEX SIZE 9.0

## (undated) DEVICE — SPONGE GAUZESTER 4 X 4 4PLY - (128PK/CA)

## (undated) DEVICE — GLOVE BIOGEL PI ORTHO SZ 7.5 PF LF (40PR/BX)

## (undated) DEVICE — VAC CANISTER W/GEL 500ML - FITS NEW MACHINES (10EA/CA)

## (undated) DEVICE — NEEDLE NON SAFETY HYPO 22 GA X 1 1/2 IN (100/BX)

## (undated) DEVICE — SUTURE 0 COATED VICRYL PLUS - CTX CR(12/BX)18 IN

## (undated) DEVICE — SHEET BILATERAL 76X120 - (12/CA)

## (undated) DEVICE — NEPTUNE 4 PORT MANIFOLD - (20/PK)

## (undated) DEVICE — SPONGE XRAY 8X4 STERL. 12PL - (10EA/TY 80TY/CA)

## (undated) DEVICE — SUTURE 3-0 ETHILON FS-1 - (36/BX) 30 INCH

## (undated) DEVICE — SUTURE 2-0 PDS II CT-2 - (36/BX)

## (undated) DEVICE — CANISTER INFO VAC 1000ML (5EA/CA)

## (undated) DEVICE — GLOVE SZ 8 BIOGEL PI MICRO - PF LF (50PR/BX)

## (undated) DEVICE — HANDPIECE 10FT INTPLS SCT PLS IRRIGATION HAND CONTROL SET (6/PK)

## (undated) DEVICE — KIT ROOM DECONTAMINATION

## (undated) DEVICE — SUTURE 0 ETHIBOND MO6 C/R - (12/BX) 8-18 INCH ETHICON

## (undated) DEVICE — WATER IRRIG. STER. 1500 ML - (9/CA)

## (undated) DEVICE — TOWEL STOP TIMEOUT SAFETY FLAG (40EA/CA)

## (undated) DEVICE — SUTURE 2-0 ETHIBOND GREEN CT-2 TAPER (36PK/BX)

## (undated) DEVICE — SET IRRIGATION CYSTOSCOPY TUBE L80 IN (20EA/CA)

## (undated) DEVICE — DRAPE SURG STERI-DRAPE 7X11OD - (40EA/CA)

## (undated) DEVICE — MAT PATIENT POSITIONING PREVALON (10EA/CA)

## (undated) DEVICE — GLOVE COTTON STERILE (50/CA)

## (undated) DEVICE — TOWELS CLOTH SURGICAL - (4/PK 20PK/CA)

## (undated) DEVICE — TIP INTPLS HFLO ML ORFC BTRY - (12/CS)  FOR SURGILAV

## (undated) DEVICE — SUTURE 3-0 VICRYL PLUS SH - 27 INCH (36/BX)

## (undated) DEVICE — DEPRESSOR TONGUE ADLT STERILE - 6 IN (100EA/BX)

## (undated) DEVICE — DRESSING KIT V.A.C. SENSA T.R.A.C. LARGE (10EA/CA)

## (undated) DEVICE — SWAB ANAEROBIC SPEC.COLLECTOR - (25/PK 4PK/CA 100EA/CA)

## (undated) DEVICE — SHEET PEDIATRIC LAPAROTOMY - (10/CA)

## (undated) DEVICE — SUTURE 2-0 VICRYL PLUS CT-1 36 (36PK/BX)"

## (undated) DEVICE — SPONGE PEANUT - (5/PK 50PK/CA)

## (undated) DEVICE — PADDING CAST 6 IN STERILE - 6 X 4 YDS (24/CA)

## (undated) DEVICE — SUTURE 3-0 VICRYL PLUS SH - 8X 18 INCH (12/BX)

## (undated) DEVICE — SOD. CHL. INJ. 0.9% 250 ML - (36/CA 50CA/PF)

## (undated) DEVICE — PACK SINGLE BASIN - (6/CA)

## (undated) DEVICE — STERI STRIP COMPOUND BENZOIN - TINCTURE 0.6ML WITH APPLICATOR (40EA/BX)

## (undated) DEVICE — HEMOSTAT ARISTA PWD 5 GRAM - (5/BX)

## (undated) DEVICE — SUTURE 4-0 MONOCRYL PLUS PS-2 - 27 INCH (36/BX)